# Patient Record
Sex: MALE | Race: WHITE | NOT HISPANIC OR LATINO | Employment: FULL TIME | URBAN - METROPOLITAN AREA
[De-identification: names, ages, dates, MRNs, and addresses within clinical notes are randomized per-mention and may not be internally consistent; named-entity substitution may affect disease eponyms.]

---

## 2017-06-22 ENCOUNTER — ALLSCRIPTS OFFICE VISIT (OUTPATIENT)
Dept: OTHER | Facility: OTHER | Age: 62
End: 2017-06-22

## 2017-09-12 ENCOUNTER — GENERIC CONVERSION - ENCOUNTER (OUTPATIENT)
Dept: OTHER | Facility: OTHER | Age: 62
End: 2017-09-12

## 2018-01-11 NOTE — MISCELLANEOUS
Chief Complaint  Chief Complaint Free Text Note Form: 09/13/2016, Telephone communication call completed, Patient will call back tomorrow to schedule a follow up with Dr Faisal Denson/[place]      History of Present Illness  TCM Communication [facility]: The patient is being contacted for follow-up after hospitalization  Hospital records were not available  He was hospitalized at [facility]  The dates of hospitalization:, date of admission: 09/02/2016, date of discharge: 09/10/2016  Diagnosis: fever, infection, and sepsis  He was discharged to home, home health care  Medications were not reviewed today  He did not schedule a follow up appointment  Follow-up appointments with other specialists: Dr Corinne Robins and Dr Jese Lyman  The patient is currently asymptomatic  Referrals Needed:  none  Counseling was provided to the patient  Topics counseled included activities of daily living and importance of compliance with treatment  Communication performed and completed by JITENDRA Denson/PARIS      Active Problems    1  Achalasia and cardiospasm (530 0) (K22 0)   2  Acid reflux disease (530 81) (K21 9)   3  Allergic rhinitis due to pollen (477 0) (J30 1)   4  Anemia (285 9) (D64 9)   5  Back pain (724 5) (M54 9)   6  Cervical spinal stenosis (723 0) (M48 02)   7  Chronic back pain greater than 3 months duration (724 5,338 29) (M54 9,G89 29)   8  Chronic use of opiate drugs therapeutic purposes (V58 69) (Z79 899)   9  Disc displacement, lumbar (722 10) (M51 26)   10  Displacement of lumbosacral intervertebral disc with myelopathy (722 73) (M51 06)   11  Generalized muscle weakness (728 87) (M62 81)   12  Generalized osteoarthritis of multiple sites (715 09) (M15 9)   13  Hereditary hemochromatosis (275 01) (E83 110)   14  Hypercholesterolemia (272 0) (E78 0)   15  Hypertension (401 9) (I10)   16  Insomnia (780 52) (G47 00)   17  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)   18   Obstructive sleep apnea syndrome (327 23) (G47 33)   19  Osteoarthrosis of hip (715 35) (M16 9)   20  Pain and swelling of lower leg (729 5,729 81) (M79 669,M79 89)    Past Medical History    1  History of Contreras's esophagus (530 85) (K22 70)   2  History of Cervical nerve root injury (953 0) (S14 2XXA)   3  History of Colon cancer screening (V76 51) (Z12 11)   4  History of Myalgia and myositis (729 1)    Surgical History    1  History of Appendectomy   2  History of Esophagogastric Fundoplasty Nissen Fundoplication   3  History of Laminectomy Lumbar   4  History of Small Bowel Resection   5  History of Surgery Vas Deferens Vasectomy   6  History of Total Hip Replacement Right    Family History  Mother    1  Family history of    2  Family history of Diabetes mellitus   3  Family history of depression (V17 0) (Z81 8)   4  Family history of Hypertension   5  Family history of Stroke  Father    6  Family history of Aneurysm   7  Family history of    6  Family history of brain aneurysm (V17 1) (Z82 49)   9  Family history of Stroke  Sibling    10  Family history of Hypertension   11  Family history of Spinal stenosis   12  Family history of Thyroid disorder  Brother    15  Family history of brain aneurysm (V17 1) (Z82 49)   14  Family history of depression (V17 0) (Z81 8)  Maternal Grandmother    15  Family history of Myocardial infarction  Paternal Grandfather    12  Family history of TIA (transient ischemic attack)  Family History    17  Family history of CAD (coronary artery disease)   25  Denied: Family history of Drug use    Social History    · Dental care, regularly   · Drinks coffee   · Exercises moderately 3 or more times a week   · Former smoker (V15 82) (U57 493)   ·    · No alcohol use   · No drug use   · Occupation   · Primary spoken language English   · Vegetarian diet (V49 89) (Z78 9)    Current Meds   1   Benicar HCT 40-25 MG Oral Tablet; TAKE 1 TABLET DAILY  Requested for: 43Frv6216;   Last Rx:97Lxm2751 Ordered 2  Hydrocodone-Acetaminophen  MG Oral Tablet; TAKE 1 TO 2 TABLETS EVERY 4   HOURS AS NEEDED FOR PAIN;   Therapy: 85Wtz7465 to (Evaluate:40Qwk3425); Last Rx:16Wqj9123 Ordered   3  Meloxicam 15 MG Oral Tablet; TAKE 1 TABLET DAILY WITH FOOD  Requested for:   06Erc1412; Last Rx:27Gsd1813 Ordered   4  Multi Vitamin Mens Oral Tablet; TAKE 1 TABLET DAILY; Therapy: (Teressata Modest) to Recorded   5  Omega-3-6-9 Oral Capsule; Take 1 capsule twice daily  Requested for: 16Apr2015; Last   Rx:43Fnl2048 Ordered   6  OxyCONTIN 10 MG Oral Tablet ER 12 Hour Abuse-Deterrent; TAKE 1 TABLET TWICE   DAILY; Therapy: 87Mlg1064 to (Evaluate:52Zku5383); Last Rx:34Eug1439 Ordered   7  OxyCONTIN 20 MG Oral Tablet ER 12 Hour Abuse-Deterrent; TAKE 1 TABLET EVERY 12   HOURS; Therapy: 08Cfs7749 to (Evaluate:61Apb1816); Last Rx:98Ouj6181 Ordered   8  Viagra 50 MG Oral Tablet; Therapy: (Oneta Modest) to Recorded   9  Wellbutrin  MG Oral Tablet Extended Release 24 Hour; TAKE 1 TABLET DAILY; Therapy: (Recorded:73Trk8072) to Recorded   10  Zantac 150 MG Oral Tablet; TAKE 1 TABLET AT BEDTIME; Therapy: (Teressata Modest) to Recorded   11  Zegerid  MG Oral Capsule; TAKE 1 CAPSULE DAILY; Therapy: (Teressata Modest) to Recorded   12  Zegerid  MG Oral Capsule; TAKE 1 CAPSULE DAILY; Therapy: (Recorded:02Xba9681) to Recorded    Allergies    1   No Known Drug Allergies    Signatures   Electronically signed by : Russ Padron MD; Sep 13 2016  9:07PM EST                       (Author)

## 2018-01-11 NOTE — RESULT NOTES
Message   PLEASE CALL   IRON STORES WERE OK   CAN STOP IRON     Verified Results  (1) CBC/PLT/DIFF 28Oct2016 12:00AM [facility]     Test Name Result Flag Reference   WHITE BLOOD CELL COUNT 5 1 Thousand/uL  3 8-10 8   RED BLOOD CELL COUNT 3 90 Million/uL L 4 20-5 80   HEMOGLOBIN 11 6 g/dL L 13 2-17 1   HEMATOCRIT 35 3 % L 38 5-50 0   MCV 90 5 fL  80 0-100 0   MCH 29 8 pg  27 0-33 0   MCHC 32 9 g/dL  32 0-36 0   RDW 14 9 %  11 0-15 0   PLATELET COUNT 664 Thousand/uL  140-400   MPV 8 2 fL  7 5-11 5   ABSOLUTE NEUTROPHILS 3029 cells/uL  5091-6326   ABSOLUTE LYMPHOCYTES 1112 cells/uL  850-3900   ABSOLUTE MONOCYTES 495 cells/uL  200-950   ABSOLUTE EOSINOPHILS 439 cells/uL     ABSOLUTE BASOPHILS 26 cells/uL  0-200   NEUTROPHILS 59 4 %     LYMPHOCYTES 21 8 %     MONOCYTES 9 7 %     EOSINOPHILS 8 6 %     BASOPHILS 0 5 %       (1) COMPREHENSIVE METABOLIC PANEL [id number] 91:18XN [facility]     Test Name Result Flag Reference   GLUCOSE 81 mg/dL  65-99   Fasting reference interval   UREA NITROGEN (BUN) 25 mg/dL  7-25   CREATININE 1 00 mg/dL  0 70-1 25   For patients >52years of age, the reference limit  for Creatinine is approximately 13% higher for people  identified as -American  eGFR NON-AFR   AMERICAN 81 mL/min/1 73m2  > OR = 60   eGFR AFRICAN AMERICAN 94 mL/min/1 73m2  > OR = 60   BUN/CREATININE RATIO   3-98   NOT APPLICABLE (calc)   SODIUM 139 mmol/L  135-146   POTASSIUM 4 6 mmol/L  3 5-5 3   CHLORIDE 103 mmol/L     CARBON DIOXIDE 26 mmol/L  20-31   CALCIUM 9 2 mg/dL  8 6-10 3   PROTEIN, TOTAL 6 3 g/dL  6 1-8 1   ALBUMIN 4 1 g/dL  3 6-5 1   GLOBULIN 2 2 g/dL (calc)  1 9-3 7   ALBUMIN/GLOBULIN RATIO 1 9 (calc)  1 0-2 5   BILIRUBIN, TOTAL 0 3 mg/dL  0 2-1 2   ALKALINE PHOSPHATASE 68 U/L     AST 28 U/L  10-35   ALT 25 U/L  9-46     (1) IRON 28Oct2016 12:00AM [facility]     Test Name Result Flag Reference   IRON, TOTAL 82 mcg/dL

## 2018-01-12 VITALS
RESPIRATION RATE: 18 BRPM | SYSTOLIC BLOOD PRESSURE: 120 MMHG | HEIGHT: 67 IN | DIASTOLIC BLOOD PRESSURE: 80 MMHG | WEIGHT: 15 LBS | BODY MASS INDEX: 2.35 KG/M2 | HEART RATE: 77 BPM | TEMPERATURE: 98.5 F

## 2018-01-18 NOTE — RESULT NOTES
Message   PLEASE CALL   REVIEWED BW   CHOL   GOOD NUMBER   LIVER FUNCTION, KIDNEY FUNCTION, THYROID CHECK   IRON STORES LOW   CONSIDER TAKING IRON SUPPLEMENT TWICE A WEEK   THANKS     Verified Results  (1) COMPREHENSIVE METABOLIC PANEL [id number] 47:41KP Lizabeth Mink     Test Name Result Flag Reference   GLUCOSE 81 mg/dL  65-99   Fasting reference interval   UREA NITROGEN (BUN) 25 mg/dL  7-25   CREATININE 1 31 mg/dL H 0 70-1 25   For patients >52years of age, the reference limit  for Creatinine is approximately 13% higher for people  identified as -American  eGFR NON-AFR  AMERICAN 58 mL/min/1 73m2 L > OR = 60   eGFR AFRICAN AMERICAN 68 mL/min/1 73m2  > OR = 60   BUN/CREATININE RATIO 19 (calc)  6-22   SODIUM 136 mmol/L  135-146   POTASSIUM 4 1 mmol/L  3 5-5 3   CHLORIDE 99 mmol/L     CARBON DIOXIDE 26 mmol/L  19-30   CALCIUM 9 6 mg/dL  8 6-10 3   PROTEIN, TOTAL 6 9 g/dL  6 1-8 1   ALBUMIN 4 6 g/dL  3 6-5 1   GLOBULIN 2 3 g/dL (calc)  1 9-3 7   ALBUMIN/GLOBULIN RATIO 2 0 (calc)  1 0-2 5   BILIRUBIN, TOTAL 0 7 mg/dL  0 2-1 2   ALKALINE PHOSPHATASE 61 U/L     AST 22 U/L  10-35   ALT 20 U/L  9-46     (1) IRON [id number] 12:00AM Lizabeth Mink     Test Name Result Flag Reference   IRON, TOTAL 81 mcg/dL       (1) LIPID PANEL, FASTING [id number] 12:00AM Lizabeth Mink     Test Name Result Flag Reference   CHOLESTEROL, TOTAL 165 mg/dL  125-200   HDL CHOLESTEROL 68 mg/dL  > OR = 40   TRIGLICERIDES 54 mg/dL  <970   LDL-CHOLESTEROL 86 mg/dL (calc)  <130   Desirable range <100 mg/dL for patients with CHD or  diabetes and <70 mg/dL for diabetic patients with  known heart disease  CHOL/HDLC RATIO 2 4 (calc)  < OR = 5 0   NON HDL CHOLESTEROL 97 mg/dL (calc)     Target for non-HDL cholesterol is 30 mg/dL higher than   LDL cholesterol target       (1) PSA (SCREEN) (Dx V76 44 Screen for Prostate Cancer) [id number] 12:00AM Lizabeth Mink     Test Name Result Flag Reference   PSA, TOTAL 2 1 ng/mL  < OR = 4 0   This test was performed using the Siemens  chemiluminescent method  Values obtained from  different assay methods cannot be used  interchangeably  PSA levels, regardless of  value, should not be interpreted as absolute  evidence of the presence or absence of disease  (Q) SED RATE BY MODIFIED WESTERGREN [id number] 12:00AM Loraine Saint     Test Name Result Flag Reference   SED RATE BY MODIFIED$FARHANAREN 9 mm/h  < OR = 20     (Q) CBC (H/H, RBC, INDICES, WBC, PLT) [id number] 12:00AM Loraine Saint     Test Name Result Flag Reference   WHITE BLOOD CELL COUNT 4 7 Thousand/uL  3 8-10 8   RED BLOOD CELL COUNT 3 81 Million/uL L 4 20-5 80   HEMOGLOBIN 11 0 g/dL L 13 2-17 1   HEMATOCRIT 34 0 % L 38 5-50 0   MCV 89 2 fL  80 0-100 0   MCH 28 9 pg  27 0-33 0   MCHC 32 4 g/dL  32 0-36 0   RDW 15 3 % H 11 0-15 0   PLATELET COUNT 658 Thousand/uL  140-400     (Q) TSH, 3RD GENERATION [id number] 12:00AM Loraine Saint     Test Name Result Flag Reference   TSH 0 78 mIU/L  0 40-4 50     *(Q) VITAMIN D, 25-HYDROXY, LC/MS/MS [id number] 12:00AM Loraine Saint     Test Name Result Flag Reference   VITAMIN D, 25-OH, TOTAL 39 ng/mL     Vitamin D Status         25-OH Vitamin D:     Deficiency:                    <20 ng/mL  Insufficiency:             20 - 29 ng/mL  Optimal:                 > or = 30 ng/mL     For 25-OH Vitamin D testing on patients on   D2-supplementation and patients for whom quantitation   of D2 and D3 fractions is required, the QuestAssureD(TM)  25-OH VIT D, (D2,D3), LC/MS/MS is recommended: order   code 10509 (patients >2yrs)  For more information on this test, go to:  http://education  SirenServ/faq/KED395  (This link is being provided for   informational/educational purposes only )     (1) OP ALIZA FERRITIN [id number] 12:00AM Loraine Saint     Test Name Result Flag Reference   FERRITIN 15 ng/mL L

## 2018-01-29 DIAGNOSIS — G89.4 CHRONIC PAIN DISORDER: Primary | ICD-10-CM

## 2018-01-29 RX ORDER — OXYCODONE HCL 10 MG/1
10 TABLET, FILM COATED, EXTENDED RELEASE ORAL EVERY 12 HOURS SCHEDULED
Qty: 60 TABLET | Refills: 0 | Status: SHIPPED | OUTPATIENT
Start: 2018-01-29 | End: 2018-01-30

## 2018-01-30 ENCOUNTER — TELEPHONE (OUTPATIENT)
Dept: FAMILY MEDICINE CLINIC | Facility: CLINIC | Age: 63
End: 2018-01-30

## 2018-01-30 DIAGNOSIS — G89.4 CHRONIC PAIN SYNDROME: Primary | ICD-10-CM

## 2018-01-30 RX ORDER — MELOXICAM 15 MG/1
1 TABLET ORAL DAILY
COMMUNITY
End: 2018-02-07 | Stop reason: SDUPTHER

## 2018-01-30 RX ORDER — OXYCODONE HCL 20 MG/1
1 TABLET, FILM COATED, EXTENDED RELEASE ORAL EVERY 12 HOURS
COMMUNITY
Start: 2014-12-22 | End: 2018-01-30 | Stop reason: SDUPTHER

## 2018-01-30 RX ORDER — BUPROPION HYDROCHLORIDE 300 MG/1
1 TABLET ORAL DAILY
COMMUNITY
End: 2019-04-18 | Stop reason: ALTCHOICE

## 2018-01-30 RX ORDER — OMEPRAZOLE AND SODIUM BICARBONATE 40; 1100 MG/1; MG/1
1 CAPSULE ORAL
COMMUNITY
End: 2019-04-12 | Stop reason: ALTCHOICE

## 2018-01-30 RX ORDER — OXYCODONE HCL 20 MG/1
20 TABLET, FILM COATED, EXTENDED RELEASE ORAL EVERY 12 HOURS
Qty: 60 TABLET | Refills: 0 | Status: SHIPPED | OUTPATIENT
Start: 2018-01-30 | End: 2018-02-26 | Stop reason: SDUPTHER

## 2018-01-30 RX ORDER — RANITIDINE 150 MG/1
1 TABLET ORAL
COMMUNITY
End: 2020-01-24 | Stop reason: HOSPADM

## 2018-01-30 RX ORDER — PNV NO.95/FERROUS FUM/FOLIC AC 28MG-0.8MG
1 TABLET ORAL DAILY
COMMUNITY
End: 2018-04-19 | Stop reason: ALTCHOICE

## 2018-01-30 RX ORDER — SILDENAFIL 50 MG/1
50 TABLET, FILM COATED ORAL AS NEEDED
COMMUNITY
End: 2022-03-31

## 2018-01-30 RX ORDER — HYDROCODONE BITARTRATE AND ACETAMINOPHEN 10; 325 MG/1; MG/1
2 TABLET ORAL EVERY 4 HOURS PRN
COMMUNITY
Start: 2014-12-22 | End: 2018-02-05 | Stop reason: SDUPTHER

## 2018-01-30 RX ORDER — OXYCODONE HCL 10 MG/1
1 TABLET, FILM COATED, EXTENDED RELEASE ORAL 2 TIMES DAILY
COMMUNITY
Start: 2014-12-22 | End: 2018-02-26 | Stop reason: SDUPTHER

## 2018-01-30 RX ORDER — OLMESARTAN MEDOXOMIL AND HYDROCHLOROTHIAZIDE 40/12.5 40; 12.5 MG/1; MG/1
1 TABLET ORAL DAILY
COMMUNITY
Start: 2017-09-03 | End: 2018-02-07 | Stop reason: SDUPTHER

## 2018-01-30 NOTE — TELEPHONE ENCOUNTER
HE SAID HE CALLED YESTERDAY AND ASKED FOR OXYCONTIN 10MG AND OXYCONTIN 20MG  WHEN HE GOT TO THE PHARM, HE RELIZED THAT HE HAD 2 SCRIPTS FOR OXYCONTIN 10MG AND NOT THE 20  CAN YOU WRITE FOR OXYCONTIN 20MG AND HE WILL BRING BACK THE 10MG TOMORROW    THANKS

## 2018-02-05 DIAGNOSIS — G89.29 CHRONIC BILATERAL LOW BACK PAIN WITH BILATERAL SCIATICA: Primary | ICD-10-CM

## 2018-02-05 DIAGNOSIS — M54.42 CHRONIC BILATERAL LOW BACK PAIN WITH BILATERAL SCIATICA: Primary | ICD-10-CM

## 2018-02-05 DIAGNOSIS — M54.41 CHRONIC BILATERAL LOW BACK PAIN WITH BILATERAL SCIATICA: Primary | ICD-10-CM

## 2018-02-05 RX ORDER — HYDROCODONE BITARTRATE AND ACETAMINOPHEN 10; 325 MG/1; MG/1
2 TABLET ORAL EVERY 4 HOURS PRN
Qty: 30 TABLET | Refills: 0 | Status: SHIPPED | OUTPATIENT
Start: 2018-02-05 | End: 2018-02-07 | Stop reason: SDUPTHER

## 2018-02-07 DIAGNOSIS — M54.42 CHRONIC BILATERAL LOW BACK PAIN WITH BILATERAL SCIATICA: ICD-10-CM

## 2018-02-07 DIAGNOSIS — I10 ESSENTIAL HYPERTENSION: Primary | ICD-10-CM

## 2018-02-07 DIAGNOSIS — G89.29 CHRONIC BILATERAL LOW BACK PAIN WITH BILATERAL SCIATICA: ICD-10-CM

## 2018-02-07 DIAGNOSIS — M54.41 CHRONIC BILATERAL LOW BACK PAIN WITH BILATERAL SCIATICA: ICD-10-CM

## 2018-02-08 RX ORDER — HYDROCODONE BITARTRATE AND ACETAMINOPHEN 10; 325 MG/1; MG/1
2 TABLET ORAL EVERY 4 HOURS PRN
Qty: 120 TABLET | Refills: 0 | Status: SHIPPED | OUTPATIENT
Start: 2018-02-08 | End: 2018-02-26 | Stop reason: SDUPTHER

## 2018-02-08 RX ORDER — OLMESARTAN MEDOXOMIL AND HYDROCHLOROTHIAZIDE 40/12.5 40; 12.5 MG/1; MG/1
1 TABLET ORAL DAILY
Qty: 90 TABLET | Refills: 1 | Status: SHIPPED | OUTPATIENT
Start: 2018-02-08 | End: 2018-08-09 | Stop reason: SDUPTHER

## 2018-02-08 RX ORDER — MELOXICAM 15 MG/1
15 TABLET ORAL DAILY
Qty: 90 TABLET | Refills: 0 | Status: SHIPPED | OUTPATIENT
Start: 2018-02-08 | End: 2018-02-15 | Stop reason: SDUPTHER

## 2018-02-15 DIAGNOSIS — M54.42 CHRONIC BILATERAL LOW BACK PAIN WITH BILATERAL SCIATICA: ICD-10-CM

## 2018-02-15 DIAGNOSIS — M54.41 CHRONIC BILATERAL LOW BACK PAIN WITH BILATERAL SCIATICA: ICD-10-CM

## 2018-02-15 DIAGNOSIS — G89.29 CHRONIC BILATERAL LOW BACK PAIN WITH BILATERAL SCIATICA: ICD-10-CM

## 2018-02-15 RX ORDER — MELOXICAM 15 MG/1
15 TABLET ORAL DAILY
Qty: 90 TABLET | Refills: 0 | Status: SHIPPED | OUTPATIENT
Start: 2018-02-15 | End: 2018-05-15 | Stop reason: SDUPTHER

## 2018-02-26 DIAGNOSIS — G89.4 CHRONIC PAIN SYNDROME: ICD-10-CM

## 2018-02-26 DIAGNOSIS — M54.42 CHRONIC BILATERAL LOW BACK PAIN WITH BILATERAL SCIATICA: ICD-10-CM

## 2018-02-26 DIAGNOSIS — G47.9 SLEEP DISTURBANCE: Primary | ICD-10-CM

## 2018-02-26 DIAGNOSIS — G89.29 CHRONIC BILATERAL LOW BACK PAIN WITH BILATERAL SCIATICA: ICD-10-CM

## 2018-02-26 DIAGNOSIS — M54.41 CHRONIC BILATERAL LOW BACK PAIN WITH BILATERAL SCIATICA: ICD-10-CM

## 2018-02-26 RX ORDER — HYDROCODONE BITARTRATE AND ACETAMINOPHEN 10; 325 MG/1; MG/1
1 TABLET ORAL EVERY 4 HOURS PRN
Qty: 120 TABLET | Refills: 0 | Status: SHIPPED | OUTPATIENT
Start: 2018-02-26 | End: 2018-03-27 | Stop reason: SDUPTHER

## 2018-02-26 RX ORDER — OXYCODONE HCL 20 MG/1
20 TABLET, FILM COATED, EXTENDED RELEASE ORAL EVERY 12 HOURS
Qty: 60 TABLET | Refills: 0 | Status: SHIPPED | OUTPATIENT
Start: 2018-02-26 | End: 2018-03-27 | Stop reason: SDUPTHER

## 2018-02-26 RX ORDER — OXYCODONE HCL 10 MG/1
10 TABLET, FILM COATED, EXTENDED RELEASE ORAL 2 TIMES DAILY
Qty: 60 TABLET | Refills: 0 | Status: SHIPPED | OUTPATIENT
Start: 2018-02-26 | End: 2018-03-27 | Stop reason: SDUPTHER

## 2018-03-27 DIAGNOSIS — G89.29 CHRONIC BILATERAL LOW BACK PAIN WITH BILATERAL SCIATICA: ICD-10-CM

## 2018-03-27 DIAGNOSIS — M54.41 CHRONIC BILATERAL LOW BACK PAIN WITH BILATERAL SCIATICA: ICD-10-CM

## 2018-03-27 DIAGNOSIS — G89.4 CHRONIC PAIN SYNDROME: ICD-10-CM

## 2018-03-27 DIAGNOSIS — M54.42 CHRONIC BILATERAL LOW BACK PAIN WITH BILATERAL SCIATICA: ICD-10-CM

## 2018-03-27 RX ORDER — HYDROCODONE BITARTRATE AND ACETAMINOPHEN 10; 325 MG/1; MG/1
1 TABLET ORAL EVERY 4 HOURS PRN
Qty: 120 TABLET | Refills: 0 | Status: SHIPPED | OUTPATIENT
Start: 2018-03-27 | End: 2018-04-26 | Stop reason: SDUPTHER

## 2018-03-27 RX ORDER — OXYCODONE HCL 10 MG/1
10 TABLET, FILM COATED, EXTENDED RELEASE ORAL 2 TIMES DAILY
Qty: 60 TABLET | Refills: 0 | Status: SHIPPED | OUTPATIENT
Start: 2018-03-27 | End: 2018-04-26 | Stop reason: SDUPTHER

## 2018-03-27 RX ORDER — OXYCODONE HCL 20 MG/1
20 TABLET, FILM COATED, EXTENDED RELEASE ORAL EVERY 12 HOURS
Qty: 60 TABLET | Refills: 0 | Status: SHIPPED | OUTPATIENT
Start: 2018-03-27 | End: 2018-04-26 | Stop reason: SDUPTHER

## 2018-04-19 ENCOUNTER — OFFICE VISIT (OUTPATIENT)
Dept: FAMILY MEDICINE CLINIC | Facility: CLINIC | Age: 63
End: 2018-04-19
Payer: COMMERCIAL

## 2018-04-19 VITALS
TEMPERATURE: 98.4 F | OXYGEN SATURATION: 98 % | HEIGHT: 68 IN | RESPIRATION RATE: 16 BRPM | SYSTOLIC BLOOD PRESSURE: 140 MMHG | WEIGHT: 176 LBS | BODY MASS INDEX: 26.67 KG/M2 | DIASTOLIC BLOOD PRESSURE: 88 MMHG | HEART RATE: 68 BPM

## 2018-04-19 DIAGNOSIS — M16.9 OSTEOARTHROSIS OF HIP: ICD-10-CM

## 2018-04-19 DIAGNOSIS — I10 ESSENTIAL HYPERTENSION: ICD-10-CM

## 2018-04-19 DIAGNOSIS — F11.20 OPIOID TYPE DEPENDENCE, CONTINUOUS (HCC): ICD-10-CM

## 2018-04-19 DIAGNOSIS — G89.4 CHRONIC PAIN SYNDROME: ICD-10-CM

## 2018-04-19 DIAGNOSIS — M15.9 GENERALIZED OSTEOARTHRITIS OF MULTIPLE SITES: Primary | ICD-10-CM

## 2018-04-19 PROCEDURE — 99214 OFFICE O/P EST MOD 30 MIN: CPT | Performed by: FAMILY MEDICINE

## 2018-04-19 RX ORDER — RANITIDINE 300 MG/1
TABLET ORAL
COMMUNITY
Start: 2012-08-26 | End: 2018-04-19 | Stop reason: ALTCHOICE

## 2018-04-19 RX ORDER — OMEPRAZOLE AND SODIUM BICARBONATE 40; 1100 MG/1; MG/1
CAPSULE ORAL
COMMUNITY
Start: 2012-06-14 | End: 2018-04-19 | Stop reason: SDUPTHER

## 2018-04-19 NOTE — PATIENT INSTRUCTIONS
CONTINUE CURRENT TREATMENT PLAN  RV 4 M    DISCUSSED THE RISKS AND BENEFITS OF CHRONIC NARCOTIC USE FOR PAIN  REVIEWED PRECAUTIONS WITH USE INCLUDING IMPAIRMENT OF MENTAL STATUS, AND INCREASED REACTION TIME  RECOMMENDED NOT OPERATING MACHINERY WHILE USING THIS CLASS OF MEDICATION  DISCUSSED THE POTENTIATION OF SIDE EFFECTS WITH USE OF OTHER MEDICATIONS AND ALCOHOL  REVIEWED ALTERNATIVE TREATMENTS    PATIENT RELATES UNDERSTANDING OF THESE ISSUES AND WOULD LIKE TO CONTINUE CURRENT TREATMENT    PATIENT IS AWARE THAT MEDICATION USE WILL BE MONITORED AND OFFICE VISITS WILL BE REQUIRED EVERY 3 MONTHS    [place]  AWARE WAS REVIEWED

## 2018-04-20 NOTE — ASSESSMENT & PLAN NOTE
STABLE  DENIES ANY JOINT SWELLING OR REDNESS  JOINT STIFFNESS PRESENT  S/P L HIP REPLACEMENT, R HIP PENDING  PAIN MANAGEMENT ADEQUATE    - CONTINUE CURRENT MANAGEMENT  - MEDICATION AS DIRECTED  - CALL / RETURN IF SYMPTOMS WORSEN

## 2018-04-20 NOTE — PROGRESS NOTES
Assessment/Plan:    Problem List Items Addressed This Visit     Generalized osteoarthritis of multiple sites - Primary     STABLE  DENIES ANY JOINT SWELLING OR REDNESS  JOINT STIFFNESS PRESENT  S/P L HIP REPLACEMENT, R HIP PENDING  PAIN MANAGEMENT ADEQUATE    - CONTINUE CURRENT MANAGEMENT  - MEDICATION AS DIRECTED  - CALL / RETURN IF SYMPTOMS WORSEN           Hypertension     STABLE  DENIES ANY CP, SOB, PALPITATIONS, OR HEADACHE  NOTES NO WATER RETENTION  COMPLIANT WITH MEDICATION  NO CONCERNS    - CONTINUE CURRENT TREATMENT PLAN  - MONITOR DIETARY SODIUM INTAKE  - ENCOURAGE PHYSICAL ACTIVITY  - RV 6 MONTHS         Osteoarthrosis of hip     R HIP REPLACEMENT PENDING         Chronic pain syndrome     PAIN MANAGEMENT ADEQUATE    DISCUSSED THE RISKS AND BENEFITS OF CHRONIC NARCOTIC USE FOR PAIN  REVIEWED PRECAUTIONS WITH USE INCLUDING IMPAIRMENT OF MENTAL STATUS, AND INCREASED REACTION TIME  RECOMMENDED NOT OPERATING MACHINERY WHILE USING THIS CLASS OF MEDICATION  DISCUSSED THE POTENTIATION OF SIDE EFFECTS WITH USE OF OTHER MEDICATIONS AND ALCOHOL  REVIEWED ALTERNATIVE TREATMENTS    PATIENT RELATES UNDERSTANDING OF THESE ISSUES AND WOULD LIKE TO CONTINUE CURRENT TREATMENT    PATIENT IS AWARE THAT MEDICATION USE WILL BE MONITORED AND OFFICE VISITS WILL BE REQUIRED EVERY 3 - [address] [facility]  AWARE WAS REVIEWED         Opioid type dependence, continuous ([facility] [address] )          Patient Instructions   CONTINUE CURRENT TREATMENT PLAN  RV 4 M    DISCUSSED THE RISKS AND BENEFITS OF CHRONIC NARCOTIC USE FOR PAIN  REVIEWED PRECAUTIONS WITH USE INCLUDING IMPAIRMENT OF MENTAL STATUS, AND INCREASED REACTION TIME  RECOMMENDED NOT OPERATING MACHINERY WHILE USING THIS CLASS OF MEDICATION  DISCUSSED THE POTENTIATION OF SIDE EFFECTS WITH USE OF OTHER MEDICATIONS AND ALCOHOL  REVIEWED ALTERNATIVE TREATMENTS    PATIENT RELATES UNDERSTANDING OF THESE ISSUES AND WOULD LIKE TO CONTINUE CURRENT TREATMENT    PATIENT IS AWARE THAT MEDICATION USE WILL BE MONITORED AND OFFICE VISITS WILL BE REQUIRED EVERY 3 MONTHS    [place]  AWARE WAS REVIEWED      Return in about 4 months (around 8/19/2018) for Recheck  Subjective:      Patient ID: Arthur Samuels is a 61 y o  male  Chief Complaint   Patient presents with    medication consult       REVIEWED MEDICAL ISSUES        The following portions of the patient's history were reviewed and updated as appropriate: allergies, current medications, past family history, past medical history, past social history, past surgical history and problem list     Review of Systems   Constitutional: Negative for chills, fatigue and fever  HENT: Negative for congestion, ear discharge, ear pain, mouth sores, postnasal drip, sore throat and trouble swallowing  Eyes: Negative for pain, discharge and visual disturbance  Respiratory: Negative for cough, shortness of breath and wheezing  Cardiovascular: Negative for chest pain, palpitations and leg swelling  Gastrointestinal: Negative for abdominal distention, abdominal pain, blood in stool, diarrhea and nausea  Endocrine: Negative for polydipsia, polyphagia and polyuria  Genitourinary: Negative for dysuria, frequency, hematuria and urgency  Musculoskeletal: Positive for arthralgias, back pain, gait problem, myalgias and neck stiffness  Negative for joint swelling  Skin: Negative for pallor and rash  Neurological: Negative for dizziness, syncope, speech difficulty, weakness, light-headedness, numbness and headaches  Hematological: Negative for adenopathy  Psychiatric/Behavioral: Negative for behavioral problems, confusion and sleep disturbance  The patient is not nervous/anxious            Current Outpatient Prescriptions   Medication Sig Dispense Refill    buPROPion (WELLBUTRIN XL) 300 mg 24 hr tablet Take 1 tablet by mouth daily      HYDROcodone-acetaminophen (NORCO)  mg per tablet Take 1 tablet by mouth every 4 (four) hours as needed (PAIN) Earliest Fill Date: 3/27/18 Max Daily Amount: 6 tablets 120 tablet 0    meloxicam (MOBIC) 15 mg tablet Take 1 tablet (15 mg total) by mouth daily 90 tablet 0    olmesartan-hydrochlorothiazide (BENICAR HCT) 40-12 5 MG per tablet Take 1 tablet by mouth daily 90 tablet 1    omeprazole-sodium bicarbonate (ZEGERID)  MG per capsule Take 1 capsule by mouth      oxyCODONE (OXYCONTIN) 10 mg 12 hr tablet Take 1 tablet (10 mg total) by mouth 2 (two) times a day Earliest Fill Date: 3/27/18 Max Daily Amount: 20 mg 60 tablet 0    oxyCODONE (OXYCONTIN) 20 mg 12 hr tablet Take 1 tablet (20 mg total) by mouth every 12 (twelve) hours Earliest Fill Date: 3/27/18 Max Daily Amount: 40 mg 60 tablet 0    ranitidine (ZANTAC) 150 mg tablet Take 1 tablet by mouth      sildenafil (VIAGRA) 50 MG tablet Take by mouth      Suvorexant (BELSOMRA) 15 MG TABS Take 1 tablet (15 mg total) by mouth daily at bedtime 30 tablet 0     No current facility-administered medications for this visit  Objective:    /88 (BP Location: Right arm, Patient Position: Sitting, Cuff Size: Standard)   Pulse 68   Temp 98 4 °F (36 9 °C) (Temporal)   Resp 16   Ht 5' 7 5" (1 715 m)   Wt 79 8 kg (176 lb)   SpO2 98%   BMI 27 16 kg/m²        Physical Exam   Constitutional: He is oriented to person, place, and time  He appears well-developed and well-nourished  HENT:   Head: Normocephalic and atraumatic  Eyes: Conjunctivae and EOM are normal  Pupils are equal, round, and reactive to light  Right eye exhibits no discharge  Left eye exhibits no discharge  Neck: Neck supple  No JVD present  No thyromegaly present  Cardiovascular: Normal rate, regular rhythm and normal heart sounds  No murmur heard  Pulmonary/Chest: Effort normal and breath sounds normal  He has no wheezes  He has no rales  Abdominal: Soft  Bowel sounds are normal  He exhibits no mass  There is no hepatosplenomegaly  There is no tenderness   There is no rebound, no guarding and no CVA tenderness  Musculoskeletal: Normal range of motion  He exhibits tenderness  He exhibits no edema or deformity  SAMANTHA DJD CHANGES   Lymphadenopathy:     He has no cervical adenopathy  He has no axillary adenopathy  Neurological: He is alert and oriented to person, place, and time  Skin: Skin is warm and dry  No rash noted  No erythema  Psychiatric: He has a normal mood and affect   His behavior is normal  Judgment and thought content normal               Deshawn Koehler MD

## 2018-04-20 NOTE — ASSESSMENT & PLAN NOTE
PAIN MANAGEMENT ADEQUATE    DISCUSSED THE RISKS AND BENEFITS OF CHRONIC NARCOTIC USE FOR PAIN  REVIEWED PRECAUTIONS WITH USE INCLUDING IMPAIRMENT OF MENTAL STATUS, AND INCREASED REACTION TIME  RECOMMENDED NOT OPERATING MACHINERY WHILE USING THIS CLASS OF MEDICATION  DISCUSSED THE POTENTIATION OF SIDE EFFECTS WITH USE OF OTHER MEDICATIONS AND ALCOHOL  REVIEWED ALTERNATIVE TREATMENTS    PATIENT RELATES UNDERSTANDING OF THESE ISSUES AND WOULD LIKE TO CONTINUE CURRENT TREATMENT    PATIENT IS AWARE THAT MEDICATION USE WILL BE MONITORED AND OFFICE VISITS WILL BE REQUIRED EVERY 3 - [address] [facility]  AWARE WAS REVIEWED

## 2018-04-22 DIAGNOSIS — M54.41 CHRONIC BILATERAL LOW BACK PAIN WITH BILATERAL SCIATICA: ICD-10-CM

## 2018-04-22 DIAGNOSIS — G89.4 CHRONIC PAIN SYNDROME: ICD-10-CM

## 2018-04-22 DIAGNOSIS — M54.42 CHRONIC BILATERAL LOW BACK PAIN WITH BILATERAL SCIATICA: ICD-10-CM

## 2018-04-22 DIAGNOSIS — G89.29 CHRONIC BILATERAL LOW BACK PAIN WITH BILATERAL SCIATICA: ICD-10-CM

## 2018-04-22 RX ORDER — HYDROCODONE BITARTRATE AND ACETAMINOPHEN 10; 325 MG/1; MG/1
1 TABLET ORAL EVERY 4 HOURS PRN
Qty: 120 TABLET | Refills: 0 | Status: CANCELLED | OUTPATIENT
Start: 2018-04-22

## 2018-04-22 RX ORDER — OXYCODONE HCL 10 MG/1
10 TABLET, FILM COATED, EXTENDED RELEASE ORAL 2 TIMES DAILY
Qty: 60 TABLET | Refills: 0 | Status: CANCELLED | OUTPATIENT
Start: 2018-04-22

## 2018-04-22 RX ORDER — OXYCODONE HCL 20 MG/1
20 TABLET, FILM COATED, EXTENDED RELEASE ORAL EVERY 12 HOURS
Qty: 60 TABLET | Refills: 0 | Status: CANCELLED | OUTPATIENT
Start: 2018-04-22

## 2018-04-26 DIAGNOSIS — G89.4 CHRONIC PAIN SYNDROME: ICD-10-CM

## 2018-04-26 DIAGNOSIS — M54.41 CHRONIC BILATERAL LOW BACK PAIN WITH BILATERAL SCIATICA: ICD-10-CM

## 2018-04-26 DIAGNOSIS — M54.42 CHRONIC BILATERAL LOW BACK PAIN WITH BILATERAL SCIATICA: ICD-10-CM

## 2018-04-26 DIAGNOSIS — G89.29 CHRONIC BILATERAL LOW BACK PAIN WITH BILATERAL SCIATICA: ICD-10-CM

## 2018-04-26 RX ORDER — HYDROCODONE BITARTRATE AND ACETAMINOPHEN 10; 325 MG/1; MG/1
1 TABLET ORAL EVERY 4 HOURS PRN
Qty: 120 TABLET | Refills: 0 | Status: SHIPPED | OUTPATIENT
Start: 2018-04-26 | End: 2018-05-23 | Stop reason: SDUPTHER

## 2018-04-26 RX ORDER — OXYCODONE HCL 20 MG/1
20 TABLET, FILM COATED, EXTENDED RELEASE ORAL EVERY 12 HOURS
Qty: 60 TABLET | Refills: 0 | Status: SHIPPED | OUTPATIENT
Start: 2018-04-26 | End: 2018-05-23 | Stop reason: SDUPTHER

## 2018-04-26 RX ORDER — OXYCODONE HCL 10 MG/1
10 TABLET, FILM COATED, EXTENDED RELEASE ORAL 2 TIMES DAILY
Qty: 60 TABLET | Refills: 0 | Status: SHIPPED | OUTPATIENT
Start: 2018-04-26 | End: 2018-05-23 | Stop reason: SDUPTHER

## 2018-05-09 DIAGNOSIS — G47.9 SLEEP DISTURBANCE: ICD-10-CM

## 2018-05-15 DIAGNOSIS — G89.29 CHRONIC BILATERAL LOW BACK PAIN WITH BILATERAL SCIATICA: ICD-10-CM

## 2018-05-15 DIAGNOSIS — M54.42 CHRONIC BILATERAL LOW BACK PAIN WITH BILATERAL SCIATICA: ICD-10-CM

## 2018-05-15 DIAGNOSIS — M54.41 CHRONIC BILATERAL LOW BACK PAIN WITH BILATERAL SCIATICA: ICD-10-CM

## 2018-05-15 RX ORDER — MELOXICAM 15 MG/1
15 TABLET ORAL DAILY
Qty: 90 TABLET | Refills: 0 | Status: SHIPPED | OUTPATIENT
Start: 2018-05-15 | End: 2018-09-16 | Stop reason: SDUPTHER

## 2018-05-23 DIAGNOSIS — G89.29 CHRONIC BILATERAL LOW BACK PAIN WITH BILATERAL SCIATICA: ICD-10-CM

## 2018-05-23 DIAGNOSIS — M54.42 CHRONIC BILATERAL LOW BACK PAIN WITH BILATERAL SCIATICA: ICD-10-CM

## 2018-05-23 DIAGNOSIS — M54.41 CHRONIC BILATERAL LOW BACK PAIN WITH BILATERAL SCIATICA: ICD-10-CM

## 2018-05-23 DIAGNOSIS — G89.4 CHRONIC PAIN SYNDROME: ICD-10-CM

## 2018-05-23 RX ORDER — HYDROCODONE BITARTRATE AND ACETAMINOPHEN 10; 325 MG/1; MG/1
1 TABLET ORAL EVERY 4 HOURS PRN
Qty: 120 TABLET | Refills: 0 | Status: SHIPPED | OUTPATIENT
Start: 2018-05-23 | End: 2018-06-22 | Stop reason: SDUPTHER

## 2018-05-23 RX ORDER — OXYCODONE HCL 10 MG/1
10 TABLET, FILM COATED, EXTENDED RELEASE ORAL 2 TIMES DAILY
Qty: 60 TABLET | Refills: 0 | Status: SHIPPED | OUTPATIENT
Start: 2018-05-23 | End: 2018-06-22 | Stop reason: SDUPTHER

## 2018-05-23 RX ORDER — OXYCODONE HCL 20 MG/1
20 TABLET, FILM COATED, EXTENDED RELEASE ORAL EVERY 12 HOURS
Qty: 60 TABLET | Refills: 0 | Status: SHIPPED | OUTPATIENT
Start: 2018-05-23 | End: 2018-06-22 | Stop reason: SDUPTHER

## 2018-06-11 ENCOUNTER — TELEPHONE (OUTPATIENT)
Dept: FAMILY MEDICINE CLINIC | Facility: CLINIC | Age: 63
End: 2018-06-11

## 2018-06-11 NOTE — TELEPHONE ENCOUNTER
He is having surgery June 28 at [facility] R hip revision  She would like you to call her back to go over his medications    Dr Kemi La [phone]

## 2018-06-22 DIAGNOSIS — G89.29 CHRONIC BILATERAL LOW BACK PAIN WITH BILATERAL SCIATICA: ICD-10-CM

## 2018-06-22 DIAGNOSIS — G89.4 CHRONIC PAIN SYNDROME: ICD-10-CM

## 2018-06-22 DIAGNOSIS — G47.9 SLEEP DISTURBANCE: ICD-10-CM

## 2018-06-22 DIAGNOSIS — M54.41 CHRONIC BILATERAL LOW BACK PAIN WITH BILATERAL SCIATICA: ICD-10-CM

## 2018-06-22 DIAGNOSIS — M54.42 CHRONIC BILATERAL LOW BACK PAIN WITH BILATERAL SCIATICA: ICD-10-CM

## 2018-06-24 DIAGNOSIS — G89.29 CHRONIC BILATERAL LOW BACK PAIN WITH BILATERAL SCIATICA: ICD-10-CM

## 2018-06-24 DIAGNOSIS — G89.4 CHRONIC PAIN SYNDROME: ICD-10-CM

## 2018-06-24 DIAGNOSIS — M54.41 CHRONIC BILATERAL LOW BACK PAIN WITH BILATERAL SCIATICA: ICD-10-CM

## 2018-06-24 DIAGNOSIS — M54.42 CHRONIC BILATERAL LOW BACK PAIN WITH BILATERAL SCIATICA: ICD-10-CM

## 2018-06-24 RX ORDER — OXYCODONE HCL 20 MG/1
20 TABLET, FILM COATED, EXTENDED RELEASE ORAL EVERY 12 HOURS
Qty: 60 TABLET | Refills: 0 | Status: SHIPPED | OUTPATIENT
Start: 2018-06-24 | End: 2018-06-25 | Stop reason: SDUPTHER

## 2018-06-24 RX ORDER — OXYCODONE HCL 10 MG/1
10 TABLET, FILM COATED, EXTENDED RELEASE ORAL 2 TIMES DAILY
Qty: 60 TABLET | Refills: 0 | Status: SHIPPED | OUTPATIENT
Start: 2018-06-24 | End: 2018-06-25 | Stop reason: SDUPTHER

## 2018-06-24 RX ORDER — HYDROCODONE BITARTRATE AND ACETAMINOPHEN 10; 325 MG/1; MG/1
1 TABLET ORAL EVERY 4 HOURS PRN
Qty: 120 TABLET | Refills: 0 | Status: SHIPPED | OUTPATIENT
Start: 2018-06-24 | End: 2018-06-25 | Stop reason: SDUPTHER

## 2018-06-25 DIAGNOSIS — M54.41 CHRONIC BILATERAL LOW BACK PAIN WITH BILATERAL SCIATICA: ICD-10-CM

## 2018-06-25 DIAGNOSIS — G89.29 CHRONIC BILATERAL LOW BACK PAIN WITH BILATERAL SCIATICA: ICD-10-CM

## 2018-06-25 DIAGNOSIS — M54.42 CHRONIC BILATERAL LOW BACK PAIN WITH BILATERAL SCIATICA: ICD-10-CM

## 2018-06-25 DIAGNOSIS — G47.9 SLEEP DISTURBANCE: ICD-10-CM

## 2018-06-25 DIAGNOSIS — G89.4 CHRONIC PAIN SYNDROME: ICD-10-CM

## 2018-06-25 RX ORDER — OXYCODONE HCL 10 MG/1
10 TABLET, FILM COATED, EXTENDED RELEASE ORAL 2 TIMES DAILY
Qty: 60 TABLET | Refills: 0 | Status: CANCELLED | OUTPATIENT
Start: 2018-06-25

## 2018-06-25 RX ORDER — OXYCODONE HCL 10 MG/1
TABLET, FILM COATED, EXTENDED RELEASE ORAL
Qty: 60 TABLET | Refills: 0 | OUTPATIENT
Start: 2018-06-25

## 2018-06-25 RX ORDER — HYDROCODONE BITARTRATE AND ACETAMINOPHEN 10; 325 MG/1; MG/1
1 TABLET ORAL EVERY 4 HOURS PRN
Qty: 120 TABLET | Refills: 0 | Status: SHIPPED | OUTPATIENT
Start: 2018-06-25 | End: 2018-06-25 | Stop reason: SDUPTHER

## 2018-06-25 RX ORDER — OXYCODONE HCL 10 MG/1
10 TABLET, FILM COATED, EXTENDED RELEASE ORAL 2 TIMES DAILY
Qty: 60 TABLET | Refills: 0 | Status: SHIPPED | OUTPATIENT
Start: 2018-06-25 | End: 2018-07-22 | Stop reason: SDUPTHER

## 2018-06-25 RX ORDER — HYDROCODONE BITARTRATE AND ACETAMINOPHEN 10; 325 MG/1; MG/1
1 TABLET ORAL EVERY 4 HOURS PRN
Qty: 120 TABLET | Refills: 0 | Status: SHIPPED | OUTPATIENT
Start: 2018-06-25 | End: 2018-07-22 | Stop reason: SDUPTHER

## 2018-06-25 RX ORDER — OXYCODONE HCL 10 MG/1
10 TABLET, FILM COATED, EXTENDED RELEASE ORAL 2 TIMES DAILY
Qty: 60 TABLET | Refills: 0 | OUTPATIENT
Start: 2018-06-25

## 2018-06-25 RX ORDER — OXYCODONE HCL 20 MG/1
20 TABLET, FILM COATED, EXTENDED RELEASE ORAL EVERY 12 HOURS
Qty: 60 TABLET | Refills: 0 | Status: SHIPPED | OUTPATIENT
Start: 2018-06-25 | End: 2018-07-22 | Stop reason: SDUPTHER

## 2018-06-25 RX ORDER — OXYCODONE HCL 20 MG/1
20 TABLET, FILM COATED, EXTENDED RELEASE ORAL EVERY 12 HOURS
Qty: 60 TABLET | Refills: 0 | OUTPATIENT
Start: 2018-06-25

## 2018-06-25 RX ORDER — OXYCODONE HCL 20 MG/1
TABLET, FILM COATED, EXTENDED RELEASE ORAL
Qty: 60 TABLET | Refills: 0 | OUTPATIENT
Start: 2018-06-25

## 2018-06-25 RX ORDER — HYDROCODONE BITARTRATE AND ACETAMINOPHEN 10; 325 MG/1; MG/1
1 TABLET ORAL EVERY 4 HOURS PRN
Qty: 120 TABLET | Refills: 0 | Status: CANCELLED | OUTPATIENT
Start: 2018-06-25

## 2018-06-25 RX ORDER — OXYCODONE HCL 20 MG/1
20 TABLET, FILM COATED, EXTENDED RELEASE ORAL EVERY 12 HOURS
Qty: 60 TABLET | Refills: 0 | Status: CANCELLED | OUTPATIENT
Start: 2018-06-25

## 2018-06-25 NOTE — TELEPHONE ENCOUNTER
From: Dominique Bernal  Sent: 6/22/2018 5:50 AM EDT  Subject: Medication Renewal Request    Dominique Bernal would like a refill of the following medications:     Suvorexant (BELSOMRA) 15 MG TABS Saba Montano MD]     oxyCODONE (OXYCONTIN) 10 mg 12 hr tablet Saba Montano MD]     HYDROcodone-acetaminophen (NORCO)  mg per tablet Saba Montano MD]     oxyCODONE (OXYCONTIN) 20 mg 12 hr tablet Saba Montano MD]    Preferred pharmacy: [facility]

## 2018-07-19 DIAGNOSIS — G89.29 CHRONIC BILATERAL LOW BACK PAIN WITH BILATERAL SCIATICA: ICD-10-CM

## 2018-07-19 DIAGNOSIS — M54.41 CHRONIC BILATERAL LOW BACK PAIN WITH BILATERAL SCIATICA: ICD-10-CM

## 2018-07-19 DIAGNOSIS — G89.4 CHRONIC PAIN SYNDROME: ICD-10-CM

## 2018-07-19 DIAGNOSIS — M54.42 CHRONIC BILATERAL LOW BACK PAIN WITH BILATERAL SCIATICA: ICD-10-CM

## 2018-07-19 NOTE — TELEPHONE ENCOUNTER
From: Leah Patel  Sent: 7/19/2018 8:20 AM EDT  Subject: Medication Renewal Request    Leah Patel would like a refill of the following medications:     oxyCODONE (OXYCONTIN) 20 mg 12 hr tablet Florin Murcia MD]     oxyCODONE (OXYCONTIN) 10 mg 12 hr tablet Florin Murcia MD]     HYDROcodone-acetaminophen (NORCO)  mg per tablet Florin Murcia MD]    Preferred pharmacy: [facility]

## 2018-07-22 DIAGNOSIS — G89.29 CHRONIC BILATERAL LOW BACK PAIN WITH BILATERAL SCIATICA: ICD-10-CM

## 2018-07-22 DIAGNOSIS — M54.41 CHRONIC BILATERAL LOW BACK PAIN WITH BILATERAL SCIATICA: ICD-10-CM

## 2018-07-22 DIAGNOSIS — G89.4 CHRONIC PAIN SYNDROME: ICD-10-CM

## 2018-07-22 DIAGNOSIS — M54.42 CHRONIC BILATERAL LOW BACK PAIN WITH BILATERAL SCIATICA: ICD-10-CM

## 2018-07-22 RX ORDER — OXYCODONE HCL 10 MG/1
10 TABLET, FILM COATED, EXTENDED RELEASE ORAL 2 TIMES DAILY
Qty: 60 TABLET | Refills: 0 | Status: SHIPPED | OUTPATIENT
Start: 2018-07-22 | End: 2018-07-23 | Stop reason: SDUPTHER

## 2018-07-22 RX ORDER — OXYCODONE HCL 20 MG/1
20 TABLET, FILM COATED, EXTENDED RELEASE ORAL EVERY 12 HOURS
Qty: 60 TABLET | Refills: 0 | Status: CANCELLED | OUTPATIENT
Start: 2018-07-22

## 2018-07-22 RX ORDER — HYDROCODONE BITARTRATE AND ACETAMINOPHEN 10; 325 MG/1; MG/1
1 TABLET ORAL EVERY 4 HOURS PRN
Qty: 120 TABLET | Refills: 0 | Status: SHIPPED | OUTPATIENT
Start: 2018-07-22 | End: 2018-07-23 | Stop reason: SDUPTHER

## 2018-07-22 RX ORDER — OXYCODONE HCL 20 MG/1
20 TABLET, FILM COATED, EXTENDED RELEASE ORAL EVERY 12 HOURS
Qty: 60 TABLET | Refills: 0 | Status: SHIPPED | OUTPATIENT
Start: 2018-07-22 | End: 2018-07-23 | Stop reason: SDUPTHER

## 2018-07-22 RX ORDER — HYDROCODONE BITARTRATE AND ACETAMINOPHEN 10; 325 MG/1; MG/1
1 TABLET ORAL EVERY 4 HOURS PRN
Qty: 120 TABLET | Refills: 0 | Status: CANCELLED | OUTPATIENT
Start: 2018-07-22

## 2018-07-22 RX ORDER — OXYCODONE HCL 10 MG/1
10 TABLET, FILM COATED, EXTENDED RELEASE ORAL 2 TIMES DAILY
Qty: 60 TABLET | Refills: 0 | Status: CANCELLED | OUTPATIENT
Start: 2018-07-22

## 2018-07-23 DIAGNOSIS — G89.29 CHRONIC BILATERAL LOW BACK PAIN WITH BILATERAL SCIATICA: ICD-10-CM

## 2018-07-23 DIAGNOSIS — G89.4 CHRONIC PAIN SYNDROME: ICD-10-CM

## 2018-07-23 DIAGNOSIS — M54.41 CHRONIC BILATERAL LOW BACK PAIN WITH BILATERAL SCIATICA: ICD-10-CM

## 2018-07-23 DIAGNOSIS — M54.42 CHRONIC BILATERAL LOW BACK PAIN WITH BILATERAL SCIATICA: ICD-10-CM

## 2018-07-23 RX ORDER — OXYCODONE HCL 10 MG/1
10 TABLET, FILM COATED, EXTENDED RELEASE ORAL 2 TIMES DAILY
Qty: 60 TABLET | Refills: 0 | Status: SHIPPED | OUTPATIENT
Start: 2018-07-23 | End: 2018-08-20 | Stop reason: SDUPTHER

## 2018-07-23 RX ORDER — HYDROCODONE BITARTRATE AND ACETAMINOPHEN 10; 325 MG/1; MG/1
1 TABLET ORAL EVERY 4 HOURS PRN
Qty: 120 TABLET | Refills: 0 | Status: SHIPPED | OUTPATIENT
Start: 2018-07-23 | End: 2018-08-20 | Stop reason: SDUPTHER

## 2018-07-23 RX ORDER — OXYCODONE HCL 20 MG/1
20 TABLET, FILM COATED, EXTENDED RELEASE ORAL EVERY 12 HOURS
Qty: 60 TABLET | Refills: 0 | Status: SHIPPED | OUTPATIENT
Start: 2018-07-23 | End: 2018-08-20 | Stop reason: SDUPTHER

## 2018-07-27 ENCOUNTER — OFFICE VISIT (OUTPATIENT)
Dept: FAMILY MEDICINE CLINIC | Facility: CLINIC | Age: 63
End: 2018-07-27
Payer: COMMERCIAL

## 2018-07-27 VITALS
TEMPERATURE: 97.7 F | RESPIRATION RATE: 16 BRPM | SYSTOLIC BLOOD PRESSURE: 150 MMHG | OXYGEN SATURATION: 96 % | HEART RATE: 62 BPM | HEIGHT: 68 IN | BODY MASS INDEX: 27.28 KG/M2 | DIASTOLIC BLOOD PRESSURE: 84 MMHG | WEIGHT: 180 LBS

## 2018-07-27 DIAGNOSIS — S81.802A WOUND OF LEFT LOWER EXTREMITY, INITIAL ENCOUNTER: ICD-10-CM

## 2018-07-27 DIAGNOSIS — Z96.641 HISTORY OF RIGHT HIP REPLACEMENT: ICD-10-CM

## 2018-07-27 DIAGNOSIS — L03.116 CELLULITIS OF LEFT LOWER EXTREMITY: Primary | ICD-10-CM

## 2018-07-27 PROCEDURE — 99213 OFFICE O/P EST LOW 20 MIN: CPT | Performed by: FAMILY MEDICINE

## 2018-07-27 RX ORDER — OXYCODONE HYDROCHLORIDE 15 MG/1
TABLET ORAL
COMMUNITY
Start: 2018-06-30 | End: 2018-07-27 | Stop reason: ALTCHOICE

## 2018-07-27 RX ORDER — ASPIRIN 81 MG/1
81 TABLET ORAL 2 TIMES DAILY
COMMUNITY
End: 2018-09-14 | Stop reason: ALTCHOICE

## 2018-07-27 RX ORDER — MELOXICAM 7.5 MG/1
TABLET ORAL
COMMUNITY
Start: 2018-06-30 | End: 2018-09-14 | Stop reason: ALTCHOICE

## 2018-07-27 NOTE — PROGRESS NOTES
Assessment/Plan:  Cellulitis left lower extremity  Excoriation left lower extremity  Recent hip replacement  Recommended the patient go directly to the emergency department for IV antibiotics in order to reduce the risk of an infection of the prosthesis  Elevate the extremity  Call with any questions or concerns  Call immediately if there is a problem getting started on antibiotics  Subjective:     Patient ID: Rosemary Brush is a 61 y o  male  This is a 43-year-old gentleman who has a rope burn from his dog's leash that is now becoming erythematous  Of note the patient had a hip replacement 3 weeks ago  Review of Systems   Constitutional: Negative  Respiratory: Negative  Cardiovascular: Negative  Skin: Rash: Left lower extremity is erythematous  Neurological: Negative  Objective:     Physical Exam   Constitutional: He appears well-developed and well-nourished  HENT:   Head: Normocephalic and atraumatic  Skin:        The markings on the diagram indicate a deep excoriation in the left lower leg and surrounding erythema to 4 inches below the knee

## 2018-08-09 DIAGNOSIS — I10 ESSENTIAL HYPERTENSION: ICD-10-CM

## 2018-08-09 DIAGNOSIS — G47.9 SLEEP DISTURBANCE: ICD-10-CM

## 2018-08-09 RX ORDER — OLMESARTAN MEDOXOMIL AND HYDROCHLOROTHIAZIDE 40/12.5 40; 12.5 MG/1; MG/1
1 TABLET ORAL DAILY
Qty: 90 TABLET | Refills: 0 | Status: SHIPPED | OUTPATIENT
Start: 2018-08-09 | End: 2018-08-20 | Stop reason: SDUPTHER

## 2018-08-09 RX ORDER — OLMESARTAN MEDOXOMIL AND HYDROCHLOROTHIAZIDE 40/12.5 40; 12.5 MG/1; MG/1
1 TABLET ORAL DAILY
Qty: 90 TABLET | Refills: 0 | Status: CANCELLED | OUTPATIENT
Start: 2018-08-09

## 2018-08-10 DIAGNOSIS — K21.9 GASTROESOPHAGEAL REFLUX DISEASE WITHOUT ESOPHAGITIS: Primary | ICD-10-CM

## 2018-08-10 DIAGNOSIS — E83.110 HEREDITARY HEMOCHROMATOSIS (HCC): ICD-10-CM

## 2018-08-10 DIAGNOSIS — M15.9 GENERALIZED OSTEOARTHRITIS OF MULTIPLE SITES: ICD-10-CM

## 2018-08-10 DIAGNOSIS — Z12.5 ENCOUNTER FOR PROSTATE CANCER SCREENING: ICD-10-CM

## 2018-08-10 DIAGNOSIS — D64.9 ANEMIA, UNSPECIFIED TYPE: ICD-10-CM

## 2018-08-10 DIAGNOSIS — K22.0 ACHALASIA OF ESOPHAGUS: ICD-10-CM

## 2018-08-10 DIAGNOSIS — E78.00 HYPERCHOLESTEROLEMIA: ICD-10-CM

## 2018-08-10 DIAGNOSIS — I10 ESSENTIAL HYPERTENSION: ICD-10-CM

## 2018-08-10 RX ORDER — CEPHALEXIN 500 MG/1
CAPSULE ORAL
COMMUNITY
Start: 2018-07-27 | End: 2018-09-14 | Stop reason: ALTCHOICE

## 2018-08-17 LAB
ALBUMIN SERPL-MCNC: 4.3 G/DL (ref 3.6–5.1)
ALBUMIN/GLOB SERPL: 1.7 (CALC) (ref 1–2.5)
ALP SERPL-CCNC: 72 U/L (ref 40–115)
ALT SERPL-CCNC: 14 U/L (ref 9–46)
AST SERPL-CCNC: 21 U/L (ref 10–35)
BASOPHILS # BLD AUTO: 29 CELLS/UL (ref 0–200)
BASOPHILS NFR BLD AUTO: 0.6 %
BILIRUB SERPL-MCNC: 0.8 MG/DL (ref 0.2–1.2)
BUN SERPL-MCNC: 19 MG/DL (ref 7–25)
BUN/CREAT SERPL: NORMAL (CALC) (ref 6–22)
CALCIUM SERPL-MCNC: 9.2 MG/DL (ref 8.6–10.3)
CHLORIDE SERPL-SCNC: 100 MMOL/L (ref 98–110)
CHOLEST SERPL-MCNC: 200 MG/DL
CHOLEST/HDLC SERPL: 2 (CALC)
CO2 SERPL-SCNC: 24 MMOL/L (ref 20–32)
CREAT SERPL-MCNC: 0.92 MG/DL (ref 0.7–1.25)
CRP SERPL-MCNC: 0.7 MG/L
EOSINOPHIL # BLD AUTO: 20 CELLS/UL (ref 15–500)
EOSINOPHIL NFR BLD AUTO: 0.4 %
ERYTHROCYTE [DISTWIDTH] IN BLOOD BY AUTOMATED COUNT: 13.8 % (ref 11–15)
ERYTHROCYTE [SEDIMENTATION RATE] IN BLOOD BY WESTERGREN METHOD: 6 MM/H
FERRITIN SERPL-MCNC: 12 NG/ML (ref 20–380)
GLOBULIN SER CALC-MCNC: 2.5 G/DL (CALC) (ref 1.9–3.7)
GLUCOSE SERPL-MCNC: 67 MG/DL (ref 65–99)
HCT VFR BLD AUTO: 33.9 % (ref 38.5–50)
HDLC SERPL-MCNC: 100 MG/DL
HGB BLD-MCNC: 10.6 G/DL (ref 13.2–17.1)
IRON SERPL-MCNC: 44 MCG/DL (ref 50–180)
LDLC SERPL CALC-MCNC: 87 MG/DL (CALC)
LYMPHOCYTES # BLD AUTO: 549 CELLS/UL (ref 850–3900)
LYMPHOCYTES NFR BLD AUTO: 11.2 %
MCH RBC QN AUTO: 26.8 PG (ref 27–33)
MCHC RBC AUTO-ENTMCNC: 31.3 G/DL (ref 32–36)
MCV RBC AUTO: 85.8 FL (ref 80–100)
MONOCYTES # BLD AUTO: 279 CELLS/UL (ref 200–950)
MONOCYTES NFR BLD AUTO: 5.7 %
NEUTROPHILS # BLD AUTO: 4023 CELLS/UL (ref 1500–7800)
NEUTROPHILS NFR BLD AUTO: 82.1 %
NONHDLC SERPL-MCNC: 100 MG/DL (CALC)
PLATELET # BLD AUTO: 326 THOUSAND/UL (ref 140–400)
PMV BLD REES-ECKER: 9.5 FL (ref 7.5–12.5)
POTASSIUM SERPL-SCNC: 4.5 MMOL/L (ref 3.5–5.3)
PROT SERPL-MCNC: 6.8 G/DL (ref 6.1–8.1)
PSA FREE MFR SERPL: 14 % (CALC)
PSA FREE SERPL-MCNC: 0.8 NG/ML
PSA SERPL-MCNC: 5.7 NG/ML
RBC # BLD AUTO: 3.95 MILLION/UL (ref 4.2–5.8)
SL AMB EGFR AFRICAN AMERICAN: 102 ML/MIN/1.73M2
SL AMB EGFR NON AFRICAN AMERICAN: 88 ML/MIN/1.73M2
SODIUM SERPL-SCNC: 136 MMOL/L (ref 135–146)
TRIGL SERPL-MCNC: 51 MG/DL
TSH SERPL-ACNC: 0.43 MIU/L (ref 0.4–4.5)
URATE SERPL-MCNC: 7.8 MG/DL (ref 4–8)
WBC # BLD AUTO: 4.9 THOUSAND/UL (ref 3.8–10.8)

## 2018-08-20 DIAGNOSIS — I10 ESSENTIAL HYPERTENSION: ICD-10-CM

## 2018-08-20 DIAGNOSIS — G89.29 CHRONIC BILATERAL LOW BACK PAIN WITH BILATERAL SCIATICA: ICD-10-CM

## 2018-08-20 DIAGNOSIS — M54.42 CHRONIC BILATERAL LOW BACK PAIN WITH BILATERAL SCIATICA: ICD-10-CM

## 2018-08-20 DIAGNOSIS — G89.4 CHRONIC PAIN SYNDROME: ICD-10-CM

## 2018-08-20 DIAGNOSIS — G47.9 SLEEP DISTURBANCE: ICD-10-CM

## 2018-08-20 DIAGNOSIS — M54.41 CHRONIC BILATERAL LOW BACK PAIN WITH BILATERAL SCIATICA: ICD-10-CM

## 2018-08-20 RX ORDER — OXYCODONE HCL 20 MG/1
20 TABLET, FILM COATED, EXTENDED RELEASE ORAL EVERY 12 HOURS
Qty: 60 TABLET | Refills: 0 | Status: SHIPPED | OUTPATIENT
Start: 2018-08-20 | End: 2018-09-16 | Stop reason: SDUPTHER

## 2018-08-20 RX ORDER — HYDROCODONE BITARTRATE AND ACETAMINOPHEN 10; 325 MG/1; MG/1
1 TABLET ORAL EVERY 4 HOURS PRN
Qty: 120 TABLET | Refills: 0 | Status: SHIPPED | OUTPATIENT
Start: 2018-08-20 | End: 2018-09-16 | Stop reason: SDUPTHER

## 2018-08-20 RX ORDER — OLMESARTAN MEDOXOMIL AND HYDROCHLOROTHIAZIDE 40/12.5 40; 12.5 MG/1; MG/1
1 TABLET ORAL DAILY
Qty: 90 TABLET | Refills: 0 | Status: SHIPPED | OUTPATIENT
Start: 2018-08-20 | End: 2018-11-09 | Stop reason: SDUPTHER

## 2018-08-20 RX ORDER — OXYCODONE HCL 10 MG/1
10 TABLET, FILM COATED, EXTENDED RELEASE ORAL 2 TIMES DAILY
Qty: 60 TABLET | Refills: 0 | Status: SHIPPED | OUTPATIENT
Start: 2018-08-20 | End: 2018-09-16 | Stop reason: SDUPTHER

## 2018-08-20 NOTE — TELEPHONE ENCOUNTER
The [facility] and the [address] was sent to the [address] [place] [address],[address] on 8/9/18  There is a receipt stating they received it but Hernandez stated to patient that they sent it to [facility] does not have it      Please resend

## 2018-08-21 PROBLEM — Z12.11 COLON CANCER SCREENING: Status: ACTIVE | Noted: 2018-08-21

## 2018-08-21 PROBLEM — R97.20 ELEVATED PSA MEASUREMENT: Status: ACTIVE | Noted: 2018-08-21

## 2018-08-21 PROBLEM — Z00.00 ROUTINE GENERAL MEDICAL EXAMINATION AT A HEALTH CARE FACILITY: Status: ACTIVE | Noted: 2018-08-21

## 2018-08-21 PROBLEM — Z13.29 SCREENING FOR HYPOTHYROIDISM: Status: ACTIVE | Noted: 2018-08-21

## 2018-09-14 ENCOUNTER — OFFICE VISIT (OUTPATIENT)
Dept: FAMILY MEDICINE CLINIC | Facility: CLINIC | Age: 63
End: 2018-09-14
Payer: COMMERCIAL

## 2018-09-14 VITALS
SYSTOLIC BLOOD PRESSURE: 128 MMHG | RESPIRATION RATE: 16 BRPM | OXYGEN SATURATION: 98 % | HEIGHT: 68 IN | DIASTOLIC BLOOD PRESSURE: 80 MMHG | BODY MASS INDEX: 26.07 KG/M2 | HEART RATE: 72 BPM | WEIGHT: 172 LBS | TEMPERATURE: 98.3 F

## 2018-09-14 DIAGNOSIS — F11.20 OPIOID TYPE DEPENDENCE, CONTINUOUS (HCC): ICD-10-CM

## 2018-09-14 DIAGNOSIS — D64.9 ANEMIA, UNSPECIFIED TYPE: ICD-10-CM

## 2018-09-14 DIAGNOSIS — Z00.00 ROUTINE GENERAL MEDICAL EXAMINATION AT A HEALTH CARE FACILITY: Primary | ICD-10-CM

## 2018-09-14 DIAGNOSIS — E78.00 HYPERCHOLESTEROLEMIA: ICD-10-CM

## 2018-09-14 DIAGNOSIS — K22.0 ACHALASIA OF ESOPHAGUS: ICD-10-CM

## 2018-09-14 DIAGNOSIS — G47.33 OBSTRUCTIVE SLEEP APNEA SYNDROME: ICD-10-CM

## 2018-09-14 DIAGNOSIS — Z12.5 ENCOUNTER FOR PROSTATE CANCER SCREENING: ICD-10-CM

## 2018-09-14 DIAGNOSIS — G89.4 CHRONIC PAIN SYNDROME: ICD-10-CM

## 2018-09-14 DIAGNOSIS — K21.9 GASTROESOPHAGEAL REFLUX DISEASE WITHOUT ESOPHAGITIS: ICD-10-CM

## 2018-09-14 DIAGNOSIS — M15.9 GENERALIZED OSTEOARTHRITIS OF MULTIPLE SITES: ICD-10-CM

## 2018-09-14 DIAGNOSIS — Z13.29 SCREENING FOR HYPOTHYROIDISM: ICD-10-CM

## 2018-09-14 DIAGNOSIS — Z23 NEED FOR INFLUENZA VACCINATION: ICD-10-CM

## 2018-09-14 DIAGNOSIS — E83.110 HEREDITARY HEMOCHROMATOSIS (HCC): ICD-10-CM

## 2018-09-14 DIAGNOSIS — Z12.11 COLON CANCER SCREENING: ICD-10-CM

## 2018-09-14 DIAGNOSIS — I10 ESSENTIAL HYPERTENSION: ICD-10-CM

## 2018-09-14 PROCEDURE — 93000 ELECTROCARDIOGRAM COMPLETE: CPT | Performed by: FAMILY MEDICINE

## 2018-09-14 PROCEDURE — 90471 IMMUNIZATION ADMIN: CPT

## 2018-09-14 PROCEDURE — 90682 RIV4 VACC RECOMBINANT DNA IM: CPT

## 2018-09-14 PROCEDURE — 99396 PREV VISIT EST AGE 40-64: CPT | Performed by: FAMILY MEDICINE

## 2018-09-16 DIAGNOSIS — M54.41 CHRONIC BILATERAL LOW BACK PAIN WITH BILATERAL SCIATICA: ICD-10-CM

## 2018-09-16 DIAGNOSIS — G89.29 CHRONIC BILATERAL LOW BACK PAIN WITH BILATERAL SCIATICA: ICD-10-CM

## 2018-09-16 DIAGNOSIS — M54.42 CHRONIC BILATERAL LOW BACK PAIN WITH BILATERAL SCIATICA: ICD-10-CM

## 2018-09-16 DIAGNOSIS — G89.4 CHRONIC PAIN SYNDROME: ICD-10-CM

## 2018-09-16 LAB — ECG INTERP DURING EX: NORMAL MS
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
## 2018-09-18 DIAGNOSIS — G47.9 SLEEP DISTURBANCE: ICD-10-CM

## 2018-09-18 RX ORDER — OXYCODONE HCL 20 MG/1
20 TABLET, FILM COATED, EXTENDED RELEASE ORAL EVERY 12 HOURS
Qty: 60 TABLET | Refills: 0 | Status: SHIPPED | OUTPATIENT
Start: 2018-09-18 | End: 2018-10-11 | Stop reason: SDUPTHER

## 2018-09-18 RX ORDER — HYDROCODONE BITARTRATE AND ACETAMINOPHEN 10; 325 MG/1; MG/1
1 TABLET ORAL EVERY 4 HOURS PRN
Qty: 120 TABLET | Refills: 0 | Status: SHIPPED | OUTPATIENT
Start: 2018-09-18 | End: 2018-10-11 | Stop reason: SDUPTHER

## 2018-09-18 RX ORDER — OXYCODONE HCL 10 MG/1
10 TABLET, FILM COATED, EXTENDED RELEASE ORAL 2 TIMES DAILY
Qty: 60 TABLET | Refills: 0 | Status: SHIPPED | OUTPATIENT
Start: 2018-09-18 | End: 2018-10-11 | Stop reason: SDUPTHER

## 2018-09-18 RX ORDER — MELOXICAM 15 MG/1
15 TABLET ORAL DAILY
Qty: 90 TABLET | Refills: 0 | Status: SHIPPED | OUTPATIENT
Start: 2018-09-18 | End: 2019-05-02 | Stop reason: SDUPTHER

## 2018-09-18 NOTE — TELEPHONE ENCOUNTER
From: Carol Vazquez  Sent: 9/16/2018 10:12 AM EDT  Subject: Medication Renewal Request    Carol Vazqeuz would like a refill of the following medications:     meloxicam (MOBIC) 15 mg tablet Jordan Capone MD]     oxyCODONE (OXYCONTIN) 10 mg 12 hr tablet Jordan Capone MD]     oxyCODONE (OXYCONTIN) 20 mg 12 hr tablet Jordan Capone MD]     HYDROcodone-acetaminophen (NORCO)  mg per tablet Jordan Capone MD]    Preferred pharmacy: [facility]

## 2018-10-11 DIAGNOSIS — M54.41 CHRONIC BILATERAL LOW BACK PAIN WITH BILATERAL SCIATICA: ICD-10-CM

## 2018-10-11 DIAGNOSIS — G89.29 CHRONIC BILATERAL LOW BACK PAIN WITH BILATERAL SCIATICA: ICD-10-CM

## 2018-10-11 DIAGNOSIS — G89.4 CHRONIC PAIN SYNDROME: ICD-10-CM

## 2018-10-11 DIAGNOSIS — M54.42 CHRONIC BILATERAL LOW BACK PAIN WITH BILATERAL SCIATICA: ICD-10-CM

## 2018-10-11 RX ORDER — OXYCODONE HCL 20 MG/1
20 TABLET, FILM COATED, EXTENDED RELEASE ORAL EVERY 12 HOURS
Qty: 60 TABLET | Refills: 0 | Status: SHIPPED | OUTPATIENT
Start: 2018-10-11 | End: 2018-11-09 | Stop reason: SDUPTHER

## 2018-10-11 RX ORDER — HYDROCODONE BITARTRATE AND ACETAMINOPHEN 10; 325 MG/1; MG/1
1 TABLET ORAL EVERY 4 HOURS PRN
Qty: 120 TABLET | Refills: 0 | Status: SHIPPED | OUTPATIENT
Start: 2018-10-11 | End: 2018-11-09 | Stop reason: SDUPTHER

## 2018-10-11 RX ORDER — OXYCODONE HCL 10 MG/1
10 TABLET, FILM COATED, EXTENDED RELEASE ORAL 2 TIMES DAILY
Qty: 60 TABLET | Refills: 0 | Status: SHIPPED | OUTPATIENT
Start: 2018-10-11 | End: 2018-11-09 | Stop reason: SDUPTHER

## 2018-10-11 NOTE — TELEPHONE ENCOUNTER
From: Matheus Espinosa  Sent: 10/11/2018 6:44 AM EDT  Subject: Medication Renewal Request    Matheus Espinosa would like a refill of the following medications:     oxyCODONE (OXYCONTIN) 10 mg 12 hr tablet Suri Lees MD]     oxyCODONE (OXYCONTIN) 20 mg 12 hr tablet Suri Lees MD]     HYDROcodone-acetaminophen (NORCO)  mg per tablet Suri Lees MD]    Preferred pharmacy: [facility]

## 2018-10-29 DIAGNOSIS — D50.9 IRON DEFICIENCY ANEMIA, UNSPECIFIED IRON DEFICIENCY ANEMIA TYPE: Primary | ICD-10-CM

## 2018-11-09 DIAGNOSIS — M54.41 CHRONIC BILATERAL LOW BACK PAIN WITH BILATERAL SCIATICA: ICD-10-CM

## 2018-11-09 DIAGNOSIS — G89.4 CHRONIC PAIN SYNDROME: ICD-10-CM

## 2018-11-09 DIAGNOSIS — I10 ESSENTIAL HYPERTENSION: ICD-10-CM

## 2018-11-09 DIAGNOSIS — M54.42 CHRONIC BILATERAL LOW BACK PAIN WITH BILATERAL SCIATICA: ICD-10-CM

## 2018-11-09 DIAGNOSIS — G89.29 CHRONIC BILATERAL LOW BACK PAIN WITH BILATERAL SCIATICA: ICD-10-CM

## 2018-11-11 RX ORDER — OXYCODONE HCL 20 MG/1
20 TABLET, FILM COATED, EXTENDED RELEASE ORAL EVERY 12 HOURS
Qty: 60 TABLET | Refills: 0 | Status: SHIPPED | OUTPATIENT
Start: 2018-11-11 | End: 2018-12-10 | Stop reason: SDUPTHER

## 2018-11-11 RX ORDER — HYDROCODONE BITARTRATE AND ACETAMINOPHEN 10; 325 MG/1; MG/1
1 TABLET ORAL EVERY 4 HOURS PRN
Qty: 120 TABLET | Refills: 0 | Status: SHIPPED | OUTPATIENT
Start: 2018-11-11 | End: 2018-12-10 | Stop reason: SDUPTHER

## 2018-11-11 RX ORDER — OLMESARTAN MEDOXOMIL AND HYDROCHLOROTHIAZIDE 40/12.5 40; 12.5 MG/1; MG/1
1 TABLET ORAL DAILY
Qty: 90 TABLET | Refills: 0 | Status: SHIPPED | OUTPATIENT
Start: 2018-11-11 | End: 2019-02-06 | Stop reason: SDUPTHER

## 2018-11-11 RX ORDER — OXYCODONE HCL 10 MG/1
10 TABLET, FILM COATED, EXTENDED RELEASE ORAL 2 TIMES DAILY
Qty: 60 TABLET | Refills: 0 | Status: SHIPPED | OUTPATIENT
Start: 2018-11-11 | End: 2018-12-10 | Stop reason: SDUPTHER

## 2018-11-11 NOTE — TELEPHONE ENCOUNTER
From: Walter Hall  Sent: 11/9/2018 3:03 PM EST  Subject: Medication Renewal Request    Walter Hall would like a refill of the following medications:     olmesartan-hydrochlorothiazide (BENICAR HCT) 40-12 5 MG per tablet Lucio Rangel MD]     oxyCODONE (OXYCONTIN) 10 mg 12 hr tablet Lucio Rangel MD]     oxyCODONE (OXYCONTIN) 20 mg 12 hr tablet Lucio Rangel MD]     HYDROcodone-acetaminophen (NORCO)  mg per tablet Lucio Rangel MD]    Preferred pharmacy: [facility]

## 2018-11-24 LAB
BASOPHILS # BLD AUTO: 31 CELLS/UL (ref 0–200)
BASOPHILS NFR BLD AUTO: 0.8 %
EOSINOPHIL # BLD AUTO: 160 CELLS/UL (ref 15–500)
EOSINOPHIL NFR BLD AUTO: 4.1 %
ERYTHROCYTE [DISTWIDTH] IN BLOOD BY AUTOMATED COUNT: 16.3 % (ref 11–15)
HCT VFR BLD AUTO: 33.6 % (ref 38.5–50)
HGB BLD-MCNC: 11.4 G/DL (ref 13.2–17.1)
IRON SERPL-MCNC: 44 MCG/DL (ref 50–180)
LYMPHOCYTES # BLD AUTO: 776 CELLS/UL (ref 850–3900)
LYMPHOCYTES NFR BLD AUTO: 19.9 %
MCH RBC QN AUTO: 29.7 PG (ref 27–33)
MCHC RBC AUTO-ENTMCNC: 33.9 G/DL (ref 32–36)
MCV RBC AUTO: 87.5 FL (ref 80–100)
MONOCYTES # BLD AUTO: 484 CELLS/UL (ref 200–950)
MONOCYTES NFR BLD AUTO: 12.4 %
NEUTROPHILS # BLD AUTO: 2449 CELLS/UL (ref 1500–7800)
NEUTROPHILS NFR BLD AUTO: 62.8 %
PLATELET # BLD AUTO: 256 THOUSAND/UL (ref 140–400)
PMV BLD REES-ECKER: 10 FL (ref 7.5–12.5)
RBC # BLD AUTO: 3.84 MILLION/UL (ref 4.2–5.8)
WBC # BLD AUTO: 3.9 THOUSAND/UL (ref 3.8–10.8)

## 2018-12-10 ENCOUNTER — TELEPHONE (OUTPATIENT)
Dept: FAMILY MEDICINE CLINIC | Facility: CLINIC | Age: 63
End: 2018-12-10

## 2018-12-10 DIAGNOSIS — G47.9 SLEEP DISTURBANCE: ICD-10-CM

## 2018-12-10 DIAGNOSIS — M54.42 CHRONIC BILATERAL LOW BACK PAIN WITH BILATERAL SCIATICA: ICD-10-CM

## 2018-12-10 DIAGNOSIS — G89.4 CHRONIC PAIN SYNDROME: ICD-10-CM

## 2018-12-10 DIAGNOSIS — G89.29 CHRONIC BILATERAL LOW BACK PAIN WITH BILATERAL SCIATICA: ICD-10-CM

## 2018-12-10 DIAGNOSIS — M54.41 CHRONIC BILATERAL LOW BACK PAIN WITH BILATERAL SCIATICA: ICD-10-CM

## 2018-12-10 RX ORDER — HYDROCODONE BITARTRATE AND ACETAMINOPHEN 10; 325 MG/1; MG/1
1 TABLET ORAL EVERY 4 HOURS PRN
Qty: 120 TABLET | Refills: 0 | Status: SHIPPED | OUTPATIENT
Start: 2018-12-10 | End: 2019-01-07 | Stop reason: SDUPTHER

## 2018-12-10 RX ORDER — OXYCODONE HCL 20 MG/1
20 TABLET, FILM COATED, EXTENDED RELEASE ORAL EVERY 12 HOURS
Qty: 60 TABLET | Refills: 0 | Status: SHIPPED | OUTPATIENT
Start: 2018-12-10 | End: 2019-01-07 | Stop reason: SDUPTHER

## 2018-12-10 RX ORDER — OXYCODONE HCL 10 MG/1
10 TABLET, FILM COATED, EXTENDED RELEASE ORAL 2 TIMES DAILY
Qty: 60 TABLET | Refills: 0 | Status: SHIPPED | OUTPATIENT
Start: 2018-12-10 | End: 2019-01-07 | Stop reason: SDUPTHER

## 2018-12-10 NOTE — TELEPHONE ENCOUNTER
Az scheduled Kira Becker to come in on Tuesday for a B12 shot  Please place the order    Per Roberto Feng Monocytes levels are low, is there a test to be done for mono  Wants to rule mono out      Wife does not want us to contact Kira Becker regarding her asking about the Mono Test

## 2018-12-13 ENCOUNTER — CLINICAL SUPPORT (OUTPATIENT)
Dept: FAMILY MEDICINE CLINIC | Facility: CLINIC | Age: 63
End: 2018-12-13
Payer: COMMERCIAL

## 2018-12-13 DIAGNOSIS — D50.8 OTHER IRON DEFICIENCY ANEMIA: ICD-10-CM

## 2018-12-13 DIAGNOSIS — E53.9 VITAMIN B DEFICIENCY: Primary | ICD-10-CM

## 2018-12-13 PROCEDURE — 96372 THER/PROPH/DIAG INJ SC/IM: CPT

## 2018-12-13 RX ORDER — CYANOCOBALAMIN 1000 UG/ML
1000 INJECTION INTRAMUSCULAR; SUBCUTANEOUS ONCE
Status: COMPLETED | OUTPATIENT
Start: 2018-12-13 | End: 2018-12-13

## 2018-12-13 RX ADMIN — CYANOCOBALAMIN 1000 MCG: 1000 INJECTION INTRAMUSCULAR; SUBCUTANEOUS at 16:15

## 2018-12-13 NOTE — TELEPHONE ENCOUNTER
Esequiel Morfin called back about Clarisse Roberts stating he is coming in today at 4pm for a b12 shot  She asked if the nurse could draw a mono test on him  I advised her that Clarisse Roberts has not been in to see a provider since sept and if he is not feeling well, then he needs to have a doctor visit  I informed her Dr Bree Pérez or Hayes Casey could see him for an appt, but Esequiel Morfin refused stating she wanted it done by the nurse today without seeing a provider  I spoke to Hayes Casey and explained the situation and she agreed  I told Esequiel Morfin that we would discuss this with Clarisse Roberts when he comes in for his b12 shot and discuss appt options

## 2019-01-07 DIAGNOSIS — G89.29 CHRONIC BILATERAL LOW BACK PAIN WITH BILATERAL SCIATICA: ICD-10-CM

## 2019-01-07 DIAGNOSIS — M54.42 CHRONIC BILATERAL LOW BACK PAIN WITH BILATERAL SCIATICA: ICD-10-CM

## 2019-01-07 DIAGNOSIS — M54.41 CHRONIC BILATERAL LOW BACK PAIN WITH BILATERAL SCIATICA: ICD-10-CM

## 2019-01-07 DIAGNOSIS — G89.4 CHRONIC PAIN SYNDROME: ICD-10-CM

## 2019-01-08 DIAGNOSIS — G47.9 SLEEP DISTURBANCE: ICD-10-CM

## 2019-01-08 RX ORDER — HYDROCODONE BITARTRATE AND ACETAMINOPHEN 10; 325 MG/1; MG/1
1 TABLET ORAL EVERY 4 HOURS PRN
Qty: 120 TABLET | Refills: 0 | Status: SHIPPED | OUTPATIENT
Start: 2019-01-08 | End: 2019-02-06 | Stop reason: SDUPTHER

## 2019-01-08 RX ORDER — OXYCODONE HCL 20 MG/1
20 TABLET, FILM COATED, EXTENDED RELEASE ORAL EVERY 12 HOURS
Qty: 60 TABLET | Refills: 0 | Status: SHIPPED | OUTPATIENT
Start: 2019-01-08 | End: 2019-02-06 | Stop reason: SDUPTHER

## 2019-01-08 RX ORDER — OXYCODONE HCL 10 MG/1
10 TABLET, FILM COATED, EXTENDED RELEASE ORAL 2 TIMES DAILY
Qty: 60 TABLET | Refills: 0 | Status: SHIPPED | OUTPATIENT
Start: 2019-01-08 | End: 2019-02-06 | Stop reason: SDUPTHER

## 2019-01-08 NOTE — TELEPHONE ENCOUNTER
From: Arvilla Councilman  Sent: 1/7/2019 7:48 PM EST  Subject: Medication Renewal Request    Arvilla Councilman would like a refill of the following medications:     oxyCODONE (OXYCONTIN) 10 mg 12 hr tablet Charu Healy MD]     oxyCODONE (OXYCONTIN) 20 mg 12 hr tablet Charu Healy MD]     HYDROcodone-acetaminophen (NORCO)  mg per tablet Charu Healy MD]    Preferred pharmacy: [facility]

## 2019-01-21 DIAGNOSIS — D50.8 OTHER IRON DEFICIENCY ANEMIA: Primary | ICD-10-CM

## 2019-02-06 DIAGNOSIS — G89.4 CHRONIC PAIN SYNDROME: ICD-10-CM

## 2019-02-06 DIAGNOSIS — M54.42 CHRONIC BILATERAL LOW BACK PAIN WITH BILATERAL SCIATICA: ICD-10-CM

## 2019-02-06 DIAGNOSIS — I10 ESSENTIAL HYPERTENSION: ICD-10-CM

## 2019-02-06 DIAGNOSIS — M54.41 CHRONIC BILATERAL LOW BACK PAIN WITH BILATERAL SCIATICA: ICD-10-CM

## 2019-02-06 DIAGNOSIS — G89.29 CHRONIC BILATERAL LOW BACK PAIN WITH BILATERAL SCIATICA: ICD-10-CM

## 2019-02-06 RX ORDER — OLMESARTAN MEDOXOMIL AND HYDROCHLOROTHIAZIDE 40/12.5 40; 12.5 MG/1; MG/1
1 TABLET ORAL DAILY
Qty: 90 TABLET | Refills: 0 | Status: SHIPPED | OUTPATIENT
Start: 2019-02-06 | End: 2019-05-23 | Stop reason: SDUPTHER

## 2019-02-06 RX ORDER — OXYCODONE HCL 20 MG/1
20 TABLET, FILM COATED, EXTENDED RELEASE ORAL EVERY 12 HOURS
Qty: 60 TABLET | Refills: 0 | Status: SHIPPED | OUTPATIENT
Start: 2019-02-06 | End: 2019-03-07 | Stop reason: SDUPTHER

## 2019-02-06 RX ORDER — HYDROCODONE BITARTRATE AND ACETAMINOPHEN 10; 325 MG/1; MG/1
1 TABLET ORAL EVERY 4 HOURS PRN
Qty: 120 TABLET | Refills: 0 | Status: SHIPPED | OUTPATIENT
Start: 2019-02-06 | End: 2019-03-07 | Stop reason: SDUPTHER

## 2019-02-06 RX ORDER — OXYCODONE HCL 10 MG/1
10 TABLET, FILM COATED, EXTENDED RELEASE ORAL 2 TIMES DAILY
Qty: 60 TABLET | Refills: 0 | Status: SHIPPED | OUTPATIENT
Start: 2019-02-06 | End: 2019-03-07 | Stop reason: SDUPTHER

## 2019-02-06 NOTE — TELEPHONE ENCOUNTER
From: Magy Mendez  Sent: 2/6/2019 8:43 AM EST  Subject: Medication Renewal Request    Magy Mendez would like a refill of the following medications:     olmesartan-hydrochlorothiazide (BENICAR HCT) 40-12 5 MG per tablet Daniel Pisano MD]     oxyCODONE (OXYCONTIN) 10 mg 12 hr tablet Daniel Pisano MD]     oxyCODONE (OXYCONTIN) 20 mg 12 hr tablet Daniel Pisano MD]     HYDROcodone-acetaminophen (NORCO)  mg per tablet Daniel Pisano MD]    Preferred pharmacy: [facility]

## 2019-02-18 LAB
BASOPHILS # BLD AUTO: 21 CELLS/UL (ref 0–200)
BASOPHILS NFR BLD AUTO: 0.5 %
EOSINOPHIL # BLD AUTO: 230 CELLS/UL (ref 15–500)
EOSINOPHIL NFR BLD AUTO: 5.6 %
ERYTHROCYTE [DISTWIDTH] IN BLOOD BY AUTOMATED COUNT: 13.3 % (ref 11–15)
HCT VFR BLD AUTO: 35.2 % (ref 38.5–50)
HGB BLD-MCNC: 11.8 G/DL (ref 13.2–17.1)
IRON SERPL-MCNC: 54 MCG/DL (ref 50–180)
LYMPHOCYTES # BLD AUTO: 828 CELLS/UL (ref 850–3900)
LYMPHOCYTES NFR BLD AUTO: 20.2 %
MCH RBC QN AUTO: 31.3 PG (ref 27–33)
MCHC RBC AUTO-ENTMCNC: 33.5 G/DL (ref 32–36)
MCV RBC AUTO: 93.4 FL (ref 80–100)
MONOCYTES # BLD AUTO: 500 CELLS/UL (ref 200–950)
MONOCYTES NFR BLD AUTO: 12.2 %
NEUTROPHILS # BLD AUTO: 2522 CELLS/UL (ref 1500–7800)
NEUTROPHILS NFR BLD AUTO: 61.5 %
PLATELET # BLD AUTO: 261 THOUSAND/UL (ref 140–400)
PMV BLD REES-ECKER: 10.1 FL (ref 7.5–12.5)
RBC # BLD AUTO: 3.77 MILLION/UL (ref 4.2–5.8)
VIT B12 SERPL-MCNC: 1251 PG/ML (ref 200–1100)
WBC # BLD AUTO: 4.1 THOUSAND/UL (ref 3.8–10.8)

## 2019-03-07 DIAGNOSIS — M54.42 CHRONIC BILATERAL LOW BACK PAIN WITH BILATERAL SCIATICA: ICD-10-CM

## 2019-03-07 DIAGNOSIS — M54.41 CHRONIC BILATERAL LOW BACK PAIN WITH BILATERAL SCIATICA: ICD-10-CM

## 2019-03-07 DIAGNOSIS — G89.29 CHRONIC BILATERAL LOW BACK PAIN WITH BILATERAL SCIATICA: ICD-10-CM

## 2019-03-07 DIAGNOSIS — G89.4 CHRONIC PAIN SYNDROME: ICD-10-CM

## 2019-03-07 RX ORDER — OXYCODONE HCL 10 MG/1
10 TABLET, FILM COATED, EXTENDED RELEASE ORAL 2 TIMES DAILY
Qty: 60 TABLET | Refills: 0 | Status: SHIPPED | OUTPATIENT
Start: 2019-03-07 | End: 2019-04-08 | Stop reason: SDUPTHER

## 2019-03-07 RX ORDER — OXYCODONE HCL 20 MG/1
20 TABLET, FILM COATED, EXTENDED RELEASE ORAL EVERY 12 HOURS
Qty: 60 TABLET | Refills: 0 | Status: SHIPPED | OUTPATIENT
Start: 2019-03-07 | End: 2019-04-08 | Stop reason: SDUPTHER

## 2019-03-07 RX ORDER — HYDROCODONE BITARTRATE AND ACETAMINOPHEN 10; 325 MG/1; MG/1
1 TABLET ORAL EVERY 4 HOURS PRN
Qty: 120 TABLET | Refills: 0 | Status: SHIPPED | OUTPATIENT
Start: 2019-03-07 | End: 2019-04-08 | Stop reason: SDUPTHER

## 2019-04-05 ENCOUNTER — APPOINTMENT (EMERGENCY)
Dept: RADIOLOGY | Facility: HOSPITAL | Age: 64
End: 2019-04-05
Payer: COMMERCIAL

## 2019-04-05 ENCOUNTER — HOSPITAL ENCOUNTER (EMERGENCY)
Facility: HOSPITAL | Age: 64
Discharge: HOME | End: 2019-04-05
Attending: EMERGENCY MEDICINE
Payer: COMMERCIAL

## 2019-04-05 VITALS
DIASTOLIC BLOOD PRESSURE: 61 MMHG | SYSTOLIC BLOOD PRESSURE: 108 MMHG | RESPIRATION RATE: 18 BRPM | OXYGEN SATURATION: 98 % | WEIGHT: 170 LBS | TEMPERATURE: 98.2 F | BODY MASS INDEX: 25.18 KG/M2 | HEART RATE: 79 BPM | HEIGHT: 69 IN

## 2019-04-05 DIAGNOSIS — S62.609A FINGER FRACTURE, LEFT: ICD-10-CM

## 2019-04-05 DIAGNOSIS — S62.609A FINGER FRACTURE: ICD-10-CM
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
## 2019-04-05 DIAGNOSIS — T07.XXXA MULTIPLE LACERATIONS: ICD-10-CM

## 2019-04-05 LAB
ALBUMIN SERPL BCP-MCNC: 3.8 G/DL (ref 3.5–5)
ALP SERPL-CCNC: 68 U/L (ref 46–116)
ALT SERPL W P-5'-P-CCNC: 25 U/L (ref 12–78)
ANION GAP SERPL CALCULATED.3IONS-SCNC: 8 MMOL/L (ref 4–13)
AST SERPL W P-5'-P-CCNC: 27 U/L (ref 5–45)
BASOPHILS # BLD AUTO: 0.01 THOUSANDS/ΜL (ref 0–0.1)
BASOPHILS NFR BLD AUTO: 0 % (ref 0–1)
BILIRUB SERPL-MCNC: 0.5 MG/DL (ref 0.2–1)
BUN SERPL-MCNC: 37 MG/DL (ref 5–25)
CALCIUM SERPL-MCNC: 8.8 MG/DL (ref 8.3–10.1)
CHLORIDE SERPL-SCNC: 100 MMOL/L (ref 100–108)
CO2 SERPL-SCNC: 27 MMOL/L (ref 21–32)
CREAT SERPL-MCNC: 1.18 MG/DL (ref 0.6–1.3)
EOSINOPHIL # BLD AUTO: 0.01 THOUSAND/ΜL (ref 0–0.61)
EOSINOPHIL NFR BLD AUTO: 0 % (ref 0–6)
ERYTHROCYTE [DISTWIDTH] IN BLOOD BY AUTOMATED COUNT: 12.7 % (ref 11.6–15.1)
GFR SERPL CREATININE-BSD FRML MDRD: 65 ML/MIN/1.73SQ M
GLUCOSE SERPL-MCNC: 111 MG/DL (ref 65–140)
HCT VFR BLD AUTO: 35.4 % (ref 36.5–49.3)
HGB BLD-MCNC: 11.7 G/DL (ref 12–17)
IMM GRANULOCYTES # BLD AUTO: 0.03 THOUSAND/UL (ref 0–0.2)
IMM GRANULOCYTES NFR BLD AUTO: 1 % (ref 0–2)
LYMPHOCYTES # BLD AUTO: 0.56 THOUSANDS/ΜL (ref 0.6–4.47)
LYMPHOCYTES NFR BLD AUTO: 10 % (ref 14–44)
MCH RBC QN AUTO: 30.8 PG (ref 26.8–34.3)
MCHC RBC AUTO-ENTMCNC: 33.1 G/DL (ref 31.4–37.4)
MCV RBC AUTO: 93 FL (ref 82–98)
MONOCYTES # BLD AUTO: 0.52 THOUSAND/ΜL (ref 0.17–1.22)
MONOCYTES NFR BLD AUTO: 9 % (ref 4–12)
NEUTROPHILS # BLD AUTO: 4.38 THOUSANDS/ΜL (ref 1.85–7.62)
NEUTS SEG NFR BLD AUTO: 80 % (ref 43–75)
NRBC BLD AUTO-RTO: 0 /100 WBCS
PLATELET # BLD AUTO: 233 THOUSANDS/UL (ref 149–390)
PMV BLD AUTO: 9.5 FL (ref 8.9–12.7)
POTASSIUM SERPL-SCNC: 4.3 MMOL/L (ref 3.5–5.3)
PROT SERPL-MCNC: 6.9 G/DL (ref 6.4–8.2)
RBC # BLD AUTO: 3.8 MILLION/UL (ref 3.88–5.62)
SODIUM SERPL-SCNC: 135 MMOL/L (ref 136–145)
WBC # BLD AUTO: 5.51 THOUSAND/UL (ref 4.31–10.16)

## 2019-04-05 PROCEDURE — 96375 TX/PRO/DX INJ NEW DRUG ADDON: CPT

## 2019-04-05 PROCEDURE — 73130 X-RAY EXAM OF HAND: CPT

## 2019-04-05 PROCEDURE — 85025 COMPLETE CBC W/AUTO DIFF WBC: CPT | Performed by: EMERGENCY MEDICINE

## 2019-04-05 PROCEDURE — 96365 THER/PROPH/DIAG IV INF INIT: CPT

## 2019-04-05 PROCEDURE — 36415 COLL VENOUS BLD VENIPUNCTURE: CPT | Performed by: EMERGENCY MEDICINE

## 2019-04-05 PROCEDURE — 70450 CT HEAD/BRAIN W/O DYE: CPT

## 2019-04-05 PROCEDURE — 72125 CT NECK SPINE W/O DYE: CPT

## 2019-04-05 PROCEDURE — 99285 EMERGENCY DEPT VISIT HI MDM: CPT

## 2019-04-05 PROCEDURE — 80053 COMPREHEN METABOLIC PANEL: CPT | Performed by: EMERGENCY MEDICINE

## 2019-04-05 RX ORDER — LIDOCAINE HYDROCHLORIDE 10 MG/ML
10 INJECTION, SOLUTION EPIDURAL; INFILTRATION; INTRACAUDAL; PERINEURAL ONCE
Status: COMPLETED | OUTPATIENT
Start: 2019-04-05 | End: 2019-04-05

## 2019-04-05 RX ORDER — CEFAZOLIN SODIUM 2 G/50ML
2000 SOLUTION INTRAVENOUS ONCE
Status: COMPLETED | OUTPATIENT
Start: 2019-04-05 | End: 2019-04-05

## 2019-04-05 RX ORDER — HYDROMORPHONE HCL/PF 1 MG/ML
0.5 SYRINGE (ML) INJECTION ONCE
Status: COMPLETED | OUTPATIENT
Start: 2019-04-05 | End: 2019-04-05

## 2019-04-05 RX ORDER — CEFADROXIL 500 MG/1
500 CAPSULE ORAL EVERY 12 HOURS SCHEDULED
Qty: 10 CAPSULE | Refills: 0 | Status: SHIPPED | OUTPATIENT
Start: 2019-04-05 | End: 2019-04-10

## 2019-04-05 RX ORDER — CEFADROXIL 500 MG/1
500 CAPSULE ORAL EVERY 12 HOURS SCHEDULED
Qty: 10 CAPSULE | Refills: 0 | Status: SHIPPED | OUTPATIENT
Start: 2019-04-05 | End: 2019-04-05 | Stop reason: SDUPTHER

## 2019-04-05 RX ORDER — BUPIVACAINE HYDROCHLORIDE 5 MG/ML
10 INJECTION, SOLUTION EPIDURAL; INTRACAUDAL ONCE
Status: COMPLETED | OUTPATIENT
Start: 2019-04-05 | End: 2019-04-05

## 2019-04-05 RX ADMIN — BUPIVACAINE HYDROCHLORIDE 10 ML: 5 INJECTION, SOLUTION EPIDURAL; INTRACAUDAL; PERINEURAL at 11:01

## 2019-04-05 RX ADMIN — CEFAZOLIN SODIUM 2000 MG: 2 SOLUTION INTRAVENOUS at 10:22

## 2019-04-05 RX ADMIN — LIDOCAINE HYDROCHLORIDE 10 ML: 10 INJECTION, SOLUTION EPIDURAL; INFILTRATION; INTRACAUDAL; PERINEURAL at 11:49

## 2019-04-05 RX ADMIN — LIDOCAINE HYDROCHLORIDE 10 ML: 10 INJECTION, SOLUTION EPIDURAL; INFILTRATION; INTRACAUDAL; PERINEURAL at 11:01

## 2019-04-05 RX ADMIN — HYDROMORPHONE HYDROCHLORIDE 0.5 MG: 1 INJECTION, SOLUTION INTRAMUSCULAR; INTRAVENOUS; SUBCUTANEOUS at 10:21

## 2019-04-05 RX ADMIN — BUPIVACAINE HYDROCHLORIDE 10 ML: 5 INJECTION, SOLUTION EPIDURAL; INTRACAUDAL; PERINEURAL at 11:49

## 2019-04-08 ENCOUNTER — TELEPHONE (OUTPATIENT)
Dept: FAMILY MEDICINE CLINIC | Facility: CLINIC | Age: 64
End: 2019-04-08

## 2019-04-08 DIAGNOSIS — M54.41 CHRONIC BILATERAL LOW BACK PAIN WITH BILATERAL SCIATICA: ICD-10-CM

## 2019-04-08 DIAGNOSIS — M54.42 CHRONIC BILATERAL LOW BACK PAIN WITH BILATERAL SCIATICA: ICD-10-CM

## 2019-04-08 DIAGNOSIS — G89.29 CHRONIC BILATERAL LOW BACK PAIN WITH BILATERAL SCIATICA: ICD-10-CM

## 2019-04-08 DIAGNOSIS — G89.4 CHRONIC PAIN SYNDROME: ICD-10-CM

## 2019-04-08 RX ORDER — HYDROCODONE BITARTRATE AND ACETAMINOPHEN 10; 325 MG/1; MG/1
1 TABLET ORAL EVERY 4 HOURS PRN
Qty: 120 TABLET | Refills: 0 | Status: SHIPPED | OUTPATIENT
Start: 2019-04-08 | End: 2019-04-09 | Stop reason: SDUPTHER

## 2019-04-08 RX ORDER — OXYCODONE HCL 20 MG/1
20 TABLET, FILM COATED, EXTENDED RELEASE ORAL EVERY 12 HOURS
Qty: 60 TABLET | Refills: 0 | Status: SHIPPED | OUTPATIENT
Start: 2019-04-08 | End: 2019-04-09 | Stop reason: SDUPTHER

## 2019-04-08 RX ORDER — OXYCODONE HCL 10 MG/1
10 TABLET, FILM COATED, EXTENDED RELEASE ORAL 2 TIMES DAILY
Qty: 60 TABLET | Refills: 0 | Status: SHIPPED | OUTPATIENT
Start: 2019-04-08 | End: 2019-04-09 | Stop reason: SDUPTHER

## 2019-04-09 DIAGNOSIS — G89.4 CHRONIC PAIN SYNDROME: ICD-10-CM

## 2019-04-09 DIAGNOSIS — G89.29 CHRONIC BILATERAL LOW BACK PAIN WITH BILATERAL SCIATICA: ICD-10-CM

## 2019-04-09 DIAGNOSIS — M54.42 CHRONIC BILATERAL LOW BACK PAIN WITH BILATERAL SCIATICA: ICD-10-CM

## 2019-04-09 DIAGNOSIS — M54.41 CHRONIC BILATERAL LOW BACK PAIN WITH BILATERAL SCIATICA: ICD-10-CM

## 2019-04-09 RX ORDER — OXYCODONE HCL 10 MG/1
10 TABLET, FILM COATED, EXTENDED RELEASE ORAL 2 TIMES DAILY
Qty: 60 TABLET | Refills: 0 | Status: SHIPPED | OUTPATIENT
Start: 2019-04-09 | End: 2019-05-08 | Stop reason: SDUPTHER

## 2019-04-09 RX ORDER — OXYCODONE HCL 20 MG/1
20 TABLET, FILM COATED, EXTENDED RELEASE ORAL EVERY 12 HOURS
Qty: 60 TABLET | Refills: 0 | Status: SHIPPED | OUTPATIENT
Start: 2019-04-09 | End: 2019-05-08 | Stop reason: SDUPTHER

## 2019-04-09 RX ORDER — HYDROCODONE BITARTRATE AND ACETAMINOPHEN 10; 325 MG/1; MG/1
1 TABLET ORAL EVERY 4 HOURS PRN
Qty: 120 TABLET | Refills: 0 | Status: SHIPPED | OUTPATIENT
Start: 2019-04-09 | End: 2019-05-08 | Stop reason: SDUPTHER

## 2019-04-12 ENCOUNTER — OFFICE VISIT (OUTPATIENT)
Dept: FAMILY MEDICINE CLINIC | Facility: CLINIC | Age: 64
End: 2019-04-12
Payer: COMMERCIAL

## 2019-04-12 VITALS
DIASTOLIC BLOOD PRESSURE: 80 MMHG | RESPIRATION RATE: 14 BRPM | OXYGEN SATURATION: 98 % | SYSTOLIC BLOOD PRESSURE: 116 MMHG | WEIGHT: 172.4 LBS | HEIGHT: 68 IN | BODY MASS INDEX: 26.13 KG/M2 | TEMPERATURE: 98.1 F | HEART RATE: 65 BPM

## 2019-04-12 DIAGNOSIS — M48.02 CERVICAL SPINAL STENOSIS: ICD-10-CM

## 2019-04-12 DIAGNOSIS — G89.4 CHRONIC PAIN SYNDROME: ICD-10-CM

## 2019-04-12 DIAGNOSIS — M54.9 CHRONIC BACK PAIN GREATER THAN 3 MONTHS DURATION: ICD-10-CM

## 2019-04-12 DIAGNOSIS — G89.29 CHRONIC BACK PAIN GREATER THAN 3 MONTHS DURATION: ICD-10-CM

## 2019-04-12 DIAGNOSIS — R55 NEAR SYNCOPE: Primary | ICD-10-CM

## 2019-04-12 PROCEDURE — 99213 OFFICE O/P EST LOW 20 MIN: CPT | Performed by: FAMILY MEDICINE

## 2019-04-12 RX ORDER — CEPHALEXIN 500 MG/1
500 CAPSULE ORAL
COMMUNITY
Start: 2019-04-10 | End: 2019-05-01

## 2019-04-12 RX ORDER — OMEPRAZOLE 40 MG/1
CAPSULE, DELAYED RELEASE ORAL
COMMUNITY
Start: 2019-01-25 | End: 2020-01-14 | Stop reason: ALTCHOICE

## 2019-04-12 RX ORDER — RANITIDINE 300 MG/1
TABLET ORAL
COMMUNITY
Start: 2012-08-26 | End: 2019-04-12 | Stop reason: ALTCHOICE

## 2019-04-14 PROBLEM — Z78.9 VEGAN DIET: Status: ACTIVE | Noted: 2019-04-14

## 2019-04-14 PROBLEM — Z13.29 SCREENING FOR HYPOTHYROIDISM: Status: RESOLVED | Noted: 2018-08-21 | Resolved: 2019-04-14

## 2019-04-14 PROBLEM — Z12.11 COLON CANCER SCREENING: Status: RESOLVED | Noted: 2018-08-21 | Resolved: 2019-04-14

## 2019-04-14 PROBLEM — Z12.5 ENCOUNTER FOR PROSTATE CANCER SCREENING: Status: RESOLVED | Noted: 2018-08-10 | Resolved: 2019-04-14

## 2019-04-14 PROBLEM — G89.29 CHRONIC PAIN: Status: ACTIVE | Noted: 2017-12-15

## 2019-04-14 PROBLEM — F32.A DEPRESSION: Status: ACTIVE | Noted: 2019-04-14

## 2019-04-14 PROBLEM — R13.10 DIFFICULTY SWALLOWING: Status: ACTIVE | Noted: 2019-04-14

## 2019-04-14 PROBLEM — Z00.00 ROUTINE GENERAL MEDICAL EXAMINATION AT A HEALTH CARE FACILITY: Status: RESOLVED | Noted: 2018-08-21 | Resolved: 2019-04-14

## 2019-04-14 PROBLEM — M21.70 LOWER LIMB LENGTH DIFFERENCE: Status: ACTIVE | Noted: 2017-12-15

## 2019-04-15 ENCOUNTER — OFFICE VISIT (OUTPATIENT)
Dept: OBGYN CLINIC | Facility: CLINIC | Age: 64
End: 2019-04-15
Payer: COMMERCIAL

## 2019-04-15 ENCOUNTER — TELEPHONE (OUTPATIENT)
Dept: OBGYN CLINIC | Facility: CLINIC | Age: 64
End: 2019-04-15

## 2019-04-15 VITALS
DIASTOLIC BLOOD PRESSURE: 67 MMHG | HEART RATE: 55 BPM | BODY MASS INDEX: 30.63 KG/M2 | HEIGHT: 64 IN | WEIGHT: 179.4 LBS | SYSTOLIC BLOOD PRESSURE: 113 MMHG

## 2019-04-15 DIAGNOSIS — S62.609S: ICD-10-CM

## 2019-04-15 DIAGNOSIS — L02.519 CELLULITIS AND ABSCESS OF HAND: Primary | ICD-10-CM

## 2019-04-15 DIAGNOSIS — L03.119 CELLULITIS AND ABSCESS OF HAND: Primary | ICD-10-CM

## 2019-04-15 PROCEDURE — 99204 OFFICE O/P NEW MOD 45 MIN: CPT | Performed by: ORTHOPAEDIC SURGERY

## 2019-04-15 RX ORDER — DOXYCYCLINE 100 MG/1
100 CAPSULE ORAL 2 TIMES DAILY
Qty: 20 CAPSULE | Refills: 0 | Status: SHIPPED | OUTPATIENT
Start: 2019-04-15 | End: 2019-04-25

## 2019-04-17 ENCOUNTER — EVALUATION (OUTPATIENT)
Dept: OCCUPATIONAL THERAPY | Facility: CLINIC | Age: 64
End: 2019-04-17
Payer: COMMERCIAL

## 2019-04-17 DIAGNOSIS — S62.613D CLOSED DISPLACED FRACTURE OF PROXIMAL PHALANX OF LEFT MIDDLE FINGER WITH ROUTINE HEALING, SUBSEQUENT ENCOUNTER: ICD-10-CM

## 2019-04-17 DIAGNOSIS — S62.614D DISPLACED FRACTURE OF PROXIMAL PHALANX OF RIGHT RING FINGER, SUBSEQUENT ENCOUNTER FOR FRACTURE WITH ROUTINE HEALING: ICD-10-CM

## 2019-04-17 DIAGNOSIS — L02.519 CELLULITIS AND ABSCESS OF HAND: Primary | ICD-10-CM

## 2019-04-17 DIAGNOSIS — L03.119 CELLULITIS AND ABSCESS OF HAND: Primary | ICD-10-CM

## 2019-04-17 PROCEDURE — G8984 CARRY CURRENT STATUS: HCPCS | Performed by: OCCUPATIONAL THERAPIST

## 2019-04-17 PROCEDURE — G8985 CARRY GOAL STATUS: HCPCS | Performed by: OCCUPATIONAL THERAPIST

## 2019-04-17 PROCEDURE — G8986 CARRY D/C STATUS: HCPCS | Performed by: OCCUPATIONAL THERAPIST

## 2019-04-17 PROCEDURE — L3808 WHFO, RIGID W/O JOINTS: HCPCS | Performed by: OCCUPATIONAL THERAPIST

## 2019-04-17 PROCEDURE — 97165 OT EVAL LOW COMPLEX 30 MIN: CPT | Performed by: OCCUPATIONAL THERAPIST

## 2019-04-18 ENCOUNTER — TELEPHONE (OUTPATIENT)
Dept: FAMILY MEDICINE CLINIC | Facility: CLINIC | Age: 64
End: 2019-04-18

## 2019-04-18 ENCOUNTER — OFFICE VISIT (OUTPATIENT)
Dept: FAMILY MEDICINE CLINIC | Facility: CLINIC | Age: 64
End: 2019-04-18
Payer: COMMERCIAL

## 2019-04-18 VITALS
DIASTOLIC BLOOD PRESSURE: 70 MMHG | TEMPERATURE: 97.1 F | SYSTOLIC BLOOD PRESSURE: 120 MMHG | HEART RATE: 54 BPM | OXYGEN SATURATION: 97 % | BODY MASS INDEX: 26.22 KG/M2 | WEIGHT: 173 LBS | HEIGHT: 68 IN | RESPIRATION RATE: 20 BRPM

## 2019-04-18 DIAGNOSIS — G89.4 CHRONIC PAIN DISORDER: ICD-10-CM

## 2019-04-18 DIAGNOSIS — F33.41 RECURRENT MAJOR DEPRESSIVE DISORDER, IN PARTIAL REMISSION (HCC): ICD-10-CM

## 2019-04-18 DIAGNOSIS — R55 NEAR SYNCOPE: Primary | ICD-10-CM

## 2019-04-18 LAB — ECG INTERP DURING EX: NORMAL MS

## 2019-04-18 PROCEDURE — 99213 OFFICE O/P EST LOW 20 MIN: CPT | Performed by: FAMILY MEDICINE

## 2019-04-18 PROCEDURE — 93000 ELECTROCARDIOGRAM COMPLETE: CPT | Performed by: FAMILY MEDICINE

## 2019-04-18 RX ORDER — BUPROPION HYDROCHLORIDE 150 MG/1
150 TABLET ORAL DAILY
Qty: 30 TABLET | Refills: 1 | Status: SHIPPED | OUTPATIENT
Start: 2019-04-18 | End: 2019-05-08 | Stop reason: SDUPTHER

## 2019-04-22 ENCOUNTER — APPOINTMENT (OUTPATIENT)
Dept: OCCUPATIONAL THERAPY | Facility: CLINIC | Age: 64
End: 2019-04-22
Payer: COMMERCIAL

## 2019-04-22 ENCOUNTER — OFFICE VISIT (OUTPATIENT)
Dept: OBGYN CLINIC | Facility: CLINIC | Age: 64
End: 2019-04-22
Payer: COMMERCIAL

## 2019-04-22 VITALS
WEIGHT: 175 LBS | SYSTOLIC BLOOD PRESSURE: 116 MMHG | HEIGHT: 69 IN | DIASTOLIC BLOOD PRESSURE: 84 MMHG | HEART RATE: 51 BPM | BODY MASS INDEX: 25.92 KG/M2

## 2019-04-22 DIAGNOSIS — S62.609S: ICD-10-CM

## 2019-04-22 DIAGNOSIS — L02.519 CELLULITIS AND ABSCESS OF HAND: Primary | ICD-10-CM

## 2019-04-22 DIAGNOSIS — L03.119 CELLULITIS AND ABSCESS OF HAND: Primary | ICD-10-CM

## 2019-04-22 PROCEDURE — 99213 OFFICE O/P EST LOW 20 MIN: CPT | Performed by: ORTHOPAEDIC SURGERY

## 2019-04-24 ENCOUNTER — APPOINTMENT (OUTPATIENT)
Dept: OCCUPATIONAL THERAPY | Facility: CLINIC | Age: 64
End: 2019-04-24
Payer: COMMERCIAL

## 2019-04-25 ENCOUNTER — TRANSITIONAL CARE MANAGEMENT (OUTPATIENT)
Dept: FAMILY MEDICINE CLINIC | Facility: CLINIC | Age: 64
End: 2019-04-25

## 2019-04-26 ENCOUNTER — APPOINTMENT (OUTPATIENT)
Dept: OCCUPATIONAL THERAPY | Facility: CLINIC | Age: 64
End: 2019-04-26
Payer: COMMERCIAL

## 2019-04-26 ENCOUNTER — TELEPHONE (OUTPATIENT)
Dept: OBGYN CLINIC | Facility: HOSPITAL | Age: 64
End: 2019-04-26

## 2019-04-30 ENCOUNTER — APPOINTMENT (OUTPATIENT)
Dept: OCCUPATIONAL THERAPY | Facility: CLINIC | Age: 64
End: 2019-04-30
Payer: COMMERCIAL

## 2019-05-02 DIAGNOSIS — M54.41 CHRONIC BILATERAL LOW BACK PAIN WITH BILATERAL SCIATICA: ICD-10-CM

## 2019-05-02 DIAGNOSIS — M54.42 CHRONIC BILATERAL LOW BACK PAIN WITH BILATERAL SCIATICA: ICD-10-CM

## 2019-05-02 DIAGNOSIS — G89.29 CHRONIC BILATERAL LOW BACK PAIN WITH BILATERAL SCIATICA: ICD-10-CM

## 2019-05-02 RX ORDER — MELOXICAM 15 MG/1
15 TABLET ORAL DAILY
Qty: 90 TABLET | Refills: 0 | Status: SHIPPED | OUTPATIENT
Start: 2019-05-02 | End: 2019-08-27 | Stop reason: SDUPTHER

## 2019-05-08 DIAGNOSIS — G89.29 CHRONIC BILATERAL LOW BACK PAIN WITH BILATERAL SCIATICA: ICD-10-CM

## 2019-05-08 DIAGNOSIS — M54.42 CHRONIC BILATERAL LOW BACK PAIN WITH BILATERAL SCIATICA: ICD-10-CM

## 2019-05-08 DIAGNOSIS — G89.4 CHRONIC PAIN SYNDROME: ICD-10-CM

## 2019-05-08 DIAGNOSIS — M54.41 CHRONIC BILATERAL LOW BACK PAIN WITH BILATERAL SCIATICA: ICD-10-CM

## 2019-05-08 DIAGNOSIS — F33.41 RECURRENT MAJOR DEPRESSIVE DISORDER, IN PARTIAL REMISSION (HCC): ICD-10-CM

## 2019-05-08 RX ORDER — OXYCODONE HCL 10 MG/1
10 TABLET, FILM COATED, EXTENDED RELEASE ORAL 2 TIMES DAILY
Qty: 60 TABLET | Refills: 0 | Status: SHIPPED | OUTPATIENT
Start: 2019-05-08 | End: 2019-06-06 | Stop reason: SDUPTHER

## 2019-05-08 RX ORDER — OXYCODONE HCL 20 MG/1
20 TABLET, FILM COATED, EXTENDED RELEASE ORAL EVERY 12 HOURS
Qty: 60 TABLET | Refills: 0 | Status: SHIPPED | OUTPATIENT
Start: 2019-05-08 | End: 2019-06-06 | Stop reason: SDUPTHER

## 2019-05-08 RX ORDER — HYDROCODONE BITARTRATE AND ACETAMINOPHEN 10; 325 MG/1; MG/1
1 TABLET ORAL EVERY 4 HOURS PRN
Qty: 120 TABLET | Refills: 0 | Status: SHIPPED | OUTPATIENT
Start: 2019-05-08 | End: 2019-06-06 | Stop reason: SDUPTHER

## 2019-05-08 RX ORDER — BUPROPION HYDROCHLORIDE 300 MG/1
300 TABLET ORAL DAILY
Qty: 30 TABLET | Refills: 1 | Status: SHIPPED | OUTPATIENT
Start: 2019-05-08 | End: 2019-06-06 | Stop reason: SDUPTHER

## 2019-05-23 DIAGNOSIS — I10 ESSENTIAL HYPERTENSION: ICD-10-CM

## 2019-05-23 RX ORDER — OLMESARTAN MEDOXOMIL AND HYDROCHLOROTHIAZIDE 40/12.5 40; 12.5 MG/1; MG/1
1 TABLET ORAL DAILY
Qty: 90 TABLET | Refills: 3 | Status: SHIPPED | OUTPATIENT
Start: 2019-05-23 | End: 2019-05-28 | Stop reason: SDUPTHER

## 2019-05-28 DIAGNOSIS — I10 ESSENTIAL HYPERTENSION: ICD-10-CM

## 2019-05-29 RX ORDER — OLMESARTAN MEDOXOMIL AND HYDROCHLOROTHIAZIDE 40/12.5 40; 12.5 MG/1; MG/1
1 TABLET ORAL DAILY
Qty: 90 TABLET | Refills: 0 | Status: SHIPPED | OUTPATIENT
Start: 2019-05-29 | End: 2019-10-30 | Stop reason: SDUPTHER

## 2019-06-06 DIAGNOSIS — G89.4 CHRONIC PAIN SYNDROME: ICD-10-CM

## 2019-06-06 DIAGNOSIS — M54.42 CHRONIC BILATERAL LOW BACK PAIN WITH BILATERAL SCIATICA: ICD-10-CM

## 2019-06-06 DIAGNOSIS — F33.41 RECURRENT MAJOR DEPRESSIVE DISORDER, IN PARTIAL REMISSION (HCC): ICD-10-CM

## 2019-06-06 DIAGNOSIS — G89.29 CHRONIC BILATERAL LOW BACK PAIN WITH BILATERAL SCIATICA: ICD-10-CM

## 2019-06-06 DIAGNOSIS — M54.41 CHRONIC BILATERAL LOW BACK PAIN WITH BILATERAL SCIATICA: ICD-10-CM

## 2019-06-07 RX ORDER — HYDROCODONE BITARTRATE AND ACETAMINOPHEN 10; 325 MG/1; MG/1
1 TABLET ORAL EVERY 4 HOURS PRN
Qty: 120 TABLET | Refills: 0 | Status: SHIPPED | OUTPATIENT
Start: 2019-06-07 | End: 2019-07-08 | Stop reason: SDUPTHER

## 2019-06-07 RX ORDER — OXYCODONE HCL 10 MG/1
10 TABLET, FILM COATED, EXTENDED RELEASE ORAL 2 TIMES DAILY
Qty: 60 TABLET | Refills: 0 | Status: SHIPPED | OUTPATIENT
Start: 2019-06-07 | End: 2019-07-08 | Stop reason: SDUPTHER

## 2019-06-07 RX ORDER — OXYCODONE HCL 20 MG/1
20 TABLET, FILM COATED, EXTENDED RELEASE ORAL EVERY 12 HOURS
Qty: 60 TABLET | Refills: 0 | Status: SHIPPED | OUTPATIENT
Start: 2019-06-07 | End: 2019-07-08 | Stop reason: SDUPTHER

## 2019-06-07 RX ORDER — BUPROPION HYDROCHLORIDE 300 MG/1
300 TABLET ORAL DAILY
Qty: 30 TABLET | Refills: 0 | Status: SHIPPED | OUTPATIENT
Start: 2019-06-07 | End: 2019-07-08 | Stop reason: SDUPTHER

## 2019-07-08 DIAGNOSIS — G89.29 CHRONIC BILATERAL LOW BACK PAIN WITH BILATERAL SCIATICA: ICD-10-CM

## 2019-07-08 DIAGNOSIS — G89.4 CHRONIC PAIN SYNDROME: ICD-10-CM

## 2019-07-08 DIAGNOSIS — M54.41 CHRONIC BILATERAL LOW BACK PAIN WITH BILATERAL SCIATICA: ICD-10-CM

## 2019-07-08 DIAGNOSIS — F33.41 RECURRENT MAJOR DEPRESSIVE DISORDER, IN PARTIAL REMISSION (HCC): ICD-10-CM

## 2019-07-08 DIAGNOSIS — M54.42 CHRONIC BILATERAL LOW BACK PAIN WITH BILATERAL SCIATICA: ICD-10-CM

## 2019-07-08 RX ORDER — BUPROPION HYDROCHLORIDE 300 MG/1
300 TABLET ORAL DAILY
Qty: 30 TABLET | Refills: 0 | Status: SHIPPED | OUTPATIENT
Start: 2019-07-08 | End: 2019-09-04 | Stop reason: SDUPTHER

## 2019-07-08 RX ORDER — OXYCODONE HCL 10 MG/1
10 TABLET, FILM COATED, EXTENDED RELEASE ORAL 2 TIMES DAILY
Qty: 60 TABLET | Refills: 0 | Status: SHIPPED | OUTPATIENT
Start: 2019-07-08 | End: 2019-08-04 | Stop reason: SDUPTHER

## 2019-07-08 RX ORDER — OXYCODONE HCL 20 MG/1
20 TABLET, FILM COATED, EXTENDED RELEASE ORAL EVERY 12 HOURS
Qty: 60 TABLET | Refills: 0 | Status: SHIPPED | OUTPATIENT
Start: 2019-07-08 | End: 2019-08-04 | Stop reason: SDUPTHER

## 2019-07-08 RX ORDER — HYDROCODONE BITARTRATE AND ACETAMINOPHEN 10; 325 MG/1; MG/1
1 TABLET ORAL EVERY 4 HOURS PRN
Qty: 120 TABLET | Refills: 0 | Status: SHIPPED | OUTPATIENT
Start: 2019-07-08 | End: 2019-08-04 | Stop reason: SDUPTHER

## 2019-08-04 DIAGNOSIS — M54.42 CHRONIC BILATERAL LOW BACK PAIN WITH BILATERAL SCIATICA: ICD-10-CM

## 2019-08-04 DIAGNOSIS — G89.29 CHRONIC BILATERAL LOW BACK PAIN WITH BILATERAL SCIATICA: ICD-10-CM

## 2019-08-04 DIAGNOSIS — G89.4 CHRONIC PAIN SYNDROME: ICD-10-CM

## 2019-08-04 DIAGNOSIS — M54.41 CHRONIC BILATERAL LOW BACK PAIN WITH BILATERAL SCIATICA: ICD-10-CM

## 2019-08-07 RX ORDER — OXYCODONE HCL 20 MG/1
20 TABLET, FILM COATED, EXTENDED RELEASE ORAL EVERY 12 HOURS
Qty: 60 TABLET | Refills: 0 | Status: SHIPPED | OUTPATIENT
Start: 2019-08-07 | End: 2019-09-03 | Stop reason: SDUPTHER

## 2019-08-07 RX ORDER — OXYCODONE HCL 10 MG/1
10 TABLET, FILM COATED, EXTENDED RELEASE ORAL 2 TIMES DAILY
Qty: 60 TABLET | Refills: 0 | Status: SHIPPED | OUTPATIENT
Start: 2019-08-07 | End: 2019-09-03 | Stop reason: SDUPTHER

## 2019-08-07 RX ORDER — HYDROCODONE BITARTRATE AND ACETAMINOPHEN 10; 325 MG/1; MG/1
1 TABLET ORAL EVERY 4 HOURS PRN
Qty: 120 TABLET | Refills: 0 | Status: SHIPPED | OUTPATIENT
Start: 2019-08-07 | End: 2019-09-03 | Stop reason: SDUPTHER

## 2019-08-27 DIAGNOSIS — M54.42 CHRONIC BILATERAL LOW BACK PAIN WITH BILATERAL SCIATICA: ICD-10-CM

## 2019-08-27 DIAGNOSIS — M54.41 CHRONIC BILATERAL LOW BACK PAIN WITH BILATERAL SCIATICA: ICD-10-CM

## 2019-08-27 DIAGNOSIS — G89.29 CHRONIC BILATERAL LOW BACK PAIN WITH BILATERAL SCIATICA: ICD-10-CM

## 2019-08-27 RX ORDER — MELOXICAM 15 MG/1
15 TABLET ORAL DAILY
Qty: 90 TABLET | Refills: 0 | Status: SHIPPED | OUTPATIENT
Start: 2019-08-27 | End: 2019-10-30 | Stop reason: SDUPTHER

## 2019-09-03 DIAGNOSIS — G89.29 CHRONIC BILATERAL LOW BACK PAIN WITH BILATERAL SCIATICA: ICD-10-CM

## 2019-09-03 DIAGNOSIS — M54.41 CHRONIC BILATERAL LOW BACK PAIN WITH BILATERAL SCIATICA: ICD-10-CM

## 2019-09-03 DIAGNOSIS — M54.42 CHRONIC BILATERAL LOW BACK PAIN WITH BILATERAL SCIATICA: ICD-10-CM

## 2019-09-03 DIAGNOSIS — G89.4 CHRONIC PAIN SYNDROME: ICD-10-CM

## 2019-09-03 RX ORDER — OXYCODONE HCL 10 MG/1
10 TABLET, FILM COATED, EXTENDED RELEASE ORAL 2 TIMES DAILY
Qty: 60 TABLET | Refills: 0 | Status: SHIPPED | OUTPATIENT
Start: 2019-09-03 | End: 2019-10-02 | Stop reason: SDUPTHER

## 2019-09-03 RX ORDER — OXYCODONE HCL 20 MG/1
20 TABLET, FILM COATED, EXTENDED RELEASE ORAL EVERY 12 HOURS
Qty: 60 TABLET | Refills: 0 | Status: SHIPPED | OUTPATIENT
Start: 2019-09-03 | End: 2019-10-02 | Stop reason: SDUPTHER

## 2019-09-03 RX ORDER — HYDROCODONE BITARTRATE AND ACETAMINOPHEN 10; 325 MG/1; MG/1
1 TABLET ORAL EVERY 4 HOURS PRN
Qty: 120 TABLET | Refills: 0 | Status: SHIPPED | OUTPATIENT
Start: 2019-09-03 | End: 2019-10-02 | Stop reason: SDUPTHER

## 2019-09-04 DIAGNOSIS — F33.41 RECURRENT MAJOR DEPRESSIVE DISORDER, IN PARTIAL REMISSION (HCC): ICD-10-CM

## 2019-09-04 RX ORDER — BUPROPION HYDROCHLORIDE 300 MG/1
300 TABLET ORAL DAILY
Qty: 30 TABLET | Refills: 0 | Status: SHIPPED | OUTPATIENT
Start: 2019-09-04 | End: 2019-10-02 | Stop reason: SDUPTHER

## 2019-10-02 DIAGNOSIS — G89.4 CHRONIC PAIN SYNDROME: ICD-10-CM

## 2019-10-02 DIAGNOSIS — F33.41 RECURRENT MAJOR DEPRESSIVE DISORDER, IN PARTIAL REMISSION (HCC): ICD-10-CM

## 2019-10-02 DIAGNOSIS — M54.41 CHRONIC BILATERAL LOW BACK PAIN WITH BILATERAL SCIATICA: ICD-10-CM

## 2019-10-02 DIAGNOSIS — G89.29 CHRONIC BILATERAL LOW BACK PAIN WITH BILATERAL SCIATICA: ICD-10-CM

## 2019-10-02 DIAGNOSIS — M54.42 CHRONIC BILATERAL LOW BACK PAIN WITH BILATERAL SCIATICA: ICD-10-CM

## 2019-10-02 RX ORDER — OXYCODONE HCL 20 MG/1
20 TABLET, FILM COATED, EXTENDED RELEASE ORAL EVERY 12 HOURS
Qty: 60 TABLET | Refills: 0 | Status: SHIPPED | OUTPATIENT
Start: 2019-10-02 | End: 2019-10-30 | Stop reason: SDUPTHER

## 2019-10-02 RX ORDER — BUPROPION HYDROCHLORIDE 300 MG/1
300 TABLET ORAL DAILY
Qty: 30 TABLET | Refills: 0 | Status: SHIPPED | OUTPATIENT
Start: 2019-10-02 | End: 2019-11-08 | Stop reason: SDUPTHER

## 2019-10-02 RX ORDER — HYDROCODONE BITARTRATE AND ACETAMINOPHEN 10; 325 MG/1; MG/1
1 TABLET ORAL EVERY 4 HOURS PRN
Qty: 120 TABLET | Refills: 0 | Status: SHIPPED | OUTPATIENT
Start: 2019-10-02 | End: 2019-10-30 | Stop reason: SDUPTHER

## 2019-10-02 RX ORDER — OXYCODONE HCL 10 MG/1
10 TABLET, FILM COATED, EXTENDED RELEASE ORAL 2 TIMES DAILY
Qty: 60 TABLET | Refills: 0 | Status: SHIPPED | OUTPATIENT
Start: 2019-10-02 | End: 2019-10-30 | Stop reason: SDUPTHER

## 2019-10-30 DIAGNOSIS — G89.4 CHRONIC PAIN SYNDROME: ICD-10-CM

## 2019-10-30 DIAGNOSIS — M54.42 CHRONIC BILATERAL LOW BACK PAIN WITH BILATERAL SCIATICA: ICD-10-CM

## 2019-10-30 DIAGNOSIS — M54.41 CHRONIC BILATERAL LOW BACK PAIN WITH BILATERAL SCIATICA: ICD-10-CM

## 2019-10-30 DIAGNOSIS — I10 ESSENTIAL HYPERTENSION: ICD-10-CM

## 2019-10-30 DIAGNOSIS — G89.29 CHRONIC BILATERAL LOW BACK PAIN WITH BILATERAL SCIATICA: ICD-10-CM

## 2019-10-30 RX ORDER — OLMESARTAN MEDOXOMIL AND HYDROCHLOROTHIAZIDE 40/12.5 40; 12.5 MG/1; MG/1
1 TABLET ORAL DAILY
Qty: 90 TABLET | Refills: 0 | Status: SHIPPED | OUTPATIENT
Start: 2019-10-30 | End: 2020-02-05 | Stop reason: SDUPTHER

## 2019-10-30 RX ORDER — MELOXICAM 15 MG/1
15 TABLET ORAL DAILY
Qty: 90 TABLET | Refills: 0 | Status: SHIPPED | OUTPATIENT
Start: 2019-10-30 | End: 2019-11-25 | Stop reason: SDUPTHER

## 2019-10-30 RX ORDER — OXYCODONE HCL 10 MG/1
10 TABLET, FILM COATED, EXTENDED RELEASE ORAL 2 TIMES DAILY
Qty: 60 TABLET | Refills: 0 | Status: SHIPPED | OUTPATIENT
Start: 2019-10-30 | End: 2019-11-25 | Stop reason: SDUPTHER

## 2019-10-30 RX ORDER — OXYCODONE HCL 20 MG/1
20 TABLET, FILM COATED, EXTENDED RELEASE ORAL EVERY 12 HOURS
Qty: 60 TABLET | Refills: 0 | Status: SHIPPED | OUTPATIENT
Start: 2019-10-30 | End: 2019-11-25 | Stop reason: SDUPTHER

## 2019-10-30 RX ORDER — HYDROCODONE BITARTRATE AND ACETAMINOPHEN 10; 325 MG/1; MG/1
1 TABLET ORAL EVERY 4 HOURS PRN
Qty: 120 TABLET | Refills: 0 | Status: SHIPPED | OUTPATIENT
Start: 2019-10-30 | End: 2019-11-25 | Stop reason: SDUPTHER

## 2019-11-08 DIAGNOSIS — G47.9 SLEEP DISTURBANCE: ICD-10-CM

## 2019-11-08 DIAGNOSIS — F33.41 RECURRENT MAJOR DEPRESSIVE DISORDER, IN PARTIAL REMISSION (HCC): ICD-10-CM

## 2019-11-11 RX ORDER — BUPROPION HYDROCHLORIDE 300 MG/1
300 TABLET ORAL DAILY
Qty: 30 TABLET | Refills: 0 | Status: SHIPPED | OUTPATIENT
Start: 2019-11-11 | End: 2019-12-17 | Stop reason: SDUPTHER

## 2019-11-25 DIAGNOSIS — M54.42 CHRONIC BILATERAL LOW BACK PAIN WITH BILATERAL SCIATICA: ICD-10-CM

## 2019-11-25 DIAGNOSIS — G89.4 CHRONIC PAIN SYNDROME: ICD-10-CM

## 2019-11-25 DIAGNOSIS — M54.41 CHRONIC BILATERAL LOW BACK PAIN WITH BILATERAL SCIATICA: ICD-10-CM

## 2019-11-25 DIAGNOSIS — G89.29 CHRONIC BILATERAL LOW BACK PAIN WITH BILATERAL SCIATICA: ICD-10-CM

## 2019-11-25 RX ORDER — MELOXICAM 15 MG/1
15 TABLET ORAL DAILY
Qty: 90 TABLET | Refills: 0 | Status: SHIPPED | OUTPATIENT
Start: 2019-11-25 | End: 2020-01-06 | Stop reason: SDUPTHER

## 2019-11-25 RX ORDER — OXYCODONE HCL 20 MG/1
20 TABLET, FILM COATED, EXTENDED RELEASE ORAL EVERY 12 HOURS
Qty: 60 TABLET | Refills: 0 | Status: SHIPPED | OUTPATIENT
Start: 2019-11-25 | End: 2019-12-30 | Stop reason: SDUPTHER

## 2019-11-25 RX ORDER — HYDROCODONE BITARTRATE AND ACETAMINOPHEN 10; 325 MG/1; MG/1
1 TABLET ORAL EVERY 4 HOURS PRN
Qty: 120 TABLET | Refills: 0 | Status: SHIPPED | OUTPATIENT
Start: 2019-11-25 | End: 2019-12-30 | Stop reason: SDUPTHER

## 2019-11-25 RX ORDER — OXYCODONE HCL 10 MG/1
10 TABLET, FILM COATED, EXTENDED RELEASE ORAL 2 TIMES DAILY
Qty: 60 TABLET | Refills: 0 | Status: SHIPPED | OUTPATIENT
Start: 2019-11-25 | End: 2019-12-30 | Stop reason: SDUPTHER

## 2019-12-17 DIAGNOSIS — F33.41 RECURRENT MAJOR DEPRESSIVE DISORDER, IN PARTIAL REMISSION (HCC): ICD-10-CM

## 2019-12-17 RX ORDER — BUPROPION HYDROCHLORIDE 300 MG/1
300 TABLET ORAL DAILY
Qty: 30 TABLET | Refills: 0 | Status: SHIPPED | OUTPATIENT
Start: 2019-12-17 | End: 2020-01-16 | Stop reason: SDUPTHER

## 2019-12-26 DIAGNOSIS — M54.41 CHRONIC BILATERAL LOW BACK PAIN WITH BILATERAL SCIATICA: ICD-10-CM

## 2019-12-26 DIAGNOSIS — M54.42 CHRONIC BILATERAL LOW BACK PAIN WITH BILATERAL SCIATICA: ICD-10-CM

## 2019-12-26 DIAGNOSIS — G89.4 CHRONIC PAIN SYNDROME: ICD-10-CM

## 2019-12-26 DIAGNOSIS — G89.29 CHRONIC BILATERAL LOW BACK PAIN WITH BILATERAL SCIATICA: ICD-10-CM

## 2019-12-26 DIAGNOSIS — G47.9 SLEEP DISTURBANCE: ICD-10-CM

## 2019-12-30 RX ORDER — OXYCODONE HCL 20 MG/1
20 TABLET, FILM COATED, EXTENDED RELEASE ORAL EVERY 12 HOURS
Qty: 6 TABLET | Refills: 0 | Status: SHIPPED | OUTPATIENT
Start: 2019-12-30 | End: 2020-01-03 | Stop reason: SDUPTHER

## 2019-12-30 RX ORDER — OXYCODONE HCL 10 MG/1
10 TABLET, FILM COATED, EXTENDED RELEASE ORAL 2 TIMES DAILY
Qty: 6 TABLET | Refills: 0 | Status: SHIPPED | OUTPATIENT
Start: 2019-12-30 | End: 2020-01-03 | Stop reason: SDUPTHER

## 2019-12-30 RX ORDER — HYDROCODONE BITARTRATE AND ACETAMINOPHEN 10; 325 MG/1; MG/1
1 TABLET ORAL EVERY 4 HOURS PRN
Qty: 6 TABLET | Refills: 0 | Status: CANCELLED | OUTPATIENT
Start: 2019-12-30 | End: 2020-01-02

## 2019-12-30 RX ORDER — OXYCODONE HCL 20 MG/1
20 TABLET, FILM COATED, EXTENDED RELEASE ORAL EVERY 12 HOURS
Qty: 6 TABLET | Refills: 0 | Status: CANCELLED | OUTPATIENT
Start: 2019-12-30

## 2019-12-30 RX ORDER — HYDROCODONE BITARTRATE AND ACETAMINOPHEN 10; 325 MG/1; MG/1
1 TABLET ORAL EVERY 4 HOURS PRN
Qty: 6 TABLET | Refills: 0 | Status: SHIPPED | OUTPATIENT
Start: 2019-12-30 | End: 2020-01-03 | Stop reason: SDUPTHER

## 2019-12-30 RX ORDER — OXYCODONE HCL 10 MG/1
10 TABLET, FILM COATED, EXTENDED RELEASE ORAL 2 TIMES DAILY
Qty: 6 TABLET | Refills: 0 | Status: CANCELLED | OUTPATIENT
Start: 2019-12-30

## 2019-12-30 NOTE — TELEPHONE ENCOUNTER
Explained to Patient's wife that you do not fill controlled substance for Dr Gracie Jones patients  She wanted me to ask you to fill just 6 pills of the Oxycontin and [place] until Dr Cristopher Flores comes back or sees his message  States that he cannot work unless he has this medication

## 2019-12-30 NOTE — TELEPHONE ENCOUNTER
I will do a one time only fill of these medications for 3 days only  He will have to call office on Thursday for additional tablets  Called pt and left message that his rx was called in  I will not do any additional refills  [place] reviewed  Chart reviewed       Malu Gregg

## 2020-01-03 DIAGNOSIS — M54.42 CHRONIC BILATERAL LOW BACK PAIN WITH BILATERAL SCIATICA: ICD-10-CM

## 2020-01-03 DIAGNOSIS — G89.29 CHRONIC BILATERAL LOW BACK PAIN WITH BILATERAL SCIATICA: ICD-10-CM

## 2020-01-03 DIAGNOSIS — G89.4 CHRONIC PAIN SYNDROME: ICD-10-CM

## 2020-01-03 DIAGNOSIS — M54.41 CHRONIC BILATERAL LOW BACK PAIN WITH BILATERAL SCIATICA: ICD-10-CM

## 2020-01-03 RX ORDER — OXYCODONE HCL 20 MG/1
20 TABLET, FILM COATED, EXTENDED RELEASE ORAL EVERY 12 HOURS
Qty: 60 TABLET | Refills: 0 | Status: SHIPPED | OUTPATIENT
Start: 2020-01-03 | End: 2020-01-09 | Stop reason: SDUPTHER

## 2020-01-03 RX ORDER — OXYCODONE HCL 10 MG/1
10 TABLET, FILM COATED, EXTENDED RELEASE ORAL 2 TIMES DAILY
Qty: 60 TABLET | Refills: 0 | Status: SHIPPED | OUTPATIENT
Start: 2020-01-03 | End: 2020-01-09 | Stop reason: SDUPTHER

## 2020-01-03 RX ORDER — HYDROCODONE BITARTRATE AND ACETAMINOPHEN 10; 325 MG/1; MG/1
1 TABLET ORAL EVERY 4 HOURS PRN
Qty: 180 TABLET | Refills: 0 | Status: SHIPPED | OUTPATIENT
Start: 2020-01-03 | End: 2020-01-07 | Stop reason: SDUPTHER

## 2020-01-06 DIAGNOSIS — G89.29 CHRONIC BILATERAL LOW BACK PAIN WITH BILATERAL SCIATICA: ICD-10-CM

## 2020-01-06 DIAGNOSIS — M54.42 CHRONIC BILATERAL LOW BACK PAIN WITH BILATERAL SCIATICA: ICD-10-CM

## 2020-01-06 DIAGNOSIS — M54.41 CHRONIC BILATERAL LOW BACK PAIN WITH BILATERAL SCIATICA: ICD-10-CM

## 2020-01-06 RX ORDER — MELOXICAM 15 MG/1
15 TABLET ORAL DAILY
Qty: 90 TABLET | Refills: 0 | Status: SHIPPED | OUTPATIENT
Start: 2020-01-06 | End: 2020-06-04 | Stop reason: SDUPTHER

## 2020-01-07 DIAGNOSIS — G89.29 CHRONIC BILATERAL LOW BACK PAIN WITH BILATERAL SCIATICA: ICD-10-CM

## 2020-01-07 DIAGNOSIS — M54.42 CHRONIC BILATERAL LOW BACK PAIN WITH BILATERAL SCIATICA: ICD-10-CM

## 2020-01-07 DIAGNOSIS — M54.41 CHRONIC BILATERAL LOW BACK PAIN WITH BILATERAL SCIATICA: ICD-10-CM

## 2020-01-07 RX ORDER — HYDROCODONE BITARTRATE AND ACETAMINOPHEN 10; 325 MG/1; MG/1
1 TABLET ORAL EVERY 4 HOURS PRN
Qty: 180 TABLET | Refills: 0 | Status: SHIPPED | OUTPATIENT
Start: 2020-01-07 | End: 2020-02-05 | Stop reason: SDUPTHER

## 2020-01-07 NOTE — TELEPHONE ENCOUNTER
Had to do a PA for HYDROcodone-acetaminophen (NORCO)  mg per tablet  It was approved  I called [facility] to let them know of the approval   They cannot fill until 1/9 due to having 7 pills filled on 1/4  They said you can send it in and they will wait to fill it

## 2020-01-09 DIAGNOSIS — G89.29 CHRONIC BILATERAL LOW BACK PAIN WITH BILATERAL SCIATICA: ICD-10-CM

## 2020-01-09 DIAGNOSIS — G89.4 CHRONIC PAIN SYNDROME: ICD-10-CM

## 2020-01-09 DIAGNOSIS — M54.42 CHRONIC BILATERAL LOW BACK PAIN WITH BILATERAL SCIATICA: ICD-10-CM

## 2020-01-09 DIAGNOSIS — M54.41 CHRONIC BILATERAL LOW BACK PAIN WITH BILATERAL SCIATICA: ICD-10-CM

## 2020-01-09 RX ORDER — OXYCODONE HCL 10 MG/1
10 TABLET, FILM COATED, EXTENDED RELEASE ORAL 2 TIMES DAILY
Qty: 60 TABLET | Refills: 0 | Status: SHIPPED | OUTPATIENT
Start: 2020-01-09 | End: 2020-02-05 | Stop reason: SDUPTHER

## 2020-01-09 RX ORDER — OXYCODONE HCL 20 MG/1
20 TABLET, FILM COATED, EXTENDED RELEASE ORAL EVERY 12 HOURS
Qty: 60 TABLET | Refills: 0 | Status: SHIPPED | OUTPATIENT
Start: 2020-01-09 | End: 2020-02-05 | Stop reason: SDUPTHER

## 2020-01-14 ENCOUNTER — OFFICE VISIT (OUTPATIENT)
Dept: FAMILY MEDICINE CLINIC | Facility: CLINIC | Age: 65
End: 2020-01-14
Payer: MEDICARE

## 2020-01-14 VITALS
OXYGEN SATURATION: 97 % | BODY MASS INDEX: 26.22 KG/M2 | HEART RATE: 79 BPM | DIASTOLIC BLOOD PRESSURE: 80 MMHG | RESPIRATION RATE: 18 BRPM | SYSTOLIC BLOOD PRESSURE: 120 MMHG | HEIGHT: 68 IN | TEMPERATURE: 98.2 F | WEIGHT: 173 LBS

## 2020-01-14 DIAGNOSIS — G89.4 CHRONIC PAIN DISORDER: ICD-10-CM

## 2020-01-14 DIAGNOSIS — M51.06 INTERVERTEBRAL DISC DISORDER OF LUMBAR REGION WITH MYELOPATHY: ICD-10-CM

## 2020-01-14 DIAGNOSIS — M15.9 GENERALIZED OSTEOARTHRITIS OF MULTIPLE SITES: Primary | ICD-10-CM

## 2020-01-14 DIAGNOSIS — F11.20 OPIOID TYPE DEPENDENCE, CONTINUOUS (HCC): ICD-10-CM

## 2020-01-14 PROBLEM — S62.644A CLOSED NONDISPLACED FRACTURE OF PROXIMAL PHALANX OF RIGHT RING FINGER: Status: ACTIVE | Noted: 2019-04-22

## 2020-01-14 PROBLEM — S62.613A CLOSED DISPLACED FRACTURE OF PROXIMAL PHALANX OF LEFT MIDDLE FINGER: Status: ACTIVE | Noted: 2019-04-22

## 2020-01-14 PROCEDURE — 99213 OFFICE O/P EST LOW 20 MIN: CPT | Performed by: FAMILY MEDICINE

## 2020-01-14 NOTE — PATIENT INSTRUCTIONS
CONTINUE CURRENT TREATMENT PLAN    MEDICATIONS PRECAUTIONS WERE DISCUSSED    PAIN CONTRACT WAS SIGNED    RV FOR [facility] IN NEAR FUTURE 18

## 2020-01-14 NOTE — PROGRESS NOTES
BMI Counseling: Body mass index is 26 3 kg/m²  The BMI is above normal  Nutrition recommendations include encouraging healthy choices of fruits and vegetables and moderation in carbohydrate intake  Exercise recommendations include exercising 3-5 times per week  No pharmacotherapy was ordered  Assessment/Plan:    No problem-specific Assessment & Plan notes found for this encounter  Diagnoses and all orders for this visit:    Generalized osteoarthritis of multiple sites    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    Chronic pain disorder    Opioid type dependence, continuous ([address] )          Patient Instructions   CONTINUE CURRENT TREATMENT PLAN    MEDICATIONS PRECAUTIONS WERE DISCUSSED    PAIN CONTRACT WAS SIGNED    RV FOR AWV IN NEAR FUTURE      Return for Next scheduled follow up  Subjective:      Patient ID: Gianna Colin is a 59 y o  male      Chief Complaint   Patient presents with    Medication Management       DISCUSSION    PATIENT RETURNS FOR MEDICATION REVIEW  FEELS OK  FEELS PAIN MANAGEMENT WAS ADEQUATE    REVIEWED MEDICATION PRECAUTIONS  PAIN CONTRACT DUE      The following portions of the patient's history were reviewed and updated as appropriate: allergies, current medications, past family history, past medical history, past social history, past surgical history and problem list     Review of Systems      Current Outpatient Medications   Medication Sig Dispense Refill    buPROPion (WELLBUTRIN XL) 300 mg 24 hr tablet Take 1 tablet (300 mg total) by mouth daily 30 tablet 0    HYDROcodone-acetaminophen (NORCO)  mg per tablet Take 1 tablet by mouth every 4 (four) hours as needed (PAIN)Max Daily Amount: 6 tablets 180 tablet 0    meloxicam (MOBIC) 15 mg tablet Take 1 tablet (15 mg total) by mouth daily 90 tablet 0    olmesartan-hydrochlorothiazide (BENICAR HCT) 40-12 5 MG per tablet Take 1 tablet by mouth daily 90 tablet 0    oxyCODONE (OXYCONTIN) 10 mg 12 hr tablet Take 1 tablet (10 mg total) by mouth 2 (two) times a dayMax Daily Amount: 20 mg 60 tablet 0    oxyCODONE (OXYCONTIN) 20 mg 12 hr tablet Take 1 tablet (20 mg total) by mouth every 12 (twelve) hoursMax Daily Amount: 40 mg 60 tablet 0    ranitidine (ZANTAC) 150 mg tablet Take 1 tablet by mouth      sildenafil (VIAGRA) 50 MG tablet Take by mouth      Suvorexant (BELSOMRA) 15 MG TABS Take 1 tablet (15 mg total) by mouth daily at bedtime (Patient not taking: Reported on 1/14/2020) 30 tablet 0     No current facility-administered medications for this visit          Objective:    /80   Pulse 79   Temp 98 2 °F (36 8 °C) (Temporal)   Resp 18   Ht 5' 8" (1 727 m)   Wt 78 5 kg (173 lb)   SpO2 97%   BMI 26 30 kg/m²        Physical Exam      NO PE WAS PERFORMED    LENGTH OF VISIT 20 MIN  LENGTH OF  20 MIN       Art Pino MD

## 2020-01-16 DIAGNOSIS — F33.41 RECURRENT MAJOR DEPRESSIVE DISORDER, IN PARTIAL REMISSION (HCC): ICD-10-CM

## 2020-01-16 RX ORDER — BUPROPION HYDROCHLORIDE 300 MG/1
300 TABLET ORAL DAILY
Qty: 30 TABLET | Refills: 0 | Status: SHIPPED | OUTPATIENT
Start: 2020-01-16 | End: 2020-02-05 | Stop reason: SDUPTHER

## 2020-01-21 ENCOUNTER — APPOINTMENT (EMERGENCY)
Dept: RADIOLOGY | Facility: HOSPITAL | Age: 65
DRG: 871 | End: 2020-01-21
Payer: MEDICARE

## 2020-01-21 ENCOUNTER — HOSPITAL ENCOUNTER (INPATIENT)
Facility: HOSPITAL | Age: 65
LOS: 3 days | Discharge: HOME | DRG: 871 | End: 2020-01-24
Attending: EMERGENCY MEDICINE | Admitting: INTERNAL MEDICINE
Payer: MEDICARE

## 2020-01-21 DIAGNOSIS — J69.0: ICD-10-CM

## 2020-01-21 DIAGNOSIS — J18.9 PNEUMONIA: Primary | ICD-10-CM

## 2020-01-21 DIAGNOSIS — K22.0 ACHALASIA: ICD-10-CM

## 2020-01-21 DIAGNOSIS — A41.9 SEPSIS (HCC): ICD-10-CM

## 2020-01-21 LAB
ALBUMIN SERPL BCP-MCNC: 3.6 G/DL (ref 3.5–5)
ALP SERPL-CCNC: 62 U/L (ref 46–116)
ALT SERPL W P-5'-P-CCNC: 23 U/L (ref 12–78)
ANION GAP SERPL CALCULATED.3IONS-SCNC: 8 MMOL/L (ref 4–13)
APTT PPP: 23 SECONDS (ref 25–32)
AST SERPL W P-5'-P-CCNC: 21 U/L (ref 5–45)
ATRIAL RATE: 96 BPM
BASOPHILS # BLD AUTO: 0.03 THOUSANDS/ΜL (ref 0–0.1)
BASOPHILS NFR BLD AUTO: 0 % (ref 0–1)
BILIRUB SERPL-MCNC: 0.7 MG/DL (ref 0.2–1)
BILIRUB UR QL STRIP: NEGATIVE
BUN SERPL-MCNC: 20 MG/DL (ref 5–25)
CALCIUM SERPL-MCNC: 8.2 MG/DL (ref 8.3–10.1)
CHLORIDE SERPL-SCNC: 103 MMOL/L (ref 100–108)
CLARITY UR: CLEAR
CO2 SERPL-SCNC: 26 MMOL/L (ref 21–32)
COLOR UR: YELLOW
CREAT SERPL-MCNC: 1.14 MG/DL (ref 0.6–1.3)
EOSINOPHIL # BLD AUTO: 0.08 THOUSAND/ΜL (ref 0–0.61)
EOSINOPHIL NFR BLD AUTO: 1 % (ref 0–6)
ERYTHROCYTE [DISTWIDTH] IN BLOOD BY AUTOMATED COUNT: 13.4 % (ref 11.6–15.1)
FLUAV RNA NPH QL NAA+PROBE: NORMAL
FLUBV RNA NPH QL NAA+PROBE: NORMAL
GFR SERPL CREATININE-BSD FRML MDRD: 68 ML/MIN/1.73SQ M
GLUCOSE SERPL-MCNC: 87 MG/DL (ref 65–140)
GLUCOSE UR STRIP-MCNC: NEGATIVE MG/DL
HCT VFR BLD AUTO: 35.5 % (ref 36.5–49.3)
HGB BLD-MCNC: 11.8 G/DL (ref 12–17)
HGB UR QL STRIP.AUTO: NEGATIVE
IMM GRANULOCYTES # BLD AUTO: 0.03 THOUSAND/UL (ref 0–0.2)
IMM GRANULOCYTES NFR BLD AUTO: 0 % (ref 0–2)
INR PPP: 0.95 (ref 0.91–1.09)
KETONES UR STRIP-MCNC: NEGATIVE MG/DL
L PNEUMO1 AG UR QL IA.RAPID: NEGATIVE
LACTATE SERPL-SCNC: 1.2 MMOL/L (ref 0.5–2)
LEUKOCYTE ESTERASE UR QL STRIP: NEGATIVE
LYMPHOCYTES # BLD AUTO: 0.32 THOUSANDS/ΜL (ref 0.6–4.47)
LYMPHOCYTES NFR BLD AUTO: 4 % (ref 14–44)
MCH RBC QN AUTO: 30 PG (ref 26.8–34.3)
MCHC RBC AUTO-ENTMCNC: 33.2 G/DL (ref 31.4–37.4)
MCV RBC AUTO: 90 FL (ref 82–98)
MONOCYTES # BLD AUTO: 0.28 THOUSAND/ΜL (ref 0.17–1.22)
MONOCYTES NFR BLD AUTO: 4 % (ref 4–12)
NEUTROPHILS # BLD AUTO: 6.65 THOUSANDS/ΜL (ref 1.85–7.62)
NEUTS SEG NFR BLD AUTO: 91 % (ref 43–75)
NITRITE UR QL STRIP: NEGATIVE
NRBC BLD AUTO-RTO: 0 /100 WBCS
P AXIS: 54 DEGREES
PH UR STRIP.AUTO: 8 [PH]
PLATELET # BLD AUTO: 239 THOUSANDS/UL (ref 149–390)
PMV BLD AUTO: 9.3 FL (ref 8.9–12.7)
POTASSIUM SERPL-SCNC: 4 MMOL/L (ref 3.5–5.3)
PR INTERVAL: 160 MS
PROCALCITONIN SERPL-MCNC: 0.98 NG/ML
PROT SERPL-MCNC: 6.2 G/DL (ref 6.4–8.2)
PROT UR STRIP-MCNC: NEGATIVE MG/DL
PROTHROMBIN TIME: 10.2 SECONDS (ref 9.8–12)
QRS AXIS: 8 DEGREES
QRSD INTERVAL: 84 MS
QT INTERVAL: 366 MS
QTC INTERVAL: 462 MS
RBC # BLD AUTO: 3.93 MILLION/UL (ref 3.88–5.62)
RSV RNA NPH QL NAA+PROBE: NORMAL
S PNEUM AG UR QL: NEGATIVE
SODIUM SERPL-SCNC: 137 MMOL/L (ref 136–145)
SP GR UR STRIP.AUTO: 1.01 (ref 1–1.03)
T WAVE AXIS: 43 DEGREES
UROBILINOGEN UR QL STRIP.AUTO: 0.2 E.U./DL
VENTRICULAR RATE: 96 BPM
WBC # BLD AUTO: 7.39 THOUSAND/UL (ref 4.31–10.16)

## 2020-01-21 PROCEDURE — 81003 URINALYSIS AUTO W/O SCOPE: CPT | Performed by: EMERGENCY MEDICINE

## 2020-01-21 PROCEDURE — 87040 BLOOD CULTURE FOR BACTERIA: CPT | Performed by: EMERGENCY MEDICINE

## 2020-01-21 PROCEDURE — 85730 THROMBOPLASTIN TIME PARTIAL: CPT | Performed by: EMERGENCY MEDICINE

## 2020-01-21 PROCEDURE — 99285 EMERGENCY DEPT VISIT HI MDM: CPT

## 2020-01-21 PROCEDURE — 94664 DEMO&/EVAL PT USE INHALER: CPT

## 2020-01-21 PROCEDURE — 93005 ELECTROCARDIOGRAM TRACING: CPT

## 2020-01-21 PROCEDURE — 71045 X-RAY EXAM CHEST 1 VIEW: CPT

## 2020-01-21 PROCEDURE — 85610 PROTHROMBIN TIME: CPT | Performed by: EMERGENCY MEDICINE

## 2020-01-21 PROCEDURE — 1123F ACP DISCUSS/DSCN MKR DOCD: CPT | Performed by: INTERNAL MEDICINE

## 2020-01-21 PROCEDURE — 87205 SMEAR GRAM STAIN: CPT | Performed by: INTERNAL MEDICINE

## 2020-01-21 PROCEDURE — 96361 HYDRATE IV INFUSION ADD-ON: CPT

## 2020-01-21 PROCEDURE — 99223 1ST HOSP IP/OBS HIGH 75: CPT | Performed by: INTERNAL MEDICINE

## 2020-01-21 PROCEDURE — 80053 COMPREHEN METABOLIC PANEL: CPT | Performed by: EMERGENCY MEDICINE

## 2020-01-21 PROCEDURE — 36415 COLL VENOUS BLD VENIPUNCTURE: CPT | Performed by: EMERGENCY MEDICINE

## 2020-01-21 PROCEDURE — 93010 ELECTROCARDIOGRAM REPORT: CPT | Performed by: INTERNAL MEDICINE

## 2020-01-21 PROCEDURE — 84145 PROCALCITONIN (PCT): CPT | Performed by: EMERGENCY MEDICINE

## 2020-01-21 PROCEDURE — 87070 CULTURE OTHR SPECIMN AEROBIC: CPT | Performed by: INTERNAL MEDICINE

## 2020-01-21 PROCEDURE — 83605 ASSAY OF LACTIC ACID: CPT | Performed by: EMERGENCY MEDICINE

## 2020-01-21 PROCEDURE — 94760 N-INVAS EAR/PLS OXIMETRY 1: CPT

## 2020-01-21 PROCEDURE — 87631 RESP VIRUS 3-5 TARGETS: CPT | Performed by: EMERGENCY MEDICINE

## 2020-01-21 PROCEDURE — 99285 EMERGENCY DEPT VISIT HI MDM: CPT | Performed by: EMERGENCY MEDICINE

## 2020-01-21 PROCEDURE — 87449 NOS EACH ORGANISM AG IA: CPT | Performed by: INTERNAL MEDICINE

## 2020-01-21 PROCEDURE — 96365 THER/PROPH/DIAG IV INF INIT: CPT

## 2020-01-21 PROCEDURE — 85025 COMPLETE CBC W/AUTO DIFF WBC: CPT | Performed by: EMERGENCY MEDICINE

## 2020-01-21 RX ORDER — ACETAMINOPHEN 325 MG/1
650 TABLET ORAL EVERY 6 HOURS PRN
Status: DISCONTINUED | OUTPATIENT
Start: 2020-01-21 | End: 2020-01-24 | Stop reason: HOSPADM

## 2020-01-21 RX ORDER — ONDANSETRON 2 MG/ML
4 INJECTION INTRAMUSCULAR; INTRAVENOUS EVERY 6 HOURS PRN
Status: DISCONTINUED | OUTPATIENT
Start: 2020-01-21 | End: 2020-01-24 | Stop reason: HOSPADM

## 2020-01-21 RX ORDER — FAMOTIDINE 20 MG/1
20 TABLET, FILM COATED ORAL DAILY
Status: DISCONTINUED | OUTPATIENT
Start: 2020-01-21 | End: 2020-01-21

## 2020-01-21 RX ORDER — OXYCODONE HCL 10 MG/1
10 TABLET, FILM COATED, EXTENDED RELEASE ORAL EVERY 12 HOURS SCHEDULED
Status: DISCONTINUED | OUTPATIENT
Start: 2020-01-21 | End: 2020-01-24 | Stop reason: HOSPADM

## 2020-01-21 RX ORDER — AZITHROMYCIN 250 MG/1
500 TABLET, FILM COATED ORAL EVERY 24 HOURS
Status: DISCONTINUED | OUTPATIENT
Start: 2020-01-21 | End: 2020-01-22

## 2020-01-21 RX ORDER — HYDROCODONE BITARTRATE AND ACETAMINOPHEN 5; 325 MG/1; MG/1
1 TABLET ORAL EVERY 6 HOURS PRN
Status: DISCONTINUED | OUTPATIENT
Start: 2020-01-21 | End: 2020-01-24 | Stop reason: HOSPADM

## 2020-01-21 RX ORDER — OXYCODONE HCL 10 MG/1
10 TABLET, FILM COATED, EXTENDED RELEASE ORAL 2 TIMES DAILY
Status: DISCONTINUED | OUTPATIENT
Start: 2020-01-21 | End: 2020-01-21

## 2020-01-21 RX ORDER — HYDROCHLOROTHIAZIDE 12.5 MG/1
12.5 TABLET ORAL DAILY
Status: DISCONTINUED | OUTPATIENT
Start: 2020-01-21 | End: 2020-01-24 | Stop reason: HOSPADM

## 2020-01-21 RX ORDER — GUAIFENESIN/DEXTROMETHORPHAN 100-10MG/5
10 SYRUP ORAL EVERY 4 HOURS PRN
Status: DISCONTINUED | OUTPATIENT
Start: 2020-01-21 | End: 2020-01-24 | Stop reason: HOSPADM

## 2020-01-21 RX ORDER — LEVOFLOXACIN 5 MG/ML
750 INJECTION, SOLUTION INTRAVENOUS ONCE
Status: COMPLETED | OUTPATIENT
Start: 2020-01-21 | End: 2020-01-21

## 2020-01-21 RX ORDER — MELOXICAM 7.5 MG/1
15 TABLET ORAL
Status: DISCONTINUED | OUTPATIENT
Start: 2020-01-21 | End: 2020-01-24 | Stop reason: HOSPADM

## 2020-01-21 RX ORDER — LOSARTAN POTASSIUM 50 MG/1
100 TABLET ORAL DAILY
Status: DISCONTINUED | OUTPATIENT
Start: 2020-01-21 | End: 2020-01-24 | Stop reason: HOSPADM

## 2020-01-21 RX ORDER — PANTOPRAZOLE SODIUM 40 MG/1
40 TABLET, DELAYED RELEASE ORAL
Status: DISCONTINUED | OUTPATIENT
Start: 2020-01-22 | End: 2020-01-21

## 2020-01-21 RX ORDER — OXYCODONE HCL 20 MG/1
20 TABLET, FILM COATED, EXTENDED RELEASE ORAL EVERY 12 HOURS
Status: DISCONTINUED | OUTPATIENT
Start: 2020-01-21 | End: 2020-01-21

## 2020-01-21 RX ORDER — AZITHROMYCIN 250 MG/1
500 TABLET, FILM COATED ORAL EVERY 24 HOURS
Status: DISCONTINUED | OUTPATIENT
Start: 2020-01-22 | End: 2020-01-21

## 2020-01-21 RX ORDER — CEFTRIAXONE 1 G/50ML
1000 INJECTION, SOLUTION INTRAVENOUS EVERY 24 HOURS
Status: DISCONTINUED | OUTPATIENT
Start: 2020-01-22 | End: 2020-01-21

## 2020-01-21 RX ORDER — BUPROPION HYDROCHLORIDE 150 MG/1
300 TABLET ORAL DAILY
Status: DISCONTINUED | OUTPATIENT
Start: 2020-01-21 | End: 2020-01-24 | Stop reason: HOSPADM

## 2020-01-21 RX ORDER — IBUPROFEN 400 MG/1
400 TABLET ORAL ONCE
Status: COMPLETED | OUTPATIENT
Start: 2020-01-21 | End: 2020-01-21

## 2020-01-21 RX ORDER — PANTOPRAZOLE SODIUM 40 MG/1
40 TABLET, DELAYED RELEASE ORAL
Status: DISCONTINUED | OUTPATIENT
Start: 2020-01-21 | End: 2020-01-24 | Stop reason: HOSPADM

## 2020-01-21 RX ORDER — OXYCODONE HCL 20 MG/1
20 TABLET, FILM COATED, EXTENDED RELEASE ORAL EVERY 12 HOURS SCHEDULED
Status: DISCONTINUED | OUTPATIENT
Start: 2020-01-21 | End: 2020-01-24 | Stop reason: HOSPADM

## 2020-01-21 RX ADMIN — OXYCODONE HYDROCHLORIDE 10 MG: 20 TABLET, FILM COATED, EXTENDED RELEASE ORAL at 13:12

## 2020-01-21 RX ADMIN — CEFEPIME HYDROCHLORIDE 2000 MG: 2 INJECTION, POWDER, FOR SOLUTION INTRAVENOUS at 14:48

## 2020-01-21 RX ADMIN — METRONIDAZOLE 500 MG: 500 INJECTION, SOLUTION INTRAVENOUS at 13:12

## 2020-01-21 RX ADMIN — OXYCODONE HYDROCHLORIDE 10 MG: 20 TABLET, FILM COATED, EXTENDED RELEASE ORAL at 21:30

## 2020-01-21 RX ADMIN — IBUPROFEN 400 MG: 400 TABLET ORAL at 07:20

## 2020-01-21 RX ADMIN — OXYCODONE HYDROCHLORIDE 20 MG: 20 TABLET, FILM COATED, EXTENDED RELEASE ORAL at 21:30

## 2020-01-21 RX ADMIN — CEFEPIME HYDROCHLORIDE 2000 MG: 2 INJECTION, POWDER, FOR SOLUTION INTRAVENOUS at 23:52

## 2020-01-21 RX ADMIN — AZITHROMYCIN 500 MG: 250 TABLET, FILM COATED ORAL at 13:09

## 2020-01-21 RX ADMIN — HYDROCODONE BITARTRATE AND ACETAMINOPHEN 1 TABLET: 5; 325 TABLET ORAL at 20:49

## 2020-01-21 RX ADMIN — LEVOFLOXACIN 750 MG: 5 INJECTION, SOLUTION INTRAVENOUS at 08:53

## 2020-01-21 RX ADMIN — OXYCODONE HYDROCHLORIDE 20 MG: 20 TABLET, FILM COATED, EXTENDED RELEASE ORAL at 13:09

## 2020-01-21 RX ADMIN — METRONIDAZOLE 500 MG: 500 INJECTION, SOLUTION INTRAVENOUS at 20:37

## 2020-01-21 RX ADMIN — SODIUM CHLORIDE 1000 ML: 0.9 INJECTION, SOLUTION INTRAVENOUS at 07:21

## 2020-01-21 RX ADMIN — ACETAMINOPHEN 650 MG: 325 TABLET, FILM COATED ORAL at 11:44

## 2020-01-21 RX ADMIN — ENOXAPARIN SODIUM 40 MG: 40 INJECTION SUBCUTANEOUS at 13:07

## 2020-01-21 NOTE — RESPIRATORY THERAPY NOTE
RT Protocol Note  Shaunna Fishman 59 y o  male MRN: [id number]  Unit/Bed#: [address] [place] [address] Encounter: [id number]    Assessment    Principal Problem:    Sepsis ([address] )  Active Problems:    Achalasia    GERD (gastroesophageal reflux disease)    Cervical spinal stenosis    Hypercholesterolemia    Hypertension    Depression    Aspiration pneumonia of right lung (HCC)      Home Pulmonary Medications:  No meds or oxygen or resp  Diseases     Home Devices/Therapy: Other (Comment)(none per patient)    Past Medical History:   Diagnosis Date    Contreras's esophagus     Depression     Hypertension     Psychiatric disorder     Spinal arthritis      Social History     Socioeconomic History    Marital status: /Civil Union     Spouse name: None    Number of children: None    Years of education: None    Highest education level: None   Occupational History    Occupation:    Social Needs    Financial resource strain: None    Food insecurity:     Worry: None     Inability: None    Transportation needs:     Medical: None     Non-medical: None   Tobacco Use    Smoking status: Former Smoker     Years:      Last attempt to quit:      Years since quittin 0    Smokeless tobacco: Never Used   Substance and Sexual Activity    Alcohol use: Never     Frequency: Never     Comment: rarely    Drug use: Yes     Types: Marijuana     Comment: occassionaly    Sexual activity: None   Lifestyle    Physical activity:     Days per week: None     Minutes per session: None    Stress: None   Relationships    Social connections:     Talks on phone: None     Gets together: None     Attends [employer] service: None     Active member of club or organization: None     Attends meetings of clubs or organizations: None     Relationship status: None    Intimate partner violence:     Fear of current or ex partner: None     Emotionally abused: None     Physically abused: None     Forced sexual activity: None   Other Topics Concern    None   Social History Narrative    Dental care, regularly    Drinks coffee:  2-3 cups per day    Exercises moderately 3 or more times a week    Vegetarian diet       Subjective    Subjective Data: pt appears to be comfortably breathing without distress    Objective    Physical Exam:   Assessment Type: Assess only  General Appearance: Alert, Awake  Respiratory Pattern: Normal, Symmetrical, Spontaneous  Chest Assessment: Chest expansion symmetrical  Bilateral Breath Sounds: Diminished  R Breath Sounds: Diminished  L Breath Sounds: Diminished  Location Specific: No  Cough: Non-productive, Moist, Strong  O2 Device: ra    Vitals:  Blood pressure 102/60, pulse 78, temperature 98 8 °F (37 1 °C), resp  rate 16, height 5' 8" (1 727 m), weight 80 6 kg (177 lb 11 1 oz), SpO2 97 %  Imaging and other studies: I have personally reviewed pertinent reports        O2 Device: ra     Plan    Respiratory Plan: Discontinue Protocol  Airway Clearance Plan: Incentive Spirometer     Resp Comments: pt states quit smoking only smoked 16 years  1 ppd no pulmonary history of pneumonia or disease

## 2020-01-21 NOTE — ASSESSMENT & PLAN NOTE
With chronic pain  Patient is on OxyContin 30 milligram p o  B i d  And oxycodone for breakthrough pain which will be continued

## 2020-01-21 NOTE — PLAN OF CARE
Problem: PAIN - ADULT  Goal: Verbalizes/displays adequate comfort level or baseline comfort level  Description  Interventions:  - Encourage patient to monitor pain and request assistance  - Assess pain using appropriate pain scale  - Administer analgesics based on type and severity of pain and evaluate response  - Implement non-pharmacological measures as appropriate and evaluate response  - Consider cultural and social influences on pain and pain management  - Notify physician/advanced practitioner if interventions unsuccessful or patient reports new pain  Outcome: Progressing     Problem: SAFETY ADULT  Goal: Patient will remain free of falls  Description  INTERVENTIONS:  - Assess patient frequently for physical needs  -  Identify cognitive and physical deficits and behaviors that affect risk of falls    -  [place] fall precautions as indicated by assessment   - Educate patient/family on patient safety including physical limitations  - Instruct patient to call for assistance with activity based on assessment  - Modify environment to reduce risk of injury     Outcome: Progressing     Problem: DISCHARGE PLANNING  Goal: Discharge to home or other facility with appropriate resources  Description  INTERVENTIONS:  - Identify barriers to discharge w/patient and caregiver  - Arrange for needed discharge resources and transportation as appropriate  - Identify discharge learning needs (meds, wound care, etc )  - Refer to Case Management Department for coordinating discharge planning if the patient needs post-hospital services based on physician/advanced practitioner order or complex needs related to functional status, cognitive ability, or social support system   Outcome: Progressing     Problem: Knowledge Deficit  Goal: Patient/family/caregiver demonstrates understanding of disease process, treatment plan, medications, and discharge instructions  Description  Complete learning assessment and assess knowledge base   Interventions:  - Provide teaching at level of understanding  - Provide teaching via preferred learning methods  Outcome: Progressing

## 2020-01-21 NOTE — ASSESSMENT & PLAN NOTE
As evidenced by fever, tachycardia and source being multifocal pneumonia in the right  Likely secondary to aspiration pneumonia  Patient was given Levaquin in the ED  Will broaden the spectrum of antibiotics to cefepime 2 gram IV q 12 hours along with Flagyl 500 milligram IV q 8 hours and azithromycin 500 milligram IV daily  Check sputum cultures  Check urine for Legionella and pneumococcal antigens  Patient's flu swab and RSV were negative in the ED  Patient's lactic acid level is normal  Procalcitonin level is mildly elevated at 0 98  Repeat procalcitonin level in a m
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
## 2020-01-21 NOTE — ASSESSMENT & PLAN NOTE
Continue losartan and hydrochlorothiazide  Patient is on Benicar at home  Monitor blood pressure closely

## 2020-01-21 NOTE — ASSESSMENT & PLAN NOTE
Wife reported multiple episodes of vomiting with some blood streaking this morning  Patient is on Zantac at home which will be changed to Protonix 40 milligram b i d
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
## 2020-01-21 NOTE — ASSESSMENT & PLAN NOTE
Patient present with sepsis likely secondary to aspiration pneumonia  Patient had multiple episodes of vomiting 3 days ago following which patient developed chills with cough  Patient will be covered with cefepime, Flagyl and azithromycin  See problem 1

## 2020-01-22 LAB
ANION GAP SERPL CALCULATED.3IONS-SCNC: 8 MMOL/L (ref 4–13)
BASOPHILS # BLD AUTO: 0.02 THOUSANDS/ΜL (ref 0–0.1)
BASOPHILS NFR BLD AUTO: 0 % (ref 0–1)
BUN SERPL-MCNC: 18 MG/DL (ref 5–25)
CALCIUM SERPL-MCNC: 7.9 MG/DL (ref 8.3–10.1)
CHLORIDE SERPL-SCNC: 102 MMOL/L (ref 100–108)
CO2 SERPL-SCNC: 26 MMOL/L (ref 21–32)
CREAT SERPL-MCNC: 1.08 MG/DL (ref 0.6–1.3)
EOSINOPHIL # BLD AUTO: 0.1 THOUSAND/ΜL (ref 0–0.61)
EOSINOPHIL NFR BLD AUTO: 1 % (ref 0–6)
ERYTHROCYTE [DISTWIDTH] IN BLOOD BY AUTOMATED COUNT: 13.7 % (ref 11.6–15.1)
GFR SERPL CREATININE-BSD FRML MDRD: 72 ML/MIN/1.73SQ M
GLUCOSE SERPL-MCNC: 104 MG/DL (ref 65–140)
HCT VFR BLD AUTO: 29.8 % (ref 36.5–49.3)
HGB BLD-MCNC: 9.8 G/DL (ref 12–17)
IMM GRANULOCYTES # BLD AUTO: 0.07 THOUSAND/UL (ref 0–0.2)
IMM GRANULOCYTES NFR BLD AUTO: 1 % (ref 0–2)
LYMPHOCYTES # BLD AUTO: 0.76 THOUSANDS/ΜL (ref 0.6–4.47)
LYMPHOCYTES NFR BLD AUTO: 6 % (ref 14–44)
MCH RBC QN AUTO: 30 PG (ref 26.8–34.3)
MCHC RBC AUTO-ENTMCNC: 32.9 G/DL (ref 31.4–37.4)
MCV RBC AUTO: 91 FL (ref 82–98)
MONOCYTES # BLD AUTO: 0.54 THOUSAND/ΜL (ref 0.17–1.22)
MONOCYTES NFR BLD AUTO: 4 % (ref 4–12)
NEUTROPHILS # BLD AUTO: 11.43 THOUSANDS/ΜL (ref 1.85–7.62)
NEUTS SEG NFR BLD AUTO: 88 % (ref 43–75)
NRBC BLD AUTO-RTO: 0 /100 WBCS
PLATELET # BLD AUTO: 213 THOUSANDS/UL (ref 149–390)
PMV BLD AUTO: 9.5 FL (ref 8.9–12.7)
POTASSIUM SERPL-SCNC: 3.9 MMOL/L (ref 3.5–5.3)
PROCALCITONIN SERPL-MCNC: 14.97 NG/ML
RBC # BLD AUTO: 3.27 MILLION/UL (ref 3.88–5.62)
SODIUM SERPL-SCNC: 136 MMOL/L (ref 136–145)
WBC # BLD AUTO: 12.92 THOUSAND/UL (ref 4.31–10.16)

## 2020-01-22 PROCEDURE — 99232 SBSQ HOSP IP/OBS MODERATE 35: CPT | Performed by: INTERNAL MEDICINE

## 2020-01-22 PROCEDURE — 80048 BASIC METABOLIC PNL TOTAL CA: CPT | Performed by: INTERNAL MEDICINE

## 2020-01-22 PROCEDURE — 84145 PROCALCITONIN (PCT): CPT | Performed by: INTERNAL MEDICINE

## 2020-01-22 PROCEDURE — 85025 COMPLETE CBC W/AUTO DIFF WBC: CPT | Performed by: INTERNAL MEDICINE

## 2020-01-22 RX ORDER — SACCHAROMYCES BOULARDII 250 MG
250 CAPSULE ORAL 2 TIMES DAILY
Status: DISCONTINUED | OUTPATIENT
Start: 2020-01-22 | End: 2020-01-24 | Stop reason: HOSPADM

## 2020-01-22 RX ORDER — CEFTRIAXONE 1 G/50ML
1000 INJECTION, SOLUTION INTRAVENOUS EVERY 24 HOURS
Status: DISCONTINUED | OUTPATIENT
Start: 2020-01-22 | End: 2020-01-24 | Stop reason: HOSPADM

## 2020-01-22 RX ADMIN — OXYCODONE HYDROCHLORIDE 10 MG: 20 TABLET, FILM COATED, EXTENDED RELEASE ORAL at 08:44

## 2020-01-22 RX ADMIN — HYDROCODONE BITARTRATE AND ACETAMINOPHEN 1 TABLET: 5; 325 TABLET ORAL at 19:28

## 2020-01-22 RX ADMIN — ENOXAPARIN SODIUM 40 MG: 40 INJECTION SUBCUTANEOUS at 08:44

## 2020-01-22 RX ADMIN — PANTOPRAZOLE SODIUM 40 MG: 40 TABLET, DELAYED RELEASE ORAL at 08:18

## 2020-01-22 RX ADMIN — AZITHROMYCIN 500 MG: 250 TABLET, FILM COATED ORAL at 13:15

## 2020-01-22 RX ADMIN — METRONIDAZOLE 500 MG: 500 INJECTION, SOLUTION INTRAVENOUS at 13:41

## 2020-01-22 RX ADMIN — OXYCODONE HYDROCHLORIDE 20 MG: 20 TABLET, FILM COATED, EXTENDED RELEASE ORAL at 08:44

## 2020-01-22 RX ADMIN — Medication 250 MG: at 17:45

## 2020-01-22 RX ADMIN — PANTOPRAZOLE SODIUM 40 MG: 40 TABLET, DELAYED RELEASE ORAL at 16:36

## 2020-01-22 RX ADMIN — METRONIDAZOLE 500 MG: 500 INJECTION, SOLUTION INTRAVENOUS at 04:59

## 2020-01-22 RX ADMIN — BUPROPION 300 MG: 150 TABLET, EXTENDED RELEASE ORAL at 08:43

## 2020-01-22 RX ADMIN — OXYCODONE HYDROCHLORIDE 10 MG: 20 TABLET, FILM COATED, EXTENDED RELEASE ORAL at 21:39

## 2020-01-22 RX ADMIN — OXYCODONE HYDROCHLORIDE 20 MG: 20 TABLET, FILM COATED, EXTENDED RELEASE ORAL at 21:40

## 2020-01-22 RX ADMIN — Medication 250 MG: at 10:16

## 2020-01-22 RX ADMIN — MELOXICAM 15 MG: 7.5 TABLET ORAL at 08:45

## 2020-01-22 RX ADMIN — CEFTRIAXONE 1000 MG: 1 INJECTION, SOLUTION INTRAVENOUS at 21:40

## 2020-01-22 RX ADMIN — HYDROCODONE BITARTRATE AND ACETAMINOPHEN 1 TABLET: 5; 325 TABLET ORAL at 13:14

## 2020-01-22 RX ADMIN — CEFEPIME HYDROCHLORIDE 2000 MG: 2 INJECTION, POWDER, FOR SOLUTION INTRAVENOUS at 13:15

## 2020-01-22 RX ADMIN — METRONIDAZOLE 500 MG: 500 INJECTION, SOLUTION INTRAVENOUS at 20:23

## 2020-01-22 NOTE — ASSESSMENT & PLAN NOTE
Patient present with sepsis likely secondary to aspiration pneumonia  Patient had multiple episodes of vomiting 3 days ago following which patient developed chills with cough  Patient was initially started on cefepime, Flagyl azithromycin and will be deescalated to Rocephin and Flagyl  See problem 1

## 2020-01-22 NOTE — ASSESSMENT & PLAN NOTE
Patient's hemoglobin dropped from 11 8-9 8  Likely secondary dilution from IV fluids  Patient also had some blood-streaked vomiting  Monitor hemoglobin

## 2020-01-22 NOTE — ASSESSMENT & PLAN NOTE
As evidenced by fever, tachycardia and source being multifocal pneumonia in the right  Likely secondary to aspiration pneumonia  Patient was given Levaquin in the ED  Patient's antibiotics were broadened to cefepime 2 gram IV q 12 hours along with Flagyl 500 milligram IV q 8 hours and azithromycin 500 milligram IV daily upon admission  Patient's urine for Legionella pneumococcal antigens were negative  Patient's flu swab and RSV were negative in the ED  Patient's lactic acid level is normal  Procalcitonin level is mildly elevated at 0 98 which increased to 14 97  Will stop azithromycin as Legionella antigen is negative  Deescalate antibiotics to Rocephin 1 gram IV daily and Flagyl 500 milligram IV q 8 hours  Sputum cultures growing gram-positive cocci in pairs  Follow-up chest x-ray in [place]

## 2020-01-22 NOTE — PROGRESS NOTES
Progress Note - Julián Mc 1955, 59 y o  male MRN: [id number]    Unit/Bed#: [address] [place] [address] Encounter: [id number]    Primary Care Provider: Barbra Reyes MD   Date and time admitted to hospital: 1/21/2020  7:03 AM        * Sepsis [facility])  Assessment & Plan  As evidenced by fever, tachycardia and source being multifocal pneumonia in the right  Likely secondary to aspiration pneumonia  Patient was given Levaquin in the ED  Patient's antibiotics were broadened to cefepime 2 gram IV q 12 hours along with Flagyl 500 milligram IV q 8 hours and azithromycin 500 milligram IV daily upon admission  Patient's urine for Legionella pneumococcal antigens were negative  Patient's flu swab and RSV were negative in the ED  Patient's lactic acid level is normal  Procalcitonin level is mildly elevated at 0 98 which increased to 14 97  Will stop azithromycin as Legionella antigen is negative  Deescalate antibiotics to Rocephin 1 gram IV daily and Flagyl 500 milligram IV q 8 hours  Sputum cultures growing gram-positive cocci in pairs  Follow-up chest x-ray in [facility]  Aspiration pneumonia of right lung [facility])  Assessment & Plan  Patient present with sepsis likely secondary to aspiration pneumonia  Patient had multiple episodes of vomiting 3 days ago following which patient developed chills with cough  Patient was initially started on cefepime, Flagyl azithromycin and will be deescalated to Rocephin and Flagyl  See problem 1  Achalasia  Assessment & Plan  Wife reported multiple episodes of vomiting with some blood streaking this morning  Patient is on Zantac at home which will be changed to Protonix 40 milligram b i d  Hypertension  Assessment & Plan  Continue losartan and hydrochlorothiazide  Patient is on Benicar at home  Monitor blood pressure closely    Depression  Assessment & Plan  Continue Wellbutrin    Opioid dependence ([facility] [address] )  Assessment & Plan  Patient currently on OxyContin 30 milligram p o  B i d   And oxycodone p r n  For breakthrough pain    Cervical spinal stenosis  Assessment & Plan  With chronic pain  Patient is on OxyContin 30 milligram p o  B i d  And oxycodone for breakthrough pain which will be continued  Anemia  Assessment & Plan  Patient's hemoglobin dropped from 11 8-9 8  Likely secondary dilution from IV fluids  Patient also had some blood-streaked vomiting  Monitor hemoglobin    GERD (gastroesophageal reflux disease)  Assessment & Plan  Continue PPI          VTE Pharmacologic Prophylaxis:   Pharmacologic: Enoxaparin (Lovenox)  Mechanical VTE Prophylaxis in Place: No    Patient Centered Rounds: I have performed bedside rounds with nursing staff today  Discussions with Specialists or Other Care Team Provider: No  Education and Discussions with Family / Patient:Yes  Time Spent for Care: 45 minutes  More than 50% of total time spent on counseling and coordination of care as described above  Current Length of Stay: 1 day(s)  Current Patient Status: Inpatient     Discharge Plan:  Home    Code Status: Level 1 - Full Code      Subjective:   Patient is still feeling very tired  No further fever  Patient does have occasional cough productive of small amount of phlegm  Patient denies any chest pain, abdominal pain  Patient did feel nauseous but denies any further vomiting      Objective:     Vitals:   Temp (24hrs), Av 7 °F (37 1 °C), Min:97 9 °F (36 6 °C), Max:99 3 °F (37 4 °C)    Temp:  [97 9 °F (36 6 °C)-99 3 °F (37 4 °C)] 98 6 °F (37 °C)  HR:  [70-78] 77  Resp:  [17-20] 20  BP: (100-108)/(60-63) 104/63  SpO2:  [92 %-96 %] 95 %  Body mass index is 27 02 kg/m²  Input and Output Summary (last 24 hours): Intake/Output Summary (Last 24 hours) at 2020 1512  Last data filed at 2020 0529  Gross per 24 hour   Intake 250 ml   Output    Net 250 ml        Physical Exam:     Physical Exam   Constitutional: No distress  HENT:   Head: Normocephalic and atraumatic     Eyes: Pupils are equal, round, and reactive to light  Conjunctivae are normal    Neck: Normal range of motion  Neck supple  Cardiovascular: Normal rate, regular rhythm and normal heart sounds  Pulmonary/Chest: Effort normal  No respiratory distress  He has no wheezes  He has no rhonchi  He has no rales  He exhibits no tenderness  Abdominal: Soft  Bowel sounds are normal  He exhibits no distension  There is no tenderness  There is no rebound and no guarding  Musculoskeletal: He exhibits no edema  Neurological: He is alert  No cranial nerve deficit  Skin: Skin is warm and dry  No rash noted  Additional Data:     Labs:    Results from last 7 days   Lab Units 01/22/20  0529 01/21/20  0721   WBC Thousand/uL 12 92* 7 39   HEMOGLOBIN g/dL 9 8* 11 8*   HEMATOCRIT % 29 8* 35 5*   PLATELETS Thousands/uL 213 239   NEUTROS PCT % 88* 91*     Results from last 7 days   Lab Units 01/22/20  0529 01/21/20  0721   SODIUM mmol/L 136 137   POTASSIUM mmol/L 3 9 4 0   CHLORIDE mmol/L 102 103   CO2 mmol/L 26 26   BUN mg/dL 18 20   CREATININE mg/dL 1 08 1 14   CALCIUM mg/dL 7 9* 8 2*   TOTAL BILIRUBIN mg/dL  --  0 70   ALK PHOS U/L  --  62   ALT U/L  --  23   AST U/L  --  21     Results from last 7 days   Lab Units 01/21/20  0721   INR  0 95         No results found for: HGBA1C      Results from last 7 days   Lab Units 01/22/20  0529 01/21/20  0721   LACTIC ACID mmol/L  --  1 2   PROCALCITONIN ng/ml 14 97* 0 98*       * I Have Reviewed All Lab Data Listed Above  * Additional Pertinent Lab Tests Reviewed: All [facility] Admission Reviewed    Imaging:     XR chest 1 view portable   Final Result by Rosy Miranda MD (01/21 1706)      Right-sided multilobar pneumonia              Workstation performed: [id number]           Imaging Reports Reviewed by myself    Cultures:   Blood Culture:   Lab Results   Component Value Date    BLOODCX No Growth at 24 hrs  01/21/2020    BLOODCX No Growth at 24 hrs  01/21/2020     Urine Culture: No results found for: URINECX  Sputum Culture: No components found for: SPUTUMCX  Wound Culture: No results found for: WOUNDCULT    Last 24 Hours Medication List:     Current Facility-Administered Medications:  acetaminophen 650 mg Oral Q6H PRN José Moore MD    buPROPion 300 mg Oral Daily José Moore MD    cefTRIAXone 1,000 mg Intravenous Q24H Joés Moore MD    dextromethorphan-guaiFENesin 10 mL Oral Q4H PRN José Moore MD    enoxaparin 40 mg Subcutaneous Daily José Moore MD    losartan 100 mg Oral Daily José Moore MD    And        hydrochlorothiazide 12 5 mg Oral Daily José Moore MD    HYDROcodone-acetaminophen 1 tablet Oral Q6H PRN José Moore MD    meloxicam 15 mg Oral Daily With Breakfast José Moore MD    metroNIDAZOLE 500 mg Intravenous Q8H José Moore MD Last Rate: 500 mg (01/22/20 1341)   ondansetron 4 mg Intravenous Q6H PRN José Moore MD    oxyCODONE 20 mg Oral Q12H [facility] José Moore MD    And        oxyCODONE 10 mg Oral Q12H [facility] José Moore MD    pantoprazole 40 mg Oral BID AC José Moore MD    pneumococcal 23-valent polysaccharide vaccine 0 5 mL Subcutaneous Prior to discharge José Moore MD    saccharomyces boulardii 250 mg Oral BID José Moore MD         Today, Patient Was Seen By: José Moore MD    ** Please Note: Dragon 360 Dictation voice to text software may have been used in the creation of this document   **

## 2020-01-23 ENCOUNTER — APPOINTMENT (INPATIENT)
Dept: RADIOLOGY | Facility: HOSPITAL | Age: 65
DRG: 871 | End: 2020-01-23
Payer: MEDICARE

## 2020-01-23 LAB
BACTERIA SPT RESP CULT: ABNORMAL
BACTERIA SPT RESP CULT: ABNORMAL
BASOPHILS # BLD AUTO: 0.02 THOUSANDS/ΜL (ref 0–0.1)
BASOPHILS NFR BLD AUTO: 0 % (ref 0–1)
EOSINOPHIL # BLD AUTO: 0.18 THOUSAND/ΜL (ref 0–0.61)
EOSINOPHIL NFR BLD AUTO: 2 % (ref 0–6)
ERYTHROCYTE [DISTWIDTH] IN BLOOD BY AUTOMATED COUNT: 13.4 % (ref 11.6–15.1)
GRAM STN SPEC: ABNORMAL
HCT VFR BLD AUTO: 29 % (ref 36.5–49.3)
HGB BLD-MCNC: 9.4 G/DL (ref 12–17)
IMM GRANULOCYTES # BLD AUTO: 0.02 THOUSAND/UL (ref 0–0.2)
IMM GRANULOCYTES NFR BLD AUTO: 0 % (ref 0–2)
LYMPHOCYTES # BLD AUTO: 0.86 THOUSANDS/ΜL (ref 0.6–4.47)
LYMPHOCYTES NFR BLD AUTO: 10 % (ref 14–44)
MCH RBC QN AUTO: 29.6 PG (ref 26.8–34.3)
MCHC RBC AUTO-ENTMCNC: 32.4 G/DL (ref 31.4–37.4)
MCV RBC AUTO: 91 FL (ref 82–98)
MONOCYTES # BLD AUTO: 0.57 THOUSAND/ΜL (ref 0.17–1.22)
MONOCYTES NFR BLD AUTO: 7 % (ref 4–12)
NEUTROPHILS # BLD AUTO: 6.63 THOUSANDS/ΜL (ref 1.85–7.62)
NEUTS SEG NFR BLD AUTO: 81 % (ref 43–75)
NRBC BLD AUTO-RTO: 0 /100 WBCS
PLATELET # BLD AUTO: 191 THOUSANDS/UL (ref 149–390)
PMV BLD AUTO: 8.9 FL (ref 8.9–12.7)
PROCALCITONIN SERPL-MCNC: 7.68 NG/ML
RBC # BLD AUTO: 3.18 MILLION/UL (ref 3.88–5.62)
WBC # BLD AUTO: 8.28 THOUSAND/UL (ref 4.31–10.16)

## 2020-01-23 PROCEDURE — 84145 PROCALCITONIN (PCT): CPT | Performed by: INTERNAL MEDICINE

## 2020-01-23 PROCEDURE — 71045 X-RAY EXAM CHEST 1 VIEW: CPT

## 2020-01-23 PROCEDURE — G0009 ADMIN PNEUMOCOCCAL VACCINE: HCPCS | Performed by: INTERNAL MEDICINE

## 2020-01-23 PROCEDURE — 85025 COMPLETE CBC W/AUTO DIFF WBC: CPT | Performed by: INTERNAL MEDICINE

## 2020-01-23 PROCEDURE — 90732 PPSV23 VACC 2 YRS+ SUBQ/IM: CPT | Performed by: INTERNAL MEDICINE

## 2020-01-23 PROCEDURE — 99232 SBSQ HOSP IP/OBS MODERATE 35: CPT | Performed by: INTERNAL MEDICINE

## 2020-01-23 RX ORDER — METRONIDAZOLE 500 MG/1
500 TABLET ORAL EVERY 8 HOURS SCHEDULED
Status: DISCONTINUED | OUTPATIENT
Start: 2020-01-23 | End: 2020-01-24 | Stop reason: HOSPADM

## 2020-01-23 RX ADMIN — Medication 250 MG: at 08:09

## 2020-01-23 RX ADMIN — OXYCODONE HYDROCHLORIDE 20 MG: 20 TABLET, FILM COATED, EXTENDED RELEASE ORAL at 08:08

## 2020-01-23 RX ADMIN — HYDROCHLOROTHIAZIDE 12.5 MG: 12.5 TABLET ORAL at 08:09

## 2020-01-23 RX ADMIN — METRONIDAZOLE 500 MG: 500 INJECTION, SOLUTION INTRAVENOUS at 04:18

## 2020-01-23 RX ADMIN — OXYCODONE HYDROCHLORIDE 10 MG: 20 TABLET, FILM COATED, EXTENDED RELEASE ORAL at 08:09

## 2020-01-23 RX ADMIN — METRONIDAZOLE 500 MG: 500 INJECTION, SOLUTION INTRAVENOUS at 11:24

## 2020-01-23 RX ADMIN — METRONIDAZOLE 500 MG: 500 TABLET ORAL at 20:59

## 2020-01-23 RX ADMIN — PANTOPRAZOLE SODIUM 40 MG: 40 TABLET, DELAYED RELEASE ORAL at 15:55

## 2020-01-23 RX ADMIN — PNEUMOCOCCAL VACCINE POLYVALENT 0.5 ML
25; 25; 25; 25; 25; 25; 25; 25; 25; 25; 25; 25; 25; 25; 25; 25; 25; 25; 25; 25; 25; 25; 25 INJECTION, SOLUTION INTRAMUSCULAR; SUBCUTANEOUS at 07:33

## 2020-01-23 RX ADMIN — HYDROCODONE BITARTRATE AND ACETAMINOPHEN 1 TABLET: 5; 325 TABLET ORAL at 11:29

## 2020-01-23 RX ADMIN — ENOXAPARIN SODIUM 40 MG: 40 INJECTION SUBCUTANEOUS at 08:08

## 2020-01-23 RX ADMIN — PANTOPRAZOLE SODIUM 40 MG: 40 TABLET, DELAYED RELEASE ORAL at 06:07

## 2020-01-23 RX ADMIN — CEFTRIAXONE 1000 MG: 1 INJECTION, SOLUTION INTRAVENOUS at 22:47

## 2020-01-23 RX ADMIN — OXYCODONE HYDROCHLORIDE 10 MG: 20 TABLET, FILM COATED, EXTENDED RELEASE ORAL at 20:53

## 2020-01-23 RX ADMIN — OXYCODONE HYDROCHLORIDE 20 MG: 20 TABLET, FILM COATED, EXTENDED RELEASE ORAL at 20:54

## 2020-01-23 RX ADMIN — MELOXICAM 15 MG: 7.5 TABLET ORAL at 08:10

## 2020-01-23 RX ADMIN — LOSARTAN POTASSIUM 100 MG: 50 TABLET, FILM COATED ORAL at 08:09

## 2020-01-23 RX ADMIN — HYDROCODONE BITARTRATE AND ACETAMINOPHEN 1 TABLET: 5; 325 TABLET ORAL at 17:25

## 2020-01-23 RX ADMIN — Medication 250 MG: at 17:24

## 2020-01-23 RX ADMIN — BUPROPION 300 MG: 150 TABLET, EXTENDED RELEASE ORAL at 08:09

## 2020-01-23 NOTE — SOCIAL WORK
Per chart and nursing rounds, patient does not have any discharge needs anticipated at this time  SW will continue to follow should needs change  Met with pt to discuss enrollment in Medicare Bundle Program   Provided Bundle Program Notification Letter

## 2020-01-23 NOTE — PLAN OF CARE
Problem: PAIN - ADULT  Goal: Verbalizes/displays adequate comfort level or baseline comfort level  Description  Interventions:  - Encourage patient to monitor pain and request assistance  - Assess pain using appropriate pain scale  - Administer analgesics based on type and severity of pain and evaluate response  - Implement non-pharmacological measures as appropriate and evaluate response  - Consider cultural and social influences on pain and pain management  - Notify physician/advanced practitioner if interventions unsuccessful or patient reports new pain  Outcome: Progressing   Monitor for pain/ administer medication as needed,   Problem: SAFETY ADULT  Goal: Patient will remain free of falls  Description  INTERVENTIONS:  - Assess patient frequently for physical needs  -  Identify cognitive and physical deficits and behaviors that affect risk of falls    -  [place] fall precautions as indicated by assessment   - Educate patient/family on patient safety including physical limitations  - Instruct patient to call for assistance with activity based on assessment  - Modify environment to reduce risk of injury     Outcome: Progressing     Problem: DISCHARGE PLANNING  Goal: Discharge to home or other facility with appropriate resources  Description  INTERVENTIONS:  - Identify barriers to discharge w/patient and caregiver  - Arrange for needed discharge resources and transportation as appropriate  - Identify discharge learning needs (meds, wound care, etc )  - Refer to Case Management Department for coordinating discharge planning if the patient needs post-hospital services based on physician/advanced practitioner order or complex needs related to functional status, cognitive ability, or social support system   Outcome: Progressing

## 2020-01-23 NOTE — PROGRESS NOTES
The metronidazole has / have been converted to Oral per [facility] IV-to-PO Auto-Conversion Protocol for Adults as approved by the Pharmacy and Therapeutics Committee  The patient met all eligible criteria:  3 Age = 25years old   2) Received at least one dose of the IV form   3) Receiving at least one other scheduled oral/enteral medication   4) Tolerating an oral/enteral diet   and did not have any exclusions:   1) Critical care patient   2) Active GI bleed (IF assessing H2RAs or PPIs)   3) Continuous tube feeding (IF assessing cipro, doxycycline, levofloxacin, minocycline, rifampin, or voriconazole)   4) Receiving PO vancomycin (IF assessing metronidazole)   5) Persistent nausea and/or vomiting   6) Ileus or gastrointestinal obstruction   7) Enrique/nasogastric tube set for continuous suction   8) Specific order not to automatically convert to PO (in the order's comments or if discussed in the most recent Infectious Disease or primary team's progress notes)

## 2020-01-24 ENCOUNTER — TRANSITIONAL CARE MANAGEMENT (OUTPATIENT)
Dept: FAMILY MEDICINE CLINIC | Facility: CLINIC | Age: 65
End: 2020-01-24

## 2020-01-24 ENCOUNTER — TELEPHONE (OUTPATIENT)
Dept: FAMILY MEDICINE CLINIC | Facility: CLINIC | Age: 65
End: 2020-01-24

## 2020-01-24 VITALS
OXYGEN SATURATION: 96 % | TEMPERATURE: 98.1 F | WEIGHT: 177.69 LBS | SYSTOLIC BLOOD PRESSURE: 124 MMHG | RESPIRATION RATE: 16 BRPM | BODY MASS INDEX: 26.93 KG/M2 | HEART RATE: 64 BPM | HEIGHT: 68 IN | DIASTOLIC BLOOD PRESSURE: 77 MMHG

## 2020-01-24 LAB
ANION GAP SERPL CALCULATED.3IONS-SCNC: 9 MMOL/L (ref 4–13)
BASOPHILS # BLD AUTO: 0.01 THOUSANDS/ΜL (ref 0–0.1)
BASOPHILS NFR BLD AUTO: 0 % (ref 0–1)
BUN SERPL-MCNC: 10 MG/DL (ref 5–25)
CALCIUM SERPL-MCNC: 8.4 MG/DL (ref 8.3–10.1)
CHLORIDE SERPL-SCNC: 102 MMOL/L (ref 100–108)
CO2 SERPL-SCNC: 26 MMOL/L (ref 21–32)
CREAT SERPL-MCNC: 0.96 MG/DL (ref 0.6–1.3)
EOSINOPHIL # BLD AUTO: 0.12 THOUSAND/ΜL (ref 0–0.61)
EOSINOPHIL NFR BLD AUTO: 2 % (ref 0–6)
ERYTHROCYTE [DISTWIDTH] IN BLOOD BY AUTOMATED COUNT: 13.3 % (ref 11.6–15.1)
GFR SERPL CREATININE-BSD FRML MDRD: 83 ML/MIN/1.73SQ M
GLUCOSE SERPL-MCNC: 100 MG/DL (ref 65–140)
HCT VFR BLD AUTO: 31.5 % (ref 36.5–49.3)
HGB BLD-MCNC: 10.5 G/DL (ref 12–17)
IMM GRANULOCYTES # BLD AUTO: 0.03 THOUSAND/UL (ref 0–0.2)
IMM GRANULOCYTES NFR BLD AUTO: 0 % (ref 0–2)
LYMPHOCYTES # BLD AUTO: 0.71 THOUSANDS/ΜL (ref 0.6–4.47)
LYMPHOCYTES NFR BLD AUTO: 10 % (ref 14–44)
MCH RBC QN AUTO: 30.4 PG (ref 26.8–34.3)
MCHC RBC AUTO-ENTMCNC: 33.3 G/DL (ref 31.4–37.4)
MCV RBC AUTO: 91 FL (ref 82–98)
MONOCYTES # BLD AUTO: 0.57 THOUSAND/ΜL (ref 0.17–1.22)
MONOCYTES NFR BLD AUTO: 8 % (ref 4–12)
NEUTROPHILS # BLD AUTO: 5.89 THOUSANDS/ΜL (ref 1.85–7.62)
NEUTS SEG NFR BLD AUTO: 80 % (ref 43–75)
NRBC BLD AUTO-RTO: 0 /100 WBCS
PLATELET # BLD AUTO: 239 THOUSANDS/UL (ref 149–390)
PMV BLD AUTO: 9.5 FL (ref 8.9–12.7)
POTASSIUM SERPL-SCNC: 4 MMOL/L (ref 3.5–5.3)
PROCALCITONIN SERPL-MCNC: 3.74 NG/ML
RBC # BLD AUTO: 3.45 MILLION/UL (ref 3.88–5.62)
SODIUM SERPL-SCNC: 137 MMOL/L (ref 136–145)
WBC # BLD AUTO: 7.33 THOUSAND/UL (ref 4.31–10.16)

## 2020-01-24 PROCEDURE — 84145 PROCALCITONIN (PCT): CPT | Performed by: INTERNAL MEDICINE

## 2020-01-24 PROCEDURE — 80048 BASIC METABOLIC PNL TOTAL CA: CPT | Performed by: INTERNAL MEDICINE

## 2020-01-24 PROCEDURE — 85025 COMPLETE CBC W/AUTO DIFF WBC: CPT | Performed by: INTERNAL MEDICINE

## 2020-01-24 PROCEDURE — 99239 HOSP IP/OBS DSCHRG MGMT >30: CPT | Performed by: INTERNAL MEDICINE

## 2020-01-24 RX ORDER — PANTOPRAZOLE SODIUM 40 MG/1
40 TABLET, DELAYED RELEASE ORAL
Qty: 60 TABLET | Refills: 0 | Status: SHIPPED | OUTPATIENT
Start: 2020-01-24 | End: 2020-09-22 | Stop reason: ALTCHOICE

## 2020-01-24 RX ORDER — GUAIFENESIN/DEXTROMETHORPHAN 100-10MG/5
10 SYRUP ORAL EVERY 4 HOURS PRN
Qty: 118 ML | Refills: 0 | Status: SHIPPED | OUTPATIENT
Start: 2020-01-24 | End: 2020-01-24

## 2020-01-24 RX ORDER — AMOXICILLIN AND CLAVULANATE POTASSIUM 875; 125 MG/1; MG/1
1 TABLET, FILM COATED ORAL 2 TIMES DAILY
Qty: 14 TABLET | Refills: 0 | Status: SHIPPED | OUTPATIENT
Start: 2020-01-24 | End: 2020-01-24 | Stop reason: SDUPTHER

## 2020-01-24 RX ORDER — PANTOPRAZOLE SODIUM 40 MG/1
40 TABLET, DELAYED RELEASE ORAL
Qty: 60 TABLET | Refills: 1 | Status: SHIPPED | OUTPATIENT
Start: 2020-01-24 | End: 2020-01-24

## 2020-01-24 RX ORDER — BUPROPION HYDROCHLORIDE 300 MG/1
300 TABLET ORAL
COMMUNITY
Start: 2020-01-16 | End: 2020-02-03 | Stop reason: SDUPTHER

## 2020-01-24 RX ORDER — SACCHAROMYCES BOULARDII 250 MG
250 CAPSULE ORAL 2 TIMES DAILY
Qty: 14 CAPSULE | Refills: 0 | Status: SHIPPED | OUTPATIENT
Start: 2020-01-24 | End: 2020-01-24 | Stop reason: SDUPTHER

## 2020-01-24 RX ORDER — GUAIFENESIN/DEXTROMETHORPHAN 100-10MG/5
10 SYRUP ORAL EVERY 4 HOURS PRN
Qty: 118 ML | Refills: 0 | Status: SHIPPED | OUTPATIENT
Start: 2020-01-24 | End: 2020-09-22 | Stop reason: ALTCHOICE

## 2020-01-24 RX ORDER — AMOXICILLIN AND CLAVULANATE POTASSIUM 875; 125 MG/1; MG/1
1 TABLET, FILM COATED ORAL 2 TIMES DAILY
Qty: 14 TABLET | Refills: 0 | Status: SHIPPED | OUTPATIENT
Start: 2020-01-24 | End: 2020-01-31

## 2020-01-24 RX ORDER — SACCHAROMYCES BOULARDII 250 MG
250 CAPSULE ORAL 2 TIMES DAILY
Qty: 14 CAPSULE | Refills: 0 | Status: SHIPPED | OUTPATIENT
Start: 2020-01-24 | End: 2020-09-22 | Stop reason: ALTCHOICE

## 2020-01-24 RX ADMIN — BUPROPION 300 MG: 150 TABLET, EXTENDED RELEASE ORAL at 09:51

## 2020-01-24 RX ADMIN — HYDROCHLOROTHIAZIDE 12.5 MG: 12.5 TABLET ORAL at 09:46

## 2020-01-24 RX ADMIN — METRONIDAZOLE 500 MG: 500 TABLET ORAL at 06:27

## 2020-01-24 RX ADMIN — Medication 250 MG: at 09:46

## 2020-01-24 RX ADMIN — OXYCODONE HYDROCHLORIDE 10 MG: 20 TABLET, FILM COATED, EXTENDED RELEASE ORAL at 09:45

## 2020-01-24 RX ADMIN — PANTOPRAZOLE SODIUM 40 MG: 40 TABLET, DELAYED RELEASE ORAL at 06:27

## 2020-01-24 RX ADMIN — MELOXICAM 15 MG: 7.5 TABLET ORAL at 09:47

## 2020-01-24 RX ADMIN — LOSARTAN POTASSIUM 100 MG: 50 TABLET, FILM COATED ORAL at 09:51

## 2020-01-24 RX ADMIN — OXYCODONE HYDROCHLORIDE 20 MG: 20 TABLET, FILM COATED, EXTENDED RELEASE ORAL at 09:47

## 2020-01-24 NOTE — ASSESSMENT & PLAN NOTE
As evidenced by fever, tachycardia and source being multifocal pneumonia in the right  Likely secondary to aspiration pneumonia  Patient was given Levaquin in the ED  Patient's antibiotics were broadened to cefepime 2 gram IV q 12 hours along with Flagyl 500 milligram IV q 8 hours and azithromycin 500 milligram IV daily upon admission  Patient's urine for Legionella pneumococcal antigens were negative  Patient's flu swab and RSV were negative in the ED  Patient's lactic acid level is normal  Procalcitonin level is mildly elevated at 0 98 which increased to 14 97  Procalcitonin continues to improved to 3 74  Azithromycin was stopped as Legionella antigen was negative  Deescalated antibiotics to Rocephin 1 gram IV daily and Flagyl 500 milligram IV q 8 hours  Sputum cultures growing mixed respiratory nick  Repeat chest x-ray showed clearing of the right lung since 01/21 with possible small residual right upper lobe consolidation  Patient received 3 days of antibiotics    Patient will be discharged on Augmentin 1 tablet p o  B i d  for another 1 week to finish a 10 day course

## 2020-01-24 NOTE — ASSESSMENT & PLAN NOTE
Patient present with sepsis likely secondary to aspiration pneumonia  Patient had multiple episodes of vomiting 3 days ago following which patient developed chills with cough  Patient was initially started on cefepime, Flagyl azithromycin and will be deescalated to Rocephin and Flagyl  See problem 1     Repeat chest x-ray showing significant improvement of the right-sided pneumonia  Patient will be discharged on Augmentin

## 2020-01-24 NOTE — ASSESSMENT & PLAN NOTE
As evidenced by fever, tachycardia and source being multifocal pneumonia in the right  Likely secondary to aspiration pneumonia  Patient was given Levaquin in the ED  Patient's antibiotics were broadened to cefepime 2 gram IV q 12 hours along with Flagyl 500 milligram IV q 8 hours and azithromycin 500 milligram IV daily upon admission  Patient's urine for Legionella pneumococcal antigens were negative  Patient's flu swab and RSV were negative in the ED  Patient's lactic acid level is normal  Procalcitonin level is mildly elevated at 0 98 which increased to 14 97    Procalcitonin improved to 7 6  Will stop azithromycin as Legionella antigen is negative  Deescalated antibiotics to Rocephin 1 gram IV daily and Flagyl 500 milligram IV q 8 hours  Sputum cultures growing gram-positive cocci in pairs  Repeat chest x-ray showed clearing of the right lung since 01/21 with possible small residual right upper lobe consolidation

## 2020-01-24 NOTE — TELEPHONE ENCOUNTER
Michel Meraz was admitted to [facility] on 01/21/2020 and is getting discharged this afternoon 01/24/2020  He has [place] appt scheduled 02/03/2020 with Dr Roderick Zhao  Please call Jose Muñiz at [phone] to get info on his hospital stay    Thanks

## 2020-01-24 NOTE — ASSESSMENT & PLAN NOTE
Patient's hemoglobin dropped from 11 8-9 8  Likely secondary dilution from IV fluids  Patient also had some blood-streaked vomiting  Hemoglobin has been stable in 9 range over the past 2 days

## 2020-01-24 NOTE — PROGRESS NOTES
Progress Note - Mursurya Number 1955, 59 y o  male MRN: [id number]    Unit/Bed#: [address] [place] [address] Encounter: [id number]    Primary Care Provider: Bailey Moya MD   Date and time admitted to hospital: 1/21/2020  7:03 AM        * Sepsis [facility])  Assessment & Plan  As evidenced by fever, tachycardia and source being multifocal pneumonia in the right  Likely secondary to aspiration pneumonia  Patient was given Levaquin in the ED  Patient's antibiotics were broadened to cefepime 2 gram IV q 12 hours along with Flagyl 500 milligram IV q 8 hours and azithromycin 500 milligram IV daily upon admission  Patient's urine for Legionella pneumococcal antigens were negative  Patient's flu swab and RSV were negative in the ED  Patient's lactic acid level is normal  Procalcitonin level is mildly elevated at 0 98 which increased to 14 97  Will stop azithromycin as Legionella antigen is negative  Deescalated antibiotics to Rocephin 1 gram IV daily and Flagyl 500 milligram IV q 8 hours  Sputum cultures growing gram-positive cocci in pairs  Repeat chest x-ray showed clearing of the right lung since 01/21 with possible small residual right upper lobe consolidation    Aspiration pneumonia of right lung [facility])  Assessment & Plan  Patient present with sepsis likely secondary to aspiration pneumonia  Patient had multiple episodes of vomiting 3 days ago following which patient developed chills with cough  Patient was initially started on cefepime, Flagyl azithromycin and will be deescalated to Rocephin and Flagyl  See problem 1  Achalasia  Assessment & Plan  Wife reported multiple episodes of vomiting with some blood streaking this morning  Patient is on Zantac at home which will be changed to Protonix 40 milligram b i d      Hypertension  Assessment & Plan  Continue losartan and hydrochlorothiazide  Patient is on Benicar at home  Monitor blood pressure closely    Depression  Assessment & Plan  Continue Wellbutrin    Opioid dependence [facility])  Assessment & Plan  Patient currently on OxyContin 30 milligram p o  B i d  And oxycodone p r n  For breakthrough pain    Cervical spinal stenosis  Assessment & Plan  With chronic pain  Patient is on OxyContin 30 milligram p o  B i d  And oxycodone for breakthrough pain which will be continued  Anemia  Assessment & Plan  Patient's hemoglobin dropped from 11 8-9 8  Likely secondary dilution from IV fluids  Patient also had some blood-streaked vomiting  Hemoglobin has been stable in 9 range over the past 2 days    GERD (gastroesophageal reflux disease)  Assessment & Plan  Continue PPI        VTE Pharmacologic Prophylaxis:   Pharmacologic: Enoxaparin (Lovenox)  Mechanical VTE Prophylaxis in Place: Yes    Patient Centered Rounds: I have performed bedside rounds with nursing staff today  Discussions with Specialists or Other Care Team Provider: Yes  Education and Discussions with Family / Patient:Yes  Time Spent for Care: 45 minutes  More than 50% of total time spent on counseling and coordination of care as described above  Current Length of Stay: 2 day(s)  Current Patient Status: Inpatient     Discharge Plan: Home    Code Status: Level 1 - Full Code      Subjective:   Patient is coughing up mucus with some blood streaking  Patient continues to complain of generalized fatigue and weakness  Patient denies any shortness of breath or further nausea or vomiting      Objective:     Vitals:   Temp (24hrs), Av 9 °F (37 2 °C), Min:98 6 °F (37 °C), Max:99 1 °F (37 3 °C)    Temp:  [98 6 °F (37 °C)-99 1 °F (37 3 °C)] 99 1 °F (37 3 °C)  HR:  [69-74] 74  Resp:  [16-20] 20  BP: (103-123)/(69-82) 103/74  SpO2:  [91 %-98 %] 98 %  Body mass index is 27 02 kg/m²  Input and Output Summary (last 24 hours):   No intake or output data in the 24 hours ending 20     Physical Exam:     Physical Exam   Constitutional: No distress  HENT:   Head: Normocephalic and atraumatic     Eyes: Pupils are equal, round, and reactive to light  Conjunctivae are normal    Neck: Normal range of motion  Neck supple  Cardiovascular: Normal rate, regular rhythm and normal heart sounds  Pulmonary/Chest: Effort normal  No respiratory distress  He has no wheezes  He has no rhonchi  He has no rales  He exhibits no tenderness  Abdominal: Soft  Bowel sounds are normal  He exhibits no distension  There is no tenderness  There is no rebound and no guarding  Musculoskeletal: He exhibits no edema  Neurological: He is alert  No cranial nerve deficit  Skin: Skin is warm and dry  No rash noted  Additional Data:     Labs:    Results from last 7 days   Lab Units 01/23/20  0528 01/22/20  0529 01/21/20  0721   WBC Thousand/uL 8 28 12 92* 7 39   HEMOGLOBIN g/dL 9 4* 9 8* 11 8*   HEMATOCRIT % 29 0* 29 8* 35 5*   PLATELETS Thousands/uL 191 213 239   NEUTROS PCT % 81* 88* 91*     Results from last 7 days   Lab Units 01/22/20  0529 01/21/20  0721   SODIUM mmol/L 136 137   POTASSIUM mmol/L 3 9 4 0   CHLORIDE mmol/L 102 103   CO2 mmol/L 26 26   BUN mg/dL 18 20   CREATININE mg/dL 1 08 1 14   CALCIUM mg/dL 7 9* 8 2*   TOTAL BILIRUBIN mg/dL  --  0 70   ALK PHOS U/L  --  62   ALT U/L  --  23   AST U/L  --  21     Results from last 7 days   Lab Units 01/21/20  0721   INR  0 95         No results found for: HGBA1C      Results from last 7 days   Lab Units 01/23/20  0528 01/22/20  0529 01/21/20  0721   LACTIC ACID mmol/L  --   --  1 2   PROCALCITONIN ng/ml 7 68* 14 97* 0 98*       * I Have Reviewed All Lab Data Listed Above  * Additional Pertinent Lab Tests Reviewed: [facility] Admission Reviewed    Imaging:     XR chest portable   Final Result by Jonatan Mckenna MD (01/23 9923)      Clearing of the right lung since 1/21/2020 with possible small residual right upper lobe consolidation              Workstation performed: [id number]         XR chest 1 view portable   Final Result by Ifeoma Henry MD (01/21 1109)      Right-sided multilobar pneumonia  Workstation performed: [id number]           Imaging Reports Reviewed by myself    Cultures:   Blood Culture:   Lab Results   Component Value Date    BLOODCX No Growth at 48 hrs  01/21/2020    BLOODCX No Growth at 48 hrs  01/21/2020     Urine Culture: No results found for: URINECX  Sputum Culture: No components found for: SPUTUMCX  Wound Culture: No results found for: WOUNDCULT    Last 24 Hours Medication List:     Current Facility-Administered Medications:  acetaminophen 650 mg Oral Q6H PRN Shawna Pisano MD    buPROPion 300 mg Oral Daily Shawna Pisano MD    cefTRIAXone 1,000 mg Intravenous Q24H Shawna Pisano MD Last Rate: 1,000 mg (01/22/20 2140)   dextromethorphan-guaiFENesin 10 mL Oral Q4H PRN Shawna Pisano MD    enoxaparin 40 mg Subcutaneous Daily Shawna Pisano MD    losartan 100 mg Oral Daily Shawna Pisano MD    And        hydrochlorothiazide 12 5 mg Oral Daily Shawna Pisano MD    HYDROcodone-acetaminophen 1 tablet Oral Q6H PRN Shawna Pisano MD    meloxicam 15 mg Oral Daily With Breakfast Shawna Pisano MD    metroNIDAZOLE 500 mg Oral Q8H [facility] Shawna Pisano MD    ondansetron 4 mg Intravenous Q6H PRN Shawna Pisano MD    oxyCODONE 20 mg Oral Q12H [facility] Shawna Pisano MD    And        oxyCODONE 10 mg Oral Q12H [facility] Shawna Pisano MD    pantoprazole 40 mg Oral BID AC Shawna Pisano MD    saccharomyces boulardii 250 mg Oral BID Shawna Pisano MD         Today, Patient Was Seen By: Shawna Pisano MD    ** Please Note: Dragon 360 Dictation voice to text software may have been used in the creation of this document   **

## 2020-01-24 NOTE — ASSESSMENT & PLAN NOTE
Patient's hemoglobin dropped from 11 8-9 8  Likely secondary dilution from IV fluids  Patient also had some blood-streaked vomiting  Hemoglobin has been stable in 9 range   Hemoglobin improved to 10 5

## 2020-01-24 NOTE — ASSESSMENT & PLAN NOTE
Wife reported multiple episodes of vomiting with some blood streaking this morning  Patient is on Zantac at home which will be changed to Protonix 40 milligram b i d    Patient advised to follow up with primary gastroenterologist in [facility]

## 2020-01-24 NOTE — DISCHARGE SUMMARY
Discharge- May Jay 1955, 59 y o  male MRN: [id number]    Unit/Bed#: [address] [place] [address] Encounter: [id number]    Primary Care Provider: Loreta Ye MD   Date and time admitted to hospital: 1/21/2020  7:03 AM        * Sepsis [facility])  Assessment & Plan  As evidenced by fever, tachycardia and source being multifocal pneumonia in the right  Likely secondary to aspiration pneumonia  Patient was given Levaquin in the ED  Patient's antibiotics were broadened to cefepime 2 gram IV q 12 hours along with Flagyl 500 milligram IV q 8 hours and azithromycin 500 milligram IV daily upon admission  Patient's urine for Legionella pneumococcal antigens were negative  Patient's flu swab and RSV were negative in the ED  Patient's lactic acid level is normal  Procalcitonin level is mildly elevated at 0 98 which increased to 14 97  Procalcitonin continues to improved to 3 74  Azithromycin was stopped as Legionella antigen was negative  Deescalated antibiotics to Rocephin 1 gram IV daily and Flagyl 500 milligram IV q 8 hours  Sputum cultures growing mixed respiratory nick  Repeat chest x-ray showed clearing of the right lung since 01/21 with possible small residual right upper lobe consolidation  Patient received 3 days of antibiotics  Patient will be discharged on Augmentin 1 tablet p o  B i d  for another 1 week to finish a 10 day course    Aspiration pneumonia of right lung [facility])  Assessment & Plan  Patient present with sepsis likely secondary to aspiration pneumonia  Patient had multiple episodes of vomiting 3 days ago following which patient developed chills with cough  Patient was initially started on cefepime, Flagyl azithromycin and will be deescalated to Rocephin and Flagyl  See problem 1     Repeat chest x-ray showing significant improvement of the right-sided pneumonia  Patient will be discharged on Augmentin    Achalasia  Assessment & Plan  Wife reported multiple episodes of vomiting with some blood streaking this morning  Patient is on Zantac at home which will be changed to Protonix 40 milligram b i d  Patient advised to follow up with primary gastroenterologist in [facility]    Hypertension  Assessment & Plan  Continue losartan and hydrochlorothiazide  Resume Benicar upon discharge    Depression  Assessment & Plan  Continue Wellbutrin    Opioid dependence ([facility] [address] )  Assessment & Plan  Patient currently on OxyContin 30 milligram p o  B i d  And oxycodone p r n  For breakthrough pain    Cervical spinal stenosis  Assessment & Plan  With chronic pain  Patient is on OxyContin 30 milligram p o  B i d  And oxycodone for breakthrough pain which will be continued  Anemia  Assessment & Plan  Patient's hemoglobin dropped from 11 8-9 8  Likely secondary dilution from IV fluids  Patient also had some blood-streaked vomiting  Hemoglobin has been stable in 9 range   Hemoglobin improved to 10 5      GERD (gastroesophageal reflux disease)  Assessment & Plan  Continue PPI        Discharging Physician / Practitioner: Shawna Pisano MD  PCP: Michael Sawyer MD  Admission Date: 1/21/2020  Discharge Date: 01/24/20    Reason for Admission: Fever - 9 weeks to 74 years and Vomiting (sx started this am)        Resolved Problems  Date Reviewed: 1/24/2020    None          Consultations During Hospital Stay:  None      Significant Findings / Test Results:     ·   Results from last 7 days   Lab Units 01/24/20  0556 01/23/20  0528 01/22/20  0529   WBC Thousand/uL 7 33 8 28 12 92*   HEMOGLOBIN g/dL 10 5* 9 4* 9 8*   PLATELETS Thousands/uL 239 191 213     Results from last 7 days   Lab Units 01/24/20  0556 01/22/20  0529 01/21/20  0721   SODIUM mmol/L 137 136 137   POTASSIUM mmol/L 4 0 3 9 4 0   CHLORIDE mmol/L 102 102 103   CO2 mmol/L 26 26 26   BUN mg/dL 10 18 20   CREATININE mg/dL 0 96 1 08 1 14   CALCIUM mg/dL 8 4 7 9* 8 2*   TOTAL BILIRUBIN mg/dL  --   --  0 70   ALK PHOS U/L  --   --  62   ALT U/L  --   --  23   AST U/L  --   --  21     Results from last 7 days   Lab Units 01/21/20  0721   INR  0 95         No results found for: HGBA1C      Results from last 7 days   Lab Units 01/24/20  0556 01/23/20  0528 01/22/20  0529 01/21/20  0721   LACTIC ACID mmol/L  --   --   --  1 2   PROCALCITONIN ng/ml 3 74* 7 68* 14 97* 0 98*     Blood Culture:   Lab Results   Component Value Date    BLOODCX No Growth at 72 hrs  01/21/2020    BLOODCX No Growth at 72 hrs  01/21/2020     Urine Culture: No results found for: URINECX  Sputum Culture: No components found for: SPUTUMCX  Wound Culture: No results found for: WOUNDCULT     XR chest portable   Final Result by Ilya Pratt MD (01/23 1383)      Clearing of the right lung since 1/21/2020 with possible small residual right upper lobe consolidation  Workstation performed: [id number]         XR chest 1 view portable   Final Result by Dino Nolasco MD (01/21 9990)      Right-sided multilobar pneumonia  Workstation performed: [id number]                 Complications:  None    Reason for Admission:   Chief Complaint   Patient presents with    Fever - 9 weeks to 74 years    Vomiting     sx started this am       Hospital Course:     Per HPI: Josiah Breaux is a 59 y o  male patient with a PMH of hypertension, hyperlipidemia, depression, achalasia, GERD who originally presented to the hospital on 1/21/2020 due to multiple episodes of vomiting followed by general weakness and chills  Patient is admitted hospital for sepsis and patient noted to have multilobar pneumonia on the right  Patient was started on cefepime, Flagyl and azithromycin initially which was de-escalated to Rocephin and Flagyl cover for aspiration pneumonia  Patient's repeat chest x-ray showed significant improvement of pneumonia patient also improved significantly  Patient be discharged home on Augmentin to cover for aspiration pneumonia    Patient advised to follow up with primary gastroenterologist regarding his achalasia as soon as possible  Hospital Course:    Please see above list of diagnoses and related plan for additional information  Condition at Discharge: stable       Discharge Day Visit / Exam:     Subjective:  Patient denies any shortness of breath   Says feeling significantly better  Occasional cough  Vitals: Blood Pressure: 124/77 (01/24/20 0809)  Pulse: 64 (01/24/20 0809)  Temperature: 98 1 °F (36 7 °C) (01/24/20 0809)  Temp Source: Oral (01/23/20 0805)  Respirations: 16 (01/24/20 0809)  Height: 5' 8" (172 7 cm) (01/21/20 1025)  Weight - Scale: 80 6 kg (177 lb 11 1 oz) (01/21/20 1025)  SpO2: 96 % (01/24/20 0809)  Exam:   Physical Exam   Constitutional: No distress  HENT:   Head: Normocephalic and atraumatic  Eyes: Pupils are equal, round, and reactive to light  Conjunctivae are normal    Neck: Normal range of motion  Neck supple  Cardiovascular: Normal rate, regular rhythm and normal heart sounds  Pulmonary/Chest: Effort normal  No respiratory distress  He has no wheezes  He has no rhonchi  He has no rales  He exhibits no tenderness  Abdominal: Soft  Bowel sounds are normal  He exhibits no distension  There is no tenderness  There is no rebound and no guarding  Musculoskeletal: He exhibits no edema  Neurological: He is alert  No cranial nerve deficit  Skin: Skin is warm and dry  No rash noted  Discharge instructions/Information to patient and family:   See after visit summary for information provided to patient and family  Provisions for Follow-Up Care:  See after visit summary for information related to follow-up care and any pertinent home health orders  Disposition:     Home    Planned Readmission: No     Discharge Statement:  I spent 35 minutes discharging the patient  This time was spent on the day of discharge  I had direct contact with the patient on the day of discharge   Greater than 50% of the total time was spent examining patient, answering all patient questions, arranging and discussing plan of care with patient as well as directly providing post-discharge instructions  Additional time then spent on discharge activities  Discharge Medications:  See after visit summary for reconciled discharge medications provided to patient and family        ** Please Note: This note has been constructed using a voice recognition system **

## 2020-01-26 LAB
BACTERIA BLD CULT: NORMAL
BACTERIA BLD CULT: NORMAL

## 2020-01-27 ENCOUNTER — EPISODE CHANGES (OUTPATIENT)
Dept: CASE MANAGEMENT | Facility: HOSPITAL | Age: 65
End: 2020-01-27

## 2020-02-02 PROBLEM — Z76.89 ENCOUNTER FOR SUPPORT AND COORDINATION OF TRANSITION OF CARE: Status: ACTIVE | Noted: 2020-02-02

## 2020-02-02 PROBLEM — R55 NEAR SYNCOPE: Status: RESOLVED | Noted: 2019-04-18 | Resolved: 2020-02-02

## 2020-02-02 NOTE — PATIENT INSTRUCTIONS
CONTINUE CURRENT TREATMENT PLAN    PLENTY OF FLUIDS  REST  MEDICATION AS DIRECTED    RV 2 WEEKS, CALL SOONER PRN

## 2020-02-03 ENCOUNTER — OFFICE VISIT (OUTPATIENT)
Dept: FAMILY MEDICINE CLINIC | Facility: CLINIC | Age: 65
End: 2020-02-03
Payer: MEDICARE

## 2020-02-03 VITALS
HEIGHT: 68 IN | HEART RATE: 97 BPM | DIASTOLIC BLOOD PRESSURE: 90 MMHG | RESPIRATION RATE: 16 BRPM | TEMPERATURE: 97.5 F | OXYGEN SATURATION: 97 % | SYSTOLIC BLOOD PRESSURE: 124 MMHG | WEIGHT: 172.4 LBS | BODY MASS INDEX: 26.13 KG/M2

## 2020-02-03 DIAGNOSIS — G89.29 CHRONIC BACK PAIN GREATER THAN 3 MONTHS DURATION: ICD-10-CM

## 2020-02-03 DIAGNOSIS — M54.9 CHRONIC BACK PAIN GREATER THAN 3 MONTHS DURATION: ICD-10-CM

## 2020-02-03 DIAGNOSIS — F11.20 UNCOMPLICATED OPIOID DEPENDENCE (HCC): ICD-10-CM

## 2020-02-03 DIAGNOSIS — M51.06 INTERVERTEBRAL DISC DISORDER OF LUMBAR REGION WITH MYELOPATHY: ICD-10-CM

## 2020-02-03 DIAGNOSIS — E78.00 HYPERCHOLESTEROLEMIA: ICD-10-CM

## 2020-02-03 DIAGNOSIS — G89.4 CHRONIC PAIN DISORDER: ICD-10-CM

## 2020-02-03 DIAGNOSIS — Z76.89 ENCOUNTER FOR SUPPORT AND COORDINATION OF TRANSITION OF CARE: Primary | ICD-10-CM

## 2020-02-03 DIAGNOSIS — M15.9 GENERALIZED OSTEOARTHRITIS OF MULTIPLE SITES: ICD-10-CM

## 2020-02-03 DIAGNOSIS — J69.0 ASPIRATION PNEUMONIA OF RIGHT LUNG, UNSPECIFIED ASPIRATION PNEUMONIA TYPE, UNSPECIFIED PART OF LUNG (HCC): ICD-10-CM

## 2020-02-03 DIAGNOSIS — I10 ESSENTIAL HYPERTENSION: ICD-10-CM

## 2020-02-03 PROCEDURE — 99495 TRANSJ CARE MGMT MOD F2F 14D: CPT | Performed by: FAMILY MEDICINE

## 2020-02-03 NOTE — PROGRESS NOTES
Assessment/Plan:    No problem-specific Assessment & Plan notes found for this encounter  Diagnoses and all orders for this visit:    Encounter for support and coordination of transition of care    Aspiration pneumonia of right lung, unspecified aspiration pneumonia type, unspecified part of lung ([facility] )    Essential hypertension    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    Generalized osteoarthritis of multiple sites    Chronic back pain greater than 3 months duration    Hypercholesterolemia    Chronic pain disorder    Uncomplicated [facility] dependence ([facility] )          Patient Instructions   CONTINUE CURRENT TREATMENT PLAN    PLENTY OF FLUIDS  REST  MEDICATION AS DIRECTED    RV 2 WEEKS, CALL SOONER PRN          Return in about 2 weeks (around 2/17/2020) for Recheck  Subjective:      Patient ID: Sabrina Gauthier is a 72 y o  male  Chief Complaint   Patient presents with    Transition of Care Management       TRANSITION OF CARE    PATIENT IS S/P [facility] ADMISSION FOR ASPIRATION PNEUMONIA AND SEPSIS    REVIEWED HOSPITAL COURSE  REVIEWED DISCHARGE INSTRUCTIONS AND MEDICATIONS    PATIENT FEELS WELL  MINIMAL COUGH  NO FEVER OR CHILLS  NO NVD  NO RASH      The following portions of the patient's history were reviewed and updated as appropriate: allergies, current medications, past family history, past medical history, past social history, past surgical history and problem list     Review of Systems   Constitutional: Negative for chills, fatigue and fever  HENT: Negative for congestion, ear discharge, ear pain, mouth sores, postnasal drip, sore throat and trouble swallowing  Eyes: Negative for pain, discharge and visual disturbance  Respiratory: Positive for cough  Negative for shortness of breath and wheezing  Cardiovascular: Negative for chest pain, palpitations and leg swelling     Gastrointestinal: Negative for abdominal distention, abdominal pain, blood in stool, diarrhea and nausea  Endocrine: Negative for polydipsia, polyphagia and polyuria  Genitourinary: Negative for dysuria, frequency, hematuria and urgency  Musculoskeletal: Positive for arthralgias, gait problem, neck pain and neck stiffness  Negative for joint swelling  Skin: Negative for pallor and rash  Neurological: Negative for dizziness, syncope, speech difficulty, weakness, light-headedness, numbness and headaches  Hematological: Negative for adenopathy  Psychiatric/Behavioral: Negative for behavioral problems, confusion and sleep disturbance  The patient is not nervous/anxious            Current Outpatient Medications   Medication Sig Dispense Refill    buPROPion (WELLBUTRIN XL) 300 mg 24 hr tablet Take 1 tablet (300 mg total) by mouth daily 30 tablet 0    HYDROcodone-acetaminophen (NORCO)  mg per tablet Take 1 tablet by mouth every 4 (four) hours as needed (PAIN)Max Daily Amount: 6 tablets 180 tablet 0    meloxicam (MOBIC) 15 mg tablet Take 1 tablet (15 mg total) by mouth daily 90 tablet 0    olmesartan-hydrochlorothiazide (BENICAR HCT) 40-12 5 MG per tablet Take 1 tablet by mouth daily 90 tablet 0    oxyCODONE (OXYCONTIN) 10 mg 12 hr tablet Take 1 tablet (10 mg total) by mouth 2 (two) times a dayMax Daily Amount: 20 mg 60 tablet 0    oxyCODONE (OXYCONTIN) 20 mg 12 hr tablet Take 1 tablet (20 mg total) by mouth every 12 (twelve) hoursMax Daily Amount: 40 mg 60 tablet 0    pantoprazole (PROTONIX) 40 mg tablet Take 1 tablet (40 mg total) by mouth 2 (two) times a day before meals 60 tablet 0    saccharomyces boulardii (FLORASTOR) 250 mg capsule Take 1 capsule (250 mg total) by mouth 2 (two) times a day 14 capsule 0    sildenafil (VIAGRA) 50 MG tablet Take by mouth      dextromethorphan-guaiFENesin (ROBITUSSIN DM)  mg/5 mL syrup Take 10 mL by mouth every 4 (four) hours as needed for cough (Patient not taking: Reported on 2/3/2020) 118 mL 0     No current facility-administered medications for this visit  Objective:    /90   Pulse 97   Temp 97 5 °F (36 4 °C) (Temporal)   Resp 16   Ht 5' 7 5" (1 715 m)   Wt 78 2 kg (172 lb 6 4 oz)   SpO2 97%   BMI 26 60 kg/m²        Physical Exam   Constitutional: He is oriented to person, place, and time  He appears well-developed and well-nourished  HENT:   Head: Normocephalic and atraumatic  Eyes: Pupils are equal, round, and reactive to light  Conjunctivae and EOM are normal  Right eye exhibits no discharge  Left eye exhibits no discharge  Neck: Neck supple  No JVD present  No thyromegaly present  Cardiovascular: Normal rate, regular rhythm and normal heart sounds  No murmur heard  Pulmonary/Chest: Effort normal  He has no wheezes  He has no rales  COARSE BS   Abdominal: Soft  Bowel sounds are normal  He exhibits no mass  There is no hepatosplenomegaly  There is no tenderness  There is no rebound, no guarding and no CVA tenderness  Musculoskeletal: He exhibits tenderness  He exhibits no edema or deformity  MODERATE DJD FINDINGS   Lymphadenopathy:     He has no cervical adenopathy  He has no axillary adenopathy  Neurological: He is alert and oriented to person, place, and time  Skin: Skin is warm and dry  No rash noted  No erythema  Psychiatric: He has a normal mood and affect  His behavior is normal  Judgment and thought content normal        TCM Call (since 1/3/2020)     Date and time call was made  1/24/2020  1:35 PM    Hospital care reviewed  Records reviewed    Patient was hospitialized at  [address] [place] [address]    Date of Admission  01/21/20    Date of discharge  01/24/20    Diagnosis  pneumonia right lung, sepsis    Disposition  Home    Were the patients medications reviewed and updated  Yes    Current Symptoms  Fatigue    Fatigue severity  Mild      TCM Call (since 1/3/2020)     Post hospital issues  None    Should patient be enrolled in anticoag monitoring? No    Scheduled for follow up?   Yes    Did you obtain your prescribed medications  Yes    Do you need help managing your prescriptions or medications  No    Is transportation to your appointment needed  No    I have advised the patient to call PCP with any new or worsening symptoms  JITENDRA Duffy    Living Arrangements  Spouse or Significiant other    Support System  Family; Friends; Neighboors; Spouse    The type of support provided  Emotional    Do you have social support  Yes, as much as I need    Are you recieving any outpatient services  No    Are you recieving home care services  No    Are you using any community resources  No    Current waiver services  No    Have you fallen in the last 12 months  No    Interperter language line needed  No    Counseling  Patient    Counseling topics  instructions for management; Activities of daily living    Comments  Patient has appointment with Dr John Turner on 02/03/2020, JITENDRA Sawyer MD

## 2020-02-05 DIAGNOSIS — M54.42 CHRONIC BILATERAL LOW BACK PAIN WITH BILATERAL SCIATICA: ICD-10-CM

## 2020-02-05 DIAGNOSIS — G89.4 CHRONIC PAIN SYNDROME: ICD-10-CM

## 2020-02-05 DIAGNOSIS — G89.29 CHRONIC BILATERAL LOW BACK PAIN WITH BILATERAL SCIATICA: ICD-10-CM

## 2020-02-05 DIAGNOSIS — M54.41 CHRONIC BILATERAL LOW BACK PAIN WITH BILATERAL SCIATICA: ICD-10-CM

## 2020-02-05 DIAGNOSIS — F33.41 RECURRENT MAJOR DEPRESSIVE DISORDER, IN PARTIAL REMISSION (HCC): ICD-10-CM

## 2020-02-05 DIAGNOSIS — I10 ESSENTIAL HYPERTENSION: ICD-10-CM

## 2020-02-08 RX ORDER — OLMESARTAN MEDOXOMIL AND HYDROCHLOROTHIAZIDE 40/12.5 40; 12.5 MG/1; MG/1
1 TABLET ORAL DAILY
Qty: 90 TABLET | Refills: 0 | Status: SHIPPED | OUTPATIENT
Start: 2020-02-08 | End: 2020-06-04 | Stop reason: SDUPTHER

## 2020-02-08 RX ORDER — BUPROPION HYDROCHLORIDE 300 MG/1
300 TABLET ORAL DAILY
Qty: 30 TABLET | Refills: 0 | Status: SHIPPED | OUTPATIENT
Start: 2020-02-08 | End: 2020-03-07 | Stop reason: SDUPTHER

## 2020-02-08 RX ORDER — OXYCODONE HCL 20 MG/1
20 TABLET, FILM COATED, EXTENDED RELEASE ORAL EVERY 12 HOURS
Qty: 60 TABLET | Refills: 0 | Status: SHIPPED | OUTPATIENT
Start: 2020-02-08 | End: 2020-03-07 | Stop reason: SDUPTHER

## 2020-02-08 RX ORDER — OXYCODONE HCL 10 MG/1
10 TABLET, FILM COATED, EXTENDED RELEASE ORAL 2 TIMES DAILY
Qty: 60 TABLET | Refills: 0 | Status: SHIPPED | OUTPATIENT
Start: 2020-02-08 | End: 2020-03-07 | Stop reason: SDUPTHER

## 2020-02-08 RX ORDER — HYDROCODONE BITARTRATE AND ACETAMINOPHEN 10; 325 MG/1; MG/1
1 TABLET ORAL EVERY 4 HOURS PRN
Qty: 180 TABLET | Refills: 0 | Status: SHIPPED | OUTPATIENT
Start: 2020-02-08 | End: 2020-03-07 | Stop reason: SDUPTHER

## 2020-02-10 ENCOUNTER — PATIENT OUTREACH (OUTPATIENT)
Dept: CASE MANAGEMENT | Facility: OTHER | Age: 65
End: 2020-02-10

## 2020-02-20 ENCOUNTER — PATIENT OUTREACH (OUTPATIENT)
Dept: CASE MANAGEMENT | Facility: OTHER | Age: 65
End: 2020-02-20

## 2020-02-20 NOTE — PROGRESS NOTES
Third attempt at outreach  Left a voicemail requesting a call back  Phone number provided  Unable to reach letter sent

## 2020-02-22 ENCOUNTER — TELEPHONE (OUTPATIENT)
Dept: FAMILY MEDICINE CLINIC | Facility: CLINIC | Age: 65
End: 2020-02-22

## 2020-02-22 DIAGNOSIS — R97.20 ELEVATED PSA: Primary | ICD-10-CM

## 2020-02-22 DIAGNOSIS — Z12.5 PROSTATE CANCER SCREENING: ICD-10-CM

## 2020-02-22 NOTE — TELEPHONE ENCOUNTER
NORMA on Sandi's phone that I just received the orders  I will fax them to the [facility] [phone]

## 2020-02-22 NOTE — TELEPHONE ENCOUNTER
Is going to [facility] to have BW for routine PSA levels  Another Dr Ida Griffin gave his a script and he cannot find it  Wants to go to [facility]   Please fax if ordered      Has an appt today at 9:15

## 2020-03-07 DIAGNOSIS — M54.41 CHRONIC BILATERAL LOW BACK PAIN WITH BILATERAL SCIATICA: ICD-10-CM

## 2020-03-07 DIAGNOSIS — F33.41 RECURRENT MAJOR DEPRESSIVE DISORDER, IN PARTIAL REMISSION (HCC): ICD-10-CM

## 2020-03-07 DIAGNOSIS — G89.29 CHRONIC BILATERAL LOW BACK PAIN WITH BILATERAL SCIATICA: ICD-10-CM

## 2020-03-07 DIAGNOSIS — M54.42 CHRONIC BILATERAL LOW BACK PAIN WITH BILATERAL SCIATICA: ICD-10-CM

## 2020-03-07 DIAGNOSIS — G89.4 CHRONIC PAIN SYNDROME: ICD-10-CM

## 2020-03-09 RX ORDER — HYDROCODONE BITARTRATE AND ACETAMINOPHEN 10; 325 MG/1; MG/1
1 TABLET ORAL EVERY 4 HOURS PRN
Qty: 180 TABLET | Refills: 0 | Status: SHIPPED | OUTPATIENT
Start: 2020-03-09 | End: 2020-04-06 | Stop reason: SDUPTHER

## 2020-03-09 RX ORDER — OXYCODONE HCL 10 MG/1
10 TABLET, FILM COATED, EXTENDED RELEASE ORAL 2 TIMES DAILY
Qty: 60 TABLET | Refills: 0 | Status: SHIPPED | OUTPATIENT
Start: 2020-03-09 | End: 2020-04-06 | Stop reason: SDUPTHER

## 2020-03-09 RX ORDER — OXYCODONE HCL 20 MG/1
20 TABLET, FILM COATED, EXTENDED RELEASE ORAL EVERY 12 HOURS
Qty: 60 TABLET | Refills: 0 | Status: SHIPPED | OUTPATIENT
Start: 2020-03-09 | End: 2020-04-06 | Stop reason: SDUPTHER

## 2020-03-09 RX ORDER — BUPROPION HYDROCHLORIDE 300 MG/1
300 TABLET ORAL DAILY
Qty: 30 TABLET | Refills: 0 | Status: SHIPPED | OUTPATIENT
Start: 2020-03-09 | End: 2020-04-06 | Stop reason: SDUPTHER

## 2020-03-30 DIAGNOSIS — G89.29 CHRONIC BILATERAL LOW BACK PAIN WITH BILATERAL SCIATICA: ICD-10-CM

## 2020-03-30 DIAGNOSIS — G89.4 CHRONIC PAIN SYNDROME: ICD-10-CM

## 2020-03-30 DIAGNOSIS — M54.41 CHRONIC BILATERAL LOW BACK PAIN WITH BILATERAL SCIATICA: ICD-10-CM

## 2020-03-30 DIAGNOSIS — M54.42 CHRONIC BILATERAL LOW BACK PAIN WITH BILATERAL SCIATICA: ICD-10-CM

## 2020-03-30 RX ORDER — OXYCODONE HCL 10 MG/1
10 TABLET, FILM COATED, EXTENDED RELEASE ORAL 2 TIMES DAILY
Qty: 60 TABLET | Refills: 0 | OUTPATIENT
Start: 2020-03-30

## 2020-03-30 RX ORDER — HYDROCODONE BITARTRATE AND ACETAMINOPHEN 10; 325 MG/1; MG/1
1 TABLET ORAL EVERY 4 HOURS PRN
Qty: 180 TABLET | Refills: 0 | OUTPATIENT
Start: 2020-03-30

## 2020-03-30 RX ORDER — OXYCODONE HCL 20 MG/1
20 TABLET, FILM COATED, EXTENDED RELEASE ORAL EVERY 12 HOURS
Qty: 60 TABLET | Refills: 0 | OUTPATIENT
Start: 2020-03-30

## 2020-04-06 DIAGNOSIS — M54.41 CHRONIC BILATERAL LOW BACK PAIN WITH BILATERAL SCIATICA: ICD-10-CM

## 2020-04-06 DIAGNOSIS — G89.4 CHRONIC PAIN SYNDROME: ICD-10-CM

## 2020-04-06 DIAGNOSIS — F33.41 RECURRENT MAJOR DEPRESSIVE DISORDER, IN PARTIAL REMISSION (HCC): ICD-10-CM

## 2020-04-06 DIAGNOSIS — M54.42 CHRONIC BILATERAL LOW BACK PAIN WITH BILATERAL SCIATICA: ICD-10-CM

## 2020-04-06 DIAGNOSIS — G89.29 CHRONIC BILATERAL LOW BACK PAIN WITH BILATERAL SCIATICA: ICD-10-CM

## 2020-04-07 RX ORDER — OXYCODONE HCL 10 MG/1
10 TABLET, FILM COATED, EXTENDED RELEASE ORAL 2 TIMES DAILY
Qty: 60 TABLET | Refills: 0 | Status: SHIPPED | OUTPATIENT
Start: 2020-04-07 | End: 2020-05-05 | Stop reason: SDUPTHER

## 2020-04-07 RX ORDER — OXYCODONE HCL 20 MG/1
20 TABLET, FILM COATED, EXTENDED RELEASE ORAL EVERY 12 HOURS
Qty: 60 TABLET | Refills: 0 | Status: SHIPPED | OUTPATIENT
Start: 2020-04-07 | End: 2020-05-05 | Stop reason: SDUPTHER

## 2020-04-07 RX ORDER — HYDROCODONE BITARTRATE AND ACETAMINOPHEN 10; 325 MG/1; MG/1
1 TABLET ORAL EVERY 4 HOURS PRN
Qty: 180 TABLET | Refills: 0 | Status: SHIPPED | OUTPATIENT
Start: 2020-04-07 | End: 2020-05-05 | Stop reason: SDUPTHER

## 2020-04-07 RX ORDER — BUPROPION HYDROCHLORIDE 300 MG/1
300 TABLET ORAL DAILY
Qty: 30 TABLET | Refills: 0 | Status: SHIPPED | OUTPATIENT
Start: 2020-04-07 | End: 2020-05-05 | Stop reason: SDUPTHER

## 2020-04-22 ENCOUNTER — PATIENT OUTREACH (OUTPATIENT)
Dept: CASE MANAGEMENT | Facility: OTHER | Age: 65
End: 2020-04-22

## 2020-05-05 DIAGNOSIS — G89.29 CHRONIC BILATERAL LOW BACK PAIN WITH BILATERAL SCIATICA: ICD-10-CM

## 2020-05-05 DIAGNOSIS — G89.4 CHRONIC PAIN SYNDROME: ICD-10-CM

## 2020-05-05 DIAGNOSIS — M54.41 CHRONIC BILATERAL LOW BACK PAIN WITH BILATERAL SCIATICA: ICD-10-CM

## 2020-05-05 DIAGNOSIS — F33.41 RECURRENT MAJOR DEPRESSIVE DISORDER, IN PARTIAL REMISSION (HCC): ICD-10-CM

## 2020-05-05 DIAGNOSIS — M54.42 CHRONIC BILATERAL LOW BACK PAIN WITH BILATERAL SCIATICA: ICD-10-CM

## 2020-05-06 DIAGNOSIS — G89.4 CHRONIC PAIN SYNDROME: ICD-10-CM

## 2020-05-06 DIAGNOSIS — M54.42 CHRONIC BILATERAL LOW BACK PAIN WITH BILATERAL SCIATICA: ICD-10-CM

## 2020-05-06 DIAGNOSIS — G89.29 CHRONIC BILATERAL LOW BACK PAIN WITH BILATERAL SCIATICA: ICD-10-CM

## 2020-05-06 DIAGNOSIS — F33.41 RECURRENT MAJOR DEPRESSIVE DISORDER, IN PARTIAL REMISSION (HCC): ICD-10-CM

## 2020-05-06 DIAGNOSIS — M54.41 CHRONIC BILATERAL LOW BACK PAIN WITH BILATERAL SCIATICA: ICD-10-CM

## 2020-05-06 RX ORDER — OXYCODONE HCL 20 MG/1
20 TABLET, FILM COATED, EXTENDED RELEASE ORAL EVERY 12 HOURS
Qty: 60 TABLET | Refills: 0 | Status: SHIPPED | OUTPATIENT
Start: 2020-05-06 | End: 2020-06-04 | Stop reason: SDUPTHER

## 2020-05-06 RX ORDER — OXYCODONE HCL 10 MG/1
10 TABLET, FILM COATED, EXTENDED RELEASE ORAL 2 TIMES DAILY
Qty: 60 TABLET | Refills: 0 | Status: SHIPPED | OUTPATIENT
Start: 2020-05-06 | End: 2020-06-04 | Stop reason: SDUPTHER

## 2020-05-06 RX ORDER — BUPROPION HYDROCHLORIDE 300 MG/1
300 TABLET ORAL DAILY
Qty: 30 TABLET | Refills: 0 | Status: SHIPPED | OUTPATIENT
Start: 2020-05-06 | End: 2020-06-03 | Stop reason: SDUPTHER

## 2020-05-06 RX ORDER — HYDROCODONE BITARTRATE AND ACETAMINOPHEN 10; 325 MG/1; MG/1
1 TABLET ORAL EVERY 4 HOURS PRN
Qty: 180 TABLET | Refills: 0 | Status: SHIPPED | OUTPATIENT
Start: 2020-05-06 | End: 2020-06-03 | Stop reason: SDUPTHER

## 2020-05-06 RX ORDER — OXYCODONE HCL 10 MG/1
10 TABLET, FILM COATED, EXTENDED RELEASE ORAL 2 TIMES DAILY
Qty: 60 TABLET | Refills: 0 | Status: SHIPPED | OUTPATIENT
Start: 2020-05-06 | End: 2020-06-03 | Stop reason: SDUPTHER

## 2020-05-06 RX ORDER — BUPROPION HYDROCHLORIDE 300 MG/1
300 TABLET ORAL DAILY
Qty: 30 TABLET | Refills: 0 | Status: SHIPPED | OUTPATIENT
Start: 2020-05-06 | End: 2020-06-04 | Stop reason: SDUPTHER

## 2020-05-06 RX ORDER — OXYCODONE HCL 20 MG/1
20 TABLET, FILM COATED, EXTENDED RELEASE ORAL EVERY 12 HOURS
Qty: 60 TABLET | Refills: 0 | Status: SHIPPED | OUTPATIENT
Start: 2020-05-06 | End: 2020-06-03 | Stop reason: SDUPTHER

## 2020-05-06 RX ORDER — HYDROCODONE BITARTRATE AND ACETAMINOPHEN 10; 325 MG/1; MG/1
1 TABLET ORAL EVERY 4 HOURS PRN
Qty: 180 TABLET | Refills: 0 | Status: SHIPPED | OUTPATIENT
Start: 2020-05-06 | End: 2020-06-04 | Stop reason: SDUPTHER

## 2020-06-03 DIAGNOSIS — M54.42 CHRONIC BILATERAL LOW BACK PAIN WITH BILATERAL SCIATICA: ICD-10-CM

## 2020-06-03 DIAGNOSIS — G89.4 CHRONIC PAIN SYNDROME: ICD-10-CM

## 2020-06-03 DIAGNOSIS — G89.29 CHRONIC BILATERAL LOW BACK PAIN WITH BILATERAL SCIATICA: ICD-10-CM

## 2020-06-03 DIAGNOSIS — I10 ESSENTIAL HYPERTENSION: ICD-10-CM

## 2020-06-03 DIAGNOSIS — M54.41 CHRONIC BILATERAL LOW BACK PAIN WITH BILATERAL SCIATICA: ICD-10-CM

## 2020-06-03 DIAGNOSIS — F33.41 RECURRENT MAJOR DEPRESSIVE DISORDER, IN PARTIAL REMISSION (HCC): ICD-10-CM

## 2020-06-04 DIAGNOSIS — G89.4 CHRONIC PAIN SYNDROME: ICD-10-CM

## 2020-06-04 DIAGNOSIS — M54.41 CHRONIC BILATERAL LOW BACK PAIN WITH BILATERAL SCIATICA: ICD-10-CM

## 2020-06-04 DIAGNOSIS — F33.41 RECURRENT MAJOR DEPRESSIVE DISORDER, IN PARTIAL REMISSION (HCC): ICD-10-CM

## 2020-06-04 DIAGNOSIS — G89.29 CHRONIC BILATERAL LOW BACK PAIN WITH BILATERAL SCIATICA: ICD-10-CM

## 2020-06-04 DIAGNOSIS — I10 ESSENTIAL HYPERTENSION: ICD-10-CM

## 2020-06-04 DIAGNOSIS — M54.42 CHRONIC BILATERAL LOW BACK PAIN WITH BILATERAL SCIATICA: ICD-10-CM

## 2020-06-05 RX ORDER — OMEPRAZOLE 40 MG/1
CAPSULE, DELAYED RELEASE ORAL
COMMUNITY
Start: 2020-05-02 | End: 2022-01-10

## 2020-06-07 RX ORDER — OLMESARTAN MEDOXOMIL AND HYDROCHLOROTHIAZIDE 40/12.5 40; 12.5 MG/1; MG/1
1 TABLET ORAL DAILY
Qty: 90 TABLET | Refills: 0 | Status: SHIPPED | OUTPATIENT
Start: 2020-06-07 | End: 2020-08-27 | Stop reason: SDUPTHER

## 2020-06-07 RX ORDER — OXYCODONE HCL 10 MG/1
10 TABLET, FILM COATED, EXTENDED RELEASE ORAL 2 TIMES DAILY
Qty: 60 TABLET | Refills: 0 | Status: SHIPPED | OUTPATIENT
Start: 2020-06-07 | End: 2020-07-06 | Stop reason: SDUPTHER

## 2020-06-07 RX ORDER — OXYCODONE HCL 10 MG/1
10 TABLET, FILM COATED, EXTENDED RELEASE ORAL 2 TIMES DAILY
Qty: 60 TABLET | Refills: 0 | Status: SHIPPED | OUTPATIENT
Start: 2020-06-07 | End: 2020-08-07 | Stop reason: SDUPTHER

## 2020-06-07 RX ORDER — OLMESARTAN MEDOXOMIL AND HYDROCHLOROTHIAZIDE 40/12.5 40; 12.5 MG/1; MG/1
1 TABLET ORAL DAILY
Qty: 90 TABLET | Refills: 0 | Status: SHIPPED | OUTPATIENT
Start: 2020-06-07 | End: 2020-09-22 | Stop reason: SDUPTHER

## 2020-06-07 RX ORDER — OXYCODONE HCL 20 MG/1
20 TABLET, FILM COATED, EXTENDED RELEASE ORAL EVERY 12 HOURS
Qty: 60 TABLET | Refills: 0 | Status: SHIPPED | OUTPATIENT
Start: 2020-06-07 | End: 2020-08-07 | Stop reason: SDUPTHER

## 2020-06-07 RX ORDER — HYDROCODONE BITARTRATE AND ACETAMINOPHEN 10; 325 MG/1; MG/1
1 TABLET ORAL EVERY 4 HOURS PRN
Qty: 180 TABLET | Refills: 0 | Status: SHIPPED | OUTPATIENT
Start: 2020-06-07 | End: 2020-08-07 | Stop reason: SDUPTHER

## 2020-06-07 RX ORDER — MELOXICAM 15 MG/1
15 TABLET ORAL DAILY
Qty: 90 TABLET | Refills: 0 | Status: SHIPPED | OUTPATIENT
Start: 2020-06-07 | End: 2020-08-03 | Stop reason: SDUPTHER

## 2020-06-07 RX ORDER — BUPROPION HYDROCHLORIDE 300 MG/1
300 TABLET ORAL DAILY
Qty: 30 TABLET | Refills: 0 | Status: SHIPPED | OUTPATIENT
Start: 2020-06-07 | End: 2020-09-22 | Stop reason: SDUPTHER

## 2020-06-07 RX ORDER — MELOXICAM 15 MG/1
15 TABLET ORAL DAILY
Qty: 90 TABLET | Refills: 0 | Status: SHIPPED | OUTPATIENT
Start: 2020-06-07 | End: 2020-09-22 | Stop reason: SDUPTHER

## 2020-06-07 RX ORDER — HYDROCODONE BITARTRATE AND ACETAMINOPHEN 10; 325 MG/1; MG/1
1 TABLET ORAL EVERY 4 HOURS PRN
Qty: 180 TABLET | Refills: 0 | Status: SHIPPED | OUTPATIENT
Start: 2020-06-07 | End: 2020-07-06 | Stop reason: SDUPTHER

## 2020-06-07 RX ORDER — BUPROPION HYDROCHLORIDE 300 MG/1
300 TABLET ORAL DAILY
Qty: 30 TABLET | Refills: 0 | Status: SHIPPED | OUTPATIENT
Start: 2020-06-07 | End: 2020-07-06 | Stop reason: SDUPTHER

## 2020-06-07 RX ORDER — OXYCODONE HCL 20 MG/1
20 TABLET, FILM COATED, EXTENDED RELEASE ORAL EVERY 12 HOURS
Qty: 60 TABLET | Refills: 0 | Status: SHIPPED | OUTPATIENT
Start: 2020-06-07 | End: 2020-07-06 | Stop reason: SDUPTHER

## 2020-06-08 ENCOUNTER — OFFICE VISIT (OUTPATIENT)
Dept: FAMILY MEDICINE CLINIC | Facility: CLINIC | Age: 65
End: 2020-06-08
Payer: MEDICARE

## 2020-06-08 VITALS
DIASTOLIC BLOOD PRESSURE: 80 MMHG | HEART RATE: 92 BPM | WEIGHT: 174 LBS | BODY MASS INDEX: 26.37 KG/M2 | OXYGEN SATURATION: 99 % | SYSTOLIC BLOOD PRESSURE: 120 MMHG | TEMPERATURE: 97.9 F | HEIGHT: 68 IN | RESPIRATION RATE: 20 BRPM

## 2020-06-08 DIAGNOSIS — E04.9 ENLARGED THYROID GLAND: ICD-10-CM

## 2020-06-08 DIAGNOSIS — R00.0 TACHYCARDIA: ICD-10-CM

## 2020-06-08 DIAGNOSIS — E83.110 HEREDITARY HEMOCHROMATOSIS (HCC): ICD-10-CM

## 2020-06-08 DIAGNOSIS — R06.00 DYSPNEA ON EXERTION: Primary | ICD-10-CM

## 2020-06-08 LAB — ECG INTERP DURING EX: NORMAL MS

## 2020-06-08 PROCEDURE — 3074F SYST BP LT 130 MM HG: CPT | Performed by: NURSE PRACTITIONER

## 2020-06-08 PROCEDURE — 93000 ELECTROCARDIOGRAM COMPLETE: CPT | Performed by: NURSE PRACTITIONER

## 2020-06-08 PROCEDURE — 3008F BODY MASS INDEX DOCD: CPT | Performed by: NURSE PRACTITIONER

## 2020-06-08 PROCEDURE — 4040F PNEUMOC VAC/ADMIN/RCVD: CPT | Performed by: NURSE PRACTITIONER

## 2020-06-08 PROCEDURE — 99214 OFFICE O/P EST MOD 30 MIN: CPT | Performed by: NURSE PRACTITIONER

## 2020-06-08 PROCEDURE — 3079F DIAST BP 80-89 MM HG: CPT | Performed by: NURSE PRACTITIONER

## 2020-06-08 PROCEDURE — 1036F TOBACCO NON-USER: CPT | Performed by: NURSE PRACTITIONER

## 2020-06-09 LAB
BASOPHILS # BLD AUTO: 0 X10E3/UL (ref 0–0.2)
BASOPHILS NFR BLD AUTO: 0 %
BUN SERPL-MCNC: 36 MG/DL (ref 8–27)
BUN/CREAT SERPL: 25 (ref 10–24)
CALCIUM SERPL-MCNC: 9.3 MG/DL (ref 8.6–10.2)
CHLORIDE SERPL-SCNC: 97 MMOL/L (ref 96–106)
CK SERPL-CCNC: 215 U/L (ref 41–331)
CO2 SERPL-SCNC: 20 MMOL/L (ref 20–29)
CREAT SERPL-MCNC: 1.43 MG/DL (ref 0.76–1.27)
CRP SERPL-MCNC: <1 MG/L (ref 0–10)
EOSINOPHIL # BLD AUTO: 0.1 X10E3/UL (ref 0–0.4)
EOSINOPHIL NFR BLD AUTO: 1 %
ERYTHROCYTE [DISTWIDTH] IN BLOOD BY AUTOMATED COUNT: 14.3 % (ref 11.6–15.4)
ERYTHROCYTE [SEDIMENTATION RATE] IN BLOOD BY WESTERGREN METHOD: 19 MM/HR (ref 0–30)
FERRITIN SERPL-MCNC: 124 NG/ML (ref 30–400)
GLUCOSE SERPL-MCNC: 109 MG/DL (ref 65–99)
HCT VFR BLD AUTO: 38.9 % (ref 37.5–51)
HGB BLD-MCNC: 12.9 G/DL (ref 13–17.7)
IMM GRANULOCYTES # BLD: 0 X10E3/UL (ref 0–0.1)
IMM GRANULOCYTES NFR BLD: 0 %
IRON SATN MFR SERPL: 84 % (ref 15–55)
IRON SERPL-MCNC: 270 UG/DL (ref 38–169)
LYMPHOCYTES # BLD AUTO: 2 X10E3/UL (ref 0.7–3.1)
LYMPHOCYTES NFR BLD AUTO: 19 %
MCH RBC QN AUTO: 29.1 PG (ref 26.6–33)
MCHC RBC AUTO-ENTMCNC: 33.2 G/DL (ref 31.5–35.7)
MCV RBC AUTO: 88 FL (ref 79–97)
MONOCYTES # BLD AUTO: 0.8 X10E3/UL (ref 0.1–0.9)
MONOCYTES NFR BLD AUTO: 8 %
NEUTROPHILS # BLD AUTO: 7.6 X10E3/UL (ref 1.4–7)
NEUTROPHILS NFR BLD AUTO: 72 %
PLATELET # BLD AUTO: 318 X10E3/UL (ref 150–450)
POTASSIUM SERPL-SCNC: 4 MMOL/L (ref 3.5–5.2)
RBC # BLD AUTO: 4.43 X10E6/UL (ref 4.14–5.8)
SL AMB EGFR AFRICAN AMERICAN: 59 ML/MIN/1.73
SL AMB EGFR NON AFRICAN AMERICAN: 51 ML/MIN/1.73
SODIUM SERPL-SCNC: 133 MMOL/L (ref 134–144)
TIBC SERPL-MCNC: 322 UG/DL (ref 250–450)
TSH SERPL DL<=0.005 MIU/L-ACNC: 0.82 UIU/ML (ref 0.45–4.5)
UIBC SERPL-MCNC: 52 UG/DL (ref 111–343)
WBC # BLD AUTO: 10.5 X10E3/UL (ref 3.4–10.8)

## 2020-06-16 DIAGNOSIS — E04.9 THYROID GOITER: Primary | ICD-10-CM

## 2020-07-06 DIAGNOSIS — M54.41 CHRONIC BILATERAL LOW BACK PAIN WITH BILATERAL SCIATICA: ICD-10-CM

## 2020-07-06 DIAGNOSIS — G89.4 CHRONIC PAIN SYNDROME: ICD-10-CM

## 2020-07-06 DIAGNOSIS — G89.29 CHRONIC BILATERAL LOW BACK PAIN WITH BILATERAL SCIATICA: ICD-10-CM

## 2020-07-06 DIAGNOSIS — F33.41 RECURRENT MAJOR DEPRESSIVE DISORDER, IN PARTIAL REMISSION (HCC): ICD-10-CM

## 2020-07-06 DIAGNOSIS — M54.42 CHRONIC BILATERAL LOW BACK PAIN WITH BILATERAL SCIATICA: ICD-10-CM

## 2020-07-06 RX ORDER — OXYCODONE HCL 20 MG/1
20 TABLET, FILM COATED, EXTENDED RELEASE ORAL EVERY 12 HOURS
Qty: 60 TABLET | Refills: 0 | Status: SHIPPED | OUTPATIENT
Start: 2020-07-06 | End: 2020-08-03 | Stop reason: SDUPTHER

## 2020-07-06 RX ORDER — OXYCODONE HCL 10 MG/1
10 TABLET, FILM COATED, EXTENDED RELEASE ORAL 2 TIMES DAILY
Qty: 60 TABLET | Refills: 0 | Status: SHIPPED | OUTPATIENT
Start: 2020-07-06 | End: 2020-08-03 | Stop reason: SDUPTHER

## 2020-07-06 RX ORDER — HYDROCODONE BITARTRATE AND ACETAMINOPHEN 10; 325 MG/1; MG/1
1 TABLET ORAL EVERY 4 HOURS PRN
Qty: 180 TABLET | Refills: 0 | Status: SHIPPED | OUTPATIENT
Start: 2020-07-06 | End: 2020-08-03 | Stop reason: SDUPTHER

## 2020-07-06 RX ORDER — BUPROPION HYDROCHLORIDE 300 MG/1
300 TABLET ORAL DAILY
Qty: 30 TABLET | Refills: 0 | Status: SHIPPED | OUTPATIENT
Start: 2020-07-06 | End: 2020-08-27 | Stop reason: SDUPTHER

## 2020-07-13 ENCOUNTER — HOSPITAL ENCOUNTER (OUTPATIENT)
Dept: NON INVASIVE DIAGNOSTICS | Facility: HOSPITAL | Age: 65
Discharge: HOME | End: 2020-07-13
Payer: MEDICARE

## 2020-07-13 DIAGNOSIS — R06.00 DYSPNEA ON EXERTION: ICD-10-CM

## 2020-07-13 DIAGNOSIS — E83.110 HEREDITARY HEMOCHROMATOSIS (HCC): ICD-10-CM

## 2020-07-13 DIAGNOSIS — R00.0 TACHYCARDIA: ICD-10-CM

## 2020-07-13 PROCEDURE — 93306 TTE W/DOPPLER COMPLETE: CPT | Performed by: INTERNAL MEDICINE

## 2020-07-13 PROCEDURE — 93018 CV STRESS TEST I&R ONLY: CPT | Performed by: INTERNAL MEDICINE

## 2020-07-13 PROCEDURE — 93016 CV STRESS TEST SUPVJ ONLY: CPT | Performed by: INTERNAL MEDICINE

## 2020-07-13 PROCEDURE — 93017 CV STRESS TEST TRACING ONLY: CPT

## 2020-07-13 PROCEDURE — 93306 TTE W/DOPPLER COMPLETE: CPT

## 2020-07-14 LAB
CHEST PAIN STATEMENT: NORMAL
MAX DIASTOLIC BP: 92 MMHG
MAX HEART RATE: 151 BPM
MAX PREDICTED HEART RATE: 155 BPM
MAX. SYSTOLIC BP: 170 MMHG
PROTOCOL NAME: NORMAL
TARGET HR FORMULA: NORMAL
TEST INDICATION: NORMAL
TIME IN EXERCISE PHASE: NORMAL

## 2020-07-17 ENCOUNTER — TELEPHONE (OUTPATIENT)
Dept: FAMILY MEDICINE CLINIC | Facility: CLINIC | Age: 65
End: 2020-07-17

## 2020-07-20 ENCOUNTER — TELEPHONE (OUTPATIENT)
Dept: FAMILY MEDICINE CLINIC | Facility: CLINIC | Age: 65
End: 2020-07-20

## 2020-07-20 DIAGNOSIS — R53.82 CHRONIC FATIGUE: Primary | ICD-10-CM

## 2020-07-20 NOTE — TELEPHONE ENCOUNTER
----- Message from Petey Singh sent at 7/17/2020  3:00 PM EDT -----  Pt advised,   feels about the same, can't stay awake - thinking he would like to have a sleep study done   Please advise    RT

## 2020-07-31 ENCOUNTER — TELEPHONE (OUTPATIENT)
Dept: FAMILY MEDICINE CLINIC | Facility: CLINIC | Age: 65
End: 2020-07-31

## 2020-07-31 NOTE — TELEPHONE ENCOUNTER
Regarding the discussion about her   Nona Barajas is going to talk to Wayne and Dr Gary Singh about that  Michelle Tom has made comments that Lakhwinder Lund is going to be upset if he knew she talked to his 's

## 2020-07-31 NOTE — TELEPHONE ENCOUNTER
If she is interested in speaking about Boni's issues, we can have an appointment in the office or via virtual to discuss further and formulate a plan to see why is having such fatigue
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
## 2020-07-31 NOTE — TELEPHONE ENCOUNTER
Sent a different message to both you and Mai Mercer in regards to medication and discussion of Boni's symptoms

## 2020-08-03 DIAGNOSIS — M54.41 CHRONIC BILATERAL LOW BACK PAIN WITH BILATERAL SCIATICA: ICD-10-CM

## 2020-08-03 DIAGNOSIS — G89.29 CHRONIC BILATERAL LOW BACK PAIN WITH BILATERAL SCIATICA: ICD-10-CM

## 2020-08-03 DIAGNOSIS — M54.42 CHRONIC BILATERAL LOW BACK PAIN WITH BILATERAL SCIATICA: ICD-10-CM

## 2020-08-03 DIAGNOSIS — G89.4 CHRONIC PAIN SYNDROME: ICD-10-CM

## 2020-08-05 DIAGNOSIS — M54.42 CHRONIC BILATERAL LOW BACK PAIN WITH BILATERAL SCIATICA: Primary | ICD-10-CM

## 2020-08-05 DIAGNOSIS — G89.29 CHRONIC BILATERAL LOW BACK PAIN WITH BILATERAL SCIATICA: Primary | ICD-10-CM

## 2020-08-05 DIAGNOSIS — M54.41 CHRONIC BILATERAL LOW BACK PAIN WITH BILATERAL SCIATICA: Primary | ICD-10-CM

## 2020-08-05 LAB
T4 FREE SERPL-MCNC: 1.6 NG/DL (ref 0.8–1.8)
THYROGLOB AB SERPL-ACNC: <1 IU/ML
THYROPEROXIDASE AB SERPL-ACNC: 2 IU/ML
TSH SERPL-ACNC: 1.25 MIU/L (ref 0.4–4.5)

## 2020-08-05 RX ORDER — HYDROCODONE BITARTRATE AND ACETAMINOPHEN 10; 325 MG/1; MG/1
1 TABLET ORAL EVERY 4 HOURS PRN
Qty: 180 TABLET | Refills: 0 | Status: SHIPPED | OUTPATIENT
Start: 2020-08-05 | End: 2020-09-22 | Stop reason: SDUPTHER

## 2020-08-05 RX ORDER — MELOXICAM 15 MG/1
15 TABLET ORAL DAILY
Qty: 90 TABLET | Refills: 0 | Status: SHIPPED | OUTPATIENT
Start: 2020-08-05 | End: 2020-11-04 | Stop reason: SDUPTHER

## 2020-08-05 RX ORDER — NALOXONE HYDROCHLORIDE 4 MG/.1ML
SPRAY NASAL
Qty: 1 EACH | Refills: 1 | Status: SHIPPED | OUTPATIENT
Start: 2020-08-05

## 2020-08-05 RX ORDER — OXYCODONE HCL 20 MG/1
20 TABLET, FILM COATED, EXTENDED RELEASE ORAL EVERY 12 HOURS
Qty: 60 TABLET | Refills: 0 | Status: SHIPPED | OUTPATIENT
Start: 2020-08-05 | End: 2020-09-09 | Stop reason: SDUPTHER

## 2020-08-05 RX ORDER — OXYCODONE HCL 10 MG/1
10 TABLET, FILM COATED, EXTENDED RELEASE ORAL 2 TIMES DAILY
Qty: 60 TABLET | Refills: 0 | Status: SHIPPED | OUTPATIENT
Start: 2020-08-05 | End: 2020-09-09 | Stop reason: SDUPTHER

## 2020-08-07 DIAGNOSIS — M54.42 CHRONIC BILATERAL LOW BACK PAIN WITH BILATERAL SCIATICA: ICD-10-CM

## 2020-08-07 DIAGNOSIS — G89.29 CHRONIC BILATERAL LOW BACK PAIN WITH BILATERAL SCIATICA: ICD-10-CM

## 2020-08-07 DIAGNOSIS — M54.41 CHRONIC BILATERAL LOW BACK PAIN WITH BILATERAL SCIATICA: ICD-10-CM

## 2020-08-07 DIAGNOSIS — G89.4 CHRONIC PAIN SYNDROME: ICD-10-CM
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
## 2020-08-08 RX ORDER — OXYCODONE HCL 20 MG/1
20 TABLET, FILM COATED, EXTENDED RELEASE ORAL EVERY 12 HOURS
Qty: 60 TABLET | Refills: 0 | Status: SHIPPED | OUTPATIENT
Start: 2020-08-08 | End: 2020-09-08 | Stop reason: SDUPTHER

## 2020-08-08 RX ORDER — OXYCODONE HCL 10 MG/1
10 TABLET, FILM COATED, EXTENDED RELEASE ORAL 2 TIMES DAILY
Qty: 60 TABLET | Refills: 0 | Status: SHIPPED | OUTPATIENT
Start: 2020-08-08 | End: 2020-09-08 | Stop reason: SDUPTHER

## 2020-08-08 RX ORDER — HYDROCODONE BITARTRATE AND ACETAMINOPHEN 10; 325 MG/1; MG/1
1 TABLET ORAL EVERY 4 HOURS PRN
Qty: 180 TABLET | Refills: 0 | Status: SHIPPED | OUTPATIENT
Start: 2020-08-08 | End: 2020-09-08 | Stop reason: SDUPTHER

## 2020-08-27 DIAGNOSIS — F33.41 RECURRENT MAJOR DEPRESSIVE DISORDER, IN PARTIAL REMISSION (HCC): ICD-10-CM

## 2020-08-27 DIAGNOSIS — I10 ESSENTIAL HYPERTENSION: ICD-10-CM

## 2020-08-27 RX ORDER — BUPROPION HYDROCHLORIDE 300 MG/1
300 TABLET ORAL DAILY
Qty: 30 TABLET | Refills: 0 | Status: SHIPPED | OUTPATIENT
Start: 2020-08-27 | End: 2020-09-25 | Stop reason: SDUPTHER

## 2020-08-27 RX ORDER — OLMESARTAN MEDOXOMIL AND HYDROCHLOROTHIAZIDE 40/12.5 40; 12.5 MG/1; MG/1
1 TABLET ORAL DAILY
Qty: 90 TABLET | Refills: 0 | Status: SHIPPED | OUTPATIENT
Start: 2020-08-27 | End: 2020-12-05

## 2020-09-08 ENCOUNTER — TELEPHONE (OUTPATIENT)
Dept: FAMILY MEDICINE CLINIC | Facility: CLINIC | Age: 65
End: 2020-09-08

## 2020-09-08 DIAGNOSIS — M54.41 CHRONIC BILATERAL LOW BACK PAIN WITH BILATERAL SCIATICA: ICD-10-CM

## 2020-09-08 DIAGNOSIS — G89.29 CHRONIC BILATERAL LOW BACK PAIN WITH BILATERAL SCIATICA: ICD-10-CM

## 2020-09-08 DIAGNOSIS — G89.4 CHRONIC PAIN SYNDROME: ICD-10-CM

## 2020-09-08 DIAGNOSIS — M54.42 CHRONIC BILATERAL LOW BACK PAIN WITH BILATERAL SCIATICA: ICD-10-CM

## 2020-09-08 RX ORDER — OXYCODONE HCL 10 MG/1
10 TABLET, FILM COATED, EXTENDED RELEASE ORAL 2 TIMES DAILY
Qty: 60 TABLET | Refills: 0 | Status: SHIPPED | OUTPATIENT
Start: 2020-09-08 | End: 2020-09-22 | Stop reason: SDUPTHER

## 2020-09-08 RX ORDER — OXYCODONE HCL 20 MG/1
20 TABLET, FILM COATED, EXTENDED RELEASE ORAL EVERY 12 HOURS
Qty: 60 TABLET | Refills: 0 | Status: SHIPPED | OUTPATIENT
Start: 2020-09-08 | End: 2020-09-22 | Stop reason: SDUPTHER

## 2020-09-08 RX ORDER — HYDROCODONE BITARTRATE AND ACETAMINOPHEN 10; 325 MG/1; MG/1
1 TABLET ORAL EVERY 4 HOURS PRN
Qty: 180 TABLET | Refills: 0 | Status: SHIPPED | OUTPATIENT
Start: 2020-09-08 | End: 2020-09-09 | Stop reason: SDUPTHER

## 2020-09-08 NOTE — TELEPHONE ENCOUNTER
Pharmacist state that they are out of the medication that was sent HYDROcodone-acetaminophen (NORCO)  mg per tablet, oxyCODONE (OxyCONTIN) 10 & 20  mg 12 hr tablet,    Also stated that this patient does not use their pharmacy        Tried to call Tyree Stephen several times, but his Voice mail is not set up

## 2020-09-09 ENCOUNTER — TELEPHONE (OUTPATIENT)
Dept: FAMILY MEDICINE CLINIC | Facility: CLINIC | Age: 65
End: 2020-09-09

## 2020-09-09 DIAGNOSIS — F33.41 RECURRENT MAJOR DEPRESSIVE DISORDER, IN PARTIAL REMISSION (HCC): ICD-10-CM

## 2020-09-09 DIAGNOSIS — Z12.5 ENCOUNTER FOR PROSTATE CANCER SCREENING: ICD-10-CM

## 2020-09-09 DIAGNOSIS — K21.9 GASTROESOPHAGEAL REFLUX DISEASE WITHOUT ESOPHAGITIS: Primary | ICD-10-CM

## 2020-09-09 DIAGNOSIS — M48.02 CERVICAL SPINAL STENOSIS: ICD-10-CM

## 2020-09-09 DIAGNOSIS — G89.4 CHRONIC PAIN DISORDER: ICD-10-CM

## 2020-09-09 DIAGNOSIS — G89.29 CHRONIC BILATERAL LOW BACK PAIN WITH BILATERAL SCIATICA: ICD-10-CM

## 2020-09-09 DIAGNOSIS — G89.4 CHRONIC PAIN SYNDROME: ICD-10-CM

## 2020-09-09 DIAGNOSIS — M62.81 GENERALIZED MUSCLE WEAKNESS: ICD-10-CM

## 2020-09-09 DIAGNOSIS — D50.8 OTHER IRON DEFICIENCY ANEMIA: ICD-10-CM

## 2020-09-09 DIAGNOSIS — E78.00 HYPERCHOLESTEROLEMIA: ICD-10-CM

## 2020-09-09 DIAGNOSIS — K22.0 ACHALASIA: ICD-10-CM

## 2020-09-09 DIAGNOSIS — F11.20 UNCOMPLICATED OPIOID DEPENDENCE (HCC): ICD-10-CM

## 2020-09-09 DIAGNOSIS — M15.9 GENERALIZED OSTEOARTHRITIS OF MULTIPLE SITES: ICD-10-CM

## 2020-09-09 DIAGNOSIS — G47.33 OBSTRUCTIVE SLEEP APNEA SYNDROME: ICD-10-CM

## 2020-09-09 DIAGNOSIS — M54.42 CHRONIC BILATERAL LOW BACK PAIN WITH BILATERAL SCIATICA: ICD-10-CM

## 2020-09-09 DIAGNOSIS — R97.20 ELEVATED PSA MEASUREMENT: ICD-10-CM

## 2020-09-09 DIAGNOSIS — M54.41 CHRONIC BILATERAL LOW BACK PAIN WITH BILATERAL SCIATICA: ICD-10-CM

## 2020-09-09 DIAGNOSIS — E83.110 HEREDITARY HEMOCHROMATOSIS (HCC): ICD-10-CM

## 2020-09-09 PROBLEM — S62.613A CLOSED DISPLACED FRACTURE OF PROXIMAL PHALANX OF LEFT MIDDLE FINGER: Status: RESOLVED | Noted: 2019-04-22 | Resolved: 2020-09-09

## 2020-09-09 PROBLEM — S62.609S: Status: RESOLVED | Noted: 2019-04-15 | Resolved: 2020-09-09

## 2020-09-09 PROBLEM — A41.9 SEPSIS (HCC): Status: RESOLVED | Noted: 2020-01-21 | Resolved: 2020-09-09

## 2020-09-09 PROBLEM — J69.0 ASPIRATION PNEUMONIA OF RIGHT LUNG (HCC): Status: RESOLVED | Noted: 2020-01-21 | Resolved: 2020-09-09

## 2020-09-09 PROBLEM — S62.644A CLOSED NONDISPLACED FRACTURE OF PROXIMAL PHALANX OF RIGHT RING FINGER: Status: RESOLVED | Noted: 2019-04-22 | Resolved: 2020-09-09

## 2020-09-09 PROBLEM — Z76.89 ENCOUNTER FOR SUPPORT AND COORDINATION OF TRANSITION OF CARE: Status: RESOLVED | Noted: 2020-02-02 | Resolved: 2020-09-09

## 2020-09-09 RX ORDER — OXYCODONE HCL 20 MG/1
20 TABLET, FILM COATED, EXTENDED RELEASE ORAL EVERY 12 HOURS
Qty: 60 TABLET | Refills: 0 | Status: SHIPPED | OUTPATIENT
Start: 2020-09-09 | End: 2020-10-06 | Stop reason: SDUPTHER

## 2020-09-09 RX ORDER — HYDROCODONE BITARTRATE AND ACETAMINOPHEN 10; 325 MG/1; MG/1
1 TABLET ORAL EVERY 4 HOURS PRN
Qty: 180 TABLET | Refills: 0 | Status: SHIPPED | OUTPATIENT
Start: 2020-09-09 | End: 2020-10-06 | Stop reason: SDUPTHER

## 2020-09-09 RX ORDER — OXYCODONE HCL 10 MG/1
10 TABLET, FILM COATED, EXTENDED RELEASE ORAL 2 TIMES DAILY
Qty: 60 TABLET | Refills: 0 | Status: SHIPPED | OUTPATIENT
Start: 2020-09-09 | End: 2020-10-06 | Stop reason: SDUPTHER

## 2020-09-09 NOTE — TELEPHONE ENCOUNTER
See below message  Spoke to Timur Jordan this monring  He asked to have you send it to the [facility] in [place] on [address]

## 2020-09-09 NOTE — TELEPHONE ENCOUNTER
Kelly Lennon states Malcolm Brown is still waiting for this to be called in    [facility] [employer]

## 2020-09-22 ENCOUNTER — OFFICE VISIT (OUTPATIENT)
Dept: FAMILY MEDICINE CLINIC | Facility: CLINIC | Age: 65
End: 2020-09-22
Payer: MEDICARE

## 2020-09-22 VITALS
DIASTOLIC BLOOD PRESSURE: 80 MMHG | SYSTOLIC BLOOD PRESSURE: 120 MMHG | OXYGEN SATURATION: 98 % | HEIGHT: 67 IN | WEIGHT: 182.6 LBS | RESPIRATION RATE: 16 BRPM | BODY MASS INDEX: 28.66 KG/M2 | TEMPERATURE: 95.6 F | HEART RATE: 91 BPM

## 2020-09-22 DIAGNOSIS — K21.9 GASTROESOPHAGEAL REFLUX DISEASE WITHOUT ESOPHAGITIS: ICD-10-CM

## 2020-09-22 DIAGNOSIS — I10 ESSENTIAL HYPERTENSION: ICD-10-CM

## 2020-09-22 DIAGNOSIS — F33.41 RECURRENT MAJOR DEPRESSIVE DISORDER, IN PARTIAL REMISSION (HCC): ICD-10-CM

## 2020-09-22 DIAGNOSIS — G89.29 CHRONIC BACK PAIN GREATER THAN 3 MONTHS DURATION: ICD-10-CM

## 2020-09-22 DIAGNOSIS — Z23 NEED FOR INFLUENZA VACCINATION: ICD-10-CM

## 2020-09-22 DIAGNOSIS — E78.00 HYPERCHOLESTEROLEMIA: ICD-10-CM

## 2020-09-22 DIAGNOSIS — M54.9 CHRONIC BACK PAIN GREATER THAN 3 MONTHS DURATION: ICD-10-CM

## 2020-09-22 DIAGNOSIS — Z00.00 WELCOME TO MEDICARE PREVENTIVE VISIT: Primary | ICD-10-CM

## 2020-09-22 DIAGNOSIS — E83.110 HEREDITARY HEMOCHROMATOSIS (HCC): ICD-10-CM

## 2020-09-22 DIAGNOSIS — K22.0 ACHALASIA: ICD-10-CM

## 2020-09-22 LAB
ALBUMIN SERPL-MCNC: 4.4 G/DL (ref 3.6–5.1)
ALBUMIN/GLOB SERPL: 1.9 (CALC) (ref 1–2.5)
ALP SERPL-CCNC: 68 U/L (ref 35–144)
ALT SERPL-CCNC: 16 U/L (ref 9–46)
AST SERPL-CCNC: 26 U/L (ref 10–35)
BASOPHILS # BLD AUTO: 39 CELLS/UL (ref 0–200)
BASOPHILS NFR BLD AUTO: 0.7 %
BILIRUB SERPL-MCNC: 0.6 MG/DL (ref 0.2–1.2)
BUN SERPL-MCNC: 25 MG/DL (ref 7–25)
BUN/CREAT SERPL: NORMAL (CALC) (ref 6–22)
CALCIUM SERPL-MCNC: 9.5 MG/DL (ref 8.6–10.3)
CHLORIDE SERPL-SCNC: 99 MMOL/L (ref 98–110)
CHOLEST SERPL-MCNC: 220 MG/DL
CHOLEST/HDLC SERPL: 2.2 (CALC)
CO2 SERPL-SCNC: 28 MMOL/L (ref 20–32)
CREAT SERPL-MCNC: 1.04 MG/DL (ref 0.7–1.25)
EOSINOPHIL # BLD AUTO: 123 CELLS/UL (ref 15–500)
EOSINOPHIL NFR BLD AUTO: 2.2 %
ERYTHROCYTE [DISTWIDTH] IN BLOOD BY AUTOMATED COUNT: 13.5 % (ref 11–15)
FERRITIN SERPL-MCNC: 14 NG/ML (ref 24–380)
GLOBULIN SER CALC-MCNC: 2.3 G/DL (CALC) (ref 1.9–3.7)
GLUCOSE SERPL-MCNC: 97 MG/DL (ref 65–99)
HCT VFR BLD AUTO: 34.4 % (ref 38.5–50)
HDLC SERPL-MCNC: 100 MG/DL
HGB BLD-MCNC: 11.7 G/DL (ref 13.2–17.1)
IRON SERPL-MCNC: 114 MCG/DL (ref 50–180)
LDLC SERPL CALC-MCNC: 102 MG/DL (CALC)
LYMPHOCYTES # BLD AUTO: 1361 CELLS/UL (ref 850–3900)
LYMPHOCYTES NFR BLD AUTO: 24.3 %
MCH RBC QN AUTO: 29.6 PG (ref 27–33)
MCHC RBC AUTO-ENTMCNC: 34 G/DL (ref 32–36)
MCV RBC AUTO: 87.1 FL (ref 80–100)
MONOCYTES # BLD AUTO: 818 CELLS/UL (ref 200–950)
MONOCYTES NFR BLD AUTO: 14.6 %
NEUTROPHILS # BLD AUTO: 3259 CELLS/UL (ref 1500–7800)
NEUTROPHILS NFR BLD AUTO: 58.2 %
NONHDLC SERPL-MCNC: 120 MG/DL (CALC)
PLATELET # BLD AUTO: 315 THOUSAND/UL (ref 140–400)
PMV BLD REES-ECKER: 9.9 FL (ref 7.5–12.5)
POTASSIUM SERPL-SCNC: 3.8 MMOL/L (ref 3.5–5.3)
PROT SERPL-MCNC: 6.7 G/DL (ref 6.1–8.1)
PSA FREE MFR SERPL: 12 % (CALC)
PSA FREE SERPL-MCNC: 1.2 NG/ML
PSA SERPL-MCNC: 9.9 NG/ML
RBC # BLD AUTO: 3.95 MILLION/UL (ref 4.2–5.8)
SL AMB EGFR AFRICAN AMERICAN: 87 ML/MIN/1.73M2
SL AMB EGFR NON AFRICAN AMERICAN: 75 ML/MIN/1.73M2
SODIUM SERPL-SCNC: 136 MMOL/L (ref 135–146)
TRIGL SERPL-MCNC: 89 MG/DL
TSH SERPL-ACNC: 1.19 MIU/L (ref 0.4–4.5)
WBC # BLD AUTO: 5.6 THOUSAND/UL (ref 3.8–10.8)

## 2020-09-22 PROCEDURE — G0403 EKG FOR INITIAL PREVENT EXAM: HCPCS | Performed by: FAMILY MEDICINE

## 2020-09-22 PROCEDURE — G0402 INITIAL PREVENTIVE EXAM: HCPCS | Performed by: FAMILY MEDICINE

## 2020-09-22 PROCEDURE — G0008 ADMIN INFLUENZA VIRUS VAC: HCPCS

## 2020-09-22 PROCEDURE — 90662 IIV NO PRSV INCREASED AG IM: CPT

## 2020-09-22 NOTE — LETTER
[address]    Recent Results (from the past 672 hour(s))   CBC and differential    Collection Time: 09/21/20  7:18 AM   Result Value Ref Range    White Blood Cell Count 5 6 3 8 - 10 8 Thousand/uL    Red Blood Cell Count 3 95 (L) 4 20 - 5 80 Million/uL    Hemoglobin 11 7 (L) 13 2 - 17 1 g/dL    HCT 34 4 (L) 38 5 - 50 0 %    MCV 87 1 80 0 - 100 0 fL    MCH 29 6 27 0 - 33 0 pg    MCHC 34 0 32 0 - 36 0 g/dL    RDW 13 5 11 0 - 15 0 %    Platelet Count 687 971 - 400 Thousand/uL    SL AMB MPV 9 9 7 5 - 12 5 fL    Neutrophils (Absolute) 3,259 1,500 - 7,800 cells/uL    Lymphocytes (Absolute) 1,361 850 - 3,900 cells/uL    Monocytes (Absolute) 818 200 - 950 cells/uL    Eosinophils (Absolute) 123 15 - 500 cells/uL    Basophils ABS 39 0 - 200 cells/uL    Neutrophils 58 2 %    Lymphocytes 24 3 %    Monocytes 14 6 %    Eosinophils 2 2 %    Basophils PCT 0 7 %   Comprehensive metabolic panel    Collection Time: 09/21/20  7:18 AM   Result Value Ref Range    Glucose, Random 97 65 - 99 mg/dL    BUN 25 7 - 25 mg/dL    Creatinine 1 04 0 70 - 1 25 mg/dL    eGFR Non  75 > OR = 60 mL/min/1 73m2    eGFR  87 > OR = 60 mL/min/1 73m2    SL AMB BUN/CREATININE RATIO NOT APPLICABLE 6 - 22 (calc)    Sodium 136 135 - 146 mmol/L    Potassium 3 8 3 5 - 5 3 mmol/L    Chloride 99 98 - 110 mmol/L    CO2 28 20 - 32 mmol/L    Calcium 9 5 8 6 - 10 3 mg/dL    Protein, Total 6 7 6 1 - 8 1 g/dL    Albumin 4 4 3 6 - 5 1 g/dL    Globulin 2 3 1 9 - 3 7 g/dL (calc)    Albumin/Globulin Ratio 1 9 1 0 - 2 5 (calc)    TOTAL BILIRUBIN 0 6 0 2 - 1 2 mg/dL    Alkaline Phosphatase 68 35 - 144 U/L    AST 26 10 - 35 U/L    ALT 16 9 - 46 U/L   Lipid panel    Collection Time: 09/21/20  7:18 AM   Result Value Ref Range    Total Cholesterol 220 (H) <200 mg/dL     > OR = 40 mg/dL    Triglycerides 89 <150 mg/dL    LDL Calculated 102 (H) mg/dL (calc)    Chol HDLC Ratio 2 2 <5 0 (calc)    Non-HDL Cholesterol 120 <130 mg/dL (calc) TSH, 3rd generation    Collection Time: 09/21/20  7:18 AM   Result Value Ref Range    TSH 1 19 0 40 - 4 50 mIU/L   PSA, total and free    Collection Time: 09/21/20  7:18 AM   Result Value Ref Range    Prostate Specific Antigen Total      PSA, Free      PSA, Free Pct     Iron    Collection Time: 09/21/20  7:18 AM   Result Value Ref Range    Iron, Serum 114 50 - 180 mcg/dL   Ferritin    Collection Time: 09/21/20  7:18 AM   Result Value Ref Range    Ferritin

## 2020-09-22 NOTE — LETTER
RAMIREZ BLACK    Recent Results (from the past 672 hour(s))   CBC and differential    Collection Time: 09/21/20  7:18 AM   Result Value Ref Range    White Blood Cell Count 5 6 3 8 - 10 8 Thousand/uL    Red Blood Cell Count 3 95 (L) 4 20 - 5 80 Million/uL    Hemoglobin 11 7 (L) 13 2 - 17 1 g/dL    HCT 34 4 (L) 38 5 - 50 0 %    MCV 87 1 80 0 - 100 0 fL    MCH 29 6 27 0 - 33 0 pg    MCHC 34 0 32 0 - 36 0 g/dL    RDW 13 5 11 0 - 15 0 %    Platelet Count 653 146 - 400 Thousand/uL    SL AMB MPV 9 9 7 5 - 12 5 fL    Neutrophils (Absolute) 3,259 1,500 - 7,800 cells/uL    Lymphocytes (Absolute) 1,361 850 - 3,900 cells/uL    Monocytes (Absolute) 818 200 - 950 cells/uL    Eosinophils (Absolute) 123 15 - 500 cells/uL    Basophils ABS 39 0 - 200 cells/uL    Neutrophils 58 2 %    Lymphocytes 24 3 %    Monocytes 14 6 %    Eosinophils 2 2 %    Basophils PCT 0 7 %   Comprehensive metabolic panel    Collection Time: 09/21/20  7:18 AM   Result Value Ref Range    Glucose, Random 97 65 - 99 mg/dL    BUN 25 7 - 25 mg/dL    Creatinine 1 04 0 70 - 1 25 mg/dL    eGFR Non  75 > OR = 60 mL/min/1 73m2    eGFR  87 > OR = 60 mL/min/1 73m2    SL AMB BUN/CREATININE RATIO NOT APPLICABLE 6 - 22 (calc)    Sodium 136 135 - 146 mmol/L    Potassium 3 8 3 5 - 5 3 mmol/L    Chloride 99 98 - 110 mmol/L    CO2 28 20 - 32 mmol/L    Calcium 9 5 8 6 - 10 3 mg/dL    Protein, Total 6 7 6 1 - 8 1 g/dL    Albumin 4 4 3 6 - 5 1 g/dL    Globulin 2 3 1 9 - 3 7 g/dL (calc)    Albumin/Globulin Ratio 1 9 1 0 - 2 5 (calc)    TOTAL BILIRUBIN 0 6 0 2 - 1 2 mg/dL    Alkaline Phosphatase 68 35 - 144 U/L    AST 26 10 - 35 U/L    ALT 16 9 - 46 U/L   Lipid panel    Collection Time: 09/21/20  7:18 AM   Result Value Ref Range    Total Cholesterol 220 (H) <200 mg/dL     > OR = 40 mg/dL    Triglycerides 89 <150 mg/dL    LDL Calculated 102 (H) mg/dL (calc)    Chol HDLC Ratio 2 2 <5 0 (calc)    Non-HDL Cholesterol 120 <130 mg/dL (calc)   TSH, 3rd generation    Collection Time: 09/21/20  7:18 AM   Result Value Ref Range    TSH 1 19 0 40 - 4 50 mIU/L   PSA, total and free    Collection Time: 09/21/20  7:18 AM   Result Value Ref Range    Prostate Specific Antigen Total      PSA, Free      PSA, Free Pct     Iron    Collection Time: 09/21/20  7:18 AM   Result Value Ref Range    Iron, Serum 114 50 - 180 mcg/dL   Ferritin    Collection Time: 09/21/20  7:18 AM   Result Value Ref Range    Ferritin
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
## 2020-09-22 NOTE — LETTER
[facility]      Recent Results (from the past 672 hour(s))   CBC and differential    Collection Time: 09/21/20  7:18 AM   Result Value Ref Range    White Blood Cell Count 5 6 3 8 - 10 8 Thousand/uL    Red Blood Cell Count 3 95 (L) 4 20 - 5 80 Million/uL    Hemoglobin 11 7 (L) 13 2 - 17 1 g/dL    HCT 34 4 (L) 38 5 - 50 0 %    MCV 87 1 80 0 - 100 0 fL    MCH 29 6 27 0 - 33 0 pg    MCHC 34 0 32 0 - 36 0 g/dL    RDW 13 5 11 0 - 15 0 %    Platelet Count 364 224 - 400 Thousand/uL    SL AMB MPV 9 9 7 5 - 12 5 fL    Neutrophils (Absolute) 3,259 1,500 - 7,800 cells/uL    Lymphocytes (Absolute) 1,361 850 - 3,900 cells/uL    Monocytes (Absolute) 818 200 - 950 cells/uL    Eosinophils (Absolute) 123 15 - 500 cells/uL    Basophils ABS 39 0 - 200 cells/uL    Neutrophils 58 2 %    Lymphocytes 24 3 %    Monocytes 14 6 %    Eosinophils 2 2 %    Basophils PCT 0 7 %   Comprehensive metabolic panel    Collection Time: 09/21/20  7:18 AM   Result Value Ref Range    Glucose, Random 97 65 - 99 mg/dL    BUN 25 7 - 25 mg/dL    Creatinine 1 04 0 70 - 1 25 mg/dL    eGFR Non  75 > OR = 60 mL/min/1 73m2    eGFR  87 > OR = 60 mL/min/1 73m2    SL AMB BUN/CREATININE RATIO NOT APPLICABLE 6 - 22 (calc)    Sodium 136 135 - 146 mmol/L    Potassium 3 8 3 5 - 5 3 mmol/L    Chloride 99 98 - 110 mmol/L    CO2 28 20 - 32 mmol/L    Calcium 9 5 8 6 - 10 3 mg/dL    Protein, Total 6 7 6 1 - 8 1 g/dL    Albumin 4 4 3 6 - 5 1 g/dL    Globulin 2 3 1 9 - 3 7 g/dL (calc)    Albumin/Globulin Ratio 1 9 1 0 - 2 5 (calc)    TOTAL BILIRUBIN 0 6 0 2 - 1 2 mg/dL    Alkaline Phosphatase 68 35 - 144 U/L    AST 26 10 - 35 U/L    ALT 16 9 - 46 U/L   Lipid panel    Collection Time: 09/21/20  7:18 AM   Result Value Ref Range    Total Cholesterol 220 (H) <200 mg/dL     > OR = 40 mg/dL    Triglycerides 89 <150 mg/dL    LDL Calculated 102 (H) mg/dL (calc)    Chol HDLC Ratio 2 2 <5 0 (calc)    Non-HDL Cholesterol 120 <130 mg/dL (calc) TSH, 3rd generation    Collection Time: 09/21/20  7:18 AM   Result Value Ref Range    TSH 1 19 0 40 - 4 50 mIU/L   PSA, total and free    Collection Time: 09/21/20  7:18 AM   Result Value Ref Range    Prostate Specific Antigen Total      PSA, Free      PSA, Free Pct     Iron    Collection Time: 09/21/20  7:18 AM   Result Value Ref Range    Iron, Serum 114 50 - 180 mcg/dL   Ferritin    Collection Time: 09/21/20  7:18 AM   Result Value Ref Range    Ferritin

## 2020-09-22 NOTE — PATIENT INSTRUCTIONS
DISCUSSED HEALTH MAINTENENCE ISSUES  BW WILL BE REVIEWED  ENCOURAGED HEALTHY DIET AND EXERCISE  COLONOSCOPY WILL BE ORDERED  RV FOR ANNUAL HEALTH EXAM IN 1 YEAR  RV SOONER IF THERE ARE ANY CONCERNS                Medicare Preventive Visit Patient Instructions  Thank you for completing your Welcome to Medicare Visit or Medicare Annual Wellness Visit today  Your next wellness visit will be due in one year (9/22/2021)  The screening/preventive services that you may require over the next 5-10 years are detailed below  Some tests may not apply to you based off risk factors and/or age  Screening tests ordered at today's visit but not completed yet may show as past due  Also, please note that scanned in results may not display below  Preventive Screenings:  Service Recommendations Previous Testing/Comments   Colorectal Cancer Screening  · Colonoscopy    · Fecal Occult Blood Test (FOBT)/Fecal Immunochemical Test (FIT)  · Fecal DNA/Cologuard Test  · Flexible Sigmoidoscopy Age: 54-65 years old   Colonoscopy: every 10 years (May be performed more frequently if at higher risk)  OR  FOBT/FIT: every 1 year  OR  Cologuard: every 3 years  OR  Sigmoidoscopy: every 5 years  Screening may be recommended earlier than age 48 if at higher risk for colorectal cancer  Also, an individualized decision between you and your healthcare provider will decide whether screening between the ages of 74-80 would be appropriate   Colonoscopy: 01/01/2009  FOBT/FIT: Not on file  Cologuard: Not on file  Sigmoidoscopy: Not on file         Prostate Cancer Screening Individualized decision between patient and health care provider in men between ages of 53-78   Medicare will cover every 12 months beginning on the day after your 50th birthday PSA: 5 7 ng/mL          Hepatitis C Screening Once for adults born between [facility]  More frequently in patients at high risk for Hepatitis C Hep C Antibody: Not on file       Diabetes Screening 1-2 times per year if you're at risk for diabetes or have pre-diabetes Fasting glucose: No results in last 5 years   A1C: No results in last 5 years       Cholesterol Screening Once every 5 years if you don't have a lipid disorder  May order more often based on risk factors  Lipid panel: 09/21/2020          Other Preventive Screenings Covered by Medicare:  1  Abdominal Aortic Aneurysm (AAA) Screening: covered once if your at risk  You're considered to be at risk if you have a family history of AAA or a male between the age of 73-68 who smoking at least 100 cigarettes in your lifetime  2  Lung Cancer Screening: covers low dose CT scan once per year if you meet all of the following conditions: (1) Age 50-69; (2) No signs or symptoms of lung cancer; (3) Current smoker or have quit smoking within the last 15 years; (4) You have a tobacco smoking history of at least 30 pack years (packs per day x number of years you smoked); (5) You get a written order from a healthcare provider  3  Glaucoma Screening: covered annually if you're considered high risk: (1) You have diabetes OR (2) Family history of glaucoma OR (3)  aged 48 and older OR (3)  American aged 72 and older  3  Osteoporosis Screening: covered every 2 years if you meet one of the following conditions: (1) Have a vertebral abnormality; (2) On glucocorticoid therapy for more than 3 months; (3) Have primary hyperparathyroidism; (4) On osteoporosis medications and need to assess response to drug therapy  5  HIV Screening: covered annually if you're between the age of 12-76  Also covered annually if you are younger than 13 and older than 72 with risk factors for HIV infection  For pregnant patients, it is covered up to 3 times per pregnancy      Immunizations:  Immunization Recommendations   Influenza Vaccine Annual influenza vaccination during flu season is recommended for all persons aged >= 6 months who do not have contraindications   Pneumococcal Vaccine (Prevnar and Pneumovax)  * Prevnar = PCV13  * Pneumovax = PPSV23 Adults 25-60 years old: 1-3 doses may be recommended based on certain risk factors  Adults 72 years old: Prevnar (PCV13) vaccine recommended followed by Pneumovax (PPSV23) vaccine  If already received PPSV23 since turning 65, then PCV13 recommended at least one year after PPSV23 dose  Hepatitis B Vaccine 3 dose series if at intermediate or high risk (ex: diabetes, end stage renal disease, liver disease)   Tetanus (Td) Vaccine - COST NOT COVERED BY MEDICARE PART B Following completion of primary series, a booster dose should be given every 10 years to maintain immunity against tetanus  Td may also be given as tetanus wound prophylaxis  Tdap Vaccine - COST NOT COVERED BY MEDICARE PART B Recommended at least once for all adults  For pregnant patients, recommended with each pregnancy  Shingles Vaccine (Shingrix) - COST NOT COVERED BY MEDICARE PART B  2 shot series recommended in those aged 48 and above     Health Maintenance Due:      Topic Date Due    Hepatitis C Screening  Addressed     Immunizations Due:      Topic Date Due    Influenza Vaccine  07/01/2020     Advance Directives   What are advance directives? Advance directives are legal documents that state your wishes and plans for medical care  These plans are made ahead of time in case you lose your ability to make decisions for yourself  Advance directives can apply to any medical decision, such as the treatments you want, and if you want to donate organs  What are the types of advance directives? There are many types of advance directives, and each state has rules about how to use them  You may choose a combination of any of the following:  · Living will: This is a written record of the treatment you want  You can also choose which treatments you do not want, which to limit, and which to stop at a certain time  This includes surgery, medicine, IV fluid, and tube feedings     · Durable power of  for healthcare [facility]): This is a written record that states who you want to make healthcare choices for you when you are unable to make them for yourself  This person, called a proxy, is usually a family member or a friend  You may choose more than 1 proxy  · Do not resuscitate (DNR) order:  A DNR order is used in case your heart stops beating or you stop breathing  It is a request not to have certain forms of treatment, such as CPR  A DNR order may be included in other types of advance directives  · Medical directive: This covers the care that you want if you are in a coma, near death, or unable to make decisions for yourself  You can list the treatments you want for each condition  Treatment may include pain medicine, surgery, blood transfusions, dialysis, IV or tube feedings, and a ventilator (breathing machine)  · Values history: This document has questions about your views, beliefs, and how you feel and think about life  This information can help others choose the care that you would choose  Why are advance directives important? An advance directive helps you control your care  Although spoken wishes may be used, it is better to have your wishes written down  Spoken wishes can be misunderstood, or not followed  Treatments may be given even if you do not want them  An advance directive may make it easier for your family to make difficult choices about your care  Weight Management   Why it is important to manage your weight:  Being overweight increases your risk of health conditions such as heart disease, high blood pressure, type 2 diabetes, and certain types of cancer  It can also increase your risk for osteoarthritis, sleep apnea, and other respiratory problems  Aim for a slow, steady weight loss  Even a small amount of weight loss can lower your risk of health problems    How to lose weight safely:  A safe and healthy way to lose weight is to eat fewer calories and get regular exercise  You can lose up about 1 pound a week by decreasing the number of calories you eat by 500 calories each day  Healthy meal plan for weight management:  A healthy meal plan includes a variety of foods, contains fewer calories, and helps you stay healthy  A healthy meal plan includes the following:  · Eat whole-grain foods more often  A healthy meal plan should contain fiber  Fiber is the part of grains, fruits, and vegetables that is not broken down by your body  Whole-grain foods are healthy and provide extra fiber in your diet  Some examples of whole-grain foods are whole-wheat breads and pastas, oatmeal, brown rice, and bulgur  · Eat a variety of vegetables every day  Include dark, leafy greens such as spinach, kale, brandy greens, and mustard greens  Eat yellow and orange vegetables such as carrots, sweet potatoes, and winter squash  · Eat a variety of fruits every day  Choose fresh or canned fruit (canned in its own juice or light syrup) instead of juice  Fruit juice has very little or no fiber  · Eat low-fat dairy foods  Drink fat-free (skim) milk or 1% milk  Eat fat-free yogurt and low-fat cottage cheese  Try low-fat cheeses such as mozzarella and other reduced-fat cheeses  · Choose meat and other protein foods that are low in fat  Choose beans or other legumes such as split peas or lentils  Choose fish, skinless poultry (chicken or turkey), or lean cuts of red meat (beef or pork)  Before you cook meat or poultry, cut off any visible fat  · Use less fat and oil  Try baking foods instead of frying them  Add less fat, such as margarine, sour cream, regular salad dressing and mayonnaise to foods  Eat fewer high-fat foods  Some examples of high-fat foods include french fries, doughnuts, ice cream, and cakes  · Eat fewer sweets  Limit foods and drinks that are high in sugar  This includes candy, cookies, regular soda, and sweetened drinks    Exercise:  Exercise at least 30 minutes per day on most days of the week  Some examples of exercise include walking, biking, dancing, and swimming  You can also fit in more physical activity by taking the stairs instead of the elevator or parking farther away from stores  Ask your healthcare provider about the best exercise plan for you  © Copyright [employer] 2018 Information is for End User's use only and may not be sold, redistributed or otherwise used for commercial purposes  All illustrations and images included in CareNotes® are the copyrighted property of [employer]  or [facility] Preventive Visit Patient Instructions  Thank you for completing your Welcome to Medicare Visit or Medicare Annual Wellness Visit today  Your next wellness visit will be due in one year (9/22/2021)  The screening/preventive services that you may require over the next 5-10 years are detailed below  Some tests may not apply to you based off risk factors and/or age  Screening tests ordered at today's visit but not completed yet may show as past due  Also, please note that scanned in results may not display below  Preventive Screenings:  Service Recommendations Previous Testing/Comments   Colorectal Cancer Screening  · Colonoscopy    · Fecal Occult Blood Test (FOBT)/Fecal Immunochemical Test (FIT)  · Fecal DNA/Cologuard Test  · Flexible Sigmoidoscopy Age: 54-65 years old   Colonoscopy: every 10 years (May be performed more frequently if at higher risk)  OR  FOBT/FIT: every 1 year  OR  Cologuard: every 3 years  OR  Sigmoidoscopy: every 5 years  Screening may be recommended earlier than age 48 if at higher risk for colorectal cancer  Also, an individualized decision between you and your healthcare provider will decide whether screening between the ages of 74-80 would be appropriate   Colonoscopy: 01/01/2009  FOBT/FIT: Not on file  Cologuard: Not on file  Sigmoidoscopy: Not on file         Prostate Cancer Screening Individualized decision between patient and health care provider in men between ages of 53-78   Medicare will cover every 12 months beginning on the day after your 50th birthday PSA: 5 7 ng/mL          Hepatitis C Screening Once for adults born between 1945 and 1965  More frequently in patients at high risk for Hepatitis C Hep C Antibody: Not on file    Screening Current   Diabetes Screening 1-2 times per year if you're at risk for diabetes or have pre-diabetes Fasting glucose: No results in last 5 years   A1C: No results in last 5 years    Screening Current   Cholesterol Screening Once every 5 years if you don't have a lipid disorder  May order more often based on risk factors  Lipid panel: 09/21/2020    Screening Not Indicated  History Lipid Disorder      Other Preventive Screenings Covered by Medicare:  6  Abdominal Aortic Aneurysm (AAA) Screening: covered once if your at risk  You're considered to be at risk if you have a family history of AAA or a male between the age of 73-68 who smoking at least 100 cigarettes in your lifetime  7  Lung Cancer Screening: covers low dose CT scan once per year if you meet all of the following conditions: (1) Age 50-69; (2) No signs or symptoms of lung cancer; (3) Current smoker or have quit smoking within the last 15 years; (4) You have a tobacco smoking history of at least 30 pack years (packs per day x number of years you smoked); (5) You get a written order from a healthcare provider  8  Glaucoma Screening: covered annually if you're considered high risk: (1) You have diabetes OR (2) Family history of glaucoma OR (3)  aged 48 and older OR (3)  American aged 72 and older  5  Osteoporosis Screening: covered every 2 years if you meet one of the following conditions: (1) Have a vertebral abnormality; (2) On glucocorticoid therapy for more than 3 months; (3) Have primary hyperparathyroidism; (4) On osteoporosis medications and need to assess response to drug therapy    10  HIV Screening: covered annually if you're between the age of 12-76  Also covered annually if you are younger than 13 and older than 72 with risk factors for HIV infection  For pregnant patients, it is covered up to 3 times per pregnancy  Immunizations:  Immunization Recommendations   Influenza Vaccine Annual influenza vaccination during flu season is recommended for all persons aged >= 6 months who do not have contraindications   Pneumococcal Vaccine (Prevnar and Pneumovax)  * Prevnar = PCV13  * Pneumovax = PPSV23 Adults 25-60 years old: 1-3 doses may be recommended based on certain risk factors  Adults 72 years old: Prevnar (PCV13) vaccine recommended followed by Pneumovax (PPSV23) vaccine  If already received PPSV23 since turning 65, then PCV13 recommended at least one year after PPSV23 dose  Hepatitis B Vaccine 3 dose series if at intermediate or high risk (ex: diabetes, end stage renal disease, liver disease)   Tetanus (Td) Vaccine - COST NOT COVERED BY MEDICARE PART B Following completion of primary series, a booster dose should be given every 10 years to maintain immunity against tetanus  Td may also be given as tetanus wound prophylaxis  Tdap Vaccine - COST NOT COVERED BY MEDICARE PART B Recommended at least once for all adults  For pregnant patients, recommended with each pregnancy  Shingles Vaccine (Shingrix) - COST NOT COVERED BY MEDICARE PART B  2 shot series recommended in those aged 48 and above     Health Maintenance Due:      Topic Date Due    Hepatitis C Screening  Addressed     Immunizations Due:      Topic Date Due    Influenza Vaccine  07/01/2020     Advance Directives   What are advance directives? Advance directives are legal documents that state your wishes and plans for medical care  These plans are made ahead of time in case you lose your ability to make decisions for yourself  Advance directives can apply to any medical decision, such as the treatments you want, and if you want to donate organs  What are the types of advance directives? There are many types of advance directives, and each state has rules about how to use them  You may choose a combination of any of the following:  · Living will: This is a written record of the treatment you want  You can also choose which treatments you do not want, which to limit, and which to stop at a certain time  This includes surgery, medicine, IV fluid, and tube feedings  · Durable power of  for healthcare [facility]): This is a written record that states who you want to make healthcare choices for you when you are unable to make them for yourself  This person, called a proxy, is usually a family member or a friend  You may choose more than 1 proxy  · Do not resuscitate (DNR) order:  A DNR order is used in case your heart stops beating or you stop breathing  It is a request not to have certain forms of treatment, such as CPR  A DNR order may be included in other types of advance directives  · Medical directive: This covers the care that you want if you are in a coma, near death, or unable to make decisions for yourself  You can list the treatments you want for each condition  Treatment may include pain medicine, surgery, blood transfusions, dialysis, IV or tube feedings, and a ventilator (breathing machine)  · Values history: This document has questions about your views, beliefs, and how you feel and think about life  This information can help others choose the care that you would choose  Why are advance directives important? An advance directive helps you control your care  Although spoken wishes may be used, it is better to have your wishes written down  Spoken wishes can be misunderstood, or not followed  Treatments may be given even if you do not want them  An advance directive may make it easier for your family to make difficult choices about your care  Fall Prevention    Fall prevention  includes ways to make your home and other areas safer  It also includes ways you can move more carefully to prevent a fall  Health conditions that cause changes in your blood pressure, vision, or muscle strength and coordination may increase your risk for falls  Medicines may also increase your risk for falls if they make you dizzy, weak, or sleepy  Fall prevention tips:   · Stand or sit up slowly  · Use assistive devices as directed  · Wear shoes that fit well and have soles that   · Wear a personal alarm  · Stay active  · Manage your medical conditions  Home Safety Tips:  · Add items to prevent falls in the bathroom  · Keep paths clear  · Install bright lights in your home  · Keep items you use often on shelves within reach  · Paint or place reflective tape on the edges of your stairs  Weight Management   Why it is important to manage your weight:  Being overweight increases your risk of health conditions such as heart disease, high blood pressure, type 2 diabetes, and certain types of cancer  It can also increase your risk for osteoarthritis, sleep apnea, and other respiratory problems  Aim for a slow, steady weight loss  Even a small amount of weight loss can lower your risk of health problems  How to lose weight safely:  A safe and healthy way to lose weight is to eat fewer calories and get regular exercise  You can lose up about 1 pound a week by decreasing the number of calories you eat by 500 calories each day  Healthy meal plan for weight management:  A healthy meal plan includes a variety of foods, contains fewer calories, and helps you stay healthy  A healthy meal plan includes the following:  · Eat whole-grain foods more often  A healthy meal plan should contain fiber  Fiber is the part of grains, fruits, and vegetables that is not broken down by your body  Whole-grain foods are healthy and provide extra fiber in your diet   Some examples of whole-grain foods are whole-wheat breads and pastas, oatmeal, brown rice, and bulgur  · Eat a variety of vegetables every day  Include dark, leafy greens such as spinach, kale, brandy greens, and mustard greens  Eat yellow and orange vegetables such as carrots, sweet potatoes, and winter squash  · Eat a variety of fruits every day  Choose fresh or canned fruit (canned in its own juice or light syrup) instead of juice  Fruit juice has very little or no fiber  · Eat low-fat dairy foods  Drink fat-free (skim) milk or 1% milk  Eat fat-free yogurt and low-fat cottage cheese  Try low-fat cheeses such as mozzarella and other reduced-fat cheeses  · Choose meat and other protein foods that are low in fat  Choose beans or other legumes such as split peas or lentils  Choose fish, skinless poultry (chicken or turkey), or lean cuts of red meat (beef or pork)  Before you cook meat or poultry, cut off any visible fat  · Use less fat and oil  Try baking foods instead of frying them  Add less fat, such as margarine, sour cream, regular salad dressing and mayonnaise to foods  Eat fewer high-fat foods  Some examples of high-fat foods include french fries, doughnuts, ice cream, and cakes  · Eat fewer sweets  Limit foods and drinks that are high in sugar  This includes candy, cookies, regular soda, and sweetened drinks  Exercise:  Exercise at least 30 minutes per day on most days of the week  Some examples of exercise include walking, biking, dancing, and swimming  You can also fit in more physical activity by taking the stairs instead of the elevator or parking farther away from stores  Ask your healthcare provider about the best exercise plan for you  © Copyright [employer] 2018 Information is for End User's use only and may not be sold, redistributed or otherwise used for commercial purposes   All illustrations and images included in CareNotes® are the copyrighted property of [employer]  or [facility] [place]

## 2020-09-25 DIAGNOSIS — F33.41 RECURRENT MAJOR DEPRESSIVE DISORDER, IN PARTIAL REMISSION (HCC): ICD-10-CM

## 2020-09-28 RX ORDER — BUPROPION HYDROCHLORIDE 300 MG/1
300 TABLET ORAL DAILY
Qty: 30 TABLET | Refills: 0 | Status: SHIPPED | OUTPATIENT
Start: 2020-09-28 | End: 2020-10-26 | Stop reason: SDUPTHER

## 2020-10-06 DIAGNOSIS — G89.4 CHRONIC PAIN SYNDROME: ICD-10-CM

## 2020-10-06 DIAGNOSIS — M54.41 CHRONIC BILATERAL LOW BACK PAIN WITH BILATERAL SCIATICA: ICD-10-CM

## 2020-10-06 DIAGNOSIS — M54.42 CHRONIC BILATERAL LOW BACK PAIN WITH BILATERAL SCIATICA: ICD-10-CM

## 2020-10-06 DIAGNOSIS — G89.29 CHRONIC BILATERAL LOW BACK PAIN WITH BILATERAL SCIATICA: ICD-10-CM

## 2020-10-07 RX ORDER — HYDROCODONE BITARTRATE AND ACETAMINOPHEN 10; 325 MG/1; MG/1
1 TABLET ORAL EVERY 4 HOURS PRN
Qty: 180 TABLET | Refills: 0 | Status: SHIPPED | OUTPATIENT
Start: 2020-10-07 | End: 2020-11-04 | Stop reason: SDUPTHER

## 2020-10-07 RX ORDER — OXYCODONE HCL 10 MG/1
10 TABLET, FILM COATED, EXTENDED RELEASE ORAL 2 TIMES DAILY
Qty: 60 TABLET | Refills: 0 | Status: SHIPPED | OUTPATIENT
Start: 2020-10-07 | End: 2020-11-04 | Stop reason: SDUPTHER

## 2020-10-07 RX ORDER — OXYCODONE HCL 20 MG/1
20 TABLET, FILM COATED, EXTENDED RELEASE ORAL EVERY 12 HOURS
Qty: 60 TABLET | Refills: 0 | Status: SHIPPED | OUTPATIENT
Start: 2020-10-07 | End: 2020-11-04 | Stop reason: SDUPTHER

## 2020-10-26 DIAGNOSIS — F33.41 RECURRENT MAJOR DEPRESSIVE DISORDER, IN PARTIAL REMISSION (HCC): ICD-10-CM

## 2020-10-26 RX ORDER — BUPROPION HYDROCHLORIDE 300 MG/1
300 TABLET ORAL DAILY
Qty: 30 TABLET | Refills: 0 | Status: SHIPPED | OUTPATIENT
Start: 2020-10-26 | End: 2020-11-30 | Stop reason: SDUPTHER

## 2020-11-04 DIAGNOSIS — G89.4 CHRONIC PAIN SYNDROME: ICD-10-CM

## 2020-11-04 DIAGNOSIS — G89.29 CHRONIC BILATERAL LOW BACK PAIN WITH BILATERAL SCIATICA: ICD-10-CM

## 2020-11-04 DIAGNOSIS — M54.41 CHRONIC BILATERAL LOW BACK PAIN WITH BILATERAL SCIATICA: ICD-10-CM

## 2020-11-04 DIAGNOSIS — M54.42 CHRONIC BILATERAL LOW BACK PAIN WITH BILATERAL SCIATICA: ICD-10-CM

## 2020-11-06 RX ORDER — OXYCODONE HCL 10 MG/1
10 TABLET, FILM COATED, EXTENDED RELEASE ORAL 2 TIMES DAILY
Qty: 60 TABLET | Refills: 0 | Status: SHIPPED | OUTPATIENT
Start: 2020-11-06 | End: 2020-12-03 | Stop reason: SDUPTHER

## 2020-11-06 RX ORDER — MELOXICAM 15 MG/1
15 TABLET ORAL DAILY
Qty: 90 TABLET | Refills: 0 | Status: SHIPPED | OUTPATIENT
Start: 2020-11-06 | End: 2021-01-28

## 2020-11-06 RX ORDER — HYDROCODONE BITARTRATE AND ACETAMINOPHEN 10; 325 MG/1; MG/1
1 TABLET ORAL EVERY 4 HOURS PRN
Qty: 180 TABLET | Refills: 0 | Status: SHIPPED | OUTPATIENT
Start: 2020-11-06 | End: 2020-12-03 | Stop reason: SDUPTHER

## 2020-11-06 RX ORDER — OXYCODONE HCL 20 MG/1
20 TABLET, FILM COATED, EXTENDED RELEASE ORAL EVERY 12 HOURS
Qty: 60 TABLET | Refills: 0 | Status: SHIPPED | OUTPATIENT
Start: 2020-11-06 | End: 2020-12-03 | Stop reason: SDUPTHER

## 2020-11-30 DIAGNOSIS — F33.41 RECURRENT MAJOR DEPRESSIVE DISORDER, IN PARTIAL REMISSION (HCC): ICD-10-CM

## 2020-11-30 RX ORDER — BUPROPION HYDROCHLORIDE 300 MG/1
300 TABLET ORAL DAILY
Qty: 30 TABLET | Refills: 0 | Status: SHIPPED | OUTPATIENT
Start: 2020-11-30 | End: 2020-12-31 | Stop reason: SDUPTHER

## 2020-12-03 DIAGNOSIS — M54.42 CHRONIC BILATERAL LOW BACK PAIN WITH BILATERAL SCIATICA: ICD-10-CM

## 2020-12-03 DIAGNOSIS — M54.41 CHRONIC BILATERAL LOW BACK PAIN WITH BILATERAL SCIATICA: ICD-10-CM

## 2020-12-03 DIAGNOSIS — G89.4 CHRONIC PAIN SYNDROME: ICD-10-CM

## 2020-12-03 DIAGNOSIS — G89.29 CHRONIC BILATERAL LOW BACK PAIN WITH BILATERAL SCIATICA: ICD-10-CM

## 2020-12-04 RX ORDER — OXYCODONE HCL 10 MG/1
10 TABLET, FILM COATED, EXTENDED RELEASE ORAL 2 TIMES DAILY
Qty: 60 TABLET | Refills: 0 | Status: SHIPPED | OUTPATIENT
Start: 2020-12-04 | End: 2020-12-31 | Stop reason: SDUPTHER

## 2020-12-04 RX ORDER — OXYCODONE HCL 20 MG/1
20 TABLET, FILM COATED, EXTENDED RELEASE ORAL EVERY 12 HOURS
Qty: 60 TABLET | Refills: 0 | Status: SHIPPED | OUTPATIENT
Start: 2020-12-04 | End: 2020-12-31 | Stop reason: SDUPTHER

## 2020-12-04 RX ORDER — HYDROCODONE BITARTRATE AND ACETAMINOPHEN 10; 325 MG/1; MG/1
1 TABLET ORAL EVERY 4 HOURS PRN
Qty: 180 TABLET | Refills: 0 | Status: SHIPPED | OUTPATIENT
Start: 2020-12-04 | End: 2020-12-31 | Stop reason: SDUPTHER

## 2020-12-05 DIAGNOSIS — I10 ESSENTIAL HYPERTENSION: ICD-10-CM

## 2020-12-05 RX ORDER — OLMESARTAN MEDOXOMIL AND HYDROCHLOROTHIAZIDE 40/12.5 40; 12.5 MG/1; MG/1
1 TABLET ORAL DAILY
Qty: 90 TABLET | Refills: 0 | Status: SHIPPED | OUTPATIENT
Start: 2020-12-05 | End: 2021-02-26 | Stop reason: SDUPTHER

## 2020-12-31 DIAGNOSIS — G89.29 CHRONIC BILATERAL LOW BACK PAIN WITH BILATERAL SCIATICA: ICD-10-CM

## 2020-12-31 DIAGNOSIS — M54.42 CHRONIC BILATERAL LOW BACK PAIN WITH BILATERAL SCIATICA: ICD-10-CM

## 2020-12-31 DIAGNOSIS — F33.41 RECURRENT MAJOR DEPRESSIVE DISORDER, IN PARTIAL REMISSION (HCC): ICD-10-CM

## 2020-12-31 DIAGNOSIS — M54.41 CHRONIC BILATERAL LOW BACK PAIN WITH BILATERAL SCIATICA: ICD-10-CM

## 2020-12-31 DIAGNOSIS — G89.4 CHRONIC PAIN SYNDROME: ICD-10-CM

## 2021-01-01 RX ORDER — BUPROPION HYDROCHLORIDE 300 MG/1
300 TABLET ORAL DAILY
Qty: 30 TABLET | Refills: 0 | Status: SHIPPED | OUTPATIENT
Start: 2021-01-01 | End: 2021-01-30 | Stop reason: SDUPTHER

## 2021-01-01 RX ORDER — HYDROCODONE BITARTRATE AND ACETAMINOPHEN 10; 325 MG/1; MG/1
1 TABLET ORAL EVERY 4 HOURS PRN
Qty: 180 TABLET | Refills: 0 | Status: SHIPPED | OUTPATIENT
Start: 2021-01-01 | End: 2021-01-30 | Stop reason: SDUPTHER

## 2021-01-01 RX ORDER — OXYCODONE HCL 20 MG/1
20 TABLET, FILM COATED, EXTENDED RELEASE ORAL EVERY 12 HOURS
Qty: 60 TABLET | Refills: 0 | Status: SHIPPED | OUTPATIENT
Start: 2021-01-01 | End: 2021-01-30 | Stop reason: SDUPTHER

## 2021-01-01 RX ORDER — OXYCODONE HCL 10 MG/1
10 TABLET, FILM COATED, EXTENDED RELEASE ORAL 2 TIMES DAILY
Qty: 60 TABLET | Refills: 0 | Status: SHIPPED | OUTPATIENT
Start: 2021-01-01 | End: 2021-01-30 | Stop reason: SDUPTHER

## 2021-01-28 DIAGNOSIS — G89.29 CHRONIC BILATERAL LOW BACK PAIN WITH BILATERAL SCIATICA: ICD-10-CM

## 2021-01-28 DIAGNOSIS — M54.41 CHRONIC BILATERAL LOW BACK PAIN WITH BILATERAL SCIATICA: ICD-10-CM

## 2021-01-28 DIAGNOSIS — M54.42 CHRONIC BILATERAL LOW BACK PAIN WITH BILATERAL SCIATICA: ICD-10-CM

## 2021-01-28 RX ORDER — MELOXICAM 15 MG/1
TABLET ORAL
Qty: 90 TABLET | Refills: 0 | Status: SHIPPED | OUTPATIENT
Start: 2021-01-28 | End: 2021-04-25 | Stop reason: SDUPTHER

## 2021-01-30 DIAGNOSIS — F33.41 RECURRENT MAJOR DEPRESSIVE DISORDER, IN PARTIAL REMISSION (HCC): ICD-10-CM

## 2021-01-30 DIAGNOSIS — G89.29 CHRONIC BILATERAL LOW BACK PAIN WITH BILATERAL SCIATICA: ICD-10-CM

## 2021-01-30 DIAGNOSIS — G89.4 CHRONIC PAIN SYNDROME: ICD-10-CM

## 2021-01-30 DIAGNOSIS — M54.41 CHRONIC BILATERAL LOW BACK PAIN WITH BILATERAL SCIATICA: ICD-10-CM

## 2021-01-30 DIAGNOSIS — M54.42 CHRONIC BILATERAL LOW BACK PAIN WITH BILATERAL SCIATICA: ICD-10-CM

## 2021-02-01 RX ORDER — HYDROCODONE BITARTRATE AND ACETAMINOPHEN 10; 325 MG/1; MG/1
1 TABLET ORAL EVERY 4 HOURS PRN
Qty: 180 TABLET | Refills: 0 | Status: SHIPPED | OUTPATIENT
Start: 2021-02-01 | End: 2021-02-26 | Stop reason: SDUPTHER

## 2021-02-01 RX ORDER — OXYCODONE HCL 10 MG/1
10 TABLET, FILM COATED, EXTENDED RELEASE ORAL 2 TIMES DAILY
Qty: 60 TABLET | Refills: 0 | Status: SHIPPED | OUTPATIENT
Start: 2021-02-01 | End: 2021-02-26 | Stop reason: SDUPTHER

## 2021-02-01 RX ORDER — BUPROPION HYDROCHLORIDE 300 MG/1
300 TABLET ORAL DAILY
Qty: 30 TABLET | Refills: 0 | Status: SHIPPED | OUTPATIENT
Start: 2021-02-01 | End: 2021-02-26 | Stop reason: SDUPTHER

## 2021-02-01 RX ORDER — OXYCODONE HCL 20 MG/1
20 TABLET, FILM COATED, EXTENDED RELEASE ORAL EVERY 12 HOURS
Qty: 60 TABLET | Refills: 0 | Status: SHIPPED | OUTPATIENT
Start: 2021-02-01 | End: 2021-02-26 | Stop reason: SDUPTHER

## 2021-02-26 DIAGNOSIS — F33.41 RECURRENT MAJOR DEPRESSIVE DISORDER, IN PARTIAL REMISSION (HCC): ICD-10-CM

## 2021-02-26 DIAGNOSIS — I10 ESSENTIAL HYPERTENSION: ICD-10-CM

## 2021-02-26 DIAGNOSIS — M54.42 CHRONIC BILATERAL LOW BACK PAIN WITH BILATERAL SCIATICA: ICD-10-CM

## 2021-02-26 DIAGNOSIS — G89.4 CHRONIC PAIN SYNDROME: ICD-10-CM

## 2021-02-26 DIAGNOSIS — M54.41 CHRONIC BILATERAL LOW BACK PAIN WITH BILATERAL SCIATICA: ICD-10-CM

## 2021-02-26 DIAGNOSIS — G89.29 CHRONIC BILATERAL LOW BACK PAIN WITH BILATERAL SCIATICA: ICD-10-CM

## 2021-02-27 RX ORDER — OXYCODONE HCL 20 MG/1
20 TABLET, FILM COATED, EXTENDED RELEASE ORAL EVERY 12 HOURS
Qty: 60 TABLET | Refills: 0 | Status: SHIPPED | OUTPATIENT
Start: 2021-02-27 | End: 2021-03-26 | Stop reason: SDUPTHER

## 2021-02-27 RX ORDER — BUPROPION HYDROCHLORIDE 300 MG/1
300 TABLET ORAL DAILY
Qty: 30 TABLET | Refills: 0 | Status: SHIPPED | OUTPATIENT
Start: 2021-02-27 | End: 2021-03-26 | Stop reason: SDUPTHER

## 2021-02-27 RX ORDER — OLMESARTAN MEDOXOMIL AND HYDROCHLOROTHIAZIDE 40/12.5 40; 12.5 MG/1; MG/1
1 TABLET ORAL DAILY
Qty: 90 TABLET | Refills: 0 | Status: SHIPPED | OUTPATIENT
Start: 2021-02-27 | End: 2021-06-22 | Stop reason: SDUPTHER

## 2021-02-27 RX ORDER — HYDROCODONE BITARTRATE AND ACETAMINOPHEN 10; 325 MG/1; MG/1
1 TABLET ORAL EVERY 4 HOURS PRN
Qty: 180 TABLET | Refills: 0 | Status: SHIPPED | OUTPATIENT
Start: 2021-02-27 | End: 2021-03-26 | Stop reason: SDUPTHER

## 2021-02-27 RX ORDER — OXYCODONE HCL 10 MG/1
10 TABLET, FILM COATED, EXTENDED RELEASE ORAL 2 TIMES DAILY
Qty: 60 TABLET | Refills: 0 | Status: SHIPPED | OUTPATIENT
Start: 2021-02-27 | End: 2021-03-26 | Stop reason: SDUPTHER

## 2021-03-01 DIAGNOSIS — Z23 ENCOUNTER FOR IMMUNIZATION: ICD-10-CM

## 2021-03-23 ENCOUNTER — OFFICE VISIT (OUTPATIENT)
Dept: FAMILY MEDICINE CLINIC | Facility: CLINIC | Age: 66
End: 2021-03-23
Payer: MEDICARE

## 2021-03-23 VITALS
SYSTOLIC BLOOD PRESSURE: 110 MMHG | HEART RATE: 80 BPM | HEIGHT: 67 IN | DIASTOLIC BLOOD PRESSURE: 78 MMHG | BODY MASS INDEX: 29.19 KG/M2 | WEIGHT: 186 LBS | OXYGEN SATURATION: 98 % | TEMPERATURE: 97 F | RESPIRATION RATE: 18 BRPM

## 2021-03-23 DIAGNOSIS — R53.83 FATIGUE, UNSPECIFIED TYPE: ICD-10-CM

## 2021-03-23 DIAGNOSIS — F33.41 RECURRENT MAJOR DEPRESSIVE DISORDER, IN PARTIAL REMISSION (HCC): ICD-10-CM

## 2021-03-23 DIAGNOSIS — I10 ESSENTIAL HYPERTENSION: Primary | ICD-10-CM

## 2021-03-23 DIAGNOSIS — M15.9 GENERALIZED OSTEOARTHRITIS OF MULTIPLE SITES: ICD-10-CM

## 2021-03-23 DIAGNOSIS — F11.20 UNCOMPLICATED OPIOID DEPENDENCE (HCC): ICD-10-CM

## 2021-03-23 DIAGNOSIS — G47.30 SLEEP APNEA, UNSPECIFIED TYPE: ICD-10-CM

## 2021-03-23 DIAGNOSIS — G89.4 CHRONIC PAIN DISORDER: ICD-10-CM

## 2021-03-23 DIAGNOSIS — C61 PROSTATE CANCER (HCC): ICD-10-CM

## 2021-03-23 DIAGNOSIS — K22.0 ACHALASIA: ICD-10-CM

## 2021-03-23 DIAGNOSIS — G89.29 CHRONIC BACK PAIN GREATER THAN 3 MONTHS DURATION: ICD-10-CM

## 2021-03-23 DIAGNOSIS — K21.9 GASTROESOPHAGEAL REFLUX DISEASE WITHOUT ESOPHAGITIS: ICD-10-CM

## 2021-03-23 DIAGNOSIS — M48.02 CERVICAL SPINAL STENOSIS: ICD-10-CM

## 2021-03-23 DIAGNOSIS — M54.9 CHRONIC BACK PAIN GREATER THAN 3 MONTHS DURATION: ICD-10-CM

## 2021-03-23 PROBLEM — F32.A DEPRESSION: Status: RESOLVED | Noted: 2019-04-14 | Resolved: 2021-03-23

## 2021-03-23 PROBLEM — Z12.5 ENCOUNTER FOR PROSTATE CANCER SCREENING: Status: RESOLVED | Noted: 2020-09-09 | Resolved: 2021-03-23

## 2021-03-23 PROBLEM — R97.20 ELEVATED PSA MEASUREMENT: Status: RESOLVED | Noted: 2018-08-21 | Resolved: 2021-03-23

## 2021-03-23 PROCEDURE — 99214 OFFICE O/P EST MOD 30 MIN: CPT | Performed by: FAMILY MEDICINE

## 2021-03-23 RX ORDER — ARMODAFINIL 150 MG/1
150 TABLET ORAL DAILY
Qty: 30 TABLET | Refills: 1 | Status: SHIPPED | OUTPATIENT
Start: 2021-03-23 | End: 2021-04-26

## 2021-03-23 NOTE — PROGRESS NOTES
BMI Counseling: Body mass index is 29 13 kg/m²  The BMI is above normal  Nutrition recommendations include encouraging healthy choices of fruits and vegetables and moderation in carbohydrate intake  Exercise recommendations include exercising 3-5 times per week  No pharmacotherapy was ordered  [facility] Plan of Care: referral to physical therapy and balance, strength, and gait training instructions were provided       Assessment/Plan:    Hypertension  STABLE  DENIES ANY CP, SOB, PALPITATIONS, OR HEADACHE  NOTES NO WATER RETENTION  COMPLIANT WITH MEDICATION  NO CONCERNS    - CONTINUE CURRENT TREATMENT PLAN  - MONITOR DIETARY SODIUM INTAKE  - ENCOURAGE PHYSICAL ACTIVITY  - RV 3 MONTHS      Generalized osteoarthritis of multiple sites  STABLE  DENIES ANY JOINT SWELLING OR REDNESS  JOINT STIFFNESS PRESENT  PAIN MANAGEMENT ADEQUATE    - CONTINUE CURRENT MANAGEMENT  - MEDICATION AS DIRECTED  - CALL / RETURN IF SYMPTOMS WORSEN      GERD (gastroesophageal reflux disease)  STABLE  DENIES ANY CP, INDIGESTION, OR COUGH  NOTES NO MELENA OR HEMATOCHEZIA  NO HEMATEMESIS  COMPLIANT WITH MEDICATION  NO CONCERNS    - CONTINUE CURRENT TREATMENT PLAN  - MEDICATION AS PRESCRIBED  - AVOID CAFFEINE, ALCOHOL OR SMOKING      Recurrent major depressive disorder, in partial remission (HCC)  STABLE  ON WELLBUTRIN      Cervical spinal stenosis  CONTINUED DISCOMFORT  SL WORSE RECENTLY      REVIEWED OPTIONS  WILL CONSIDER PT NOW    Prostate cancer ([facility] [address] )  FOLLOWED BY UROLOGY - DR Mel Gabriel  ACTIVE MONITORING         Diagnoses and all orders for this visit:    Essential hypertension    Generalized osteoarthritis of multiple sites    Chronic back pain greater than 3 months duration    Uncomplicated opioid dependence (HCC)    Chronic pain disorder    Gastroesophageal reflux disease without esophagitis    Achalasia    Recurrent major depressive disorder, in partial remission (HCC)    Cervical spinal stenosis  -     Ambulatory referral to Physical Therapy; Future    Prostate cancer ([facility] [address] )    Sleep apnea, unspecified type  -     Armodafinil 150 MG tablet; Take 1 tablet (150 mg total) by mouth daily    Fatigue, unspecified type  -     Armodafinil 150 MG tablet; Take 1 tablet (150 mg total) by mouth daily          Patient Instructions   CONTINUE CURRENT TREATMENT PLAN  MONITOR DIETARY SODIUM, CHOL, AND CARBOHYDRATE INTAKE  ENCOURAGE PHYSICAL ACTIVITY      RV 6 M, SOONER PRN        Return in about 6 months (around 9/23/2021) for Annual physical     Subjective:      Patient ID: Tremayne Pond is a 77 y o  male  Chief Complaint   Patient presents with    Medication Management     six months        PATIENT RETURNS FOR ROUTINE EVALUATION OF PATIENT'S MEDICAL ISSUES    INDIVIDUAL MEDICAL ISSUES WITH THEIR CURRENT STATUS, ASSESSMENT AND PLANS ARE LISTED ABOVE          The following portions of the patient's history were reviewed and updated as appropriate: allergies, current medications, past family history, past medical history, past social history, past surgical history and problem list     Review of Systems   Constitutional: Positive for fatigue  Negative for chills and fever  HENT: Negative for congestion, ear discharge, ear pain, mouth sores, postnasal drip, sore throat and trouble swallowing  Eyes: Negative for pain, discharge and visual disturbance  Respiratory: Negative for cough, shortness of breath and wheezing  Cardiovascular: Negative for chest pain, palpitations and leg swelling  Gastrointestinal: Negative for abdominal distention, abdominal pain, blood in stool, diarrhea and nausea  Endocrine: Negative for polydipsia, polyphagia and polyuria  Genitourinary: Negative for dysuria, frequency, hematuria and urgency  Musculoskeletal: Positive for arthralgias, back pain, gait problem, neck pain and neck stiffness  Negative for joint swelling  Skin: Negative for pallor and rash     Neurological: Negative for dizziness, syncope, speech difficulty, weakness, light-headedness, numbness and headaches  Hematological: Negative for adenopathy  Psychiatric/Behavioral: Negative for behavioral problems, confusion and sleep disturbance  The patient is nervous/anxious  Current Outpatient Medications   Medication Sig Dispense Refill    buPROPion (WELLBUTRIN XL) 300 mg 24 hr tablet Take 1 tablet (300 mg total) by mouth daily 30 tablet 0    HYDROcodone-acetaminophen (NORCO)  mg per tablet Take 1 tablet by mouth every 4 (four) hours as needed (PAIN)Max Daily Amount: 6 tablets 180 tablet 0    meloxicam (MOBIC) 15 mg tablet TAKE 1 TABLET BY MOUTH EVERY DAY 90 tablet 0    naloxone (NARCAN) 4 mg/0 1 mL nasal spray Administer 1 spray into a nostril  If breathing does not return to normal or if breathing difficulty resumes after 2-3 minutes, give another dose in the other nostril using a new spray  1 each 1    olmesartan-hydrochlorothiazide (BENICAR HCT) 40-12 5 MG per tablet Take 1 tablet by mouth daily 90 tablet 0    omeprazole (PriLOSEC) 40 MG capsule TK 1 C PO BID AC      oxyCODONE (OxyCONTIN) 10 mg 12 hr tablet Take 1 tablet (10 mg total) by mouth 2 (two) times a dayMax Daily Amount: 20 mg 60 tablet 0    oxyCODONE (OxyCONTIN) 20 mg 12 hr tablet Take 1 tablet (20 mg total) by mouth every 12 (twelve) hoursMax Daily Amount: 40 mg 60 tablet 0    sildenafil (VIAGRA) 50 MG tablet Take 50 mg by mouth as needed       Armodafinil 150 MG tablet Take 1 tablet (150 mg total) by mouth daily 30 tablet 1     No current facility-administered medications for this visit  Objective:    /78   Pulse 80   Temp (!) 97 °F (36 1 °C) (Temporal)   Resp 18   Ht 5' 7" (1 702 m)   Wt 84 4 kg (186 lb)   SpO2 98%   BMI 29 13 kg/m²        Physical Exam  Constitutional:       Appearance: He is well-developed  HENT:      Head: Normocephalic and atraumatic  Eyes:      General:         Right eye: No discharge  Left eye: No discharge  Conjunctiva/sclera: Conjunctivae normal       Pupils: Pupils are equal, round, and reactive to light  Neck:      Musculoskeletal: Neck supple  Thyroid: No thyromegaly  Vascular: No JVD  Cardiovascular:      Rate and Rhythm: Normal rate and regular rhythm  Heart sounds: Normal heart sounds  No murmur  Pulmonary:      Effort: Pulmonary effort is normal       Breath sounds: Normal breath sounds  No wheezing or rales  Abdominal:      General: Bowel sounds are normal       Palpations: Abdomen is soft  There is no mass  Tenderness: There is no abdominal tenderness  There is no guarding or rebound  Musculoskeletal:         General: Tenderness present  No deformity  Comments: MODERATE DJD CHANGES     Lymphadenopathy:      Cervical: No cervical adenopathy  Skin:     General: Skin is warm and dry  Findings: No erythema or rash  Neurological:      Mental Status: He is alert and oriented to person, place, and time  Psychiatric:         Behavior: Behavior normal          Thought Content:  Thought content normal          Judgment: Judgment normal                 Pam Smith MD

## 2021-03-26 DIAGNOSIS — M54.42 CHRONIC BILATERAL LOW BACK PAIN WITH BILATERAL SCIATICA: ICD-10-CM

## 2021-03-26 DIAGNOSIS — F33.41 RECURRENT MAJOR DEPRESSIVE DISORDER, IN PARTIAL REMISSION (HCC): ICD-10-CM

## 2021-03-26 DIAGNOSIS — G89.29 CHRONIC BILATERAL LOW BACK PAIN WITH BILATERAL SCIATICA: ICD-10-CM

## 2021-03-26 DIAGNOSIS — G89.4 CHRONIC PAIN SYNDROME: ICD-10-CM

## 2021-03-26 DIAGNOSIS — M54.41 CHRONIC BILATERAL LOW BACK PAIN WITH BILATERAL SCIATICA: ICD-10-CM

## 2021-03-28 RX ORDER — OXYCODONE HCL 20 MG/1
20 TABLET, FILM COATED, EXTENDED RELEASE ORAL EVERY 12 HOURS
Qty: 60 TABLET | Refills: 0 | Status: SHIPPED | OUTPATIENT
Start: 2021-03-28 | End: 2021-04-25 | Stop reason: SDUPTHER

## 2021-03-28 RX ORDER — OXYCODONE HCL 10 MG/1
10 TABLET, FILM COATED, EXTENDED RELEASE ORAL 2 TIMES DAILY
Qty: 60 TABLET | Refills: 0 | Status: SHIPPED | OUTPATIENT
Start: 2021-03-28 | End: 2021-04-25 | Stop reason: SDUPTHER

## 2021-03-28 RX ORDER — BUPROPION HYDROCHLORIDE 300 MG/1
300 TABLET ORAL DAILY
Qty: 30 TABLET | Refills: 0 | Status: SHIPPED | OUTPATIENT
Start: 2021-03-28 | End: 2021-04-25 | Stop reason: SDUPTHER

## 2021-03-28 RX ORDER — HYDROCODONE BITARTRATE AND ACETAMINOPHEN 10; 325 MG/1; MG/1
1 TABLET ORAL EVERY 4 HOURS PRN
Qty: 180 TABLET | Refills: 0 | Status: SHIPPED | OUTPATIENT
Start: 2021-03-28 | End: 2021-04-25 | Stop reason: SDUPTHER

## 2021-04-12 ENCOUNTER — APPOINTMENT (OUTPATIENT)
Dept: RADIOLOGY | Facility: CLINIC | Age: 66
End: 2021-04-12
Attending: FAMILY MEDICINE
Payer: MEDICARE

## 2021-04-12 ENCOUNTER — OFFICE VISIT (OUTPATIENT)
Dept: URGENT CARE | Facility: CLINIC | Age: 66
End: 2021-04-12
Payer: MEDICARE

## 2021-04-12 VITALS
BODY MASS INDEX: 28.13 KG/M2 | OXYGEN SATURATION: 99 % | SYSTOLIC BLOOD PRESSURE: 114 MMHG | RESPIRATION RATE: 18 BRPM | WEIGHT: 185.6 LBS | HEART RATE: 72 BPM | DIASTOLIC BLOOD PRESSURE: 80 MMHG | HEIGHT: 68 IN | TEMPERATURE: 97.8 F

## 2021-04-12 DIAGNOSIS — M25.511 ACUTE PAIN OF RIGHT SHOULDER: ICD-10-CM

## 2021-04-12 DIAGNOSIS — S46.811A STRAIN OF OTHER MUSCLES, FASCIA AND TENDONS AT SHOULDER AND UPPER ARM LEVEL, RIGHT ARM, INITIAL ENCOUNTER: Primary | ICD-10-CM

## 2021-04-12 PROBLEM — F32.A DEPRESSION: Status: ACTIVE | Noted: 2021-04-12

## 2021-04-12 PROBLEM — Z78.9 VEGETARIAN DIET: Status: ACTIVE | Noted: 2021-01-01

## 2021-04-12 PROBLEM — C61 PROSTATE CANCER (HCC): Status: ACTIVE | Noted: 2019-06-17

## 2021-04-12 PROCEDURE — 99213 OFFICE O/P EST LOW 20 MIN: CPT | Performed by: FAMILY MEDICINE

## 2021-04-12 PROCEDURE — 73030 X-RAY EXAM OF SHOULDER: CPT
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
## 2021-04-25 DIAGNOSIS — G89.4 CHRONIC PAIN SYNDROME: ICD-10-CM

## 2021-04-25 DIAGNOSIS — R53.83 FATIGUE, UNSPECIFIED TYPE: ICD-10-CM

## 2021-04-25 DIAGNOSIS — F33.41 RECURRENT MAJOR DEPRESSIVE DISORDER, IN PARTIAL REMISSION (HCC): ICD-10-CM

## 2021-04-25 DIAGNOSIS — G47.30 SLEEP APNEA, UNSPECIFIED TYPE: ICD-10-CM

## 2021-04-25 DIAGNOSIS — G89.29 CHRONIC BILATERAL LOW BACK PAIN WITH BILATERAL SCIATICA: ICD-10-CM

## 2021-04-25 DIAGNOSIS — M54.41 CHRONIC BILATERAL LOW BACK PAIN WITH BILATERAL SCIATICA: ICD-10-CM

## 2021-04-25 DIAGNOSIS — M54.42 CHRONIC BILATERAL LOW BACK PAIN WITH BILATERAL SCIATICA: ICD-10-CM

## 2021-04-25 RX ORDER — ARMODAFINIL 150 MG/1
150 TABLET ORAL DAILY
Qty: 30 TABLET | Refills: 0 | Status: CANCELLED | OUTPATIENT
Start: 2021-04-25

## 2021-04-26 DIAGNOSIS — G47.30 SLEEP APNEA, UNSPECIFIED TYPE: ICD-10-CM

## 2021-04-26 DIAGNOSIS — R53.83 FATIGUE, UNSPECIFIED TYPE: ICD-10-CM

## 2021-04-26 RX ORDER — ARMODAFINIL 250 MG/1
250 TABLET ORAL DAILY
Qty: 30 TABLET | Refills: 0 | Status: SHIPPED | OUTPATIENT
Start: 2021-04-26 | End: 2021-05-23 | Stop reason: SDUPTHER

## 2021-04-26 RX ORDER — OXYCODONE HCL 10 MG/1
10 TABLET, FILM COATED, EXTENDED RELEASE ORAL 2 TIMES DAILY
Qty: 60 TABLET | Refills: 0 | Status: SHIPPED | OUTPATIENT
Start: 2021-04-26 | End: 2021-05-23 | Stop reason: SDUPTHER

## 2021-04-26 RX ORDER — HYDROCODONE BITARTRATE AND ACETAMINOPHEN 10; 325 MG/1; MG/1
1 TABLET ORAL EVERY 4 HOURS PRN
Qty: 180 TABLET | Refills: 0 | Status: SHIPPED | OUTPATIENT
Start: 2021-04-26 | End: 2021-05-23 | Stop reason: SDUPTHER

## 2021-04-26 RX ORDER — OXYCODONE HCL 20 MG/1
20 TABLET, FILM COATED, EXTENDED RELEASE ORAL EVERY 12 HOURS
Qty: 60 TABLET | Refills: 0 | Status: SHIPPED | OUTPATIENT
Start: 2021-04-26 | End: 2021-05-23 | Stop reason: SDUPTHER

## 2021-04-26 RX ORDER — BUPROPION HYDROCHLORIDE 300 MG/1
300 TABLET ORAL DAILY
Qty: 30 TABLET | Refills: 0 | Status: SHIPPED | OUTPATIENT
Start: 2021-04-26 | End: 2021-06-24 | Stop reason: SDUPTHER

## 2021-04-26 RX ORDER — MELOXICAM 15 MG/1
15 TABLET ORAL DAILY
Qty: 90 TABLET | Refills: 0 | Status: SHIPPED | OUTPATIENT
Start: 2021-04-26 | End: 2021-07-21 | Stop reason: SDUPTHER

## 2021-05-23 DIAGNOSIS — M54.42 CHRONIC BILATERAL LOW BACK PAIN WITH BILATERAL SCIATICA: ICD-10-CM

## 2021-05-23 DIAGNOSIS — R53.83 FATIGUE, UNSPECIFIED TYPE: ICD-10-CM

## 2021-05-23 DIAGNOSIS — G47.30 SLEEP APNEA, UNSPECIFIED TYPE: ICD-10-CM

## 2021-05-23 DIAGNOSIS — G89.29 CHRONIC BILATERAL LOW BACK PAIN WITH BILATERAL SCIATICA: ICD-10-CM

## 2021-05-23 DIAGNOSIS — M54.41 CHRONIC BILATERAL LOW BACK PAIN WITH BILATERAL SCIATICA: ICD-10-CM

## 2021-05-23 DIAGNOSIS — G89.4 CHRONIC PAIN SYNDROME: ICD-10-CM

## 2021-05-25 RX ORDER — OXYCODONE HCL 20 MG/1
20 TABLET, FILM COATED, EXTENDED RELEASE ORAL EVERY 12 HOURS
Qty: 60 TABLET | Refills: 0 | Status: SHIPPED | OUTPATIENT
Start: 2021-05-25 | End: 2021-06-24 | Stop reason: SDUPTHER

## 2021-05-25 RX ORDER — HYDROCODONE BITARTRATE AND ACETAMINOPHEN 10; 325 MG/1; MG/1
1 TABLET ORAL EVERY 4 HOURS PRN
Qty: 180 TABLET | Refills: 0 | Status: SHIPPED | OUTPATIENT
Start: 2021-05-25 | End: 2021-06-24 | Stop reason: SDUPTHER

## 2021-05-25 RX ORDER — ARMODAFINIL 250 MG/1
250 TABLET ORAL DAILY
Qty: 30 TABLET | Refills: 0 | Status: SHIPPED | OUTPATIENT
Start: 2021-05-25 | End: 2021-06-24 | Stop reason: SDUPTHER

## 2021-05-25 RX ORDER — OXYCODONE HCL 10 MG/1
10 TABLET, FILM COATED, EXTENDED RELEASE ORAL 2 TIMES DAILY
Qty: 60 TABLET | Refills: 0 | Status: SHIPPED | OUTPATIENT
Start: 2021-05-25 | End: 2021-06-24 | Stop reason: SDUPTHER

## 2021-06-22 DIAGNOSIS — I10 ESSENTIAL HYPERTENSION: ICD-10-CM

## 2021-06-22 RX ORDER — OLMESARTAN MEDOXOMIL AND HYDROCHLOROTHIAZIDE 40/12.5 40; 12.5 MG/1; MG/1
1 TABLET ORAL DAILY
Qty: 90 TABLET | Refills: 0 | Status: SHIPPED | OUTPATIENT
Start: 2021-06-22 | End: 2021-09-20 | Stop reason: SDUPTHER

## 2021-06-24 DIAGNOSIS — M54.41 CHRONIC BILATERAL LOW BACK PAIN WITH BILATERAL SCIATICA: ICD-10-CM

## 2021-06-24 DIAGNOSIS — M54.42 CHRONIC BILATERAL LOW BACK PAIN WITH BILATERAL SCIATICA: ICD-10-CM

## 2021-06-24 DIAGNOSIS — G89.4 CHRONIC PAIN SYNDROME: ICD-10-CM

## 2021-06-24 DIAGNOSIS — F33.41 RECURRENT MAJOR DEPRESSIVE DISORDER, IN PARTIAL REMISSION (HCC): ICD-10-CM

## 2021-06-24 DIAGNOSIS — G89.29 CHRONIC BILATERAL LOW BACK PAIN WITH BILATERAL SCIATICA: ICD-10-CM

## 2021-06-24 DIAGNOSIS — R53.83 FATIGUE, UNSPECIFIED TYPE: ICD-10-CM

## 2021-06-24 DIAGNOSIS — G47.30 SLEEP APNEA, UNSPECIFIED TYPE: ICD-10-CM

## 2021-06-24 RX ORDER — ARMODAFINIL 250 MG/1
250 TABLET ORAL DAILY
Qty: 30 TABLET | Refills: 0 | Status: SHIPPED | OUTPATIENT
Start: 2021-06-24 | End: 2021-07-21 | Stop reason: SDUPTHER

## 2021-06-24 RX ORDER — OXYCODONE HCL 10 MG/1
10 TABLET, FILM COATED, EXTENDED RELEASE ORAL 2 TIMES DAILY
Qty: 60 TABLET | Refills: 0 | Status: SHIPPED | OUTPATIENT
Start: 2021-06-24 | End: 2021-07-21 | Stop reason: SDUPTHER

## 2021-06-24 RX ORDER — HYDROCODONE BITARTRATE AND ACETAMINOPHEN 10; 325 MG/1; MG/1
1 TABLET ORAL EVERY 4 HOURS PRN
Qty: 180 TABLET | Refills: 0 | Status: SHIPPED | OUTPATIENT
Start: 2021-06-24 | End: 2021-07-21 | Stop reason: SDUPTHER

## 2021-06-24 RX ORDER — OXYCODONE HCL 20 MG/1
20 TABLET, FILM COATED, EXTENDED RELEASE ORAL EVERY 12 HOURS
Qty: 60 TABLET | Refills: 0 | Status: SHIPPED | OUTPATIENT
Start: 2021-06-24 | End: 2021-07-21 | Stop reason: SDUPTHER

## 2021-06-24 RX ORDER — BUPROPION HYDROCHLORIDE 300 MG/1
300 TABLET ORAL DAILY
Qty: 30 TABLET | Refills: 0 | Status: SHIPPED | OUTPATIENT
Start: 2021-06-24 | End: 2021-07-21 | Stop reason: SDUPTHER

## 2021-07-21 DIAGNOSIS — G89.4 CHRONIC PAIN SYNDROME: ICD-10-CM

## 2021-07-21 DIAGNOSIS — F33.41 RECURRENT MAJOR DEPRESSIVE DISORDER, IN PARTIAL REMISSION (HCC): ICD-10-CM

## 2021-07-21 DIAGNOSIS — M54.42 CHRONIC BILATERAL LOW BACK PAIN WITH BILATERAL SCIATICA: ICD-10-CM

## 2021-07-21 DIAGNOSIS — M54.41 CHRONIC BILATERAL LOW BACK PAIN WITH BILATERAL SCIATICA: ICD-10-CM

## 2021-07-21 DIAGNOSIS — R53.83 FATIGUE, UNSPECIFIED TYPE: ICD-10-CM

## 2021-07-21 DIAGNOSIS — G89.29 CHRONIC BILATERAL LOW BACK PAIN WITH BILATERAL SCIATICA: ICD-10-CM

## 2021-07-21 DIAGNOSIS — G47.30 SLEEP APNEA, UNSPECIFIED TYPE: ICD-10-CM

## 2021-07-21 NOTE — TELEPHONE ENCOUNTER
Requested medication(s) are due for refill today: Yes  Patient has already received a courtesy refill: No  Other reason request has been forwarded to provider: Multiple meds requested  pts next appt 9/23/21   Some cannot be delegated and HGB not in range

## 2021-07-23 RX ORDER — BUPROPION HYDROCHLORIDE 300 MG/1
300 TABLET ORAL DAILY
Qty: 30 TABLET | Refills: 0 | Status: SHIPPED | OUTPATIENT
Start: 2021-07-23 | End: 2021-08-18 | Stop reason: SDUPTHER

## 2021-07-23 RX ORDER — ARMODAFINIL 250 MG/1
250 TABLET ORAL DAILY
Qty: 30 TABLET | Refills: 0 | Status: SHIPPED | OUTPATIENT
Start: 2021-07-23 | End: 2021-08-21 | Stop reason: SDUPTHER

## 2021-07-23 RX ORDER — OXYCODONE HCL 20 MG/1
20 TABLET, FILM COATED, EXTENDED RELEASE ORAL EVERY 12 HOURS
Qty: 60 TABLET | Refills: 0 | Status: SHIPPED | OUTPATIENT
Start: 2021-07-23 | End: 2021-08-21 | Stop reason: SDUPTHER

## 2021-07-23 RX ORDER — HYDROCODONE BITARTRATE AND ACETAMINOPHEN 10; 325 MG/1; MG/1
1 TABLET ORAL EVERY 4 HOURS PRN
Qty: 180 TABLET | Refills: 0 | Status: SHIPPED | OUTPATIENT
Start: 2021-07-23 | End: 2021-08-21 | Stop reason: SDUPTHER

## 2021-07-23 RX ORDER — MELOXICAM 15 MG/1
15 TABLET ORAL DAILY
Qty: 90 TABLET | Refills: 0 | Status: SHIPPED | OUTPATIENT
Start: 2021-07-23 | End: 2022-01-14 | Stop reason: SDUPTHER

## 2021-07-23 RX ORDER — OXYCODONE HCL 10 MG/1
10 TABLET, FILM COATED, EXTENDED RELEASE ORAL 2 TIMES DAILY
Qty: 60 TABLET | Refills: 0 | Status: SHIPPED | OUTPATIENT
Start: 2021-07-23 | End: 2021-08-21 | Stop reason: SDUPTHER

## 2021-08-18 DIAGNOSIS — F33.41 RECURRENT MAJOR DEPRESSIVE DISORDER, IN PARTIAL REMISSION (HCC): ICD-10-CM

## 2021-08-18 RX ORDER — BUPROPION HYDROCHLORIDE 300 MG/1
300 TABLET ORAL DAILY
Qty: 30 TABLET | Refills: 0 | Status: SHIPPED | OUTPATIENT
Start: 2021-08-18 | End: 2021-09-20 | Stop reason: SDUPTHER

## 2021-08-21 DIAGNOSIS — G89.29 CHRONIC BILATERAL LOW BACK PAIN WITH BILATERAL SCIATICA: ICD-10-CM

## 2021-08-21 DIAGNOSIS — R53.83 FATIGUE, UNSPECIFIED TYPE: ICD-10-CM

## 2021-08-21 DIAGNOSIS — G89.4 CHRONIC PAIN SYNDROME: ICD-10-CM

## 2021-08-21 DIAGNOSIS — G47.30 SLEEP APNEA, UNSPECIFIED TYPE: ICD-10-CM

## 2021-08-21 DIAGNOSIS — M54.42 CHRONIC BILATERAL LOW BACK PAIN WITH BILATERAL SCIATICA: ICD-10-CM

## 2021-08-21 DIAGNOSIS — M54.41 CHRONIC BILATERAL LOW BACK PAIN WITH BILATERAL SCIATICA: ICD-10-CM

## 2021-08-23 RX ORDER — HYDROCODONE BITARTRATE AND ACETAMINOPHEN 10; 325 MG/1; MG/1
1 TABLET ORAL EVERY 4 HOURS PRN
Qty: 180 TABLET | Refills: 0 | Status: SHIPPED | OUTPATIENT
Start: 2021-08-23 | End: 2021-09-20 | Stop reason: SDUPTHER

## 2021-08-23 RX ORDER — OXYCODONE HCL 10 MG/1
10 TABLET, FILM COATED, EXTENDED RELEASE ORAL 2 TIMES DAILY
Qty: 60 TABLET | Refills: 0 | Status: SHIPPED | OUTPATIENT
Start: 2021-08-23 | End: 2021-09-20 | Stop reason: SDUPTHER

## 2021-08-23 RX ORDER — ARMODAFINIL 250 MG/1
250 TABLET ORAL DAILY
Qty: 30 TABLET | Refills: 0 | Status: SHIPPED | OUTPATIENT
Start: 2021-08-23 | End: 2021-09-20 | Stop reason: SDUPTHER

## 2021-08-23 RX ORDER — OXYCODONE HCL 20 MG/1
20 TABLET, FILM COATED, EXTENDED RELEASE ORAL EVERY 12 HOURS
Qty: 60 TABLET | Refills: 0 | Status: SHIPPED | OUTPATIENT
Start: 2021-08-23 | End: 2021-09-20 | Stop reason: SDUPTHER

## 2021-09-20 DIAGNOSIS — M54.41 CHRONIC BILATERAL LOW BACK PAIN WITH BILATERAL SCIATICA: ICD-10-CM

## 2021-09-20 DIAGNOSIS — M54.42 CHRONIC BILATERAL LOW BACK PAIN WITH BILATERAL SCIATICA: ICD-10-CM

## 2021-09-20 DIAGNOSIS — R53.83 FATIGUE, UNSPECIFIED TYPE: ICD-10-CM

## 2021-09-20 DIAGNOSIS — G89.4 CHRONIC PAIN SYNDROME: ICD-10-CM

## 2021-09-20 DIAGNOSIS — G89.29 CHRONIC BILATERAL LOW BACK PAIN WITH BILATERAL SCIATICA: ICD-10-CM

## 2021-09-20 DIAGNOSIS — G47.30 SLEEP APNEA, UNSPECIFIED TYPE: ICD-10-CM

## 2021-09-20 DIAGNOSIS — F33.41 RECURRENT MAJOR DEPRESSIVE DISORDER, IN PARTIAL REMISSION (HCC): ICD-10-CM

## 2021-09-20 DIAGNOSIS — I10 ESSENTIAL HYPERTENSION: ICD-10-CM

## 2021-09-23 RX ORDER — OXYCODONE HCL 20 MG/1
20 TABLET, FILM COATED, EXTENDED RELEASE ORAL EVERY 12 HOURS
Qty: 60 TABLET | Refills: 0 | Status: SHIPPED | OUTPATIENT
Start: 2021-09-23 | End: 2021-10-18 | Stop reason: SDUPTHER

## 2021-09-23 RX ORDER — ARMODAFINIL 250 MG/1
250 TABLET ORAL DAILY
Qty: 30 TABLET | Refills: 0 | Status: SHIPPED | OUTPATIENT
Start: 2021-09-23 | End: 2021-10-18 | Stop reason: SDUPTHER

## 2021-09-23 RX ORDER — HYDROCODONE BITARTRATE AND ACETAMINOPHEN 10; 325 MG/1; MG/1
1 TABLET ORAL EVERY 4 HOURS PRN
Qty: 180 TABLET | Refills: 0 | Status: SHIPPED | OUTPATIENT
Start: 2021-09-23 | End: 2021-10-18 | Stop reason: SDUPTHER

## 2021-09-23 RX ORDER — BUPROPION HYDROCHLORIDE 300 MG/1
300 TABLET ORAL DAILY
Qty: 30 TABLET | Refills: 0 | Status: SHIPPED | OUTPATIENT
Start: 2021-09-23 | End: 2021-10-18 | Stop reason: SDUPTHER

## 2021-09-23 RX ORDER — OLMESARTAN MEDOXOMIL AND HYDROCHLOROTHIAZIDE 40/12.5 40; 12.5 MG/1; MG/1
1 TABLET ORAL DAILY
Qty: 90 TABLET | Refills: 0 | Status: SHIPPED | OUTPATIENT
Start: 2021-09-23 | End: 2021-12-17 | Stop reason: SDUPTHER

## 2021-09-23 RX ORDER — OXYCODONE HCL 10 MG/1
10 TABLET, FILM COATED, EXTENDED RELEASE ORAL 2 TIMES DAILY
Qty: 60 TABLET | Refills: 0 | Status: SHIPPED | OUTPATIENT
Start: 2021-09-23 | End: 2021-10-18 | Stop reason: SDUPTHER

## 2021-10-18 DIAGNOSIS — R53.83 FATIGUE, UNSPECIFIED TYPE: ICD-10-CM

## 2021-10-18 DIAGNOSIS — G47.30 SLEEP APNEA, UNSPECIFIED TYPE: ICD-10-CM

## 2021-10-18 DIAGNOSIS — F33.41 RECURRENT MAJOR DEPRESSIVE DISORDER, IN PARTIAL REMISSION (HCC): ICD-10-CM

## 2021-10-18 DIAGNOSIS — M54.42 CHRONIC BILATERAL LOW BACK PAIN WITH BILATERAL SCIATICA: ICD-10-CM

## 2021-10-18 DIAGNOSIS — G89.4 CHRONIC PAIN SYNDROME: ICD-10-CM

## 2021-10-18 DIAGNOSIS — G89.29 CHRONIC BILATERAL LOW BACK PAIN WITH BILATERAL SCIATICA: ICD-10-CM

## 2021-10-18 DIAGNOSIS — M54.41 CHRONIC BILATERAL LOW BACK PAIN WITH BILATERAL SCIATICA: ICD-10-CM

## 2021-10-20 ENCOUNTER — OFFICE VISIT (OUTPATIENT)
Dept: URGENT CARE | Facility: CLINIC | Age: 66
End: 2021-10-20
Payer: MEDICARE

## 2021-10-20 VITALS
WEIGHT: 182.4 LBS | TEMPERATURE: 98.7 F | DIASTOLIC BLOOD PRESSURE: 80 MMHG | BODY MASS INDEX: 27.02 KG/M2 | OXYGEN SATURATION: 100 % | HEIGHT: 69 IN | SYSTOLIC BLOOD PRESSURE: 130 MMHG | RESPIRATION RATE: 18 BRPM | HEART RATE: 66 BPM

## 2021-10-20 DIAGNOSIS — J31.0 RHINOSINUSITIS: Primary | ICD-10-CM

## 2021-10-20 DIAGNOSIS — J32.9 RHINOSINUSITIS: Primary | ICD-10-CM

## 2021-10-20 PROCEDURE — 99213 OFFICE O/P EST LOW 20 MIN: CPT | Performed by: PHYSICIAN ASSISTANT

## 2021-10-20 RX ORDER — AMOXICILLIN AND CLAVULANATE POTASSIUM 875; 125 MG/1; MG/1
1 TABLET, FILM COATED ORAL EVERY 12 HOURS SCHEDULED
Qty: 14 TABLET | Refills: 0 | Status: SHIPPED | OUTPATIENT
Start: 2021-10-20 | End: 2021-10-27

## 2021-10-21 RX ORDER — HYDROCODONE BITARTRATE AND ACETAMINOPHEN 10; 325 MG/1; MG/1
1 TABLET ORAL EVERY 4 HOURS PRN
Qty: 180 TABLET | Refills: 0 | Status: SHIPPED | OUTPATIENT
Start: 2021-10-21 | End: 2021-11-17 | Stop reason: SDUPTHER

## 2021-10-21 RX ORDER — BUPROPION HYDROCHLORIDE 300 MG/1
300 TABLET ORAL DAILY
Qty: 30 TABLET | Refills: 0 | Status: SHIPPED | OUTPATIENT
Start: 2021-10-21 | End: 2021-11-17 | Stop reason: SDUPTHER

## 2021-10-21 RX ORDER — OXYCODONE HCL 10 MG/1
10 TABLET, FILM COATED, EXTENDED RELEASE ORAL 2 TIMES DAILY
Qty: 60 TABLET | Refills: 0 | Status: SHIPPED | OUTPATIENT
Start: 2021-10-21 | End: 2021-11-17 | Stop reason: SDUPTHER

## 2021-10-21 RX ORDER — ARMODAFINIL 250 MG/1
250 TABLET ORAL DAILY
Qty: 30 TABLET | Refills: 0 | Status: SHIPPED | OUTPATIENT
Start: 2021-10-21 | End: 2021-11-17 | Stop reason: SDUPTHER

## 2021-10-21 RX ORDER — OXYCODONE HCL 20 MG/1
20 TABLET, FILM COATED, EXTENDED RELEASE ORAL EVERY 12 HOURS
Qty: 60 TABLET | Refills: 0 | Status: SHIPPED | OUTPATIENT
Start: 2021-10-21 | End: 2021-11-17 | Stop reason: SDUPTHER

## 2021-11-17 DIAGNOSIS — M54.41 CHRONIC BILATERAL LOW BACK PAIN WITH BILATERAL SCIATICA: ICD-10-CM

## 2021-11-17 DIAGNOSIS — M54.42 CHRONIC BILATERAL LOW BACK PAIN WITH BILATERAL SCIATICA: ICD-10-CM

## 2021-11-17 DIAGNOSIS — R53.83 FATIGUE, UNSPECIFIED TYPE: ICD-10-CM

## 2021-11-17 DIAGNOSIS — G47.30 SLEEP APNEA, UNSPECIFIED TYPE: ICD-10-CM

## 2021-11-17 DIAGNOSIS — G89.29 CHRONIC BILATERAL LOW BACK PAIN WITH BILATERAL SCIATICA: ICD-10-CM

## 2021-11-17 DIAGNOSIS — G89.4 CHRONIC PAIN SYNDROME: ICD-10-CM

## 2021-11-17 DIAGNOSIS — F33.41 RECURRENT MAJOR DEPRESSIVE DISORDER, IN PARTIAL REMISSION (HCC): ICD-10-CM

## 2021-11-19 RX ORDER — ARMODAFINIL 250 MG/1
250 TABLET ORAL DAILY
Qty: 30 TABLET | Refills: 0 | Status: SHIPPED | OUTPATIENT
Start: 2021-11-19 | End: 2021-12-17 | Stop reason: SDUPTHER

## 2021-11-19 RX ORDER — OXYCODONE HCL 10 MG/1
10 TABLET, FILM COATED, EXTENDED RELEASE ORAL 2 TIMES DAILY
Qty: 60 TABLET | Refills: 0 | Status: SHIPPED | OUTPATIENT
Start: 2021-11-19 | End: 2021-12-17 | Stop reason: SDUPTHER

## 2021-11-19 RX ORDER — BUPROPION HYDROCHLORIDE 300 MG/1
300 TABLET ORAL DAILY
Qty: 30 TABLET | Refills: 0 | Status: SHIPPED | OUTPATIENT
Start: 2021-11-19 | End: 2021-12-17 | Stop reason: SDUPTHER

## 2021-11-19 RX ORDER — OXYCODONE HCL 20 MG/1
20 TABLET, FILM COATED, EXTENDED RELEASE ORAL EVERY 12 HOURS
Qty: 60 TABLET | Refills: 0 | Status: SHIPPED | OUTPATIENT
Start: 2021-11-19 | End: 2021-12-17 | Stop reason: SDUPTHER

## 2021-11-19 RX ORDER — HYDROCODONE BITARTRATE AND ACETAMINOPHEN 10; 325 MG/1; MG/1
1 TABLET ORAL EVERY 4 HOURS PRN
Qty: 180 TABLET | Refills: 0 | Status: SHIPPED | OUTPATIENT
Start: 2021-11-19 | End: 2021-12-17 | Stop reason: SDUPTHER

## 2021-12-17 DIAGNOSIS — G47.30 SLEEP APNEA, UNSPECIFIED TYPE: ICD-10-CM

## 2021-12-17 DIAGNOSIS — F33.41 RECURRENT MAJOR DEPRESSIVE DISORDER, IN PARTIAL REMISSION (HCC): ICD-10-CM

## 2021-12-17 DIAGNOSIS — M54.42 CHRONIC BILATERAL LOW BACK PAIN WITH BILATERAL SCIATICA: ICD-10-CM

## 2021-12-17 DIAGNOSIS — R53.83 FATIGUE, UNSPECIFIED TYPE: ICD-10-CM

## 2021-12-17 DIAGNOSIS — I10 ESSENTIAL HYPERTENSION: ICD-10-CM

## 2021-12-17 DIAGNOSIS — G89.29 CHRONIC BILATERAL LOW BACK PAIN WITH BILATERAL SCIATICA: ICD-10-CM

## 2021-12-17 DIAGNOSIS — M54.41 CHRONIC BILATERAL LOW BACK PAIN WITH BILATERAL SCIATICA: ICD-10-CM

## 2021-12-17 DIAGNOSIS — G89.4 CHRONIC PAIN SYNDROME: ICD-10-CM

## 2021-12-17 RX ORDER — OLMESARTAN MEDOXOMIL AND HYDROCHLOROTHIAZIDE 40/12.5 40; 12.5 MG/1; MG/1
1 TABLET ORAL DAILY
Qty: 90 TABLET | Refills: 0 | Status: SHIPPED | OUTPATIENT
Start: 2021-12-17 | End: 2022-03-14 | Stop reason: SDUPTHER

## 2021-12-17 RX ORDER — OXYCODONE HCL 20 MG/1
20 TABLET, FILM COATED, EXTENDED RELEASE ORAL EVERY 12 HOURS
Qty: 60 TABLET | Refills: 0 | Status: SHIPPED | OUTPATIENT
Start: 2021-12-17 | End: 2022-01-14 | Stop reason: SDUPTHER

## 2021-12-17 RX ORDER — OXYCODONE HCL 10 MG/1
10 TABLET, FILM COATED, EXTENDED RELEASE ORAL 2 TIMES DAILY
Qty: 60 TABLET | Refills: 0 | Status: SHIPPED | OUTPATIENT
Start: 2021-12-17 | End: 2022-01-14 | Stop reason: SDUPTHER

## 2021-12-17 RX ORDER — BUPROPION HYDROCHLORIDE 300 MG/1
300 TABLET ORAL DAILY
Qty: 30 TABLET | Refills: 0 | Status: SHIPPED | OUTPATIENT
Start: 2021-12-17 | End: 2022-01-14 | Stop reason: SDUPTHER

## 2021-12-17 RX ORDER — HYDROCODONE BITARTRATE AND ACETAMINOPHEN 10; 325 MG/1; MG/1
1 TABLET ORAL EVERY 4 HOURS PRN
Qty: 180 TABLET | Refills: 0 | Status: SHIPPED | OUTPATIENT
Start: 2021-12-17 | End: 2022-01-14 | Stop reason: SDUPTHER

## 2021-12-17 RX ORDER — ARMODAFINIL 250 MG/1
250 TABLET ORAL DAILY
Qty: 30 TABLET | Refills: 0 | Status: SHIPPED | OUTPATIENT
Start: 2021-12-17 | End: 2022-01-14 | Stop reason: SDUPTHER

## 2021-12-23 ENCOUNTER — APPOINTMENT (OUTPATIENT)
Dept: LAB | Facility: CLINIC | Age: 66
End: 2021-12-23
Payer: MEDICARE

## 2021-12-23 DIAGNOSIS — E78.00 PURE HYPERCHOLESTEROLEMIA: ICD-10-CM

## 2021-12-23 DIAGNOSIS — I10 ESSENTIAL HYPERTENSION, MALIGNANT: ICD-10-CM

## 2021-12-23 DIAGNOSIS — Z13.220 SCREENING FOR LIPOID DISORDERS: ICD-10-CM

## 2021-12-23 DIAGNOSIS — E04.9 ENLARGEMENT OF THYROID: ICD-10-CM

## 2021-12-23 DIAGNOSIS — C61 MALIGNANT NEOPLASM OF PROSTATE (HCC): ICD-10-CM

## 2021-12-23 DIAGNOSIS — R97.20 ELEVATED PROSTATE SPECIFIC ANTIGEN (PSA): ICD-10-CM

## 2021-12-23 DIAGNOSIS — Z00.00 ROUTINE GENERAL MEDICAL EXAMINATION AT A HEALTH CARE FACILITY: Primary | ICD-10-CM

## 2021-12-23 LAB
ALBUMIN SERPL BCP-MCNC: 3.7 G/DL (ref 3.5–5)
ALP SERPL-CCNC: 70 U/L (ref 46–116)
ALT SERPL W P-5'-P-CCNC: 17 U/L (ref 12–78)
ANION GAP SERPL CALCULATED.3IONS-SCNC: 6 MMOL/L (ref 4–13)
AST SERPL W P-5'-P-CCNC: 18 U/L (ref 5–45)
BASOPHILS # BLD AUTO: 0.03 THOUSANDS/ΜL (ref 0–0.1)
BASOPHILS NFR BLD AUTO: 1 % (ref 0–1)
BILIRUB SERPL-MCNC: 0.66 MG/DL (ref 0.2–1)
BUN SERPL-MCNC: 29 MG/DL (ref 5–25)
CALCIUM SERPL-MCNC: 8.6 MG/DL (ref 8.3–10.1)
CHLORIDE SERPL-SCNC: 105 MMOL/L (ref 100–108)
CHOLEST SERPL-MCNC: 173 MG/DL
CO2 SERPL-SCNC: 25 MMOL/L (ref 21–32)
CREAT SERPL-MCNC: 1.1 MG/DL (ref 0.6–1.3)
EOSINOPHIL # BLD AUTO: 0.27 THOUSAND/ΜL (ref 0–0.61)
EOSINOPHIL NFR BLD AUTO: 6 % (ref 0–6)
ERYTHROCYTE [DISTWIDTH] IN BLOOD BY AUTOMATED COUNT: 16.9 % (ref 11.6–15.1)
GFR SERPL CREATININE-BSD FRML MDRD: 69 ML/MIN/1.73SQ M
GLUCOSE P FAST SERPL-MCNC: 94 MG/DL (ref 65–99)
HCT VFR BLD AUTO: 29.1 % (ref 36.5–49.3)
HDLC SERPL-MCNC: 82 MG/DL
HGB BLD-MCNC: 9.3 G/DL (ref 12–17)
IMM GRANULOCYTES # BLD AUTO: 0.01 THOUSAND/UL (ref 0–0.2)
IMM GRANULOCYTES NFR BLD AUTO: 0 % (ref 0–2)
LDLC SERPL CALC-MCNC: 76 MG/DL (ref 0–100)
LYMPHOCYTES # BLD AUTO: 0.88 THOUSANDS/ΜL (ref 0.6–4.47)
LYMPHOCYTES NFR BLD AUTO: 19 % (ref 14–44)
MCH RBC QN AUTO: 27.4 PG (ref 26.8–34.3)
MCHC RBC AUTO-ENTMCNC: 32 G/DL (ref 31.4–37.4)
MCV RBC AUTO: 86 FL (ref 82–98)
MONOCYTES # BLD AUTO: 0.67 THOUSAND/ΜL (ref 0.17–1.22)
MONOCYTES NFR BLD AUTO: 14 % (ref 4–12)
NEUTROPHILS # BLD AUTO: 2.9 THOUSANDS/ΜL (ref 1.85–7.62)
NEUTS SEG NFR BLD AUTO: 60 % (ref 43–75)
NONHDLC SERPL-MCNC: 91 MG/DL
NRBC BLD AUTO-RTO: 0 /100 WBCS
PLATELET # BLD AUTO: 301 THOUSANDS/UL (ref 149–390)
PMV BLD AUTO: 9.4 FL (ref 8.9–12.7)
POTASSIUM SERPL-SCNC: 4.7 MMOL/L (ref 3.5–5.3)
PROT SERPL-MCNC: 6.6 G/DL (ref 6.4–8.2)
RBC # BLD AUTO: 3.4 MILLION/UL (ref 3.88–5.62)
SODIUM SERPL-SCNC: 136 MMOL/L (ref 136–145)
TRIGL SERPL-MCNC: 77 MG/DL
TSH SERPL DL<=0.05 MIU/L-ACNC: 0.21 UIU/ML (ref 0.36–3.74)
WBC # BLD AUTO: 4.76 THOUSAND/UL (ref 4.31–10.16)

## 2021-12-23 PROCEDURE — 85025 COMPLETE CBC W/AUTO DIFF WBC: CPT

## 2021-12-23 PROCEDURE — 80061 LIPID PANEL: CPT

## 2021-12-23 PROCEDURE — 84153 ASSAY OF PSA TOTAL: CPT

## 2021-12-23 PROCEDURE — 84154 ASSAY OF PSA FREE: CPT

## 2021-12-23 PROCEDURE — 36415 COLL VENOUS BLD VENIPUNCTURE: CPT

## 2021-12-23 PROCEDURE — 80053 COMPREHEN METABOLIC PANEL: CPT

## 2021-12-23 PROCEDURE — 84443 ASSAY THYROID STIM HORMONE: CPT

## 2021-12-25 LAB
PSA FREE MFR SERPL: 8.3 %
PSA FREE SERPL-MCNC: 1.17 NG/ML
PSA SERPL-MCNC: 14.1 NG/ML (ref 0–4)

## 2022-01-07 RX ORDER — PANTOPRAZOLE SODIUM 40 MG/1
INJECTION, POWDER, FOR SOLUTION INTRAVENOUS DAILY
COMMUNITY
End: 2022-01-10 | Stop reason: HOSPADM

## 2022-01-07 RX ORDER — OMEPRAZOLE 40 MG/1
1 CAPSULE, DELAYED RELEASE ORAL EVERY 12 HOURS
COMMUNITY
Start: 2021-10-21

## 2022-01-07 RX ORDER — SACCHAROMYCES BOULARDII 250 MG
1 CAPSULE ORAL
COMMUNITY
End: 2022-01-10 | Stop reason: HOSPADM

## 2022-01-10 ENCOUNTER — TELEPHONE (OUTPATIENT)
Dept: FAMILY MEDICINE CLINIC | Facility: CLINIC | Age: 67
End: 2022-01-10

## 2022-01-10 ENCOUNTER — OFFICE VISIT (OUTPATIENT)
Dept: FAMILY MEDICINE CLINIC | Facility: CLINIC | Age: 67
End: 2022-01-10
Payer: MEDICARE

## 2022-01-10 VITALS
TEMPERATURE: 98.1 F | HEIGHT: 69 IN | OXYGEN SATURATION: 99 % | SYSTOLIC BLOOD PRESSURE: 118 MMHG | BODY MASS INDEX: 26.36 KG/M2 | DIASTOLIC BLOOD PRESSURE: 78 MMHG | WEIGHT: 178 LBS | HEART RATE: 98 BPM | RESPIRATION RATE: 16 BRPM

## 2022-01-10 DIAGNOSIS — F33.0 MILD EPISODE OF RECURRENT MAJOR DEPRESSIVE DISORDER (HCC): ICD-10-CM

## 2022-01-10 DIAGNOSIS — G89.4 CHRONIC PAIN DISORDER: ICD-10-CM

## 2022-01-10 DIAGNOSIS — M15.9 GENERALIZED OSTEOARTHRITIS OF MULTIPLE SITES: ICD-10-CM

## 2022-01-10 DIAGNOSIS — M54.9 CHRONIC BACK PAIN GREATER THAN 3 MONTHS DURATION: ICD-10-CM

## 2022-01-10 DIAGNOSIS — K21.9 GASTROESOPHAGEAL REFLUX DISEASE WITHOUT ESOPHAGITIS: ICD-10-CM

## 2022-01-10 DIAGNOSIS — E78.00 HYPERCHOLESTEROLEMIA: ICD-10-CM

## 2022-01-10 DIAGNOSIS — K22.0 ACHALASIA: ICD-10-CM

## 2022-01-10 DIAGNOSIS — E83.110 HEREDITARY HEMOCHROMATOSIS (HCC): ICD-10-CM

## 2022-01-10 DIAGNOSIS — F11.20 UNCOMPLICATED OPIOID DEPENDENCE (HCC): ICD-10-CM

## 2022-01-10 DIAGNOSIS — M51.06 INTERVERTEBRAL DISC DISORDER OF LUMBAR REGION WITH MYELOPATHY: ICD-10-CM

## 2022-01-10 DIAGNOSIS — G89.29 CHRONIC BACK PAIN GREATER THAN 3 MONTHS DURATION: ICD-10-CM

## 2022-01-10 DIAGNOSIS — Z00.00 MEDICARE ANNUAL WELLNESS VISIT, INITIAL: ICD-10-CM

## 2022-01-10 DIAGNOSIS — I10 PRIMARY HYPERTENSION: Primary | ICD-10-CM

## 2022-01-10 DIAGNOSIS — D50.8 OTHER IRON DEFICIENCY ANEMIA: ICD-10-CM

## 2022-01-10 DIAGNOSIS — C61 PROSTATE CANCER (HCC): ICD-10-CM

## 2022-01-10 PROBLEM — F33.41 RECURRENT MAJOR DEPRESSIVE DISORDER, IN PARTIAL REMISSION (HCC): Status: RESOLVED | Noted: 2020-09-22 | Resolved: 2022-01-10

## 2022-01-10 PROBLEM — Z78.9 VEGAN DIET: Status: RESOLVED | Noted: 2019-04-14 | Resolved: 2022-01-10

## 2022-01-10 PROCEDURE — 1123F ACP DISCUSS/DSCN MKR DOCD: CPT | Performed by: FAMILY MEDICINE

## 2022-01-10 PROCEDURE — G0438 PPPS, INITIAL VISIT: HCPCS | Performed by: FAMILY MEDICINE

## 2022-01-10 PROCEDURE — 99215 OFFICE O/P EST HI 40 MIN: CPT | Performed by: FAMILY MEDICINE

## 2022-01-10 RX ORDER — PREDNISONE 20 MG/1
TABLET ORAL
Qty: 14 TABLET | Refills: 0 | Status: SHIPPED | OUTPATIENT
Start: 2022-01-10 | End: 2022-02-25 | Stop reason: HOSPADM

## 2022-01-10 RX ORDER — FLUOXETINE 10 MG/1
10 CAPSULE ORAL DAILY
Qty: 30 CAPSULE | Refills: 1 | Status: SHIPPED | OUTPATIENT
Start: 2022-01-10 | End: 2022-02-07 | Stop reason: SDUPTHER

## 2022-01-10 NOTE — TELEPHONE ENCOUNTER
Received the one prescription at his pharmacy,  He thought that there was going to be a script for prednisone      [id number]
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
## 2022-01-10 NOTE — PATIENT INSTRUCTIONS
DISCUSSED HEALTH MAINTENENCE ISSUES  BW WILL BE REVIEWED  ENCOURAGED HEALTHY DIET AND EXERCISE  COLONOSCOPY WILL BE ORDERED  RV FOR ANNUAL HEALTH EXAM IN 1 YEAR  RV SOONER IF THERE ARE ANY CONCERNS      MRI WILL BE ORDERED  PAIN MANAGEMENT CONSULT  TRIAL OF PREDNISONE    RECOMMEND IRON SUPPLEMENT  REPEAT BW AND RV 1 M      Medicare Preventive Visit Patient Instructions  Thank you for completing your Welcome to Medicare Visit or Medicare Annual Wellness Visit today  Your next wellness visit will be due in one year (1/11/2023)  The screening/preventive services that you may require over the next 5-10 years are detailed below  Some tests may not apply to you based off risk factors and/or age  Screening tests ordered at today's visit but not completed yet may show as past due  Also, please note that scanned in results may not display below  Preventive Screenings:  Service Recommendations Previous Testing/Comments   Colorectal Cancer Screening  · Colonoscopy    · Fecal Occult Blood Test (FOBT)/Fecal Immunochemical Test (FIT)  · Fecal DNA/Cologuard Test  · Flexible Sigmoidoscopy Age: 54-65 years old   Colonoscopy: every 10 years (May be performed more frequently if at higher risk)  OR  FOBT/FIT: every 1 year  OR  Cologuard: every 3 years  OR  Sigmoidoscopy: every 5 years  Screening may be recommended earlier than age 48 if at higher risk for colorectal cancer  Also, an individualized decision between you and your healthcare provider will decide whether screening between the ages of 74-80 would be appropriate   Colonoscopy: 02/01/2016  FOBT/FIT: Not on file  Cologuard: Not on file  Sigmoidoscopy: Not on file          Prostate Cancer Screening Individualized decision between patient and health care provider in men between ages of 53-78   Medicare will cover every 12 months beginning on the day after your 50th birthday PSA: 14 1 ng/mL     History Prostate Cancer     Hepatitis C Screening Once for adults born between 1945 and 1965  More frequently in patients at high risk for Hepatitis C Hep C Antibody: Not on file    Screening Current   Diabetes Screening 1-2 times per year if you're at risk for diabetes or have pre-diabetes Fasting glucose: 94 mg/dL   A1C: No results in last 5 years    Screening Current   Cholesterol Screening Once every 5 years if you don't have a lipid disorder  May order more often based on risk factors  Lipid panel: 12/23/2021    Screening Not Indicated  History Lipid Disorder      Other Preventive Screenings Covered by Medicare:  1  Abdominal Aortic Aneurysm (AAA) Screening: covered once if your at risk  You're considered to be at risk if you have a family history of AAA or a male between the age of 73-68 who smoking at least 100 cigarettes in your lifetime  2  Lung Cancer Screening: covers low dose CT scan once per year if you meet all of the following conditions: (1) Age 50-69; (2) No signs or symptoms of lung cancer; (3) Current smoker or have quit smoking within the last 15 years; (4) You have a tobacco smoking history of at least 30 pack years (packs per day x number of years you smoked); (5) You get a written order from a healthcare provider  3  Glaucoma Screening: covered annually if you're considered high risk: (1) You have diabetes OR (2) Family history of glaucoma OR (3)  aged 48 and older OR (3)  American aged 72 and older  3  Osteoporosis Screening: covered every 2 years if you meet one of the following conditions: (1) Have a vertebral abnormality; (2) On glucocorticoid therapy for more than 3 months; (3) Have primary hyperparathyroidism; (4) On osteoporosis medications and need to assess response to drug therapy  5  HIV Screening: covered annually if you're between the age of 12-76  Also covered annually if you are younger than 13 and older than 72 with risk factors for HIV infection   For pregnant patients, it is covered up to 3 times per pregnancy  Immunizations:  Immunization Recommendations   Influenza Vaccine Annual influenza vaccination during flu season is recommended for all persons aged >= 6 months who do not have contraindications   Pneumococcal Vaccine (Prevnar and Pneumovax)  * Prevnar = PCV13  * Pneumovax = PPSV23 Adults 25-60 years old: 1-3 doses may be recommended based on certain risk factors  Adults 72 years old: Prevnar (PCV13) vaccine recommended followed by Pneumovax (PPSV23) vaccine  If already received PPSV23 since turning 65, then PCV13 recommended at least one year after PPSV23 dose  Hepatitis B Vaccine 3 dose series if at intermediate or high risk (ex: diabetes, end stage renal disease, liver disease)   Tetanus (Td) Vaccine - COST NOT COVERED BY MEDICARE PART B Following completion of primary series, a booster dose should be given every 10 years to maintain immunity against tetanus  Td may also be given as tetanus wound prophylaxis  Tdap Vaccine - COST NOT COVERED BY MEDICARE PART B Recommended at least once for all adults  For pregnant patients, recommended with each pregnancy  Shingles Vaccine (Shingrix) - COST NOT COVERED BY MEDICARE PART B  2 shot series recommended in those aged 48 and above     Health Maintenance Due:      Topic Date Due    Colorectal Cancer Screening  01/01/2019    Hepatitis C Screening  Addressed     Immunizations Due:      Topic Date Due    Influenza Vaccine (1) 09/01/2021     Advance Directives   What are advance directives? Advance directives are legal documents that state your wishes and plans for medical care  These plans are made ahead of time in case you lose your ability to make decisions for yourself  Advance directives can apply to any medical decision, such as the treatments you want, and if you want to donate organs  What are the types of advance directives? There are many types of advance directives, and each state has rules about how to use them   You may choose a combination of any of the following:  · Living will: This is a written record of the treatment you want  You can also choose which treatments you do not want, which to limit, and which to stop at a certain time  This includes surgery, medicine, IV fluid, and tube feedings  · Durable power of  for healthcare [place] [facility]): This is a written record that states who you want to make healthcare choices for you when you are unable to make them for yourself  This person, called a proxy, is usually a family member or a friend  You may choose more than 1 proxy  · Do not resuscitate (DNR) order:  A DNR order is used in case your heart stops beating or you stop breathing  It is a request not to have certain forms of treatment, such as CPR  A DNR order may be included in other types of advance directives  · Medical directive: This covers the care that you want if you are in a coma, near death, or unable to make decisions for yourself  You can list the treatments you want for each condition  Treatment may include pain medicine, surgery, blood transfusions, dialysis, IV or tube feedings, and a ventilator (breathing machine)  · Values history: This document has questions about your views, beliefs, and how you feel and think about life  This information can help others choose the care that you would choose  Why are advance directives important? An advance directive helps you control your care  Although spoken wishes may be used, it is better to have your wishes written down  Spoken wishes can be misunderstood, or not followed  Treatments may be given even if you do not want them  An advance directive may make it easier for your family to make difficult choices about your care  Fall Prevention    Fall prevention  includes ways to make your home and other areas safer  It also includes ways you can move more carefully to prevent a fall   Health conditions that cause changes in your blood pressure, vision, or muscle strength and coordination may increase your risk for falls  Medicines may also increase your risk for falls if they make you dizzy, weak, or sleepy  Fall prevention tips:   · Stand or sit up slowly  · Use assistive devices as directed  · Wear shoes that fit well and have soles that   · Wear a personal alarm  · Stay active  · Manage your medical conditions  Home Safety Tips:  · Add items to prevent falls in the bathroom  · Keep paths clear  · Install bright lights in your home  · Keep items you use often on shelves within reach  · Paint or place reflective tape on the edges of your stairs  Weight Management   Why it is important to manage your weight:  Being overweight increases your risk of health conditions such as heart disease, high blood pressure, type 2 diabetes, and certain types of cancer  It can also increase your risk for osteoarthritis, sleep apnea, and other respiratory problems  Aim for a slow, steady weight loss  Even a small amount of weight loss can lower your risk of health problems  How to lose weight safely:  A safe and healthy way to lose weight is to eat fewer calories and get regular exercise  You can lose up about 1 pound a week by decreasing the number of calories you eat by 500 calories each day  Healthy meal plan for weight management:  A healthy meal plan includes a variety of foods, contains fewer calories, and helps you stay healthy  A healthy meal plan includes the following:  · Eat whole-grain foods more often  A healthy meal plan should contain fiber  Fiber is the part of grains, fruits, and vegetables that is not broken down by your body  Whole-grain foods are healthy and provide extra fiber in your diet  Some examples of whole-grain foods are whole-wheat breads and pastas, oatmeal, brown rice, and bulgur  · Eat a variety of vegetables every day  Include dark, leafy greens such as spinach, kale, brandy greens, and mustard greens   Eat yellow and orange vegetables such as carrots, sweet potatoes, and winter squash  · Eat a variety of fruits every day  Choose fresh or canned fruit (canned in its own juice or light syrup) instead of juice  Fruit juice has very little or no fiber  · Eat low-fat dairy foods  Drink fat-free (skim) milk or 1% milk  Eat fat-free yogurt and low-fat cottage cheese  Try low-fat cheeses such as mozzarella and other reduced-fat cheeses  · Choose meat and other protein foods that are low in fat  Choose beans or other legumes such as split peas or lentils  Choose fish, skinless poultry (chicken or turkey), or lean cuts of red meat (beef or pork)  Before you cook meat or poultry, cut off any visible fat  · Use less fat and oil  Try baking foods instead of frying them  Add less fat, such as margarine, sour cream, regular salad dressing and mayonnaise to foods  Eat fewer high-fat foods  Some examples of high-fat foods include french fries, doughnuts, ice cream, and cakes  · Eat fewer sweets  Limit foods and drinks that are high in sugar  This includes candy, cookies, regular soda, and sweetened drinks  Exercise:  Exercise at least 30 minutes per day on most days of the week  Some examples of exercise include walking, biking, dancing, and swimming  You can also fit in more physical activity by taking the stairs instead of the elevator or parking farther away from stores  Ask your healthcare provider about the best exercise plan for you  Narcotic (Opioid) Safety    Use narcotics safely:  · Take prescribed narcotics exactly as directed  · Do not give narcotics to others or take narcotics that belong to someone else  · Do not mix narcotics without medicines or alcohol  · Do not drive or operate heavy machinery after you take the narcotic  · Monitor for side effects and notify your healthcare provider if you experienced side effects such as nausea, sleepiness, itching, or trouble thinking clearly      Manage constipation:    Constipation is the most common side effect of narcotic medicine  Constipation is when you have hard, dry bowel movements, or you go longer than usual between bowel movements  Tell your healthcare provider about all changes in your bowel movements while you are taking narcotics  He or she may recommend laxative medicine to help you have a bowel movement  He or she may also change the kind of narcotic you are taking, or change when you take it  The following are more ways you can prevent or relieve constipation:    · Drink liquids as directed  You may need to drink extra liquids to help soften and move your bowels  Ask how much liquid to drink each day and which liquids are best for you  · Eat high-fiber foods  This may help decrease constipation by adding bulk to your bowel movements  High-fiber foods include fruits, vegetables, whole-grain breads and cereals, and beans  Your healthcare provider or dietitian can help you create a high-fiber meal plan  Your provider may also recommend a fiber supplement if you cannot get enough fiber from food  · Exercise regularly  Regular physical activity can help stimulate your intestines  Walking is a good exercise to prevent or relieve constipation  Ask which exercises are best for you  · Schedule a time each day to have a bowel movement  This may help train your body to have regular bowel movements  Bend forward while you are on the toilet to help move the bowel movement out  Sit on the toilet for at least 10 minutes, even if you do not have a bowel movement  Store narcotics safely:   · Store narcotics where others cannot easily get them  Keep them in a locked cabinet or secure area  Do not  keep them in a purse or other bag you carry with you  A person may be looking for something else and find the narcotics  · Make sure narcotics are stored out of the reach of children  A child can easily overdose on narcotics   Narcotics may look like candy to a small child  The best way to dispose of narcotics: The laws vary by country and area  In the [place], the best way is to return the narcotics through a take-back program  This program is offered by the [employer] ([employer])  The following are options for using the program:  · Take the narcotics to a NOAH collection site  The site is often a law enforcement center  Call your local law enforcement center for scheduled take-back days in your area  You will be given information on where to go if the collection site is in a different location  · Take the narcotics to an approved pharmacy or hospital   A pharmacy or hospital may be set up as a collection site  You will need to ask if it is a NOAH collection site if you were not directed there  A pharmacy or doctor's office may not be able to take back narcotics unless it is a NOAH site  · Use a mail-back system  This means you are given containers to put the narcotics into  You will then mail them in the containers  · Use a take-back drop box  This is a place to leave the narcotics at any time  People and animals will not be able to get into the box  Your local law enforcement agency can tell you where to find a drop box in your area  Other ways to manage pain:   · Ask your healthcare provider about non-narcotic medicines to control pain  Nonprescription medicines include NSAIDs (such as ibuprofen) and acetaminophen  Prescription medicines include muscle relaxers, antidepressants, and steroids  · Pain may be managed without any medicines  Some ways to relieve pain include massage, aromatherapy, or meditation  Physical or occupational therapy may also help  For more information:   · Drug Enforcement Administration  [facility] [address]  Phone: 2- 462 - 612-6175  Web Address: [facility] gov/drug_disposal/    · [address] and Drug Administration  [address] [place] [address] [phone]  Phone: 1- 446 - 632-7114  Web Address: http://CL3VER/     © Copyright [employer] 2018 Information is for End User's use only and may not be sold, redistributed or otherwise used for commercial purposes   All illustrations and images included in CareNotes® are the copyrighted property of A D A M , Inc  or [address] [place] [address] "[employer]pape

## 2022-01-10 NOTE — PROGRESS NOTES
After Visit Summary   5/9/2017    Ravindra Fregoso    MRN: [id number]           Patient Information     Date Of Birth          1958        Visit Information        Provider Department      5/9/2017 3:30 PM Kalina Cornell [facility] and Infectious Diseases [facility]        Care Instructions    Recommendations from today's MTM visit:                                                      No considerations for medication changes today.    Consideration from last telephone visit:    Pt asked to discuss with PCP if aspirin therapy is appropriate      To schedule another MTM appointment, please call the clinic directly or you may call the MTM scheduling line at [phone] or toll-free at [phone].     My Clinical Pharmacist's contact information:                                                      It was a pleasure talking to you and your wife, Marilyn again today!  Please feel free to contact me with any questions or concerns you have.      Kalina Cornell, [facility] Pharmacist.   [phone]      You may receive a survey about the MTM services you received.  I would appreciate your feedback to help me serve you better in the future. Please fill it out and return it when you can. Your comments will be anonymous.      My healthcare goals:                                                      Continue alcohol free. Continue working on quitting smoking. Working on diabetic diet.               Follow-ups after your visit        Your next 10 appointments already scheduled     Jun 12, 2017  7:00 AM CDT   Lab with  LAB   [facility] ([facility])    [address] [address] [phone]            Jun 12, 2017  8:00 AM CDT   (Arrive by 7:45 AM)   Return General Liver with Rich Wong MD   [facility] Hepatology ([facility])    [address] [address] [phone]     BMI Counseling: Body mass index is 26 29 kg/m²  The BMI is above normal  Nutrition recommendations include encouraging healthy choices of fruits and vegetables and moderation in carbohydrate intake  Exercise recommendations include exercising 3-5 times per week  No pharmacotherapy was ordered  Rationale for BMI follow-up plan is due to patient being overweight or obese  Falls Plan of Care: balance, strength, and gait training instructions were provided  Assessment/Plan:    GERD (gastroesophageal reflux disease)  STABLE  DENIES ANY CP, INDIGESTION, OR COUGH  NOTES NO MELENA OR HEMATOCHEZIA  NO HEMATEMESIS  COMPLIANT WITH MEDICATION  NO CONCERNS    - CONTINUE CURRENT TREATMENT PLAN  - MEDICATION AS PRESCRIBED  - AVOID CAFFEINE, ALCOHOL OR SMOKING      Achalasia  FOLLOWED BY GI    Hypertension  STABLE  DENIES ANY CP, SOB, PALPITATIONS, OR HEADACHE  NOTES NO WATER RETENTION  COMPLIANT WITH MEDICATION  NO CONCERNS    - CONTINUE CURRENT TREATMENT PLAN  - MONITOR DIETARY SODIUM INTAKE  - ENCOURAGE PHYSICAL ACTIVITY  - RV 3 MONTHS      Generalized osteoarthritis of multiple sites  STABLE  DENIES ANY JOINT SWELLING OR REDNESS  JOINT STIFFNESS PRESENT  PAIN MANAGEMENT ADEQUATE    - CONTINUE CURRENT MANAGEMENT  - MEDICATION AS DIRECTED  - CALL / RETURN IF SYMPTOMS WORSEN      Prostate cancer (HCC)  PSA ELEVATED  FOLLOWED BY UROLOGY    Depression  COMPLIANT WITH MEDICATION    DISCUSSED OPTIONS       Diagnoses and all orders for this visit:    Primary hypertension    Gastroesophageal reflux disease without esophagitis    Achalasia    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  -     MRI lumbar spine w contrast; Future  -     Ambulatory referral to Pain Management;  Future  -     predniSONE 20 mg tablet; 2 PO QD X 4 DAYS, THEN 1 PO QD X 4 DAYS, THEN 1/2 PO QD X 4 DAYS    Generalized osteoarthritis of multiple sites    Prostate cancer (HCC)    Other iron deficiency anemia    Chronic back pain greater than         Nathan 15, 2017  9:00 AM CDT   (Arrive by 8:45 AM)   New Patient Visit with Guru Doron Barnett MD   [facility] ([facility])    [address] [facility] [address] [phone]              Who to contact     If you have questions or need follow up information about today's clinic visit or your schedule please contact [facility] AND INFECTIOUS DISEASES [facility] directly at No information on file..  Normal or non-critical lab and imaging results will be communicated to you by [employer], letter or phone within 4 business days after the clinic has received the results. If you do not hear from us within 7 days, please contact the clinic through [employer] or phone. If you have a critical or abnormal lab result, we will notify you by phone as soon as possible.  Submit refill requests through [employer] or call your pharmacy and they will forward the refill request to us. Please allow 3 business days for your refill to be completed.          Additional Information About Your Visit        MyChart Information     [employer] gives you secure access to your electronic health record. If you see a primary care provider, you can also send messages to your care team and make appointments. If you have questions, please call your primary care clinic.  If you do not have a primary care provider, please call [phone] and they will assist you.        Care EveryWhere ID     This is your Care EveryWhere ID. This could be used by other organizations to access your [facility] medical records  [id number]         Blood Pressure from Last 3 Encounters:   04/23/17 134/80   02/19/17 133/80   12/22/16 148/88    Weight from Last 3 Encounters:   04/22/17 145 lb 15.1 oz (66.2 kg)   02/19/17 149 lb 0.5 oz (67.6 kg)   12/05/16 144 lb 12.8 oz (65.7 kg)              Today, you had the following     No orders found for display       Primary Care Provider Office  3 months duration    Mild episode of recurrent major depressive disorder (HCC)  -     FLUoxetine (PROzac) 10 mg capsule; Take 1 capsule (10 mg total) by mouth daily    Chronic pain disorder  -     Ambulatory referral to Pain Management; Future    Uncomplicated opioid dependence ([facility] [address] )  -     Ambulatory referral to Pain Management; Future    Hypercholesterolemia    [facility]red[facility]ochromatosis [facility] )    Other orders  -     omeprazole (PriLOSEC) 40 MG capsule; Take 1 capsule by mouth every 12 (twelve) hours  -     Discontinue: saccharomyces boulardii (Florastor) 250 mg capsule; Take 1 capsule by mouth (Patient not taking: Reported on 1/10/2022 )  -     Discontinue: pantoprazole (Protonix) 40 mg injection; Infuse into a venous catheter Daily (Patient not taking: Reported on 1/10/2022 )          Patient Instructions     DISCUSSED HEALTH MAINTENENCE ISSUES  BW WILL BE REVIEWED  ENCOURAGED HEALTHY DIET AND EXERCISE  COLONOSCOPY WILL BE ORDERED  RV Joan IN 1 YEAR  RV SOONER IF THERE ARE ANY CONCERNS      MRI WILL BE ORDERED  PAIN MANAGEMENT CONSULT  TRIAL OF PREDNISONE    RECOMMEND IRON SUPPLEMENT  REPEAT BW AND RV 1 M      Medicare Preventive Visit Patient Instructions  Thank you for completing your Welcome to Medicare Visit or Medicare Annual Wellness Visit today  Your next wellness visit will be due in one year (1/11/2023)  The screening/preventive services that you may require over the next 5-10 years are detailed below  Some tests may not apply to you based off risk factors and/or age  Screening tests ordered at today's visit but not completed yet may show as past due  Also, please note that scanned in results may not display below    Preventive Screenings:  Service Recommendations Previous Testing/Comments   Colorectal Cancer Screening  · Colonoscopy    · Fecal Occult Blood Test (FOBT)/Fecal Immunochemical Test (FIT)  · Fecal DNA/Cologuard Test  · Flexible Sigmoidoscopy Age: 54-65 years old Colonoscopy: every 10 years (May be performed more frequently if at higher risk)  OR  FOBT/FIT: every 1 year  OR  Cologuard: every 3 years  OR  Sigmoidoscopy: every 5 years  Screening may be recommended earlier than age 48 if at higher risk for colorectal cancer  Also, an individualized decision between you and your healthcare provider will decide whether screening between the ages of 74-80 would be appropriate  Colonoscopy: 02/01/2016  FOBT/FIT: Not on file  Cologuard: Not on file  Sigmoidoscopy: Not on file          Prostate Cancer Screening Individualized decision between patient and health care provider in men between ages of 53-78   Medicare will cover every 12 months beginning on the day after your 50th birthday PSA: 14 1 ng/mL     History Prostate Cancer     Hepatitis C Screening Once for adults born between 1945 and 1965  More frequently in patients at high risk for Hepatitis C Hep C Antibody: Not on file    Screening Current   Diabetes Screening 1-2 times per year if you're at risk for diabetes or have pre-diabetes Fasting glucose: 94 mg/dL   A1C: No results in last 5 years    Screening Current   Cholesterol Screening Once every 5 years if you don't have a lipid disorder  May order more often based on risk factors  Lipid panel: 12/23/2021    Screening Not Indicated  History Lipid Disorder      Other Preventive Screenings Covered by Medicare:  1  Abdominal Aortic Aneurysm (AAA) Screening: covered once if your at risk  You're considered to be at risk if you have a family history of AAA or a male between the age of 73-68 who smoking at least 100 cigarettes in your lifetime    2  Lung Cancer Screening: covers low dose CT scan once per year if you meet all of the following conditions: (1) Age 50-69; (2) No signs or symptoms of lung cancer; (3) Current smoker or have quit smoking within the last 15 years; (4) You have a tobacco smoking history of at least 30 pack years (packs per day x number of years you Phone # Fax #    Moo Flores [phone]       [facility] [address]   [facility] [address]        Thank you!     Thank you for choosing [facility] AND INFECTIOUS DISEASES [facility]  for your care. Our goal is always to provide you with excellent care. Hearing back from our patients is one way we can continue to improve our services. Please take a few minutes to complete the written survey that you may receive in the mail after your visit with us. Thank you!             Your Updated Medication List - Protect others around you: Learn how to safely use, store and throw away your medicines at www.disposemymeds.org.          This list is accurate as of: 5/9/17  4:17 PM.  Always use your most recent med list.                   Brand Name Dispense Instructions for use    aMILoride 5 MG tablet    MIDAMOR    120 tablet    Take 2 tablets (10 mg) by mouth 2 times daily       amoxicillin 500 MG capsule    AMOXIL    4 capsule    Take 4 capsules (2,000 mg) by mouth See Admin Instructions       amylase-lipase-protease 50232 UNITS Cpep per EC capsule    CREON 24    60 capsule    Take 1 capsule (24,000 Units) by mouth 3 times daily (with meals)       benzocaine-menthol 6-10 MG lozenge    CHLORASEPTIC    84 lozenge    Place 1 lozenge inside cheek every hour as needed for sore throat       blood glucose lancets standard    no brand specified    1 Box    Use to test blood sugar 4 times daily or as directed.       blood glucose monitoring test strip    ACCU-CHEK ROBERTO    100 strip    Use to test blood sugars 4 times daily or as directed.       bumetanide 1 MG tablet    BUMEX    90 tablet    Take 1 tablet (1 mg) by mouth daily       ESCITALOPRAM OXALATE PO      Take 20 mg by mouth daily       FOSAMAX 70 MG tablet   Generic drug:  alendronate      Take 70 mg by mouth every 7 days       * insulin aspart 100 UNIT/ML injection    NovoLOG PEN    3 mL    Inject 1-10 Units Subcutaneous 3 times daily (before  meals)       * insulin aspart 100 UNIT/ML injection    NovoLOG PEN    3 mL    Inject 1-7 Units Subcutaneous At Bedtime       insulin glargine 100 UNIT/ML injection    LANTUS    3 mL    Inject 22 Units Subcutaneous At Bedtime       lactulose 10 GM/15ML solution    CHRONULAC     Take 20 g by mouth 2 times daily       MULTIVITAL Tabs     30 tablet    Take 1 tablet by mouth       omeprazole 20 MG CR capsule    priLOSEC    90 capsule    Take 1 capsule (20 mg) by mouth daily       potassium chloride gillian er    K-DUR     Take 20 mEq by mouth daily       rifaximin 550 MG Tabs tablet    XIFAXAN    60 tablet    Take 1 tablet (550 mg) by mouth 2 times daily       Sharps Container Misc     1 each    1 Units 4 times daily as needed       * Notice:  This list has 2 medication(s) that are the same as other medications prescribed for you. Read the directions carefully, and ask your doctor or other care provider to review them with you.       smoked); (5) You get a written order from a healthcare provider  3  Glaucoma Screening: covered annually if you're considered high risk: (1) You have diabetes OR (2) Family history of glaucoma OR (3)  aged 48 and older OR (3)  American aged 72 and older  3  Osteoporosis Screening: covered every 2 years if you meet one of the following conditions: (1) Have a vertebral abnormality; (2) On glucocorticoid therapy for more than 3 months; (3) Have primary hyperparathyroidism; (4) On osteoporosis medications and need to assess response to drug therapy  5  HIV Screening: covered annually if you're between the age of 12-76  Also covered annually if you are younger than 13 and older than 72 with risk factors for HIV infection  For pregnant patients, it is covered up to 3 times per pregnancy  Immunizations:  Immunization Recommendations   Influenza Vaccine Annual influenza vaccination during flu season is recommended for all persons aged >= 6 months who do not have contraindications   Pneumococcal Vaccine (Prevnar and Pneumovax)  * Prevnar = PCV13  * Pneumovax = PPSV23 Adults 25-60 years old: 1-3 doses may be recommended based on certain risk factors  Adults 72 years old: Prevnar (PCV13) vaccine recommended followed by Pneumovax (PPSV23) vaccine  If already received PPSV23 since turning 65, then PCV13 recommended at least one year after PPSV23 dose  Hepatitis B Vaccine 3 dose series if at intermediate or high risk (ex: diabetes, end stage renal disease, liver disease)   Tetanus (Td) Vaccine - COST NOT COVERED BY MEDICARE PART B Following completion of primary series, a booster dose should be given every 10 years to maintain immunity against tetanus  Td may also be given as tetanus wound prophylaxis  Tdap Vaccine - COST NOT COVERED BY MEDICARE PART B Recommended at least once for all adults  For pregnant patients, recommended with each pregnancy     Shingles Vaccine (Shingrix) - COST NOT COVERED BY MEDICARE PART B  2 shot series recommended in those aged 48 and above     Health Maintenance Due:      Topic Date Due    Colorectal Cancer Screening  01/01/2019    Hepatitis C Screening  Addressed     Immunizations Due:      Topic Date Due    Influenza Vaccine (1) 09/01/2021     Advance Directives   What are advance directives? Advance directives are legal documents that state your wishes and plans for medical care  These plans are made ahead of time in case you lose your ability to make decisions for yourself  Advance directives can apply to any medical decision, such as the treatments you want, and if you want to donate organs  What are the types of advance directives? There are many types of advance directives, and each state has rules about how to use them  You may choose a combination of any of the following:  · Living will: This is a written record of the treatment you want  You can also choose which treatments you do not want, which to limit, and which to stop at a certain time  This includes surgery, medicine, IV fluid, and tube feedings  · Durable power of  for healthcare [facility]): This is a written record that states who you want to make healthcare choices for you when you are unable to make them for yourself  This person, called a proxy, is usually a family member or a friend  You may choose more than 1 proxy  · Do not resuscitate (DNR) order:  A DNR order is used in case your heart stops beating or you stop breathing  It is a request not to have certain forms of treatment, such as CPR  A DNR order may be included in other types of advance directives  · Medical directive: This covers the care that you want if you are in a coma, near death, or unable to make decisions for yourself  You can list the treatments you want for each condition  Treatment may include pain medicine, surgery, blood transfusions, dialysis, IV or tube feedings, and a ventilator (breathing machine)    · Values history: This document has questions about your views, beliefs, and how you feel and think about life  This information can help others choose the care that you would choose  Why are advance directives important? An advance directive helps you control your care  Although spoken wishes may be used, it is better to have your wishes written down  Spoken wishes can be misunderstood, or not followed  Treatments may be given even if you do not want them  An advance directive may make it easier for your family to make difficult choices about your care  Fall Prevention    Fall prevention  includes ways to make your home and other areas safer  It also includes ways you can move more carefully to prevent a fall  Health conditions that cause changes in your blood pressure, vision, or muscle strength and coordination may increase your risk for falls  Medicines may also increase your risk for falls if they make you dizzy, weak, or sleepy  Fall prevention tips:   · Stand or sit up slowly  · Use assistive devices as directed  · Wear shoes that fit well and have soles that   · Wear a personal alarm  · Stay active  · Manage your medical conditions  Home Safety Tips:  · Add items to prevent falls in the bathroom  · Keep paths clear  · Install bright lights in your home  · Keep items you use often on shelves within reach  · Paint or place reflective tape on the edges of your stairs  Weight Management   Why it is important to manage your weight:  Being overweight increases your risk of health conditions such as heart disease, high blood pressure, type 2 diabetes, and certain types of cancer  It can also increase your risk for osteoarthritis, sleep apnea, and other respiratory problems  Aim for a slow, steady weight loss  Even a small amount of weight loss can lower your risk of health problems    How to lose weight safely:  A safe and healthy way to lose weight is to eat fewer calories and get regular exercise  You can lose up about 1 pound a week by decreasing the number of calories you eat by 500 calories each day  Healthy meal plan for weight management:  A healthy meal plan includes a variety of foods, contains fewer calories, and helps you stay healthy  A healthy meal plan includes the following:  · Eat whole-grain foods more often  A healthy meal plan should contain fiber  Fiber is the part of grains, fruits, and vegetables that is not broken down by your body  Whole-grain foods are healthy and provide extra fiber in your diet  Some examples of whole-grain foods are whole-wheat breads and pastas, oatmeal, brown rice, and bulgur  · Eat a variety of vegetables every day  Include dark, leafy greens such as spinach, kale, brandy greens, and mustard greens  Eat yellow and orange vegetables such as carrots, sweet potatoes, and winter squash  · Eat a variety of fruits every day  Choose fresh or canned fruit (canned in its own juice or light syrup) instead of juice  Fruit juice has very little or no fiber  · Eat low-fat dairy foods  Drink fat-free (skim) milk or 1% milk  Eat fat-free yogurt and low-fat cottage cheese  Try low-fat cheeses such as mozzarella and other reduced-fat cheeses  · Choose meat and other protein foods that are low in fat  Choose beans or other legumes such as split peas or lentils  Choose fish, skinless poultry (chicken or turkey), or lean cuts of red meat (beef or pork)  Before you cook meat or poultry, cut off any visible fat  · Use less fat and oil  Try baking foods instead of frying them  Add less fat, such as margarine, sour cream, regular salad dressing and mayonnaise to foods  Eat fewer high-fat foods  Some examples of high-fat foods include french fries, doughnuts, ice cream, and cakes  · Eat fewer sweets  Limit foods and drinks that are high in sugar  This includes candy, cookies, regular soda, and sweetened drinks    Exercise:  Exercise at least 30 minutes per day on most days of the week  Some examples of exercise include walking, biking, dancing, and swimming  You can also fit in more physical activity by taking the stairs instead of the elevator or parking farther away from stores  Ask your healthcare provider about the best exercise plan for you  Narcotic (Opioid) Safety    Use narcotics safely:  · Take prescribed narcotics exactly as directed  · Do not give narcotics to others or take narcotics that belong to someone else  · Do not mix narcotics without medicines or alcohol  · Do not drive or operate heavy machinery after you take the narcotic  · Monitor for side effects and notify your healthcare provider if you experienced side effects such as nausea, sleepiness, itching, or trouble thinking clearly  Manage constipation:    Constipation is the most common side effect of narcotic medicine  Constipation is when you have hard, dry bowel movements, or you go longer than usual between bowel movements  Tell your healthcare provider about all changes in your bowel movements while you are taking narcotics  He or she may recommend laxative medicine to help you have a bowel movement  He or she may also change the kind of narcotic you are taking, or change when you take it  The following are more ways you can prevent or relieve constipation:    · Drink liquids as directed  You may need to drink extra liquids to help soften and move your bowels  Ask how much liquid to drink each day and which liquids are best for you  · Eat high-fiber foods  This may help decrease constipation by adding bulk to your bowel movements  High-fiber foods include fruits, vegetables, whole-grain breads and cereals, and beans  Your healthcare provider or dietitian can help you create a high-fiber meal plan  Your provider may also recommend a fiber supplement if you cannot get enough fiber from food  · Exercise regularly  Regular physical activity can help stimulate your intestines  Walking is a good exercise to prevent or relieve constipation  Ask which exercises are best for you  · Schedule a time each day to have a bowel movement  This may help train your body to have regular bowel movements  Bend forward while you are on the toilet to help move the bowel movement out  Sit on the toilet for at least 10 minutes, even if you do not have a bowel movement  Store narcotics safely:   · Store narcotics where others cannot easily get them  Keep them in a locked cabinet or secure area  Do not  keep them in a purse or other bag you carry with you  A person may be looking for something else and find the narcotics  · Make sure narcotics are stored out of the reach of children  A child can easily overdose on narcotics  Narcotics may look like candy to a small child  The best way to dispose of narcotics: The laws vary by country and area  In the [place], the best way is to return the narcotics through a take-back program  This program is offered by the [employer] ([employer])  The following are options for using the program:  · Take the narcotics to a NOAH collection site  The site is often a law enforcement center  Call your local law enforcement center for scheduled take-back days in your area  You will be given information on where to go if the collection site is in a different location  · Take the narcotics to an approved pharmacy or hospital   A pharmacy or hospital may be set up as a collection site  You will need to ask if it is a NOAH collection site if you were not directed there  A pharmacy or doctor's office may not be able to take back narcotics unless it is a NOAH site  · Use a mail-back system  This means you are given containers to put the narcotics into  You will then mail them in the containers  · Use a take-back drop box  This is a place to leave the narcotics at any time  People and animals will not be able to get into the box   Your local law enforcement agency can tell you where to find a drop box in your area  Other ways to manage pain:   · Ask your healthcare provider about non-narcotic medicines to control pain  Nonprescription medicines include NSAIDs (such as ibuprofen) and acetaminophen  Prescription medicines include muscle relaxers, antidepressants, and steroids  · Pain may be managed without any medicines  Some ways to relieve pain include massage, aromatherapy, or meditation  Physical or occupational therapy may also help  For more information:   · Drug Enforcement Administration  [address]  Phone: 5- [phone] - [phone]  Web Address: [facility]/drug_disposal/    · [address]  and Drug Administration  [place] , [address]  Phone: 1- 902 - [phone]  Web Address: http://[employer]/     © Copyright [employer] 2018 Information is for End User's use only and may not be sold, redistributed or otherwise used for commercial purposes  All illustrations and images included in CareNotes® are the copyrighted property of A D A M , Inc  or [employer]       No follow-ups on file  Subjective:      Patient ID: Arabella Chen is a 77 y o  male  Chief Complaint   Patient presents with   [facility] [employer] Wellness Visit       PATIENT RETURNS FOR ANNUAL WELLNESS EXAM AND ANNUAL EVALUATION OF PATIENT'S MEDICAL ISSUES    INDIVIDUAL MEDICAL ISSUES WITH THEIR CURRENT STATUS, ASSESSMENT AND PLANS ARE LISTED ABOVE          The following portions of the patient's history were reviewed and updated as appropriate: allergies, current medications, past family history, past medical history, past social history, past surgical history and problem list     Review of Systems   Constitutional: Positive for fatigue  Negative for chills and fever  HENT: Negative for congestion, ear discharge, ear pain, mouth sores, postnasal drip, sore throat and trouble swallowing      Eyes: Negative for pain, discharge and visual disturbance  Respiratory: Negative for cough, shortness of breath and wheezing  Cardiovascular: Negative for chest pain, palpitations and leg swelling  Gastrointestinal: Negative for abdominal distention, abdominal pain, blood in stool, diarrhea and nausea  Endocrine: Negative for polydipsia, polyphagia and polyuria  Genitourinary: Negative for dysuria, frequency, hematuria and urgency  Musculoskeletal: Positive for arthralgias, back pain, gait problem, neck pain and neck stiffness  Negative for joint swelling  Skin: Negative for pallor and rash  Neurological: Negative for dizziness, syncope, speech difficulty, weakness, light-headedness, numbness and headaches  Hematological: Negative for adenopathy  Psychiatric/Behavioral: Positive for dysphoric mood  Negative for behavioral problems, confusion and sleep disturbance  The patient is nervous/anxious  Current Outpatient Medications   Medication Sig Dispense Refill    Armodafinil 250 MG tablet Take 1 tablet (250 mg total) by mouth daily 30 tablet 0    buPROPion (WELLBUTRIN XL) 300 mg 24 hr tablet Take 1 tablet (300 mg total) by mouth daily 30 tablet 0    HYDROcodone-acetaminophen (NORCO)  mg per tablet Take 1 tablet by mouth every 4 (four) hours as needed (PAIN) Max Daily Amount: 6 tablets 180 tablet 0    meloxicam (MOBIC) 15 mg tablet Take 1 tablet (15 mg total) by mouth daily 90 tablet 0    naloxone (NARCAN) 4 mg/0 1 mL nasal spray Administer 1 spray into a nostril  If breathing does not return to normal or if breathing difficulty resumes after 2-3 minutes, give another dose in the other nostril using a new spray   1 each 1    olmesartan-hydrochlorothiazide (BENICAR HCT) 40-12 5 MG per tablet Take 1 tablet by mouth daily 90 tablet 0    omeprazole (PriLOSEC) 40 MG capsule Take 1 capsule by mouth every 12 (twelve) hours      oxyCODONE (OxyCONTIN) 10 mg 12 hr tablet Take 1 tablet (10 mg total) by mouth 2 (two) times a day Max Daily Amount: 20 mg 60 tablet 0    oxyCODONE (OxyCONTIN) 20 mg 12 hr tablet Take 1 tablet (20 mg total) by mouth every 12 (twelve) hours Max Daily Amount: 40 mg 60 tablet 0    sildenafil (VIAGRA) 50 MG tablet Take 50 mg by mouth as needed       FLUoxetine (PROzac) 10 mg capsule Take 1 capsule (10 mg total) by mouth daily 30 capsule 1    predniSONE 20 mg tablet 2 PO QD X 4 DAYS, THEN 1 PO QD X 4 DAYS, THEN 1/2 PO QD X 4 DAYS 14 tablet 0     No current facility-administered medications for this visit  Objective:    /78   Pulse 98   Temp 98 1 °F (36 7 °C) (Temporal)   Resp 16   Ht 5' 9" (1 753 m)   Wt 80 7 kg (178 lb)   SpO2 99%   BMI 26 29 kg/m²        Physical Exam  Constitutional:       Appearance: He is well-developed  Comments: PALE   HENT:      Head: Normocephalic and atraumatic  Right Ear: External ear normal       Left Ear: External ear normal       Nose: Nose normal    Eyes:      General:         Right eye: No discharge  Left eye: No discharge  Conjunctiva/sclera: Conjunctivae normal       Pupils: Pupils are equal, round, and reactive to light  Neck:      Thyroid: No thyromegaly  Vascular: No JVD  Cardiovascular:      Rate and Rhythm: Normal rate and regular rhythm  Heart sounds: Normal heart sounds  No murmur heard  Pulmonary:      Effort: Pulmonary effort is normal       Breath sounds: Normal breath sounds  No wheezing or rales  Abdominal:      General: Bowel sounds are normal       Palpations: Abdomen is soft  There is no mass  Tenderness: There is no abdominal tenderness  There is no guarding or rebound  Genitourinary:     Penis: Normal        Prostate: Normal       Rectum: Normal  Guaiac result negative  Musculoskeletal:         General: Tenderness present  No deformity  Normal range of motion  Cervical back: Neck supple        Comments: MODERATE DJD CHANGES   Lymphadenopathy: Cervical: No cervical adenopathy  Skin:     General: Skin is warm and dry  Findings: No erythema or rash  Neurological:      Mental Status: He is alert and oriented to person, place, and time  Cranial Nerves: No cranial nerve deficit  Motor: No abnormal muscle tone  Coordination: Coordination normal       Deep Tendon Reflexes: Reflexes are normal and symmetric  Psychiatric:         Behavior: Behavior normal          Thought Content: Thought content normal          Judgment: Judgment normal                 Isabela Pederson MD    Answers for HPI/ROS submitted by the patient on 1/10/2022  How would you rate your overall health?: fair  Compared to last year, how is your physical health?: slightly worse  In general, how satisfied are you with your life?: dissatisfied  Compared to last year, how is your eyesight?: same  Compared to last year, how is your hearing?: same  Compared to last year, how is your emotional/mental health?: same  How often is anger a problem for you?: sometimes  How often do you feel unusually tired/fatigued?: often  In the past 7 days, how much pain have you experienced?: a lot  If you answered "some" or "a lot", please rate the severity of your pain on a scale of 1 to 10 (1 being the least severe pain and 10 being the most intense pain)  : 7/10  In the past 6 months, have you lost or gained 10 pounds without trying?: No  One or more falls in the last year: Yes  Do you have trouble with the stairs inside or outside your home?: Yes  Does your home have working smoke alarms?: Yes  Does your home have a carbon monoxide monitor?: Yes  Which safety hazards (if any) have you experienced in your home? Please select all that apply : none  How would you describe your current diet?  Please select all that apply : Regular  In addition to prescription medications, are you taking any over-the-counter supplements?: Yes  If yes, what supplements are you taking?: Multi vitamin  Can you manage your medications?: Yes  Are you currently taking any opioid medications?: Yes  Can you walk and transfer into and out of your bed and chair?: Yes  Can you dress and groom yourself?: Yes  Can you bathe or shower yourself?: Yes  Can you feed yourself?: Yes  Can you do your laundry/ housekeeping?: Yes  Can you manage your money, pay your bills, and track your expenses?: Yes  Can you make your own meals?: Yes  Can you do your own shopping?: Yes  Within the last 12 months, have you had any hospitalizations or Emergency Department visits?: No  Do you have a living will?: Yes  Do you have a Durable POA (Power of ) for healthcare decisions?: Yes  Do you have an Advanced Directive for end of life decisions?: No  How often have you used an illegal drug (including marijuana) or a prescription medication for non-medical reasons in the past year?: less than monthly  Have you used drugs other than those required for medical reasons?: Yes  Do you abuse more than one drug at a time?: No  Are you always able to stop using drugs when you want to?: Yes  Have you had "blackouts" or "flashbacks" as a result of drug use?: No  Do you ever feel bad or guilty about your drug use?: No  Does your spouse (or parents) ever complain about your involvement with drugs?: No  Have you neglected your family because of your use of drugs?: No  Have you engaged in illegal activities in order to obtain drugs?: No  Have you ever experienced withdrawal symptoms (felt sick) when you stopped taking drugs?: No  Have you had medical problems as a result of your drug use (e g , memory loss, hepatitis, convulsions, bleeding, etc )?: No  What is the typical number of drinks you consume in a day?: 0  What is the typical number of drinks you consume in a week?: 2  How often did you have a drink containing alcohol in the past year?: 2 to 4 times a month  How many drinks did you have on a typical day  when you were drinking in the past year?: 1 to 2  How often did you have 6 or more drinks on one occasion in the past year?: never

## 2022-01-14 DIAGNOSIS — M54.42 CHRONIC BILATERAL LOW BACK PAIN WITH BILATERAL SCIATICA: ICD-10-CM

## 2022-01-14 DIAGNOSIS — M54.41 CHRONIC BILATERAL LOW BACK PAIN WITH BILATERAL SCIATICA: ICD-10-CM

## 2022-01-14 DIAGNOSIS — G89.4 CHRONIC PAIN SYNDROME: ICD-10-CM

## 2022-01-14 DIAGNOSIS — R53.83 FATIGUE, UNSPECIFIED TYPE: ICD-10-CM

## 2022-01-14 DIAGNOSIS — F33.41 RECURRENT MAJOR DEPRESSIVE DISORDER, IN PARTIAL REMISSION (HCC): ICD-10-CM

## 2022-01-14 DIAGNOSIS — G89.29 CHRONIC BILATERAL LOW BACK PAIN WITH BILATERAL SCIATICA: ICD-10-CM

## 2022-01-14 DIAGNOSIS — G47.30 SLEEP APNEA, UNSPECIFIED TYPE: ICD-10-CM

## 2022-01-15 RX ORDER — ARMODAFINIL 250 MG/1
250 TABLET ORAL DAILY
Qty: 30 TABLET | Refills: 0 | Status: SHIPPED | OUTPATIENT
Start: 2022-01-15 | End: 2022-02-14 | Stop reason: SDUPTHER

## 2022-01-15 RX ORDER — BUPROPION HYDROCHLORIDE 300 MG/1
300 TABLET ORAL DAILY
Qty: 30 TABLET | Refills: 0 | Status: SHIPPED | OUTPATIENT
Start: 2022-01-15 | End: 2022-02-14 | Stop reason: SDUPTHER

## 2022-01-15 RX ORDER — OXYCODONE HCL 10 MG/1
10 TABLET, FILM COATED, EXTENDED RELEASE ORAL 2 TIMES DAILY
Qty: 60 TABLET | Refills: 0 | Status: SHIPPED | OUTPATIENT
Start: 2022-01-15 | End: 2022-02-14 | Stop reason: SDUPTHER

## 2022-01-15 RX ORDER — HYDROCODONE BITARTRATE AND ACETAMINOPHEN 10; 325 MG/1; MG/1
1 TABLET ORAL EVERY 4 HOURS PRN
Qty: 180 TABLET | Refills: 0 | Status: SHIPPED | OUTPATIENT
Start: 2022-01-15 | End: 2022-02-14 | Stop reason: SDUPTHER

## 2022-01-15 RX ORDER — MELOXICAM 15 MG/1
15 TABLET ORAL DAILY
Qty: 90 TABLET | Refills: 0 | Status: SHIPPED | OUTPATIENT
Start: 2022-01-15 | End: 2022-04-09 | Stop reason: SDUPTHER

## 2022-01-15 RX ORDER — OXYCODONE HCL 20 MG/1
20 TABLET, FILM COATED, EXTENDED RELEASE ORAL EVERY 12 HOURS
Qty: 60 TABLET | Refills: 0 | Status: SHIPPED | OUTPATIENT
Start: 2022-01-15 | End: 2022-02-14 | Stop reason: SDUPTHER

## 2022-01-24 PROCEDURE — 93000 ELECTROCARDIOGRAM COMPLETE: CPT | Performed by: FAMILY MEDICINE

## 2022-01-27 ENCOUNTER — SOCIAL WORK (OUTPATIENT)
Dept: BEHAVIORAL/MENTAL HEALTH CLINIC | Facility: CLINIC | Age: 67
End: 2022-01-27
Payer: MEDICARE

## 2022-01-27 DIAGNOSIS — F43.21 ADJUSTMENT DISORDER WITH DEPRESSED MOOD: Primary | ICD-10-CM

## 2022-01-27 PROCEDURE — 90791 PSYCH DIAGNOSTIC EVALUATION: CPT | Performed by: SOCIAL WORKER
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
## 2022-02-03 NOTE — PATIENT INSTRUCTIONS
Please continue to schedule a follow-up session within one week  In case of a psychiatric emergency please contact any of the following:  [facility] [phone] or [phone]     National Suicide Prevention Lifeline : 2-988.705.8603

## 2022-02-07 DIAGNOSIS — F33.0 MILD EPISODE OF RECURRENT MAJOR DEPRESSIVE DISORDER (HCC): ICD-10-CM

## 2022-02-07 RX ORDER — FLUOXETINE 10 MG/1
10 CAPSULE ORAL DAILY
Qty: 30 CAPSULE | Refills: 0 | Status: SHIPPED | OUTPATIENT
Start: 2022-02-07 | End: 2022-03-30 | Stop reason: SDUPTHER

## 2022-02-14 DIAGNOSIS — G89.29 CHRONIC BILATERAL LOW BACK PAIN WITH BILATERAL SCIATICA: ICD-10-CM

## 2022-02-14 DIAGNOSIS — G47.30 SLEEP APNEA, UNSPECIFIED TYPE: ICD-10-CM

## 2022-02-14 DIAGNOSIS — R53.83 FATIGUE, UNSPECIFIED TYPE: ICD-10-CM

## 2022-02-14 DIAGNOSIS — F33.41 RECURRENT MAJOR DEPRESSIVE DISORDER, IN PARTIAL REMISSION (HCC): ICD-10-CM

## 2022-02-14 DIAGNOSIS — G89.4 CHRONIC PAIN SYNDROME: ICD-10-CM

## 2022-02-14 DIAGNOSIS — M54.42 CHRONIC BILATERAL LOW BACK PAIN WITH BILATERAL SCIATICA: ICD-10-CM

## 2022-02-14 DIAGNOSIS — M54.41 CHRONIC BILATERAL LOW BACK PAIN WITH BILATERAL SCIATICA: ICD-10-CM

## 2022-02-15 RX ORDER — BUPROPION HYDROCHLORIDE 300 MG/1
300 TABLET ORAL DAILY
Qty: 30 TABLET | Refills: 0 | Status: SHIPPED | OUTPATIENT
Start: 2022-02-15 | End: 2022-03-14 | Stop reason: SDUPTHER

## 2022-02-15 RX ORDER — HYDROCODONE BITARTRATE AND ACETAMINOPHEN 10; 325 MG/1; MG/1
1 TABLET ORAL EVERY 4 HOURS PRN
Qty: 180 TABLET | Refills: 0 | Status: SHIPPED | OUTPATIENT
Start: 2022-02-15 | End: 2022-03-14 | Stop reason: SDUPTHER

## 2022-02-15 RX ORDER — OXYCODONE HCL 10 MG/1
10 TABLET, FILM COATED, EXTENDED RELEASE ORAL 2 TIMES DAILY
Qty: 60 TABLET | Refills: 0 | Status: SHIPPED | OUTPATIENT
Start: 2022-02-15 | End: 2022-03-14 | Stop reason: SDUPTHER

## 2022-02-15 RX ORDER — OXYCODONE HCL 20 MG/1
20 TABLET, FILM COATED, EXTENDED RELEASE ORAL EVERY 12 HOURS
Qty: 60 TABLET | Refills: 0 | Status: SHIPPED | OUTPATIENT
Start: 2022-02-15 | End: 2022-03-14 | Stop reason: SDUPTHER

## 2022-02-15 RX ORDER — ARMODAFINIL 250 MG/1
250 TABLET ORAL DAILY
Qty: 30 TABLET | Refills: 0 | Status: SHIPPED | OUTPATIENT
Start: 2022-02-15 | End: 2022-03-14 | Stop reason: SDUPTHER

## 2022-02-21 ENCOUNTER — CONSULT (OUTPATIENT)
Dept: PAIN MEDICINE | Facility: CLINIC | Age: 67
End: 2022-02-21
Payer: MEDICARE

## 2022-02-21 VITALS
SYSTOLIC BLOOD PRESSURE: 128 MMHG | HEIGHT: 69 IN | HEART RATE: 70 BPM | BODY MASS INDEX: 25.77 KG/M2 | WEIGHT: 174 LBS | DIASTOLIC BLOOD PRESSURE: 90 MMHG

## 2022-02-21 DIAGNOSIS — M54.42 CHRONIC BILATERAL LOW BACK PAIN WITH BILATERAL SCIATICA: ICD-10-CM

## 2022-02-21 DIAGNOSIS — M54.12 CERVICAL RADICULOPATHY: ICD-10-CM

## 2022-02-21 DIAGNOSIS — M96.1 LUMBAR POST-LAMINECTOMY SYNDROME: ICD-10-CM

## 2022-02-21 DIAGNOSIS — M54.2 NECK PAIN: ICD-10-CM

## 2022-02-21 DIAGNOSIS — M54.41 CHRONIC BILATERAL LOW BACK PAIN WITH BILATERAL SCIATICA: ICD-10-CM

## 2022-02-21 DIAGNOSIS — G89.29 CHRONIC BILATERAL LOW BACK PAIN WITH BILATERAL SCIATICA: ICD-10-CM

## 2022-02-21 DIAGNOSIS — G89.4 CHRONIC PAIN DISORDER: Primary | ICD-10-CM

## 2022-02-21 PROCEDURE — 99204 OFFICE O/P NEW MOD 45 MIN: CPT | Performed by: ANESTHESIOLOGY
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
## 2022-02-21 NOTE — PATIENT INSTRUCTIONS
Magnetic Resonance Imaging   WHAT YOU NEED TO KNOW:   What do I need to know about magnetic resonance imaging (MRI)? An MRI is a test that uses magnetic fields and radio waves to take pictures inside your body  An MRI is used to see blood vessels, tissue, muscles, and bones  It can also show organs, such as your heart, lungs, or liver  An MRI can help your healthcare provider diagnose or treat a medical condition  It does not use radiation  How do I prepare for an MRI? · Your healthcare provider will tell you which medicines to take or not take on the day of your MRI  He or she may give you medicine to help you feel calm and relaxed during the MRI  · Tell your provider if you know or think you are pregnant  · Tell your provider if you have any metal in your body, such as a pacemaker, implant, or aneurism clip  Tell him or her if you have a tattoo or wear a medicine patch  · Remove any metal items, such as hair clips, jewelry, glasses, hearing aids, or dentures before you enter the MRI room  What will happen during an MRI? Your healthcare provider will ask you to lie on a table  Contrast liquid may be used to help a body part show up more clearly  The table will be moved into an open space in the middle of the machine  You will need to lie still during the MRI  It is normal to hear knocking, thumping, or clicking noises from the machine  What are the risks of an MRI? An MRI may cause a metal object in your body to move out of place and cause serious injury, or stop working properly  The contrast liquid may cause nausea, a headache, lightheadedness, or pain at the injection site  You could have an allergic reaction to the contrast liquid  CARE AGREEMENT:   You have the right to help plan your care  Learn about your health condition and how it may be treated  Discuss treatment options with your healthcare providers to decide what care you want to receive   You always have the right to refuse treatment  The above information is an  only  It is not intended as medical advice for individual conditions or treatments  Talk to your doctor, nurse or pharmacist before following any medical regimen to see if it is safe and effective for you  © Copyright [employer] 2021 Information is for End User's use only and may not be sold, redistributed or otherwise used for commercial purposes   All illustrations and images included in CareNotes® are the copyrighted property of A D A M , Inc  or [address] [place] [address]

## 2022-02-24 ENCOUNTER — RA CDI HCC (OUTPATIENT)
Dept: OTHER | Facility: HOSPITAL | Age: 67
End: 2022-02-24

## 2022-02-24 ENCOUNTER — SOCIAL WORK (OUTPATIENT)
Dept: BEHAVIORAL/MENTAL HEALTH CLINIC | Facility: CLINIC | Age: 67
End: 2022-02-24
Payer: MEDICARE

## 2022-02-24 DIAGNOSIS — F43.21 ADJUSTMENT DISORDER WITH DEPRESSED MOOD: Primary | ICD-10-CM

## 2022-02-24 PROCEDURE — 90834 PSYTX W PT 45 MINUTES: CPT | Performed by: SOCIAL WORKER

## 2022-02-24 NOTE — PROGRESS NOTES
[facility]  coding opportunities       Chart reviewed, no opportunity found: CHART REVIEWED, NO OPPORTUNITY FOUND                        Patients insurance company: [employer]

## 2022-03-02 ENCOUNTER — TELEPHONE (OUTPATIENT)
Dept: FAMILY MEDICINE CLINIC | Facility: CLINIC | Age: 67
End: 2022-03-02

## 2022-03-02 NOTE — TELEPHONE ENCOUNTER
Harry called from Tony stating Nikki Paz went to [facility] today for an appt at 4pm for a MRI lumbar spine you ordered 1/10/22  Harry states when Az scheduled the appt she did not have the order so she scheduled Nikki Paz for MRI lumbar spine without contrast   You ordered with contrast   Tony states its their policy to do with and without  Please change order to MRI lumbar spine with and without  Also since Nikki Paz waited so long to go for the appt his CMP bw from Dec is no longer valid for the appt  Please place new bw order for CMP  Call Nikki Paz when bw order and new MRI order is ready is ready    Also fax new MRI order to Tony at fax# [phone]  Thanks

## 2022-03-03 ENCOUNTER — SOCIAL WORK (OUTPATIENT)
Dept: BEHAVIORAL/MENTAL HEALTH CLINIC | Facility: CLINIC | Age: 67
End: 2022-03-03
Payer: MEDICARE

## 2022-03-03 DIAGNOSIS — F43.21 ADJUSTMENT DISORDER WITH DEPRESSED MOOD: Primary | ICD-10-CM

## 2022-03-03 PROCEDURE — 90834 PSYTX W PT 45 MINUTES: CPT | Performed by: SOCIAL WORKER

## 2022-03-03 NOTE — TELEPHONE ENCOUNTER
Also he has an appt scheduled for Monday to go over the MRI and the anemia  Since he did not get the MRI yet, do you still want to see him on Monday? He did have the cervical MRI done through Dr Nae Caruso (ARCHANA Ramsey)  Results should be in his chart

## 2022-03-04 ENCOUNTER — TELEPHONE (OUTPATIENT)
Dept: BEHAVIORAL/MENTAL HEALTH CLINIC | Facility: CLINIC | Age: 67
End: 2022-03-04

## 2022-03-04 NOTE — TELEPHONE ENCOUNTER
Left VM informing him that [facility] can see him for a psych   Eval     Will refer him [facility] for psych eval

## 2022-03-08 NOTE — PATIENT INSTRUCTIONS
Please continue to schedule a follow-up session within one week  In case of a psychiatric emergency please contact any of the following:  [facility] [phone] or [phone]     National Suicide Prevention Lifeline : 8-384.735.4501
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
## 2022-03-10 ENCOUNTER — SOCIAL WORK (OUTPATIENT)
Dept: BEHAVIORAL/MENTAL HEALTH CLINIC | Facility: CLINIC | Age: 67
End: 2022-03-10
Payer: MEDICARE

## 2022-03-10 DIAGNOSIS — F43.21 ADJUSTMENT DISORDER WITH DEPRESSED MOOD: Primary | ICD-10-CM

## 2022-03-10 PROCEDURE — 90834 PSYTX W PT 45 MINUTES: CPT | Performed by: SOCIAL WORKER

## 2022-03-14 DIAGNOSIS — G89.4 CHRONIC PAIN SYNDROME: ICD-10-CM

## 2022-03-14 DIAGNOSIS — I10 ESSENTIAL HYPERTENSION: ICD-10-CM

## 2022-03-14 DIAGNOSIS — G47.30 SLEEP APNEA, UNSPECIFIED TYPE: ICD-10-CM

## 2022-03-14 DIAGNOSIS — G89.29 CHRONIC BILATERAL LOW BACK PAIN WITH BILATERAL SCIATICA: ICD-10-CM

## 2022-03-14 DIAGNOSIS — M54.41 CHRONIC BILATERAL LOW BACK PAIN WITH BILATERAL SCIATICA: ICD-10-CM

## 2022-03-14 DIAGNOSIS — M54.42 CHRONIC BILATERAL LOW BACK PAIN WITH BILATERAL SCIATICA: ICD-10-CM

## 2022-03-14 DIAGNOSIS — R53.83 FATIGUE, UNSPECIFIED TYPE: ICD-10-CM

## 2022-03-14 DIAGNOSIS — F33.41 RECURRENT MAJOR DEPRESSIVE DISORDER, IN PARTIAL REMISSION (HCC): ICD-10-CM

## 2022-03-14 RX ORDER — BUPROPION HYDROCHLORIDE 300 MG/1
300 TABLET ORAL DAILY
Qty: 30 TABLET | Refills: 0 | Status: SHIPPED | OUTPATIENT
Start: 2022-03-14 | End: 2022-04-09 | Stop reason: SDUPTHER

## 2022-03-14 RX ORDER — OLMESARTAN MEDOXOMIL AND HYDROCHLOROTHIAZIDE 40/12.5 40; 12.5 MG/1; MG/1
1 TABLET ORAL DAILY
Qty: 90 TABLET | Refills: 0 | Status: SHIPPED | OUTPATIENT
Start: 2022-03-14 | End: 2022-05-06 | Stop reason: SDUPTHER

## 2022-03-14 RX ORDER — OXYCODONE HCL 20 MG/1
20 TABLET, FILM COATED, EXTENDED RELEASE ORAL EVERY 12 HOURS
Qty: 60 TABLET | Refills: 0 | Status: SHIPPED | OUTPATIENT
Start: 2022-03-14 | End: 2022-04-09 | Stop reason: SDUPTHER

## 2022-03-14 RX ORDER — OXYCODONE HCL 10 MG/1
10 TABLET, FILM COATED, EXTENDED RELEASE ORAL 2 TIMES DAILY
Qty: 60 TABLET | Refills: 0 | Status: SHIPPED | OUTPATIENT
Start: 2022-03-14 | End: 2022-04-09 | Stop reason: SDUPTHER

## 2022-03-14 RX ORDER — ARMODAFINIL 250 MG/1
250 TABLET ORAL DAILY
Qty: 30 TABLET | Refills: 0 | Status: SHIPPED | OUTPATIENT
Start: 2022-03-14 | End: 2022-04-09 | Stop reason: SDUPTHER

## 2022-03-14 RX ORDER — HYDROCODONE BITARTRATE AND ACETAMINOPHEN 10; 325 MG/1; MG/1
1 TABLET ORAL EVERY 4 HOURS PRN
Qty: 180 TABLET | Refills: 0 | Status: SHIPPED | OUTPATIENT
Start: 2022-03-14 | End: 2022-04-09 | Stop reason: SDUPTHER

## 2022-03-14 NOTE — PATIENT INSTRUCTIONS
Please continue to schedule a follow-up session within one week  In case of a psychiatric emergency please contact any of the following:  [facility] [phone] or [phone]     National Suicide Prevention Lifeline : 7-146.488.4165

## 2022-03-15 ENCOUNTER — TELEPHONE (OUTPATIENT)
Dept: RADIOLOGY | Facility: CLINIC | Age: 67
End: 2022-03-15

## 2022-03-15 NOTE — PSYCH
This note was not shared with the patient due to this is a psychotherapy note     Assessment/Plan:      Diagnoses and all orders for this visit:    Adjustment disorder with depressed mood          Subjective:     Patient ID: Harjinder Hernández is a 79 y o  male who presented for his follow-up outpatient counseling appointment  Today claire reports he received prednisone due to his cervical seine pain, arm and back pain  He reports the prednisone helps but he's aware it's not a "forever lasting" relief  Jhonatancristofer Kidd continues to wait for MRI and bloodwork to then meet with the pain management specialist   Nashchaya Marti reports he had to go to the urgent care due to his inability to move  Jhonatancristofer Kidd shared about his medica history with anemia and how it affects his ability to get an MRI at this time  Nashchaya Kidd shared that he's had bout so insomnia in the past where he goes 3-4 days of no sleep but also shared that he's been drinking alcohol daily  Laxmi Cedenoe denies having a substance use disorder and reports drinking 1-2 beers a night and not daily  The undersigned therapist provided Nashchaya Kidd with psychoeducation on sleep hygiene and how alcohol affects our sleep  Nashchaya Kidd also reports he has times where he falls asleep so deeply that nothing or no one is able to wake him up  Nashchaya Kidd had history of a DUI and reports he was outside of a liquor store when this sleeping spell came over him  Jhonatancristofer Kidd was unable to fight the case as his physical ailments prohibited him from walking in a straight line and completing the cam   Nashchaya Kidd continues to feel sad, depressed and struggling to manage his anxiety  HE reports he used to be healthy, alive going to marathons, running and doing mindfulness medication but feels at this time he's unmotivated, depressed  The undersigned therapist assisted Jhonatancristofer Kidd come up with strategies to help him sleep more and organize himself to feel less overwhelmed   Jhonatancristofer Kidd denies suicidal intent, gesture or ideation at this time but admits ideations come fleeting through from time to time  Jaspreet Timmons lives with his wife and his son lives in [place]  Jaspreet Timmons has motivation to stay alive, but is overwhelmed with finances, his marriage and his physical pain  The undersigned therapist provided Jaspreet Timmons with 45 minutes of psychotherapy  Review of Systems   Psychiatric/Behavioral: Positive for decreased concentration, dysphoric mood and sleep disturbance  The patient is nervous/anxious  Objective:     Physical Exam  Psychiatric:         Attention and Perception: Attention and perception normal          Mood and Affect: Affect normal  Mood is anxious  Speech: Speech normal          Behavior: Behavior normal  Behavior is cooperative  Thought Content:  Thought content normal          Cognition and Memory: Cognition and memory normal          Judgment: Judgment normal

## 2022-03-15 NOTE — TELEPHONE ENCOUNTER
----- Message from Alvina Hernández MD sent at 3/15/2022  1:03 PM EDT -----  Regarding: MRI C-spine results  MRI of the cervical spine shows spinal stenosis, spondylolisthesis and a fused vertebra  The fused vertebra is at the C6-C7 level  I think at this point the patient would benefit from a C7-T1 cervical epidural steroid injection since this could be potentially therapeutic  If he is interested, please schedule the injection    Proc:  C7-T1 PUMA  CPT:  22333  Dx:  M48 02, M54 12, M54 2, G89 4

## 2022-03-22 NOTE — PATIENT INSTRUCTIONS
Please continue to schedule a follow-up session within one week  In case of a psychiatric emergency please contact any of the following:  [facility] [phone] or [phone]     National Suicide Prevention Lifeline : 0-954.357.1772
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
## 2022-03-30 DIAGNOSIS — F33.0 MILD EPISODE OF RECURRENT MAJOR DEPRESSIVE DISORDER (HCC): ICD-10-CM

## 2022-03-30 RX ORDER — FLUOXETINE 10 MG/1
10 CAPSULE ORAL DAILY
Qty: 30 CAPSULE | Refills: 0 | Status: SHIPPED | OUTPATIENT
Start: 2022-03-30 | End: 2022-04-01 | Stop reason: SDUPTHER

## 2022-03-31 ENCOUNTER — TELEMEDICINE (OUTPATIENT)
Dept: PSYCHIATRY | Facility: CLINIC | Age: 67
End: 2022-03-31
Payer: MEDICARE

## 2022-03-31 DIAGNOSIS — F33.0 MILD EPISODE OF RECURRENT MAJOR DEPRESSIVE DISORDER (HCC): ICD-10-CM

## 2022-03-31 PROCEDURE — 90792 PSYCH DIAG EVAL W/MED SRVCS: CPT | Performed by: PSYCHIATRY & NEUROLOGY

## 2022-03-31 RX ORDER — TADALAFIL 5 MG/1
5 TABLET ORAL DAILY
COMMUNITY
Start: 2022-03-08 | End: 2023-03-08
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
## 2022-04-01 RX ORDER — FLUOXETINE HYDROCHLORIDE 20 MG/1
20 CAPSULE ORAL DAILY
Qty: 30 CAPSULE | Refills: 1 | Status: SHIPPED | OUTPATIENT
Start: 2022-04-14 | End: 2022-05-31

## 2022-04-01 RX ORDER — FLUOXETINE 10 MG/1
10 CAPSULE ORAL DAILY
Qty: 30 CAPSULE | Refills: 1 | Status: SHIPPED | OUTPATIENT
Start: 2022-04-01 | End: 2022-05-31

## 2022-04-04 NOTE — TELEPHONE ENCOUNTER
Joe Mullins from [facility] called again stating they are still waiting for a new MRI order  Harry called previously stating when Keely Garland scheduled the appt she did not have the order so she scheduled Mayra Seashore for MRI lumbar spine without contrast   You ordered with contrast   Tony states its their policy to do with and without  Please change order to MRI lumbar spine with and without  Mayra Seashore is scheduled for 4/7     Fax to them at [phone]

## 2022-04-09 DIAGNOSIS — M54.42 CHRONIC BILATERAL LOW BACK PAIN WITH BILATERAL SCIATICA: ICD-10-CM

## 2022-04-09 DIAGNOSIS — G89.4 CHRONIC PAIN SYNDROME: ICD-10-CM

## 2022-04-09 DIAGNOSIS — G47.30 SLEEP APNEA, UNSPECIFIED TYPE: ICD-10-CM

## 2022-04-09 DIAGNOSIS — M54.41 CHRONIC BILATERAL LOW BACK PAIN WITH BILATERAL SCIATICA: ICD-10-CM

## 2022-04-09 DIAGNOSIS — F33.41 RECURRENT MAJOR DEPRESSIVE DISORDER, IN PARTIAL REMISSION (HCC): ICD-10-CM

## 2022-04-09 DIAGNOSIS — R53.83 FATIGUE, UNSPECIFIED TYPE: ICD-10-CM

## 2022-04-09 DIAGNOSIS — G89.29 CHRONIC BILATERAL LOW BACK PAIN WITH BILATERAL SCIATICA: ICD-10-CM

## 2022-04-11 ENCOUNTER — TELEPHONE (OUTPATIENT)
Dept: PAIN MEDICINE | Facility: CLINIC | Age: 67
End: 2022-04-11

## 2022-04-12 ENCOUNTER — TELEPHONE (OUTPATIENT)
Dept: PAIN MEDICINE | Facility: CLINIC | Age: 67
End: 2022-04-12

## 2022-04-12 RX ORDER — ARMODAFINIL 250 MG/1
250 TABLET ORAL DAILY
Qty: 30 TABLET | Refills: 0 | Status: SHIPPED | OUTPATIENT
Start: 2022-04-12 | End: 2022-05-12 | Stop reason: SDUPTHER

## 2022-04-12 RX ORDER — HYDROCODONE BITARTRATE AND ACETAMINOPHEN 10; 325 MG/1; MG/1
1 TABLET ORAL EVERY 4 HOURS PRN
Qty: 180 TABLET | Refills: 0 | Status: SHIPPED | OUTPATIENT
Start: 2022-04-12 | End: 2022-05-12 | Stop reason: SDUPTHER

## 2022-04-12 RX ORDER — OXYCODONE HCL 20 MG/1
20 TABLET, FILM COATED, EXTENDED RELEASE ORAL EVERY 12 HOURS
Qty: 60 TABLET | Refills: 0 | Status: SHIPPED | OUTPATIENT
Start: 2022-04-12 | End: 2022-05-12 | Stop reason: SDUPTHER

## 2022-04-12 RX ORDER — MELOXICAM 15 MG/1
15 TABLET ORAL DAILY
Qty: 90 TABLET | Refills: 0 | Status: SHIPPED | OUTPATIENT
Start: 2022-04-12 | End: 2022-07-08 | Stop reason: SDUPTHER

## 2022-04-12 RX ORDER — OXYCODONE HCL 10 MG/1
10 TABLET, FILM COATED, EXTENDED RELEASE ORAL 2 TIMES DAILY
Qty: 60 TABLET | Refills: 0 | Status: SHIPPED | OUTPATIENT
Start: 2022-04-12 | End: 2022-05-12 | Stop reason: SDUPTHER

## 2022-04-12 RX ORDER — BUPROPION HYDROCHLORIDE 300 MG/1
300 TABLET ORAL DAILY
Qty: 30 TABLET | Refills: 0 | Status: SHIPPED | OUTPATIENT
Start: 2022-04-12 | End: 2022-05-12 | Stop reason: SDUPTHER

## 2022-04-12 NOTE — TELEPHONE ENCOUNTER
Left message for the patient to call to schedule his injection    Gave my direct phone number [phone]

## 2022-04-12 NOTE — TELEPHONE ENCOUNTER
S/w the patient and reviewed MRI results with him  He stated he would like to schedule the PUMA and TFESI   He did inquire to see if he could have both injections done at the same time  I did review that it not possible because of the steroid load  He could probably schedule two weeks in between  He denies taking blood thinning medications  Please schedule and he appreciated the call  Thanks!

## 2022-04-12 NOTE — TELEPHONE ENCOUNTER
Just wanted to let you know that he has one pill left and the last time he ran out, he did not have a good experience

## 2022-04-12 NOTE — TELEPHONE ENCOUNTER
----- Message from Lokesh Saldivar MD sent at 4/11/2022  4:31 PM EDT -----  Regarding: FW: Cervical MRI  MRI of the lumbar spine shows an anterior fusion of L4-L5 and L5-S1  There is moderate to severe lumbar dextroscoliosis  There is moderate central and moderate to severe bilateral foraminal stenosis at the L2-L3 level  There is L3-L4 moderate to severe central canal and bilateral severe foraminal stenosis  Based on this, I think he would benefit from bilateral L5 transforaminal epidural steroid injections  Please schedule if he is interested  Proc:  Bilateral L5 transforaminal epidural steroid injectionsDx:  M96 1, M54 5, M54 16, G89  4CPT:  [id number]  ----- Message -----  From: Jana Araujo RN  Sent: 4/11/2022   4:17 PM EDT  To: Lokesh Saldivar MD  Subject: FW: Toni Ortiz,  Have you reviewed the lumbar MRI? Pt has not scheduled an appt as yet  Thank you,  Lucita Frank  ----- Message -----  From: Augustin Bee RN  Sent: 4/11/2022   3:44 PM EDT  To: [facility]  Subject: FW: Cervical MRI                                 [place] patient  ----- Message -----  From: Sabrina Gauthier  Sent: 4/11/2022   3:28 PM EDT  To: [facility]  Subject: Cervical MRI                                     Hi there, Joanna  I had an MRI on my lumbar spine on April 7 and Dr Zak López should have received the report as well  My PCP Dr Almer Meigs indicated that the MRI report was "not happy" and that I should schedule an appointment with Dr Radha Ortiz as soon as possible  I would like to get the C7-T1 cervical epidural injection for my cervical spine but felt it would be better to wait until I had the lumbar spine MRI so that he could get a full picture of my condition before I went any further  I will call and try to set up an appointment  Thanks!    Maxi Jackson

## 2022-04-21 NOTE — TELEPHONE ENCOUNTER
Scheduled patient for TFESI on 5/13/22 and PUMA on 6/9/22  Patient denies RX blood thinners/ NSAIDS  Nothing to eat or drink 1 hour prior to procedure  Needs to arrange transportation  Proper clothing for procedure  No vaccines 2 weeks prior or after procedure  If ill or place on antibiotics, please call to reschedule

## 2022-05-06 DIAGNOSIS — I10 ESSENTIAL HYPERTENSION: ICD-10-CM

## 2022-05-06 RX ORDER — OLMESARTAN MEDOXOMIL AND HYDROCHLOROTHIAZIDE 40/12.5 40; 12.5 MG/1; MG/1
1 TABLET ORAL DAILY
Qty: 60 TABLET | Refills: 0 | Status: SHIPPED | OUTPATIENT
Start: 2022-05-06 | End: 2022-07-08 | Stop reason: SDUPTHER

## 2022-05-12 DIAGNOSIS — M54.41 CHRONIC BILATERAL LOW BACK PAIN WITH BILATERAL SCIATICA: ICD-10-CM

## 2022-05-12 DIAGNOSIS — R53.83 FATIGUE, UNSPECIFIED TYPE: ICD-10-CM

## 2022-05-12 DIAGNOSIS — M54.42 CHRONIC BILATERAL LOW BACK PAIN WITH BILATERAL SCIATICA: ICD-10-CM

## 2022-05-12 DIAGNOSIS — G89.4 CHRONIC PAIN SYNDROME: ICD-10-CM

## 2022-05-12 DIAGNOSIS — G89.29 CHRONIC BILATERAL LOW BACK PAIN WITH BILATERAL SCIATICA: ICD-10-CM

## 2022-05-12 DIAGNOSIS — G47.30 SLEEP APNEA, UNSPECIFIED TYPE: ICD-10-CM

## 2022-05-12 DIAGNOSIS — F33.41 RECURRENT MAJOR DEPRESSIVE DISORDER, IN PARTIAL REMISSION (HCC): ICD-10-CM

## 2022-05-12 RX ORDER — OXYCODONE HCL 20 MG/1
20 TABLET, FILM COATED, EXTENDED RELEASE ORAL EVERY 12 HOURS
Qty: 60 TABLET | Refills: 0 | Status: SHIPPED | OUTPATIENT
Start: 2022-05-12 | End: 2022-06-10 | Stop reason: SDUPTHER

## 2022-05-12 RX ORDER — BUPROPION HYDROCHLORIDE 300 MG/1
300 TABLET ORAL DAILY
Qty: 30 TABLET | Refills: 0 | Status: SHIPPED | OUTPATIENT
Start: 2022-05-12 | End: 2022-05-26

## 2022-05-12 RX ORDER — HYDROCODONE BITARTRATE AND ACETAMINOPHEN 10; 325 MG/1; MG/1
1 TABLET ORAL EVERY 4 HOURS PRN
Qty: 180 TABLET | Refills: 0 | Status: SHIPPED | OUTPATIENT
Start: 2022-05-12 | End: 2022-06-10 | Stop reason: SDUPTHER

## 2022-05-12 RX ORDER — ARMODAFINIL 250 MG/1
250 TABLET ORAL DAILY
Qty: 30 TABLET | Refills: 0 | Status: SHIPPED | OUTPATIENT
Start: 2022-05-12 | End: 2022-06-10 | Stop reason: SDUPTHER

## 2022-05-12 RX ORDER — OXYCODONE HCL 10 MG/1
10 TABLET, FILM COATED, EXTENDED RELEASE ORAL 2 TIMES DAILY
Qty: 60 TABLET | Refills: 0 | Status: SHIPPED | OUTPATIENT
Start: 2022-05-12 | End: 2022-06-10 | Stop reason: SDUPTHER

## 2022-05-13 ENCOUNTER — HOSPITAL ENCOUNTER (OUTPATIENT)
Facility: AMBULARY SURGERY CENTER | Age: 67
Setting detail: OUTPATIENT SURGERY
Discharge: HOME | End: 2022-05-13
Attending: ANESTHESIOLOGY | Admitting: ANESTHESIOLOGY
Payer: MEDICARE

## 2022-05-13 ENCOUNTER — APPOINTMENT (OUTPATIENT)
Dept: RADIOLOGY | Facility: HOSPITAL | Age: 67
End: 2022-05-13
Payer: MEDICARE

## 2022-05-13 VITALS
DIASTOLIC BLOOD PRESSURE: 101 MMHG | HEART RATE: 80 BPM | SYSTOLIC BLOOD PRESSURE: 161 MMHG | OXYGEN SATURATION: 98 % | RESPIRATION RATE: 18 BRPM | TEMPERATURE: 97.4 F

## 2022-05-13 PROCEDURE — 64483 NJX AA&/STRD TFRM EPI L/S 1: CPT | Performed by: ANESTHESIOLOGY

## 2022-05-13 PROCEDURE — A9585 GADOBUTROL INJECTION: HCPCS | Performed by: ANESTHESIOLOGY

## 2022-05-13 RX ORDER — BUPIVACAINE HYDROCHLORIDE 2.5 MG/ML
INJECTION, SOLUTION EPIDURAL; INFILTRATION; INTRACAUDAL AS NEEDED
Status: DISCONTINUED | OUTPATIENT
Start: 2022-05-13 | End: 2022-05-13 | Stop reason: HOSPADM

## 2022-05-13 RX ORDER — METHYLPREDNISOLONE ACETATE 80 MG/ML
INJECTION, SUSPENSION INTRA-ARTICULAR; INTRALESIONAL; INTRAMUSCULAR; SOFT TISSUE AS NEEDED
Status: DISCONTINUED | OUTPATIENT
Start: 2022-05-13 | End: 2022-05-13 | Stop reason: HOSPADM

## 2022-05-13 RX ORDER — LIDOCAINE WITH 8.4% SOD BICARB 0.9%(10ML)
SYRINGE (ML) INJECTION AS NEEDED
Status: DISCONTINUED | OUTPATIENT
Start: 2022-05-13 | End: 2022-05-13 | Stop reason: HOSPADM

## 2022-05-13 NOTE — OP NOTE
ATTENDING PHYSICIAN:  Yaneth Aguilar MD     PROCEDURE:  1  Left L5 transforaminal epidural steroid injection under fluoroscopic guidance  2  Right L5 transforaminal epidural steroid injection under fluoroscopic guidance  PRE-PROCEDURE DIAGNOSIS:  Lumbar post-laminectomy syndrome    POST-PROCEDURE DIAGNOSIS:  Lumbar post-laminectomy syndrome    ANESTHESIA:  Local     ESTIMATED BLOOD LOSS:  Minimal     COMPLICATIONS:  None  LOCATION:  [address]  CONSENT:  Today's procedure, its potential benefits as well as its risks and potential side effects were reviewed  Discussed risks of the procedure including bleeding, infection, nerve irritation or damage, reactions to the medications, weakness, headache, failure of the pain to improve, and potential worsening of the pain were explained to the patient who verbalized understanding and who wished to proceed  Written informed consent was thereby obtained  DESCRIPTION OF THE PROCEDURE:  After written informed consent was obtained, the patient was taken to the fluoroscopy suite and placed in the prone position  Anatomical landmarks were identified by way of fluoroscopy in multiple views  The skin of the lumbar region was prepped using antiseptic and draped in the usual sterile fashion  Strict aseptic technique was utilized  The skin and subcutaneous tissues at the needle entry site were infiltrated with a total of 5 mL of 1% preservative-free lidocaine using a 25-gauge 1-1/2-inch needle  22-gauge needles were then incrementally advanced under fluoroscopic guidance in the oblique view into the neural foramina as mentioned above  Proper placement into each of the neural foramen was confirmed with fluoroscopy in both the lateral and AP views  After negative aspiration for CSF or heme, contrast was injected, which delineated the nerve roots and the epidural space under fluoroscopy in the AP view   There was only a transient pressure paresthesia that resolved immediately upon injection  After negative aspiration, a 2 mL of a 4 mL injectate consisting of 3 mL of preservative-free 0 25% bupivacaine and 1 mL of Depo-Medrol 80 mg/mL was slowly injected into each of the needles as delineated above  The patient tolerated the procedure well and all needles were removed intact  Hemostasis was maintained  There were no apparent paresthesias or complications  The skin was wiped clean and a Band-Aid was placed as appropriate  The patient was monitored for an appropriate period of time and remained hemodynamically stable following the procedure  The patient was ultimately discharged to home with supervision in good condition and instructed to call the office in approximately 7-10 days with an update or sooner as warranted  Discharge instructions were provided  I was present for and participated in all key and critical portions of this procedure      Arsh Benito MD  5/13/2022  12:48 PM

## 2022-05-13 NOTE — DISCHARGE INSTRUCTIONS
Epidural Steroid Injection   WHAT YOU NEED TO KNOW:   An epidural steroid injection (TREVON) is a procedure to inject steroid medicine into the epidural space  The epidural space is between your spinal cord and vertebrae  Steroids reduce inflammation and fluid buildup in your spine that may be causing pain  You may be given pain medicine along with the steroids  ACTIVITY  Do not drive or operate machinery today  No strenuous activity today - bending, lifting, etc   You may resume normal activites starting tomorrow - start slowly and as tolerated  You may shower today, but no tub baths or hot tubs  You may have numbness for several hours from the local anesthetic  Please use caution and common sense, especially with weight-bearing activities  CARE OF THE INJECTION SITE  If you have soreness or pain, apply ice to the area today (20 minutes on/20 minutes off)  Starting tomorrow, you may use warm, moist heat or ice if needed  You may have an increase or change in your discomfort for 36-48 hours after your treatment  Apply ice and continue with any pain medication you have been prescribed  Notify [facility] if you have any of the following: redness, drainage, swelling, headache, stiff neck or fever above 100°F     SPECIAL INSTRUCTIONS  Our office will contact you in approximately 7 days for a progress report  MEDICATIONS  Continue to take all routine medications  Our office may have instructed you to hold some medications  As no general anesthesia was used in today's procedure, you should not experience any side effects related to anesthesia  If you have a problem specifically related to your procedure, please call our office at [phone]  Problems not related to your procedure should be directed to your primary care physician
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
## 2022-05-20 ENCOUNTER — TELEPHONE (OUTPATIENT)
Dept: PAIN MEDICINE | Facility: CLINIC | Age: 67
End: 2022-05-20

## 2022-05-20 NOTE — TELEPHONE ENCOUNTER
Pt reports 20% improvement post inj   Pain level 5/10  Pt aware I will call next week for an update

## 2022-05-26 ENCOUNTER — TELEMEDICINE (OUTPATIENT)
Dept: PSYCHIATRY | Facility: CLINIC | Age: 67
End: 2022-05-26
Payer: MEDICARE

## 2022-05-26 DIAGNOSIS — F41.9 MILD ANXIETY: ICD-10-CM

## 2022-05-26 DIAGNOSIS — F33.41 RECURRENT MAJOR DEPRESSIVE DISORDER, IN PARTIAL REMISSION (HCC): Primary | ICD-10-CM

## 2022-05-26 PROCEDURE — 99213 OFFICE O/P EST LOW 20 MIN: CPT | Performed by: PSYCHIATRY & NEUROLOGY

## 2022-05-26 RX ORDER — BUPROPION HYDROCHLORIDE 450 MG/1
450 TABLET, FILM COATED, EXTENDED RELEASE ORAL EVERY MORNING
Qty: 30 TABLET | Refills: 1 | Status: SHIPPED | OUTPATIENT
Start: 2022-05-26 | End: 2022-07-11 | Stop reason: SDUPTHER

## 2022-05-26 RX ORDER — BUSPIRONE HYDROCHLORIDE 10 MG/1
10 TABLET ORAL 2 TIMES DAILY
Qty: 60 TABLET | Refills: 1 | Status: SHIPPED | OUTPATIENT
Start: 2022-05-26 | End: 2022-07-11 | Stop reason: SDUPTHER
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
## 2022-05-27 ENCOUNTER — TELEPHONE (OUTPATIENT)
Dept: PSYCHIATRY | Facility: CLINIC | Age: 67
End: 2022-05-27

## 2022-05-27 NOTE — TELEPHONE ENCOUNTER
Fax from pharmacy requesting completion of Prior Authorization for Bupropion  MG 24 HR Tablet  Prior Authorization faxed to [facility]  Prior Authorization approval from [employer] effective 1/1/2022 - 5/27/2023  Called pharmacy and notified them of approval   Pharmacist states medication did not go through  Approval letter faxed to him at [phone]  LM on [facility] VM that medication has been approved

## 2022-06-09 ENCOUNTER — TELEPHONE (OUTPATIENT)
Dept: PAIN MEDICINE | Facility: CLINIC | Age: 67
End: 2022-06-09

## 2022-06-09 NOTE — TELEPHONE ENCOUNTER
Received a message from Larry at the [facility] that Shiv Lopes called to cancel his procedure today due to being ill  He will call the office to reschedule when he is feeling better   Procedure has been cancelled

## 2022-06-10 DIAGNOSIS — M54.42 CHRONIC BILATERAL LOW BACK PAIN WITH BILATERAL SCIATICA: ICD-10-CM

## 2022-06-10 DIAGNOSIS — R53.83 FATIGUE, UNSPECIFIED TYPE: ICD-10-CM

## 2022-06-10 DIAGNOSIS — G89.29 CHRONIC BILATERAL LOW BACK PAIN WITH BILATERAL SCIATICA: ICD-10-CM

## 2022-06-10 DIAGNOSIS — G47.30 SLEEP APNEA, UNSPECIFIED TYPE: ICD-10-CM

## 2022-06-10 DIAGNOSIS — M54.41 CHRONIC BILATERAL LOW BACK PAIN WITH BILATERAL SCIATICA: ICD-10-CM

## 2022-06-10 DIAGNOSIS — G89.4 CHRONIC PAIN SYNDROME: ICD-10-CM

## 2022-06-10 RX ORDER — OXYCODONE HCL 20 MG/1
20 TABLET, FILM COATED, EXTENDED RELEASE ORAL EVERY 12 HOURS
Qty: 60 TABLET | Refills: 0 | Status: SHIPPED | OUTPATIENT
Start: 2022-06-10 | End: 2022-07-08 | Stop reason: SDUPTHER

## 2022-06-10 RX ORDER — HYDROCODONE BITARTRATE AND ACETAMINOPHEN 10; 325 MG/1; MG/1
1 TABLET ORAL EVERY 4 HOURS PRN
Qty: 180 TABLET | Refills: 0 | Status: SHIPPED | OUTPATIENT
Start: 2022-06-10 | End: 2022-07-08 | Stop reason: SDUPTHER

## 2022-06-10 RX ORDER — OXYCODONE HCL 10 MG/1
10 TABLET, FILM COATED, EXTENDED RELEASE ORAL 2 TIMES DAILY
Qty: 60 TABLET | Refills: 0 | Status: SHIPPED | OUTPATIENT
Start: 2022-06-10 | End: 2022-07-08 | Stop reason: SDUPTHER

## 2022-06-10 RX ORDER — ARMODAFINIL 250 MG/1
250 TABLET ORAL DAILY
Qty: 30 TABLET | Refills: 0 | Status: SHIPPED | OUTPATIENT
Start: 2022-06-10 | End: 2022-07-08 | Stop reason: SDUPTHER

## 2022-07-05 ENCOUNTER — TELEPHONE (OUTPATIENT)
Dept: PSYCHIATRY | Facility: CLINIC | Age: 67
End: 2022-07-05

## 2022-07-05 NOTE — TELEPHONE ENCOUNTER
Chief complaint:   Chief Complaint   Patient presents with   • Follow-up     medication follow up       Vitals:  Visit Vitals  /62   Pulse 72   Temp 98.8 °F (37.1 °C) (Temporal Artery)   Wt 86.5 kg   SpO2 97%   BMI 31.73 kg/m²       HISTORY OF PRESENT ILLNESS     HPI  Hypertension.  she continues her antihypertensives.  she denies lightheadedness and dizziness.  she is tolerating medicines well.  she feels her blood pressure is well controlled.  Hyperlipidemia.  she continues medications without problem.  she denies generalized muscle aches or pains.  she feels her cholesterol is well controlled.    Other significant problems:  Patient Active Problem List    Diagnosis Date Noted   • Obesity (BMI 30-39.9) 05/07/2012     Priority: Low   • Essential hypertension, benign 03/05/2012     Priority: Low   • Rosacea 04/20/2011     Priority: Low   • Dyslipidemia 04/20/2011     Priority: Low   • Vitamin D deficiency 04/20/2011     Priority: Low       PAST MEDICAL, FAMILY AND SOCIAL HISTORY     Medications:  Current Outpatient Prescriptions   Medication   • lisinopril (PRINIVIL,ZESTRIL) 40 MG tablet   • hydrochlorothiazide (HYDRODIURIL) 25 MG tablet   • atorvastatin (LIPITOR) 20 MG tablet   • Cholecalciferol (VITAMIN D) 2000 UNITS CAPS     No current facility-administered medications for this visit.        Allergies:  ALLERGIES:   Allergen Reactions   • Crestor [Rosuvastatin Calcium]      Joint aches, paresthesias of tongue, edema of hard palate   • Penicillins HIVES       Past Medical  History/Surgeries:  Past Medical History:   Diagnosis Date   • Dyslipidemia 4/20/2011   • Essential hypertension, benign 3/5/2012   • Hyperlipidemia    • Obesity    • Vitamin D deficiency 4/20/2011       Past Surgical History:   Procedure Laterality Date   • Colposcopy,loop electrd cervix excis  ~1990   • Endometrial ablation  1/23/07    Hysteroscopy/D&C   • Full rout obste care,vaginal deliv      x3   • Hysterectomy  8/6/08    vaginal  Patient called to reschedule his 7/7 apt  hysterectomy with transobturator sling, ovaries intact, for bleeding       Family History:  Family History   Problem Relation Age of Onset   • Hypertension Mother    • Musculoskeletal Mother    • Cancer Father         prostate   • Diabetes Father    • Heart Father    • Hypertension Father    • Stroke Father    • Other Brother         well   • Other Brother         well   • Neurological Disorder Maternal Grandmother         alzheimer's   • Cancer Paternal Grandmother         ?cancer   • Cancer Paternal Grandfather         ?cancer   • Heart Maternal Grandfather          of MI   • NEGATIVE FAMILY HX OF Other         no breast, ovarian or colon Ca       Social History:  Social History   Substance Use Topics   • Smoking status: Former Smoker     Packs/day: 0.30     Types: Cigarettes     Quit date: 3/12/2008   • Smokeless tobacco: Never Used   • Alcohol use 1.2 oz/week     2 Standard drinks or equivalent per week      Comment: occasional       REVIEW OF SYSTEMS     Review of Systems   Constitutional: Negative.    Respiratory: Negative.    Cardiovascular: Negative.        PHYSICAL EXAM     Physical Exam   Constitutional: She is oriented to person, place, and time. She appears well-developed and well-nourished. No distress.   Cardiovascular: Normal rate, regular rhythm and normal heart sounds.    No murmur heard.  Pulmonary/Chest: Effort normal and breath sounds normal. She has no wheezes.   Neurological: She is alert and oriented to person, place, and time.   Skin: Skin is warm and dry.   Psychiatric: She has a normal mood and affect.       ASSESSMENT/PLAN     ASSESSMENT: (I10) Essential hypertension, benign  (primary encounter diagnosis)  Comment:   Plan: The current medical regimen is effective;  continue present plan and medications.      (E78.5) Dyslipidemia  Comment:   Plan: The current medical regimen is effective;  continue present plan and medications.      (Z13.6) Screening for cardiovascular  condition  Comment:   Plan: COMPREHENSIVE METABOLIC PANEL, LIPID PANEL WITH        REFLEX, CBC & AUTO DIFFERENTIAL, URINALYSIS &         REFLEX MICRO WITH CULTURE IF INDICATED, THYROID        STIMULATING HORMONE REFLEX            (Z12.31) Screening for breast cancer  Comment:   Plan: MAMMO SCREENING BILATERAL            (Z12.11) Screening for colon cancer  Comment:  Plan: COLOGUARD

## 2022-07-08 DIAGNOSIS — G47.30 SLEEP APNEA, UNSPECIFIED TYPE: ICD-10-CM

## 2022-07-08 DIAGNOSIS — G89.29 CHRONIC BILATERAL LOW BACK PAIN WITH BILATERAL SCIATICA: ICD-10-CM

## 2022-07-08 DIAGNOSIS — M54.41 CHRONIC BILATERAL LOW BACK PAIN WITH BILATERAL SCIATICA: ICD-10-CM

## 2022-07-08 DIAGNOSIS — I10 ESSENTIAL HYPERTENSION: ICD-10-CM

## 2022-07-08 DIAGNOSIS — R53.83 FATIGUE, UNSPECIFIED TYPE: ICD-10-CM

## 2022-07-08 DIAGNOSIS — M54.42 CHRONIC BILATERAL LOW BACK PAIN WITH BILATERAL SCIATICA: ICD-10-CM

## 2022-07-08 DIAGNOSIS — G89.4 CHRONIC PAIN SYNDROME: ICD-10-CM

## 2022-07-08 NOTE — TELEPHONE ENCOUNTER
Requested medication(s) are due for refill today: Yes  Patient has already received a courtesy refill: No  Other reason request has been forwarded to provider:  This refill cannot be delegated
Satisfactory

## 2022-07-10 RX ORDER — HYDROCODONE BITARTRATE AND ACETAMINOPHEN 10; 325 MG/1; MG/1
1 TABLET ORAL EVERY 4 HOURS PRN
Qty: 180 TABLET | Refills: 0 | Status: SHIPPED | OUTPATIENT
Start: 2022-07-10 | End: 2022-08-05 | Stop reason: SDUPTHER

## 2022-07-10 RX ORDER — OXYCODONE HCL 20 MG/1
20 TABLET, FILM COATED, EXTENDED RELEASE ORAL EVERY 12 HOURS
Qty: 60 TABLET | Refills: 0 | Status: SHIPPED | OUTPATIENT
Start: 2022-07-10 | End: 2022-08-05 | Stop reason: SDUPTHER

## 2022-07-10 RX ORDER — MELOXICAM 15 MG/1
15 TABLET ORAL DAILY
Qty: 90 TABLET | Refills: 0 | Status: SHIPPED | OUTPATIENT
Start: 2022-07-10

## 2022-07-10 RX ORDER — OLMESARTAN MEDOXOMIL AND HYDROCHLOROTHIAZIDE 40/12.5 40; 12.5 MG/1; MG/1
1 TABLET ORAL DAILY
Qty: 60 TABLET | Refills: 0 | Status: SHIPPED | OUTPATIENT
Start: 2022-07-10 | End: 2022-09-07 | Stop reason: SDUPTHER

## 2022-07-10 RX ORDER — OXYCODONE HCL 10 MG/1
10 TABLET, FILM COATED, EXTENDED RELEASE ORAL 2 TIMES DAILY
Qty: 60 TABLET | Refills: 0 | Status: SHIPPED | OUTPATIENT
Start: 2022-07-10 | End: 2022-08-05 | Stop reason: SDUPTHER

## 2022-07-10 RX ORDER — ARMODAFINIL 250 MG/1
250 TABLET ORAL DAILY
Qty: 30 TABLET | Refills: 0 | Status: SHIPPED | OUTPATIENT
Start: 2022-07-10 | End: 2022-08-11 | Stop reason: SDUPTHER

## 2022-07-11 ENCOUNTER — TELEMEDICINE (OUTPATIENT)
Dept: PSYCHIATRY | Facility: CLINIC | Age: 67
End: 2022-07-11
Payer: MEDICARE

## 2022-07-11 DIAGNOSIS — F33.41 RECURRENT MAJOR DEPRESSIVE DISORDER, IN PARTIAL REMISSION (HCC): ICD-10-CM

## 2022-07-11 DIAGNOSIS — F41.9 MILD ANXIETY: ICD-10-CM

## 2022-07-11 DIAGNOSIS — F33.0 MILD EPISODE OF RECURRENT MAJOR DEPRESSIVE DISORDER (HCC): Primary | ICD-10-CM

## 2022-07-11 PROCEDURE — 99213 OFFICE O/P EST LOW 20 MIN: CPT | Performed by: PSYCHIATRY & NEUROLOGY

## 2022-07-11 RX ORDER — BUPROPION HYDROCHLORIDE 450 MG/1
450 TABLET, FILM COATED, EXTENDED RELEASE ORAL EVERY MORNING
Qty: 30 TABLET | Refills: 1 | Status: SHIPPED | OUTPATIENT
Start: 2022-07-11 | End: 2022-09-06

## 2022-07-11 RX ORDER — BUSPIRONE HYDROCHLORIDE 10 MG/1
10 TABLET ORAL 2 TIMES DAILY
Qty: 60 TABLET | Refills: 1 | Status: SHIPPED | OUTPATIENT
Start: 2022-07-11 | End: 2022-09-06 | Stop reason: SDUPTHER
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
## 2022-07-15 NOTE — PATIENT INSTRUCTIONS
Anesthesia Evaluation     Patient summary reviewed and Nursing notes reviewed   no history of anesthetic complications:  NPO Solid Status: > 8 hours  NPO Liquid Status: > 8 hours           Airway   Mallampati: II  TM distance: >3 FB  Neck ROM: full  No difficulty expected  Dental - normal exam   (+) edentulous    Pulmonary - normal exam   (+) a smoker Current,   Cardiovascular - normal exam    ECG reviewed    (+) hypertension, valvular problems/murmurs murmur,   (-) dysrhythmias, angina, cardiac stents    ROS comment: EF 70% BY ECHO 7/2022    Interpretation Summary    · Left ventricular ejection fraction appears to be greater than 70%. Left ventricular systolic function is hyperdynamic (EF > 70%).  · Left ventricular wall thickness is consistent with moderate concentric hypertrophy.  · Left ventricular diastolic function is consistent with (grade Ia w/high LAP) impaired relaxation.  · Estimated right ventricular systolic pressure from tricuspid regurgitation is mildly elevated (35-45 mmHg). Calculated right ventricular systolic pressure from tricuspid regurgitation is 37 mmHg.        Neuro/Psych  (+) CVA,    (-) seizures, TIA  GI/Hepatic/Renal/Endo    (+)  GERD,  renal disease CRI,   (-) liver disease, diabetes, no thyroid disorder    ROS Comment: Partial small bowel obstruction    Musculoskeletal (-) negative ROS    Abdominal  - normal exam    Bowel sounds: normal.   Substance History - negative use     OB/GYN negative ob/gyn ROS         Other   autoimmune disease lupus, blood dyscrasia anemia,       Other Comment: LUPUS  ROS/Med Hx Other: HCT 26.3                  Anesthesia Plan    ASA 3     general with block   Rapid sequence  (TAPS  Type and cross( HCT 26))  intravenous induction     Anesthetic plan, risks, benefits, and alternatives have been provided, discussed and informed consent has been obtained with: patient.    Plan discussed with CRNA.        CODE STATUS:        1  Strain of the right shoulder joint   - right shoulder xray report pending   - rest the arm and apply ice to the site  - may use a muscle rub such as [employer] or [employer]    - may try a heating pad   - follow up w/ PCP for re-check in 3-5 days   - if symptoms acutely worsen or any new symptoms present, patient is to be seen in the ER

## 2022-07-19 ENCOUNTER — TELEPHONE (OUTPATIENT)
Dept: PSYCHIATRY | Facility: CLINIC | Age: 67
End: 2022-07-19

## 2022-08-05 ENCOUNTER — OFFICE VISIT (OUTPATIENT)
Dept: FAMILY MEDICINE CLINIC | Facility: CLINIC | Age: 67
End: 2022-08-05
Payer: MEDICARE

## 2022-08-05 VITALS
TEMPERATURE: 96.3 F | HEIGHT: 69 IN | RESPIRATION RATE: 16 BRPM | BODY MASS INDEX: 26.22 KG/M2 | OXYGEN SATURATION: 98 % | WEIGHT: 177 LBS | DIASTOLIC BLOOD PRESSURE: 86 MMHG | SYSTOLIC BLOOD PRESSURE: 132 MMHG | HEART RATE: 67 BPM

## 2022-08-05 DIAGNOSIS — M15.9 GENERALIZED OSTEOARTHRITIS OF MULTIPLE SITES: ICD-10-CM

## 2022-08-05 DIAGNOSIS — K21.9 GASTROESOPHAGEAL REFLUX DISEASE WITHOUT ESOPHAGITIS: ICD-10-CM

## 2022-08-05 DIAGNOSIS — I10 PRIMARY HYPERTENSION: Primary | ICD-10-CM

## 2022-08-05 DIAGNOSIS — G89.4 CHRONIC PAIN SYNDROME: ICD-10-CM

## 2022-08-05 DIAGNOSIS — F11.20 CONTINUOUS OPIOID DEPENDENCE (HCC): ICD-10-CM

## 2022-08-05 DIAGNOSIS — M54.41 CHRONIC BILATERAL LOW BACK PAIN WITH BILATERAL SCIATICA: ICD-10-CM

## 2022-08-05 DIAGNOSIS — M54.42 CHRONIC BILATERAL LOW BACK PAIN WITH BILATERAL SCIATICA: ICD-10-CM

## 2022-08-05 DIAGNOSIS — G89.29 CHRONIC BILATERAL LOW BACK PAIN WITH BILATERAL SCIATICA: ICD-10-CM

## 2022-08-05 DIAGNOSIS — K22.0 ACHALASIA: ICD-10-CM

## 2022-08-05 DIAGNOSIS — C61 PROSTATE CANCER (HCC): ICD-10-CM

## 2022-08-05 PROCEDURE — 99214 OFFICE O/P EST MOD 30 MIN: CPT | Performed by: FAMILY MEDICINE

## 2022-08-08 RX ORDER — OXYCODONE HCL 10 MG/1
10 TABLET, FILM COATED, EXTENDED RELEASE ORAL 2 TIMES DAILY
Qty: 60 TABLET | Refills: 0 | Status: SHIPPED | OUTPATIENT
Start: 2022-08-08 | End: 2022-09-05 | Stop reason: SDUPTHER

## 2022-08-08 RX ORDER — HYDROCODONE BITARTRATE AND ACETAMINOPHEN 10; 325 MG/1; MG/1
1 TABLET ORAL EVERY 4 HOURS PRN
Qty: 180 TABLET | Refills: 0 | Status: SHIPPED | OUTPATIENT
Start: 2022-08-08 | End: 2022-09-05 | Stop reason: SDUPTHER

## 2022-08-08 RX ORDER — OXYCODONE HCL 20 MG/1
20 TABLET, FILM COATED, EXTENDED RELEASE ORAL EVERY 12 HOURS
Qty: 60 TABLET | Refills: 0 | Status: SHIPPED | OUTPATIENT
Start: 2022-08-08 | End: 2022-09-05 | Stop reason: SDUPTHER

## 2022-08-11 DIAGNOSIS — G47.30 SLEEP APNEA, UNSPECIFIED TYPE: ICD-10-CM

## 2022-08-11 DIAGNOSIS — R53.83 FATIGUE, UNSPECIFIED TYPE: ICD-10-CM

## 2022-08-11 RX ORDER — ARMODAFINIL 250 MG/1
250 TABLET ORAL DAILY
Qty: 30 TABLET | Refills: 0 | Status: SHIPPED | OUTPATIENT
Start: 2022-08-11 | End: 2022-09-07 | Stop reason: SDUPTHER

## 2022-08-14 PROBLEM — R13.10 DIFFICULTY SWALLOWING: Status: RESOLVED | Noted: 2019-04-14 | Resolved: 2022-08-14

## 2022-08-14 NOTE — PROGRESS NOTES
Assessment/Plan     Problem List Items Addressed This Visit        Digestive    Achalasia    GERD (gastroesophageal reflux disease)     STABLE  DENIES ANY CP, INDIGESTION, OR COUGH  NOTES NO MELENA OR HEMATOCHEZIA  NO HEMATEMESIS  COMPLIANT WITH MEDICATION  NO CONCERNS    - CONTINUE CURRENT TREATMENT PLAN  - MEDICATION AS PRESCRIBED  - AVOID CAFFEINE, ALCOHOL OR SMOKING              Cardiovascular and Mediastinum    Hypertension - Primary     STABLE  DENIES ANY CP, SOB, PALPITATIONS, OR HEADACHE  NOTES NO WATER RETENTION  COMPLIANT WITH MEDICATION  NO CONCERNS    - CONTINUE CURRENT TREATMENT PLAN  - MONITOR DIETARY SODIUM INTAKE  - ENCOURAGE PHYSICAL ACTIVITY  - RV 3 MONTHS              Musculoskeletal and Integument    Generalized osteoarthritis of multiple sites     STABLE  DENIES ANY JOINT SWELLING OR REDNESS  JOINT STIFFNESS PRESENT  PAIN MANAGEMENT ADEQUATE    - CONTINUE CURRENT MANAGEMENT  - MEDICATION AS DIRECTED  - CALL / RETURN IF SYMPTOMS WORSEN              Genitourinary    [facility]      Other Visit Diagnoses     [facility]uous opioid dependence ([facility] )        Chronic pain syndrome             Treatment Plan: CONTINUE CURRENT TREATMENT PLAN WITH CURRENT MEDICATIONS    REVIEWED MEDICATIONS WITH SIDE EFFECTS AND PRECAUTIONS    Treatment Goals: ADEQUATE PAIN RELIEF WITH PATIENT TO BE ABLE TO PERFORM ACTIVITIES OF DAILY LIVING    Opiate risks  There are risks associated with opioid medications, including dependence, addiction and tolerance  The patient understands and agrees to use these medications only as prescribed  Potential side effects of the medications include, but are not limited to, constipation, drowsiness, addiction, impaired judgment and risk of fatal overdose if not taken as prescribed  The patient was warned against driving while taking sedation medications  Sharing medications is a felony   At this point in time, the patient is showing no signs of addiction, abuse, diversion or suicidal ideation  Pateint is taking concurrent benzodiazepines  Has been counseled on the risks of taking opioids and benzodiazepines including sedation, increased fall risk, dizziness, addictive potential and death  Patient has a high risk condition (age > 72, CAROLINE, renal or hepatic impairment, mental health condition, use of alcohol or other substances)  Has been counseled on the specific risks of taking opioids with these conditions and the increased risks including including sedation, increased fall risk, dizziness, addictive potential and death  Opioid agreement  Pain management agreement was reviewed with patient and signed/updated during visit      PD[place] review  [place] or [place]  reviewed  No red flags were identified; safe to proceed with prescription      Greater than 50% of this time was spent in counseling/coordination of care regarding: risks and benefits of treatment options, instructions for management and impressions  Subjective     Opioid Management:   Type of visit: Follow-up    Interval history: LONG TERM HISTORY OF CHRONIC PAIN SECONDARY TO SEVERE OSTEOARTHRITIS, SPINAL STENOSIS LUMBAR AND CERVICAL DEGENERATIVE CHANGES    Screening Tools/Assessments:    PHQ-2/9:  Last PHQ-2 score: 2 (Last PHQ-2 date: 3/23/2021)  Last PHQ-9 score: 16 (Last PHQ-9 date: 1/10/2022)    Brief Pain Inventory (BPI):  1) Throughout our lives, most of us have had pain from time to time (such as minor headaches, sprains, and toothaches)  Have you had pain other than these everyday kinds of pain today? Yes  2) Where is your pain located? Lower back; shoulder; legs/feet (numbness)  3) Rate your pain at its worst in the last 24 hours: 5  4) Rate your pain at its least in the last 24 hours: 2  5) Rate your average level of pain: 6  6) Rate your pain right now: 6  7) What treatments or medications are you receiving for your pain?  Oxycontin, Norco, Mobic  8) In the past 24 hours, how much relief have pain treatments or medication provided?  80%  9) During the past 24 hours, pain has interfered with your:     A) General activity: 7     B) Mood: 5     C) Walking ability: 8     D) Normal work (work outside the home & housework): 8     E) Relations with other people: 2     F) Sleep: 4     G) Enjoyment of life: 7    Drug Screen:      Drug screen comments: PATIENT UNABLE TO GIVE URINE AT THIS TIME    Opioid agreement:  Active Opioid agreement on file?: Yes    Opioid agreement signed date: 1/14/2020  Opioid agreement expiration date: 1/13/2021    Naloxone:  Currently prescribed Naloxone (Narcan): Yes      PATIENT RETURNS TO REVIEW OPIOID MANAGEMENT AND REVIEWED OF CURRENT MEDICAL ISSUES    PATIENT IS TABLE  PAIN MANAGEMENT BARELY ADEQUATE  RECOMMENDED INCREASED ACTIVITY - OFFERED PHYSICAL THERAPY - WILL CONSIDER    NO CONCERNS AT THIS TIME    Pain Medications             buPROPion (FORFIVO XL) 450 MG 24 hr tablet Take 1 tablet (450 mg total) by mouth every morning    meloxicam (MOBIC) 15 mg tablet Take 1 tablet (15 mg total) by mouth daily         Outpatient Morphine Milligram Equivalents Per Day     8/14/22 and after 150 MME/Day    Order Name Dose Route Frequency Maximum MME/Day     oxyCODONE (OxyCONTIN) 20 mg 12 hr tablet 20 mg Oral Every 12 hours 60 MME/Day     oxyCODONE (OxyCONTIN) 10 mg 12 hr tablet 10 mg Oral 2 times daily 30 MME/Day     HYDROcodone-acetaminophen (NORCO)  mg per tablet 1 tablet Oral Every 4 hours PRN 60 MME/Day    Total Potential Morphine Milligram Equivalents Per Day 150 MME/Day    Calculation Information        oxyCODONE (OxyCONTIN) 20 mg 12 hr tablet    oxyCODONE 20 mg T12a: maximum daily dose of 40 mg * morphine equivalence factor of 1 5 = 60 MME/Day       oxyCODONE (OxyCONTIN) 10 mg 12 hr tablet    oxyCODONE 10 mg T12a: maximum daily dose of 20 mg * morphine equivalence factor of 1 5 = 30 MME/Day       HYDROcodone-acetaminophen (NORCO)  mg per tablet    HYDROcodone-acetaminophen  mg Tabs: maximum daily dose of 60 mg of opioid in combo * morphine equivalence factor of 1 = 60 MME/Day                         PDMP Review       Value Time User    PDMP Reviewed  Yes 8/11/2022  1:27 PM Freddy Vela MD         Review of Systems   Constitutional: Positive for fatigue  Negative for chills and fever  HENT: Negative for congestion, ear discharge, ear pain, mouth sores, postnasal drip, sore throat and trouble swallowing  Eyes: Negative for pain, discharge and visual disturbance  Respiratory: Negative for cough, shortness of breath and wheezing  Cardiovascular: Negative for chest pain, palpitations and leg swelling  Gastrointestinal: Negative for abdominal distention, abdominal pain, blood in stool, diarrhea and nausea  Endocrine: Negative for polydipsia, polyphagia and polyuria  Genitourinary: Negative for dysuria, frequency, hematuria and urgency  Musculoskeletal: Positive for arthralgias, back pain, gait problem, joint swelling, myalgias, neck pain and neck stiffness  Skin: Negative for pallor and rash  Neurological: Negative for dizziness, syncope, speech difficulty, weakness, light-headedness, numbness and headaches  Hematological: Negative for adenopathy  Psychiatric/Behavioral: Positive for dysphoric mood  Negative for behavioral problems, confusion and sleep disturbance  The patient is nervous/anxious  Objective     /86   Pulse 67   Temp (!) 96 3 °F (35 7 °C) (Temporal)   Resp 16   Ht 5' 9" (1 753 m)   Wt 80 3 kg (177 lb)   SpO2 98%   BMI 26 14 kg/m²     Physical Exam  Vitals and nursing note reviewed  Constitutional:       Appearance: He is well-developed  Comments: PALE   HENT:      Head: Normocephalic and atraumatic  Eyes:      Conjunctiva/sclera: Conjunctivae normal    Cardiovascular:      Rate and Rhythm: Normal rate and regular rhythm  Heart sounds: No murmur heard    Pulmonary:      Effort: Pulmonary effort is normal  No respiratory distress  Breath sounds: Normal breath sounds  Abdominal:      Palpations: Abdomen is soft  Tenderness: There is no abdominal tenderness  Musculoskeletal:         General: Tenderness present  Cervical back: Neck supple  Comments: SEVER DJD CHANGES   Skin:     General: Skin is warm and dry  Neurological:      General: No focal deficit present  Mental Status: He is alert           Thalia Foster MD

## 2022-08-14 NOTE — PATIENT INSTRUCTIONS
CONTINUE CURRENT TREATMENT PLAN  REVIEWED MEDICATIONS AND PRECAUTION  DISCUSSED FOLLOW UP AND NEW OPOID POLICY ISSUES    RV 3 M, CALL OR RETURN SOONER PRN   Goals of care:  Maximize your health and quality of life by:   · Increasing your level of function and activity  · Decreasing the negative effects of pain on your life  · Minimizing the risks and side effects of medications and ensuring safe use of opioid medication     Ways for you to help meet your goals:  Maintain a healthy lifestyle  This includes proper nutrition, regular physical activity as able, try for 8 hours of sleep per night, use stress reduction strategies, avoid triggers  Risks and side effects of opioid use:  Prescription opioids carry serious risks of addiction and  overdose, especially with prolonged use  An opioid overdose,  often marked by slowed breathing, can cause sudden death  The  use of prescription opioids can have a number of side effects as  well, even when taken as directed:  · Tolerance--meaning you might need to take more of a medication for the same pain relief  · Physical dependence--meaning you have symptoms of withdrawal when a medication is stopped  · Increased sensitivity to pain  · Constipation  · Nausea, vomiting, dry mouth  · Sleepiness and dizziness   · Confusion  · Depression  · Low levels of testosterone that can result in lower sex drive, energy, and strength  · Itching and sweating    If you are prescribed opioids for pain:  · Never take opioids in greater amounts or more often than prescribed  · Help prevent misuse and abuse  - Never sell or share prescription opioids         - Never use another persons prescription opioids  · Store prescription opioids in a secure place and out of reach of others (this may include visitors, children, friends, and family)    · Safely dispose of unused prescription opioids: Find your community drug take-back program or your pharmacy mail-back program, or flush them down the toilet, following guidance from the Food and Drug Administration (www fda gov/Drugs/ResourcesForYou)  · Visit www cdc gov/drugoverdose to learn about the risks of opioid abuse and overdose  · If you believe you may be struggling with addiction, tell your health care provider and ask for guidance or call [address] at 8-268-278-YDXE

## 2022-09-05 DIAGNOSIS — M54.42 CHRONIC BILATERAL LOW BACK PAIN WITH BILATERAL SCIATICA: ICD-10-CM

## 2022-09-05 DIAGNOSIS — M54.41 CHRONIC BILATERAL LOW BACK PAIN WITH BILATERAL SCIATICA: ICD-10-CM

## 2022-09-05 DIAGNOSIS — G89.29 CHRONIC BILATERAL LOW BACK PAIN WITH BILATERAL SCIATICA: ICD-10-CM

## 2022-09-05 DIAGNOSIS — G89.4 CHRONIC PAIN SYNDROME: ICD-10-CM

## 2022-09-06 ENCOUNTER — TELEMEDICINE (OUTPATIENT)
Dept: PSYCHIATRY | Facility: CLINIC | Age: 67
End: 2022-09-06
Payer: MEDICARE

## 2022-09-06 DIAGNOSIS — F33.0 MILD EPISODE OF RECURRENT MAJOR DEPRESSIVE DISORDER (HCC): Primary | ICD-10-CM

## 2022-09-06 DIAGNOSIS — F41.9 MILD ANXIETY: ICD-10-CM

## 2022-09-06 PROCEDURE — 99213 OFFICE O/P EST LOW 20 MIN: CPT | Performed by: PSYCHIATRY & NEUROLOGY

## 2022-09-06 RX ORDER — MIRTAZAPINE 15 MG/1
15 TABLET, FILM COATED ORAL
Qty: 14 TABLET | Refills: 0 | Status: SHIPPED | OUTPATIENT
Start: 2022-09-06 | End: 2022-10-18

## 2022-09-06 RX ORDER — OXYCODONE HCL 20 MG/1
20 TABLET, FILM COATED, EXTENDED RELEASE ORAL EVERY 12 HOURS
Qty: 60 TABLET | Refills: 0 | Status: SHIPPED | OUTPATIENT
Start: 2022-09-06 | End: 2022-10-07 | Stop reason: SDUPTHER

## 2022-09-06 RX ORDER — HYDROCODONE BITARTRATE AND ACETAMINOPHEN 10; 325 MG/1; MG/1
1 TABLET ORAL EVERY 4 HOURS PRN
Qty: 180 TABLET | Refills: 0 | Status: SHIPPED | OUTPATIENT
Start: 2022-09-06 | End: 2022-10-07 | Stop reason: SDUPTHER

## 2022-09-06 RX ORDER — MIRTAZAPINE 30 MG/1
30 TABLET, FILM COATED ORAL
Qty: 30 TABLET | Refills: 1 | Status: SHIPPED | OUTPATIENT
Start: 2022-09-20 | End: 2022-10-18

## 2022-09-06 RX ORDER — BUSPIRONE HYDROCHLORIDE 10 MG/1
10 TABLET ORAL 2 TIMES DAILY
Qty: 60 TABLET | Refills: 1 | Status: SHIPPED | OUTPATIENT
Start: 2022-09-06 | End: 2022-10-18

## 2022-09-06 RX ORDER — OXYCODONE HCL 10 MG/1
10 TABLET, FILM COATED, EXTENDED RELEASE ORAL 2 TIMES DAILY
Qty: 60 TABLET | Refills: 0 | Status: SHIPPED | OUTPATIENT
Start: 2022-09-06 | End: 2022-10-07 | Stop reason: SDUPTHER
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
## 2022-09-07 DIAGNOSIS — I10 ESSENTIAL HYPERTENSION: ICD-10-CM

## 2022-09-07 DIAGNOSIS — G47.30 SLEEP APNEA, UNSPECIFIED TYPE: ICD-10-CM

## 2022-09-07 DIAGNOSIS — R53.83 FATIGUE, UNSPECIFIED TYPE: ICD-10-CM

## 2022-09-07 RX ORDER — ARMODAFINIL 250 MG/1
250 TABLET ORAL DAILY
Qty: 30 TABLET | Refills: 0 | Status: SHIPPED | OUTPATIENT
Start: 2022-09-07 | End: 2022-10-07 | Stop reason: SDUPTHER

## 2022-09-07 RX ORDER — OLMESARTAN MEDOXOMIL AND HYDROCHLOROTHIAZIDE 40/12.5 40; 12.5 MG/1; MG/1
1 TABLET ORAL DAILY
Qty: 60 TABLET | Refills: 0 | Status: SHIPPED | OUTPATIENT
Start: 2022-09-07

## 2022-10-07 DIAGNOSIS — M54.42 CHRONIC BILATERAL LOW BACK PAIN WITH BILATERAL SCIATICA: ICD-10-CM

## 2022-10-07 DIAGNOSIS — R53.83 FATIGUE, UNSPECIFIED TYPE: ICD-10-CM

## 2022-10-07 DIAGNOSIS — M54.41 CHRONIC BILATERAL LOW BACK PAIN WITH BILATERAL SCIATICA: ICD-10-CM

## 2022-10-07 DIAGNOSIS — G47.30 SLEEP APNEA, UNSPECIFIED TYPE: ICD-10-CM

## 2022-10-07 DIAGNOSIS — G89.29 CHRONIC BILATERAL LOW BACK PAIN WITH BILATERAL SCIATICA: ICD-10-CM

## 2022-10-07 DIAGNOSIS — G89.4 CHRONIC PAIN SYNDROME: ICD-10-CM

## 2022-10-08 RX ORDER — OXYCODONE HCL 10 MG/1
10 TABLET, FILM COATED, EXTENDED RELEASE ORAL 2 TIMES DAILY
Qty: 60 TABLET | Refills: 0 | Status: SHIPPED | OUTPATIENT
Start: 2022-10-08

## 2022-10-08 RX ORDER — ARMODAFINIL 250 MG/1
250 TABLET ORAL DAILY
Qty: 30 TABLET | Refills: 0 | Status: SHIPPED | OUTPATIENT
Start: 2022-10-08

## 2022-10-08 RX ORDER — OXYCODONE HCL 20 MG/1
20 TABLET, FILM COATED, EXTENDED RELEASE ORAL EVERY 12 HOURS
Qty: 60 TABLET | Refills: 0 | Status: SHIPPED | OUTPATIENT
Start: 2022-10-08

## 2022-10-08 RX ORDER — HYDROCODONE BITARTRATE AND ACETAMINOPHEN 10; 325 MG/1; MG/1
1 TABLET ORAL EVERY 4 HOURS PRN
Qty: 180 TABLET | Refills: 0 | Status: SHIPPED | OUTPATIENT
Start: 2022-10-08

## 2022-10-12 PROBLEM — Z00.00 MEDICARE ANNUAL WELLNESS VISIT, INITIAL: Status: RESOLVED | Noted: 2022-01-10 | Resolved: 2022-10-12

## 2022-10-18 ENCOUNTER — TELEMEDICINE (OUTPATIENT)
Dept: PSYCHIATRY | Facility: CLINIC | Age: 67
End: 2022-10-18
Payer: MEDICARE

## 2022-10-18 DIAGNOSIS — F33.0 MILD EPISODE OF RECURRENT MAJOR DEPRESSIVE DISORDER (HCC): Primary | ICD-10-CM

## 2022-10-18 DIAGNOSIS — F41.9 MILD ANXIETY: ICD-10-CM

## 2022-10-18 PROCEDURE — 99213 OFFICE O/P EST LOW 20 MIN: CPT | Performed by: PSYCHIATRY & NEUROLOGY

## 2022-10-18 RX ORDER — BUSPIRONE HYDROCHLORIDE 15 MG/1
15 TABLET ORAL 2 TIMES DAILY
Qty: 60 TABLET | Refills: 2 | Status: SHIPPED | OUTPATIENT
Start: 2022-10-18

## 2022-10-18 RX ORDER — MIRTAZAPINE 30 MG/1
30 TABLET, FILM COATED ORAL
Qty: 90 TABLET | Refills: 0 | Status: SHIPPED | OUTPATIENT
Start: 2022-10-18
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
## 2022-11-04 DIAGNOSIS — G89.29 CHRONIC BILATERAL LOW BACK PAIN WITH BILATERAL SCIATICA: ICD-10-CM

## 2022-11-04 DIAGNOSIS — G89.4 CHRONIC PAIN SYNDROME: ICD-10-CM

## 2022-11-04 DIAGNOSIS — R53.83 FATIGUE, UNSPECIFIED TYPE: ICD-10-CM

## 2022-11-04 DIAGNOSIS — G47.30 SLEEP APNEA, UNSPECIFIED TYPE: ICD-10-CM

## 2022-11-04 DIAGNOSIS — M54.42 CHRONIC BILATERAL LOW BACK PAIN WITH BILATERAL SCIATICA: ICD-10-CM

## 2022-11-04 DIAGNOSIS — M54.41 CHRONIC BILATERAL LOW BACK PAIN WITH BILATERAL SCIATICA: ICD-10-CM

## 2022-11-05 RX ORDER — MELOXICAM 15 MG/1
15 TABLET ORAL DAILY
Qty: 90 TABLET | Refills: 0 | Status: SHIPPED | OUTPATIENT
Start: 2022-11-05

## 2022-11-05 RX ORDER — ARMODAFINIL 250 MG/1
250 TABLET ORAL DAILY
Qty: 30 TABLET | Refills: 0 | Status: SHIPPED | OUTPATIENT
Start: 2022-11-05

## 2022-11-05 RX ORDER — OXYCODONE HCL 20 MG/1
20 TABLET, FILM COATED, EXTENDED RELEASE ORAL EVERY 12 HOURS
Qty: 60 TABLET | Refills: 0 | Status: SHIPPED | OUTPATIENT
Start: 2022-11-05

## 2022-11-05 RX ORDER — OXYCODONE HCL 10 MG/1
10 TABLET, FILM COATED, EXTENDED RELEASE ORAL 2 TIMES DAILY
Qty: 60 TABLET | Refills: 0 | Status: SHIPPED | OUTPATIENT
Start: 2022-11-05

## 2022-11-05 RX ORDER — HYDROCODONE BITARTRATE AND ACETAMINOPHEN 10; 325 MG/1; MG/1
1 TABLET ORAL EVERY 4 HOURS PRN
Qty: 180 TABLET | Refills: 0 | Status: SHIPPED | OUTPATIENT
Start: 2022-11-05

## 2022-11-09 ENCOUNTER — OFFICE VISIT (OUTPATIENT)
Dept: FAMILY MEDICINE CLINIC | Facility: CLINIC | Age: 67
End: 2022-11-09

## 2022-11-09 VITALS
BODY MASS INDEX: 27.99 KG/M2 | HEIGHT: 69 IN | WEIGHT: 189 LBS | TEMPERATURE: 98.2 F | SYSTOLIC BLOOD PRESSURE: 138 MMHG | OXYGEN SATURATION: 98 % | HEART RATE: 100 BPM | RESPIRATION RATE: 12 BRPM | DIASTOLIC BLOOD PRESSURE: 90 MMHG

## 2022-11-09 DIAGNOSIS — M54.9 CHRONIC BACK PAIN GREATER THAN 3 MONTHS DURATION: ICD-10-CM

## 2022-11-09 DIAGNOSIS — M48.02 CERVICAL SPINAL STENOSIS: ICD-10-CM

## 2022-11-09 DIAGNOSIS — G89.4 CHRONIC PAIN DISORDER: ICD-10-CM

## 2022-11-09 DIAGNOSIS — E83.110 HEREDITARY HEMOCHROMATOSIS (HCC): ICD-10-CM

## 2022-11-09 DIAGNOSIS — M15.9 GENERALIZED OSTEOARTHRITIS OF MULTIPLE SITES: ICD-10-CM

## 2022-11-09 DIAGNOSIS — N18.30 STAGE 3 CHRONIC KIDNEY DISEASE, UNSPECIFIED WHETHER STAGE 3A OR 3B CKD (HCC): ICD-10-CM

## 2022-11-09 DIAGNOSIS — E78.00 HYPERCHOLESTEROLEMIA: ICD-10-CM

## 2022-11-09 DIAGNOSIS — I10 PRIMARY HYPERTENSION: ICD-10-CM

## 2022-11-09 DIAGNOSIS — F11.20 UNCOMPLICATED OPIOID DEPENDENCE (HCC): ICD-10-CM

## 2022-11-09 DIAGNOSIS — M96.1 LUMBAR POST-LAMINECTOMY SYNDROME: Primary | ICD-10-CM

## 2022-11-09 DIAGNOSIS — G89.29 CHRONIC BACK PAIN GREATER THAN 3 MONTHS DURATION: ICD-10-CM

## 2022-11-09 RX ORDER — DIPHENOXYLATE HYDROCHLORIDE AND ATROPINE SULFATE 2.5; .025 MG/1; MG/1
1 TABLET ORAL DAILY
Status: ON HOLD | COMMUNITY

## 2022-11-09 RX ORDER — METHYLPREDNISOLONE ACETATE 40 MG/ML
40 INJECTION, SUSPENSION INTRA-ARTICULAR; INTRALESIONAL; INTRAMUSCULAR; SOFT TISSUE ONCE
Status: COMPLETED | OUTPATIENT
Start: 2022-11-09 | End: 2022-11-09

## 2022-11-09 RX ADMIN — METHYLPREDNISOLONE ACETATE 40 MG: 40 INJECTION, SUSPENSION INTRA-ARTICULAR; INTRALESIONAL; INTRAMUSCULAR; SOFT TISSUE at 14:13

## 2022-11-09 NOTE — PROGRESS NOTES
Assessment/Plan     Problem List Items Addressed This Visit        Cardiovascular and Mediastinum    Hypertension       Musculoskeletal and Integument    Generalized osteoarthritis of multiple sites    Relevant Medications    methylPREDNISolone acetate (DEPO-MEDROL) injection 40 mg (Completed)       Genitourinary    Stage 3 chronic kidney disease, unspecified whether stage 3a or 3b CKD (HCC)       Other    Cervical spinal stenosis    Relevant Medications    methylPREDNISolone acetate (DEPO-MEDROL) injection 40 mg (Completed)    Other Relevant Orders    Ambulatory Referral to Physical Therapy    Ambulatory Referral to Neurosurgery    Chronic back pain greater than 3 months duration    Relevant Medications    methylPREDNISolone acetate (DEPO-MEDROL) injection 40 mg (Completed)    Other Relevant Orders    Ambulatory Referral to Physical Therapy    Ambulatory Referral to Neurosurgery    Hereditary hemochromatosis ([facility] [address] )    Hypercholesterolemia    Chronic pain disorder    Relevant Orders    Ambulatory Referral to Physical Therapy    Ambulatory Referral to Neurosurgery    [facility] All Prescribed Meds and Special Instructions    Amphetamines, Methamphetamines    Butalbital    Phenobarbital    Secobarbital    Temazepam    Alprazolam    Clonazepam    Diazepam    Lorazepam    Oxazepam    Gabapentin    Pregabalin    Cocaine    Heroin    Buprenorphine    Levorphanol    Meperidine    Naltrexone    Fentanyl    Methadone    Oxycodone    Oxymorphone    Tapentadol    THC    Tramadol    Codeine, Hydrocodone, Hydropmorphone, Morphine    Bath Salts    Ethyl Glucuronide/Ethyl Sulfate    Kratom    Spice    Methylphenidate    Phentermine    Validity Oxidant    Validity Creatinine    Validity pH    Validity Specific    Opioid dependence (HCC)    Relevant Orders    Ambulatory Referral to Physical Therapy    Ambulatory Referral to Neurosurgery    [facility] All Prescribed Meds and Special Instructions    Amphetamines, Methamphetamines Butalbital    Phenobarbital    Secobarbital    Temazepam    Alprazolam    Clonazepam    Diazepam    Lorazepam    Oxazepam    Gabapentin    Pregabalin    Cocaine    Heroin    Buprenorphine    Levorphanol    Meperidine    Naltrexone    Fentanyl    Methadone    Oxycodone    Oxymorphone    Tapentadol    THC    Tramadol    Codeine, Hydrocodone, Hydropmorphone, Morphine    Bath Salts    Ethyl Glucuronide/Ethyl Sulfate    Kratom    Spice    Methylphenidate    Phentermine    Validity Oxidant    Validity Creatinine    Validity pH    Validity Specific    Lumbar post-laminectomy syndrome - Primary    Relevant Medications    methylPREDNISolone acetate (DEPO-MEDROL) injection 40 mg (Completed)    Other Relevant Orders    Ambulatory Referral to Physical Therapy    Ambulatory Referral to Neurosurgery    [facility] All Prescribed Meds and Special Instructions    Amphetamines, Methamphetamines    Butalbital    Phenobarbital    Secobarbital    Temazepam    Alprazolam    Clonazepam    Diazepam    Lorazepam    Oxazepam    Gabapentin    Pregabalin    Cocaine    Heroin    Buprenorphine    Levorphanol    Meperidine    Naltrexone    Fentanyl    Methadone    Oxycodone    Oxymorphone    Tapentadol    THC    Tramadol    Codeine, Hydrocodone, Hydropmorphone, Morphine    Bath Salts    Ethyl Glucuronide/Ethyl Sulfate    Kratom    Spice    Methylphenidate    Phentermine    Validity Oxidant    Validity Creatinine    Validity pH    Validity Specific         Treatment Plan: PAIN MANAGEMENT  OPIOID MEDICATION    WILL RECOMMEND PT AND NEUROSURGICAL CONSULT    Treatment Goals: IMPROVED DAILY FUNCTION AND MOBILITY    Opiate risks  There are risks associated with opioid medications, including dependence, addiction and tolerance  The patient understands and agrees to use these medications only as prescribed   Potential side effects of the medications include, but are not limited to, constipation, drowsiness, addiction, impaired judgment and risk of fatal overdose if not taken as prescribed  The patient was warned against driving while taking sedation medications  Sharing medications is a felony  At this point in time, the patient is showing no signs of addiction, abuse, diversion or suicidal ideation  Patient is taking concurrent muscle relaxers  Has been counseled on the risks of taking opioids and muscle relaxers including sedation, increased fall risk, dizziness, addictive potential and death  Opioid agreement  Pain management agreement was reviewed with patient and signed/updated during visit      Drug screen  Drug screen collected during today's visit      PD[place] review  [place] or [place]  reviewed  No red flags were identified; safe to proceed with prescription      Greater than 50% of this time was spent in counseling/coordination of care regarding: instructions for management and impressions  Subjective     Opioid Management:   Type of visit: Follow-up    Pain related diagnoses: CERVICAL SPINAL STENOSIS, POST LAMINECTOMY SYNDROME, SEVERE OSTEOARTHRITIS, CHRONIC PAIN MANAGEMENT    Interval history: BARELY ADEQUATE PAIN CONTROL  LITTLE WORSE AS MONTHS GO BY    Aberrant behavior?: No      Adverse effects from medication?: No      Screening Tools/Assessments:    PHQ-2/9:  Last PHQ-2 score: 2 (Last PHQ-2 date: 3/23/2021)  Last PHQ-9 score: 16 (Last PHQ-9 date: 1/10/2022)    Brief Pain Inventory (BPI):  1) Throughout our lives, most of us have had pain from time to time (such as minor headaches, sprains, and toothaches)  Have you had pain other than these everyday kinds of pain today? Yes  2) Where is your pain located? Legs, lower back, neck, arms, hands  3) Rate your pain at its worst in the last 24 hours: 6  4) Rate your pain at its least in the last 24 hours: 3  5) Rate your average level of pain: 4  6) Rate your pain right now: 3  7) What treatments or medications are you receiving for your pain?  Oxycontin, Norco  8) In the past 24 hours, how much relief have pain treatments or medication provided? 70%  9) During the past 24 hours, pain has interfered with your:     A) General activity: 6     B) Mood: 7     C) Walking ability: 8     D) Normal work (work outside the home & housework): 6     E) Relations with other people: 5     F) Sleep: 6     G) Enjoyment of life: 7    COMM:  Current COMM Score: 12 (positive, at risk patient)    Drug Screen:  Date of last drug screen: 11/9/2022    Opioid agreement:  Active Opioid agreement on file?: Yes    Opioid agreement signed date: 1/14/2020  Opioid agreement expiration date: 1/13/2021    Naloxone:  Currently prescribed Naloxone (Narcan): Yes      FOLLOW UP OPIOID MANAGEMENT VISIT    EVALUATION OF MEDICAL ISSUES    Pain Medications             HYDROcodone-acetaminophen (NORCO)  mg per tablet Take 1 tablet by mouth every 4 (four) hours as needed (PAIN) Max Daily Amount: 6 tablets    meloxicam (MOBIC) 15 mg tablet Take 1 tablet (15 mg total) by mouth daily    mirtazapine (REMERON) 30 mg tablet Take 1 tablet (30 mg total) by mouth daily at bedtime    oxyCODONE (OxyCONTIN) 10 mg 12 hr tablet Take 1 tablet (10 mg total) by mouth 2 (two) times a day Max Daily Amount: 20 mg    oxyCODONE (OxyCONTIN) 20 mg 12 hr tablet Take 1 tablet (20 mg total) by mouth every 12 (twelve) hours Max Daily Amount: 40 mg         Outpatient Morphine Milligram Equivalents Per Day     11/9/22 and after 150 MME/Day    Order Name Dose Route Frequency Maximum MME/Day     oxyCODONE (OxyCONTIN) 20 mg 12 hr tablet 20 mg Oral Every 12 hours 60 MME/Day     oxyCODONE (OxyCONTIN) 10 mg 12 hr tablet 10 mg Oral 2 times daily 30 MME/Day     HYDROcodone-acetaminophen (NORCO)  mg per tablet 1 tablet Oral Every 4 hours PRN 60 MME/Day    Total Potential Morphine Milligram Equivalents Per Day 150 MME/Day    Calculation Information        oxyCODONE (OxyCONTIN) 20 mg 12 hr tablet    oxyCODONE 20 mg T12a: maximum daily dose of 40 mg * morphine equivalence factor of 1 5 = 60 MME/Day       oxyCODONE (OxyCONTIN) 10 mg 12 hr tablet    oxyCODONE 10 mg T12a: maximum daily dose of 20 mg * morphine equivalence factor of 1 5 = 30 MME/Day       HYDROcodone-acetaminophen (NORCO)  mg per tablet    HYDROcodone-acetaminophen  mg Tabs: maximum daily dose of 60 mg of opioid in combo * morphine equivalence factor of 1 = 60 MME/Day                         PDMP Review       Value Time User    PDMP Reviewed  Yes 11/5/2022  5:46 PM Collette Martinez MD         Review of Systems   Constitutional: Negative for chills, fatigue and fever  HENT: Negative for congestion, ear discharge, ear pain, mouth sores, postnasal drip, sore throat and trouble swallowing  Eyes: Negative for pain, discharge and visual disturbance  Respiratory: Negative for cough, shortness of breath and wheezing  Cardiovascular: Negative for chest pain, palpitations and leg swelling  Gastrointestinal: Negative for abdominal distention, abdominal pain, blood in stool, diarrhea and nausea  Endocrine: Negative for polydipsia, polyphagia and polyuria  Genitourinary: Negative for dysuria, frequency, hematuria and urgency  Musculoskeletal: Positive for arthralgias, back pain, gait problem, joint swelling, neck pain and neck stiffness  Skin: Negative for pallor and rash  Neurological: Negative for dizziness, syncope, speech difficulty, weakness, light-headedness, numbness and headaches  Hematological: Negative for adenopathy  Psychiatric/Behavioral: Positive for dysphoric mood  Negative for behavioral problems, confusion and sleep disturbance  The patient is nervous/anxious  Objective     /90 (BP Location: Right arm, Patient Position: Sitting, Cuff Size: Large)   Pulse 100   Temp 98 2 °F (36 8 °C) (Temporal)   Resp 12   Ht 5' 9" (1 753 m)   Wt 85 7 kg (189 lb)   SpO2 98%   BMI 27 91 kg/m²     Physical Exam  Constitutional:       General: He is not in acute distress  Appearance: He is well-developed and normal weight  He is not diaphoretic  HENT:      Head: Normocephalic and atraumatic  Right Ear: External ear normal       Left Ear: External ear normal       Nose: Nose normal       Mouth/Throat:      Pharynx: No oropharyngeal exudate  Eyes:      General: No scleral icterus  Right eye: No discharge  Left eye: No discharge  Conjunctiva/sclera: Conjunctivae normal       Pupils: Pupils are equal, round, and reactive to light  Neck:      Thyroid: No thyromegaly  Vascular: No JVD  Trachea: No tracheal deviation  Cardiovascular:      Rate and Rhythm: Normal rate and regular rhythm  Heart sounds: Normal heart sounds  No murmur heard  No friction rub  No gallop  Pulmonary:      Effort: Pulmonary effort is normal  No respiratory distress  Breath sounds: Normal breath sounds  No stridor  No wheezing or rales  Chest:      Chest wall: No tenderness  Abdominal:      General: Bowel sounds are normal  There is no distension  Palpations: Abdomen is soft  There is no mass  Tenderness: There is no abdominal tenderness  There is no guarding or rebound  Hernia: No hernia is present  Genitourinary:     Penis: Normal  No tenderness  Prostate: Normal       Rectum: Normal  Guaiac result negative  Musculoskeletal:         General: Tenderness present  No deformity  Cervical back: Normal range of motion and neck supple  Comments: SEVERE DJD CHANGES   Lymphadenopathy:      Cervical: No cervical adenopathy  Skin:     General: Skin is warm and dry  Coloration: Skin is not pale  Findings: No erythema or rash  Neurological:      General: No focal deficit present  Mental Status: He is alert and oriented to person, place, and time  Cranial Nerves: No cranial nerve deficit  Sensory: No sensory deficit  Motor: No abnormal muscle tone        Coordination: Coordination normal       Deep Tendon Reflexes: Reflexes normal    Psychiatric:         Behavior: Behavior normal          Thought Content:  Thought content normal          Judgment: Judgment normal          Melanie Cosby MD

## 2022-11-09 NOTE — PATIENT INSTRUCTIONS
CONTINUE CURRENT TREATMENT PLAN    REVIEWED MEDICATION WITH RISKS AND BENEFITS    URINE SCREEN OBTAINED TODAY    RV 3 M  Goals of care:  Maximize your health and quality of life by:   · Increasing your level of function and activity  · Decreasing the negative effects of pain on your life  · Minimizing the risks and side effects of medications and ensuring safe use of opioid medication     Ways for you to help meet your goals:  Maintain a healthy lifestyle  This includes proper nutrition, regular physical activity as able, try for 8 hours of sleep per night, use stress reduction strategies, avoid triggers  Risks and side effects of opioid use:  Prescription opioids carry serious risks of addiction and  overdose, especially with prolonged use  An opioid overdose,  often marked by slowed breathing, can cause sudden death  The  use of prescription opioids can have a number of side effects as  well, even when taken as directed:  · Tolerance--meaning you might need to take more of a medication for the same pain relief  · Physical dependence--meaning you have symptoms of withdrawal when a medication is stopped  · Increased sensitivity to pain  · Constipation  · Nausea, vomiting, dry mouth  · Sleepiness and dizziness   · Confusion  · Depression  · Low levels of testosterone that can result in lower sex drive, energy, and strength  · Itching and sweating    If you are prescribed opioids for pain:  · Never take opioids in greater amounts or more often than prescribed  · Help prevent misuse and abuse  - Never sell or share prescription opioids         - Never use another person’s prescription opioids  · ‡Store prescription opioids in a secure place and out of reach of others (this may include visitors, children, friends, and family)    · Safely dispose of unused prescription opioids: Find your community drug take-back program or your pharmacy mail-back program, or flush them down the toilet, following guidance from the Food and Drug Administration (www fda gov/Drugs/ResourcesForYou)  · ‡Visit www cdc gov/drugoverdose to learn about the risks of opioid abuse and overdose  · If you believe you may be struggling with addiction, tell your health care provider and ask for guidance or call [facility]’s National Helpline at 5-115-962-SPXD

## 2022-11-10 ENCOUNTER — TELEPHONE (OUTPATIENT)
Dept: ADMINISTRATIVE | Facility: OTHER | Age: 67
End: 2022-11-10

## 2022-11-10 NOTE — LETTER
Vaccination Request Form: [id number] aka SARS-CoV-2 (Barbra Pillar or Fung Peter or J & J) and Influenza      Date Requested: 22  Patient: Adithya Goff  Patient : 1955   Referring Provider: Thalia Foster MD       The above patient has informed us that they have had their   most recent COVID-19 aka SARS-CoV-2 (Barbra Pillar or Fung Peter or J & J) and Influenza administered at your facility  Please   complete this form and attach all corresponding documentation  Date of Vaccine(s) Given  ______________________________    Lot Number(s) _______________________________________    Manufacture(s) ______________________________________    Dose Amount (s) _____________________________________    Expiration Date(s) ____________________________________    Comments __________________________________________________________  ____________________________________________________________________  ____________________________________________________________________  ____________________________________________________________________    Administering Facility  ________________________________________________    Vaccine Administered By (print name) ___________________________________      Form Completed By (print name) _______________________________________      Signature ___________________________________________________________      These reports are needed for  compliance  Please fax this completed form and a copy of the Vaccine Document(s) to our office located at [place] [address] as soon as possible to [phone] [address]: Phone [phone]    We thank you for your assistance in treating our mutual patient

## 2022-11-10 NOTE — TELEPHONE ENCOUNTER
----- Message from [employer] sent at 11/9/2022  2:01 PM EST -----  Regarding: care gap request - Covid booster and flu shot  11/09/22 2:01 PM    Hello, our patient attached above has had Immunization(s) completed/performed  Please assist in updating the patient chart by making an External outreach to [facility] facility located in [place], [place]  The date of service is 2022      Thank you,  [employer]  [address]

## 2022-11-14 NOTE — TELEPHONE ENCOUNTER
Upon review of the In Basket request and the patient's chart, initial outreach has been made via fax to facility  , please see Contacts section for details       Thank you  Marilynn Garcia

## 2022-11-15 LAB
6MAM UR QL CFM: NEGATIVE NG/ML
7AMINOCLONAZEPAM UR QL CFM: NEGATIVE NG/ML
A-OH ALPRAZ UR QL CFM: NEGATIVE NG/ML
ACCEPTABLE CREAT UR QL: NORMAL MG/DL
ACCEPTIBLE SP GR UR QL: NORMAL
AMPHET UR QL CFM: NEGATIVE NG/ML
BUPRENORPHINE UR QL CFM: NEGATIVE NG/ML
BUTALBITAL UR QL CFM: NEGATIVE NG/ML
BZE UR QL CFM: NEGATIVE NG/ML
CODEINE UR QL CFM: NEGATIVE NG/ML
EDDP UR QL CFM: NEGATIVE NG/ML
ETHYL GLUCURONIDE UR QL CFM: ABNORMAL NG/ML
ETHYL SULFATE UR QL SCN: ABNORMAL NG/ML
EUTYLONE UR QL: NEGATIVE NG/ML
FENTANYL UR QL CFM: NEGATIVE NG/ML
GLIADIN IGG SER IA-ACNC: NEGATIVE NG/ML
HYDROCODONE UR QL CFM: NORMAL NG/ML
HYDROMORPHONE UR QL CFM: NORMAL NG/ML
LORAZEPAM UR QL CFM: NEGATIVE NG/ML
ME-PHENIDATE UR QL CFM: NEGATIVE NG/ML
MEPERIDINE UR QL CFM: NEGATIVE NG/ML
METHADONE UR QL CFM: NEGATIVE NG/ML
METHAMPHET UR QL CFM: NEGATIVE NG/ML
MORPHINE UR QL CFM: NEGATIVE NG/ML
NALTREXONE UR QL CFM: NEGATIVE NG/ML
NITRITE UR QL: NORMAL UG/ML
NORBUPRENORPHINE UR QL CFM: NEGATIVE NG/ML
NORDIAZEPAM UR QL CFM: NEGATIVE NG/ML
NORFENTANYL UR QL CFM: NEGATIVE NG/ML
NORHYDROCODONE UR QL CFM: NORMAL NG/ML
NORMEPERIDINE UR QL CFM: NEGATIVE NG/ML
NOROXYCODONE UR QL CFM: NORMAL NG/ML
OXAZEPAM UR QL CFM: NEGATIVE NG/ML
OXYCODONE UR QL CFM: NORMAL NG/ML
OXYMORPHONE UR QL CFM: NORMAL NG/ML
OXYMORPHONE UR QL CFM: NORMAL NG/ML
PARA-FLUOROFENTANYL QUANTIFICATION: NORMAL NG/ML
PHENOBARB UR QL CFM: NEGATIVE NG/ML
RESULT ALL_PRESCRIBED MEDS AND SPECIAL INSTRUCTIONS: NORMAL
SECOBARBITAL UR QL CFM: NEGATIVE NG/ML
SL AMB 4-ANPP QUANTIFICATION: NORMAL NG/ML
SL AMB 5F-ADB-M7 METABOLITE QUANTIFICATION: NEGATIVE NG/ML
SL AMB 7-OH-MITRAGYNINE (KRATOM ALKALOID) QUANTIFICATION: NEGATIVE NG/ML
SL AMB AB-FUBINACA-M3 METABOLITE QUANTIFICATION: NEGATIVE NG/ML
SL AMB ACETYL FENTANYL QUANTIFICATION: NORMAL NG/ML
SL AMB ACETYL NORFENTANYL QUANTIFICATION: NORMAL NG/ML
SL AMB ACRYL FENTANYL QUANTIFICATION: NORMAL NG/ML
SL AMB CARFENTANIL QUANTIFICATION: NORMAL NG/ML
SL AMB CTHC (MARIJUANA METABOLITE) QUANTIFICATION: ABNORMAL NG/ML
SL AMB DEXTRORPHAN (DEXTROMETHORPHAN METABOLITE) QUANT: NEGATIVE NG/ML
SL AMB GABAPENTIN QUANTIFICATION: NEGATIVE
SL AMB JWH018 METABOLITE QUANTIFICATION: NEGATIVE NG/ML
SL AMB JWH073 METABOLITE QUANTIFICATION: NEGATIVE NG/ML
SL AMB MDMB-FUBINACA-M1 METABOLITE QUANTIFICATION: NEGATIVE NG/ML
SL AMB METHYLONE QUANTIFICATION: NEGATIVE NG/ML
SL AMB N-DESMETHYL-TRAMADOL QUANTIFICATION: NEGATIVE NG/ML
SL AMB PHENTERMINE QUANTIFICATION: NEGATIVE NG/ML
SL AMB PREGABALIN QUANTIFICATION: NEGATIVE
SL AMB RCS4 METABOLITE QUANTIFICATION: NEGATIVE NG/ML
SL AMB RITALINIC ACID QUANTIFICATION: NEGATIVE NG/ML
SMOOTH MUSCLE AB TITR SER IF: NEGATIVE NG/ML
SPECIMEN DRAWN SERPL: NEGATIVE NG/ML
SPECIMEN PH ACCEPTABLE UR: NORMAL
TAPENTADOL UR QL CFM: NEGATIVE NG/ML
TEMAZEPAM UR QL CFM: NEGATIVE NG/ML
TEMAZEPAM UR QL CFM: NEGATIVE NG/ML
TRAMADOL UR QL CFM: NEGATIVE NG/ML
URATE/CREAT 24H UR: NEGATIVE NG/ML

## 2022-11-16 NOTE — TELEPHONE ENCOUNTER
Upon review of the In Basket request we were able to locate, review, and update the patient chart as requested for Immunization(s) Covid and Influenza vaccines  Any additional questions or concerns should be emailed to the Practice Liaisons via the appropriate education email address, please do not reply via In Basket      Thank you  Edouard Morfin

## 2022-11-17 ENCOUNTER — HOSPITAL ENCOUNTER (INPATIENT)
Facility: HOSPITAL | Age: 67
LOS: 4 days | Discharge: HOME | End: 2022-11-22
Attending: GENERAL PRACTICE | Admitting: FAMILY MEDICINE

## 2022-11-17 ENCOUNTER — APPOINTMENT (EMERGENCY)
Dept: RADIOLOGY | Facility: HOSPITAL | Age: 67
End: 2022-11-17

## 2022-11-17 DIAGNOSIS — S73.004A HIP DISLOCATION, RIGHT (HCC): ICD-10-CM

## 2022-11-17 DIAGNOSIS — T14.8XXA DISLOCATION CLOSED: ICD-10-CM

## 2022-11-17 DIAGNOSIS — F32.A DEPRESSION: ICD-10-CM

## 2022-11-17 DIAGNOSIS — S73.004A DISLOCATION OF RIGHT HIP, INITIAL ENCOUNTER (HCC): Primary | ICD-10-CM

## 2022-11-17 PROCEDURE — 0SS9XZZ REPOSITION RIGHT HIP JOINT, EXTERNAL APPROACH: ICD-10-PCS | Performed by: GENERAL PRACTICE

## 2022-11-17 RX ORDER — MORPHINE SULFATE 4 MG/ML
4 INJECTION, SOLUTION INTRAMUSCULAR; INTRAVENOUS ONCE
Status: COMPLETED | OUTPATIENT
Start: 2022-11-17 | End: 2022-11-17

## 2022-11-17 RX ORDER — MORPHINE SULFATE 4 MG/ML
4 INJECTION, SOLUTION INTRAMUSCULAR; INTRAVENOUS ONCE
Status: COMPLETED | OUTPATIENT
Start: 2022-11-17 | End: 2022-11-18

## 2022-11-17 RX ORDER — KETAMINE HCL IN NACL, ISO-OSM 100MG/10ML
50 SYRINGE (ML) INJECTION ONCE
Status: COMPLETED | OUTPATIENT
Start: 2022-11-17 | End: 2022-11-17

## 2022-11-17 RX ORDER — PROPOFOL 10 MG/ML
50 INJECTION, EMULSION INTRAVENOUS ONCE
Status: COMPLETED | OUTPATIENT
Start: 2022-11-17 | End: 2022-11-17

## 2022-11-17 RX ADMIN — PROPOFOL 50 MG: 10 INJECTION, EMULSION INTRAVENOUS at 23:01

## 2022-11-17 RX ADMIN — MORPHINE SULFATE 4 MG: 4 INJECTION INTRAVENOUS at 19:35

## 2022-11-17 RX ADMIN — SODIUM CHLORIDE 1000 ML: 0.9 INJECTION, SOLUTION INTRAVENOUS at 21:06

## 2022-11-17 RX ADMIN — MORPHINE SULFATE 4 MG: 4 INJECTION INTRAVENOUS at 20:39

## 2022-11-17 RX ADMIN — Medication 50 MG: at 23:00

## 2022-11-17 NOTE — LETTER
To: [facility]  From:  [address] [facility] [phone]    AVS and SoC for Ysesica Mendenhall

## 2022-11-18 ENCOUNTER — ANESTHESIA (OUTPATIENT)
Dept: PERIOP | Facility: HOSPITAL | Age: 67
End: 2022-11-18

## 2022-11-18 ENCOUNTER — ANESTHESIA EVENT (OUTPATIENT)
Dept: PERIOP | Facility: HOSPITAL | Age: 67
End: 2022-11-18

## 2022-11-18 ENCOUNTER — APPOINTMENT (OUTPATIENT)
Dept: RADIOLOGY | Facility: HOSPITAL | Age: 67
End: 2022-11-18

## 2022-11-18 PROBLEM — F10.20 ALCOHOL DEPENDENCE (HCC): Status: ACTIVE | Noted: 2022-11-18

## 2022-11-18 PROBLEM — S73.004A HIP DISLOCATION, RIGHT (HCC): Status: ACTIVE | Noted: 2022-11-18

## 2022-11-18 LAB
ALBUMIN SERPL BCP-MCNC: 3.2 G/DL (ref 3.5–5)
ALP SERPL-CCNC: 76 U/L (ref 46–116)
ALT SERPL W P-5'-P-CCNC: 23 U/L (ref 12–78)
ANION GAP SERPL CALCULATED.3IONS-SCNC: 11 MMOL/L (ref 4–13)
BILIRUB SERPL-MCNC: 0.4 MG/DL (ref 0.2–1)
BUN SERPL-MCNC: 17 MG/DL (ref 5–25)
CALCIUM ALBUM COR SERPL-MCNC: 8.8 MG/DL (ref 8.3–10.1)
CALCIUM SERPL-MCNC: 8.2 MG/DL (ref 8.3–10.1)
CHLORIDE SERPL-SCNC: 106 MMOL/L (ref 96–108)
CO2 SERPL-SCNC: 24 MMOL/L (ref 21–32)
CREAT SERPL-MCNC: 1.01 MG/DL (ref 0.6–1.3)
ERYTHROCYTE [DISTWIDTH] IN BLOOD BY AUTOMATED COUNT: 16.9 % (ref 11.6–15.1)
FERRITIN SERPL-MCNC: 16 NG/ML (ref 8–388)
FOLATE SERPL-MCNC: 17.3 NG/ML (ref 3.1–17.5)
GFR SERPL CREATININE-BSD FRML MDRD: 76 ML/MIN/1.73SQ M
GLUCOSE SERPL-MCNC: 88 MG/DL (ref 65–140)
HCT VFR BLD AUTO: 27.8 % (ref 36.5–49.3)
HGB BLD-MCNC: 8.8 G/DL (ref 12–17)
IRON SATN MFR SERPL: 7 % (ref 20–50)
IRON SERPL-MCNC: 23 UG/DL (ref 65–175)
MAGNESIUM SERPL-MCNC: 1.6 MG/DL (ref 1.6–2.6)
MCH RBC QN AUTO: 25 PG (ref 26.8–34.3)
MCHC RBC AUTO-ENTMCNC: 31.7 G/DL (ref 31.4–37.4)
MCV RBC AUTO: 79 FL (ref 82–98)
PLATELET # BLD AUTO: 279 THOUSANDS/UL (ref 149–390)
PMV BLD AUTO: 8.8 FL (ref 8.9–12.7)
POTASSIUM SERPL-SCNC: 4.4 MMOL/L (ref 3.5–5.3)
PROT SERPL-MCNC: 6.1 G/DL (ref 6.4–8.4)
RBC # BLD AUTO: 3.52 MILLION/UL (ref 3.88–5.62)
SARS-COV-2 RNA RESP QL NAA+PROBE: NEGATIVE
SODIUM SERPL-SCNC: 141 MMOL/L (ref 135–147)
T4 FREE SERPL-MCNC: 0.91 NG/DL (ref 0.76–1.46)
TIBC SERPL-MCNC: 310 UG/DL (ref 250–450)
TSH SERPL DL<=0.05 MIU/L-ACNC: 0.33 UIU/ML (ref 0.45–4.5)
VIT B12 SERPL-MCNC: 813 PG/ML (ref 100–900)
WBC # BLD AUTO: 5.94 THOUSAND/UL (ref 4.31–10.16)

## 2022-11-18 PROCEDURE — 0SS9XZZ REPOSITION RIGHT HIP JOINT, EXTERNAL APPROACH: ICD-10-PCS | Performed by: ORTHOPAEDIC SURGERY

## 2022-11-18 RX ORDER — FOLIC ACID 1 MG/1
1 TABLET ORAL DAILY
Status: DISCONTINUED | OUTPATIENT
Start: 2022-11-18 | End: 2022-11-22 | Stop reason: HOSPADM

## 2022-11-18 RX ORDER — OXYCODONE HCL 20 MG/1
20 TABLET, FILM COATED, EXTENDED RELEASE ORAL EVERY 12 HOURS SCHEDULED
Status: DISCONTINUED | OUTPATIENT
Start: 2022-11-18 | End: 2022-11-22 | Stop reason: HOSPADM

## 2022-11-18 RX ORDER — ONDANSETRON 2 MG/ML
4 INJECTION INTRAMUSCULAR; INTRAVENOUS EVERY 6 HOURS PRN
Status: DISCONTINUED | OUTPATIENT
Start: 2022-11-18 | End: 2022-11-22 | Stop reason: HOSPADM

## 2022-11-18 RX ORDER — MELOXICAM 7.5 MG/1
15 TABLET ORAL
Status: DISCONTINUED | OUTPATIENT
Start: 2022-11-18 | End: 2022-11-19

## 2022-11-18 RX ORDER — LIDOCAINE HYDROCHLORIDE 10 MG/ML
INJECTION, SOLUTION EPIDURAL; INFILTRATION; INTRACAUDAL; PERINEURAL AS NEEDED
Status: DISCONTINUED | OUTPATIENT
Start: 2022-11-18 | End: 2022-11-18

## 2022-11-18 RX ORDER — HYDROCHLOROTHIAZIDE 12.5 MG/1
12.5 TABLET ORAL DAILY
Status: DISCONTINUED | OUTPATIENT
Start: 2022-11-18 | End: 2022-11-18

## 2022-11-18 RX ORDER — MIRTAZAPINE 15 MG/1
30 TABLET, FILM COATED ORAL
Status: DISCONTINUED | OUTPATIENT
Start: 2022-11-18 | End: 2022-11-21

## 2022-11-18 RX ORDER — LANOLIN ALCOHOL/MO/W.PET/CERES
100 CREAM (GRAM) TOPICAL DAILY
Status: DISCONTINUED | OUTPATIENT
Start: 2022-11-18 | End: 2022-11-22 | Stop reason: HOSPADM

## 2022-11-18 RX ORDER — PROPOFOL 10 MG/ML
INJECTION, EMULSION INTRAVENOUS AS NEEDED
Status: DISCONTINUED | OUTPATIENT
Start: 2022-11-18 | End: 2022-11-18

## 2022-11-18 RX ORDER — ONDANSETRON 2 MG/ML
4 INJECTION INTRAMUSCULAR; INTRAVENOUS ONCE AS NEEDED
Status: DISCONTINUED | OUTPATIENT
Start: 2022-11-18 | End: 2022-11-18 | Stop reason: HOSPADM

## 2022-11-18 RX ORDER — HEPARIN SODIUM 5000 [USP'U]/ML
5000 INJECTION, SOLUTION INTRAVENOUS; SUBCUTANEOUS ONCE
Status: COMPLETED | OUTPATIENT
Start: 2022-11-18 | End: 2022-11-18

## 2022-11-18 RX ORDER — TADALAFIL 5 MG/1
5 TABLET ORAL DAILY
Status: DISCONTINUED | OUTPATIENT
Start: 2022-11-18 | End: 2022-11-20

## 2022-11-18 RX ORDER — DOCUSATE SODIUM 100 MG/1
100 CAPSULE, LIQUID FILLED ORAL 2 TIMES DAILY
Status: DISCONTINUED | OUTPATIENT
Start: 2022-11-18 | End: 2022-11-22 | Stop reason: HOSPADM

## 2022-11-18 RX ORDER — ENOXAPARIN SODIUM 100 MG/ML
40 INJECTION SUBCUTANEOUS DAILY
Status: DISCONTINUED | OUTPATIENT
Start: 2022-11-18 | End: 2022-11-19

## 2022-11-18 RX ORDER — LOSARTAN POTASSIUM 50 MG/1
100 TABLET ORAL DAILY
Status: DISCONTINUED | OUTPATIENT
Start: 2022-11-18 | End: 2022-11-18

## 2022-11-18 RX ORDER — ROCURONIUM BROMIDE 10 MG/ML
INJECTION, SOLUTION INTRAVENOUS AS NEEDED
Status: DISCONTINUED | OUTPATIENT
Start: 2022-11-18 | End: 2022-11-18

## 2022-11-18 RX ORDER — SODIUM CHLORIDE, SODIUM LACTATE, POTASSIUM CHLORIDE, CALCIUM CHLORIDE 600; 310; 30; 20 MG/100ML; MG/100ML; MG/100ML; MG/100ML
100 INJECTION, SOLUTION INTRAVENOUS CONTINUOUS
Status: DISCONTINUED | OUTPATIENT
Start: 2022-11-18 | End: 2022-11-19

## 2022-11-18 RX ORDER — OXYCODONE HCL 10 MG/1
10 TABLET, FILM COATED, EXTENDED RELEASE ORAL EVERY 12 HOURS SCHEDULED
Status: DISCONTINUED | OUTPATIENT
Start: 2022-11-18 | End: 2022-11-22 | Stop reason: HOSPADM

## 2022-11-18 RX ORDER — SODIUM CHLORIDE, SODIUM LACTATE, POTASSIUM CHLORIDE, CALCIUM CHLORIDE 600; 310; 30; 20 MG/100ML; MG/100ML; MG/100ML; MG/100ML
INJECTION, SOLUTION INTRAVENOUS CONTINUOUS PRN
Status: DISCONTINUED | OUTPATIENT
Start: 2022-11-18 | End: 2022-11-18

## 2022-11-18 RX ORDER — PANTOPRAZOLE SODIUM 40 MG/1
40 TABLET, DELAYED RELEASE ORAL
Status: DISCONTINUED | OUTPATIENT
Start: 2022-11-18 | End: 2022-11-22 | Stop reason: HOSPADM

## 2022-11-18 RX ORDER — HYDROMORPHONE HCL/PF 1 MG/ML
0.5 SYRINGE (ML) INJECTION EVERY 4 HOURS PRN
Status: DISCONTINUED | OUTPATIENT
Start: 2022-11-18 | End: 2022-11-20

## 2022-11-18 RX ORDER — HYDROCODONE BITARTRATE AND ACETAMINOPHEN 5; 325 MG/1; MG/1
2 TABLET ORAL EVERY 6 HOURS PRN
Status: DISCONTINUED | OUTPATIENT
Start: 2022-11-18 | End: 2022-11-20

## 2022-11-18 RX ORDER — MODAFINIL 100 MG/1
200 TABLET ORAL DAILY
Status: DISCONTINUED | OUTPATIENT
Start: 2022-11-18 | End: 2022-11-22 | Stop reason: HOSPADM

## 2022-11-18 RX ORDER — FENTANYL CITRATE/PF 50 MCG/ML
50 SYRINGE (ML) INJECTION
Status: DISCONTINUED | OUTPATIENT
Start: 2022-11-18 | End: 2022-11-18 | Stop reason: HOSPADM

## 2022-11-18 RX ORDER — LOSARTAN POTASSIUM 50 MG/1
100 TABLET ORAL DAILY
Status: DISCONTINUED | OUTPATIENT
Start: 2022-11-18 | End: 2022-11-22 | Stop reason: HOSPADM

## 2022-11-18 RX ORDER — MIDAZOLAM HYDROCHLORIDE 2 MG/2ML
INJECTION, SOLUTION INTRAMUSCULAR; INTRAVENOUS AS NEEDED
Status: DISCONTINUED | OUTPATIENT
Start: 2022-11-18 | End: 2022-11-18

## 2022-11-18 RX ORDER — ONDANSETRON 2 MG/ML
INJECTION INTRAMUSCULAR; INTRAVENOUS AS NEEDED
Status: DISCONTINUED | OUTPATIENT
Start: 2022-11-18 | End: 2022-11-18

## 2022-11-18 RX ORDER — FENTANYL CITRATE 50 UG/ML
INJECTION, SOLUTION INTRAMUSCULAR; INTRAVENOUS AS NEEDED
Status: DISCONTINUED | OUTPATIENT
Start: 2022-11-18 | End: 2022-11-18

## 2022-11-18 RX ADMIN — SODIUM CHLORIDE, POTASSIUM CHLORIDE, SODIUM LACTATE AND CALCIUM CHLORIDE 100 ML/HR: 600; 310; 30; 20 INJECTION, SOLUTION INTRAVENOUS at 11:54

## 2022-11-18 RX ADMIN — SUGAMMADEX 400 MG: 100 INJECTION, SOLUTION INTRAVENOUS at 09:33

## 2022-11-18 RX ADMIN — PANTOPRAZOLE SODIUM 40 MG: 40 TABLET, DELAYED RELEASE ORAL at 17:07

## 2022-11-18 RX ADMIN — HYDROCODONE BITARTRATE AND ACETAMINOPHEN 2 TABLET: 5; 325 TABLET ORAL at 20:33

## 2022-11-18 RX ADMIN — BUSPIRONE HYDROCHLORIDE 15 MG: 10 TABLET ORAL at 17:07

## 2022-11-18 RX ADMIN — MELOXICAM 15 MG: 7.5 TABLET ORAL at 17:10

## 2022-11-18 RX ADMIN — FENTANYL CITRATE 50 MCG: 50 INJECTION, SOLUTION INTRAMUSCULAR; INTRAVENOUS at 09:05

## 2022-11-18 RX ADMIN — THIAMINE HCL TAB 100 MG 100 MG: 100 TAB at 12:15

## 2022-11-18 RX ADMIN — FOLIC ACID 1 MG: 1 TABLET ORAL at 12:15

## 2022-11-18 RX ADMIN — MODAFINIL 200 MG: 100 TABLET ORAL at 12:13

## 2022-11-18 RX ADMIN — OXYCODONE HYDROCHLORIDE 20 MG: 20 TABLET, FILM COATED, EXTENDED RELEASE ORAL at 17:07

## 2022-11-18 RX ADMIN — FENTANYL CITRATE 50 MCG: 50 INJECTION, SOLUTION INTRAMUSCULAR; INTRAVENOUS at 09:10

## 2022-11-18 RX ADMIN — ROCURONIUM BROMIDE 50 MG: 10 INJECTION, SOLUTION INTRAVENOUS at 09:10

## 2022-11-18 RX ADMIN — OXYCODONE HYDROCHLORIDE 10 MG: 10 TABLET, FILM COATED, EXTENDED RELEASE ORAL at 03:15

## 2022-11-18 RX ADMIN — DOCUSATE SODIUM 100 MG: 100 CAPSULE, LIQUID FILLED ORAL at 17:09

## 2022-11-18 RX ADMIN — MORPHINE SULFATE 4 MG: 4 INJECTION INTRAVENOUS at 00:15

## 2022-11-18 RX ADMIN — SODIUM CHLORIDE, SODIUM LACTATE, POTASSIUM CHLORIDE, AND CALCIUM CHLORIDE: .6; .31; .03; .02 INJECTION, SOLUTION INTRAVENOUS at 08:58

## 2022-11-18 RX ADMIN — PROPOFOL 200 MG: 10 INJECTION, EMULSION INTRAVENOUS at 09:10

## 2022-11-18 RX ADMIN — ENOXAPARIN SODIUM 40 MG: 40 INJECTION SUBCUTANEOUS at 17:08

## 2022-11-18 RX ADMIN — HYDROMORPHONE HYDROCHLORIDE 0.5 MG: 1 INJECTION, SOLUTION INTRAMUSCULAR; INTRAVENOUS; SUBCUTANEOUS at 08:22

## 2022-11-18 RX ADMIN — ONDANSETRON 4 MG: 2 INJECTION INTRAMUSCULAR; INTRAVENOUS at 09:19

## 2022-11-18 RX ADMIN — IRON SUCROSE 300 MG: 20 INJECTION, SOLUTION INTRAVENOUS at 19:12

## 2022-11-18 RX ADMIN — PANTOPRAZOLE SODIUM 40 MG: 40 TABLET, DELAYED RELEASE ORAL at 05:25

## 2022-11-18 RX ADMIN — MIRTAZAPINE 30 MG: 15 TABLET, FILM COATED ORAL at 03:14

## 2022-11-18 RX ADMIN — HYDROCODONE BITARTRATE AND ACETAMINOPHEN 2 TABLET: 5; 325 TABLET ORAL at 11:53

## 2022-11-18 RX ADMIN — MIDAZOLAM 1 MG: 1 INJECTION INTRAMUSCULAR; INTRAVENOUS at 08:58

## 2022-11-18 RX ADMIN — DOCUSATE SODIUM 100 MG: 100 CAPSULE, LIQUID FILLED ORAL at 12:11

## 2022-11-18 RX ADMIN — MIDAZOLAM 1 MG: 1 INJECTION INTRAMUSCULAR; INTRAVENOUS at 09:05

## 2022-11-18 RX ADMIN — MIRTAZAPINE 30 MG: 15 TABLET, FILM COATED ORAL at 23:23

## 2022-11-18 RX ADMIN — HYDROCODONE BITARTRATE AND ACETAMINOPHEN 2 TABLET: 5; 325 TABLET ORAL at 05:25

## 2022-11-18 RX ADMIN — LIDOCAINE HYDROCHLORIDE 50 MG: 10 INJECTION, SOLUTION EPIDURAL; INFILTRATION; INTRACAUDAL; PERINEURAL at 09:10

## 2022-11-18 RX ADMIN — HEPARIN SODIUM 5000 UNITS: 5000 INJECTION INTRAVENOUS; SUBCUTANEOUS at 03:15

## 2022-11-18 RX ADMIN — LOSARTAN POTASSIUM 100 MG: 50 TABLET, FILM COATED ORAL at 12:12

## 2022-11-18 RX ADMIN — OXYCODONE HYDROCHLORIDE 20 MG: 20 TABLET, FILM COATED, EXTENDED RELEASE ORAL at 03:15

## 2022-11-18 RX ADMIN — FENTANYL CITRATE 50 MCG: 50 INJECTION INTRAMUSCULAR; INTRAVENOUS at 10:05

## 2022-11-18 RX ADMIN — OXYCODONE HYDROCHLORIDE 10 MG: 10 TABLET, FILM COATED, EXTENDED RELEASE ORAL at 17:08

## 2022-11-18 RX ADMIN — BUSPIRONE HYDROCHLORIDE 15 MG: 10 TABLET ORAL at 12:11
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
## 2022-11-18 NOTE — ED NOTES
Pt wife called nursing station for update on pt  Wife was agitated and upset with staff for not calling her sooner and began yelling at this RN  This RN explained to spouse that we intended to call her with an update after the conscious sedation but she had called before this RN could call her  Pt wife unhappy and asked to speak with pt  Pt given land line and this RN overheard pt wife screaming  Pt expressed to wife that staff was "not lying or deceiving" her and that we were going to call her shortly  Wife hung up on pt then called back and yelled at staff again             Bianca Martínez, [address] [place] [address]  11/18/22 5312

## 2022-11-18 NOTE — ASSESSMENT & PLAN NOTE
Baseline hemoglobin appears to be around 9  · Hemoglobin on admission 8 8  · iron panel reviewed, B12 813, folate 17  · TSH low with normal free T4 - can get repeat lab work after discharge  · IV Venofer  · Monitor CBC    Results from last 7 days   Lab Units 11/18/22  0006   HEMOGLOBIN g/dL 8 8*   HEMATOCRIT % 27 8*   MCV fL 79*

## 2022-11-18 NOTE — ASSESSMENT & PLAN NOTE
Lab Results   Component Value Date    EGFR 76 11/18/2022    EGFR 69 12/23/2021    EGFR 83 01/24/2020    CREATININE 1 01 11/18/2022    CREATININE 1 29 (H) 03/21/2022    CREATININE 1 10 12/23/2021     Baseline creatinine appears to be around 1 0-1 2s    · Creatinine continues to remain at baseline  · Monitor

## 2022-11-18 NOTE — PLAN OF CARE
Problem: Potential for Falls  Goal: Patient will remain free of falls  Description: INTERVENTIONS:  - Educate patient/family on patient safety including physical limitations  - Instruct patient to call for assistance with activity   - Consult OT/PT to assist with strengthening/mobility   - Keep Call bell within reach  - Keep bed low and locked with side rails adjusted as appropriate  - Keep care items and personal belongings within reach  - Initiate and maintain comfort rounds  - Make Fall Risk Sign visible to staff  - Offer Toileting every 2  Hours, in advance of need  - Initiate/Maintain bed alarm  - Obtain necessary fall risk management equipment: bed alarm, yellow socks   - Apply yellow socks and bracelet for high fall risk patients  - Consider moving patient to room near nurses station  Outcome: Progressing     Problem: MOBILITY - ADULT  Goal: Maintain or return to baseline ADL function  Description: INTERVENTIONS:  -  Assess patient's ability to carry out ADLs; assess patient's baseline for ADL function and identify physical deficits which impact ability to perform ADLs (bathing, care of mouth/teeth, toileting, grooming, dressing, etc )  - Assess/evaluate cause of self-care deficits   - Assess range of motion  - Assess patient's mobility; develop plan if impaired  - Assess patient's need for assistive devices and provide as appropriate  - Encourage maximum independence but intervene and supervise when necessary  - Involve family in performance of ADLs  - Assess for home care needs following discharge   - Consider OT consult to assist with ADL evaluation and planning for discharge  - Provide patient education as appropriate  Outcome: Progressing  Goal: Maintains/Returns to pre admission functional level  Description: INTERVENTIONS:  - Perform BMAT or MOVE assessment daily    - Set and communicate daily mobility goal to care team and patient/family/caregiver     - Collaborate with rehabilitation services on mobility goals if consulted  - Perform Range of Motion 3 times a day  - Reposition patient every 2 hours    - Dangle patient 3 times a day  - Stand patient 3 times a day  - Ambulate patient 3 times a day  - Out of bed to chair 3 times a day   - Out of bed for meals 3 times a day  - Out of bed for toileting  - Record patient progress and toleration of activity level   Outcome: Progressing     Problem: PAIN - ADULT  Goal: Verbalizes/displays adequate comfort level or baseline comfort level  Description: Interventions:  - Encourage patient to monitor pain and request assistance  - Assess pain using appropriate pain scale  - Administer analgesics based on type and severity of pain and evaluate response  - Implement non-pharmacological measures as appropriate and evaluate response  - Consider cultural and social influences on pain and pain management  - Notify physician/advanced practitioner if interventions unsuccessful or patient reports new pain  Outcome: Progressing     Problem: INFECTION - ADULT  Goal: Absence or prevention of progression during hospitalization  Description: INTERVENTIONS:  - Assess and monitor for signs and symptoms of infection  - Monitor lab/diagnostic results  - Monitor all insertion sites, i e  indwelling lines, tubes, and drains  - Monitor endotracheal if appropriate and nasal secretions for changes in amount and color  - Vernon appropriate cooling/warming therapies per order  - Administer medications as ordered  - Instruct and encourage patient and family to use good hand hygiene technique  - Identify and instruct in appropriate isolation precautions for identified infection/condition  Outcome: Progressing  Goal: Absence of fever/infection during neutropenic period  Description: INTERVENTIONS:  - Monitor WBC    Outcome: Progressing     Problem: SAFETY ADULT  Goal: Patient will remain free of falls  Description: INTERVENTIONS:  - Educate patient/family on patient safety including physical limitations  - Instruct patient to call for assistance with activity   - Consult OT/PT to assist with strengthening/mobility   - Keep Call bell within reach  - Keep bed low and locked with side rails adjusted as appropriate  - Keep care items and personal belongings within reach  - Initiate and maintain comfort rounds  - Make Fall Risk Sign visible to staff  - Offer Toileting every 2 Hours, in advance of need  - Initiate/Maintain bed alarm  - Obtain necessary fall risk management equipment: bed alarm, yellow socks   - Apply yellow socks and bracelet for high fall risk patients  - Consider moving patient to room near nurses station  Outcome: Progressing  Goal: Maintain or return to baseline ADL function  Description: INTERVENTIONS:  -  Assess patient's ability to carry out ADLs; assess patient's baseline for ADL function and identify physical deficits which impact ability to perform ADLs (bathing, care of mouth/teeth, toileting, grooming, dressing, etc )  - Assess/evaluate cause of self-care deficits   - Assess range of motion  - Assess patient's mobility; develop plan if impaired  - Assess patient's need for assistive devices and provide as appropriate  - Encourage maximum independence but intervene and supervise when necessary  - Involve family in performance of ADLs  - Assess for home care needs following discharge   - Consider OT consult to assist with ADL evaluation and planning for discharge  - Provide patient education as appropriate  Outcome: Progressing  Goal: Maintains/Returns to pre admission functional level  Description: INTERVENTIONS:  - Perform BMAT or MOVE assessment daily    - Set and communicate daily mobility goal to care team and patient/family/caregiver  - Collaborate with rehabilitation services on mobility goals if consulted  - Perform Range of Motion 4 times a day  - Reposition patient every 2 hours    - Dangle patient 3 times a day  - Stand patient 3 times a day  - Ambulate patient 3 times a day  - Out of bed to chair 3 times a day   - Out of bed for meals 3 times a day  - Out of bed for toileting  - Record patient progress and toleration of activity level   Outcome: Progressing     Problem: DISCHARGE PLANNING  Goal: Discharge to home or other facility with appropriate resources  Description: INTERVENTIONS:  - Identify barriers to discharge w/patient and caregiver  - Arrange for needed discharge resources and transportation as appropriate  - Identify discharge learning needs (meds, wound care, etc )  - Arrange for interpretive services to assist at discharge as needed  - Refer to Case Management Department for coordinating discharge planning if the patient needs post-hospital services based on physician/advanced practitioner order or complex needs related to functional status, cognitive ability, or social support system  Outcome: Progressing     Problem: Knowledge Deficit  Goal: Patient/family/caregiver demonstrates understanding of disease process, treatment plan, medications, and discharge instructions  Description: Complete learning assessment and assess knowledge base    Interventions:  - Provide teaching at level of understanding  - Provide teaching via preferred learning methods  Outcome: Progressing     Problem: Prexisting or High Potential for Compromised Skin Integrity  Goal: Skin integrity is maintained or improved  Description: INTERVENTIONS:  - Identify patients at risk for skin breakdown  - Assess and monitor skin integrity  - Assess and monitor nutrition and hydration status  - Monitor labs   - Assess for incontinence   - Turn and reposition patient  - Assist with mobility/ambulation  - Relieve pressure over bony prominences  - Avoid friction and shearing  - Provide appropriate hygiene as needed including keeping skin clean and dry  - Evaluate need for skin moisturizer/barrier cream  - Collaborate with interdisciplinary team   - Patient/family teaching  - Consider wound care consult Outcome: Progressing     Problem: RESPIRATORY - ADULT  Goal: Achieves optimal ventilation and oxygenation  Description: INTERVENTIONS:  - Assess for changes in respiratory status  - Assess for changes in mentation and behavior  - Position to facilitate oxygenation and minimize respiratory effort  - Oxygen administered by appropriate delivery if ordered  - Initiate smoking cessation education as indicated  - Encourage broncho-pulmonary hygiene including cough, deep breathe, Incentive Spirometry  - Assess the need for suctioning and aspirate as needed  - Assess and instruct to report SOB or any respiratory difficulty  - Respiratory Therapy support as indicated  Outcome: Progressing

## 2022-11-18 NOTE — OP NOTE
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, [facility] [T84 020A]    Same    Procedure(s) (LRB):  CLOSED REDUCTION HIP ( attempted) (Right)    Procedure:  Manipulation of R hip requiring general anesthesia (CPT 03547)    Specimen(s):  * No specimens in log *    Estimated Blood Loss:   0 cc    Drains:  * No LDAs found *    Anesthesia Type:   General    Operative Indications:  Dislocation of R hip prosthetic    Operative Findings:  See below    Complications:   None    Procedure and Technique:  The patient is a 71-year-old male who has a complex history of right hip dislocations and revisions and infection who most recently in 2018 underwent a revision surgery to his right hip following placement of dual mobility liner and cup revision  Per the patient, has not dislocated hip in the past 4 years however according to wife he has had multiple dislocations since that time  He had an injury where he fell and presents today with a dislocated right hip prosthesis  The equipment required for open revision of these components were not readily available however the patient was agreeable to undergoing a closed attempted reduction under general anesthesia in the event that we are unable to obtain successful closed reduction and prevent open reduction  Patient understands risks of surgery including persistent dislocation need for revision procedures, impaired function, loss of limb, complications from general anesthesia and agrees to proceed with the case      The patient's operative extremity, laterality, procedure, consent were verified in the preoperative area the patient was taken to the operating room  General anesthesia was provided by the anesthesia team and under fluoroscopic imaging dislocation of the right hip prosthetic was confirmed  Stabilization of the pelvis then took place and reduction maneuvers were attempted  We were able to observe that the femoral stem was stable and was found to be moving as a unit  With flexion and traction as well as extension, we were able to free the stem and head clear of the cup however either dual mobility liner is suspected to be dislocated or soft tissue interposed in the hip joint itself  Given the need for possible revision of components, decision was made to the hip dislocated and return at a later time with hip revision implants present  The wife was informed of this plan  Patient was extubated and awakened and transitioned to the PACU in stable condition  There were no immediate complications  There was no qualified resident available for the procedure  Critical assistance from Eliot Borges PA-C was required for all components of the procedure including but not limited to positioning, stabilization of pelvis during hip reduction maneuvers  The patient will be nonweightbearing on the operative extremity for the time being  We would anticipate a return to the OR in 2 days for revision of components if necessary, at minimal open reduction right hip  The patient may receive Lovenox in the meantime for deep vein thrombosis prophylaxis      Patient Disposition:  PACU         SIGNATURE: Yasmeen Major MD  DATE: November 18, 2022  TIME: 12:30 PM

## 2022-11-18 NOTE — ASSESSMENT & PLAN NOTE
Reports chronic low back pain and neck pain  On OxyContin 30 mg p o  B i d , Norco p r n  At home  Follows pain management as outpatient  History of L4-5 fusion  · Continue OxyContin, Norco p r n  Verified at [facility] website  · IV Dilaudid p r n   Breakthrough

## 2022-11-18 NOTE — RESPIRATORY THERAPY NOTE
RT Protocol Note  Bard Mantle 79 y o  male MRN: [id number]  Unit/Bed#: 2 [place] [address] A Encounter: [id number]    Assessment    Principal Problem:    Hip dislocation, right ([place] [address] )  Active Problems:    Achalasia    Anemia    Chronic back pain greater than 3 months duration    Hypercholesterolemia    Hypertension    Prostate cancer ([place] [address] )    Sleep apnea    Depression    Stage 3 chronic kidney disease, unspecified whether stage 3a or 3b CKD ([place] [address] )    Alcohol dependence ([place] [address] )      Home Pulmonary Medications:  Home Devices/Therapy: BiPAP/CPAP    Past Medical History:   Diagnosis Date   • Anxiety    • Arthritis    • Contreras's esophagus    • Cancer ([place] [address] ) 2018    Stage 1 prostrate cancer; under active surveillance  • Depression    • GERD (gastroesophageal reflux disease)    • Head injury    • Hypertension    • Osteoarthritis    • Psychiatric disorder    • Sleep apnea     CPAP at    • Spinal arthritis      Social History     Socioeconomic History   • Marital status: /Civil Union     Spouse name: None   • Number of children: 1   • Years of education: None   • Highest education level: Master's degree (e g , MA, MS, Benito, MEd, MSW, FERMIN)   Occupational History   • Occupation:    • Occupation: Teacher     Employer: [employer] [address]   Tobacco Use   • Smoking status: Former     Packs/day: 1 00     Years: 16 00     Pack years: 16 00     Types: Cigarettes     Start date: 1969     Quit date:      Years since quittin 9   • Smokeless tobacco: Never   Vaping Use   • Vaping Use: Never used   Substance and Sexual Activity   • Alcohol use:  Yes     Alcohol/week: 2 0 standard drinks     Types: 1 Glasses of wine, 1 Cans of beer per week     Comment: one drink a week   • Drug use: Yes     Frequency: 1 0 times per week     Types: Marijuana     Comment: occassionaly   • Sexual activity: Not Currently     Partners: Female     Birth control/protection: Post-menopausal, Male Sterilization     Comment: Very low / no libido; an issue in my marriage  Other Topics Concern   • None   Social History Narrative    Dental care, regularly    Drinks coffee:  2-3 cups per day    Exercises moderately 3 or more times a week    Vegetarian diet     Social Determinants of Health     Financial Resource Strain: Not on file   Food Insecurity: Not on file   Transportation Needs: Not on file   Physical Activity: Not on file   Stress: Not on file   Social Connections: Not on file   Intimate Partner Violence: Not on file   Housing Stability: Not on file       Subjective         Objective    Physical Exam:   Assessment Type: Assess only  General Appearance: Awake, Alert  Respiratory Pattern: Normal  Chest Assessment: Chest expansion symmetrical  Bilateral Breath Sounds: Unable to assess  Cough: None  O2 Device: CPAP    Vitals:  Blood pressure 130/75, pulse 73, temperature 98 3 °F (36 8 °C), temperature source Oral, resp  rate 17, height 5' 9" (1 753 m), weight 84 8 kg (186 lb 15 2 oz), SpO2 95 %              O2 Device: CPAP     Plan    Respiratory Plan: Vent/NIV/HFNC        Resp Comments: placed pt on CPAP for HS

## 2022-11-18 NOTE — NURSING NOTE
This RN pulled 100 mg of Modafinil previously before patient was in OR  Was supposed to pull 200 mg to administer  Returned one 100 mg pill, since that was what was pulled  Paulette Douglas RN witnessed return  Currently repulled his accurate dose, 200mg  Discrepancy in acudose was corrected by this RN and Paulette Douglas RN  to give as his daily scheduled medicine since patient is back under this RN care

## 2022-11-18 NOTE — ASSESSMENT & PLAN NOTE
Reports drinking 1 drink a week, last drink was night prior to admission  Wife warned ED that patient might withdraw    · Continue CIWA protocol

## 2022-11-18 NOTE — ASSESSMENT & PLAN NOTE
Lab Results   Component Value Date    EGFR 76 11/18/2022    EGFR 69 12/23/2021    EGFR 83 01/24/2020    CREATININE 1 01 11/18/2022    CREATININE 1 29 (H) 03/21/2022    CREATININE 1 10 12/23/2021     Baseline creatinine appears to be around 1 0-1 2s    · Creatinine stable  · Monitor

## 2022-11-18 NOTE — ASSESSMENT & PLAN NOTE
Reports chronic low back pain and neck pain  On OxyContin 30 mg p o  B i d , Norco p r n  At home  Follows pain management as outpatient  History of L4-5 fusion  · Continue OxyContin, Norco p r n  Prior provider Verified at [facility] website  · IV Dilaudid p r n   Breakthrough
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
## 2022-11-18 NOTE — ANESTHESIA POSTPROCEDURE EVALUATION
Post-Op Assessment Note    CV Status:  Stable  Pain Score: 0    Pain management: adequate     Mental Status:  Awake   Hydration Status:  Stable   PONV Controlled:  None   Airway Patency:  Patent       Staff: CRNA         No notable events documented      BP   136/65   Temp      Pulse  78   Resp   15   SpO2   99

## 2022-11-18 NOTE — PROGRESS NOTES
Patel 45  Progress Note Gianni Hines 1955, 79 y o  male MRN: [id number]  Unit/Bed#: OR POOL Encounter: [id number]  Primary Care Provider: Jefry Miramontes MD   Date and time admitted to hospital: 11/17/2022  6:39 PM    * Hip dislocation, right [facility])  Assessment & Plan  Patient presented with "slipped and fell while getting out of bed tonight", sustained right hip pain  History of right hip replacement about 7 years ago, underwent two revisions due to infection and pain respectively  · X-ray showed right hip dislocation, no fracture   · Attempted reduction in ED without success  · Was taken to the OR for reduction today which was not successful  · Pain control  · Nonweightbearing of right lower extremity  · Given heparin subQ x 1 last night for DVT prophylaxis  Started Lovenox today  · Appreciated orthopedic surgery input    Anemia  Assessment & Plan  Baseline hemoglobin appears to be around 9  · Hemoglobin on admission 8 8  · iron panel reviewed, B12 813, folate 17  · TSH low with normal free T4 - can get repeat lab work after discharge  · IV Venofer  · Monitor CBC    Results from last 7 days   Lab Units 11/18/22  0006   HEMOGLOBIN g/dL 8 8*   HEMATOCRIT % 27 8*   MCV fL 79*       Alcohol dependence (HCC)  Assessment & Plan  Reports drinking 1 drink a week, last drink was night prior to admission  Wife warned ED that patient might withdraw  · Continue CIWA protocol    Stage 3 chronic kidney disease, unspecified whether stage 3a or 3b CKD [facility])  Assessment & Plan  Lab Results   Component Value Date    EGFR 76 11/18/2022    EGFR 69 12/23/2021    EGFR 83 01/24/2020    CREATININE 1 01 11/18/2022    CREATININE 1 29 (H) 03/21/2022    CREATININE 1 10 12/23/2021     Baseline creatinine appears to be around 1 0-1 2s    · Creatinine continues to remain at baseline  · Monitor    Depression  Assessment & Plan  Continue Remeron, BuSpar    Sleep apnea  Assessment & Plan  · Substituted Armodafinil with Provigil  · Continue CPAP at HS  Prostate cancer [facility])  Assessment & Plan  Under surveillance    Hypertension  Assessment & Plan  On Benicar-HCT at home  · Substituted with losartan  · BP acceptable    Hypercholesterolemia  Assessment & Plan  Diet controlled    Chronic back pain greater than 3 months duration  Assessment & Plan  Reports chronic low back pain and neck pain  On OxyContin 30 mg p o  B i d , Norco p r n  At home  Follows pain management as outpatient  History of L4-5 fusion  · Continue OxyContin, Norco p r n  Prior provider Verified at [facility] website  · IV Dilaudid p r n  Breakthrough    Achalasia  Assessment & Plan  Continue  b i d  Protonix      VTE Pharmacologic Prophylaxis: VTE Score: 3 Moderate Risk (Score 3-4) - Pharmacological DVT Prophylaxis Ordered: enoxaparin (Lovenox)  Patient Centered Rounds: I performed bedside rounds with nursing staff today  Discussions with Specialists or Other Care Team Provider: yes     Education and Discussions with Family / Patient: Patient declined call to   Time Spent for Care: 30 minutes  More than 50% of total time spent on counseling and coordination of care as described above  Current Length of Stay: 0 day(s)  Current Patient Status: Inpatient   Certification Statement: The patient will continue to require additional inpatient hospital stay due to Persistent right hip dislocation requiring repeat OR intervention  Discharge Plan: Anticipate discharge in >72 hrs to discharge location to be determined pending rehab evaluations      Code Status: Level 3 - DNAR and DNI    Subjective:     Patient reports pain along the right hip    Objective:     Vitals:   Temp (24hrs), Av 1 °F (36 7 °C), Min:97 8 °F (36 6 °C), Max:98 3 °F (36 8 °C)    Temp:  [97 8 °F (36 6 °C)-98 3 °F (36 8 °C)] 97 8 °F (36 6 °C)  HR:  [66-90] 67  Resp:  [11-22] 16  BP: (123-148)/(62-98) 139/90  SpO2:  [95 %-100 %] 97 %  Body mass index is 27 61 kg/m²  Input and Output Summary (last 24 hours): Intake/Output Summary (Last 24 hours) at 11/18/2022 1044  Last data filed at 11/18/2022 1025  Gross per 24 hour   Intake 1300 ml   Output 0 ml   Net 1300 ml       Physical Exam:   Physical Exam  Vitals reviewed  Constitutional:       General: He is not in acute distress  Appearance: He is not ill-appearing  HENT:      Head: Normocephalic and atraumatic  Eyes:      General:         Right eye: No discharge  Left eye: No discharge  Cardiovascular:      Rate and Rhythm: Normal rate and regular rhythm  Pulmonary:      Effort: Pulmonary effort is normal  No respiratory distress  Breath sounds: Normal breath sounds  No wheezing or rales  Abdominal:      General: Bowel sounds are normal  There is no distension  Palpations: Abdomen is soft  Tenderness: There is no abdominal tenderness  Musculoskeletal:      Comments: Tenderness noted along the right hip region   Neurological:      Mental Status: He is alert and oriented to person, place, and time            Additional Data:     Labs:  Results from last 7 days   Lab Units 11/18/22  0006   WBC Thousand/uL 5 94   HEMOGLOBIN g/dL 8 8*   HEMATOCRIT % 27 8*   PLATELETS Thousands/uL 279     Results from last 7 days   Lab Units 11/18/22  0006   SODIUM mmol/L 141   POTASSIUM mmol/L 4 4   CHLORIDE mmol/L 106   CO2 mmol/L 24   BUN mg/dL 17   CREATININE mg/dL 1 01   ANION GAP mmol/L 11   CALCIUM mg/dL 8 2*   ALBUMIN g/dL 3 2*   TOTAL BILIRUBIN mg/dL 0 40   ALK PHOS U/L 76   ALT U/L 23   GLUCOSE RANDOM mg/dL 88                       Lines/Drains:  Invasive Devices     Peripheral Intravenous Line  Duration           Peripheral IV 11/17/22 Distal;Left;Upper;Ventral (anterior) Antecubital <1 day                      Imaging: Reviewed radiology reports from this admission including: xray(s)    Recent Cultures (last 7 days):         Last 24 Hours Medication List:   Current Facility-Administered Medications   Medication Dose Route Frequency Provider Last Rate   • [MAR Hold] busPIRone  15 mg Oral BID ELLIOT Florian     • [facility] Hold] docusate sodium  100 mg Oral BID ELLIOT Florian     • [MAR Hold] enoxaparin  40 mg Subcutaneous Daily ELLIOT Florian     • fentaNYL  50 mcg Intravenous Q10 Min PRN Chauncey Boyd MD     • [facility] Hold] folic acid  1 mg Oral Daily ELLIOT Florian     • [facility] Hold] HYDROcodone-acetaminophen  2 tablet Oral Q6H PRN ELLIOT Florian     • [MAR Hold] HYDROmorphone  0 5 mg Intravenous Q4H PRN ELLIOT Florian     • lactated ringers  100 mL/hr Intravenous Continuous Chauncey Boyd MD     • [facility] Hold] losartan  100 mg Oral Daily ELLIOT Florian     • [facility] Hold] meloxicam  15 mg Oral After Dinner ELLIOT Florian     • [facility] Hold] mirtazapine  30 mg Oral HS ELLIOT Florian     • [MAR Hold] modafinil  200 mg Oral Daily ELLIOT Florian     • [MAR Hold] ondansetron  4 mg Intravenous Q6H PRN ELLIOT Florian     • ondansetron  4 mg Intravenous Once PRN Chauncey Boyd MD     • [facility] Hold] oxyCODONE  10 mg Oral Q12H [address] ELLIOT Florian     • [facility] Hold] oxyCODONE  20 mg Oral Q12H [address] ELLIOT Florian     • [MAR Hold] pantoprazole  40 mg Oral BID AC ELLIOT Florian     • [MAR Hold] tadalafil  5 mg Oral Daily ELLIOT Florian     • [MAR Hold] thiamine  100 mg Oral Daily ELLIOT Florian          Today, Patient Was Seen By: Leana Vicente DO    **Please Note: This note may have been constructed using a voice recognition system  **

## 2022-11-18 NOTE — ASSESSMENT & PLAN NOTE
Baseline hemoglobin appears to be around 9  · Hemoglobin today 8 8  · Check iron panel, B12, folate, TSH, free T4, stool occult blood  · Monitor CBC
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
## 2022-11-18 NOTE — ASSESSMENT & PLAN NOTE
Reports drinking 1 drink a week, last drink was last night  Wife warned ED that patient might withdraw    · Follow CIWA protocol

## 2022-11-18 NOTE — ASSESSMENT & PLAN NOTE
Patient presented with "slipped and fell while getting out of bed tonight", sustained right hip pain  History of right hip replacement about 7 years ago, underwent two revisions due to infection and pain respectively  · X-ray showed right hip dislocation, no fracture   · Attempted reduction in ED without success  · Was taken to the OR for reduction today which was not successful  · Pain control  · Nonweightbearing of right lower extremity  · Given heparin subQ x 1 last night for DVT prophylaxis    Started Lovenox today  · Appreciated orthopedic surgery input

## 2022-11-18 NOTE — ANESTHESIA PREPROCEDURE EVALUATION
Procedure:  CLOSED REDUCTION HIP (Right: Hip)    Relevant Problems   ANESTHESIA (within normal limits)      CARDIO   (+) Hypercholesterolemia   (+) Hypertension      GI/HEPATIC   (+) GERD (gastroesophageal reflux disease)      /RENAL   (+) Prostate cancer (HCC)   (+) Stage 3 chronic kidney disease, unspecified whether stage 3a or 3b CKD (HCC)      HEMATOLOGY   (+) Anemia      MUSCULOSKELETAL   (+) Chronic back pain greater than 3 months duration   (+) Chronic bilateral low back pain with bilateral sciatica   (+) Generalized osteoarthritis of multiple sites   (+) Osteoarthrosis of hip   (+) Strain of other muscles, fascia and tendons at shoulder and upper arm level, right arm, initial encounter      NEURO/PSYCH   (+) Chronic back pain greater than 3 months duration   (+) Chronic bilateral low back pain with bilateral sciatica   (+) Chronic pain disorder   (+) Depression   (+) History of staphylococcal infection   (+) 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      PULMONARY   (+) Obstructive sleep apnea syndrome   (+) Sleep apnea        Physical Exam    Airway    Mallampati score: I  TM Distance: >3 FB  Neck ROM: full     Dental   No notable dental hx     Cardiovascular  Rhythm: regular, Rate: normal,     Pulmonary  Breath sounds clear to auscultation,     Other Findings        Anesthesia Plan  ASA Score- 3 Emergent    Anesthesia Type- general with ASA Monitors  Additional Monitors:   Airway Plan: ETT  Plan Factors-Exercise tolerance (METS): >4 METS  Chart reviewed  EKG reviewed  Existing labs reviewed  Patient summary reviewed  Patient is not a current smoker  Induction- intravenous  Postoperative Plan- Plan for postoperative opioid use  Planned trial extubation    Informed Consent- Anesthetic plan and risks discussed with patient  I personally reviewed this patient with the CRNA  Discussed and agreed on the Anesthesia Plan with the CRNA  Norman Singh

## 2022-11-18 NOTE — ED NOTES
X-Ray at bedside        Rick Cook, [facility]  11/17/22 3310

## 2022-11-18 NOTE — ASSESSMENT & PLAN NOTE
Patient presents with "slipped and fell while getting out of bed tonight", sustained right hip pain  History of right hip replacement about 7 years ago, underwent two revisions due to infection and pain respectively  · X-ray showed right hip dislocation, no fracture per ED provider  · Attempted reduction in ED without success  ED provider discussed with Orthopedic on-call, will take patient to OR for reduction in a m  · Pain control  · Ice  · Bed rest  · Will order heparin subQ x 1 tonight for DVT prophylaxis  Then start Lovenox after procedure tomorrow    · NPO after midnight  · Consult orthopedic surgery

## 2022-11-18 NOTE — ED NOTES
Patient transported to [address] [place] [address] by Pacheco Russo, ED Tech       Rayo Rand, AYAN  11/18/22 3555

## 2022-11-19 ENCOUNTER — ANESTHESIA EVENT (INPATIENT)
Dept: PERIOP | Facility: HOSPITAL | Age: 67
End: 2022-11-19

## 2022-11-19 LAB
ABO GROUP BLD: NORMAL
ABO GROUP BLD: NORMAL
ALBUMIN SERPL BCP-MCNC: 2.9 G/DL (ref 3.5–5)
ALP SERPL-CCNC: 72 U/L (ref 46–116)
ALT SERPL W P-5'-P-CCNC: 19 U/L (ref 12–78)
ANION GAP SERPL CALCULATED.3IONS-SCNC: 7 MMOL/L (ref 4–13)
APTT PPP: 38 SECONDS (ref 23–37)
AST SERPL W P-5'-P-CCNC: 16 U/L (ref 5–45)
BILIRUB SERPL-MCNC: 0.38 MG/DL (ref 0.2–1)
BLD GP AB SCN SERPL QL: NEGATIVE
BUN SERPL-MCNC: 15 MG/DL (ref 5–25)
CALCIUM ALBUM COR SERPL-MCNC: 9 MG/DL (ref 8.3–10.1)
CALCIUM SERPL-MCNC: 8.1 MG/DL (ref 8.3–10.1)
CHLORIDE SERPL-SCNC: 108 MMOL/L (ref 96–108)
CO2 SERPL-SCNC: 26 MMOL/L (ref 21–32)
CREAT SERPL-MCNC: 0.98 MG/DL (ref 0.6–1.3)
ERYTHROCYTE [DISTWIDTH] IN BLOOD BY AUTOMATED COUNT: 16.9 % (ref 11.6–15.1)
GFR SERPL CREATININE-BSD FRML MDRD: 79 ML/MIN/1.73SQ M
GLUCOSE SERPL-MCNC: 107 MG/DL (ref 65–140)
HCT VFR BLD AUTO: 29.6 % (ref 36.5–49.3)
HGB BLD-MCNC: 9 G/DL (ref 12–17)
INR PPP: 1.01 (ref 0.84–1.19)
MAGNESIUM SERPL-MCNC: 1.6 MG/DL (ref 1.6–2.6)
MCH RBC QN AUTO: 24.6 PG (ref 26.8–34.3)
MCHC RBC AUTO-ENTMCNC: 30.4 G/DL (ref 31.4–37.4)
MCV RBC AUTO: 81 FL (ref 82–98)
PLATELET # BLD AUTO: 245 THOUSANDS/UL (ref 149–390)
PMV BLD AUTO: 8.2 FL (ref 8.9–12.7)
POTASSIUM SERPL-SCNC: 4.2 MMOL/L (ref 3.5–5.3)
PROT SERPL-MCNC: 5.8 G/DL (ref 6.4–8.4)
PROTHROMBIN TIME: 13.4 SECONDS (ref 11.6–14.5)
RBC # BLD AUTO: 3.66 MILLION/UL (ref 3.88–5.62)
RH BLD: POSITIVE
RH BLD: POSITIVE
SODIUM SERPL-SCNC: 141 MMOL/L (ref 135–147)
SPECIMEN EXPIRATION DATE: NORMAL
WBC # BLD AUTO: 5.8 THOUSAND/UL (ref 4.31–10.16)

## 2022-11-19 RX ORDER — SODIUM CHLORIDE, SODIUM LACTATE, POTASSIUM CHLORIDE, CALCIUM CHLORIDE 600; 310; 30; 20 MG/100ML; MG/100ML; MG/100ML; MG/100ML
75 INJECTION, SOLUTION INTRAVENOUS CONTINUOUS
Status: DISCONTINUED | OUTPATIENT
Start: 2022-11-20 | End: 2022-11-20

## 2022-11-19 RX ORDER — CEFAZOLIN SODIUM 2 G/50ML
2000 SOLUTION INTRAVENOUS ONCE
Status: COMPLETED | OUTPATIENT
Start: 2022-11-19 | End: 2022-11-19

## 2022-11-19 RX ORDER — ENOXAPARIN SODIUM 100 MG/ML
40 INJECTION SUBCUTANEOUS DAILY
Status: COMPLETED | OUTPATIENT
Start: 2022-11-19 | End: 2022-11-19

## 2022-11-19 RX ADMIN — DOCUSATE SODIUM 100 MG: 100 CAPSULE, LIQUID FILLED ORAL at 18:20

## 2022-11-19 RX ADMIN — HYDROCODONE BITARTRATE AND ACETAMINOPHEN 2 TABLET: 5; 325 TABLET ORAL at 20:20

## 2022-11-19 RX ADMIN — OXYCODONE HYDROCHLORIDE 20 MG: 20 TABLET, FILM COATED, EXTENDED RELEASE ORAL at 18:21

## 2022-11-19 RX ADMIN — OXYCODONE HYDROCHLORIDE 20 MG: 20 TABLET, FILM COATED, EXTENDED RELEASE ORAL at 05:51

## 2022-11-19 RX ADMIN — OXYCODONE HYDROCHLORIDE 10 MG: 10 TABLET, FILM COATED, EXTENDED RELEASE ORAL at 18:21

## 2022-11-19 RX ADMIN — HYDROMORPHONE HYDROCHLORIDE 0.5 MG: 1 INJECTION, SOLUTION INTRAMUSCULAR; INTRAVENOUS; SUBCUTANEOUS at 07:33

## 2022-11-19 RX ADMIN — ENOXAPARIN SODIUM 40 MG: 40 INJECTION SUBCUTANEOUS at 14:00

## 2022-11-19 RX ADMIN — BUSPIRONE HYDROCHLORIDE 15 MG: 10 TABLET ORAL at 18:20

## 2022-11-19 RX ADMIN — HYDROMORPHONE HYDROCHLORIDE 0.5 MG: 1 INJECTION, SOLUTION INTRAMUSCULAR; INTRAVENOUS; SUBCUTANEOUS at 00:13

## 2022-11-19 RX ADMIN — OXYCODONE HYDROCHLORIDE 10 MG: 10 TABLET, FILM COATED, EXTENDED RELEASE ORAL at 05:50

## 2022-11-19 RX ADMIN — HYDROMORPHONE HYDROCHLORIDE 0.5 MG: 1 INJECTION, SOLUTION INTRAMUSCULAR; INTRAVENOUS; SUBCUTANEOUS at 23:08

## 2022-11-19 RX ADMIN — MIRTAZAPINE 30 MG: 15 TABLET, FILM COATED ORAL at 23:03

## 2022-11-19 RX ADMIN — MODAFINIL 200 MG: 100 TABLET ORAL at 08:53

## 2022-11-19 RX ADMIN — PANTOPRAZOLE SODIUM 40 MG: 40 TABLET, DELAYED RELEASE ORAL at 15:49

## 2022-11-19 RX ADMIN — DOCUSATE SODIUM 100 MG: 100 CAPSULE, LIQUID FILLED ORAL at 08:54

## 2022-11-19 RX ADMIN — THIAMINE HCL TAB 100 MG 100 MG: 100 TAB at 08:54

## 2022-11-19 RX ADMIN — BUSPIRONE HYDROCHLORIDE 15 MG: 10 TABLET ORAL at 08:54

## 2022-11-19 RX ADMIN — FOLIC ACID 1 MG: 1 TABLET ORAL at 08:54

## 2022-11-19 RX ADMIN — PANTOPRAZOLE SODIUM 40 MG: 40 TABLET, DELAYED RELEASE ORAL at 05:50

## 2022-11-19 RX ADMIN — CEFAZOLIN SODIUM 2000 MG: 2 SOLUTION INTRAVENOUS at 23:25

## 2022-11-19 RX ADMIN — SODIUM CHLORIDE, POTASSIUM CHLORIDE, SODIUM LACTATE AND CALCIUM CHLORIDE 100 ML/HR: 600; 310; 30; 20 INJECTION, SOLUTION INTRAVENOUS at 10:28

## 2022-11-19 RX ADMIN — LOSARTAN POTASSIUM 100 MG: 50 TABLET, FILM COATED ORAL at 08:54

## 2022-11-19 RX ADMIN — HYDROMORPHONE HYDROCHLORIDE 0.5 MG: 1 INJECTION, SOLUTION INTRAMUSCULAR; INTRAVENOUS; SUBCUTANEOUS at 14:00

## 2022-11-19 RX ADMIN — SODIUM CHLORIDE 200 MG: 900 INJECTION, SOLUTION INTRAVENOUS at 10:24

## 2022-11-19 RX ADMIN — HYDROCODONE BITARTRATE AND ACETAMINOPHEN 2 TABLET: 5; 325 TABLET ORAL at 10:32

## 2022-11-19 NOTE — ASSESSMENT & PLAN NOTE
Baseline hemoglobin appears to be around 9  · Hemoglobin on admission 8 8  · iron panel reviewed, B12 813, folate 17  · TSH low with normal free T4 - can get repeat lab work after discharge  · IV Venofer for 3 doses  · Lab holiday tomorrow        Results from last 7 days   Lab Units 11/19/22  0600 11/18/22  0006   HEMOGLOBIN g/dL 9 0* 8 8*   HEMATOCRIT % 29 6* 27 8*   MCV fL 81* 79*

## 2022-11-19 NOTE — ASSESSMENT & PLAN NOTE
Reports drinking 1 drink a week, last drink was night prior to admission  Wife warned ED that patient might withdraw    · Continue CIWA protocol  · No signs of withdrawal

## 2022-11-19 NOTE — ASSESSMENT & PLAN NOTE
Reports chronic low back pain and neck pain  On OxyContin 30 mg p o  B i d , Norco p r n  At home  Follows pain management as outpatient  History of L4-5 fusion  · Continue OxyContin, Norco p r n  Prior provider Verified at [facility] website  · IV Dilaudid p r n   Breakthrough

## 2022-11-19 NOTE — PLAN OF CARE
Problem: Potential for Falls  Goal: Patient will remain free of falls  Description: INTERVENTIONS:  - Educate patient/family on patient safety including physical limitations  - Instruct patient to call for assistance with activity   - Consult OT/PT to assist with strengthening/mobility   - Keep Call bell within reach  - Keep bed low and locked with side rails adjusted as appropriate  - Keep care items and personal belongings within reach  - Initiate and maintain comfort rounds  - Make Fall Risk Sign visible to staff  - Offer Toileting every 2  Hours, in advance of need  - Initiate/Maintain bed/chair alarm  - Obtain necessary fall risk management equipment: fall risk sign  - Apply yellow socks and bracelet for high fall risk patients  - Consider moving patient to room near nurses station  Outcome: Progressing     Problem: MOBILITY - ADULT  Goal: Maintain or return to baseline ADL function  Description: INTERVENTIONS:  -  Assess patient's ability to carry out ADLs; assess patient's baseline for ADL function and identify physical deficits which impact ability to perform ADLs (bathing, care of mouth/teeth, toileting, grooming, dressing, etc )  - Assess/evaluate cause of self-care deficits   - Assess range of motion  - Assess patient's mobility; develop plan if impaired  - Assess patient's need for assistive devices and provide as appropriate  - Encourage maximum independence but intervene and supervise when necessary  - Involve family in performance of ADLs  - Assess for home care needs following discharge   - Consider OT consult to assist with ADL evaluation and planning for discharge  - Provide patient education as appropriate  Outcome: Progressing     Problem: PAIN - ADULT  Goal: Verbalizes/displays adequate comfort level or baseline comfort level  Description: Interventions:  - Encourage patient to monitor pain and request assistance  - Assess pain using appropriate pain scale  - Administer analgesics based on type and severity of pain and evaluate response  - Implement non-pharmacological measures as appropriate and evaluate response  - Consider cultural and social influences on pain and pain management  - Notify physician/advanced practitioner if interventions unsuccessful or patient reports new pain  Outcome: Progressing

## 2022-11-19 NOTE — PROGRESS NOTES
[address] 45  Progress Note Laithtory Jose E 1955, 79 y o  male MRN: [id number]  Unit/Bed#: [address]  Encounter: [id number]  Primary Care Provider: Ila Grimm MD   Date and time admitted to hospital: 11/17/2022  6:39 PM    * Hip dislocation, right [facility])  Assessment & Plan  Patient presented with "slipped and fell while getting out of bed tonight", sustained right hip pain  History of right hip replacement about 7 years ago, underwent two revisions due to infection and pain respectively  · X-ray showed right hip dislocation, no fracture   · Attempted reduction in ED without success  · Was taken to the OR for reduction 11/18 which was not successful  · Pain control  · Nonweightbearing of right lower extremity  · Continue Lovenox  · Appreciated orthopedic surgery input    Anemia  Assessment & Plan  Baseline hemoglobin appears to be around 9  · Hemoglobin on admission 8 8  · iron panel reviewed, B12 813, folate 17  · TSH low with normal free T4 - can get repeat lab work after discharge  · IV Venofer for 3 doses  · Lab holiday tomorrow  Results from last 7 days   Lab Units 11/19/22  0600 11/18/22  0006   HEMOGLOBIN g/dL 9 0* 8 8*   HEMATOCRIT % 29 6* 27 8*   MCV fL 81* 79*       Alcohol dependence (HCC)  Assessment & Plan  Reports drinking 1 drink a week, last drink was night prior to admission  Wife warned ED that patient might withdraw  · Continue CIWA protocol  · No signs of withdrawal    Stage 3 chronic kidney disease, unspecified whether stage 3a or 3b CKD [facility])  Assessment & Plan  Lab Results   Component Value Date    EGFR 79 11/19/2022    EGFR 76 11/18/2022    EGFR 69 12/23/2021    CREATININE 0 98 11/19/2022    CREATININE 1 01 11/18/2022    CREATININE 1 29 (H) 03/21/2022     Baseline creatinine appears to be around 1 0-1 2s    · Creatinine continues to remain at baseline  · Monitor    Depression  Assessment & Plan  Continue BuSrupinder Remeron    Sleep apnea  Assessment & Plan  · Substituted Armodafinil with Provigil  · Continue CPAP at HS  Prostate cancer [facility])  Assessment & Plan  Under surveillance  Hypertension  Assessment & Plan  On Benicar-HCT at home  · Substituted with losartan  · BPs acceptable    Hypercholesterolemia  Assessment & Plan  Diet controlled  Chronic back pain greater than 3 months duration  Assessment & Plan  Reports chronic low back pain and neck pain  On OxyContin 30 mg p o  B i d , Norco p r n  At home  Follows pain management as outpatient  History of L4-5 fusion  · Continue OxyContin, Norco p r n  Prior provider Verified at [facility] website  · IV Dilaudid p r n  Breakthrough    Achalasia  Assessment & Plan  Continue Protonix b i d     VTE Pharmacologic Prophylaxis: VTE Score: 3 Moderate Risk (Score 3-4) - Pharmacological DVT Prophylaxis Ordered: enoxaparin (Lovenox)  Patient Centered Rounds: I performed bedside rounds with nursing staff today  Discussions with Specialists or Other Care Team Provider: yes - ortho    Education and Discussions with Family / Patient: Updated  (wife) via phone  Time Spent for Care: 35 min  More than 50% of total time spent on counseling and coordination of care as described above  Current Length of Stay: 1 day(s)  Current Patient Status: Inpatient   Certification Statement: The patient will continue to require additional inpatient hospital stay due to Right hip dislocation  Discharge Plan: Anticipate discharge in 48-72 hrs to discharge location to be determined pending rehab evaluations      Code Status: Level 3 - DNAR and DNI    Subjective:     Reports right hip pain and reports stiffness from not being able to move around    Objective:     Vitals:   Temp (24hrs), Av 1 °F (36 7 °C), Min:97 4 °F (36 3 °C), Max:99 2 °F (37 3 °C)    Temp:  [97 4 °F (36 3 °C)-99 2 °F (37 3 °C)] 98 1 °F (36 7 °C)  HR:  [62-79] 74  Resp:  [16-18] 18  BP: (107-148)/(62-96) 138/93  SpO2:  [92 %-100 %] 92 %  Body mass index is 27 61 kg/m²  Input and Output Summary (last 24 hours): Intake/Output Summary (Last 24 hours) at 11/19/2022 0924  Last data filed at 11/19/2022 0245  Gross per 24 hour   Intake 300 ml   Output 950 ml   Net -650 ml       Physical Exam:   Physical Exam  Vitals reviewed  Constitutional:       General: He is not in acute distress  Appearance: He is not ill-appearing  HENT:      Head: Normocephalic and atraumatic  Eyes:      General:         Right eye: No discharge  Left eye: No discharge  Cardiovascular:      Rate and Rhythm: Normal rate and regular rhythm  Pulmonary:      Effort: Pulmonary effort is normal  No respiratory distress  Breath sounds: Normal breath sounds  No wheezing or rales  Abdominal:      General: Bowel sounds are normal  There is no distension  Palpations: Abdomen is soft  Tenderness: There is no abdominal tenderness  Musculoskeletal:      Comments: Right hip tenderness  No tremors noted   Neurological:      Mental Status: He is alert and oriented to person, place, and time           Additional Data:     Labs:  Results from last 7 days   Lab Units 11/19/22  0600   WBC Thousand/uL 5 80   HEMOGLOBIN g/dL 9 0*   HEMATOCRIT % 29 6*   PLATELETS Thousands/uL 245     Results from last 7 days   Lab Units 11/19/22  0600   SODIUM mmol/L 141   POTASSIUM mmol/L 4 2   CHLORIDE mmol/L 108   CO2 mmol/L 26   BUN mg/dL 15   CREATININE mg/dL 0 98   ANION GAP mmol/L 7   CALCIUM mg/dL 8 1*   ALBUMIN g/dL 2 9*   TOTAL BILIRUBIN mg/dL 0 38   ALK PHOS U/L 72   ALT U/L 19   AST U/L 16   GLUCOSE RANDOM mg/dL 107                       Lines/Drains:  Invasive Devices     Peripheral Intravenous Line  Duration           Peripheral IV 11/18/22 Right;Upper;Ventral (anterior) Arm 1 day                Imaging: Reviewed radiology reports from this admission including: xray(s) and procedure reports    Recent Cultures (last 7 days):         Last 24 Hours Medication List:   Current Facility-Administered Medications   Medication Dose Route Frequency Provider Last Rate   • busPIRone  15 mg Oral BID Brina Dockery PA-C     • docusate sodium  100 mg Oral BID Brina Dockery PA-C     • enoxaparin  40 mg Subcutaneous Daily Brina Dockery PA-C     • folic acid  1 mg Oral Daily Brina Dockery PA-C     • HYDROcodone-acetaminophen  2 tablet Oral Q6H PRN Brina Dockery PA-C     • HYDROmorphone  0 5 mg Intravenous Q4H PRN Brina Dockery PA-C     • iron sucrose  200 mg Intravenous Daily Geraldine Mitchell DO     • lactated ringers  100 mL/hr Intravenous Continuous Brina Dockery PA-C 100 mL/hr (11/18/22 1154)   • losartan  100 mg Oral Daily Brina Dockery PA-C     • meloxicam  15 mg Oral After Marilynn Kaba PA-C     • mirtazapine  30 mg Oral HS Brina Dockery PA-C     • modafinil  200 mg Oral Daily Devyn Kaba PA-C     • ondansetron  4 mg Intravenous Q6H PRN Brina Dockery PA-C     • oxyCODONE  10 mg Oral Q12H [address] Brina Dockery PA-C     • oxyCODONE  20 mg Oral Q12H [address] Devyn Kaba PA-C     • pantoprazole  40 mg Oral BID AC Devyn Kaba PA-C     • tadalafil  5 mg Oral Daily Devyn Kaba PA-C     • thiamine  100 mg Oral Daily Brina Dockery PA-C          Today, Patient Was Seen By: Vincent Torres DO    **Please Note: This note may have been constructed using a voice recognition system  **

## 2022-11-19 NOTE — ASSESSMENT & PLAN NOTE
Lab Results   Component Value Date    EGFR 79 11/19/2022    EGFR 76 11/18/2022    EGFR 69 12/23/2021    CREATININE 0 98 11/19/2022    CREATININE 1 01 11/18/2022    CREATININE 1 29 (H) 03/21/2022     Baseline creatinine appears to be around 1 0-1 2s    · Creatinine continues to remain at baseline  · Monitor

## 2022-11-19 NOTE — ASSESSMENT & PLAN NOTE
Patient presented with "slipped and fell while getting out of bed tonight", sustained right hip pain  History of right hip replacement about 7 years ago, underwent two revisions due to infection and pain respectively  · X-ray showed right hip dislocation, no fracture   · Attempted reduction in ED without success      · Was taken to the OR for reduction 11/18 which was not successful  · Pain control  · Nonweightbearing of right lower extremity  · Continue Lovenox  · Appreciated orthopedic surgery input

## 2022-11-19 NOTE — PROGRESS NOTES
Progress Note - Orthopedics   Smooth Number 79 y o  male MRN: [id number]  Unit/Bed#: [address] [address] Encounter: [id number]    Assessment:  1) Dislocated right total hip prosthesis; failed closed reduction attempt in OR yesterday    Plan:  - plan for open reduction verses open revision right total hip arthroplasty tomorrow morning at 8:00 a m  With Dr Mackey Carry  - nonweightbearing right lower extremity, strict bedrest  - antibiotics on call to OR  - NPO after midnight tonight  - Type and screen, coags today  - may give Lovenox today, hold chemical anticoagulation after midnight  -analgesia p r n   - hold PT/OT  - orthopedics will continue to follow    Weight bearing: NWB RLE    VTE Pharmacologic Prophylaxis: Enoxaparin (Lovenox) today, hold after midnight  VTE Mechanical Prophylaxis: sequential compression device    Subjective:  Patient seen and examined this afternoon  No overnight events  Pain controlled in the right hip  He has been nonweightbearing and on bedrest   We did have a long discussion today about planning for surgery tomorrow  At his request, I also called his wife, but was unable to reach her so I left a detailed message  He has baseline paresthesias previous spinal injuries, but no new paresthesias of the right lower extremity  Vitals: Blood pressure 138/93, pulse 74, temperature 98 1 °F (36 7 °C), resp  rate 18, height 5' 9" (1 753 m), weight 84 8 kg (186 lb 15 2 oz), SpO2 92 %  ,Body mass index is 27 61 kg/m²        Intake/Output Summary (Last 24 hours) at 11/19/2022 1107  Last data filed at 11/19/2022 0245  Gross per 24 hour   Intake --   Output 950 ml   Net -950 ml       Invasive Devices     Peripheral Intravenous Line  Duration           Peripheral IV 11/18/22 Right;Upper;Ventral (anterior) Arm 1 day                Physical Exam: General: A&Ox3, NAD, resting comfortably in bed  Ortho Exam: RLE:  Right lower extremity shorter than the left, range of motion strength deferred, baseline sensation unchanged in bilateral lower extremities, thigh calf soft non tender, no abrasion or ecchymosis around right hip, palpable pedal pulse, SCDs in place    Lab, Imaging and other studies:   I have personally reviewed pertinent lab results    CBC:   Lab Results   Component Value Date    WBC 5 80 11/19/2022    HGB 9 0 (L) 11/19/2022    HCT 29 6 (L) 11/19/2022    MCV 81 (L) 11/19/2022     11/19/2022    MCH 24 6 (L) 11/19/2022    MCHC 30 4 (L) 11/19/2022    RDW 16 9 (H) 11/19/2022    MPV 8 2 (L) 11/19/2022     CMP:   Lab Results   Component Value Date    SODIUM 141 11/19/2022     11/19/2022    CO2 26 11/19/2022    BUN 15 11/19/2022    CREATININE 0 98 11/19/2022    CALCIUM 8 1 (L) 11/19/2022    AST 16 11/19/2022    ALT 19 11/19/2022    ALKPHOS 72 11/19/2022    EGFR 79 11/19/2022     PT/INR: No results found for: PT, INR    Enio Jackson PA-C

## 2022-11-20 ENCOUNTER — APPOINTMENT (INPATIENT)
Dept: RADIOLOGY | Facility: HOSPITAL | Age: 67
End: 2022-11-20

## 2022-11-20 ENCOUNTER — ANESTHESIA (INPATIENT)
Dept: PERIOP | Facility: HOSPITAL | Age: 67
End: 2022-11-20

## 2022-11-20 LAB
ANION GAP SERPL CALCULATED.3IONS-SCNC: 10 MMOL/L (ref 4–13)
BASOPHILS # BLD AUTO: 0.03 THOUSANDS/ÂΜL (ref 0–0.1)
BASOPHILS NFR BLD AUTO: 1 % (ref 0–1)
BUN SERPL-MCNC: 11 MG/DL (ref 5–25)
CALCIUM SERPL-MCNC: 8.5 MG/DL (ref 8.3–10.1)
CHLORIDE SERPL-SCNC: 106 MMOL/L (ref 96–108)
CO2 SERPL-SCNC: 23 MMOL/L (ref 21–32)
CREAT SERPL-MCNC: 0.91 MG/DL (ref 0.6–1.3)
EOSINOPHIL # BLD AUTO: 0.36 THOUSAND/ÂΜL (ref 0–0.61)
EOSINOPHIL NFR BLD AUTO: 6 % (ref 0–6)
ERYTHROCYTE [DISTWIDTH] IN BLOOD BY AUTOMATED COUNT: 16.9 % (ref 11.6–15.1)
GFR SERPL CREATININE-BSD FRML MDRD: 86 ML/MIN/1.73SQ M
GLUCOSE SERPL-MCNC: 108 MG/DL (ref 65–140)
HCT VFR BLD AUTO: 28.8 % (ref 36.5–49.3)
HEMOCCULT STL QL: NEGATIVE
HEMOCCULT STL QL: NORMAL
HEMOCCULT STL QL: NORMAL
HGB BLD-MCNC: 8.9 G/DL (ref 12–17)
IMM GRANULOCYTES # BLD AUTO: 0.02 THOUSAND/UL (ref 0–0.2)
IMM GRANULOCYTES NFR BLD AUTO: 0 % (ref 0–2)
LYMPHOCYTES # BLD AUTO: 0.78 THOUSANDS/ÂΜL (ref 0.6–4.47)
LYMPHOCYTES NFR BLD AUTO: 13 % (ref 14–44)
MCH RBC QN AUTO: 25.2 PG (ref 26.8–34.3)
MCHC RBC AUTO-ENTMCNC: 30.9 G/DL (ref 31.4–37.4)
MCV RBC AUTO: 82 FL (ref 82–98)
MONOCYTES # BLD AUTO: 0.67 THOUSAND/ÂΜL (ref 0.17–1.22)
MONOCYTES NFR BLD AUTO: 11 % (ref 4–12)
NEUTROPHILS # BLD AUTO: 4.26 THOUSANDS/ÂΜL (ref 1.85–7.62)
NEUTS SEG NFR BLD AUTO: 69 % (ref 43–75)
NRBC BLD AUTO-RTO: 0 /100 WBCS
PLATELET # BLD AUTO: 286 THOUSANDS/UL (ref 149–390)
PMV BLD AUTO: 9.1 FL (ref 8.9–12.7)
POTASSIUM SERPL-SCNC: 4.1 MMOL/L (ref 3.5–5.3)
RBC # BLD AUTO: 3.53 MILLION/UL (ref 3.88–5.62)
SODIUM SERPL-SCNC: 139 MMOL/L (ref 135–147)
WBC # BLD AUTO: 6.12 THOUSAND/UL (ref 4.31–10.16)

## 2022-11-20 PROCEDURE — 0SR903Z REPLACEMENT OF RIGHT HIP JOINT WITH CERAMIC SYNTHETIC SUBSTITUTE, OPEN APPROACH: ICD-10-PCS | Performed by: ORTHOPAEDIC SURGERY

## 2022-11-20 PROCEDURE — 0SP90JZ REMOVAL OF SYNTHETIC SUBSTITUTE FROM RIGHT HIP JOINT, OPEN APPROACH: ICD-10-PCS | Performed by: ORTHOPAEDIC SURGERY

## 2022-11-20 DEVICE — BIOLOX DELTA TS CERAMIC FEMORAL HEAD 12/14 TAPER REVISION DIAMETER 28MM +5
Type: IMPLANTABLE DEVICE | Status: FUNCTIONAL
Brand: BIOLOX DELTA

## 2022-11-20 DEVICE — IMPLANTABLE DEVICE: Type: IMPLANTABLE DEVICE | Status: FUNCTIONAL

## 2022-11-20 RX ORDER — MAGNESIUM HYDROXIDE 1200 MG/15ML
LIQUID ORAL AS NEEDED
Status: DISCONTINUED | OUTPATIENT
Start: 2022-11-20 | End: 2022-11-20 | Stop reason: HOSPADM

## 2022-11-20 RX ORDER — MIDAZOLAM HYDROCHLORIDE 2 MG/2ML
INJECTION, SOLUTION INTRAMUSCULAR; INTRAVENOUS AS NEEDED
Status: DISCONTINUED | OUTPATIENT
Start: 2022-11-20 | End: 2022-11-20

## 2022-11-20 RX ORDER — HYDROMORPHONE HCL/PF 1 MG/ML
SYRINGE (ML) INJECTION AS NEEDED
Status: DISCONTINUED | OUTPATIENT
Start: 2022-11-20 | End: 2022-11-20

## 2022-11-20 RX ORDER — SODIUM CHLORIDE 9 MG/ML
INJECTION, SOLUTION INTRAVENOUS CONTINUOUS PRN
Status: DISCONTINUED | OUTPATIENT
Start: 2022-11-20 | End: 2022-11-20

## 2022-11-20 RX ORDER — OXYCODONE HYDROCHLORIDE 5 MG/1
5 TABLET ORAL EVERY 4 HOURS PRN
Status: DISCONTINUED | OUTPATIENT
Start: 2022-11-20 | End: 2022-11-22 | Stop reason: HOSPADM

## 2022-11-20 RX ORDER — ONDANSETRON 2 MG/ML
4 INJECTION INTRAMUSCULAR; INTRAVENOUS EVERY 6 HOURS PRN
Status: DISCONTINUED | OUTPATIENT
Start: 2022-11-20 | End: 2022-11-20 | Stop reason: SDUPTHER

## 2022-11-20 RX ORDER — LIDOCAINE HYDROCHLORIDE 10 MG/ML
INJECTION, SOLUTION EPIDURAL; INFILTRATION; INTRACAUDAL; PERINEURAL AS NEEDED
Status: DISCONTINUED | OUTPATIENT
Start: 2022-11-20 | End: 2022-11-20

## 2022-11-20 RX ORDER — HYDROMORPHONE HCL/PF 1 MG/ML
0.4 SYRINGE (ML) INJECTION
Status: DISCONTINUED | OUTPATIENT
Start: 2022-11-20 | End: 2022-11-20 | Stop reason: HOSPADM

## 2022-11-20 RX ORDER — ONDANSETRON 2 MG/ML
4 INJECTION INTRAMUSCULAR; INTRAVENOUS ONCE AS NEEDED
Status: DISCONTINUED | OUTPATIENT
Start: 2022-11-20 | End: 2022-11-20 | Stop reason: HOSPADM

## 2022-11-20 RX ORDER — LABETALOL HYDROCHLORIDE 5 MG/ML
10 INJECTION, SOLUTION INTRAVENOUS
Status: DISCONTINUED | OUTPATIENT
Start: 2022-11-20 | End: 2022-11-20 | Stop reason: HOSPADM

## 2022-11-20 RX ORDER — ENOXAPARIN SODIUM 100 MG/ML
40 INJECTION SUBCUTANEOUS DAILY
Status: DISCONTINUED | OUTPATIENT
Start: 2022-11-21 | End: 2022-11-22 | Stop reason: HOSPADM

## 2022-11-20 RX ORDER — IBUPROFEN 600 MG/1
600 TABLET ORAL ONCE
Status: COMPLETED | OUTPATIENT
Start: 2022-11-20 | End: 2022-11-20

## 2022-11-20 RX ORDER — CEFAZOLIN SODIUM 2 G/50ML
2000 SOLUTION INTRAVENOUS EVERY 8 HOURS
Status: COMPLETED | OUTPATIENT
Start: 2022-11-20 | End: 2022-11-21

## 2022-11-20 RX ORDER — FENTANYL CITRATE 50 UG/ML
INJECTION, SOLUTION INTRAMUSCULAR; INTRAVENOUS AS NEEDED
Status: DISCONTINUED | OUTPATIENT
Start: 2022-11-20 | End: 2022-11-20

## 2022-11-20 RX ORDER — ROCURONIUM BROMIDE 10 MG/ML
INJECTION, SOLUTION INTRAVENOUS AS NEEDED
Status: DISCONTINUED | OUTPATIENT
Start: 2022-11-20 | End: 2022-11-20

## 2022-11-20 RX ORDER — OXYCODONE HYDROCHLORIDE 10 MG/1
10 TABLET ORAL EVERY 4 HOURS PRN
Status: DISCONTINUED | OUTPATIENT
Start: 2022-11-20 | End: 2022-11-22 | Stop reason: HOSPADM

## 2022-11-20 RX ORDER — ONDANSETRON 2 MG/ML
INJECTION INTRAMUSCULAR; INTRAVENOUS AS NEEDED
Status: DISCONTINUED | OUTPATIENT
Start: 2022-11-20 | End: 2022-11-20

## 2022-11-20 RX ORDER — SODIUM CHLORIDE, SODIUM LACTATE, POTASSIUM CHLORIDE, CALCIUM CHLORIDE 600; 310; 30; 20 MG/100ML; MG/100ML; MG/100ML; MG/100ML
INJECTION, SOLUTION INTRAVENOUS CONTINUOUS PRN
Status: DISCONTINUED | OUTPATIENT
Start: 2022-11-20 | End: 2022-11-20

## 2022-11-20 RX ORDER — ACETAMINOPHEN 325 MG/1
975 TABLET ORAL EVERY 8 HOURS
Status: DISCONTINUED | OUTPATIENT
Start: 2022-11-20 | End: 2022-11-22 | Stop reason: HOSPADM

## 2022-11-20 RX ORDER — HYDROMORPHONE HCL/PF 1 MG/ML
0.5 SYRINGE (ML) INJECTION EVERY 2 HOUR PRN
Status: DISPENSED | OUTPATIENT
Start: 2022-11-20 | End: 2022-11-22

## 2022-11-20 RX ORDER — PROPOFOL 10 MG/ML
INJECTION, EMULSION INTRAVENOUS AS NEEDED
Status: DISCONTINUED | OUTPATIENT
Start: 2022-11-20 | End: 2022-11-20

## 2022-11-20 RX ORDER — TRANEXAMIC ACID 100 MG/ML
INJECTION, SOLUTION INTRAVENOUS AS NEEDED
Status: DISCONTINUED | OUTPATIENT
Start: 2022-11-20 | End: 2022-11-20

## 2022-11-20 RX ORDER — DEXAMETHASONE SODIUM PHOSPHATE 4 MG/ML
INJECTION, SOLUTION INTRA-ARTICULAR; INTRALESIONAL; INTRAMUSCULAR; INTRAVENOUS; SOFT TISSUE AS NEEDED
Status: DISCONTINUED | OUTPATIENT
Start: 2022-11-20 | End: 2022-11-20

## 2022-11-20 RX ORDER — CEFAZOLIN SODIUM 2 G/50ML
SOLUTION INTRAVENOUS AS NEEDED
Status: DISCONTINUED | OUTPATIENT
Start: 2022-11-20 | End: 2022-11-20

## 2022-11-20 RX ADMIN — FENTANYL CITRATE 50 MCG: 50 INJECTION, SOLUTION INTRAMUSCULAR; INTRAVENOUS at 11:08

## 2022-11-20 RX ADMIN — FOLIC ACID 1 MG: 1 TABLET ORAL at 14:09

## 2022-11-20 RX ADMIN — ROCURONIUM BROMIDE 10 MG: 10 INJECTION, SOLUTION INTRAVENOUS at 09:14

## 2022-11-20 RX ADMIN — HYDROMORPHONE HYDROCHLORIDE 0.4 MG: 1 INJECTION, SOLUTION INTRAMUSCULAR; INTRAVENOUS; SUBCUTANEOUS at 11:31

## 2022-11-20 RX ADMIN — ACETAMINOPHEN 975 MG: 325 TABLET, FILM COATED ORAL at 21:15

## 2022-11-20 RX ADMIN — OXYCODONE HYDROCHLORIDE 10 MG: 10 TABLET ORAL at 13:19

## 2022-11-20 RX ADMIN — PANTOPRAZOLE SODIUM 40 MG: 40 TABLET, DELAYED RELEASE ORAL at 17:23

## 2022-11-20 RX ADMIN — FENTANYL CITRATE 25 MCG: 50 INJECTION, SOLUTION INTRAMUSCULAR; INTRAVENOUS at 10:06

## 2022-11-20 RX ADMIN — PROPOFOL 200 MG: 10 INJECTION, EMULSION INTRAVENOUS at 08:13

## 2022-11-20 RX ADMIN — CEFAZOLIN SODIUM 2000 MG: 2 SOLUTION INTRAVENOUS at 08:11

## 2022-11-20 RX ADMIN — HYDROMORPHONE HYDROCHLORIDE 0.4 MG: 1 INJECTION, SOLUTION INTRAMUSCULAR; INTRAVENOUS; SUBCUTANEOUS at 11:46

## 2022-11-20 RX ADMIN — SODIUM CHLORIDE: 9 INJECTION, SOLUTION INTRAVENOUS at 08:18

## 2022-11-20 RX ADMIN — FENTANYL CITRATE 75 MCG: 50 INJECTION, SOLUTION INTRAMUSCULAR; INTRAVENOUS at 08:13

## 2022-11-20 RX ADMIN — THIAMINE HCL TAB 100 MG 100 MG: 100 TAB at 14:09

## 2022-11-20 RX ADMIN — BUSPIRONE HYDROCHLORIDE 15 MG: 10 TABLET ORAL at 14:08

## 2022-11-20 RX ADMIN — ONDANSETRON 4 MG: 2 INJECTION INTRAMUSCULAR; INTRAVENOUS at 08:30

## 2022-11-20 RX ADMIN — FENTANYL CITRATE 25 MCG: 50 INJECTION, SOLUTION INTRAMUSCULAR; INTRAVENOUS at 09:05

## 2022-11-20 RX ADMIN — FENTANYL CITRATE 25 MCG: 50 INJECTION, SOLUTION INTRAMUSCULAR; INTRAVENOUS at 09:08

## 2022-11-20 RX ADMIN — CEFAZOLIN SODIUM 2000 MG: 2 SOLUTION INTRAVENOUS at 17:22

## 2022-11-20 RX ADMIN — ROCURONIUM BROMIDE 10 MG: 10 INJECTION, SOLUTION INTRAVENOUS at 10:29

## 2022-11-20 RX ADMIN — FENTANYL CITRATE 25 MCG: 50 INJECTION, SOLUTION INTRAMUSCULAR; INTRAVENOUS at 10:58

## 2022-11-20 RX ADMIN — FENTANYL CITRATE 25 MCG: 50 INJECTION, SOLUTION INTRAMUSCULAR; INTRAVENOUS at 08:03

## 2022-11-20 RX ADMIN — ROCURONIUM BROMIDE 10 MG: 10 INJECTION, SOLUTION INTRAVENOUS at 09:31

## 2022-11-20 RX ADMIN — SUGAMMADEX 300 MG: 100 INJECTION, SOLUTION INTRAVENOUS at 10:58

## 2022-11-20 RX ADMIN — LIDOCAINE HYDROCHLORIDE 50 MG: 10 INJECTION, SOLUTION EPIDURAL; INFILTRATION; INTRACAUDAL; PERINEURAL at 08:13

## 2022-11-20 RX ADMIN — SODIUM CHLORIDE, SODIUM LACTATE, POTASSIUM CHLORIDE, AND CALCIUM CHLORIDE: .6; .31; .03; .02 INJECTION, SOLUTION INTRAVENOUS at 11:18

## 2022-11-20 RX ADMIN — FENTANYL CITRATE 25 MCG: 50 INJECTION, SOLUTION INTRAMUSCULAR; INTRAVENOUS at 10:52

## 2022-11-20 RX ADMIN — HYDROMORPHONE HYDROCHLORIDE 1 MG: 1 INJECTION, SOLUTION INTRAMUSCULAR; INTRAVENOUS; SUBCUTANEOUS at 11:15

## 2022-11-20 RX ADMIN — LABETALOL HYDROCHLORIDE 10 MG: 5 INJECTION, SOLUTION INTRAVENOUS at 11:54

## 2022-11-20 RX ADMIN — IBUPROFEN 600 MG: 600 TABLET, FILM COATED ORAL at 23:40

## 2022-11-20 RX ADMIN — HYDROMORPHONE HYDROCHLORIDE 0.5 MG: 1 INJECTION, SOLUTION INTRAMUSCULAR; INTRAVENOUS; SUBCUTANEOUS at 21:15

## 2022-11-20 RX ADMIN — OXYCODONE HYDROCHLORIDE 10 MG: 10 TABLET ORAL at 17:26

## 2022-11-20 RX ADMIN — DEXAMETHASONE SODIUM PHOSPHATE 4 MG: 4 INJECTION, SOLUTION INTRA-ARTICULAR; INTRALESIONAL; INTRAMUSCULAR; INTRAVENOUS; SOFT TISSUE at 08:30

## 2022-11-20 RX ADMIN — MIDAZOLAM 2 MG: 1 INJECTION INTRAMUSCULAR; INTRAVENOUS at 08:03

## 2022-11-20 RX ADMIN — BUSPIRONE HYDROCHLORIDE 15 MG: 10 TABLET ORAL at 21:16

## 2022-11-20 RX ADMIN — OXYCODONE HYDROCHLORIDE 10 MG: 10 TABLET, FILM COATED, EXTENDED RELEASE ORAL at 18:45

## 2022-11-20 RX ADMIN — SODIUM CHLORIDE, SODIUM LACTATE, POTASSIUM CHLORIDE, AND CALCIUM CHLORIDE 75 ML/HR: .6; .31; .03; .02 INJECTION, SOLUTION INTRAVENOUS at 00:27

## 2022-11-20 RX ADMIN — MODAFINIL 200 MG: 100 TABLET ORAL at 14:08

## 2022-11-20 RX ADMIN — ROCURONIUM BROMIDE 50 MG: 10 INJECTION, SOLUTION INTRAVENOUS at 08:14

## 2022-11-20 RX ADMIN — OXYCODONE HYDROCHLORIDE 10 MG: 10 TABLET ORAL at 23:41

## 2022-11-20 RX ADMIN — ROCURONIUM BROMIDE 20 MG: 10 INJECTION, SOLUTION INTRAVENOUS at 08:45

## 2022-11-20 RX ADMIN — ROCURONIUM BROMIDE 10 MG: 10 INJECTION, SOLUTION INTRAVENOUS at 09:58

## 2022-11-20 RX ADMIN — OXYCODONE HYDROCHLORIDE 20 MG: 20 TABLET, FILM COATED, EXTENDED RELEASE ORAL at 18:45

## 2022-11-20 RX ADMIN — LOSARTAN POTASSIUM 100 MG: 50 TABLET, FILM COATED ORAL at 14:09

## 2022-11-20 RX ADMIN — SODIUM CHLORIDE: 0.9 INJECTION, SOLUTION INTRAVENOUS at 08:03

## 2022-11-20 RX ADMIN — ACETAMINOPHEN 975 MG: 325 TABLET, FILM COATED ORAL at 13:19

## 2022-11-20 RX ADMIN — MIRTAZAPINE 30 MG: 15 TABLET, FILM COATED ORAL at 21:15

## 2022-11-20 RX ADMIN — TRANEXAMIC ACID 1 G: 1 INJECTION, SOLUTION INTRAVENOUS at 08:39

## 2022-11-20 RX ADMIN — HYDROMORPHONE HYDROCHLORIDE 1 MG: 1 INJECTION, SOLUTION INTRAMUSCULAR; INTRAVENOUS; SUBCUTANEOUS at 08:42

## 2022-11-20 RX ADMIN — FENTANYL CITRATE 25 MCG: 50 INJECTION, SOLUTION INTRAMUSCULAR; INTRAVENOUS at 10:47

## 2022-11-20 RX ADMIN — CEFAZOLIN SODIUM 2000 MG: 2 SOLUTION INTRAVENOUS at 23:41

## 2022-11-20 NOTE — OP NOTE
OPERATIVE REPORT  PATIENT NAME: Malu Frost    :  1955  MRN: [id number]  Pt Location: [facility] ROOM 03    SURGERY DATE: 2022    Surgeon(s) and Role:     * Yasmeen Major MD - Primary     * Jose Chan PA-C - Assisting    Preop Diagnosis:  Right prosthetic hip dislocation  Dissociation R hip dual mobility head/liner    Post-Op Diagnosis Codes:     * [facility] right [facility] [[id number]]     Same    Procedure(s) (LRB):  ARTHROPLASTY RIGHT HIP TOTAL OPEN REDUCTION, REVISION OF ONE COMPONENT (Right)    Procedures:  1  Removal of deep implant R hip (femoral head, poly liner) - CPT 43045  2  Open treatment of R hip dislocation requiring anesthesia, without internal fixation - CPT 46346  3  Revision of R hip acetabular component only - CPT 93626    Specimen(s):  * No specimens in log *    Estimated Blood Loss:   250 mL    Drains:  * No LDAs found *    Anesthesia Type:   General    Operative Indications:  Dislocated R hip prosthetic, dissociation of head/liner in ambulatory patient    Operative Findings:  See below    Complications:   None    Implants: [place] ADM x3 28mm/52 mm poly; Depuy revision  28 mm + 5 head    Procedure and Technique:  The patient is a 30-year-old male with a complicated history of multiple right hip revision surgeries, infection, multiple dislocations  He sustained a traumatic injury to the right hip involving a fall which resulted in dislocation of the right hip prosthetic  Prior closed reduction of hip prosthesis this was unable to be obtained, which was suspected to be a possibility secondary to the dual mobility liner in place  The return to the OR today represents a planned return to the OR for open definitive management of hip dislocation    Advised the patient and wife of the continuing risks of surgery for this patient which include but are not limited to leg-length discrepancy, instability, repeat dislocation, need for additional procedures  Of note, the patient was noted to have a significantly lengthened extremity prior to the surgery  This was evidenced by the fact that with his hip in a dislocated position, the extremity was still noted to be longer than the contralateral extremity  This was presumably needed for hip stability at some point in the past   As such, we will likely restore the patient to his baseline of leg length discrepancy for the sake of stability  The patient and wife understand the risks of the surgery and agreed to proceed with procedure  The patient's operative site, laterality, procedure, consent were verified in the preoperative area the patient was transitioned to the operating room  General anesthesia was provided by the anesthesia team and the patient was turned to the left lateral decubitus position  All bony prominences were padded  The operative extremity was prepped and draped in the usual sterile fashion  After time-out for safety, old hip incision was utilized and incised and extended proximally and distally  Dissection was taken down through the layers of scar tissue as well as fascia which was incised  Scar tissue was released and capsular incision was made  We were able to identify the dislocated hip prosthetic at this time  It was noted that the ceramic head had dissociated from the polyethylene liner  The polyethylene liner was intact in the acetabular cup  There was noted to be significant metallosis of all tissues as well as significant femoral head wear/tarnishing as well as pre-existing wear on the polyethylene liner  Utilizing a Olivarez elevator and mallet, the femoral head was removed from the trunnion and the femoral stem was noted to be stable  The polyethylene liner was removed from the acetabular cup  The locking mechanism of the dual mobility metal insert was noted to be intact and there were noted to be no surface defects on the metal liner    The wound was then copiously irrigated with sterile saline  Significant capsular release was required to free up tissue to be able to relocate hip  The new femoral ceramic head and dual mobility poly liner was then assembled on the back table in standard fashion with compression and satisfactory engaging of the locking mechanism  The new femoral head/poly complex was then impacted onto a clean trunnion and impacted with mallet blows  The hip was reduced into the acetabulum and noted to have satisfactory stability  Leg length was long as expected  The wound was then irrigated with Irrisept solution and sterile saline  Repair of the deep tissues and remaining rotator tissue took place with #5 ethibond suture, fascial layer repaired with heavy Vicryl suture, subcutaneous layer closure took place with Vicryl suture, skin layer closure took place with staples  Sterile dressing consisted of Mepilex dressing  An abduction pillow was placed  All final counts were correct  I was present for the entire procedure  The patient was extubated and awakened and transitioned to the PACU in stable condition  There were no immediate complications  There was no qualified resident available for the procedure  Critical assistance from Delisa Avila PA-C  Was required for all components of the procedure including but not limited to positioning, soft tissue retraction, dislocation/relocation of dislocated components, soft tissue closure   This was particularly important secondary to the revision nature of the surgery  The patient may be weight bearing as tolerated on right lower extremity and exercise posterior hip precautions  Abduction pillow should be maintained in bed and when supine at all times otherwise  He will require dvt prophylaxis for 4 wks  We will consider staple removal in 2 weeks      Patient Disposition:  PACU         SIGNATURE: Lam Dorado MD  DATE: November 20, 2022  TIME: 11:22 AM

## 2022-11-20 NOTE — ANESTHESIA POSTPROCEDURE EVALUATION
Post-Op Assessment Note    CV Status:  Stable  Pain Score: 5    Pain management: inadequate     Mental Status:  Alert and awake   Hydration Status:  Euvolemic   PONV Controlled:  Controlled   Airway Patency:  Patent      Post Op Vitals Reviewed: Yes      Staff: Anesthesiologist, CRNA         No notable events documented      BP (!) 174/96 (11/20/22 1111)    Temp 99 2 °F (37 3 °C) (11/20/22 1111)    Pulse 84 (11/20/22 1111)   Resp 16 (11/20/22 1111)    SpO2 96 % (11/20/22 1111)

## 2022-11-20 NOTE — ANESTHESIA PREPROCEDURE EVALUATION
Procedure:  ARTHROPLASTY RIGHT HIP TOTAL OPEN REDUCTION, POSSIBLE REVISION (Right: Hip)    Relevant Problems   CARDIO   (+) Hypercholesterolemia   (+) Hypertension      GI/HEPATIC   (+) GERD (gastroesophageal reflux disease)      /RENAL   (+) Prostate cancer (HCC)   (+) Stage 3 chronic kidney disease, unspecified whether stage 3a or 3b CKD (HCC)      HEMATOLOGY   (+) Anemia      MUSCULOSKELETAL   (+) Chronic back pain greater than 3 months duration      NEURO/PSYCH   (+) Chronic back pain greater than 3 months duration   (+) Depression      PULMONARY   (+) Obstructive sleep apnea syndrome      Musculoskeletal and Integument   (+) Hip dislocation, right (HCC)      Other   (+) Alcohol dependence (HCC)   Complains of awareness during MAC cases    Confirmed NPO appropriate    [place]ack 2 A with Mac 3 on 11/18/22    Physical Exam    Airway    Mallampati score: II  TM Distance: >3 FB  Neck ROM: full     Dental   No notable dental hx     Cardiovascular      Pulmonary      Other Findings        7/13/20 TTE:  LEFT VENTRICLE:  Systolic function was normal by visual assessment  Ejection fraction was estimated in the range of 55 % to 60 %  There were no regional wall motion abnormalities  Wall thickness was at the upper limits of normal   Doppler parameters were consistent with abnormal left ventricular relaxation (grade 1 diastolic dysfunction)      MITRAL VALVE:  There was mild regurgitation      AORTIC VALVE:  There was no evidence for stenosis  There was no regurgitation      TRICUSPID VALVE:  There was mild regurgitation  Pulmonary artery systolic pressure was within the normal range  7/13/20 Exercise Stress:  IMPRESSIONS: Normal study after maximal exercise without reproduction of symptoms  Anesthesia Plan  ASA Score- 3     Anesthesia Type- general with ASA Monitors  Additional Monitors:   Airway Plan: ETT  Plan Factors-Exercise tolerance (METS): >4 METS  Exercise comment: Able to climb flight of stairs without cardiopulmonary limitation when hip is not dislocated  Chart reviewed  EKG reviewed  Existing labs reviewed  Patient summary reviewed  Patient is not a current smoker  Patient did not smoke on day of surgery  Induction- intravenous  Postoperative Plan- Plan for postoperative opioid use  Planned trial extubation    Informed Consent- Anesthetic plan and risks discussed with patient

## 2022-11-20 NOTE — ASSESSMENT & PLAN NOTE
Baseline hemoglobin appears to be around 9  · Hemoglobin on admission 8 8  · iron panel reviewed, B12 813, folate 17  · TSH low with normal free T4 - can get repeat lab work after discharge  · IV Venofer for 3 doses  · Hemoglobin stable    Results from last 7 days   Lab Units 11/20/22  0437 11/19/22  0600 11/18/22  0006   HEMOGLOBIN g/dL 8 9* 9 0* 8 8*   HEMATOCRIT % 28 8* 29 6* 27 8*   MCV fL 82 81* 79*

## 2022-11-20 NOTE — ASSESSMENT & PLAN NOTE
Reports chronic low back pain and neck pain  On OxyContin 30 mg p o  B i d , Norco p r n  At home  Follows pain management as outpatient  History of L4-5 fusion  · Continue OxyContin, Norco p r n  Prior provider Verified at [facility] website  · IV Dilaudid p r n   Breakthrough

## 2022-11-20 NOTE — ASSESSMENT & PLAN NOTE
Lab Results   Component Value Date    EGFR 86 11/20/2022    EGFR 79 11/19/2022    EGFR 76 11/18/2022    CREATININE 0 91 11/20/2022    CREATININE 0 98 11/19/2022    CREATININE 1 01 11/18/2022     Baseline creatinine appears to be around 1 0-1 2s  · Creatinine continues to remain at baseline  · Discontinue IV fluids    Monitor

## 2022-11-20 NOTE — PROGRESS NOTES
[address] 45  Progress Note Maylin Berger 1955, 79 y o  male MRN: [id number]  Unit/Bed#: [address]  Encounter: [id number]  Primary Care Provider: Dominick Kehr, MD   Date and time admitted to hospital: 11/17/2022  6:39 PM    * Hip dislocation, right [facility])  Assessment & Plan  Patient presented with "slipped and fell while getting out of bed tonight", sustained right hip pain  History of right hip replacement about 7 years ago, underwent two revisions due to infection and pain respectively  · X-ray showed right hip dislocation, no fracture   · Attempted reduction in ED without success  · Was taken to the OR for reduction 11/18 which was not successful  · Patient underwent removal of right femoral head implant, open treatment of right hip dislocation without internal fixation and revision of right hip acetabular component today  · Pain control  · Initially Nonweightbearing but per Ortho can weightbear as tolerated of right lower extremity  · Posterior hip precautions  Abduction pillow in bed and when supine  · Continue Lovenox, DVT prophylaxis for 4 weeks  · Staple removal in 2 weeks  · Appreciated orthopedic surgery input    Anemia  Assessment & Plan  Baseline hemoglobin appears to be around 9  · Hemoglobin on admission 8 8  · iron panel reviewed, B12 813, folate 17  · TSH low with normal free T4 - can get repeat lab work after discharge  · IV Venofer for 3 doses  · Hemoglobin stable    Results from last 7 days   Lab Units 11/20/22  0437 11/19/22  0600 11/18/22  0006   HEMOGLOBIN g/dL 8 9* 9 0* 8 8*   HEMATOCRIT % 28 8* 29 6* 27 8*   MCV fL 82 81* 79*       Alcohol dependence (HCC)  Assessment & Plan  Reports drinking 1 drink a week, last drink was night prior to admission  Wife warned ED that patient might withdraw    · Continue CIWA protocol  · No signs of withdrawal    Stage 3 chronic kidney disease, unspecified whether stage 3a or 3b CKD ([facility] [address] )  Assessment & Plan  Lab Results   Component Value Date    EGFR 86 11/20/2022    EGFR 79 11/19/2022    EGFR 76 11/18/2022    CREATININE 0 91 11/20/2022    CREATININE 0 98 11/19/2022    CREATININE 1 01 11/18/2022     Baseline creatinine appears to be around 1 0-1 2s  · Creatinine continues to remain at baseline  · Discontinue IV fluids  Monitor    Depression  Assessment & Plan  Continue Remeron, BuSpar  Psychiatry consult  Sleep apnea  Assessment & Plan  · Substituted Armodafinil with Provigil  · Continue CPAP at   Prostate cancer [facility])  Assessment & Plan  Under surveillance    Hypertension  Assessment & Plan  On Benicar-HCT at home  · Substituted with losartan  · BPs acceptable    Hypercholesterolemia  Assessment & Plan  Diet controlled    Chronic back pain greater than 3 months duration  Assessment & Plan  Reports chronic low back pain and neck pain  On OxyContin 30 mg p o  B i d , Norco p r n  At home  Follows pain management as outpatient  History of L4-5 fusion  · Continue OxyContin, Norco p r n  Prior provider Verified at [facility] website  · IV Dilaudid p r n  Breakthrough    Achalasia  Assessment & Plan  Continue Protonix b i d       VTE Pharmacologic Prophylaxis: VTE Score: 3 Moderate Risk (Score 3-4) - Pharmacological DVT Prophylaxis Ordered: enoxaparin (Lovenox)  Patient Centered Rounds: I performed bedside rounds with nursing staff today  Discussions with Specialists or Other Care Team Provider: yes     Education and Discussions with Family / Patient: Updated  (wife) via phone  Time Spent for Care: 30 minutes  More than 50% of total time spent on counseling and coordination of care as described above      Current Length of Stay: 2 day(s)  Current Patient Status: Inpatient   Certification Statement: The patient will continue to require additional inpatient hospital stay due to Right hip dislocation status post OR intervention requiring monitoring overnight  Discharge Plan: Anticipate discharge in 48-72 hrs to discharge location to be determined pending rehab evaluations  Code Status: Level 3 - DNAR and DNI    Subjective:     Patient reports right hip pain which is bearable due to anesthesia  Objective:     Vitals:   Temp (24hrs), Av 3 °F (36 8 °C), Min:98 °F (36 7 °C), Max:99 2 °F (37 3 °C)    Temp:  [98 °F (36 7 °C)-99 2 °F (37 3 °C)] 98 °F (36 7 °C)  HR:  [64-84] 79  Resp:  [16-19] 19  BP: (105-177)/() 121/67  SpO2:  [94 %-97 %] 96 %  Body mass index is 27 61 kg/m²  Input and Output Summary (last 24 hours): Intake/Output Summary (Last 24 hours) at 2022 1833  Last data filed at 2022 1205  Gross per 24 hour   Intake 1650 ml   Output 1075 ml   Net 575 ml       Physical Exam:   Physical Exam  Vitals reviewed  Constitutional:       General: He is not in acute distress  Appearance: He is not ill-appearing  HENT:      Head: Normocephalic and atraumatic  Eyes:      General:         Right eye: No discharge  Left eye: No discharge  Extraocular Movements: Extraocular movements intact  Cardiovascular:      Rate and Rhythm: Normal rate and regular rhythm  Pulmonary:      Effort: Pulmonary effort is normal  No respiratory distress  Breath sounds: Normal breath sounds  No wheezing or rales  Abdominal:      General: Bowel sounds are normal  There is no distension  Palpations: Abdomen is soft  Tenderness: There is no abdominal tenderness  Musculoskeletal:      Comments: Right thigh region with dressing in place posteriorly   Neurological:      Mental Status: He is alert and oriented to person, place, and time           Additional Data:     Labs:  Results from last 7 days   Lab Units 22  0437   WBC Thousand/uL 6 12   HEMOGLOBIN g/dL 8 9*   HEMATOCRIT % 28 8*   PLATELETS Thousands/uL 286   NEUTROS PCT % 69   LYMPHS PCT % 13*   MONOS PCT % 11   EOS PCT % 6     Results from last 7 days   Lab Units 22  0437 22  0600   SODIUM mmol/L 139 141   POTASSIUM mmol/L 4 1 4 2   CHLORIDE mmol/L 106 108   CO2 mmol/L 23 26   BUN mg/dL 11 15   CREATININE mg/dL 0 91 0 98   ANION GAP mmol/L 10 7   CALCIUM mg/dL 8 5 8 1*   ALBUMIN g/dL  --  2 9*   TOTAL BILIRUBIN mg/dL  --  0 38   ALK PHOS U/L  --  72   ALT U/L  --  19   AST U/L  --  16   GLUCOSE RANDOM mg/dL 108 107     Results from last 7 days   Lab Units 11/19/22  1431   INR  1 01                   Lines/Drains:  Invasive Devices     Peripheral Intravenous Line  Duration           Peripheral IV 11/18/22 Right;Upper;Ventral (anterior) Arm 2 days    Peripheral IV 11/20/22 Left Forearm <1 day                      Imaging: Reviewed radiology reports from this admission including: xray(s)    Recent Cultures (last 7 days):         Last 24 Hours Medication List:   Current Facility-Administered Medications   Medication Dose Route Frequency Provider Last Rate   • acetaminophen  975 mg Oral Q8H Maryfrances Folds, PA-C     • busPIRone  15 mg Oral BID Maryfrances Folds, PA-C     • cefazolin  2,000 mg Intravenous Q8H Maryfrances Folds, PA-C 2,000 mg (11/20/22 1722)   • docusate sodium  100 mg Oral BID Maryfrances Folds, PA-C     • [START ON 11/21/2022] enoxaparin  40 mg Subcutaneous Daily Maryfrances Folds, PA-C     • folic acid  1 mg Oral Daily Maryfrances Folds, PA-C     • HYDROmorphone  0 5 mg Intravenous Q2H PRN Maryfrances Folds, PA-C     • lactated ringers  1,000 mL Intravenous Once PRN Maryfrances Folds, PA-C      And   • lactated ringers  1,000 mL Intravenous Once PRN Maryfrances Folds, PA-C     • lactated ringers  75 mL/hr Intravenous Continuous Maryfrances Folds, PA-C 75 mL/hr (11/20/22 0027)   • losartan  100 mg Oral Daily Maryfrances Folds, PA-C     • mirtazapine  30 mg Oral HS Maryfrances Folds, PA-C     • modafinil  200 mg Oral Daily Maryfrances Folds, PA-C     • ondansetron  4 mg Intravenous Q6H PRN Maryfrances Folds, PA-C     • oxyCODONE  10 mg Oral Q12H [address] Ted Shaw PA-C     • oxyCODONE  20 mg Oral Q12H [address] Ayleen Sutton William Mojica PA-C     • oxyCODONE  10 mg Oral Q4H PRN Mercdees Golden PA-C     • oxyCODONE  5 mg Oral Q4H PRN Mercedes Golden PA-C     • pantoprazole  40 mg Oral BID AC Mercedes Golden PA-C     • sodium chloride  1,000 mL Intravenous Once PRN Mercedes Golden PA-C      And   • sodium chloride  1,000 mL Intravenous Once PRN Mercedes Golden PA-C     • thiamine  100 mg Oral Daily Mercedes Golden PA-C          Today, Patient Was Seen By: Leana Vicente DO    **Please Note: This note may have been constructed using a voice recognition system  **

## 2022-11-20 NOTE — ASSESSMENT & PLAN NOTE
Patient presented with "slipped and fell while getting out of bed tonight", sustained right hip pain  History of right hip replacement about 7 years ago, underwent two revisions due to infection and pain respectively  · X-ray showed right hip dislocation, no fracture   · Attempted reduction in ED without success  · Was taken to the OR for reduction 11/18 which was not successful  · Patient underwent removal of right femoral head implant, open treatment of right hip dislocation without internal fixation and revision of right hip acetabular component today  · Pain control  · Initially Nonweightbearing but per Ortho can weightbear as tolerated of right lower extremity  · Posterior hip precautions    Abduction pillow in bed and when supine  · Continue Lovenox, DVT prophylaxis for 4 weeks  · Staple removal in 2 weeks  · Appreciated orthopedic surgery input

## 2022-11-20 NOTE — PLAN OF CARE
Problem: Potential for Falls  Goal: Patient will remain free of falls  Description: INTERVENTIONS:  - Educate patient/family on patient safety including physical limitations  - Instruct patient to call for assistance with activity   - Consult OT/PT to assist with strengthening/mobility   - Keep Call bell within reach  - Keep bed low and locked with side rails adjusted as appropriate  - Keep care items and personal belongings within reach  - Initiate and maintain comfort rounds  - Make Fall Risk Sign visible to staff  - Offer Toileting every 2 Hours, in advance of need  - Initiate/Maintain bed alarm  - Obtain necessary fall risk management equipment:   - Apply yellow socks and bracelet for high fall risk patients  - Consider moving patient to room near nurses station  Outcome: Progressing     Problem: PAIN - ADULT  Goal: Verbalizes/displays adequate comfort level or baseline comfort level  Description: Interventions:  - Encourage patient to monitor pain and request assistance  - Assess pain using appropriate pain scale  - Administer analgesics based on type and severity of pain and evaluate response  - Implement non-pharmacological measures as appropriate and evaluate response  - Consider cultural and social influences on pain and pain management  - Notify physician/advanced practitioner if interventions unsuccessful or patient reports new pain  Outcome: Progressing

## 2022-11-21 ENCOUNTER — APPOINTMENT (INPATIENT)
Dept: RADIOLOGY | Facility: HOSPITAL | Age: 67
End: 2022-11-21

## 2022-11-21 PROBLEM — R50.82 FEVER POSTOP: Status: ACTIVE | Noted: 2022-11-21

## 2022-11-21 LAB
ANION GAP SERPL CALCULATED.3IONS-SCNC: 8 MMOL/L (ref 4–13)
BILIRUB UR QL STRIP: NEGATIVE
BUN SERPL-MCNC: 10 MG/DL (ref 5–25)
CALCIUM SERPL-MCNC: 8.2 MG/DL (ref 8.3–10.1)
CHLORIDE SERPL-SCNC: 109 MMOL/L (ref 96–108)
CLARITY UR: CLEAR
CO2 SERPL-SCNC: 26 MMOL/L (ref 21–32)
COLOR UR: NORMAL
CREAT SERPL-MCNC: 0.88 MG/DL (ref 0.6–1.3)
ERYTHROCYTE [DISTWIDTH] IN BLOOD BY AUTOMATED COUNT: 17.1 % (ref 11.6–15.1)
GFR SERPL CREATININE-BSD FRML MDRD: 88 ML/MIN/1.73SQ M
GLUCOSE SERPL-MCNC: 111 MG/DL (ref 65–140)
GLUCOSE UR STRIP-MCNC: NEGATIVE MG/DL
HCT VFR BLD AUTO: 28.6 % (ref 36.5–49.3)
HGB BLD-MCNC: 8.9 G/DL (ref 12–17)
HGB UR QL STRIP.AUTO: NEGATIVE
KETONES UR STRIP-MCNC: NEGATIVE MG/DL
LEUKOCYTE ESTERASE UR QL STRIP: NEGATIVE
MCH RBC QN AUTO: 25.6 PG (ref 26.8–34.3)
MCHC RBC AUTO-ENTMCNC: 31.1 G/DL (ref 31.4–37.4)
MCV RBC AUTO: 82 FL (ref 82–98)
NITRITE UR QL STRIP: NEGATIVE
PH UR STRIP.AUTO: 6 [PH]
PLATELET # BLD AUTO: 272 THOUSANDS/UL (ref 149–390)
PMV BLD AUTO: 9.4 FL (ref 8.9–12.7)
POTASSIUM SERPL-SCNC: 3.9 MMOL/L (ref 3.5–5.3)
PROT UR STRIP-MCNC: NEGATIVE MG/DL
RBC # BLD AUTO: 3.48 MILLION/UL (ref 3.88–5.62)
SODIUM SERPL-SCNC: 143 MMOL/L (ref 135–147)
SP GR UR STRIP.AUTO: 1.02 (ref 1–1.03)
UROBILINOGEN UR QL STRIP.AUTO: 0.2 E.U./DL
WBC # BLD AUTO: 8.88 THOUSAND/UL (ref 4.31–10.16)

## 2022-11-21 RX ORDER — DULOXETIN HYDROCHLORIDE 30 MG/1
30 CAPSULE, DELAYED RELEASE ORAL DAILY
Status: DISCONTINUED | OUTPATIENT
Start: 2022-11-22 | End: 2022-11-22 | Stop reason: HOSPADM

## 2022-11-21 RX ORDER — TRAZODONE HYDROCHLORIDE 50 MG/1
50 TABLET ORAL
Status: DISCONTINUED | OUTPATIENT
Start: 2022-11-21 | End: 2022-11-22 | Stop reason: HOSPADM

## 2022-11-21 RX ORDER — HYDROCHLOROTHIAZIDE 12.5 MG/1
12.5 TABLET ORAL DAILY
Status: DISCONTINUED | OUTPATIENT
Start: 2022-11-21 | End: 2022-11-22 | Stop reason: HOSPADM

## 2022-11-21 RX ORDER — ENOXAPARIN SODIUM 100 MG/ML
40 INJECTION SUBCUTANEOUS DAILY
Qty: 11.6 ML | Refills: 0 | Status: SHIPPED | OUTPATIENT
Start: 2022-11-21 | End: 2022-12-20

## 2022-11-21 RX ADMIN — BUSPIRONE HYDROCHLORIDE 15 MG: 10 TABLET ORAL at 08:12

## 2022-11-21 RX ADMIN — HYDROMORPHONE HYDROCHLORIDE 0.5 MG: 1 INJECTION, SOLUTION INTRAMUSCULAR; INTRAVENOUS; SUBCUTANEOUS at 11:40

## 2022-11-21 RX ADMIN — PANTOPRAZOLE SODIUM 40 MG: 40 TABLET, DELAYED RELEASE ORAL at 06:06

## 2022-11-21 RX ADMIN — OXYCODONE HYDROCHLORIDE 10 MG: 10 TABLET ORAL at 22:25

## 2022-11-21 RX ADMIN — ENOXAPARIN SODIUM 40 MG: 40 INJECTION SUBCUTANEOUS at 08:12

## 2022-11-21 RX ADMIN — OXYCODONE HYDROCHLORIDE 10 MG: 10 TABLET, FILM COATED, EXTENDED RELEASE ORAL at 06:06

## 2022-11-21 RX ADMIN — OXYCODONE HYDROCHLORIDE 10 MG: 10 TABLET ORAL at 15:34

## 2022-11-21 RX ADMIN — OXYCODONE HYDROCHLORIDE 10 MG: 10 TABLET ORAL at 09:23

## 2022-11-21 RX ADMIN — FOLIC ACID 1 MG: 1 TABLET ORAL at 08:12

## 2022-11-21 RX ADMIN — OXYCODONE HYDROCHLORIDE 10 MG: 10 TABLET, FILM COATED, EXTENDED RELEASE ORAL at 17:52

## 2022-11-21 RX ADMIN — BUSPIRONE HYDROCHLORIDE 15 MG: 10 TABLET ORAL at 17:52

## 2022-11-21 RX ADMIN — PANTOPRAZOLE SODIUM 40 MG: 40 TABLET, DELAYED RELEASE ORAL at 15:30

## 2022-11-21 RX ADMIN — THIAMINE HCL TAB 100 MG 100 MG: 100 TAB at 08:12

## 2022-11-21 RX ADMIN — MODAFINIL 200 MG: 100 TABLET ORAL at 08:13

## 2022-11-21 RX ADMIN — ACETAMINOPHEN 975 MG: 325 TABLET, FILM COATED ORAL at 06:06

## 2022-11-21 RX ADMIN — LOSARTAN POTASSIUM 100 MG: 50 TABLET, FILM COATED ORAL at 08:13

## 2022-11-21 RX ADMIN — OXYCODONE HYDROCHLORIDE 20 MG: 20 TABLET, FILM COATED, EXTENDED RELEASE ORAL at 17:52

## 2022-11-21 RX ADMIN — ACETAMINOPHEN 975 MG: 325 TABLET, FILM COATED ORAL at 22:11

## 2022-11-21 RX ADMIN — HYDROCHLOROTHIAZIDE 12.5 MG: 12.5 TABLET ORAL at 17:52

## 2022-11-21 RX ADMIN — OXYCODONE HYDROCHLORIDE 20 MG: 20 TABLET, FILM COATED, EXTENDED RELEASE ORAL at 06:06

## 2022-11-21 RX ADMIN — ACETAMINOPHEN 975 MG: 325 TABLET, FILM COATED ORAL at 15:30

## 2022-11-21 NOTE — PHYSICAL THERAPY NOTE
PT TREATMENT     11/21/22 1270   Note Type   Note Type BID visit/treatment   Pain Assessment   Pain Assessment Tool 0-10   Pain Score No Pain   Pain Location/Orientation   (R hip)   Restrictions/Precautions   Weight Bearing Precautions Per Order Yes   RLE Weight Bearing Per Order WBAT   Braces or Orthoses   (hip abduction pillow)   Other Precautions Pain; Fall Risk, posterior hip precuations   General   Chart Reviewed Yes   Cognition   Overall Cognitive Status WFL   Subjective   Subjective "It feels good to be oob"   Transfers   Sit to Stand 5  Supervision   Stand to Sit 5  Supervision   Stand pivot 5  Supervision   Additional items   (with walker)   Ambulation/Elevation   Gait pattern   (decreased heel strike)   Gait Assistance 5  Supervision   Assistive Device Rolling walker   Distance  Stairs 2x75 feet  Pt climbed the 4 practice steps 6 times for a total of 24 steps  Pt is able to do so with a cane or crutch and rail with supervision assist   Pt requires mod assist to climb the steps without a rail with 2 crutches or 2 canes  Balance   Static Sitting Good   Static Standing Fair   Activity Tolerance   Activity Tolerance Patient tolerated treatment well   Exercises   Neuro re-ed x 10 each ankle pumps, quad set, LAQ R LE   Assessment   Prognosis Good   Problem List Decreased strength; Impaired balance;Decreased mobility;Pain;Orthopedic restrictions   Assessment Pt demonstrates improving ability to transfer and ambulate POD 1 s/p R posterior THR revision  Pt is also able to climb the steps with supervsion with a rail and a cane  Pt however requires at least mod assist to climb steps without a rail which pt has to do to enter his home  This PT initially thought that pt would be able to go home today however pt may need one more day of PT to master stair climbing without rails  The patient's AM-PAC Basic Mobility Inpatient Short Form Raw Score is 17   A Raw score of greater than 16 suggests the patient may benefit from discharge to home  Plan   Treatment/Interventions Functional transfer training;LE strengthening/ROM; Elevations; Therapeutic exercise;Gait training;Bed mobility; Equipment eval/education;Patient/family training   Recommendation   PT Discharge Recommendation Home with home health rehabilitation   Equipment Recommended   (pt has a cane and a walker)   AM-PAC Basic Mobility Inpatient   Turning in Bed Without Bedrails 2   Lying on Back to Sitting on Edge of Flat Bed 2   Moving Bed to Chair 3   Standing Up From Chair 3   Walk in Room 3   Climb 3-5 Stairs 2   Basic Mobility Inpatient Raw Score 15   Basic Mobility Standardized Score 36 97   Highest Level Of Mobility   -HLM Goal 4: Move to chair/commode   -HLM Achieved 7: Walk 25 feet or more   End of Consult   Patient Position at End of Consult All needs within reach; Bedside chair   Licensure   [place] License Number  [place]  [id number]

## 2022-11-21 NOTE — ASSESSMENT & PLAN NOTE
Lab Results   Component Value Date    EGFR 88 11/21/2022    EGFR 86 11/20/2022    EGFR 79 11/19/2022    CREATININE 0 88 11/21/2022    CREATININE 0 91 11/20/2022    CREATININE 0 98 11/19/2022     Baseline creatinine appears to be around 1 0-1 2s  · Creatinine continues to remain at baseline  · Now off IV fluids    Monitor

## 2022-11-21 NOTE — ASSESSMENT & PLAN NOTE
Reports chronic low back pain and neck pain  On OxyContin 30 mg p o  B i d , Norco p r n  At home  Follows pain management as outpatient  History of L4-5 fusion  · Continue OxyContin, Norco p r n  Prior provider Verified at [facility] website  · IV Dilaudid p r n   Breakthrough

## 2022-11-21 NOTE — PLAN OF CARE
Problem: MOBILITY - ADULT  Goal: Maintain or return to baseline ADL function  Description: INTERVENTIONS:  -  Assess patient's ability to carry out ADLs; assess patient's baseline for ADL function and identify physical deficits which impact ability to perform ADLs (bathing, care of mouth/teeth, toileting, grooming, dressing, etc )  - Assess/evaluate cause of self-care deficits   - Assess range of motion  - Assess patient's mobility; develop plan if impaired  - Assess patient's need for assistive devices and provide as appropriate  - Encourage maximum independence but intervene and supervise when necessary  - Involve family in performance of ADLs  - Assess for home care needs following discharge   - Consider OT consult to assist with ADL evaluation and planning for discharge  - Provide patient education as appropriate  Outcome: Progressing     Problem: PAIN - ADULT  Goal: Verbalizes/displays adequate comfort level or baseline comfort level  Description: Interventions:  - Encourage patient to monitor pain and request assistance  - Assess pain using appropriate pain scale  - Administer analgesics based on type and severity of pain and evaluate response  - Implement non-pharmacological measures as appropriate and evaluate response  - Consider cultural and social influences on pain and pain management  - Notify physician/advanced practitioner if interventions unsuccessful or patient reports new pain  Outcome: Progressing     Problem: SAFETY ADULT  Goal: Patient will remain free of falls  Description: INTERVENTIONS:  - Educate patient/family on patient safety including physical limitations  - Instruct patient to call for assistance with activity   - Consult OT/PT to assist with strengthening/mobility   - Keep Call bell within reach  - Keep bed low and locked with side rails adjusted as appropriate  - Keep care items and personal belongings within reach  - Initiate and maintain comfort rounds  - Make Fall Risk Sign visible to staff  - Offer Toileting every 2 Hours, in advance of need  - Initiate/Maintain bed/chair alarm  - Obtain necessary fall risk management equipment: fall risk sign  - Apply yellow socks and bracelet for high fall risk patients  - Consider moving patient to room near nurses station  Outcome: Progressing

## 2022-11-21 NOTE — ASSESSMENT & PLAN NOTE
Baseline hemoglobin appears to be around 9  · Hemoglobin on admission 8 8  · iron panel reviewed, B12 813, folate 17  · TSH low with normal free T4 - can get repeat lab work after discharge  · Received IV Venofer for 3 doses  · Hemoglobin stable    Results from last 7 days   Lab Units 11/21/22  0502 11/20/22  0437 11/19/22  0600 11/18/22  0006   HEMOGLOBIN g/dL 8 9* 8 9* 9 0* 8 8*   HEMATOCRIT % 28 6* 28 8* 29 6* 27 8*   MCV fL 82 82 81* 79*

## 2022-11-21 NOTE — PROGRESS NOTES
Patel 45  Progress Note Ashli Daily 1955, 79 y o  male MRN: [id number]  Unit/Bed#: [address]  Encounter: [id number]  Primary Care Provider: Kong Renee MD   Date and time admitted to hospital: 11/17/2022  6:39 PM    * Hip dislocation, right [facility])  Assessment & Plan  Patient presented with "slipped and fell while getting out of bed tonight", sustained right hip pain  History of right hip replacement about 7 years ago, underwent two revisions due to infection and pain respectively  · X-ray showed right hip dislocation, no fracture   · Attempted reduction in ED without success  · Was taken to the OR for reduction 11/18 which was not successful  · Patient underwent removal of right femoral head implant, open treatment of right hip dislocation without internal fixation and revision of right hip acetabular component on 11/20  · Pain control  · Initially Nonweightbearing but per Ortho can weightbear as tolerated of right lower extremity  · Posterior hip precautions  Abduction pillow in bed and when supine  · Continue Lovenox, DVT prophylaxis for 4 weeks  Lovenox Rx sent to pharmacy  · Staple removal in 2 weeks  · Appreciate orthopedic surgery input    Fever postop  Assessment & Plan  Temp of 101 2° overnight which has since resolved  No leukocytosis on lab work  Chest x-ray negative for pneumonia    UA pending    Anemia  Assessment & Plan  Baseline hemoglobin appears to be around 9  · Hemoglobin on admission 8 8  · iron panel reviewed, B12 813, folate 17  · TSH low with normal free T4 - can get repeat lab work after discharge  · Received IV Venofer for 3 doses  · Hemoglobin stable    Results from last 7 days   Lab Units 11/21/22  0502 11/20/22  0437 11/19/22  0600 11/18/22  0006   HEMOGLOBIN g/dL 8 9* 8 9* 9 0* 8 8*   HEMATOCRIT % 28 6* 28 8* 29 6* 27 8*   MCV fL 82 82 81* 79*       Alcohol dependence (HCC)  Assessment & Plan  Reports drinking 1 drink a week, last drink was night prior to admission  Wife warned ED that patient might withdraw  · Continue CIWA protocol  · No signs of withdrawal    Stage 3 chronic kidney disease, unspecified whether stage 3a or 3b CKD [facility])  Assessment & Plan  Lab Results   Component Value Date    EGFR 88 11/21/2022    EGFR 86 11/20/2022    EGFR 79 11/19/2022    CREATININE 0 88 11/21/2022    CREATININE 0 91 11/20/2022    CREATININE 0 98 11/19/2022     Baseline creatinine appears to be around 1 0-1 2s  · Creatinine continues to remain at baseline  · Now off IV fluids  Monitor    Depression  Assessment & Plan  Patient states he was recently started on Remeron with no improvement in symptoms however did lead to weight gain  Psychiatry consulted and appreciate recommendation  · Continue BuSpar  Discontinue Remeron  · Per Psychiatry start Cymbalta 30 mg daily which will be increased after 1 week to 60 mg daily  · Trazodone  mg q h s  P r n  Insomnia  · Follow-up with primary psychiatrist after discharge    Sleep apnea  Assessment & Plan  · Substituted Armodafinil with Provigil  · Continue CPAP at HS      Prostate cancer [facility])  Assessment & Plan  Under surveillance  Hypertension  Assessment & Plan  On Benicar-HCT at home  · Substituted with losartan  Restart hydrochlorothiazide component as blood pressures are running somewhat high    Hypercholesterolemia  Assessment & Plan  Diet control    Chronic back pain greater than 3 months duration  Assessment & Plan  Reports chronic low back pain and neck pain  On OxyContin 30 mg p o  B i d , Norco p r n  At home  Follows pain management as outpatient  History of L4-5 fusion  · Continue OxyContin, Norco p r n  Prior provider Verified at [facility] website  · IV Dilaudid p r n  Breakthrough    Achalasia  Assessment & Plan  Continue Protonix b i d         VTE Pharmacologic Prophylaxis: VTE Score: 3 Moderate Risk (Score 3-4) - Pharmacological DVT Prophylaxis Ordered: enoxaparin (Lovenox)      Patient Centered Rounds: I performed bedside rounds with nursing staff today  Discussions with Specialists or Other Care Team Provider: yes - ortho, psych    Education and Discussions with Family / Patient: Updated  (wife) via phone  Time Spent for Care: 40 min  More than 50% of total time spent on counseling and coordination of care as described above  Current Length of Stay: 3 day(s)  Current Patient Status: Inpatient   Certification Statement: The patient will continue to require additional inpatient hospital stay due to Status post right hip dislocation, postoperative fever  Discharge Plan: Anticipate discharge tomorrow to home with home services  Code Status: Level 3 - DNAR and DNI    Subjective:     Patient states pain is tolerable and does improve with ambulation    Objective:     Vitals:   Temp (24hrs), Av 3 °F (37 4 °C), Min:98 1 °F (36 7 °C), Max:101 2 °F (38 4 °C)    Temp:  [98 1 °F (36 7 °C)-101 2 °F (38 4 °C)] 98 8 °F (37 1 °C)  HR:  [65-90] 90  Resp:  [18-19] 19  BP: (130-165)/(71-97) 153/96  SpO2:  [92 %-99 %] 98 %  Body mass index is 27 11 kg/m²  Input and Output Summary (last 24 hours): Intake/Output Summary (Last 24 hours) at 2022 1746  Last data filed at 2022 1209  Gross per 24 hour   Intake 780 ml   Output 1400 ml   Net -620 ml       Physical Exam:   Physical Exam  Vitals reviewed  Constitutional:       General: He is not in acute distress  Appearance: He is not ill-appearing  HENT:      Head: Normocephalic and atraumatic  Eyes:      General:         Right eye: No discharge  Left eye: No discharge  Cardiovascular:      Rate and Rhythm: Normal rate and regular rhythm  Pulmonary:      Effort: Pulmonary effort is normal  No respiratory distress  Breath sounds: Normal breath sounds  No wheezing or rales  Abdominal:      General: Bowel sounds are normal  There is no distension  Palpations: Abdomen is soft  Tenderness:  There is no abdominal tenderness  Musculoskeletal:      Comments: Right hip region dressing noted with some bloody strike through   Neurological:      Mental Status: He is alert and oriented to person, place, and time  Additional Data:     Labs:  Results from last 7 days   Lab Units 11/21/22  0502 11/20/22  0437   WBC Thousand/uL 8 88 6 12   HEMOGLOBIN g/dL 8 9* 8 9*   HEMATOCRIT % 28 6* 28 8*   PLATELETS Thousands/uL 272 286   NEUTROS PCT %  --  69   LYMPHS PCT %  --  13*   MONOS PCT %  --  11   EOS PCT %  --  6     Results from last 7 days   Lab Units 11/21/22  0502 11/20/22  0437 11/19/22  0600   SODIUM mmol/L 143   < > 141   POTASSIUM mmol/L 3 9   < > 4 2   CHLORIDE mmol/L 109*   < > 108   CO2 mmol/L 26   < > 26   BUN mg/dL 10   < > 15   CREATININE mg/dL 0 88   < > 0 98   ANION GAP mmol/L 8   < > 7   CALCIUM mg/dL 8 2*   < > 8 1*   ALBUMIN g/dL  --   --  2 9*   TOTAL BILIRUBIN mg/dL  --   --  0 38   ALK PHOS U/L  --   --  72   ALT U/L  --   --  19   AST U/L  --   --  16   GLUCOSE RANDOM mg/dL 111   < > 107    < > = values in this interval not displayed       Results from last 7 days   Lab Units 11/19/22  1431   INR  1 01                   Lines/Drains:  Invasive Devices     Peripheral Intravenous Line  Duration           Peripheral IV 11/18/22 Right;Upper;Ventral (anterior) Arm 3 days    Peripheral IV 11/20/22 Left Forearm 1 day                      Imaging: Reviewed radiology reports from this admission including: chest xray    Recent Cultures (last 7 days):         Last 24 Hours Medication List:   Current Facility-Administered Medications   Medication Dose Route Frequency Provider Last Rate   • acetaminophen  975 mg Oral Q8H Honey Subramanian PA-C     • busPIRone  15 mg Oral BID Honey Subramanian PA-C     • docusate sodium  100 mg Oral BID Honey Subramanian PA-C     • [START ON 11/22/2022] DULoxetine  30 mg Oral Daily Geraldine Mitchell DO     • enoxaparin  40 mg Subcutaneous Daily Honey Subramanian PA-C     • folic acid  1 mg Oral Daily Harpreet Zhang PA-C     • hydrochlorothiazide  12 5 mg Oral Daily Geraldine Mitchell DO     • HYDROmorphone  0 5 mg Intravenous Q2H PRN Harpreet Zhang PA-C     • losartan  100 mg Oral Daily Harpreet Zhang PA-C     • modafinil  200 mg Oral Daily Harpreet Zhang PA-C     • ondansetron  4 mg Intravenous Q6H PRN Harpreet Zhang PA-C     • oxyCODONE  10 mg Oral Q12H [address] Harpreet Zhang PA-C     • oxyCODONE  20 mg Oral Q12H [address] Amparo Ventura     • oxyCODONE  10 mg Oral Q4H PRN Harpreet Zhang PA-C     • oxyCODONE  5 mg Oral Q4H PRN Harpreet Zhang PA-C     • pantoprazole  40 mg Oral BID AC Harpreet Zhang PA-C     • thiamine  100 mg Oral Daily Harpreet Zhang PA-C     • traZODone  50 mg Oral HS PRN Geraldine Mitchell DO          Today, Patient Was Seen By: Matty Casey DO    **Please Note: This note may have been constructed using a voice recognition system  **

## 2022-11-21 NOTE — PHYSICAL THERAPY NOTE
PHYSICAL THERAPY EVALUATION/TREATMENT     11/21/22 1001   Note Type   Note type Evaluation   Pain Assessment   Pain Assessment Tool 0-10   Pain Score 5   Pain Location/Orientation Orientation: Right;Location: Hip   Restrictions/Precautions   Weight Bearing Precautions Per Order Yes   RLE Weight Bearing Per Order WBAT   Braces or Orthoses   (abduction pillow)   Other Precautions   (posterior hip precautions)   Home Living   Type of [address] [place] [address] Two level;1/2 bath on main level;Bed/bath upstairs  (5 (no rails) + 5 (rails) GEORGETTE)   Home Equipment Walker;Cane;Quad cane  (rollator)   Prior Function   Level of [place] Independent with ADLs; Independent with functional mobility   Lives With Spouse   Vocational Retired   [employer]   Additional Pertinent History Pt has a history of multiple R hip dislocations s/p NORMAN  Pt fell OOB with hip dislocation and is now 1 day s/p surgical repair  Family/Caregiver Present No   Cognition   Overall Cognitive Status WFL   Following Commands Follows all commands and directions without difficulty   Subjective   Subjective "It feels good to be OOB"   RLE Assessment   RLE Assessment   (ROM WFL within precautions  MMT hip 2-/5, knee 3+/5, ankle 4-/5  Pt reports chronic numbness distal to knee)   LLE Assessment   LLE Assessment WFL  (pt reports numbness distal to knee)   Bed Mobility   Supine to Sit 3  Moderate assistance   Additional items LE management; Increased time required;Verbal cues   Transfers   Sit to Stand 4  Minimal assistance   Stand to Sit 4  Minimal assistance   Stand pivot 4  Minimal assistance   Additional items   (with walker)   Ambulation/Elevation   Gait pattern Antalgic; Excessively slow; Inconsistent naida   Gait Assistance 4  Minimal assist   Assistive Device Rolling walker   Distance 30 feet   Balance   Static Sitting Good   Static Standing Fair   Activity Tolerance   Activity Tolerance Patient tolerated treatment well   Assessment   Prognosis Good   Problem List Decreased strength; Impaired balance;Decreased mobility;Pain;Orthopedic restrictions   Assessment Patient is an 79y o  year old male seen for Physical Therapy evaluation  Patient admitted with Hip dislocation, right ([address] )  Comorbidities affecting patient's physical performance include: B NORMAN, depression, ETOH dependence, chornic back pain  Personal factors affecting patient at time of initial evaluation include: lives in 2 story house, stairs to enter home, inability to perform dynamic tasks in community and depression  Prior to admission, patient was independent with functional mobility with cane and independent with ADLS  Please find objective findings from Physical Therapy assessment regarding body systems outlined above with impairments and limitations including weakness, impaired balance, gait deviations, pain, decreased activity tolerance, decreased functional mobility tolerance, fall risk and orthopedic restrictions  The Barthel Index was used as a functional outcome tool presenting with a score of Barthel Index Score: 65 today indicating moderate limitations of functional mobility and ADLS  Patient's clinical presentation is currently unstable/unpredictable as seen in patient's presentation of changing level of pain, increased fall risk, new onset of impairment of functional mobility and new onset of weakness  Pt would benefit from continued Physical Therapy treatment to address deficits as defined above and maximize level of functional mobility  As demonstrated by objective findings, the assigned level of complexity for this evaluation is high  The patient's AM-PAC Basic Mobility Inpatient Short Form Raw Score is 17  A Raw score of greater than 16 suggests the patient may benefit from discharge to home     Goals   Patient Goals "walk without pain" "go back to work"   [facility] Expiration Date 11/28/22   Short Term Goal #1 independent bed mobility, independent transfers, independent ambulation with a walker 100 feet indoor level surfaces with a non antalgic gait, supervision up and down 10 steps   LTG Expiration Date 12/05/22   Long Term Goal #1 improve R LE strength by at least 1 MMT grade, independent up and down 5 steps with or without a rail with a cane or crutches   Plan   Treatment/Interventions Functional transfer training;LE strengthening/ROM; Elevations; Therapeutic exercise;Gait training;Bed mobility; Equipment eval/education;Patient/family training;Spoke to nursing   PT Frequency Twice a day   Recommendation   PT Discharge Recommendation Home with outpatient rehabilitation   Equipment Recommended   (pt has a cane and a walker)   AM-PAC Basic Mobility Inpatient   Turning in Bed Without Bedrails 3   Lying on Back to Sitting on Edge of Flat Bed 2   Moving Bed to Chair 3   Standing Up From Chair 3   Walk in Room 3   Climb 3-5 Stairs 3   Basic Mobility Inpatient Raw Score 17   Basic Mobility Standardized Score 39 67   Highest Level Of Mobility   -HL Goal 5: Stand one or more mins   -HLM Achieved 7: Walk 25 feet or more   Barthel Index   Feeding 10   Bathing 0   Grooming Score 0   Dressing Score 5   Bladder Score 10   Bowels Score 10   Toilet Use Score 5   Transfers (Bed/Chair) Score 10   Mobility (Level Surface) Score 10   Stairs Score 5   Barthel Index Score 65   Additional Treatment Session   Start Time 0930   End Time 1000   Treatment Assessment S:  "It feels good to get OOB"  O: x 10 each R LE  ankle pumps, quad set, AAROM heel slides, AAROM hip abd/add, LAQ  Gait training with a rolling walker 2x 60 feet with a rolling walker  Gait training up and down 8 steps with min assist and verbal cues  (4 steps with 2 rails, 4 steps with a rail and a cane)  Reviewed NORMAN precautions and use of abduction pillow  A:  Pt demonstrates a generally steady gait POD 1 s/p R NORMAN revision for dislocation  P:  Continue PT BID  Review HEP, stair climbing as pt has stairs without rails     End of Consult   Patient Position at End of Consult Bedside chair; All needs within reach   [facility] License Number  Bunny Shin   [id number]

## 2022-11-21 NOTE — ASSESSMENT & PLAN NOTE
Patient states he was recently started on Remeron with no improvement in symptoms however did lead to weight gain  Psychiatry consulted and appreciate recommendation  · Continue BuSpar  Discontinue Remeron  · Per Psychiatry start Cymbalta 30 mg daily which will be increased after 1 week to 60 mg daily  · Trazodone  mg q h s  P r n   Insomnia  · Follow-up with primary psychiatrist after discharge

## 2022-11-21 NOTE — CASE MANAGEMENT
Case Management Assessment & Discharge Planning Note    Patient name Yuliana Mojica  Location [address] 201/2 850 [facility] MRN [id number]  : 1955 Date 2022       Current Admission Date: 2022  Current Admission Diagnosis:Hip dislocation, right [facility])   Patient Active Problem List    Diagnosis Date Noted   • Hip dislocation, right ([facility] ) 2022   • Alcohol dependence ([facility] ) 2022   • Stage 3 chronic kidney disease, unspecified whether stage 3a or 3b CKD ([facility] ) 2022   • Chronic bilateral low back pain with bilateral sciatica 2022   • Lumbar post-laminectomy syndrome 2022   • Depression 2021   • Strain of other muscles, fascia and tendons at shoulder and upper arm level, right arm, initial encounter 2021   • Sleep apnea 2021   • Vegetarian diet 2021   • Welcome to Medicare preventive visit 2020   • Prostate cancer ([facility] ) 2019   • Chronic pain disorder 2018   • Opioid dependence ([facility] ) 2018   • Lower limb length difference 12/15/2017   • Osteoarthrosis of hip 02/10/2016   • Obstructive sleep apnea syndrome 2016   • History of staphylococcal infection 2016   • Chronic back pain greater than 3 months duration 2015   • Hypercholesterolemia 04/10/2015   • Achalasia 2014   • GERD (gastroesophageal reflux disease) 2014   • Allergic rhinitis due to pollen 2014   • Anemia 2014   • Cervical spinal stenosis 2014   • Disc displacement, lumbar 2014   • Generalized osteoarthritis of multiple sites 2014   • Hereditary hemochromatosis ([facility] ) 2014   • Hypertension 2014   • Neoplasm of uncertain behavior of lung 2014   • 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 2014      LOS (days): 3  Geometric Mean LOS (GMLOS) (days): 2 30  Days to GMLOS:-0 7     OBJECTIVE:    Risk of Unplanned Readmission Score: 14 51     Current admission status: Inpatient    Preferred Pharmacy:   [place], [place] - [address]  [address] [address]  Phone: [phone] Fax: [phone]    [address] [place] [address] - [address] [place] [address]  Phone: [phone] Fax: [address] [facility], [place] [address] [address] [facility] [address] [facility] [address]  Phone: [phone] Fax: [phone]    [id number]  [address] [facility] [address]  Phone: [phone] Fax: [phone]    Primary Care Provider: Kylie Borrego MD    Primary Insurance: MEDICARE  Secondary Insurance: [facility]:  [address] - Spouse   Primary Phone: [phone] (Home)  Mobile Phone: [phone]               Advance Directives  Does patient have a [address]?: Yes  Does patient have Advance Directives?: Yes  Advance Directives: Power of  for health care, Living will  Primary Contact: An Hubbard    Readmission Root Cause  30 Day Readmission: No    Patient Information  Admitted from[de-identified] Home  Mental Status: Alert  During Assessment patient was accompanied by: Not accompanied during assessment  Assessment information provided by[de-identified] Patient  Primary Caregiver: Self  Support Systems: Spouse/significant other  [place] of Residence: [address] [place] [address] do you live in?: [address] [facility] entry access options   Select all that apply : Stairs  Number of steps to enter home : 9 (4 down (no railing), 5 up (railing))  Do the steps have railings?: No  Type of Current Residence: 2 story home  Upon entering residence, is there a bedroom on the main floor (no further steps)?: No  A bedroom is located on the following floor levels of residence (select all that apply):: 2nd Floor  Upon entering residence, is there a bathroom on the main floor (no further steps)?: Yes  Number of steps to 2nd floor from main floor: One Flight  In the last 12 months, was there a time when you were not able to pay the mortgage or rent on time?: No  In the last 12 months, how many places have you lived?: 1  In the last 12 months, was there a time when you did not have a steady place to sleep or slept in a shelter (including now)?: No  Homeless/housing insecurity resource given?: N/A  Living Arrangements: Lives w/ Spouse/significant other    Activities of Daily Living Prior to Admission  Functional Status: Independent  Completes ADLs independently?: Yes  Ambulates independently?: Yes  Does patient use assisted devices?: Yes  Assisted Devices (DME) used: CPAP  DME Company Name (respiratory supplies): [employer]  Does patient currently own DME?: Yes  What DME does the patient currently own?: CPAP  Does patient have a history of Outpatient Therapy (PT/OT)?: Yes  Does the patient have a history of Short-Term Rehab?: No  Does patient have a history of HHC?: No  Does patient currently have [facility]?: No    Patient Information Continued  Income Source: Pension/penitentiary  Does patient have prescription coverage?: Yes ([facility])  Within the past 12 months, you worried that your food would run out before you got the money to buy more : Never true  Within the past 12 months, the food you bought just didn't last and you didn't have money to get more : Never true  Food insecurity resource given?: N/A  Does patient receive dialysis treatments?: No  Does patient have a history of substance abuse?: Yes  Historical substance use preference: Alcohol/ETOH (per chart)  Is patient currently in treatment for substance abuse?: No  Patient declined treatment information  (per pt he does not have substance abuse issue, shared that he is on pain management program)  Does patient have a history of Mental Health Diagnosis?: Yes  Is patient receiving treatment for mental health?: Yes (Following with psychiatrist outpatient)    Means of Transportation  Means of Transport to Appts[de-identified] Drives Self (wife can also drive)  In the past 12 months, has lack of transportation kept you from medical appointments or from getting medications?: No  In the past 12 months, has lack of transportation kept you from meetings, work, or from getting things needed for daily living?: No  Was application for public transport provided?: N/A      DISCHARGE DETAILS:    Discharge planning discussed with[de-identified] Patient and wife, Maryjane Gupta of Choice: Yes  Comments - Freedom of Choice: SW met with pt and spoke with wife over phone to assess needs and discuss plans  Initial recommendation by PT was home with outpatient therapy  However based on pt's afternoon therapy session on steps and the situation with steps with no railing at home recommendation may be changed to home with home care  Pt has been to outpatient therapy in the past and is open to resuming  SW offered to make home care referrals as well to determine agency availability in the event home care is recommended  Pt requested home care referrals be made in case they are needed  SW spoke with pt's wife about plans  Wife discussed stair issue and said there is a way around back without stairs however that is over uneven grass  Pt and wife both requested rollator be ordered for pt because it has been recommended in past for pt's spine issues  Pt chose to have DME ordered from [address]  SW offered ongoing support and assistance with planning  Will follow    CM contacted family/caregiver?: Yes  Were Treatment Team discharge recommendations reviewed with patient/caregiver?: Yes  Did patient/caregiver verbalize understanding of patient care needs?: Yes  Were patient/caregiver advised of the risks associated with not following Treatment Team discharge recommendations?: Yes    Contacts  Patient Contacts: Zachary Adams  Relationship to Patient[de-identified] Family (wife)  Contact Method: Phone  Phone Number: [phone]  Reason/Outcome: Discharge [address]         Is the patient interested in Salvador Dalton at discharge?: Yes  [address] requested[de-identified] Nursing, Physical Therapy, Occupational [address] Name[de-identified] Other  [address] Provider[de-identified] PCP  Home Health Services Needed[de-identified] Evaluate Functional Status and Safety, Gait/ADL Training, Strengthening/Theraputic Exercises to Improve Function  Homebound Criteria Met[de-identified] Requires the Assistance of Another Person for Safe Ambulation or to Leave the Home, Uses an Assist Device (i e  cane, walker, etc)  Supporting Clincal Findings[de-identified] Limited Endurance, Fatigues Easliy in [employer]    DME Referral Provided  Referral made for DME?: Yes  DME referral completed for the following items[de-identified] [facility]  DME Supplier Name[de-identified] [employer]    Other Referral/Resources/Interventions Provided:  Interventions: HHC, DME, Prescription Price Check  Referral Comments: [place] home care referrals made to agencies in [facility] ordered from [address] [facility]  Dr Tere Lloyd requesting price check for Lovenox  Treatment Team Recommendation: Home with [address] [employer] (Home with outpatient therapy)  Discharge Destination Plan[de-identified] Home with [address] [employer] (Home with outpatient therapy)  Transport at Discharge : Family    IMM Given (Date):: 11/21/22  IMM Given to[de-identified] Patient (IMM reviewed with pt  Pt verbalized understanding and agrees with discharge determination  Pt signed IMM and copy given    Copy also placed in scan bin for chart )

## 2022-11-21 NOTE — PLAN OF CARE
Problem: MOBILITY - ADULT  Goal: Maintain or return to baseline ADL function  Description: INTERVENTIONS:  -  Assess patient's ability to carry out ADLs; assess patient's baseline for ADL function and identify physical deficits which impact ability to perform ADLs (bathing, care of mouth/teeth, toileting, grooming, dressing, etc )  - Assess/evaluate cause of self-care deficits   - Assess range of motion  - Assess patient's mobility; develop plan if impaired  - Assess patient's need for assistive devices and provide as appropriate  - Encourage maximum independence but intervene and supervise when necessary  - Involve family in performance of ADLs  - Assess for home care needs following discharge   - Consider OT consult to assist with ADL evaluation and planning for discharge  - Provide patient education as appropriate  11/21/2022 0811 by Dayana Baig, RN  Outcome: Progressing       Problem: PAIN - ADULT  Goal: Verbalizes/displays adequate comfort level or baseline comfort level  Description: Interventions:  - Encourage patient to monitor pain and request assistance  - Assess pain using appropriate pain scale  - Administer analgesics based on type and severity of pain and evaluate response  - Implement non-pharmacological measures as appropriate and evaluate response  - Consider cultural and social influences on pain and pain management  - Notify physician/advanced practitioner if interventions unsuccessful or patient reports new pain  11/21/2022 0811 by Dayana Baig, RN  Outcome: Progressing       Problem: Prexisting or High Potential for Compromised Skin Integrity  Goal: Skin integrity is maintained or improved  Description: INTERVENTIONS:  - Identify patients at risk for skin breakdown  - Assess and monitor skin integrity  - Assess and monitor nutrition and hydration status  - Monitor labs   - Assess for incontinence   - Turn and reposition patient  - Assist with mobility/ambulation  - Relieve pressure over bony prominences  - Avoid friction and shearing  - Provide appropriate hygiene as needed including keeping skin clean and dry  - Evaluate need for skin moisturizer/barrier cream  - Collaborate with interdisciplinary team   - Patient/family teaching  - Consider wound care consult   11/21/2022 0811 by Denys Alanis RN  Outcome: Progressing

## 2022-11-21 NOTE — PROGRESS NOTES
Progress Note - Orthopedics   Maira Doctor 79 y o  male MRN: [id number]  Unit/Bed#: 2 [place] [address] Encounter: [id number]    Assessment:  1) POD#1 s/p open treatment of right hip dislocation with revision of 1 component    Plan:  Ancef 2g IV x 2 for 24 hours postop - completed  DVT prophylaxis: Lovenox 40mg subQ daily/SCD's/Ambulation  PT/OT- WBAT, posterior hip precautions  Analgesia PRN  Follow up AM labs - stable, no hematoma, continue to monitor  Post op medical management per primary medical team  Dressing- monitor for drainage, Mepilex dressing may remain in place 7 days  Discharge planning - patient progressed, but needs cotinued work with PT  Plan for discharge home tomorrow if cleared by PT and SLIM  Follow up as an outpatient with Dr Pete Graves in 2 weeks  Weight bearing: as tolerated     VTE Pharmacologic Prophylaxis: Enoxaparin (Lovenox)  VTE Mechanical Prophylaxis: sequential compression device    Subjective: Patient seen and examined this afternoon  Low fever overnight, now resolved  Pain controlled  Patient able to successfully work with PT/OT, but did struggle with stairs  Denies CP or new parasthesias other than is baseline    Vitals: Blood pressure 157/95, pulse 82, temperature 98 8 °F (37 1 °C), resp  rate 19, height 5' 9" (1 753 m), weight 83 3 kg (183 lb 9 6 oz), SpO2 97 %  ,Body mass index is 27 11 kg/m²        Intake/Output Summary (Last 24 hours) at 11/21/2022 1552  Last data filed at 11/21/2022 1209  Gross per 24 hour   Intake 780 ml   Output 1400 ml   Net -620 ml       Invasive Devices     Peripheral Intravenous Line  Duration           Peripheral IV 11/18/22 Right;Upper;Ventral (anterior) Arm 3 days    Peripheral IV 11/20/22 Left Forearm 1 day                Physical Exam: General: NAD, A&Ox3, resting in chair  Ortho Exam: RLE: posterolateral Mepilex dressing c/d/i, Abd pillow in place, thigh and calf soft and nontender, palpable pulse, + DF/PF/EHL, SILT at baseline, SCDs in place    Lab, Imaging and other studies:   Postoperative x-rays in the PACU demonstrate a reduced and well-aligned right total hip prosthesis  I have personally reviewed pertinent lab results    CBC:   Lab Results   Component Value Date    WBC 8 88 11/21/2022    HGB 8 9 (L) 11/21/2022    HCT 28 6 (L) 11/21/2022    MCV 82 11/21/2022     11/21/2022    MCH 25 6 (L) 11/21/2022    MCHC 31 1 (L) 11/21/2022    RDW 17 1 (H) 11/21/2022    MPV 9 4 11/21/2022     CMP:   Lab Results   Component Value Date    SODIUM 143 11/21/2022     (H) 11/21/2022    CO2 26 11/21/2022    BUN 10 11/21/2022    CREATININE 0 88 11/21/2022    CALCIUM 8 2 (L) 11/21/2022    EGFR 88 11/21/2022     Hilton Paul PA-C

## 2022-11-21 NOTE — OCCUPATIONAL THERAPY NOTE
Occupational Therapy Evaluation/Treatment       11/21/22 1240   Note Type   Note type Evaluation   Pain Assessment   Pain Assessment Tool 0-10   Pain Score 6   Pain Location/Orientation Orientation: Right;Location: Hip   Restrictions/Precautions   RLE Weight Bearing Per Order WBAT   Braces or Orthoses   (abduction pillow)   Other Precautions Fall Risk;Pain  (posterior hip precautions)   Home Living   Type of [address] Two level;1/2 bath on main level  ([address])   Bathroom Shower/Tub Tub/shower unit   Bathroom Toilet Standard   Bathroom Equipment Commode   Home Equipment Walker;Cane;Quad cane  (rollator)   Prior Function   Level of [place] Independent with ADLs; Independent with functional mobility   Lives With Spouse   Receives Help From Family   Comments Patient is POD#1 s/p R NORMAN revision  ADL   Eating Assistance 7  Independent   Grooming Assistance 7  Independent   UB Bathing Assistance 7  Independent   LB Bathing Assistance 4  Minimal Assistance   UB Dressing Assistance 7  Independent   LB Dressing Assistance 4  Minimal Assistance   Toileting Assistance  4  Minimal Assistance   Transfers   Sit to Stand 4  Minimal assistance   Stand to Sit 4  Minimal assistance   Functional Mobility   Functional Mobility 4  Minimal assistance   Additional Comments functional distance in room with RW   Balance   Static Sitting Good   Dynamic Sitting Good   Static Standing Fair   Dynamic Standing Fair   Activity Tolerance   Activity Tolerance Patient tolerated treatment well;Patient limited by pain   Nurse Made Aware Goals established to promote Patient Goals: to go home:  Grooming: supervision standing at sink; Bathing: supervision; Lower Body Dressing: supervision; Toileting: supervision; Patient will increase standing tolerance to 5 minutes during ADL task to decrease assistance level and decrease fall risk; Patient will increase bed mobility to supervision in preparation for ADLS and transfers;  Patient will increase functional mobility to and from bathroom with rolling walker with supervision to increase performance with ADLS and to use a toilet; Patient will improve functional activity tolerance to 10 minutes of sustained functional tasks to increase participation in basic self-care and decrease assistance level;  Patient will be able to to verbalize understanding and perform energy conservation/proper body mechanics/posterior hip precuations during ADLS and functional mobility at least 75% of the time with minimal cueing to decrease signs of fatigue and increase stamina to return to prior level of function;  Patient will increase dynamic standing balance to fair+ to improve postural stability and decrease fall risk during standing ADLS and transfers  RUE Assessment   RUE Assessment WFL   LUE Assessment   LUE Assessment WFL   Cognition   Overall Cognitive Status WFL   Arousal/Participation Cooperative   Attention Within functional limits   Orientation Level Oriented X4   Following Commands Follows all commands and directions without difficulty   Assessment   Limitation Decreased ADL status; Decreased UE strength;Decreased Safe judgement during ADL;Decreased endurance;Decreased high-level ADLs; Decreased self-care trans  (decreased balance and mobility)   Prognosis Good   Assessment Patient evaluated by Occupational Therapy  Patient admitted with Hip dislocation, right ([facility] [address] )  The patients occupational profile, medical and therapy history includes a extensive additional review of physical, cognitive, or psychosocial history related to current functional performance  Comorbidities affecting functional mobility and ADLS include: arthritis, cancer, depression and hypertension  Prior to admission, patient was independent with functional mobility without assistive device, independent with ADLS and independent with IADLS    The evaluation identifies the following performance deficits: weakness, impaired balance, decreased endurance, increased fall risk, new onset of impairment of functional mobility, decreased ADLS, decreased IADLS, pain, decreased activity tolerance, decreased safety awareness, impaired judgement and decreased strength, that result in activity limitations and/or participation restrictions  This evaluation requires clinical decision making of high complexity, because the patient presents with comorbidites that affect occupational performance and required significant modification of tasks or assistance with consideration of multiple treatment options  The Barthel Index was used as a functional outcome tool presenting with a score of Barthel Index Score: 70, indicating marked limitations of functional mobility and ADLS  The patient's raw score on the -PAC Daily Activity inpatient short form is 21, standardized score is 44 27, greater than 39 4  Patients at this level are likely to benefit from DC to home  Please refer to the recommendation of the Occupational Therapist for safe DC planning  Patient will benefit from skilled Occupational Therapy services to address above deficits and facilitate a safe return to prior level of function  Goals   Patient Goals to go home   STG Time Frame   (1-7 days)   Short Term Goal  Goals established to promote Patient Goals: to go home:  Grooming: supervision standing at sink; Bathing: supervision; Lower Body Dressing: supervision; Toileting: supervision; Patient will increase standing tolerance to 5 minutes during ADL task to decrease assistance level and decrease fall risk; Patient will increase bed mobility to supervision in preparation for ADLS and transfers;  Patient will increase functional mobility to and from bathroom with rolling walker with supervision to increase performance with ADLS and to use a toilet; Patient will improve functional activity tolerance to 10 minutes of sustained functional tasks to increase participation in basic self-care and decrease assistance level;  Patient will be able to to verbalize understanding and perform energy conservation/proper body mechanics/posterior hip precuations during ADLS and functional mobility at least 75% of the time with minimal cueing to decrease signs of fatigue and increase stamina to return to prior level of function;  Patient will increase dynamic standing balance to fair+ to improve postural stability and decrease fall risk during standing ADLS and transfers  LTG Time Frame   (8-14 days)   Long Term Goal Grooming: independent standing at sink; Bathing: independent; Lower Body Dressing: independent; Toileting: independent; Patient will increase standing tolerance to 10 minutes during ADL task to decrease assistance level and decrease fall risk; Patient will increase bed mobility to independent in preparation for ADLS and transfers; Patient will increase functional mobility to and from bathroom with rolling walker with independent to increase performance with ADLS and to use a toilet; Patient will improve functional activity tolerance to 20 minutes of sustained functional tasks to increase participation in basic self-care and decrease assistance level;  Patient will be able to to verbalize understanding and perform energy conservation/proper body mechanics/posterior hip precuations during ADLS and functional mobility at least 90% of the time to decrease signs of fatigue and increase stamina to return to prior level of function;  Patient will increase dynamic standing balance to good to improve postural stability and decrease fall risk during standing ADLS and transfers  Pt will score >/= 24/24 on AM-PAC Daily Activity Inpatient scale to promote safe independence with ADLs and functional mobility; Pt will score >/= 100/100 on Barthel Index in order to decrease caregiver assistance needed and increase ability to perform ADLs and functional mobility     Plan   Treatment Interventions ADL retraining;Functional transfer training;UE strengthening/ROM; Endurance training;Patient/family training;Equipment evaluation/education; Activityengagement; Compensatory technique education   Goal Expiration Date 12/05/22   OT Frequency   (5x/week)   Recommendation   OT Discharge Recommendation Home with outpatient rehabilitation   AM-PAC Daily Activity Inpatient   Lower Body Dressing 3   Bathing 3   Toileting 3   Upper Body Dressing 4   Grooming 4   Eating 4   Daily Activity Raw Score 21   Daily Activity Standardized Score (Calc for Raw Score >=11) 44 27   AM-PAC Applied Cognition Inpatient   Following a Speech/Presentation 4   Understanding Ordinary Conversation 4   Taking Medications 4   Remembering Where Things Are Placed or Put Away 4   Remembering List of 4-5 Errands 4   Taking Care of Complicated Tasks 4   Applied Cognition Raw Score 24   Applied Cognition Standardized Score 62 21   Barthel Index   Feeding 10   Bathing 0   Grooming Score 5   Dressing Score 5   Bladder Score 10   Bowels Score 10   Toilet Use Score 5   Transfers (Bed/Chair) Score 10   Mobility (Level Surface) Score 10   Stairs Score 5   Barthel Index Score 70   Additional Treatment Session   Start Time 1230   End Time 1240   Treatment Assessment S: 6/10 R hip pain  O: Patient independently recalled all hip precuations  Patient instructed on car transfers and verbalized understanding  Patient completed commode transfer with supervision  Patient has a commode for home use and a reacher, long handled shoe horn and maybe a sock aid  Functional mobility supervision with RW back to bed  Patient completed donning pants with min assist with use of reacher  Patient doffed/donned R sock with supervision with use of reacher and sock aid  Patient instructed on how to purchase equipment including elastic shoe laces and verbalized understanding  A: Patient reports wife will assist with ADLS and IADLS at home    Patient demonstrated good understanding of equipment and able to maintain hip preacautions    P: outpatient PT   Licensure   [place] License Number  Lona Yousif Erick 87 OTR/L [id number]

## 2022-11-21 NOTE — ASSESSMENT & PLAN NOTE
Temp of 101 2° overnight which has since resolved  No leukocytosis on lab work  Chest x-ray negative for pneumonia    UA pending

## 2022-11-21 NOTE — CASE MANAGEMENT
Case Management Discharge Planning Note    Patient name Kareem Hidden  Location [address] 201/[address] [facility] MRN [id number]  : 1955 Date 2022       Current Admission Date: 2022  Current Admission Diagnosis:Hip dislocation, right [facility])   Patient Active Problem List    Diagnosis Date Noted   • Hip dislocation, right ([facility] [address] ) 2022   • Alcohol dependence ([facility] ) 2022   • Stage 3 chronic kidney disease, unspecified whether stage 3a or 3b CKD ([facility] ) 2022   • Chronic bilateral low back pain with bilateral sciatica 2022   • Lumbar post-laminectomy syndrome 2022   • Depression 2021   • Strain of other muscles, fascia and tendons at shoulder and upper arm level, right arm, initial encounter 2021   • Sleep apnea 2021   • Vegetarian diet 2021   • Welcome to Medicare preventive visit 2020   • Prostate cancer ([facility] ) 2019   • Chronic pain disorder 2018   • Opioid dependence ([facility] ) 2018   • Lower limb length difference 12/15/2017   • Osteoarthrosis of hip 02/10/2016   • Obstructive sleep apnea syndrome 2016   • History of staphylococcal infection 2016   • Chronic back pain greater than 3 months duration 2015   • Hypercholesterolemia 04/10/2015   • Achalasia 2014   • GERD (gastroesophageal reflux disease) 2014   • Allergic rhinitis due to pollen 2014   • Anemia 2014   • Cervical spinal stenosis 2014   • Disc displacement, lumbar 2014   • Generalized osteoarthritis of multiple sites 2014   • Hereditary hemochromatosis ([facility] [address] ) 2014   • Hypertension 2014   • Neoplasm of uncertain behavior of lung 2014   • 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 2014      LOS (days): 3  Geometric Mean LOS (GMLOS) (days): 2 30  Days to GMLOS:-0 8     OBJECTIVE:  Risk of Unplanned Readmission Score: 14 09     Current admission status: Inpatient   Preferred Pharmacy:   [place], [place] - [address]  [address]  [address] [place] [address]  Phone: [phone] Fax: [phone]    [address] [place] [address] [address] [place], [place] - [address] [place] [address] 53 [facility] [address]  Phone: [phone] Fax: [address], [address] [facility] [address] [facility]  [address] [facility] [address]  Phone: [phone] Fax: [phone]    [id number]  [address] [address]  Phone: [phone] Fax: [phone]    Primary Care Provider: Kathy Zaragoza MD    Primary Insurance: MEDICARE  Secondary Insurance: AARP    DISCHARGE DETAILS:    Discharge planning discussed with[de-identified] Patient and wife, Gissel Soledad of Choice: Yes  Comments - Freedom of Choice: SW following to assist with DCP  PT is now recommending home care/therapy for pt  Pt aware of recommendation and open to home care services  JASMIN provided pt with list of accepting agencies from [address] [place] [address]  After reviewing list pt requested services through [facility] [address]  Order for rollator also approved  JASMIN provided pt with phone number for [facility] to schedule delivery  Pt said he would call [facility]  JASMIN reviewed all above with pt's wife over phone  Confirmed plan for pt to discharge tomorrow around noon  Discussed timeframe due to wife's preference not to drive in the dark  Wife stated that she will be able to get pt whenever he calls her tomorrow  Pt aware of wife's request for call  JASMIN also discussed copay amount for pt's Lovenox, $12 96/30 day supply, with pt  Pt said the cost would be affordable    CM contacted family/caregiver?: Yes  Were Treatment Team discharge recommendations reviewed with patient/caregiver?: Yes  Did patient/caregiver verbalize understanding of patient care needs?: Yes  Were patient/caregiver advised of the risks associated with not following Treatment Team discharge recommendations?: Yes    Contacts  Patient Contacts: Emery Valdez  Relationship to Patient[de-identified] Family  Contact Method: Phone  Phone Number: [phone]  Reason/Outcome: Discharge [address] Name[de-identified] [facility] [address]    Other Referral/Resources/Interventions Provided:  Interventions: HHC, DME, Prescription Price Check  Referral Comments: [facility] in [address] [place] [address] for home care services  Rollator approved by [employer] and pt will be calling to schedule delivery  Per pharmacist at [facility] pt's co-pay amount for Lovenox will be $12 96/30 day supply      Treatment Team Recommendation: Home with [address]  Discharge Destination Plan[de-identified] Home with [place] at Discharge : Family

## 2022-11-21 NOTE — ASSESSMENT & PLAN NOTE
On Benicar-HCT at home  · Substituted with losartan    Restart hydrochlorothiazide component as blood pressures are running somewhat high

## 2022-11-21 NOTE — PLAN OF CARE
Problem: PHYSICAL THERAPY ADULT  Goal: Performs mobility at highest level of function for planned discharge setting  See evaluation for individualized goals  Description: Treatment/Interventions: Functional transfer training, LE strengthening/ROM, Elevations, Therapeutic exercise, Gait training, Bed mobility, Equipment eval/education, Patient/family training, Spoke to nursing  Equipment Recommended:  (pt has a cane and a walker)       See flowsheet documentation for full assessment, interventions and recommendations  Note: Prognosis: Good  Problem List: Decreased strength, Impaired balance, Decreased mobility, Pain, Orthopedic restrictions  Assessment: Patient is an 79y o  year old male seen for Physical Therapy evaluation  Patient admitted with Hip dislocation, right ([facility] [address] )  Comorbidities affecting patient's physical performance include: B NORMAN, depression, ETOH dependence, chornic back pain  Personal factors affecting patient at time of initial evaluation include: lives in 2 story house, stairs to enter home, inability to perform dynamic tasks in community and depression  Prior to admission, patient was independent with functional mobility with cane and independent with ADLS  Please find objective findings from Physical Therapy assessment regarding body systems outlined above with impairments and limitations including weakness, impaired balance, gait deviations, pain, decreased activity tolerance, decreased functional mobility tolerance, fall risk and orthopedic restrictions  The Barthel Index was used as a functional outcome tool presenting with a score of Barthel Index Score: 65 today indicating moderate limitations of functional mobility and ADLS  Patient's clinical presentation is currently unstable/unpredictable as seen in patient's presentation of changing level of pain, increased fall risk, new onset of impairment of functional mobility and new onset of weakness   Pt would benefit from continued Physical Therapy treatment to address deficits as defined above and maximize level of functional mobility  As demonstrated by objective findings, the assigned level of complexity for this evaluation is high  The patient's AM-PAC Basic Mobility Inpatient Short Form Raw Score is 17  A Raw score of greater than 16 suggests the patient may benefit from discharge to home  PT Discharge Recommendation: Home with outpatient rehabilitation    See flowsheet documentation for full assessment
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
## 2022-11-21 NOTE — ASSESSMENT & PLAN NOTE
Patient presented with "slipped and fell while getting out of bed tonight", sustained right hip pain  History of right hip replacement about 7 years ago, underwent two revisions due to infection and pain respectively  · X-ray showed right hip dislocation, no fracture   · Attempted reduction in ED without success  · Was taken to the OR for reduction 11/18 which was not successful  · Patient underwent removal of right femoral head implant, open treatment of right hip dislocation without internal fixation and revision of right hip acetabular component on 11/20  · Pain control  · Initially Nonweightbearing but per Ortho can weightbear as tolerated of right lower extremity  · Posterior hip precautions  Abduction pillow in bed and when supine  · Continue Lovenox, DVT prophylaxis for 4 weeks    Lovenox Rx sent to pharmacy  · Staple removal in 2 weeks  · Appreciate orthopedic surgery input

## 2022-11-21 NOTE — PROGRESS NOTES
-- Patient:  -- MRN: [id number]  -- Aidin Request ID: [id number]  -- Level of care reserved: [address] [place] [address]  -- Partner Reserved: Visiting Nurse [address] [employer] [address] [phone]  -- Clinical needs requested:  -- Geography searched: [phone]  -- Start of Service:  -- Request sent: 1:51pm EST on 11/21/2022 by Flavia Heart  -- Partner reserved: 4:12pm EST on 11/21/2022 by Flavia Heart  -- Choice list shared: 3:38pm EST on 11/21/2022 by Flavia Heart

## 2022-11-22 VITALS
DIASTOLIC BLOOD PRESSURE: 110 MMHG | WEIGHT: 183.42 LBS | BODY MASS INDEX: 27.17 KG/M2 | HEIGHT: 69 IN | TEMPERATURE: 98.9 F | OXYGEN SATURATION: 97 % | HEART RATE: 93 BPM | SYSTOLIC BLOOD PRESSURE: 168 MMHG | RESPIRATION RATE: 18 BRPM

## 2022-11-22 LAB
ANION GAP SERPL CALCULATED.3IONS-SCNC: 10 MMOL/L (ref 4–13)
BUN SERPL-MCNC: 12 MG/DL (ref 5–25)
CALCIUM SERPL-MCNC: 8.2 MG/DL (ref 8.3–10.1)
CHLORIDE SERPL-SCNC: 104 MMOL/L (ref 96–108)
CO2 SERPL-SCNC: 25 MMOL/L (ref 21–32)
CREAT SERPL-MCNC: 0.8 MG/DL (ref 0.6–1.3)
ERYTHROCYTE [DISTWIDTH] IN BLOOD BY AUTOMATED COUNT: 17.3 % (ref 11.6–15.1)
GFR SERPL CREATININE-BSD FRML MDRD: 92 ML/MIN/1.73SQ M
GLUCOSE SERPL-MCNC: 99 MG/DL (ref 65–140)
HCT VFR BLD AUTO: 27.5 % (ref 36.5–49.3)
HGB BLD-MCNC: 8.5 G/DL (ref 12–17)
MCH RBC QN AUTO: 25.4 PG (ref 26.8–34.3)
MCHC RBC AUTO-ENTMCNC: 30.9 G/DL (ref 31.4–37.4)
MCV RBC AUTO: 82 FL (ref 82–98)
PLATELET # BLD AUTO: 250 THOUSANDS/UL (ref 149–390)
PMV BLD AUTO: 9 FL (ref 8.9–12.7)
POTASSIUM SERPL-SCNC: 3.3 MMOL/L (ref 3.5–5.3)
RBC # BLD AUTO: 3.35 MILLION/UL (ref 3.88–5.62)
SODIUM SERPL-SCNC: 139 MMOL/L (ref 135–147)
WBC # BLD AUTO: 9.04 THOUSAND/UL (ref 4.31–10.16)

## 2022-11-22 RX ORDER — POTASSIUM CHLORIDE 20 MEQ/1
40 TABLET, EXTENDED RELEASE ORAL ONCE
Status: COMPLETED | OUTPATIENT
Start: 2022-11-22 | End: 2022-11-22

## 2022-11-22 RX ORDER — DOCUSATE SODIUM 100 MG/1
100 CAPSULE, LIQUID FILLED ORAL 2 TIMES DAILY
Qty: 60 CAPSULE | Refills: 0 | Status: SHIPPED | OUTPATIENT
Start: 2022-11-22 | End: 2022-11-29 | Stop reason: HOSPADM

## 2022-11-22 RX ORDER — ACETAMINOPHEN 325 MG/1
975 TABLET ORAL EVERY 8 HOURS
Qty: 45 TABLET | Refills: 0 | Status: SHIPPED | OUTPATIENT
Start: 2022-11-22 | End: 2022-11-27

## 2022-11-22 RX ORDER — DULOXETIN HYDROCHLORIDE 30 MG/1
30 CAPSULE, DELAYED RELEASE ORAL DAILY
Qty: 30 CAPSULE | Refills: 0 | Status: SHIPPED | OUTPATIENT
Start: 2022-11-23

## 2022-11-22 RX ORDER — DULOXETIN HYDROCHLORIDE 30 MG/1
30 CAPSULE, DELAYED RELEASE ORAL DAILY
Qty: 30 CAPSULE | Refills: 0 | Status: SHIPPED | OUTPATIENT
Start: 2022-11-23 | End: 2022-11-22 | Stop reason: SDUPTHER

## 2022-11-22 RX ADMIN — OXYCODONE HYDROCHLORIDE 20 MG: 20 TABLET, FILM COATED, EXTENDED RELEASE ORAL at 05:44

## 2022-11-22 RX ADMIN — ENOXAPARIN SODIUM 40 MG: 40 INJECTION SUBCUTANEOUS at 08:59

## 2022-11-22 RX ADMIN — OXYCODONE HYDROCHLORIDE 10 MG: 10 TABLET, FILM COATED, EXTENDED RELEASE ORAL at 05:44

## 2022-11-22 RX ADMIN — LOSARTAN POTASSIUM 100 MG: 50 TABLET, FILM COATED ORAL at 08:59

## 2022-11-22 RX ADMIN — ACETAMINOPHEN 975 MG: 325 TABLET, FILM COATED ORAL at 12:01

## 2022-11-22 RX ADMIN — ACETAMINOPHEN 975 MG: 325 TABLET, FILM COATED ORAL at 05:44

## 2022-11-22 RX ADMIN — BUSPIRONE HYDROCHLORIDE 15 MG: 10 TABLET ORAL at 08:59

## 2022-11-22 RX ADMIN — PANTOPRAZOLE SODIUM 40 MG: 40 TABLET, DELAYED RELEASE ORAL at 05:44

## 2022-11-22 RX ADMIN — HYDROCHLOROTHIAZIDE 12.5 MG: 12.5 TABLET ORAL at 08:59

## 2022-11-22 RX ADMIN — THIAMINE HCL TAB 100 MG 100 MG: 100 TAB at 08:59

## 2022-11-22 RX ADMIN — OXYCODONE HYDROCHLORIDE 10 MG: 10 TABLET ORAL at 08:46

## 2022-11-22 RX ADMIN — MODAFINIL 200 MG: 100 TABLET ORAL at 08:59

## 2022-11-22 RX ADMIN — DULOXETINE HYDROCHLORIDE 30 MG: 30 CAPSULE, DELAYED RELEASE ORAL at 08:59

## 2022-11-22 RX ADMIN — FOLIC ACID 1 MG: 1 TABLET ORAL at 09:00

## 2022-11-22 RX ADMIN — DOCUSATE SODIUM 100 MG: 100 CAPSULE, LIQUID FILLED ORAL at 08:59

## 2022-11-22 RX ADMIN — POTASSIUM CHLORIDE 40 MEQ: 1500 TABLET, EXTENDED RELEASE ORAL at 08:59

## 2022-11-22 NOTE — ASSESSMENT & PLAN NOTE
Patient presented with "slipped and fell while getting out of bed tonight", sustained right hip pain  History of right hip replacement about 7 years ago, underwent two revisions due to infection and pain respectively  · X-ray showed right hip dislocation, no fracture   · Attempted reduction in ED without success  · Was taken to the OR for reduction 11/18 which was not successful  · Patient underwent removal of right femoral head implant, open treatment of right hip dislocation without internal fixation and revision of right hip acetabular component on 11/20  · Continue pain control  · Patient was initially nonweightbearing but currently orthopedics recommending weight-bearing as tolerated  · Posterior hip precautions  Abduction pillow in bed and when supine  · Continue due to prophylaxis with Lovenox for 4 weeks  · Staple removal in 2 weeks  · Appreciate orthopedic surgery input    Outpatient follow-up with orthopedics

## 2022-11-22 NOTE — ASSESSMENT & PLAN NOTE
Lab Results   Component Value Date    EGFR 92 11/22/2022    EGFR 88 11/21/2022    EGFR 86 11/20/2022    CREATININE 0 80 11/22/2022    CREATININE 0 88 11/21/2022    CREATININE 0 91 11/20/2022     Baseline creatinine appears to be around 1 0-1 2s    · Creatinine continues to remain at baseline  · Patient was initially on IV fluids which were later discontinued

## 2022-11-22 NOTE — DISCHARGE SUMMARY
Patel 45  Discharge- Slime Hugo 1955, 79 y o  male MRN: [id number]  Unit/Bed#: [address]  Encounter: [id number]  Primary Care Provider: Sedrick Swenson MD   Date and time admitted to hospital: 11/17/2022  6:39 PM    * Hip dislocation, right [facility])  Assessment & Plan  Patient presented with "slipped and fell while getting out of bed tonight", sustained right hip pain  History of right hip replacement about 7 years ago, underwent two revisions due to infection and pain respectively  · X-ray showed right hip dislocation, no fracture   · Attempted reduction in ED without success  · Was taken to the OR for reduction 11/18 which was not successful  · Patient underwent removal of right femoral head implant, open treatment of right hip dislocation without internal fixation and revision of right hip acetabular component on 11/20  · Continue pain control  · Patient was initially nonweightbearing but currently orthopedics recommending weight-bearing as tolerated  · Posterior hip precautions  Abduction pillow in bed and when supine  · Continue due to prophylaxis with Lovenox for 4 weeks  · Staple removal in 2 weeks  · Appreciate orthopedic surgery input  Outpatient follow-up with orthopedics    Chronic back pain greater than 3 months duration  Assessment & Plan  Reports chronic low back pain and neck pain  On OxyContin 30 mg p o  B i d , Norco p r n  At home  Follows pain management as outpatient  History of L4-5 fusion  · Continue OxyContin, Norco p r n  Prior provider Verified at [facility] website  · Patient was on IV Dilaudid for breakthrough pain    Achalasia  Assessment & Plan  Continue Protonix b i d  Hypertension  Assessment & Plan  On Benicar-HCT at home  · Continue Benicar/hydrochlorothiazide    Fever postop  Assessment & Plan  Temp of 101 2° overnight of 11/20/22  No leukocytosis on lab work  Chest x-ray negative for pneumonia    UA was unremarkable    Alcohol dependence ([facility] [address] )  Assessment & Plan  Reports drinking 1 drink a week, last drink was night prior to admission  Wife warned ED that patient might withdraw  · No evidence of withdrawal  · Patient was on CIWA protocol    Stage 3 chronic kidney disease, unspecified whether stage 3a or 3b CKD [facility])  Assessment & Plan  Lab Results   Component Value Date    EGFR 92 11/22/2022    EGFR 88 11/21/2022    EGFR 86 11/20/2022    CREATININE 0 80 11/22/2022    CREATININE 0 88 11/21/2022    CREATININE 0 91 11/20/2022     Baseline creatinine appears to be around 1 0-1 2s  · Creatinine continues to remain at baseline  · Patient was initially on IV fluids which were later discontinued    Depression  Assessment & Plan  Patient states he was recently started on Remeron with no improvement in symptoms however did lead to weight gain  Psychiatry consulted and appreciate recommendation  · Continue BuSpar  Discontinue Remeron  · Per Psychiatry start Cymbalta 30 mg daily which will be increased after 1 week to 60 mg daily  · Trazodone  mg q h s  P r n  Insomnia was ordered during hospitalization  · Follow-up with primary psychiatrist after discharge    Sleep apnea  Assessment & Plan  · Substituted Armodafinil with Provigil  · Continue CPAP at HS      Prostate cancer [facility])  Assessment & Plan  Under surveillance      Hypercholesterolemia  Assessment & Plan  Diet control    Anemia  Assessment & Plan  Baseline hemoglobin appears to be around 9  · Hemoglobin on admission 8 8  · iron panel reviewed, B12 813, folate 17  · TSH low with normal free T4 - can get repeat lab work after discharge  · Received IV Venofer for 3 doses  · Hemoglobin stable    Results from last 7 days   Lab Units 11/22/22  0452 11/21/22  0502 11/20/22  0437 11/19/22  0600 11/18/22  0006   HEMOGLOBIN g/dL 8 5* 8 9* 8 9* 9 0* 8 8*   HEMATOCRIT % 27 5* 28 6* 28 8* 29 6* 27 8*   MCV fL 82 82 82 81* 79*       Medical Problems     Resolved Problems  Date Reviewed: 11/22/2022 None       Discharging Physician / Practitioner: Aleida Monson MD  PCP: Raghavendra Cabral MD  Admission Date:   Admission Orders (From admission, onward)     Ordered        11/18/22 1348  Inpatient Admission  Once            11/17/22 2337  Place in Observation  Once                      Discharge Date: 11/22/22    Consultations During Hospital Stay:  · Status post arthroplasty of the right hip with total open reduction and revision of 1 component-acetabular component       Outpatient Tests Requested:  · Follow-up with orthopedics    Complications:  None    Reason for Admission:  Status post fall    Hospital Course:   Smooth [place] is a 79 y o  male patient with past medical history of right hip replacement with revision x2, chronic low back pain with L4-L5 fusion, achalasia, depression, hypertension, hyperlipidemia, prostate cancer sleep apnea who originally presented to the hospital on 11/17/2022 due to fall while getting out of the bed  Patient complained of significant right hip pain and x-ray showed right hip dislocation  Failed reduction in the ED  Patient was taken to the OR for reduction which again failed  Later patient underwent arthroplasty of the right hip with total open reduction and revision of acetabular component  Postoperatively patient developed fever and septic workup was performed which was all negative  Patient remained afebrile and stable  Patient to be discharged on DVT prophylaxis with Lovenox with outpatient follow-up with orthopedics  Patient was doing well with physical therapy  Please see above list of diagnoses and related plan for additional information  Condition at Discharge: stable    Discharge Day Visit / Exam:   Subjective:  Patient is feeling well  Patient does complain of chronic back pain with some right hip pain    Vitals: Blood Pressure: (!) 168/110 (11/22/22 0846)  Pulse: 93 (11/22/22 0846)  Temperature: 98 9 °F (37 2 °C) (11/22/22 0300)  Temp Source: Oral (11/22/22 0300)  Respirations: 18 (11/21/22 2322)  Height: 5' 9" (175 3 cm) (11/18/22 0152)  Weight - Scale: 83 2 kg (183 lb 6 8 oz) (11/22/22 0642)  SpO2: 97 % (11/22/22 0846)  Exam:   Physical Exam  Constitutional:       Appearance: Normal appearance  HENT:      Head: Normocephalic and atraumatic  Eyes:      Extraocular Movements: Extraocular movements intact  Pupils: Pupils are equal, round, and reactive to light  Cardiovascular:      Rate and Rhythm: Normal rate and regular rhythm  Heart sounds: No murmur heard  No gallop  Pulmonary:      Effort: Pulmonary effort is normal       Breath sounds: Normal breath sounds  Abdominal:      General: Bowel sounds are normal       Palpations: Abdomen is soft  Tenderness: There is no abdominal tenderness  Musculoskeletal:         General: No swelling or deformity  Normal range of motion  Cervical back: Normal range of motion and neck supple  Comments: Right hip dressing will some dried bloody drainage   Skin:     General: Skin is warm and dry  Neurological:      General: No focal deficit present  Mental Status: He is alert  Discharge instructions/Information to patient and family:   See after visit summary for information provided to patient and family  Provisions for Follow-Up Care:  See after visit summary for information related to follow-up care and any pertinent home health orders  Disposition:   Home with VNA Services (Reminder: Complete face to face encounter)    Planned Readmission: No     Discharge Statement:  I spent 35 minutes discharging the patient  This time was spent on the day of discharge  I had direct contact with the patient on the day of discharge  Greater than 50% of the total time was spent examining patient, answering all patient questions, arranging and discussing plan of care with patient as well as directly providing post-discharge instructions    Additional time then spent on discharge activities  Discharge Medications:  See after visit summary for reconciled discharge medications provided to patient and/or family        **Please Note: This note may have been constructed using a voice recognition system**

## 2022-11-22 NOTE — ASSESSMENT & PLAN NOTE
Patient states he was recently started on Remeron with no improvement in symptoms however did lead to weight gain  Psychiatry consulted and appreciate recommendation  · Continue BuSpar  Discontinue Remeron  · Per Psychiatry start Cymbalta 30 mg daily which will be increased after 1 week to 60 mg daily  · Trazodone  mg q h s  P r n   Insomnia was ordered during hospitalization  · Follow-up with primary psychiatrist after discharge

## 2022-11-22 NOTE — PHYSICAL THERAPY NOTE
PT TREATMENT     11/22/22 0850   Note Type   Note Type BID visit/treatment   Pain Assessment   Pain Assessment Tool 0-10   Pain Score 5  (pt had pain meds prior to PT)   Pain Location/Orientation   (R hip)   Restrictions/Precautions   RLE Weight Bearing Per Order WBAT   Braces or Orthoses   (abduction pillow)   Other Precautions Fall Risk;Pain  (posterior hip precautions)   General   Chart Reviewed Yes   Cognition   Overall Cognitive Status WFL   Subjective   Subjective "It feels good to get up and walk"   Bed Mobility   Supine to Sit 4  Minimal assistance   Additional items LE management   Transfers   Sit to Stand 5  Supervision   Stand to Sit 5  Supervision   Stand pivot 5  Supervision   Additional items   (with walker)   Additional Comments Pt stood at the toilet to urinate with supervision   Ambulation/Elevation   Gait Assistance 6  Modified independent/supervision   Assistive Device Rolling walker   Distance 200 feet, 100 feet   Stair Management Assistance   (supervision with 2 rails and with 1 rail and a cane;  min assist with 2 canes  Pt issued a cane for home use on steps with his own cane)   Balance   Static Sitting Good   Static Standing Good   Activity Tolerance   Activity Tolerance Patient tolerated treatment well   Exercises   Therapeutic exercises R LE x 10 each ankle pumps, quad set, LAQ, standing hip flexion and standing hip abduction  Ice to hip after PT   Assessment   Prognosis Good   Problem List Decreased strength; Impaired balance;Decreased mobility;Pain;Orthopedic restrictions   Assessment Pt demonstrates improving quality of gait, improving balance, and improved ability to navigate the steps including steps without rails POD 2 s/p R posterior NORMAN revision for dislocation  Pt is ready to go home from the PT point of view to have home PT at home and progress to OP PT  The patient's AM-PAC Basic Mobility Inpatient Short Form Raw Score is 21   A Raw score of greater than 16 suggests the patient may benefit from discharge to home  Plan   Treatment/Interventions Functional transfer training;LE strengthening/ROM; Therapeutic exercise;Gait training;Bed mobility; Patient/family training;Equipment eval/education;Spoke to nursing;Elevations   Progress Progressing toward goals   PT Frequency Twice a day   Recommendation   PT Discharge Recommendation Home with home health rehabilitation   Equipment Recommended   (pt has a walker, pt issued a cane to use with his own cane on steps without rails)   AM-PAC Basic Mobility Inpatient   Turning in Bed Without Bedrails 3   Lying on Back to Sitting on Edge of Flat Bed 3   Moving Bed to Chair 4   Standing Up From Chair 4   Walk in Room 4   Climb 3-5 Stairs 3   Basic Mobility Inpatient Raw Score 21   Basic Mobility Standardized Score 45 55   Highest Level Of Mobility   JH-HLM Goal 6: Walk 10 steps or more   JH-HLM Achieved 8: Walk 250 feet ot more   End of Consult   Patient Position at End of Consult All needs within reach; Bedside chair   Licensure   [address] [facility] License Number  Quiroz MICHELLE [id number]

## 2022-11-22 NOTE — ASSESSMENT & PLAN NOTE
Baseline hemoglobin appears to be around 9  · Hemoglobin on admission 8 8  · iron panel reviewed, B12 813, folate 17  · TSH low with normal free T4 - can get repeat lab work after discharge  · Received IV Venofer for 3 doses  · Hemoglobin stable    Results from last 7 days   Lab Units 11/22/22  0452 11/21/22  0502 11/20/22  0437 11/19/22  0600 11/18/22  0006   HEMOGLOBIN g/dL 8 5* 8 9* 8 9* 9 0* 8 8*   HEMATOCRIT % 27 5* 28 6* 28 8* 29 6* 27 8*   MCV fL 82 82 82 81* 79*

## 2022-11-22 NOTE — PLAN OF CARE
Problem: Potential for Falls  Goal: Patient will remain free of falls  Description: INTERVENTIONS:  - Educate patient/family on patient safety including physical limitations  - Instruct patient to call for assistance with activity   - Consult OT/PT to assist with strengthening/mobility   - Keep Call bell within reach  - Keep bed low and locked with side rails adjusted as appropriate  - Keep care items and personal belongings within reach  - Initiate and maintain comfort rounds  - Make Fall Risk Sign visible to staff  - Offer Toileting every 2 Hours, in advance of need  - Initiate/Maintain bed alarm  - Obtain necessary fall risk management equipment: call bell   - Apply yellow socks and bracelet for high fall risk patients  - Consider moving patient to room near nurses station  Outcome: Progressing     Problem: MOBILITY - ADULT  Goal: Maintain or return to baseline ADL function  Description: INTERVENTIONS:  -  Assess patient's ability to carry out ADLs; assess patient's baseline for ADL function and identify physical deficits which impact ability to perform ADLs (bathing, care of mouth/teeth, toileting, grooming, dressing, etc )  - Assess/evaluate cause of self-care deficits   - Assess range of motion  - Assess patient's mobility; develop plan if impaired  - Assess patient's need for assistive devices and provide as appropriate  - Encourage maximum independence but intervene and supervise when necessary  - Involve family in performance of ADLs  - Assess for home care needs following discharge   - Consider OT consult to assist with ADL evaluation and planning for discharge  - Provide patient education as appropriate  Outcome: Progressing  Goal: Maintains/Returns to pre admission functional level  Description: INTERVENTIONS:  - Perform BMAT or MOVE assessment daily    - Set and communicate daily mobility goal to care team and patient/family/caregiver     - Collaborate with rehabilitation services on mobility goals if consulted  - Perform Range of Motion 2 times a day  - Reposition patient every 2 hours    - Dangle patient 2 times a day  - Stand patient 2 times a day  - Ambulate patient 2 times a day  - Out of bed to chair 2 times a day   - Out of bed for meals 2 times a day  - Out of bed for toileting  - Record patient progress and toleration of activity level   Outcome: Progressing     Problem: PAIN - ADULT  Goal: Verbalizes/displays adequate comfort level or baseline comfort level  Description: Interventions:  - Encourage patient to monitor pain and request assistance  - Assess pain using appropriate pain scale  - Administer analgesics based on type and severity of pain and evaluate response  - Implement non-pharmacological measures as appropriate and evaluate response  - Consider cultural and social influences on pain and pain management  - Notify physician/advanced practitioner if interventions unsuccessful or patient reports new pain  Outcome: Progressing     Problem: INFECTION - ADULT  Goal: Absence or prevention of progression during hospitalization  Description: INTERVENTIONS:  - Assess and monitor for signs and symptoms of infection  - Monitor lab/diagnostic results  - Monitor all insertion sites, i e  indwelling lines, tubes, and drains  - Monitor endotracheal if appropriate and nasal secretions for changes in amount and color  - [place] appropriate cooling/warming therapies per order  - Administer medications as ordered  - Instruct and encourage patient and family to use good hand hygiene technique  - Identify and instruct in appropriate isolation precautions for identified infection/condition  Outcome: Progressing  Goal: Absence of fever/infection during neutropenic period  Description: INTERVENTIONS:  - Monitor WBC    Outcome: Progressing     Problem: SAFETY ADULT  Goal: Patient will remain free of falls  Description: INTERVENTIONS:  - Educate patient/family on patient safety including physical limitations  - Instruct patient to call for assistance with activity   - Consult OT/PT to assist with strengthening/mobility   - Keep Call bell within reach  - Keep bed low and locked with side rails adjusted as appropriate  - Keep care items and personal belongings within reach  - Initiate and maintain comfort rounds  - Make Fall Risk Sign visible to staff  - Offer Toileting every 2 Hours, in advance of need  - Initiate/Maintain bed alarm  - Obtain necessary fall risk management equipment: call bell   - Apply yellow socks and bracelet for high fall risk patients  - Consider moving patient to room near nurses station  Outcome: Progressing  Goal: Maintain or return to baseline ADL function  Description: INTERVENTIONS:  -  Assess patient's ability to carry out ADLs; assess patient's baseline for ADL function and identify physical deficits which impact ability to perform ADLs (bathing, care of mouth/teeth, toileting, grooming, dressing, etc )  - Assess/evaluate cause of self-care deficits   - Assess range of motion  - Assess patient's mobility; develop plan if impaired  - Assess patient's need for assistive devices and provide as appropriate  - Encourage maximum independence but intervene and supervise when necessary  - Involve family in performance of ADLs  - Assess for home care needs following discharge   - Consider OT consult to assist with ADL evaluation and planning for discharge  - Provide patient education as appropriate  Outcome: Progressing  Goal: Maintains/Returns to pre admission functional level  Description: INTERVENTIONS:  - Perform BMAT or MOVE assessment daily    - Set and communicate daily mobility goal to care team and patient/family/caregiver  - Collaborate with rehabilitation services on mobility goals if consulted  - Perform Range of Motion 2 times a day  - Reposition patient every 2 hours    - Dangle patient 2 times a day  - Stand patient 2 times a day  - Ambulate patient 2 times a day  - Out of bed to chair 2 times a day   - Out of bed for meals 2 times a day  - Out of bed for toileting  - Record patient progress and toleration of activity level   Outcome: Progressing     Problem: DISCHARGE PLANNING  Goal: Discharge to home or other facility with appropriate resources  Description: INTERVENTIONS:  - Identify barriers to discharge w/patient and caregiver  - Arrange for needed discharge resources and transportation as appropriate  - Identify discharge learning needs (meds, wound care, etc )  - Arrange for interpretive services to assist at discharge as needed  - Refer to Case Management Department for coordinating discharge planning if the patient needs post-hospital services based on physician/advanced practitioner order or complex needs related to functional status, cognitive ability, or social support system  Outcome: Progressing     Problem: Knowledge Deficit  Goal: Patient/family/caregiver demonstrates understanding of disease process, treatment plan, medications, and discharge instructions  Description: Complete learning assessment and assess knowledge base    Interventions:  - Provide teaching at level of understanding  - Provide teaching via preferred learning methods  Outcome: Progressing     Problem: Prexisting or High Potential for Compromised Skin Integrity  Goal: Skin integrity is maintained or improved  Description: INTERVENTIONS:  - Identify patients at risk for skin breakdown  - Assess and monitor skin integrity  - Assess and monitor nutrition and hydration status  - Monitor labs   - Assess for incontinence   - Turn and reposition patient  - Assist with mobility/ambulation  - Relieve pressure over bony prominences  - Avoid friction and shearing  - Provide appropriate hygiene as needed including keeping skin clean and dry  - Evaluate need for skin moisturizer/barrier cream  - Collaborate with interdisciplinary team   - Patient/family teaching  - Consider wound care consult   Outcome: Progressing Problem: RESPIRATORY - ADULT  Goal: Achieves optimal ventilation and oxygenation  Description: INTERVENTIONS:  - Assess for changes in respiratory status  - Assess for changes in mentation and behavior  - Position to facilitate oxygenation and minimize respiratory effort  - Oxygen administered by appropriate delivery if ordered  - Initiate smoking cessation education as indicated  - Encourage broncho-pulmonary hygiene including cough, deep breathe, Incentive Spirometry  - Assess the need for suctioning and aspirate as needed  - Assess and instruct to report SOB or any respiratory difficulty  - Respiratory Therapy support as indicated  Outcome: Progressing

## 2022-11-22 NOTE — RESPIRATORY THERAPY NOTE
Patient had CPAP for approximately 30 minutes and removed it  Said he no longer wanted to wear it for the night

## 2022-11-22 NOTE — ASSESSMENT & PLAN NOTE
Reports drinking 1 drink a week, last drink was night prior to admission  Wife warned ED that patient might withdraw    · No evidence of withdrawal  · Patient was on CIWA protocol

## 2022-11-22 NOTE — CASE MANAGEMENT
Case Management Discharge Planning Note    Patient name Kareem Hidden  Location 18 [facility] [address] 12 MRN [id number]  : 1955 Date 2022       Current Admission Date: 2022  Current Admission Diagnosis:Hip dislocation, right [facility])   Patient Active Problem List    Diagnosis Date Noted   • Fever postop 2022   • Hip dislocation, right ([facility] ) 2022   • Alcohol dependence ([facility] ) 2022   • Stage 3 chronic kidney disease, unspecified whether stage 3a or 3b CKD ([facility] ) 2022   • Chronic bilateral low back pain with bilateral sciatica 2022   • Lumbar post-laminectomy syndrome 2022   • Depression 2021   • Strain of other muscles, fascia and tendons at shoulder and upper arm level, right arm, initial encounter 2021   • Sleep apnea 2021   • Vegetarian diet 2021   • Welcome to Medicare preventive visit 2020   • Prostate cancer ([facility] ) 2019   • Chronic pain disorder 2018   • Opioid dependence ([facility] ) 2018   • Lower limb length difference 12/15/2017   • Osteoarthrosis of hip 02/10/2016   • Obstructive sleep apnea syndrome 2016   • History of staphylococcal infection 2016   • Chronic back pain greater than 3 months duration 2015   • Hypercholesterolemia 04/10/2015   • Achalasia 2014   • GERD (gastroesophageal reflux disease) 2014   • Allergic rhinitis due to pollen 2014   • Anemia 2014   • Cervical spinal stenosis 2014   • Disc displacement, lumbar 2014   • Generalized osteoarthritis of multiple sites 2014   • Hereditary hemochromatosis ([facility] ) 2014   • Hypertension 2014   • Neoplasm of uncertain behavior of lung 2014   • 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 2014      LOS (days): 4  Geometric Mean LOS (GMLOS) (days): 2 30  Days to GMLOS:-1 7     OBJECTIVE:  Risk of Unplanned Readmission Score: 15 05     Current admission status: Inpatient   Preferred Pharmacy:   [place], [place] - [address]  [address] [facility] [address]  Phone: [phone] Fax: [phone]    [address] [place] [address] [address] [place], [place] - [address] [facility] 53 [facility] [address]  Phone: [phone] Fax: [address], [facility] 97 [address] [facility]  [address] [facility] [address] [facility] [address]  Phone: [phone] Fax: [phone]    [id number] [place]  [place]  [address] [address]  Phone: [phone] Fax: [phone]    Primary Care Provider: Buddy Antonio MD    Primary Insurance: MEDICARE  Secondary Insurance: AARP    DISCHARGE DETAILS:    Discharge planning discussed with[de-identified] Patient  Freedom of Choice: Yes  Comments - Freedom of Choice: Discharge planned today  SW met with pt to confirm plans  Per pt his wife will be arriving to transport him home very soon  Pt's plan remains to return home with [facility] [address] [facility] services  Requested [address]         Is the patient interested in [address] at discharge?: Yes    Other Referral/Resources/Interventions Provided:  Interventions: [address]  Referral Comments: SW notified [facility] [address] of discharge  AVS and Summary of Care faxed to agency      Treatment Team Recommendation: Home with [address]  Discharge Destination Plan[de-identified] Home with [place] at Discharge : Family

## 2022-11-22 NOTE — ASSESSMENT & PLAN NOTE
Temp of 101 2° overnight of 11/20/22  No leukocytosis on lab work  Chest x-ray negative for pneumonia    UA was unremarkable

## 2022-11-23 ENCOUNTER — TRANSITIONAL CARE MANAGEMENT (OUTPATIENT)
Dept: FAMILY MEDICINE CLINIC | Facility: CLINIC | Age: 67
End: 2022-11-23

## 2022-11-25 LAB
DME PARACHUTE DELIVERY DATE EXPECTED: NORMAL
DME PARACHUTE DELIVERY DATE REQUESTED: NORMAL
DME PARACHUTE ITEM DESCRIPTION: NORMAL
DME PARACHUTE ITEM DESCRIPTION: NORMAL
DME PARACHUTE ORDER STATUS: NORMAL
DME PARACHUTE SUPPLIER NAME: NORMAL
DME PARACHUTE SUPPLIER PHONE: NORMAL

## 2022-11-27 DIAGNOSIS — S73.004A DISLOCATION OF RIGHT HIP, INITIAL ENCOUNTER (HCC): Primary | ICD-10-CM

## 2022-11-28 LAB
DME PARACHUTE DELIVERY DATE ACTUAL: NORMAL
DME PARACHUTE DELIVERY DATE EXPECTED: NORMAL
DME PARACHUTE DELIVERY DATE REQUESTED: NORMAL
DME PARACHUTE ITEM DESCRIPTION: NORMAL
DME PARACHUTE ITEM DESCRIPTION: NORMAL
DME PARACHUTE ORDER STATUS: NORMAL
DME PARACHUTE SUPPLIER NAME: NORMAL
DME PARACHUTE SUPPLIER PHONE: NORMAL

## 2022-11-30 ENCOUNTER — TELEPHONE (OUTPATIENT)
Dept: FAMILY MEDICINE CLINIC | Facility: CLINIC | Age: 67
End: 2022-11-30

## 2022-11-30 ENCOUNTER — OFFICE VISIT (OUTPATIENT)
Dept: FAMILY MEDICINE CLINIC | Facility: CLINIC | Age: 67
End: 2022-11-30

## 2022-11-30 VITALS — WEIGHT: 183 LBS | HEIGHT: 69 IN | BODY MASS INDEX: 27.11 KG/M2

## 2022-11-30 DIAGNOSIS — E78.00 HYPERCHOLESTEROLEMIA: ICD-10-CM

## 2022-11-30 DIAGNOSIS — I10 PRIMARY HYPERTENSION: ICD-10-CM

## 2022-11-30 DIAGNOSIS — Z76.89 ENCOUNTER FOR SUPPORT AND COORDINATION OF TRANSITION OF CARE: ICD-10-CM

## 2022-11-30 DIAGNOSIS — N18.30 STAGE 3 CHRONIC KIDNEY DISEASE, UNSPECIFIED WHETHER STAGE 3A OR 3B CKD (HCC): ICD-10-CM

## 2022-11-30 DIAGNOSIS — M15.9 GENERALIZED OSTEOARTHRITIS OF MULTIPLE SITES: ICD-10-CM

## 2022-11-30 DIAGNOSIS — G89.4 CHRONIC PAIN DISORDER: ICD-10-CM

## 2022-11-30 DIAGNOSIS — G89.29 CHRONIC BILATERAL LOW BACK PAIN WITH BILATERAL SCIATICA: ICD-10-CM

## 2022-11-30 DIAGNOSIS — S73.004S DISLOCATION OF RIGHT HIP, SEQUELA: ICD-10-CM

## 2022-11-30 DIAGNOSIS — K21.9 GASTROESOPHAGEAL REFLUX DISEASE WITHOUT ESOPHAGITIS: ICD-10-CM

## 2022-11-30 DIAGNOSIS — M54.42 CHRONIC BILATERAL LOW BACK PAIN WITH BILATERAL SCIATICA: ICD-10-CM

## 2022-11-30 DIAGNOSIS — D64.9 ANEMIA, UNSPECIFIED TYPE: Primary | ICD-10-CM

## 2022-11-30 DIAGNOSIS — M54.41 CHRONIC BILATERAL LOW BACK PAIN WITH BILATERAL SCIATICA: ICD-10-CM

## 2022-11-30 DIAGNOSIS — E83.110 HEREDITARY HEMOCHROMATOSIS (HCC): ICD-10-CM

## 2022-11-30 DIAGNOSIS — G47.33 OBSTRUCTIVE SLEEP APNEA SYNDROME: ICD-10-CM

## 2022-11-30 DIAGNOSIS — F11.20 UNCOMPLICATED OPIOID DEPENDENCE (HCC): ICD-10-CM

## 2022-11-30 DIAGNOSIS — K22.0 ACHALASIA: ICD-10-CM

## 2022-11-30 NOTE — TELEPHONE ENCOUNTER
Anjelica Guzmán the Physical Therapist with the [facility] went to the house  Anjelica Guzmán states he is walking around great  strength is good  He did put in for OT eval to help him get in and out of bath tub  Otherwise Anjelica Guzmán states Casie Jalloh does not need PT  No further action at this time

## 2022-11-30 NOTE — PROGRESS NOTES
Virtual TCM Visit:    Verification of patient location:    Patient is located in the following state in which I hold an active license [place]        Assessment/Plan:        Problem List Items Addressed This Visit        Digestive    Achalasia    GERD (gastroesophageal reflux disease)       Respiratory    Obstructive sleep apnea syndrome       Cardiovascular and Mediastinum    Hypertension       Nervous and Auditory    Chronic bilateral low back pain with bilateral sciatica       Musculoskeletal and Integument    Generalized osteoarthritis of multiple sites    Hip dislocation, right (HCC)       Genitourinary    Stage 3 chronic kidney disease, unspecified whether stage 3a or 3b CKD ([facility] )       Other    Anemia - Primary    Relevant Orders    Ambulatory Referral to Hematology / Oncology    Hereditary hemochromatosis ([facility] )    Hypercholesterolemia    Chronic pain disorder    Opioid dependence ([facility] )   Other Visit Diagnoses     Encounter for support and coordination of transition of care                 Reason for visit is [facility]    Encounter provider Collette Martinez MD       Provider located at [address] [place] [address]  [id number]      Recent Visits  No visits were found meeting these conditions  Showing recent visits within past 7 days and meeting all other requirements  Today's Visits  Date Type Provider Dept   11/30/22 Telephone [address] [place] [address] Physicians   11/30/22 Office Visit Collette Martinez MD [facility] Physicians   Showing today's visits and meeting all other requirements  Future Appointments  No visits were found meeting these conditions  Showing future appointments within next 150 days and meeting all other requirements       After connecting through Social[employer], the patient was identified by name and date of birth   Gianna Colin was informed that this is a telemedicine visit and that the visit is being conducted through the [employer] platform  He agrees to proceed     My office door was closed  No one else was in the room  He acknowledged consent and understanding of privacy and security of the video platform  The patient has agreed to participate and understands they can discontinue the visit at any time  Patient is aware this is a billable service  Subjective:     Patient ID: Bard Dior is a 79 y o  male  PATIENT IS S/P [facility] ADMISSION FOR DISLOCATION OF R HIP / S/P REPAIR    DATE OF DISCHARGE  11/22/2022    REVIEWED HOSPITAL COURSE, DISCHARGE INSTRUCTIONS AND MEDICATIONS    PATIENT FEELS BETTER  REVIEWED ABNORMAL LABS AND DISCUSSED THERAPEUTIC PLAN AT LENGTH    PATIENT WILL RETURN FOR IN OFFICE EVAL Monday OR Tuesday OF NEXT WEEK      Review of Systems   Constitutional: Negative for chills, fatigue and fever  HENT: Negative for congestion, ear discharge, ear pain, mouth sores, postnasal drip, sore throat and trouble swallowing  Eyes: Negative for pain, discharge and visual disturbance  Respiratory: Negative for cough, shortness of breath and wheezing  Cardiovascular: Negative for chest pain, palpitations and leg swelling  Gastrointestinal: Negative for abdominal distention, abdominal pain, blood in stool, diarrhea and nausea  Endocrine: Negative for polydipsia, polyphagia and polyuria  Genitourinary: Negative for dysuria, frequency, hematuria and urgency  Musculoskeletal: Negative for arthralgias, gait problem and joint swelling  Skin: Negative for pallor and rash  Neurological: Negative for dizziness, syncope, speech difficulty, weakness, light-headedness, numbness and headaches  Hematological: Negative for adenopathy  Psychiatric/Behavioral: Negative for behavioral problems, confusion and sleep disturbance  The patient is not nervous/anxious            Objective:    Vitals:    11/30/22 1407   Weight: 83 kg (183 lb)   Height: 5' 9" (1 753 m) Physical Exam      PATIENT VISUALLY APPEARS  IN NO OBVIOUS DISTRESS      Transitional Care Management Review:  Loraine Kuhn is a 79 y o  male here for TCM follow up  During the TCM phone call patient stated:    TCM Call     Date and time call was made  11/23/2022 10:45 AM    Hospital care reviewed  Records reviewed    Patient was hospitialized at  [address] [place] [address]    Date of Admission  11/17/22    Date of discharge  11/22/22    Diagnosis  Hip dislocation, right    Disposition  Home    Were the patients medications reviewed and updated  Yes    Current Symptoms  --  Pain    Fatigue severity  Mild      TCM Call     Post hospital issues  Reduced activity    Should patient be enrolled in anticoag monitoring? No    Scheduled for follow up? Yes    Not clinically warranted  Patient is following up with his surgeon in [place] on 05/01/2019 Dr Marina Marin MD    Patients specialists  Other (comment)    Other specialists names  surgeon    Did you obtain your prescribed medications  Yes    Do you need help managing your prescriptions or medications  No    Is transportation to your appointment needed  No    I have advised the patient to call PCP with any new or worsening symptoms  [place] Arrangements  Family members    Support System  Family    The type of support provided  Emotional; Physical    Do you have social support  Yes, as much as I need    Are you recieving any outpatient services  Yes    What type of services  PT    Are you recieving home care services  Yes    Are you using any community resources  No    Current waiver services  No    Have you fallen in the last 12 months  Yes    How many times  12    Interperter language line needed  No    Counseling  Patient    Counseling topics  instructions for management; Activities of daily living    Comments  Patient has appointment with Dr Cristopher Flores on 02/03/2020, JITENDRA Denson/[place]          I spent 25 minutes with the patient during this visit      Natalie Morejon Ree Adan MD      VIRTUAL VISIT DISCLAIMER    Tremayne Santosaine verbally agrees to participate in [facility]  Pt is aware that [facility] could be limited without vital signs or the ability to perform a full hands-on physical Lynne Montez understands he or the provider may request at any time to terminate the video visit and request the patient to seek care or treatment in person

## 2022-11-30 NOTE — PATIENT INSTRUCTIONS
CONTINUE CURRENT TREATMENT PLAN  CONTINUE DISCHARGE MEDICATIONS    CONSULT DR Messina [facility] - CONSIDER IRON INFUSIONS    PATIENT WILL START ORAL IRON  WILL CALL NUMBER FOR THERAPIST REGARDING DEPRESSION AND ALCOHOL USE    RV Monday OR Tuesday FOR FOLLOW UP    IF FEEL WORSE - GO TO ER

## 2022-12-01 ENCOUNTER — APPOINTMENT (OUTPATIENT)
Dept: RADIOLOGY | Facility: CLINIC | Age: 67
End: 2022-12-01

## 2022-12-01 ENCOUNTER — OFFICE VISIT (OUTPATIENT)
Dept: OBGYN CLINIC | Facility: CLINIC | Age: 67
End: 2022-12-01

## 2022-12-01 ENCOUNTER — TELEPHONE (OUTPATIENT)
Dept: PSYCHIATRY | Facility: CLINIC | Age: 67
End: 2022-12-01

## 2022-12-01 VITALS
HEIGHT: 69 IN | HEART RATE: 69 BPM | WEIGHT: 183 LBS | DIASTOLIC BLOOD PRESSURE: 93 MMHG | SYSTOLIC BLOOD PRESSURE: 164 MMHG | BODY MASS INDEX: 27.11 KG/M2 | TEMPERATURE: 98.6 F

## 2022-12-01 DIAGNOSIS — S73.004S DISLOCATION OF RIGHT HIP, SEQUELA: ICD-10-CM

## 2022-12-01 DIAGNOSIS — M21.70 LEG LENGTH DISCREPANCY: Primary | ICD-10-CM

## 2022-12-01 DIAGNOSIS — S73.004A DISLOCATION OF RIGHT HIP, INITIAL ENCOUNTER (HCC): ICD-10-CM

## 2022-12-01 NOTE — TELEPHONE ENCOUNTER
Pt called to schedule a appt with a counselor I asked when pt was available, He said he just had a hip replacement and it wouldn't be for a couple of weeks  I asked him to give me a call when his doctor gives him a go

## 2022-12-01 NOTE — PROGRESS NOTES
45Orthopaedics Office Visit - Post-op Patient Visit    ASSESSMENT/PLAN:    Assessment:   11 days s/p open reduction, revision right hip arthroplasty  - replacement of dual mobility head/liner after liner dissociation  DOS 11/20/22  Doing well overall, well healed incision site   Leg length discrepancy  R longer than left - pre-existing from prior to this injury      Plan:   - Weight bearing as tolerated right lower extremity   - Continue posterior hip precautions at this time   - Continue lovenox as directed (28 days post operatively )   - Staples removed in the office  Patient tolerated well    - Okay To begin showering  Allow water to flow over the incision sites  Do not vigorously scrubbed or soak wounds until otherwise stated   - Over the counter analgesics as needed / directed   - Ice / heat as directed   - Please contact orthopedics office prior to dental appointment for antibiotic prescription   - Shoe lift prescription provided for patient   - Cleared for spine surgery at this time  - Follow up 4 weeks with repeat XR       To Do Next Visit:  XR right hip     _____________________________________________________  CHIEF COMPLAINT:  Chief Complaint   Patient presents with   • Right Hip - Post-op         SUBJECTIVE:  Tremayne Pond is a 79 y o  male who presents 11 days s/p open reduction, revision right hip arthroplasty    DOS 11/20/22  Patient states that his hip is doing well overall  Patient states he has minimal pain in the hip currently  Patient states he has been full weight-bearing with use of a walker  Patient states he has been compliant with anticoagulation use  Patient states he has been compliant with posterior hip precautions  Patient denies any shortness of breath chest tightness chest pain  Patient does admit to experiencing exhaustion  Patient offers no other complaints at this time      SOCIAL HISTORY:  Social History     Tobacco Use   • Smoking status: Former     Packs/day: 1 00 Years: 16 00     Pack years: 16 00     Types: Cigarettes     Start date: 1969     Quit date:      Years since quittin 9   • Smokeless tobacco: Never   Vaping Use   • Vaping Use: Never used   Substance Use Topics   • Alcohol use: Yes     Alcohol/week: 2 0 standard drinks     Types: 1 Glasses of wine, 1 Cans of beer per week     Comment: one drink a week   • Drug use: Yes     Frequency: 1 0 times per week     Types: Marijuana     Comment: occassionaly       MEDICATIONS:    Current Outpatient Medications:   •  Armodafinil 250 MG tablet, Take 1 tablet (250 mg total) by mouth daily, Disp: 30 tablet, Rfl: 0  •  busPIRone (BUSPAR) 15 mg tablet, Take 1 tablet (15 mg total) by mouth 2 (two) times a day, Disp: 60 tablet, Rfl: 2  •  DULoxetine (CYMBALTA) 30 mg delayed release capsule, Take 1 capsule (30 mg total) by mouth daily Do not start before 2022 , Disp: 30 capsule, Rfl: 0  •  enoxaparin (LOVENOX) 40 mg/0 4 mL, Inject 0 4 mL (40 mg total) under the skin in the morning for 29 days, Disp: 11 6 mL, Rfl: 0  •  HYDROcodone-acetaminophen (NORCO)  mg per tablet, Take 1 tablet by mouth every 4 (four) hours as needed (PAIN) Max Daily Amount: 6 tablets, Disp: 180 tablet, Rfl: 0  •  meloxicam (MOBIC) 15 mg tablet, Take 1 tablet (15 mg total) by mouth daily, Disp: 90 tablet, Rfl: 0  •  multivitamin (THERAGRAN) TABS, Take 1 tablet by mouth daily, Disp: , Rfl:   •  naloxone (NARCAN) 4 mg/0 1 mL nasal spray, Administer 1 spray into a nostril   If breathing does not return to normal or if breathing difficulty resumes after 2-3 minutes, give another dose in the other nostril using a new spray , Disp: 1 each, Rfl: 1  •  olmesartan-hydrochlorothiazide (BENICAR HCT) 40-12 5 MG per tablet, Take 1 tablet by mouth daily, Disp: 60 tablet, Rfl: 0  •  omeprazole (PriLOSEC) 40 MG capsule, Take 1 capsule by mouth every 12 (twelve) hours, Disp: , Rfl:   •  oxyCODONE (OxyCONTIN) 10 mg 12 hr tablet, Take 1 tablet (10 mg total) by mouth 2 (two) times a day Max Daily Amount: 20 mg, Disp: 60 tablet, Rfl: 0  •  oxyCODONE (OxyCONTIN) 20 mg 12 hr tablet, Take 1 tablet (20 mg total) by mouth every 12 (twelve) hours Max Daily Amount: 40 mg, Disp: 60 tablet, Rfl: 0  •  tadalafil (CIALIS) 5 MG tablet, Take 5 mg by mouth daily, Disp: , Rfl:     REVIEW OF SYSTEMS:  MSK: right hip pain  Neuro: WNL   Pertinent items are otherwise noted in HPI  A comprehensive review of systems was otherwise negative     _____________________________________________________  PHYSICAL EXAMINATION:  Vital signs: /93   Pulse 69   Temp 98 6 °F (37 °C)   Ht 5' 9" (1 753 m)   Wt 83 kg (183 lb)   BMI 27 02 kg/m²   General: No acute distress, awake and alert  Psychiatric: Mood and affect appear appropriate  HEENT: Trachea Midline, No torticollis, no apparent facial trauma  Cardiovascular: No audible murmurs; Extremities appear perfused  Pulmonary: No audible wheezing or stridor  Skin: No open lesions; see further details (if any) below      MUSCULOSKELETAL EXAMINATION:  Right hip examination:  - Patient sitting comfortably in the office in no acute distress   - healing incision site present with staples intact  No surrounding erythema or ecchymosis present  - mild tenderness palpation noted over incision site  No other bony or soft tissue tenderness to palpation noted at this time  - NV intact    _____________________________________________________  STUDIES REVIEWED:  I personally reviewed the images and interpretation is as follows:  Right hip XR 3 views:  Status post total hip arthroplasty with no fractures or dislocations present      PROCEDURES PERFORMED:  Suture removal    Date/Time: 12/1/2022 10:44 AM  Performed by: Price Mcdonald PA-C  Authorized by: Celso Ovalles MD   Universal Protocol:  Consent: Verbal consent obtained    Risks and benefits: risks, benefits and alternatives were discussed  Consent given by: patient  Patient understanding: patient states understanding of the procedure being performed  Site marked: the operative site was marked  Patient identity confirmed: verbally with patient        Patient location:  Bedside  Location:     Laterality:  Right    Location:  Lower extremity    Lower extremity location:  Hip    Hip location:  R hip  Procedure details:     Nail bed suture material: staple remover     Wound appearance:  No sign(s) of infection, good wound healing and clean  Post-procedure details:     Post-removal:  Steri-Strips applied    Patient tolerance of procedure: Tolerated well, no immediate complications            Moustapha Bobby PA-C - assisting  Farrah Ogden MD          Portions of the record may have been created with voice recognition software  Occasional wrong word or "sound a like" substitutions may have occurred due to the inherent limitations of voice recognition software  Read the chart carefully and recognize, using context, where substitutions have occurred

## 2022-12-01 NOTE — PATIENT INSTRUCTIONS
- Weight bearing as tolerated right lower extremity   - Continue posterior hip precautions at this time   - Continue lovenox as directed (28 days post operatively )   - Staples removed in the office  Patient tolerated well    - Okay To begin showering  Allow water to flow over the incision sites    Do not vigorously scrubbed or soak wounds until otherwise stated   - Over the counter analgesics as needed / directed   - Ice / heat as directed   - Please contact orthopedics office prior to dental appointment for antibiotic prescription   - Shoe lift prescription provided for patient   - Follow up 4 weeks with repeat XR

## 2022-12-05 ENCOUNTER — OFFICE VISIT (OUTPATIENT)
Dept: FAMILY MEDICINE CLINIC | Facility: CLINIC | Age: 67
End: 2022-12-05

## 2022-12-05 ENCOUNTER — TELEPHONE (OUTPATIENT)
Dept: OBGYN CLINIC | Facility: HOSPITAL | Age: 67
End: 2022-12-05

## 2022-12-05 VITALS
OXYGEN SATURATION: 99 % | DIASTOLIC BLOOD PRESSURE: 88 MMHG | HEART RATE: 84 BPM | SYSTOLIC BLOOD PRESSURE: 142 MMHG | TEMPERATURE: 99.6 F | WEIGHT: 175 LBS | RESPIRATION RATE: 12 BRPM | BODY MASS INDEX: 25.92 KG/M2 | HEIGHT: 69 IN

## 2022-12-05 DIAGNOSIS — M15.9 GENERALIZED OSTEOARTHRITIS OF MULTIPLE SITES: ICD-10-CM

## 2022-12-05 DIAGNOSIS — D50.8 OTHER IRON DEFICIENCY ANEMIA: Primary | ICD-10-CM

## 2022-12-05 DIAGNOSIS — I10 PRIMARY HYPERTENSION: ICD-10-CM

## 2022-12-05 DIAGNOSIS — S73.004S DISLOCATION OF RIGHT HIP, SEQUELA: ICD-10-CM

## 2022-12-05 NOTE — PROGRESS NOTES
Name: Ángel Langley      : 1955      MRN: [id number]  Encounter Provider: Anuel Ocasio MD  Encounter Date: 2022   Encounter department: [facility]     1  Other iron deficiency anemia  -     CBC and differential  -     Comprehensive metabolic panel  -     Iron  -     Ferritin    2  Primary hypertension  -     Comprehensive metabolic panel    3  Dislocation of right hip, sequela    4  Generalized osteoarthritis of multiple sites         Subjective      FOLLOW UP    FEELING BETTER  SAW ORTHO LAST WEEK  PROGRESSING WELL    NO ACTIVE BLEEDING  HAS NOT BEEN CONTACTED BY CENTRAL SCHEDULING YET REGARDING HEMATOLOGY CONSULT       DENIES ANY ALCOHOL INTAKE    REVIEWED PLAN  BW WILL BE OBTAINED TODAY    FURTHER PLANS PENDING EVAL    Review of Systems   Constitutional: Positive for fatigue  Negative for chills and fever  HENT: Negative for congestion, ear discharge, ear pain, mouth sores, postnasal drip, sore throat and trouble swallowing  Eyes: Negative for pain, discharge and visual disturbance  Respiratory: Negative for cough, shortness of breath and wheezing  Cardiovascular: Negative for chest pain, palpitations and leg swelling  Gastrointestinal: Positive for nausea  Negative for abdominal distention, abdominal pain, blood in stool, diarrhea and vomiting  Endocrine: Negative for polydipsia, polyphagia and polyuria  Genitourinary: Negative for dysuria, frequency, hematuria and urgency  Musculoskeletal: Positive for arthralgias, back pain, gait problem, neck pain and neck stiffness  Negative for joint swelling  Skin: Negative for pallor and rash  Neurological: Negative for dizziness, syncope, speech difficulty, weakness, light-headedness, numbness and headaches  Hematological: Negative for adenopathy  Psychiatric/Behavioral: Negative for behavioral problems, confusion and sleep disturbance  The patient is not nervous/anxious  Current Outpatient Medications on File Prior to Visit   Medication Sig   • Armodafinil 250 MG tablet Take 1 tablet (250 mg total) by mouth daily   • busPIRone (BUSPAR) 15 mg tablet Take 1 tablet (15 mg total) by mouth 2 (two) times a day   • DULoxetine (CYMBALTA) 30 mg delayed release capsule Take 1 capsule (30 mg total) by mouth daily Do not start before November 23, 2022  • enoxaparin (LOVENOX) 40 mg/0 4 mL Inject 0 4 mL (40 mg total) under the skin in the morning for 29 days   • HYDROcodone-acetaminophen (NORCO)  mg per tablet Take 1 tablet by mouth every 4 (four) hours as needed (PAIN) Max Daily Amount: 6 tablets   • meloxicam (MOBIC) 15 mg tablet Take 1 tablet (15 mg total) by mouth daily   • multivitamin (THERAGRAN) TABS Take 1 tablet by mouth daily   • naloxone (NARCAN) 4 mg/0 1 mL nasal spray Administer 1 spray into a nostril  If breathing does not return to normal or if breathing difficulty resumes after 2-3 minutes, give another dose in the other nostril using a new spray  • olmesartan-hydrochlorothiazide (BENICAR HCT) 40-12 5 MG per tablet Take 1 tablet by mouth daily   • omeprazole (PriLOSEC) 40 MG capsule Take 1 capsule by mouth every 12 (twelve) hours   • oxyCODONE (OxyCONTIN) 10 mg 12 hr tablet Take 1 tablet (10 mg total) by mouth 2 (two) times a day Max Daily Amount: 20 mg   • oxyCODONE (OxyCONTIN) 20 mg 12 hr tablet Take 1 tablet (20 mg total) by mouth every 12 (twelve) hours Max Daily Amount: 40 mg   • tadalafil (CIALIS) 5 MG tablet Take 5 mg by mouth daily       Objective     /88 (BP Location: Right arm, Patient Position: Sitting, Cuff Size: Large)   Pulse 84   Temp 99 6 °F (37 6 °C) (Temporal)   Resp 12   Ht 5' 9" (1 753 m)   Wt 79 4 kg (175 lb)   SpO2 99%   BMI 25 84 kg/m²     Physical Exam  Constitutional:       Appearance: He is well-developed and well-nourished  HENT:      Head: Normocephalic and atraumatic  Eyes:      General:         Right eye: No discharge  Left eye: No discharge  Extraocular Movements: EOM normal       Conjunctiva/sclera: Conjunctivae normal       Pupils: Pupils are equal, round, and reactive to light  Neck:      Thyroid: No thyromegaly  Vascular: No JVD  Cardiovascular:      Rate and Rhythm: Normal rate and regular rhythm  Heart sounds: Normal heart sounds  No murmur heard  Pulmonary:      Effort: Pulmonary effort is normal       Breath sounds: Normal breath sounds  No wheezing or rales  Abdominal:      General: Bowel sounds are normal       Palpations: Abdomen is soft  There is no hepatosplenomegaly or mass  Tenderness: There is no abdominal tenderness  There is no CVA tenderness, guarding or rebound  Musculoskeletal:         General: Tenderness present  No deformity or edema  Cervical back: Neck supple  Comments: SEVERE DJD CHANGES     Lymphadenopathy:      Cervical: No cervical adenopathy  Upper Body:   No axillary adenopathy present  Skin:     General: Skin is warm and dry  Findings: No erythema or rash  Neurological:      General: No focal deficit present  Mental Status: He is alert and oriented to person, place, and time  Psychiatric:         Mood and Affect: Mood and affect and mood normal          Behavior: Behavior normal          Thought Content:  Thought content normal          Judgment: Judgment normal        Keely Delarosa MD

## 2022-12-05 NOTE — PATIENT INSTRUCTIONS
Hydration  Continue current medications  Bw today    Further plans pending results    Will contact hematology with bw results for possible iron infusion    RV 2 WEEKS OTHERWISE

## 2022-12-05 NOTE — TELEPHONE ENCOUNTER
Caller: patient    Doctor: Shahid Kramer    Reason for call: pt is calling requesting that we mail the script for durable equipment(lt shoe lift) to the home address on file    Call back#: [phone]

## 2022-12-06 ENCOUNTER — TELEPHONE (OUTPATIENT)
Dept: HEMATOLOGY ONCOLOGY | Facility: CLINIC | Age: 67
End: 2022-12-06

## 2022-12-06 LAB
ALBUMIN SERPL-MCNC: 4.4 G/DL (ref 3.6–5.1)
ALBUMIN/GLOB SERPL: 1.8 (CALC) (ref 1–2.5)
ALP SERPL-CCNC: 86 U/L (ref 35–144)
ALT SERPL-CCNC: 13 U/L (ref 9–46)
AST SERPL-CCNC: 16 U/L (ref 10–35)
BASOPHILS # BLD AUTO: 20 CELLS/UL (ref 0–200)
BASOPHILS NFR BLD AUTO: 0.3 %
BILIRUB SERPL-MCNC: 0.4 MG/DL (ref 0.2–1.2)
BUN SERPL-MCNC: 21 MG/DL (ref 7–25)
BUN/CREAT SERPL: ABNORMAL (CALC) (ref 6–22)
CALCIUM SERPL-MCNC: 9.3 MG/DL (ref 8.6–10.3)
CHLORIDE SERPL-SCNC: 99 MMOL/L (ref 98–110)
CO2 SERPL-SCNC: 23 MMOL/L (ref 20–32)
CREAT SERPL-MCNC: 0.98 MG/DL (ref 0.7–1.35)
EOSINOPHIL # BLD AUTO: 211 CELLS/UL (ref 15–500)
EOSINOPHIL NFR BLD AUTO: 3.1 %
ERYTHROCYTE [DISTWIDTH] IN BLOOD BY AUTOMATED COUNT: 16.5 % (ref 11–15)
FERRITIN SERPL-MCNC: 124 NG/ML (ref 24–380)
GFR/BSA.PRED SERPLBLD CYS-BASED-ARV: 85 ML/MIN/1.73M2
GLOBULIN SER CALC-MCNC: 2.5 G/DL (CALC) (ref 1.9–3.7)
GLUCOSE SERPL-MCNC: 84 MG/DL (ref 65–99)
HCT VFR BLD AUTO: 33.1 % (ref 38.5–50)
HGB BLD-MCNC: 10.6 G/DL (ref 13.2–17.1)
IRON SERPL-MCNC: 125 MCG/DL (ref 50–180)
LYMPHOCYTES # BLD AUTO: 979 CELLS/UL (ref 850–3900)
LYMPHOCYTES NFR BLD AUTO: 14.4 %
MCH RBC QN AUTO: 26.8 PG (ref 27–33)
MCHC RBC AUTO-ENTMCNC: 32 G/DL (ref 32–36)
MCV RBC AUTO: 83.8 FL (ref 80–100)
MONOCYTES # BLD AUTO: 612 CELLS/UL (ref 200–950)
MONOCYTES NFR BLD AUTO: 9 %
NEUTROPHILS # BLD AUTO: 4978 CELLS/UL (ref 1500–7800)
NEUTROPHILS NFR BLD AUTO: 73.2 %
PLATELET # BLD AUTO: 515 THOUSAND/UL (ref 140–400)
PMV BLD REES-ECKER: 9.3 FL (ref 7.5–12.5)
POTASSIUM SERPL-SCNC: 4.9 MMOL/L (ref 3.5–5.3)
PROT SERPL-MCNC: 6.9 G/DL (ref 6.1–8.1)
RBC # BLD AUTO: 3.95 MILLION/UL (ref 4.2–5.8)
SODIUM SERPL-SCNC: 131 MMOL/L (ref 135–146)
WBC # BLD AUTO: 6.8 THOUSAND/UL (ref 3.8–10.8)

## 2022-12-06 NOTE — TELEPHONE ENCOUNTER
Made attempt to schedule a new patient appointment with Hematology oncology, Patient decline to schedule a appointment at this time stating that he will be follow up with his PCP

## 2022-12-07 DIAGNOSIS — M54.41 CHRONIC BILATERAL LOW BACK PAIN WITH BILATERAL SCIATICA: ICD-10-CM

## 2022-12-07 DIAGNOSIS — G89.4 CHRONIC PAIN SYNDROME: ICD-10-CM

## 2022-12-07 DIAGNOSIS — G89.29 CHRONIC BILATERAL LOW BACK PAIN WITH BILATERAL SCIATICA: ICD-10-CM

## 2022-12-07 DIAGNOSIS — G47.30 SLEEP APNEA, UNSPECIFIED TYPE: ICD-10-CM

## 2022-12-07 DIAGNOSIS — M54.42 CHRONIC BILATERAL LOW BACK PAIN WITH BILATERAL SCIATICA: ICD-10-CM

## 2022-12-07 DIAGNOSIS — R53.83 FATIGUE, UNSPECIFIED TYPE: ICD-10-CM

## 2022-12-08 ENCOUNTER — TELEPHONE (OUTPATIENT)
Dept: OBGYN CLINIC | Facility: CLINIC | Age: 67
End: 2022-12-08

## 2022-12-08 ENCOUNTER — TELEPHONE (OUTPATIENT)
Dept: FAMILY MEDICINE CLINIC | Facility: CLINIC | Age: 67
End: 2022-12-08

## 2022-12-08 ENCOUNTER — TELEPHONE (OUTPATIENT)
Dept: PSYCHIATRY | Facility: CLINIC | Age: 67
End: 2022-12-08

## 2022-12-08 NOTE — TELEPHONE ENCOUNTER
Patient called and left message on clinical line  Patient expressed you wanted him to call you if he has any falls or dizziness  He stated he fell on Saturday night down the stairs about 3 ft  States he hurt his back but it did not seem to impact his dislocation side  Artemio Delacruz explained he does have bouts of dizziness when he climbs stairs, he said it could be from his Anemia, other than that he is feeling fine  He just wanted to make you aware

## 2022-12-08 NOTE — TELEPHONE ENCOUNTER
Cristy Santos from the A called to update Mayra Gan had a fall on sat 12/03/22  He states he fell on his back and hit his head  He said he is fine and walking around fine  Cristy Santos states vitals are good  Cristy Santos states she is calling his surgeon to inform them next

## 2022-12-09 DIAGNOSIS — E04.9 ENLARGEMENT OF THYROID: ICD-10-CM

## 2022-12-09 DIAGNOSIS — D50.8 OTHER IRON DEFICIENCY ANEMIA: ICD-10-CM

## 2022-12-09 DIAGNOSIS — C61 PROSTATE CANCER (HCC): ICD-10-CM

## 2022-12-09 DIAGNOSIS — Z00.00 MEDICARE ANNUAL WELLNESS VISIT, SUBSEQUENT: Primary | ICD-10-CM

## 2022-12-09 DIAGNOSIS — K21.9 GASTROESOPHAGEAL REFLUX DISEASE WITHOUT ESOPHAGITIS: ICD-10-CM

## 2022-12-09 DIAGNOSIS — I10 PRIMARY HYPERTENSION: ICD-10-CM

## 2022-12-09 DIAGNOSIS — E78.00 HYPERCHOLESTEROLEMIA: ICD-10-CM

## 2022-12-09 RX ORDER — OXYCODONE HCL 10 MG/1
10 TABLET, FILM COATED, EXTENDED RELEASE ORAL 2 TIMES DAILY
Qty: 60 TABLET | Refills: 0 | Status: SHIPPED | OUTPATIENT
Start: 2022-12-09

## 2022-12-09 RX ORDER — ARMODAFINIL 250 MG/1
250 TABLET ORAL DAILY
Qty: 30 TABLET | Refills: 0 | Status: SHIPPED | OUTPATIENT
Start: 2022-12-09

## 2022-12-09 RX ORDER — HYDROCODONE BITARTRATE AND ACETAMINOPHEN 10; 325 MG/1; MG/1
1 TABLET ORAL EVERY 4 HOURS PRN
Qty: 180 TABLET | Refills: 0 | Status: SHIPPED | OUTPATIENT
Start: 2022-12-09

## 2022-12-09 RX ORDER — OXYCODONE HCL 20 MG/1
20 TABLET, FILM COATED, EXTENDED RELEASE ORAL EVERY 12 HOURS
Qty: 60 TABLET | Refills: 0 | Status: SHIPPED | OUTPATIENT
Start: 2022-12-09

## 2022-12-12 ENCOUNTER — TELEPHONE (OUTPATIENT)
Dept: PSYCHIATRY | Facility: CLINIC | Age: 67
End: 2022-12-12

## 2022-12-12 NOTE — TELEPHONE ENCOUNTER
Patient called again stating that Dr Yonas Borges has recommended that he see a counselor for therapy  I informed him that he is placed on the waiting list and someone will be calling him once the schedules open up

## 2022-12-13 ENCOUNTER — EVALUATION (OUTPATIENT)
Dept: PHYSICAL THERAPY | Facility: CLINIC | Age: 67
End: 2022-12-13

## 2022-12-13 DIAGNOSIS — Z96.641 HISTORY OF RIGHT HIP REPLACEMENT: Primary | ICD-10-CM

## 2022-12-13 DIAGNOSIS — G89.4 CHRONIC PAIN DISORDER: ICD-10-CM

## 2022-12-13 NOTE — PROGRESS NOTES
PT Evaluation     Today's date: 2022  Patient name: Tami Mckee  : 1955  MRN: [id number]  Referring provider: Lluvia Lorenz MD  Dx:   Encounter Diagnosis     ICD-10-CM    1  History of right hip replacement  Z96 641       2  Chronic pain disorder  G89 4 Ambulatory Referral to Physical Therapy                     Assessment  Assessment details: Tami Mckee is a 79 y o  male with history of R total hip arthroplasty with two revisions who presents s/p R total hip revision and open reduction, which occurred on 22  Patient also has a frequent history of falls due to neuropathy in both legs  Patient presents with the following impairments: right hip pain, limited right hip AROM, limited right hip strength, poor balance, and ambulatory dysfunction  Due to these impairments, patient has difficulty performing the following: ADLs, bending forward, self-dressing, bathing, prolonged walking, prolonged sitting, sit-to-stand transfers, walking on uneven terrain, getting in/out of the car, getting in/out of the bath, shopping, driving, lifting, carrying, and sleeping  Patient has been educated in post-op contraindications / precautions, weight bearing status, home exercise program and plan of care  Patient would benefit from skilled physical therapy services to address the above functional limitations and progress towards prior level of function and independence with home exercise program   Impairments: abnormal gait, abnormal muscle firing, abnormal or restricted ROM, activity intolerance, impaired physical strength, lacks appropriate home exercise program, pain with function and poor body mechanics  Understanding of Dx/Px/POC: good   Prognosis: good    Goals  Short Term Goals (3 weeks; target date: 23)  1  Patient will be independent with initial HEP  2  Patient will demonstrate an increase in right hip    3  Patient will demonstrate an increase in right hip strength of 1/2 grade on MMT     Long Term Goals (6 weeks; target date: 3/13/23)  1  Patient will demonstrate an increase in right hip AROM to WNL in order to promote self-care activities pain-free  2  Patient will demonstrate an increase in right hip strength to 4/5 on MMT  3  Patient will be able to ascend/descend 15 stairs reciprocally without pain  4  Patient will be able to ambulate 300 feet on varied terrain without AD and without gait deviation  5  Patient will be able to perform all basic ADLs without modification  6  Patient will be able to perform Timed Up and Go in 12 5 seconds or less in order to demonstrate decreased risk of falls       Plan  Patient would benefit from: skilled physical therapy  Planned modality interventions: cryotherapy, electrical stimulation/Russian stimulation, TENS, ultrasound, thermotherapy: hydrocollator packs, unattended electrical stimulation, high voltage pulsed current: pain management and high voltage pulsed current: spasm management  Planned therapy interventions: flexibility, functional ROM exercises, graded exercise, home exercise program, joint mobilization, manual therapy, neuromuscular re-education, patient education, strengthening, stretching, therapeutic exercise, therapeutic activities, Vides taping, balance/weight bearing training, gait training, abdominal trunk stabilization, activity modification, balance, body mechanics training, graded activity, self care, postural training, IADL retraining, ADL training, breathing training, behavior modification, muscle pump exercises, therapeutic training and transfer training  Frequency: 2x week  Duration in weeks: 8  Plan of Care beginning date: 12/13/2022  Plan of Care expiration date: 3/13/2023  Treatment plan discussed with: patient        Subjective Evaluation    History of Present Illness  Date of surgery: 11/20/2022  Mechanism of injury: Pt reports that on 11/17/22, he attempted to get out of bed while wearing socks and states that he slipped and fell "flat onto his backside " Pt states that he went to the ED and was found to have dislocation of his R hip  Pt states that on 22, his surgeon attempted to reduce the dislocation, however was not successful  Pt states that he underwent R total hip revision and reduction of the hip on 22  Pt states that he was discharged and was using a walker until about one week ago  Pt is able to recall his posterior hip precautions when asked and states he has been following them  Pt also reports adherence to his HEP prescribed in the hospital  Pt reports that he has neuropathy in both of his legs from the knees down to both feet, which makes him susceptible to falls  Pt admits to falling about 1x/week  Pain  Current pain ratin  At best pain rating: 3  At worst pain ratin  Location: Right hip/back  Quality: dull ache  Relieving factors: medications  Aggravating factors: walking, standing, stair climbing and lifting  Progression: improved    Social Support  Steps to enter house: yes  8  Stairs in house: yes   15  Lives with: spouse    Employment status: not working (Retired)  Hand dominance: right  Exercise history: Not much lately due to COVID and neuropathy      Diagnostic Tests  X-ray: normal  Treatments  No previous or current treatments  Patient Goals  Patient goals for therapy: increased motion          Objective     Neurological Testing     Sensation     Hip   Left Hip   Hyposensation: light touch    Right Hip   Hyposensation: light touch    Comments   Left light touch: Reports symmetrical hyposensation below each knee and into both feet; intact above the knee       Active Range of Motion   Left Hip   Flexion: 105 degrees   Abduction: 36 degrees   External rotation (90/90): 34 degrees     Right Hip   Flexion: 88 (Terminated by PT to adhere to precautions) degrees with pain  Abduction: 25 degrees   External rotation (90/90): 22 degrees     Strength/Myotome Testing     Additional Strength Details  MMT not performed due to recent surgery    Ambulation     Comments   Ambulates with wide-based gait with SPC, tibial external rotation R > L; antalgic gait    Timed-Up-and-Go Score: 12 8 seconds with SPC         Insurance:  AMA/CMS Eval/ Re-eval POC expires FOTO Total   Visits  Co-Insurance   CMS - [facility] 12/13/22 3/13/23 48/63  $0                                        Precautions: s/p R total hip arthroplasty revision, open reduction 11/20/22; RLE WBAT; R posterior hip precautions, neuropathy in BLE, FALL RISK, chronic neck and back pain     EVAL   Manuals 12/13/22   STM/MFR    Manual flexibility     Joint mobs         Neuro Re-Ed    Quad sets Instructed   Glute sets  Instructed   SLR w/ quad set  Instructed   Standing hip abd Reviewed   Bridging    Supine clams        Ther Ex    Ankle pumps Instructed   Heel slides Instructed active   HS stretching                        Ther Activity    Pt education POC, HEP   Stairs    Squats    Gait                Modalities              Access Code: [email or web address]  URL: [email or web address]  Date: 12/13/2022  Prepared by: Tammy Ledesma    Exercises  • Supine Ankle Pumps - 1-2 x daily - 7 x weekly - 1 sets - 30 reps  • Supine Quadricep Sets - 1-2 x daily - 7 x weekly - 2 sets - 10 reps - 5 hold  • Supine Gluteal Sets - 1-2 x daily - 7 x weekly - 2 sets - 10 reps - 5 hold  • Supine Heel Slides - 1-2 x daily - 7 x weekly - 2 sets - 10 reps - 5 hold  • Supine Active Straight Leg Raise - 1-2 x daily - 7 x weekly - 2 sets - 10 reps - 3 hold

## 2022-12-14 ENCOUNTER — TELEMEDICINE (OUTPATIENT)
Dept: PSYCHIATRY | Facility: CLINIC | Age: 67
End: 2022-12-14

## 2022-12-14 DIAGNOSIS — F41.9 MILD ANXIETY: ICD-10-CM

## 2022-12-14 DIAGNOSIS — F33.41 RECURRENT MAJOR DEPRESSIVE DISORDER, IN PARTIAL REMISSION (HCC): Primary | ICD-10-CM

## 2022-12-14 DIAGNOSIS — S73.004A DISLOCATION OF RIGHT HIP, INITIAL ENCOUNTER (HCC): ICD-10-CM

## 2022-12-14 RX ORDER — DULOXETIN HYDROCHLORIDE 30 MG/1
30 CAPSULE, DELAYED RELEASE ORAL DAILY
Qty: 30 CAPSULE | Refills: 1 | Status: SHIPPED | OUTPATIENT
Start: 2022-12-14

## 2022-12-14 RX ORDER — BUSPIRONE HYDROCHLORIDE 15 MG/1
15 TABLET ORAL 2 TIMES DAILY
Qty: 60 TABLET | Refills: 2 | Status: SHIPPED | OUTPATIENT
Start: 2022-12-14
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
## 2022-12-15 ENCOUNTER — OFFICE VISIT (OUTPATIENT)
Dept: PHYSICAL THERAPY | Facility: CLINIC | Age: 67
End: 2022-12-15

## 2022-12-15 DIAGNOSIS — G89.4 CHRONIC PAIN DISORDER: ICD-10-CM

## 2022-12-15 DIAGNOSIS — Z96.641 HISTORY OF RIGHT HIP REPLACEMENT: Primary | ICD-10-CM

## 2022-12-15 NOTE — PROGRESS NOTES
Daily Note     Today's date: 12/15/2022  Patient name: Adithya Goff  : 1955  MRN: [id number]  Referring provider: Johanna Nixon MD  Dx:   Encounter Diagnosis     ICD-10-CM    1  History of right hip replacement  Z96 641       2  Chronic pain disorder  G89 4           Start Time: 940  Stop Time: 1062  Total time in clinic (min): 35 minutes    Subjective: Pt reports no pain prior to session      Objective: See treatment diary below      Assessment: Tolerated treatment well  Patient demonstrated fatigue post treatment, exhibited good technique with therapeutic exercises and would benefit from continued PT  Pt did not report any pain during session  Demo good tolerance with exercises  Compliant with HEP      Plan: Continue per plan of care  Insurance:  AMA/CMS Eval/ Re-eval POC expires FOTO Total   Visits  Co-Insurance   [facility] 12/13/22 3/13/23 48/63  $0                                        Precautions: s/p R total hip arthroplasty revision, open reduction 22; RLE WBAT; R posterior hip precautions, neuropathy in BLE, FALL RISK, chronic neck and back pain     12/15 EVAL   Manuals  22   STM/MFR     Manual flexibility      Joint mobs           Neuro Re-Ed     Quad sets 5sx20 Instructed   Glute sets  5sx20 Instructed   SLR w/ quad set  2x10 Instructed   Standing hip abd 2x10 Reviewed   Bridging 5sx10    Supine clams          Ther Ex     Ankle pumps  Instructed   Heel slides AROM 3sx20 Instructed active   HS stretching SOS 5x10s    SAQ 5sx20    Hip Add sq 20x    Clamshells Hook YTB 20x              Ther Activity     Pt education  POC, HEP   Stairs     Squats     Gait exagg gait at bar 3 laps                   Modalities                 Access Code: [email or web address]  URL: [email or web address]  Date: 2022  Prepared by: Romana Mallet    Exercises  • Supine Ankle Pumps - 1-2 x daily - 7 x weekly - 1 sets - 30 reps  • Supine Quadricep Sets - 1-2 x daily - 7 x weekly - 2 sets - 10 reps - 5 hold  • Supine Gluteal Sets - 1-2 x daily - 7 x weekly - 2 sets - 10 reps - 5 hold  • Supine Heel Slides - 1-2 x daily - 7 x weekly - 2 sets - 10 reps - 5 hold  • Supine Active Straight Leg Raise - 1-2 x daily - 7 x weekly - 2 sets - 10 reps - 3 hold

## 2022-12-20 ENCOUNTER — OFFICE VISIT (OUTPATIENT)
Dept: FAMILY MEDICINE CLINIC | Facility: CLINIC | Age: 67
End: 2022-12-20

## 2022-12-20 ENCOUNTER — OFFICE VISIT (OUTPATIENT)
Dept: PHYSICAL THERAPY | Facility: CLINIC | Age: 67
End: 2022-12-20

## 2022-12-20 VITALS
OXYGEN SATURATION: 98 % | TEMPERATURE: 97.7 F | WEIGHT: 180 LBS | HEIGHT: 69 IN | SYSTOLIC BLOOD PRESSURE: 120 MMHG | BODY MASS INDEX: 26.66 KG/M2 | HEART RATE: 60 BPM | RESPIRATION RATE: 12 BRPM | DIASTOLIC BLOOD PRESSURE: 70 MMHG

## 2022-12-20 DIAGNOSIS — E83.110 HEREDITARY HEMOCHROMATOSIS (HCC): ICD-10-CM

## 2022-12-20 DIAGNOSIS — G89.4 CHRONIC PAIN DISORDER: ICD-10-CM

## 2022-12-20 DIAGNOSIS — M15.9 GENERALIZED OSTEOARTHRITIS OF MULTIPLE SITES: ICD-10-CM

## 2022-12-20 DIAGNOSIS — K21.9 GASTROESOPHAGEAL REFLUX DISEASE WITHOUT ESOPHAGITIS: ICD-10-CM

## 2022-12-20 DIAGNOSIS — Z96.641 HISTORY OF RIGHT HIP REPLACEMENT: Primary | ICD-10-CM

## 2022-12-20 DIAGNOSIS — D50.8 OTHER IRON DEFICIENCY ANEMIA: ICD-10-CM

## 2022-12-20 DIAGNOSIS — I10 PRIMARY HYPERTENSION: Primary | ICD-10-CM

## 2022-12-20 NOTE — PROGRESS NOTES
Daily Note     Today's date: 2022  Patient name: Hermelindo Mishra  : 1955  MRN: [id number]  Referring provider: Luis Daniel Rivera MD  Dx:   Encounter Diagnosis     ICD-10-CM    1  History of right hip replacement  Z96 641       2  Chronic pain disorder  G89 4                      Subjective: Hermelindo Mishra denies any falls this week but continues to have numbness in lower limbs constantly  He states symptoms will normally intensify when walking longer periods  He also has been having cramping in hamstring, quads of his legs  Objective: See treatment diary below      Assessment: Tolerated treatment well  Patient with cramping in both hamstrings and quads at onset of session which responded well to stretching  He continues with severe balance deficits from lack of proprioceptive input from LEs  He demonstrates severe lateral hip weakness, tendency to have weight posterior in standing creating increased extension moment on lumbar spine and then increased radicular LE symptoms  Plan: Continue per plan of care        Insurance:  A/CMS Eval/ Re-eval POC expires FOTO Total   Visits  Co-Insurance   CMS - [facility] 12/13/22 3/13/23 48/63  $0                                        Precautions: s/p R total hip arthroplasty revision, open reduction 22; RLE WBAT; R posterior hip precautions, neuropathy in BLE, FALL RISK, chronic neck and back pain     12/20 12/15 EVAL   Manuals 3  22   STM/MFR      Manual flexibility       Joint mobs             Neuro Re-Ed      Quad sets  5sx20 Instructed   Glute sets   5sx20 Instructed   SLR w/ quad set   2x10 Instructed   Standing hip abd  2x10 Reviewed   SL clamshell 2x10 B cues for hip drop     Bridge 2x10 5s 5sx10    ML weight shifts In mirror + cues 2x10 5s - created increased LE numbness/weakness     Hook PPT 15x5s     Ther Ex      Ankle pumps   Instructed   Heel slides  AROM 3sx20 Instructed active   HS stretching Seated 3x30s B SOS 5x10s    SAQ 5sx20    Hip Add sq  20x    Clamshells  Hook YTB 20x    Prone quad str 3x30s B           Ther Activity      Pt education   POC, HEP   Gait  exagg gait at bar 3 laps                                          Access Code: [email or web address]  URL: [email or web address]  Date: 12/13/2022  Prepared by: Claudio Rm    Exercises  • Supine Ankle Pumps - 1-2 x daily - 7 x weekly - 1 sets - 30 reps  • Supine Quadricep Sets - 1-2 x daily - 7 x weekly - 2 sets - 10 reps - 5 hold  • Supine Gluteal Sets - 1-2 x daily - 7 x weekly - 2 sets - 10 reps - 5 hold  • Supine Heel Slides - 1-2 x daily - 7 x weekly - 2 sets - 10 reps - 5 hold  • Supine Active Straight Leg Raise - 1-2 x daily - 7 x weekly - 2 sets - 10 reps - 3 hold

## 2022-12-20 NOTE — PROGRESS NOTES
Name: Serg Joshi      : 1955      MRN: [id number]  Encounter Provider: Emerald Martin MD  Encounter Date: 2022   Encounter department: [address] [facility]     1  Primary hypertension  Assessment & Plan:  STABLE  DENIES ANY CP, SOB, PALPITATIONS, OR HEADACHE  NOTES NO WATER RETENTION  COMPLIANT WITH MEDICATION  NO CONCERNS    - CONTINUE CURRENT TREATMENT PLAN  - MONITOR DIETARY SODIUM INTAKE  - ENCOURAGE PHYSICAL ACTIVITY  - RV 3 MONTHS      Orders:  -     Comprehensive metabolic panel    2  Gastroesophageal reflux disease without esophagitis  Assessment & Plan:  STABLE  DENIES ANY CP, INDIGESTION, OR COUGH  NOTES NO MELENA OR HEMATOCHEZIA  NO HEMATEMESIS  COMPLIANT WITH MEDICATION  NO CONCERNS    - CONTINUE CURRENT TREATMENT PLAN  - MEDICATION AS PRESCRIBED  - AVOID CAFFEINE, ALCOHOL OR SMOKING      Orders:  -     CBC and differential    3  Generalized osteoarthritis of multiple sites  Assessment & Plan:  STABLE  DENIES ANY JOINT SWELLING OR REDNESS  JOINT STIFFNESS PRESENT  PAIN MANAGEMENT ADEQUATE    - CONTINUE CURRENT MANAGEMENT  - MEDICATION AS DIRECTED  - CALL / RETURN IF SYMPTOMS WORSEN        4  Other iron deficiency anemia  Assessment & Plan:  HGB 10 6        Orders:  -     CBC and differential    5  Hereditary hemochromatosis ([facility] [address] )  -     Iron           Subjective      FOLLOW UP    REVIEWED CURRENT ACTIVE MEDICAL ISSUES        Review of Systems   Constitutional: Negative for chills, fatigue and fever  HENT: Negative for congestion, ear discharge, ear pain, mouth sores, postnasal drip, sore throat and trouble swallowing  Eyes: Negative for pain, discharge and visual disturbance  Respiratory: Negative for cough, shortness of breath and wheezing  Cardiovascular: Negative for chest pain, palpitations and leg swelling  Gastrointestinal: Negative for abdominal distention, abdominal pain, blood in stool, diarrhea and nausea     Endocrine: Negative for polydipsia, polyphagia and polyuria  Genitourinary: Negative for dysuria, frequency, hematuria and urgency  Musculoskeletal: Positive for arthralgias, back pain, gait problem, neck pain and neck stiffness  Negative for joint swelling  Skin: Negative for pallor and rash  Neurological: Negative for dizziness, syncope, speech difficulty, weakness, light-headedness, numbness and headaches  Hematological: Negative for adenopathy  Psychiatric/Behavioral: Positive for dysphoric mood  Negative for behavioral problems, confusion and sleep disturbance  The patient is nervous/anxious  Current Outpatient Medications on File Prior to Visit   Medication Sig   • Armodafinil 250 MG tablet Take 1 tablet (250 mg total) by mouth daily   • busPIRone (BUSPAR) 15 mg tablet Take 1 tablet (15 mg total) by mouth 2 (two) times a day   • DULoxetine (CYMBALTA) 30 mg delayed release capsule Take 1 capsule (30 mg total) by mouth daily   • enoxaparin (LOVENOX) 40 mg/0 4 mL Inject 0 4 mL (40 mg total) under the skin in the morning for 29 days   • HYDROcodone-acetaminophen (NORCO)  mg per tablet Take 1 tablet by mouth every 4 (four) hours as needed (PAIN) Max Daily Amount: 6 tablets   • meloxicam (MOBIC) 15 mg tablet Take 1 tablet (15 mg total) by mouth daily   • multivitamin (THERAGRAN) TABS Take 1 tablet by mouth daily   • naloxone (NARCAN) 4 mg/0 1 mL nasal spray Administer 1 spray into a nostril  If breathing does not return to normal or if breathing difficulty resumes after 2-3 minutes, give another dose in the other nostril using a new spray     • olmesartan-hydrochlorothiazide (BENICAR HCT) 40-12 5 MG per tablet Take 1 tablet by mouth daily   • omeprazole (PriLOSEC) 40 MG capsule Take 1 capsule by mouth every 12 (twelve) hours   • oxyCODONE (OxyCONTIN) 10 mg 12 hr tablet Take 1 tablet (10 mg total) by mouth 2 (two) times a day Max Daily Amount: 20 mg   • tadalafil (CIALIS) 5 MG tablet Take 5 mg by mouth daily   • oxyCODONE (OxyCONTIN) 20 mg 12 hr tablet Take 1 tablet (20 mg total) by mouth every 12 (twelve) hours Max Daily Amount: 40 mg       Objective     /70   Pulse 60   Temp 97 7 °F (36 5 °C) (Temporal)   Resp 12   Ht 5' 9" (1 753 m)   Wt 81 6 kg (180 lb)   SpO2 98%   BMI 26 58 kg/m²     Physical Exam  Constitutional:       Appearance: He is well-developed  HENT:      Head: Normocephalic and atraumatic  Eyes:      General:         Right eye: No discharge  Left eye: No discharge  Conjunctiva/sclera: Conjunctivae normal       Pupils: Pupils are equal, round, and reactive to light  Neck:      Thyroid: No thyromegaly  Vascular: No JVD  Cardiovascular:      Rate and Rhythm: Normal rate and regular rhythm  Heart sounds: Normal heart sounds  No murmur heard  Pulmonary:      Effort: Pulmonary effort is normal       Breath sounds: Normal breath sounds  No wheezing or rales  Abdominal:      General: Bowel sounds are normal       Palpations: Abdomen is soft  There is no mass  Tenderness: There is no abdominal tenderness  There is no guarding or rebound  Musculoskeletal:         General: Tenderness and deformity present  Cervical back: Neck supple  Comments: MODERATELY SEVERE DJD CHANGES     Lymphadenopathy:      Cervical: No cervical adenopathy  Skin:     General: Skin is warm and dry  Findings: No erythema or rash  Neurological:      Mental Status: He is alert and oriented to person, place, and time  Psychiatric:         Behavior: Behavior normal          Thought Content:  Thought content normal          Judgment: Judgment normal        Mariya Celestin MD

## 2022-12-21 ENCOUNTER — TELEPHONE (OUTPATIENT)
Dept: OBGYN CLINIC | Facility: HOSPITAL | Age: 67
End: 2022-12-21

## 2022-12-21 DIAGNOSIS — Z96.649 STATUS POST TOTAL REPLACEMENT OF HIP, UNSPECIFIED LATERALITY: Primary | ICD-10-CM

## 2022-12-21 LAB
ALBUMIN SERPL-MCNC: 4 G/DL (ref 3.6–5.1)
ALBUMIN/GLOB SERPL: 1.9 (CALC) (ref 1–2.5)
ALP SERPL-CCNC: 80 U/L (ref 35–144)
ALT SERPL-CCNC: 14 U/L (ref 9–46)
AST SERPL-CCNC: 19 U/L (ref 10–35)
BASOPHILS # BLD AUTO: 22 CELLS/UL (ref 0–200)
BASOPHILS NFR BLD AUTO: 0.4 %
BILIRUB SERPL-MCNC: 0.3 MG/DL (ref 0.2–1.2)
BUN SERPL-MCNC: 35 MG/DL (ref 7–25)
BUN/CREAT SERPL: 29 (CALC) (ref 6–22)
CALCIUM SERPL-MCNC: 8.8 MG/DL (ref 8.6–10.3)
CHLORIDE SERPL-SCNC: 100 MMOL/L (ref 98–110)
CO2 SERPL-SCNC: 22 MMOL/L (ref 20–32)
CREAT SERPL-MCNC: 1.19 MG/DL (ref 0.7–1.35)
EOSINOPHIL # BLD AUTO: 567 CELLS/UL (ref 15–500)
EOSINOPHIL NFR BLD AUTO: 10.5 %
ERYTHROCYTE [DISTWIDTH] IN BLOOD BY AUTOMATED COUNT: 17.4 % (ref 11–15)
GFR/BSA.PRED SERPLBLD CYS-BASED-ARV: 67 ML/MIN/1.73M2
GLOBULIN SER CALC-MCNC: 2.1 G/DL (CALC) (ref 1.9–3.7)
GLUCOSE SERPL-MCNC: 120 MG/DL (ref 65–99)
HCT VFR BLD AUTO: 29.4 % (ref 38.5–50)
HGB BLD-MCNC: 9.8 G/DL (ref 13.2–17.1)
IRON SERPL-MCNC: 75 MCG/DL (ref 50–180)
LYMPHOCYTES # BLD AUTO: 778 CELLS/UL (ref 850–3900)
LYMPHOCYTES NFR BLD AUTO: 14.4 %
MCH RBC QN AUTO: 27.5 PG (ref 27–33)
MCHC RBC AUTO-ENTMCNC: 33.3 G/DL (ref 32–36)
MCV RBC AUTO: 82.4 FL (ref 80–100)
MONOCYTES # BLD AUTO: 400 CELLS/UL (ref 200–950)
MONOCYTES NFR BLD AUTO: 7.4 %
NEUTROPHILS # BLD AUTO: 3634 CELLS/UL (ref 1500–7800)
NEUTROPHILS NFR BLD AUTO: 67.3 %
PLATELET # BLD AUTO: 217 THOUSAND/UL (ref 140–400)
PMV BLD REES-ECKER: 9.8 FL (ref 7.5–12.5)
POTASSIUM SERPL-SCNC: 4.4 MMOL/L (ref 3.5–5.3)
PROT SERPL-MCNC: 6.1 G/DL (ref 6.1–8.1)
RBC # BLD AUTO: 3.57 MILLION/UL (ref 4.2–5.8)
SODIUM SERPL-SCNC: 133 MMOL/L (ref 135–146)
WBC # BLD AUTO: 5.4 THOUSAND/UL (ref 3.8–10.8)

## 2022-12-21 RX ORDER — CEPHALEXIN 500 MG/1
CAPSULE ORAL
Qty: 4 CAPSULE | Refills: 1 | Status: SHIPPED | OUTPATIENT
Start: 2022-12-21 | End: 2022-12-21

## 2022-12-21 NOTE — TELEPHONE ENCOUNTER
LM on  for patient  [facility], form being faxed for medical clearance

## 2022-12-21 NOTE — TELEPHONE ENCOUNTER
Caller: [facility]  dental    Doctor: Thu Arriaga    Reason for call: Dental office would like to know if patient needs to pre medicate prior to dental procedures    Call back#:[phone]

## 2022-12-22 ENCOUNTER — APPOINTMENT (OUTPATIENT)
Dept: PHYSICAL THERAPY | Facility: CLINIC | Age: 67
End: 2022-12-22

## 2022-12-27 ENCOUNTER — OFFICE VISIT (OUTPATIENT)
Dept: PHYSICAL THERAPY | Facility: CLINIC | Age: 67
End: 2022-12-27

## 2022-12-27 DIAGNOSIS — Z96.641 HISTORY OF RIGHT HIP REPLACEMENT: Primary | ICD-10-CM

## 2022-12-27 DIAGNOSIS — G89.4 CHRONIC PAIN DISORDER: ICD-10-CM

## 2022-12-27 NOTE — PROGRESS NOTES
Daily Note     Today's date: 2022  Patient name: Marvin Ramos  : 1955  MRN: [id number]  Referring provider: Lidia Abbott MD  Dx:   Encounter Diagnosis     ICD-10-CM    1  History of right hip replacement  Z96 641       2  Chronic pain disorder  G89 4                      Subjective: Marvin Ramos reports he has had tightness along the front of right hip but not severe  He also continues with his normal balance issues  Objective: See treatment diary below      Assessment: Tolerated treatment well  Patient demonstrated fatigue post treatment and would benefit from continued PT  He demos signs of right foot drop during side stepping requiring increased hip flexion to clear right foot  He had one episode of catching toe requiring need to grab onto railing to prevent fall  Plan: Continue per plan of care        Insurance:  A/CMS Eval/ Re-eval POC expires FOTO Total   Visits  Co-Insurance   CMS - [facility] 12/13/22 3/13/23 48/63  $0                                        Precautions: s/p R total hip arthroplasty revision, open reduction 22; RLE WBAT; R posterior hip precautions, neuropathy in BLE, FALL RISK, chronic neck and back pain      12/15 EVAL   Manuals 4 3  22   STM/MFR       Manual flexibility        Joint mobs               Neuro Re-Ed       Quad sets   5sx20 Instructed   Glute sets    5sx20 Instructed   SLR w/ quad set    2x10 Instructed   Standing hip abd   2x10 Reviewed   SL clamshell  2x10 B cues for hip drop     Standing hip flexion onto 8" step 2x10 B      Bridge 2x10 5s 2x10 5s 5sx10    ML side step at mirror [id number] cues for reducing hip ER      ML weight shifts  In mirror + cues 2x10 5s - created increased LE numbness/weakness     Hook PPT  15x5s     Ther Ex       Ankle pumps    Instructed   Heel slides   AROM 3sx20 Instructed active   HS stretching  Seated 3x30s B SOS 5x10s    SAQ   5sx20    Hip Add sq   20x    Clamshells   Hook YTB 20x Prone quad str  3x30s B     Supine SKTC off edge of table 2x10      Sup hip IR to neutral  2x10 5s      Sup modified karen str 3x30s      Standing toe raises 2x10             Ther Activity       Pt education    POC, HEP   Gait   exagg gait at bar 3 laps                                                Access Code: [email or web address]  URL: [email or web address]  Date: 12/13/2022  Prepared by: Liborio Smith    Exercises  • Supine Ankle Pumps - 1-2 x daily - 7 x weekly - 1 sets - 30 reps  • Supine Quadricep Sets - 1-2 x daily - 7 x weekly - 2 sets - 10 reps - 5 hold  • Supine Gluteal Sets - 1-2 x daily - 7 x weekly - 2 sets - 10 reps - 5 hold  • Supine Heel Slides - 1-2 x daily - 7 x weekly - 2 sets - 10 reps - 5 hold  • Supine Active Straight Leg Raise - 1-2 x daily - 7 x weekly - 2 sets - 10 reps - 3 hold

## 2022-12-29 ENCOUNTER — APPOINTMENT (OUTPATIENT)
Dept: RADIOLOGY | Facility: CLINIC | Age: 67
End: 2022-12-29

## 2022-12-29 ENCOUNTER — OFFICE VISIT (OUTPATIENT)
Dept: OBGYN CLINIC | Facility: CLINIC | Age: 67
End: 2022-12-29

## 2022-12-29 VITALS
DIASTOLIC BLOOD PRESSURE: 84 MMHG | SYSTOLIC BLOOD PRESSURE: 123 MMHG | HEIGHT: 69 IN | BODY MASS INDEX: 26.59 KG/M2 | HEART RATE: 72 BPM | WEIGHT: 179.5 LBS | TEMPERATURE: 98.6 F

## 2022-12-29 DIAGNOSIS — S73.004S DISLOCATION OF RIGHT HIP, SEQUELA: Primary | ICD-10-CM

## 2022-12-29 DIAGNOSIS — S73.004S DISLOCATION OF RIGHT HIP, SEQUELA: ICD-10-CM

## 2022-12-29 NOTE — PATIENT INSTRUCTIONS
- Weight bearing as tolerated right lower extremity   - Recommend shoe lift for leg length discrepancy  - Continue care for lumbar spine    - Over the counter analgesics as needed / directed   - Ice / heat as directed   - Discontinue posterior hip precautions at this time   - Begin voltaren gel as directed   - Follow up 3 months with repeat XR

## 2022-12-29 NOTE — PROGRESS NOTES
Orthopaedics Office Visit - Post-op Patient Visit    ASSESSMENT/PLAN:    Assessment:   5 5 weeks s/p open reduction, revision right hip arthroplasty  - replacement of dual mobility head/liner after liner dissociation  DOS 22  Doing well overall, well healed incision site   Leg length discrepancy  R longer than left - pre-existing from prior to this injury      Plan:   - Weight bearing as tolerated right lower extremity   - Recommend shoe lift for leg length discrepancy  - Continue care for lumbar spine    - Over the counter analgesics as needed / directed   - Ice / heat as directed   - Discontinue posterior hip precautions at this time   - Begin voltaren gel as directed   - Follow up 3 months with repeat XR       To Do Next Visit:  Repeat XR    _____________________________________________________  CHIEF COMPLAINT:  Chief Complaint   Patient presents with   • Right Hip - Post-op         SUBJECTIVE:  Monique Sanders is a 79 y o  male who presents 5 5 weeks s/p open reduction, revision right hip arthroplasty  - replacement of dual mobility head/liner after liner dissociation  DOS 22  Patient states that his hip is doing well overall  Patient states he has minimal pain in the hip currently  Patient states he does have mild pain associated with physical therapy  Patient was previously prescribed a shoe lift at last appointment but states that he was unable to obtain the shoe lift  Patient states that he has been attending physical therapy as directed with good results  Patient denies any new or worsening symptoms in the hip or leg  Patient offers no other complaints at this time        SOCIAL HISTORY:  Social History     Tobacco Use   • Smoking status: Former     Packs/day: 1 00     Years: 16 00     Pack years: 16 00     Types: Cigarettes     Start date: 1969     Quit date:      Years since quittin 0   • Smokeless tobacco: Never   Vaping Use   • Vaping Use: Never used   Substance Use Topics   • Alcohol use: Yes     Alcohol/week: 2 0 standard drinks     Types: 1 Glasses of wine, 1 Cans of beer per week     Comment: one drink a week   • Drug use: Yes     Frequency: 1 0 times per week     Types: Marijuana     Comment: occassionaly       MEDICATIONS:    Current Outpatient Medications:   •  Armodafinil 250 MG tablet, Take 1 tablet (250 mg total) by mouth daily, Disp: 30 tablet, Rfl: 0  •  busPIRone (BUSPAR) 15 mg tablet, Take 1 tablet (15 mg total) by mouth 2 (two) times a day, Disp: 60 tablet, Rfl: 2  •  DULoxetine (CYMBALTA) 30 mg delayed release capsule, Take 1 capsule (30 mg total) by mouth daily, Disp: 30 capsule, Rfl: 1  •  HYDROcodone-acetaminophen (NORCO)  mg per tablet, Take 1 tablet by mouth every 4 (four) hours as needed (PAIN) Max Daily Amount: 6 tablets, Disp: 180 tablet, Rfl: 0  •  meloxicam (MOBIC) 15 mg tablet, Take 1 tablet (15 mg total) by mouth daily, Disp: 90 tablet, Rfl: 0  •  multivitamin (THERAGRAN) TABS, Take 1 tablet by mouth daily, Disp: , Rfl:   •  naloxone (NARCAN) 4 mg/0 1 mL nasal spray, Administer 1 spray into a nostril   If breathing does not return to normal or if breathing difficulty resumes after 2-3 minutes, give another dose in the other nostril using a new spray , Disp: 1 each, Rfl: 1  •  olmesartan-hydrochlorothiazide (BENICAR HCT) 40-12 5 MG per tablet, Take 1 tablet by mouth daily, Disp: 60 tablet, Rfl: 0  •  omeprazole (PriLOSEC) 40 MG capsule, Take 1 capsule by mouth every 12 (twelve) hours, Disp: , Rfl:   •  oxyCODONE (OxyCONTIN) 10 mg 12 hr tablet, Take 1 tablet (10 mg total) by mouth 2 (two) times a day Max Daily Amount: 20 mg, Disp: 60 tablet, Rfl: 0  •  oxyCODONE (OxyCONTIN) 20 mg 12 hr tablet, Take 1 tablet (20 mg total) by mouth every 12 (twelve) hours Max Daily Amount: 40 mg, Disp: 60 tablet, Rfl: 0  •  tadalafil (CIALIS) 5 MG tablet, Take 5 mg by mouth daily, Disp: , Rfl:   •  enoxaparin (LOVENOX) 40 mg/0 4 mL, Inject 0 4 mL (40 mg total) under the skin in the morning for 29 days, Disp: 11 6 mL, Rfl: 0    REVIEW OF SYSTEMS:  MSK: right hip pain   Neuro: WNL   Pertinent items are otherwise noted in HPI  A comprehensive review of systems was otherwise negative     _____________________________________________________  PHYSICAL EXAMINATION:  Vital signs: /84   Pulse 72   Temp 98 6 °F (37 °C)   Ht 5' 9" (1 753 m)   Wt 81 4 kg (179 lb 8 oz)   BMI 26 51 kg/m²   General: No acute distress, awake and alert  Psychiatric: Mood and affect appear appropriate  HEENT: Trachea Midline, No torticollis, no apparent facial trauma  Cardiovascular: No audible murmurs; Extremities appear perfused  Pulmonary: No audible wheezing or stridor  Skin: No open lesions; see further details (if any) below      MUSCULOSKELETAL EXAMINATION:  Right hip examination:  - Patient sitting comfortably in the office in no acute distress   -Healed incision site with Vicryls present proximally and distally  Extremity appears well-perfused overall   -Minimal tenderness palpation noted over incision site  No other bony or soft tissue tenderness to palpation noted at this time   -No pain associated with resisted flexion, abduction, abduction of the hip  - NV intact    _____________________________________________________  STUDIES REVIEWED:  I personally reviewed the images and interpretation is as follows:  N/A       PROCEDURES PERFORMED:  No procedures were performed at this time  Darvin Hernandez PA-C - assisting  Billie Roldan              Portions of the record may have been created with voice recognition software  Occasional wrong word or "sound a like" substitutions may have occurred due to the inherent limitations of voice recognition software  Read the chart carefully and recognize, using context, where substitutions have occurred

## 2023-01-03 ENCOUNTER — APPOINTMENT (OUTPATIENT)
Dept: PHYSICAL THERAPY | Facility: CLINIC | Age: 68
End: 2023-01-03

## 2023-01-04 ENCOUNTER — RA CDI HCC (OUTPATIENT)
Dept: OTHER | Facility: HOSPITAL | Age: 68
End: 2023-01-04

## 2023-01-04 NOTE — PROGRESS NOTES
[facility]  coding opportunities          Chart Reviewed number of suggestions sent to Provider: 1     Patients Insurance     Medicare Insurance: Medicare        F10 20

## 2023-01-05 ENCOUNTER — OFFICE VISIT (OUTPATIENT)
Dept: PHYSICAL THERAPY | Facility: CLINIC | Age: 68
End: 2023-01-05

## 2023-01-05 DIAGNOSIS — G89.4 CHRONIC PAIN DISORDER: ICD-10-CM

## 2023-01-05 DIAGNOSIS — Z96.641 HISTORY OF RIGHT HIP REPLACEMENT: Primary | ICD-10-CM

## 2023-01-05 NOTE — PROGRESS NOTES
Daily Note     Today's date: 2023  Patient name: Adithya Goff  : 1955  MRN: [id number]  Referring provider: Johanna Nixon MD  Dx:   Encounter Diagnosis     ICD-10-CM    1  History of right hip replacement  Z96 641       2  Chronic pain disorder  G89 4           Start Time: 1100          Subjective: Pt reports his sciatica flared up after last session, he stated he would like to "take it easy today"  He reports he saw the surgeon and cleared him of his restrictions  Objective: See treatment diary below      Assessment: Tolerated treatment well  Patient demonstrated fatigue post treatment and would benefit from continued PT  Pt able to tolerate table exercises today well overall, he stated he preferred these exercises today from feeling "worn out" at start of session  Plan: Continue per plan of care        Insurance:  AMA/CMS Eval/ Re-eval POC expires FOTO Total   Visits  Co-Insurance   [facility] 12/13/22 3/13/23 48/63  $0                                        Precautions: s/p R total hip arthroplasty revision, open reduction 22; RLE WBAT; R posterior hip precautions, neuropathy in BLE, FALL RISK, chronic neck and back pain     1/5 12/27 12/20 12/15   Manuals 5 4 3    STM/MFR       Manual flexibility        Joint mobs               Neuro Re-Ed       Quad sets    5sx20   Glute sets  5sx20   5sx20   SLR w/ quad set  2x10   2x10   Standing hip abd SL 2x10   2x10   SL clamshell   2x10 B cues for hip drop    Standing hip flexion onto 8" step 2x10 B 2x10 B     Bridge 2x10 2x10 5s 2x10 5s 5sx10   ML side step at mirror [id number] cues for reducing hip ER [id number] cues for reducing hip ER     ML weight shifts   In mirror + cues 2x10 5s - created increased LE numbness/weakness    Hook PPT   15x5s    Ther Ex       Ankle pumps       Heel slides pball SL 20x  DL 20x   AROM 3sx20   HS stretching SOS 5x10s  Seated 3x30s B SOS 5x10s   SAQ    5sx20   Hip Add sq 20x   20x   Clamshells Hook RTB 20x Hook YTB 20x   Prone quad str   3x30s B    Supine SKTC off edge of table  2x10     Sup hip IR to neutral   2x10 5s     Sup modified karen str 3x30s 3x30s     Standing toe raises 2x10 2x10            Ther Activity       Pt education       Gait    exagg gait at bar 3 laps                                               Access Code: [email or web address]  URL: [email or web address]  Date: 12/13/2022  Prepared by: Onesimo Hassan    Exercises  • Supine Ankle Pumps - 1-2 x daily - 7 x weekly - 1 sets - 30 reps  • Supine Quadricep Sets - 1-2 x daily - 7 x weekly - 2 sets - 10 reps - 5 hold  • Supine Gluteal Sets - 1-2 x daily - 7 x weekly - 2 sets - 10 reps - 5 hold  • Supine Heel Slides - 1-2 x daily - 7 x weekly - 2 sets - 10 reps - 5 hold  • Supine Active Straight Leg Raise - 1-2 x daily - 7 x weekly - 2 sets - 10 reps - 3 hold

## 2023-01-07 DIAGNOSIS — G47.30 SLEEP APNEA, UNSPECIFIED TYPE: ICD-10-CM

## 2023-01-07 DIAGNOSIS — G89.29 CHRONIC BILATERAL LOW BACK PAIN WITH BILATERAL SCIATICA: ICD-10-CM

## 2023-01-07 DIAGNOSIS — M54.42 CHRONIC BILATERAL LOW BACK PAIN WITH BILATERAL SCIATICA: ICD-10-CM

## 2023-01-07 DIAGNOSIS — R53.83 FATIGUE, UNSPECIFIED TYPE: ICD-10-CM

## 2023-01-07 DIAGNOSIS — M54.41 CHRONIC BILATERAL LOW BACK PAIN WITH BILATERAL SCIATICA: ICD-10-CM

## 2023-01-07 DIAGNOSIS — I10 ESSENTIAL HYPERTENSION: ICD-10-CM

## 2023-01-07 DIAGNOSIS — G89.4 CHRONIC PAIN SYNDROME: ICD-10-CM

## 2023-01-09 ENCOUNTER — OFFICE VISIT (OUTPATIENT)
Dept: PHYSICAL THERAPY | Facility: CLINIC | Age: 68
End: 2023-01-09

## 2023-01-09 DIAGNOSIS — Z96.641 HISTORY OF RIGHT HIP REPLACEMENT: Primary | ICD-10-CM

## 2023-01-09 DIAGNOSIS — G89.4 CHRONIC PAIN DISORDER: ICD-10-CM

## 2023-01-09 RX ORDER — OXYCODONE HCL 10 MG/1
10 TABLET, FILM COATED, EXTENDED RELEASE ORAL 2 TIMES DAILY
Qty: 60 TABLET | Refills: 0 | Status: SHIPPED | OUTPATIENT
Start: 2023-01-09

## 2023-01-09 RX ORDER — OXYCODONE HCL 20 MG/1
20 TABLET, FILM COATED, EXTENDED RELEASE ORAL EVERY 12 HOURS
Qty: 60 TABLET | Refills: 0 | Status: SHIPPED | OUTPATIENT
Start: 2023-01-09

## 2023-01-09 RX ORDER — HYDROCODONE BITARTRATE AND ACETAMINOPHEN 10; 325 MG/1; MG/1
1 TABLET ORAL EVERY 4 HOURS PRN
Qty: 180 TABLET | Refills: 0 | Status: SHIPPED | OUTPATIENT
Start: 2023-01-09

## 2023-01-09 RX ORDER — OLMESARTAN MEDOXOMIL AND HYDROCHLOROTHIAZIDE 40/12.5 40; 12.5 MG/1; MG/1
1 TABLET ORAL DAILY
Qty: 60 TABLET | Refills: 0 | Status: SHIPPED | OUTPATIENT
Start: 2023-01-09

## 2023-01-09 RX ORDER — ARMODAFINIL 250 MG/1
250 TABLET ORAL DAILY
Qty: 30 TABLET | Refills: 0 | Status: SHIPPED | OUTPATIENT
Start: 2023-01-09

## 2023-01-09 NOTE — PROGRESS NOTES
Daily Note     Today's date: 2023  Patient name: Kati Escamilla  : 1955  MRN: [id number]  Referring provider: Tyler Canales MD  Dx:   Encounter Diagnosis     ICD-10-CM    1  History of right hip replacement  Z96 641       2  Chronic pain disorder  G89 4                      Subjective: Kati Escamilla reports he has responded well to decreased intensity of exercises with decreased pain levels  He does continue to report continued severe weakness, limited weight bearing tolerance and severe imbalance  Objective: See treatment diary below      Assessment: Tolerated treatment well  Patient demonstrated fatigue post treatment, exhibited good technique with therapeutic exercises and would benefit from continued PT to improve core, hip, LE strength and mobility  Plan: Continue per plan of care        Insurance:  AMA/CMS Eval/ Re-eval POC expires FOTO Total   Visits  Co-Insurance   CMS - [facility] 12/13/22 3/13/23 48/63  $0                                        Precautions: s/p R total hip arthroplasty revision, open reduction 22; RLE WBAT; R posterior hip precautions, neuropathy in BLE, FALL RISK, chronic neck and back pain     1/9 1/5 12/27 12/20 12/15   Manuals 6 5 4 3    STM/MFR        Manual flexibility         Joint mobs                 Neuro Re-Ed        Quad sets     5sx20   Glute sets   5sx20   5sx20   SLR w/ quad set   2x10   2x10   Standing hip abd Standing hip abd 2x10 B SL 2x10   2x10   SL clamshell 2x10 B   2x10 B cues for hip drop    Standing hip flexion onto 8" step SLS hip flexion 2x10 B 2x10 B 2x10 B     Bridge 2x10 5s 2x10 2x10 5s 2x10 5s 5sx10   ML side step at mirror  [id number] cues for reducing hip ER [id number] cues for reducing hip ER     ML weight shifts    In mirror + cues 2x10 5s - created increased LE numbness/weakness    Hook PPT    15x5s    Ther Ex        Ankle pumps        Heel slides  pball SL 20x  DL 20x   AROM 3sx20   HS stretching SOS 3x30s SOS 5x10s  Seated 3x30s B SOS 5x10s   SAQ     5sx20   Hip Add sq  20x   20x   Clamshells  Hook RTB 20x   Hook YTB 20x   Prone quad str    3x30s B    Supine SKTC off edge of table   2x10     Sup hip IR to neutral    2x10 5s     Sup modified karen str 3x30s B 3x30s 3x30s     Standing toe raises  2x10 2x10             Ther Activity        Pt education        Gait     exagg gait at bar 3 laps                                                     Access Code: [email or web address]  URL: [email or web address]  Date: 12/13/2022  Prepared by: Ralf Gary    Exercises  • Supine Ankle Pumps - 1-2 x daily - 7 x weekly - 1 sets - 30 reps  • Supine Quadricep Sets - 1-2 x daily - 7 x weekly - 2 sets - 10 reps - 5 hold  • Supine Gluteal Sets - 1-2 x daily - 7 x weekly - 2 sets - 10 reps - 5 hold  • Supine Heel Slides - 1-2 x daily - 7 x weekly - 2 sets - 10 reps - 5 hold  • Supine Active Straight Leg Raise - 1-2 x daily - 7 x weekly - 2 sets - 10 reps - 3 hold

## 2023-01-10 LAB
ALBUMIN SERPL-MCNC: 4.3 G/DL (ref 3.6–5.1)
ALBUMIN/GLOB SERPL: 1.9 (CALC) (ref 1–2.5)
ALP SERPL-CCNC: 85 U/L (ref 35–144)
ALT SERPL-CCNC: 16 U/L (ref 9–46)
AST SERPL-CCNC: 23 U/L (ref 10–35)
BASOPHILS # BLD AUTO: 29 CELLS/UL (ref 0–200)
BASOPHILS NFR BLD AUTO: 0.5 %
BILIRUB SERPL-MCNC: 0.4 MG/DL (ref 0.2–1.2)
BUN SERPL-MCNC: 37 MG/DL (ref 7–25)
BUN/CREAT SERPL: 21 (CALC) (ref 6–22)
CALCIUM SERPL-MCNC: 9 MG/DL (ref 8.6–10.3)
CHLORIDE SERPL-SCNC: 99 MMOL/L (ref 98–110)
CHOLEST SERPL-MCNC: 236 MG/DL
CHOLEST/HDLC SERPL: 2.2 (CALC)
CO2 SERPL-SCNC: 26 MMOL/L (ref 20–32)
CREAT SERPL-MCNC: 1.74 MG/DL (ref 0.7–1.35)
EOSINOPHIL # BLD AUTO: 180 CELLS/UL (ref 15–500)
EOSINOPHIL NFR BLD AUTO: 3.1 %
ERYTHROCYTE [DISTWIDTH] IN BLOOD BY AUTOMATED COUNT: 18.6 % (ref 11–15)
GFR/BSA.PRED SERPLBLD CYS-BASED-ARV: 42 ML/MIN/1.73M2
GLOBULIN SER CALC-MCNC: 2.3 G/DL (CALC) (ref 1.9–3.7)
GLUCOSE SERPL-MCNC: 101 MG/DL (ref 65–139)
HCT VFR BLD AUTO: 36 % (ref 38.5–50)
HDLC SERPL-MCNC: 105 MG/DL
HGB BLD-MCNC: 12 G/DL (ref 13.2–17.1)
LDLC SERPL CALC-MCNC: 104 MG/DL (CALC)
LYMPHOCYTES # BLD AUTO: 1119 CELLS/UL (ref 850–3900)
LYMPHOCYTES NFR BLD AUTO: 19.3 %
MCH RBC QN AUTO: 28.8 PG (ref 27–33)
MCHC RBC AUTO-ENTMCNC: 33.3 G/DL (ref 32–36)
MCV RBC AUTO: 86.5 FL (ref 80–100)
MONOCYTES # BLD AUTO: 551 CELLS/UL (ref 200–950)
MONOCYTES NFR BLD AUTO: 9.5 %
NEUTROPHILS # BLD AUTO: 3921 CELLS/UL (ref 1500–7800)
NEUTROPHILS NFR BLD AUTO: 67.6 %
NONHDLC SERPL-MCNC: 131 MG/DL (CALC)
PLATELET # BLD AUTO: 320 THOUSAND/UL (ref 140–400)
PMV BLD REES-ECKER: 9.6 FL (ref 7.5–12.5)
POTASSIUM SERPL-SCNC: 5.5 MMOL/L (ref 3.5–5.3)
PROT SERPL-MCNC: 6.6 G/DL (ref 6.1–8.1)
PSA FREE MFR SERPL: ABNORMAL % (CALC)
PSA FREE SERPL-MCNC: 2.9 NG/ML
PSA SERPL-MCNC: 19 NG/ML
RBC # BLD AUTO: 4.16 MILLION/UL (ref 4.2–5.8)
SODIUM SERPL-SCNC: 132 MMOL/L (ref 135–146)
TRIGL SERPL-MCNC: 153 MG/DL
TSH SERPL-ACNC: 0.37 MIU/L (ref 0.4–4.5)
WBC # BLD AUTO: 5.8 THOUSAND/UL (ref 3.8–10.8)

## 2023-01-11 ENCOUNTER — OFFICE VISIT (OUTPATIENT)
Dept: FAMILY MEDICINE CLINIC | Facility: CLINIC | Age: 68
End: 2023-01-11

## 2023-01-11 ENCOUNTER — TELEMEDICINE (OUTPATIENT)
Dept: PSYCHIATRY | Facility: CLINIC | Age: 68
End: 2023-01-11

## 2023-01-11 VITALS
SYSTOLIC BLOOD PRESSURE: 112 MMHG | TEMPERATURE: 97.6 F | OXYGEN SATURATION: 98 % | BODY MASS INDEX: 29.41 KG/M2 | RESPIRATION RATE: 12 BRPM | HEART RATE: 80 BPM | WEIGHT: 183 LBS | HEIGHT: 66 IN | DIASTOLIC BLOOD PRESSURE: 70 MMHG

## 2023-01-11 DIAGNOSIS — F10.20 UNCOMPLICATED ALCOHOL DEPENDENCE (HCC): ICD-10-CM

## 2023-01-11 DIAGNOSIS — M15.9 GENERALIZED OSTEOARTHRITIS OF MULTIPLE SITES: ICD-10-CM

## 2023-01-11 DIAGNOSIS — I10 PRIMARY HYPERTENSION: Primary | ICD-10-CM

## 2023-01-11 DIAGNOSIS — M48.02 CERVICAL SPINAL STENOSIS: ICD-10-CM

## 2023-01-11 DIAGNOSIS — M54.41 CHRONIC BILATERAL LOW BACK PAIN WITH BILATERAL SCIATICA: ICD-10-CM

## 2023-01-11 DIAGNOSIS — M54.42 CHRONIC BILATERAL LOW BACK PAIN WITH BILATERAL SCIATICA: ICD-10-CM

## 2023-01-11 DIAGNOSIS — Z13.29 SCREENING FOR HYPOTHYROIDISM: ICD-10-CM

## 2023-01-11 DIAGNOSIS — Z00.00 MEDICARE ANNUAL WELLNESS VISIT, SUBSEQUENT: ICD-10-CM

## 2023-01-11 DIAGNOSIS — M51.06 INTERVERTEBRAL DISC DISORDER OF LUMBAR REGION WITH MYELOPATHY: ICD-10-CM

## 2023-01-11 DIAGNOSIS — G89.4 CHRONIC PAIN DISORDER: ICD-10-CM

## 2023-01-11 DIAGNOSIS — K22.0 ACHALASIA: ICD-10-CM

## 2023-01-11 DIAGNOSIS — F39 UNSPECIFIED MOOD (AFFECTIVE) DISORDER (HCC): ICD-10-CM

## 2023-01-11 DIAGNOSIS — E83.110 HEREDITARY HEMOCHROMATOSIS (HCC): ICD-10-CM

## 2023-01-11 DIAGNOSIS — S73.004A DISLOCATION OF RIGHT HIP, INITIAL ENCOUNTER (HCC): ICD-10-CM

## 2023-01-11 DIAGNOSIS — N18.30 STAGE 3 CHRONIC KIDNEY DISEASE, UNSPECIFIED WHETHER STAGE 3A OR 3B CKD (HCC): ICD-10-CM

## 2023-01-11 DIAGNOSIS — D50.8 OTHER IRON DEFICIENCY ANEMIA: ICD-10-CM

## 2023-01-11 DIAGNOSIS — Z12.11 COLON CANCER SCREENING: ICD-10-CM

## 2023-01-11 DIAGNOSIS — K21.9 GASTROESOPHAGEAL REFLUX DISEASE WITHOUT ESOPHAGITIS: ICD-10-CM

## 2023-01-11 DIAGNOSIS — E34.9 TESTOSTERONE DEFICIENCY: ICD-10-CM

## 2023-01-11 DIAGNOSIS — F41.9 MILD ANXIETY: ICD-10-CM

## 2023-01-11 DIAGNOSIS — F33.41 RECURRENT MAJOR DEPRESSIVE DISORDER, IN PARTIAL REMISSION (HCC): Primary | ICD-10-CM

## 2023-01-11 DIAGNOSIS — E78.00 HYPERCHOLESTEROLEMIA: ICD-10-CM

## 2023-01-11 DIAGNOSIS — G89.29 CHRONIC BILATERAL LOW BACK PAIN WITH BILATERAL SCIATICA: ICD-10-CM

## 2023-01-11 DIAGNOSIS — F11.20 UNCOMPLICATED OPIOID DEPENDENCE (HCC): ICD-10-CM

## 2023-01-11 DIAGNOSIS — C61 PROSTATE CANCER (HCC): ICD-10-CM

## 2023-01-11 PROBLEM — S46.811A STRAIN OF OTHER MUSCLES, FASCIA AND TENDONS AT SHOULDER AND UPPER ARM LEVEL, RIGHT ARM, INITIAL ENCOUNTER: Status: RESOLVED | Noted: 2021-04-12 | Resolved: 2023-01-11

## 2023-01-11 PROBLEM — R50.82 FEVER POSTOP: Status: RESOLVED | Noted: 2022-11-21 | Resolved: 2023-01-11

## 2023-01-11 RX ORDER — BUPROPION HYDROCHLORIDE 150 MG/1
150 TABLET ORAL DAILY
Qty: 30 TABLET | Refills: 1 | Status: SHIPPED | OUTPATIENT
Start: 2023-01-11 | End: 2023-01-11

## 2023-01-11 RX ORDER — DULOXETIN HYDROCHLORIDE 30 MG/1
30 CAPSULE, DELAYED RELEASE ORAL DAILY
Qty: 30 CAPSULE | Refills: 1 | Status: SHIPPED | OUTPATIENT
Start: 2023-01-11

## 2023-01-11 NOTE — PATIENT INSTRUCTIONS
DISCUSSED HEALTH MAINTENENCE ISSUES  BW WILL REVIEWED  ENCOURAGED HEALTHY DIET AND EXERCISE  RV FOR ANNUAL HEALTH EXAM IN 1 YEAR  RV SOONER IF THERE ARE ANY CONCERNS    RV 6 WKS    FOLLOW UP WITH DR Favian Rodriguez REGARDING ELEVATED PSA  FOLLOW UP WITH DR LOPES FOR ACHALASIA  CONTINUE PSYCH INTERVENTION    DECREASE MICARDIS TO 1/2 PILL DAILY  CHECK TESTOSTERONE LEVEL      Recent Results (from the past 672 hour(s))   CBC and differential    Collection Time: 12/20/22  2:14 PM   Result Value Ref Range    White Blood Cell Count 5 4 3 8 - 10 8 Thousand/uL    Red Blood Cell Count 3 57 (L) 4 20 - 5 80 Million/uL    Hemoglobin 9 8 (L) 13 2 - 17 1 g/dL    HCT 29 4 (L) 38 5 - 50 0 %    MCV 82 4 80 0 - 100 0 fL    MCH 27 5 27 0 - 33 0 pg    MCHC 33 3 32 0 - 36 0 g/dL    RDW 17 4 (H) 11 0 - 15 0 %    Platelet Count 337 170 - 400 Thousand/uL    SL AMB MPV 9 8 7 5 - 12 5 fL    Neutrophils (Absolute) 3,634 1,500 - 7,800 cells/uL    Lymphocytes (Absolute) 778 (L) 850 - 3,900 cells/uL    Monocytes (Absolute) 400 200 - 950 cells/uL    Eosinophils (Absolute) 567 (H) 15 - 500 cells/uL    Basophils ABS 22 0 - 200 cells/uL    Neutrophils 67 3 %    Lymphocytes 14 4 %    Monocytes 7 4 %    Eosinophils 10 5 %    Basophils PCT 0 4 %   Comprehensive metabolic panel    Collection Time: 12/20/22  2:14 PM   Result Value Ref Range    Glucose, Random 120 (H) 65 - 99 mg/dL    BUN 35 (H) 7 - 25 mg/dL    Creatinine 1 19 0 70 - 1 35 mg/dL    eGFR 67 > OR = 60 mL/min/1 73m2    SL AMB BUN/CREATININE RATIO 29 (H) 6 - 22 (calc)    Sodium 133 (L) 135 - 146 mmol/L    Potassium 4 4 3 5 - 5 3 mmol/L    Chloride 100 98 - 110 mmol/L    CO2 22 20 - 32 mmol/L    Calcium 8 8 8 6 - 10 3 mg/dL    Protein, Total 6 1 6 1 - 8 1 g/dL    Albumin 4 0 3 6 - 5 1 g/dL    Globulin 2 1 1 9 - 3 7 g/dL (calc)    Albumin/Globulin Ratio 1 9 1 0 - 2 5 (calc)    TOTAL BILIRUBIN 0 3 0 2 - 1 2 mg/dL    Alkaline Phosphatase 80 35 - 144 U/L    AST 19 10 - 35 U/L    ALT 14 9 - 46 U/L   Iron Collection Time: 12/20/22  2:14 PM   Result Value Ref Range    Iron, Serum 75 50 - 180 mcg/dL   CBC and differential    Collection Time: 01/09/23  1:32 PM   Result Value Ref Range    White Blood Cell Count 5 8 3 8 - 10 8 Thousand/uL    Red Blood Cell Count 4 16 (L) 4 20 - 5 80 Million/uL    Hemoglobin 12 0 (L) 13 2 - 17 1 g/dL    HCT 36 0 (L) 38 5 - 50 0 %    MCV 86 5 80 0 - 100 0 fL    MCH 28 8 27 0 - 33 0 pg    MCHC 33 3 32 0 - 36 0 g/dL    RDW 18 6 (H) 11 0 - 15 0 %    Platelet Count 675 472 - 400 Thousand/uL    SL AMB MPV 9 6 7 5 - 12 5 fL    Neutrophils (Absolute) 3,921 1,500 - 7,800 cells/uL    Lymphocytes (Absolute) 1,119 850 - 3,900 cells/uL    Monocytes (Absolute) 551 200 - 950 cells/uL    Eosinophils (Absolute) 180 15 - 500 cells/uL    Basophils ABS 29 0 - 200 cells/uL    Neutrophils 67 6 %    Lymphocytes 19 3 %    Monocytes 9 5 %    Eosinophils 3 1 %    Basophils PCT 0 5 %   Comprehensive metabolic panel    Collection Time: 01/09/23  1:32 PM   Result Value Ref Range    Glucose, Random 101 65 - 139 mg/dL    BUN 37 (H) 7 - 25 mg/dL    Creatinine 1 74 (H) 0 70 - 1 35 mg/dL    eGFR 42 (L) > OR = 60 mL/min/1 73m2    SL AMB BUN/CREATININE RATIO 21 6 - 22 (calc)    Sodium 132 (L) 135 - 146 mmol/L    Potassium 5 5 (H) 3 5 - 5 3 mmol/L    Chloride 99 98 - 110 mmol/L    CO2 26 20 - 32 mmol/L    Calcium 9 0 8 6 - 10 3 mg/dL    Protein, Total 6 6 6 1 - 8 1 g/dL    Albumin 4 3 3 6 - 5 1 g/dL    Globulin 2 3 1 9 - 3 7 g/dL (calc)    Albumin/Globulin Ratio 1 9 1 0 - 2 5 (calc)    TOTAL BILIRUBIN 0 4 0 2 - 1 2 mg/dL    Alkaline Phosphatase 85 35 - 144 U/L    AST 23 10 - 35 U/L    ALT 16 9 - 46 U/L   Lipid panel    Collection Time: 01/09/23  1:32 PM   Result Value Ref Range    Total Cholesterol 236 (H) <200 mg/dL     > OR = 40 mg/dL    Triglycerides 153 (H) <150 mg/dL    LDL Calculated 104 (H) mg/dL (calc)    Chol HDLC Ratio 2 2 <5 0 (calc)    Non-HDL Cholesterol 131 (H) <130 mg/dL (calc)   TSH, 3rd generation Collection Time: 01/09/23  1:32 PM   Result Value Ref Range    TSH 0 37 (L) 0 40 - 4 50 mIU/L   PSA, total and free    Collection Time: 01/09/23  1:32 PM   Result Value Ref Range    Prostate Specific Antigen Total 19 0 (H) < OR = 4 0 ng/mL    PSA, Free 2 9 ng/mL    PSA, Free Pct NOT CALCULATED >25 % (calc)       Medicare Preventive Visit Patient Instructions  Thank you for completing your Welcome to Medicare Visit or Medicare Annual Wellness Visit today  Your next wellness visit will be due in one year (1/12/2024)  The screening/preventive services that you may require over the next 5-10 years are detailed below  Some tests may not apply to you based off risk factors and/or age  Screening tests ordered at today's visit but not completed yet may show as past due  Also, please note that scanned in results may not display below  Preventive Screenings:  Service Recommendations Previous Testing/Comments   Colorectal Cancer Screening  Colonoscopy    Fecal Occult Blood Test (FOBT)/Fecal Immunochemical Test (FIT)  Fecal DNA/Cologuard Test  Flexible Sigmoidoscopy Age: 39-70 years old   Colonoscopy: every 10 years (May be performed more frequently if at higher risk)  OR  FOBT/FIT: every 1 year  OR  Cologuard: every 3 years  OR  Sigmoidoscopy: every 5 years  Screening may be recommended earlier than age 39 if at higher risk for colorectal cancer  Also, an individualized decision between you and your healthcare provider will decide whether screening between the ages of 74-80 would be appropriate   Colonoscopy: 02/01/2016  FOBT/FIT: 11/20/2022  Cologuard: Not on file  Sigmoidoscopy: Not on file    Screening Current     Prostate Cancer Screening Individualized decision between patient and health care provider in men between ages of 53-78   Medicare will cover every 12 months beginning on the day after your 50th birthday PSA: 19 0 ng/mL     History Prostate Cancer     Hepatitis C Screening Once for adults born between [birth year] and 1965  More frequently in patients at high risk for Hepatitis C Hep C Antibody: Not on file    Screening Current   Diabetes Screening 1-2 times per year if you're at risk for diabetes or have pre-diabetes Fasting glucose: 94 mg/dL (12/23/2021)  A1C: No results in last 5 years (No results in last 5 years)  Screening Current   Cholesterol Screening Once every 5 years if you don't have a lipid disorder  May order more often based on risk factors  Lipid panel: 01/09/2023  Screening Not Indicated  History Lipid Disorder      Other Preventive Screenings Covered by Medicare:  Abdominal Aortic Aneurysm (AAA) Screening: covered once if your at risk  You're considered to be at risk if you have a family history of AAA or a male between the age of 73-68 who smoking at least 100 cigarettes in your lifetime  Lung Cancer Screening: covers low dose CT scan once per year if you meet all of the following conditions: (1) Age 50-69; (2) No signs or symptoms of lung cancer; (3) Current smoker or have quit smoking within the last 15 years; (4) You have a tobacco smoking history of at least 20 pack years (packs per day x number of years you smoked); (5) You get a written order from a healthcare provider  Glaucoma Screening: covered annually if you're considered high risk: (1) You have diabetes OR (2) Family history of glaucoma OR (3)  aged 48 and older OR (3)  American aged 72 and older  Osteoporosis Screening: covered every 2 years if you meet one of the following conditions: (1) Have a vertebral abnormality; (2) On glucocorticoid therapy for more than 3 months; (3) Have primary hyperparathyroidism; (4) On osteoporosis medications and need to assess response to drug therapy  HIV Screening: covered annually if you're between the age of 12-76  Also covered annually if you are younger than 13 and older than 72 with risk factors for HIV infection   For pregnant patients, it is covered up to 3 times per pregnancy  Immunizations:  Immunization Recommendations   Influenza Vaccine Annual influenza vaccination during flu season is recommended for all persons aged >= 6 months who do not have contraindications   Pneumococcal Vaccine   * Pneumococcal conjugate vaccine = PCV13 (Prevnar 13), PCV15 (Vaxneuvance), PCV20 (Prevnar 20)  * Pneumococcal polysaccharide vaccine = PPSV23 (Pneumovax) Adults 25-60 years old: 1-3 doses may be recommended based on certain risk factors  Adults 72 years old: 1-2 doses may be recommended based off what pneumonia vaccine you previously received   Hepatitis B Vaccine 3 dose series if at intermediate or high risk (ex: diabetes, end stage renal disease, liver disease)   Tetanus (Td) Vaccine - COST NOT COVERED BY MEDICARE PART B Following completion of primary series, a booster dose should be given every 10 years to maintain immunity against tetanus  Td may also be given as tetanus wound prophylaxis  Tdap Vaccine - COST NOT COVERED BY MEDICARE PART B Recommended at least once for all adults  For pregnant patients, recommended with each pregnancy  Shingles Vaccine (Shingrix) - COST NOT COVERED BY MEDICARE PART B  2 shot series recommended in those aged 48 and above     Health Maintenance Due:      Topic Date Due    Colorectal Cancer Screening  11/20/2023    Hepatitis C Screening  Addressed     Immunizations Due:      Topic Date Due    Pneumococcal Vaccine: 65+ Years (2 - PCV) 01/23/2021    COVID-19 Vaccine (5 - Booster for Fung Peter series) 12/05/2022     Advance Directives   What are advance directives? Advance directives are legal documents that state your wishes and plans for medical care  These plans are made ahead of time in case you lose your ability to make decisions for yourself  Advance directives can apply to any medical decision, such as the treatments you want, and if you want to donate organs  What are the types of advance directives?   There are many types of advance directives, and each state has rules about how to use them  You may choose a combination of any of the following:  Living will: This is a written record of the treatment you want  You can also choose which treatments you do not want, which to limit, and which to stop at a certain time  This includes surgery, medicine, IV fluid, and tube feedings  Durable power of  for healthcare [facility]): This is a written record that states who you want to make healthcare choices for you when you are unable to make them for yourself  This person, called a proxy, is usually a family member or a friend  You may choose more than 1 proxy  Do not resuscitate (DNR) order:  A DNR order is used in case your heart stops beating or you stop breathing  It is a request not to have certain forms of treatment, such as CPR  A DNR order may be included in other types of advance directives  Medical directive: This covers the care that you want if you are in a coma, near death, or unable to make decisions for yourself  You can list the treatments you want for each condition  Treatment may include pain medicine, surgery, blood transfusions, dialysis, IV or tube feedings, and a ventilator (breathing machine)  Values history: This document has questions about your views, beliefs, and how you feel and think about life  This information can help others choose the care that you would choose  Why are advance directives important? An advance directive helps you control your care  Although spoken wishes may be used, it is better to have your wishes written down  Spoken wishes can be misunderstood, or not followed  Treatments may be given even if you do not want them  An advance directive may make it easier for your family to make difficult choices about your care  Fall Prevention    Fall prevention  includes ways to make your home and other areas safer  It also includes ways you can move more carefully to prevent a fall   Health conditions that cause changes in your blood pressure, vision, or muscle strength and coordination may increase your risk for falls  Medicines may also increase your risk for falls if they make you dizzy, weak, or sleepy  Fall prevention tips:   Stand or sit up slowly  Use assistive devices as directed  Wear shoes that fit well and have soles that   Wear a personal alarm  Stay active  Manage your medical conditions  Home Safety Tips:  Add items to prevent falls in the bathroom  Keep paths clear  Install bright lights in your home  Keep items you use often on shelves within reach  [place] or place reflective tape on the edges of your stairs  Weight Management   Why it is important to manage your weight:  Being overweight increases your risk of health conditions such as heart disease, high blood pressure, type 2 diabetes, and certain types of cancer  It can also increase your risk for osteoarthritis, sleep apnea, and other respiratory problems  Aim for a slow, steady weight loss  Even a small amount of weight loss can lower your risk of health problems  How to lose weight safely:  A safe and healthy way to lose weight is to eat fewer calories and get regular exercise  You can lose up about 1 pound a week by decreasing the number of calories you eat by 500 calories each day  Healthy meal plan for weight management:  A healthy meal plan includes a variety of foods, contains fewer calories, and helps you stay healthy  A healthy meal plan includes the following:  Eat whole-grain foods more often  A healthy meal plan should contain fiber  Fiber is the part of grains, fruits, and vegetables that is not broken down by your body  Whole-grain foods are healthy and provide extra fiber in your diet  Some examples of whole-grain foods are whole-wheat breads and pastas, oatmeal, brown rice, and bulgur  Eat a variety of vegetables every day    Include dark, leafy greens such as spinach, kale, brandy greens, and mustard greens  Eat yellow and orange vegetables such as carrots, sweet potatoes, and winter squash  Eat a variety of fruits every day  Choose fresh or canned fruit (canned in its own juice or light syrup) instead of juice  Fruit juice has very little or no fiber  Eat low-fat dairy foods  Drink fat-free (skim) milk or 1% milk  Eat fat-free yogurt and low-fat cottage cheese  Try low-fat cheeses such as mozzarella and other reduced-fat cheeses  Choose meat and other protein foods that are low in fat  Choose beans or other legumes such as split peas or lentils  Choose fish, skinless poultry (chicken or turkey), or lean cuts of red meat (beef or pork)  Before you cook meat or poultry, cut off any visible fat  Use less fat and oil  Try baking foods instead of frying them  Add less fat, such as margarine, sour cream, regular salad dressing and mayonnaise to foods  Eat fewer high-fat foods  Some examples of high-fat foods include french fries, doughnuts, ice cream, and cakes  Eat fewer sweets  Limit foods and drinks that are high in sugar  This includes candy, cookies, regular soda, and sweetened drinks  Exercise:  Exercise at least 30 minutes per day on most days of the week  Some examples of exercise include walking, biking, dancing, and swimming  You can also fit in more physical activity by taking the stairs instead of the elevator or parking farther away from stores  Ask your healthcare provider about the best exercise plan for you  Narcotic (Opioid) Safety    Use narcotics safely:  Take prescribed narcotics exactly as directed  Do not give narcotics to others or take narcotics that belong to someone else  Do not mix narcotics without medicines or alcohol  Do not drive or operate heavy machinery after you take the narcotic  Monitor for side effects and notify your healthcare provider if you experienced side effects such as nausea, sleepiness, itching, or trouble thinking clearly      Manage constipation:    Constipation is the most common side effect of narcotic medicine  Constipation is when you have hard, dry bowel movements, or you go longer than usual between bowel movements  Tell your healthcare provider about all changes in your bowel movements while you are taking narcotics  He or she may recommend laxative medicine to help you have a bowel movement  He or she may also change the kind of narcotic you are taking, or change when you take it  The following are more ways you can prevent or relieve constipation:    Drink liquids as directed  You may need to drink extra liquids to help soften and move your bowels  Ask how much liquid to drink each day and which liquids are best for you  Eat high-fiber foods  This may help decrease constipation by adding bulk to your bowel movements  High-fiber foods include fruits, vegetables, whole-grain breads and cereals, and beans  Your healthcare provider or dietitian can help you create a high-fiber meal plan  Your provider may also recommend a fiber supplement if you cannot get enough fiber from food  Exercise regularly  Regular physical activity can help stimulate your intestines  Walking is a good exercise to prevent or relieve constipation  Ask which exercises are best for you  Schedule a time each day to have a bowel movement  This may help train your body to have regular bowel movements  Bend forward while you are on the toilet to help move the bowel movement out  Sit on the toilet for at least 10 minutes, even if you do not have a bowel movement  Store narcotics safely:   Store narcotics where others cannot easily get them  Keep them in a locked cabinet or secure area  Do not  keep them in a purse or other bag you carry with you  A person may be looking for something else and find the narcotics  Make sure narcotics are stored out of the reach of children  A child can easily overdose on narcotics   Narcotics may look like candy to a small child     The best way to dispose of narcotics: The laws vary by country and area  In the [place], the best way is to return the narcotics through a take-back program  This program is offered by the [employer])  The following are options for using the program:  Take the narcotics to a NOAH collection site  The site is often a law enforcement center  Call your local law enforcement center for scheduled take-back days in your area  You will be given information on where to go if the collection site is in a different location  Take the narcotics to an approved pharmacy or hospital   A pharmacy or hospital may be set up as a collection site  You will need to ask if it is a NOAH collection site if you were not directed there  A pharmacy or doctor's office may not be able to take back narcotics unless it is a NOAH site  Use a mail-back system  This means you are given containers to put the narcotics into  You will then mail them in the containers  Use a take-back drop box  This is a place to leave the narcotics at any time  People and animals will not be able to get into the box  Your local law enforcement agency can tell you where to find a drop box in your area  Other ways to manage pain:   Ask your healthcare provider about non-narcotic medicines to control pain  Nonprescription medicines include NSAIDs (such as ibuprofen) and acetaminophen  Prescription medicines include muscle relaxers, antidepressants, and steroids  Pain may be managed without any medicines  Some ways to relieve pain include massage, aromatherapy, or meditation  Physical or occupational therapy may also help  For more information:   Drug Enforcement Administration  [address] [place] [address] [address]  Phone: 6- 180 - [phone]  Web Address: [facility]/drug_[email or web address]    [address] and Drug Administration  [place] , [address]  Phone: 0- 718 - [phone]  Web Address: http://[employer]/     © Copyright [employer] 2018 Information is for End User's use only and may not be sold, redistributed or otherwise used for commercial purposes   All illustrations and images included in CareNotes® are the copyrighted property of A D A M , Inc  or Shay Waggoner

## 2023-01-11 NOTE — LETTER
FLETCHER BLACK        Current Outpatient Medications:   •  Armodafinil 250 MG tablet, Take 1 tablet (250 mg total) by mouth daily, Disp: 30 tablet, Rfl: 0  •  HYDROcodone-acetaminophen (NORCO)  mg per tablet, Take 1 tablet by mouth every 4 (four) hours as needed (PAIN) Max Daily Amount: 6 tablets, Disp: 180 tablet, Rfl: 0  •  olmesartan-hydrochlorothiazide (BENICAR HCT) 40-12 5 MG per tablet, Take 1 tablet by mouth daily, Disp: 60 tablet, Rfl: 0  •  oxyCODONE (OxyCONTIN) 10 mg 12 hr tablet, Take 1 tablet (10 mg total) by mouth 2 (two) times a day Max Daily Amount: 20 mg, Disp: 60 tablet, Rfl: 0  •  oxyCODONE (OxyCONTIN) 20 mg 12 hr tablet, Take 1 tablet (20 mg total) by mouth every 12 (twelve) hours Max Daily Amount: 40 mg, Disp: 60 tablet, Rfl: 0  •  busPIRone (BUSPAR) 15 mg tablet, Take 1 tablet (15 mg total) by mouth 2 (two) times a day, Disp: 60 tablet, Rfl: 2  •  Diclofenac Sodium (VOLTAREN) 1 %, Apply 2 g topically 4 (four) times a day, Disp: 150 g, Rfl: 1  •  DULoxetine (CYMBALTA) 30 mg delayed release capsule, Take 1 capsule (30 mg total) by mouth daily, Disp: 30 capsule, Rfl: 1  •  enoxaparin (LOVENOX) 40 mg/0 4 mL, Inject 0 4 mL (40 mg total) under the skin in the morning for 29 days, Disp: 11 6 mL, Rfl: 0  •  meloxicam (MOBIC) 15 mg tablet, Take 1 tablet (15 mg total) by mouth daily, Disp: 90 tablet, Rfl: 0  •  multivitamin (THERAGRAN) TABS, Take 1 tablet by mouth daily, Disp: , Rfl:   •  naloxone (NARCAN) 4 mg/0 1 mL nasal spray, Administer 1 spray into a nostril   If breathing does not return to normal or if breathing difficulty resumes after 2-3 minutes, give another dose in the other nostril using a new spray , Disp: 1 each, Rfl: 1  •  omeprazole (PriLOSEC) 40 MG capsule, Take 1 capsule by mouth every 12 (twelve) hours, Disp: , Rfl:   •  tadalafil (CIALIS) 5 MG tablet, Take 5 mg by mouth daily, Disp: , Rfl:       Recent Results (from the past 672 hour(s))   CBC and differential    Collection Time: 12/20/22  2:14 PM   Result Value Ref Range    White Blood Cell Count 5 4 3 8 - 10 8 Thousand/uL    Red Blood Cell Count 3 57 (L) 4 20 - 5 80 Million/uL    Hemoglobin 9 8 (L) 13 2 - 17 1 g/dL    HCT 29 4 (L) 38 5 - 50 0 %    MCV 82 4 80 0 - 100 0 fL    MCH 27 5 27 0 - 33 0 pg    MCHC 33 3 32 0 - 36 0 g/dL    RDW 17 4 (H) 11 0 - 15 0 %    Platelet Count 280 764 - 400 Thousand/uL    SL AMB MPV 9 8 7 5 - 12 5 fL    Neutrophils (Absolute) 3,634 1,500 - 7,800 cells/uL    Lymphocytes (Absolute) 778 (L) 850 - 3,900 cells/uL    Monocytes (Absolute) 400 200 - 950 cells/uL    Eosinophils (Absolute) 567 (H) 15 - 500 cells/uL    Basophils ABS 22 0 - 200 cells/uL    Neutrophils 67 3 %    Lymphocytes 14 4 %    Monocytes 7 4 %    Eosinophils 10 5 %    Basophils PCT 0 4 %   Comprehensive metabolic panel    Collection Time: 12/20/22  2:14 PM   Result Value Ref Range    Glucose, Random 120 (H) 65 - 99 mg/dL    BUN 35 (H) 7 - 25 mg/dL    Creatinine 1 19 0 70 - 1 35 mg/dL    eGFR 67 > OR = 60 mL/min/1 73m2    SL AMB BUN/CREATININE RATIO 29 (H) 6 - 22 (calc)    Sodium 133 (L) 135 - 146 mmol/L    Potassium 4 4 3 5 - 5 3 mmol/L    Chloride 100 98 - 110 mmol/L    CO2 22 20 - 32 mmol/L    Calcium 8 8 8 6 - 10 3 mg/dL    Protein, Total 6 1 6 1 - 8 1 g/dL    Albumin 4 0 3 6 - 5 1 g/dL    Globulin 2 1 1 9 - 3 7 g/dL (calc)    Albumin/Globulin Ratio 1 9 1 0 - 2 5 (calc)    TOTAL BILIRUBIN 0 3 0 2 - 1 2 mg/dL    Alkaline Phosphatase 80 35 - 144 U/L    AST 19 10 - 35 U/L    ALT 14 9 - 46 U/L   Iron    Collection Time: 12/20/22  2:14 PM   Result Value Ref Range    Iron, Serum 75 50 - 180 mcg/dL   CBC and differential    Collection Time: 01/09/23  1:32 PM   Result Value Ref Range    White Blood Cell Count 5 8 3 8 - 10 8 Thousand/uL    Red Blood Cell Count 4 16 (L) 4 20 - 5 80 Million/uL    Hemoglobin 12 0 (L) 13 2 - 17 1 g/dL    HCT 36 0 (L) 38 5 - 50 0 %    MCV 86 5 80 0 - 100 0 fL    MCH 28 8 27 0 - 33 0 pg    MCHC 33 3 32 0 - 36 0 g/dL RDW 18 6 (H) 11 0 - 15 0 %    Platelet Count 528 914 - 400 Thousand/uL    SL AMB MPV 9 6 7 5 - 12 5 fL    Neutrophils (Absolute) 3,921 1,500 - 7,800 cells/uL    Lymphocytes (Absolute) 1,119 850 - 3,900 cells/uL    Monocytes (Absolute) 551 200 - 950 cells/uL    Eosinophils (Absolute) 180 15 - 500 cells/uL    Basophils ABS 29 0 - 200 cells/uL    Neutrophils 67 6 %    Lymphocytes 19 3 %    Monocytes 9 5 %    Eosinophils 3 1 %    Basophils PCT 0 5 %   Comprehensive metabolic panel    Collection Time: 01/09/23  1:32 PM   Result Value Ref Range    Glucose, Random 101 65 - 139 mg/dL    BUN 37 (H) 7 - 25 mg/dL    Creatinine 1 74 (H) 0 70 - 1 35 mg/dL    eGFR 42 (L) > OR = 60 mL/min/1 73m2    SL AMB BUN/CREATININE RATIO 21 6 - 22 (calc)    Sodium 132 (L) 135 - 146 mmol/L    Potassium 5 5 (H) 3 5 - 5 3 mmol/L    Chloride 99 98 - 110 mmol/L    CO2 26 20 - 32 mmol/L    Calcium 9 0 8 6 - 10 3 mg/dL    Protein, Total 6 6 6 1 - 8 1 g/dL    Albumin 4 3 3 6 - 5 1 g/dL    Globulin 2 3 1 9 - 3 7 g/dL (calc)    Albumin/Globulin Ratio 1 9 1 0 - 2 5 (calc)    TOTAL BILIRUBIN 0 4 0 2 - 1 2 mg/dL    Alkaline Phosphatase 85 35 - 144 U/L    AST 23 10 - 35 U/L    ALT 16 9 - 46 U/L   Lipid panel    Collection Time: 01/09/23  1:32 PM   Result Value Ref Range    Total Cholesterol 236 (H) <200 mg/dL     > OR = 40 mg/dL    Triglycerides 153 (H) <150 mg/dL    LDL Calculated 104 (H) mg/dL (calc)    Chol HDLC Ratio 2 2 <5 0 (calc)    Non-HDL Cholesterol 131 (H) <130 mg/dL (calc)   TSH, 3rd generation    Collection Time: 01/09/23  1:32 PM   Result Value Ref Range    TSH 0 37 (L) 0 40 - 4 50 mIU/L   PSA, total and free    Collection Time: 01/09/23  1:32 PM   Result Value Ref Range    Prostate Specific Antigen Total 19 0 (H) < OR = 4 0 ng/mL    PSA, Free 2 9 ng/mL    PSA, Free Pct NOT CALCULATED >25 % (calc)
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
## 2023-01-12 ENCOUNTER — OFFICE VISIT (OUTPATIENT)
Dept: PHYSICAL THERAPY | Facility: CLINIC | Age: 68
End: 2023-01-12

## 2023-01-12 DIAGNOSIS — Z96.641 HISTORY OF RIGHT HIP REPLACEMENT: ICD-10-CM

## 2023-01-12 DIAGNOSIS — G89.4 CHRONIC PAIN DISORDER: Primary | ICD-10-CM

## 2023-01-12 NOTE — PROGRESS NOTES
Daily Note     Today's date: 2023  Patient name: Harjinder Hernández  : 1955  MRN: [id number]  Referring provider: Farnaz Connor MD  Dx:   Encounter Diagnosis     ICD-10-CM    1  Chronic pain disorder  G89 4       2  History of right hip replacement  Z96 641                      Subjective: Patient reports he felt mild soreness after LV, but not like 2 times ago, he reports balance continues to be his primary issue  Objective: See treatment diary below      Assessment: Tolerated treatment well  Patient continues to demonstrate significant balance deficits, particularly in single limb support  He continues to have difficulty progressing with ambulation due in part to L LE instability  Patient demonstrated fatigue post treatment, exhibited good technique with therapeutic exercises and would benefit from continued PT      Plan: Continue per plan of care  Progress treatment as tolerated  Progress challenge as appropriate with irritability       Insurance:  A/CMS Eval/ Re-eval POC expires FOTO Total   Visits  Co-Insurance   CMS - [facility] 12/13/22 3/13/23 48/63  $0                                        Precautions: s/p R total hip arthroplasty revision, open reduction 22; RLE WBAT; R posterior hip precautions, neuropathy in BLE, FALL RISK, chronic neck and back pain        Manuals 7 6 5 4 3   STM/MFR        Manual flexibility         Joint mobs                 Neuro Re-Ed        [employer] sets    5sx20     SLR w/ quad set    2x10     Standing hip abd Standing 2x10 hip abd B Standing hip abd 2x10 B SL 2x10     SL clamshell 2x10 2x10 B   2x10 B cues for hip drop   Standing hip flexion onto 8" step SLS hip flexion 2x10 B SLS hip flexion 2x10 B 2x10 B 2x10 B    Bridge 2x10 5s 2x10 5s 2x10 2x10 5s 2x10 5s   ML side step at mirror [id number] with redcution of hip ER  [id number] cues for reducing hip ER [id number] cues for reducing hip ER    ML weight shifts     In mirror + cues 2x10 5s - created increased LE numbness/weakness   Hook PPT     15x5s   Ther Ex        Ankle pumps        Heel slides   pball SL 20x  DL 20x     HS stretching SOS 3x30" SOS 3x30s SOS 5x10s  Seated 3x30s B   SAQ        Hip Add sq   20x     Clamshells   Hook RTB 20x     Prone quad str     3x30s B   Supine SKTC off edge of table    2x10    Sup hip IR to neutral     2x10 5s    Sup modified karen str 3x30s B 3x30s B 3x30s 3x30s    Standing toe raises   2x10 2x10            Ther Activity        Pt education        Gait                                                          Access Code: [email or web address]  URL: [email or web address]  Date: 12/13/2022  Prepared by: Abe Moya    Exercises  • Supine Ankle Pumps - 1-2 x daily - 7 x weekly - 1 sets - 30 reps  • Supine Quadricep Sets - 1-2 x daily - 7 x weekly - 2 sets - 10 reps - 5 hold  • Supine Gluteal Sets - 1-2 x daily - 7 x weekly - 2 sets - 10 reps - 5 hold  • Supine Heel Slides - 1-2 x daily - 7 x weekly - 2 sets - 10 reps - 5 hold  • Supine Active Straight Leg Raise - 1-2 x daily - 7 x weekly - 2 sets - 10 reps - 3 hold

## 2023-01-14 RX ORDER — BUPROPION HYDROCHLORIDE 150 MG/1
TABLET ORAL
COMMUNITY
Start: 2023-01-11

## 2023-01-16 ENCOUNTER — CONSULT (OUTPATIENT)
Dept: HEMATOLOGY ONCOLOGY | Facility: MEDICAL CENTER | Age: 68
End: 2023-01-16

## 2023-01-16 VITALS
HEIGHT: 66 IN | SYSTOLIC BLOOD PRESSURE: 130 MMHG | OXYGEN SATURATION: 98 % | TEMPERATURE: 97.9 F | WEIGHT: 178.2 LBS | RESPIRATION RATE: 18 BRPM | DIASTOLIC BLOOD PRESSURE: 74 MMHG | HEART RATE: 82 BPM | BODY MASS INDEX: 28.64 KG/M2

## 2023-01-16 DIAGNOSIS — Z78.9 VEGETARIAN: ICD-10-CM

## 2023-01-16 DIAGNOSIS — D51.8 OTHER VITAMIN B12 DEFICIENCY ANEMIAS: ICD-10-CM

## 2023-01-16 DIAGNOSIS — E83.119 HEMOCHROMATOSIS, UNSPECIFIED HEMOCHROMATOSIS TYPE: ICD-10-CM

## 2023-01-16 DIAGNOSIS — R53.83 FATIGUE, UNSPECIFIED TYPE: ICD-10-CM

## 2023-01-16 DIAGNOSIS — D50.9 MICROCYTIC ANEMIA: Primary | ICD-10-CM
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
## 2023-01-17 ENCOUNTER — APPOINTMENT (OUTPATIENT)
Dept: PHYSICAL THERAPY | Facility: CLINIC | Age: 68
End: 2023-01-17

## 2023-01-19 ENCOUNTER — OFFICE VISIT (OUTPATIENT)
Dept: PHYSICAL THERAPY | Facility: CLINIC | Age: 68
End: 2023-01-19

## 2023-01-19 DIAGNOSIS — G89.4 CHRONIC PAIN DISORDER: Primary | ICD-10-CM

## 2023-01-19 DIAGNOSIS — Z96.641 HISTORY OF RIGHT HIP REPLACEMENT: ICD-10-CM

## 2023-01-19 NOTE — PROGRESS NOTES
Daily Note     Today's date: 2023  Patient name: Valente Manzo  : 1955  MRN: [id number]  Referring provider: Bob Ribera MD  Dx:   Encounter Diagnosis     ICD-10-CM    1  Chronic pain disorder  G89 4       2  History of right hip replacement  Z96 641                      Subjective: Patient reports he is having a bad day, the rain has increased his arthritis symptoms, and today is a low end day  Objective: See treatment diary below    Hip Flexion: 108,  Strength: 4  Hip ER: 36,   Strength: 3+  Hip Abd: 32,   Strength: 3+      Assessment: Tolerated treatment fair  Patient presented with significant increase in tissue irritability, significant neuropathy symptoms and unsteadiness noted with presentation  Discussed emphasis on increasing functional mobility exercises in subsequent visits due to objective measure improvement  Emphasized importance of gradual increase in activity tolerance and balance/stability exercise  Patient demonstrated fatigue post treatment, exhibited good technique with therapeutic exercises and would benefit from continued PT      Plan: Continue per plan of care  Progress treatment as tolerated  Progress challenge as appropriate with irritability       Insurance:  AMA/CMS Eval/ Re-eval POC expires FOTO Total   Visits  Co-Insurance   CMS - [facility] 12/13/22 3/13/23 48/63  $0                                        Precautions: s/p R total hip arthroplasty revision, open reduction 22; RLE WBAT; R posterior hip precautions, neuropathy in BLE, FALL RISK, chronic neck and back pain        Manuals 8 7 6 5   STM/MFR       Manual flexibility        Joint mobs               Neuro Re-Ed       Standing Horse On/Off 2x10 w/ UE support      Glute sets     5sx20   SLR w/ quad set     2x10   Standing hip abd  Standing 2x10 hip abd B Standing hip abd 2x10 B SL 2x10   SL clamshell  2x10 2x10 B    Standing hip flexion onto 8" step  SLS hip flexion 2x10 B SLS hip flexion 2x10 B 2x10 B   Bridge  2x10 5s 2x10 5s 2x10   Romberg EC 3x30" [place]      Nerve Glide Seated sciatic nerve glide 2x10 ea      ML side step at mirror  [id number] with redcution of hip ER  [id number] cues for reducing hip ER   ML weight shifts       Hook PPT       Ther Ex       Ankle pumps       Heel slides    pball SL 20x  DL 20x   HS stretching SOS 3x30" SOS 3x30" SOS 3x30s SOS 5x10s   SAQ       Hip Add sq    20x   Clamshells    Hook RTB 20x   Prone quad str       Supine SKTC off edge of table       Sup hip IR to neutral        Sup modified karen str  3x30s B 3x30s B 3x30s   Standing toe raises    2x10   Assessment & Management POC with emphasis on functional training and emphasis on dynamic stability exercises  Ther Activity       Pt education       Gait                                                   Access Code: [email or web address]  URL: [email or web address]  Date: 12/13/2022  Prepared by: Fabienne Rumple    Exercises  • Supine Ankle Pumps - 1-2 x daily - 7 x weekly - 1 sets - 30 reps  • Supine Quadricep Sets - 1-2 x daily - 7 x weekly - 2 sets - 10 reps - 5 hold  • Supine Gluteal Sets - 1-2 x daily - 7 x weekly - 2 sets - 10 reps - 5 hold  • Supine Heel Slides - 1-2 x daily - 7 x weekly - 2 sets - 10 reps - 5 hold  • Supine Active Straight Leg Raise - 1-2 x daily - 7 x weekly - 2 sets - 10 reps - 3 hold

## 2023-01-24 ENCOUNTER — APPOINTMENT (OUTPATIENT)
Dept: PHYSICAL THERAPY | Facility: CLINIC | Age: 68
End: 2023-01-24

## 2023-01-26 ENCOUNTER — APPOINTMENT (OUTPATIENT)
Dept: PHYSICAL THERAPY | Facility: CLINIC | Age: 68
End: 2023-01-26

## 2023-01-27 ENCOUNTER — OFFICE VISIT (OUTPATIENT)
Dept: PHYSICAL THERAPY | Facility: CLINIC | Age: 68
End: 2023-01-27

## 2023-01-27 DIAGNOSIS — G89.4 CHRONIC PAIN DISORDER: Primary | ICD-10-CM

## 2023-01-27 DIAGNOSIS — Z96.641 HISTORY OF RIGHT HIP REPLACEMENT: ICD-10-CM

## 2023-01-27 NOTE — PROGRESS NOTES
PT Re-Evaluation     Today's date: 2023  Patient name: Ariana Puga  : 1955  MRN: [id number]  Referring provider: Nuria Devlin MD  Dx:   Encounter Diagnosis     ICD-10-CM    1  Chronic pain disorder  G89 4       2  History of right hip replacement  Z96 641           Start Time: 1315  Stop Time: 1400  Total time in clinic (min): 45 minutes       Subjective: Patient reports he is having a bad day, the rain has increased his arthritis symptoms, and today is a low end day  Objective: See treatment diary below    Hip Flexion: 108,  Strength: 4  Hip ER: 36,   Strength: 3+  Hip Abd: 32,   Strength: 3+    Goals  Short Term Goals (3 weeks; target date: 23)  1  Patient will be independent with initial HEP -MET  2  Patient will demonstrate an increase in right hip  -MET  3  Patient will demonstrate an increase in right hip strength of 1/2 grade on MMT  -MET    Long Term Goals (6 weeks; target date: 3/13/23)  1  Patient will demonstrate an increase in right hip AROM to WNL in order to promote self-care activities pain-free -progressing towards  2  Patient will demonstrate an increase in right hip strength to 4/5 on MMT -progressing towards  3  Patient will be able to ascend/descend 15 stairs reciprocally without pain  -progressing towards  4  Patient will be able to ambulate 300 feet on varied terrain without AD and without gait deviation  -progressing towards   5  Patient will be able to perform all basic ADLs without modification  -progressing towards  6  Patient will be able to perform Timed Up and Go in 12 5 seconds or less in order to demonstrate decreased risk of falls  -progressing towards      Assessment: Tolerated treatment fair  Patient presented with significant increase in tissue irritability, significant neuropathy symptoms and unsteadiness noted with presentation  Discussed emphasis on increasing functional mobility exercises in subsequent visits due to objective measure improvement  Emphasized importance of gradual increase in activity tolerance and balance/stability exercise  Patient demonstrated fatigue post treatment, exhibited good technique with therapeutic exercises and would benefit from continued PT      Plan: Continue per plan of care  Progress treatment as tolerated  Progress challenge as appropriate with irritability  Insurance:  AMA/CMS Eval/ Re-eval POC expires FOTO Total   Visits  Co-Insurance   CMS - [facility] 12/13/22 3/13/23 48/63  $0                                        Precautions: s/p R total hip arthroplasty revision, open reduction 11/20/22; RLE WBAT; R posterior hip precautions, neuropathy in BLE, FALL RISK, chronic neck and back pain     1/19 1/12 1/9 1/5   Manuals 8 7 6 5   STM/MFR       Manual flexibility        Joint mobs               Neuro Re-Ed       Standing Horse On/Off 2x10 w/ UE support      Glute sets     5sx20   SLR w/ quad set     2x10   Standing hip abd  Standing 2x10 hip abd B Standing hip abd 2x10 B SL 2x10   SL clamshell  2x10 2x10 B    Standing hip flexion onto 8" step  SLS hip flexion 2x10 B SLS hip flexion 2x10 B 2x10 B   Bridge  2x10 5s 2x10 5s 2x10   Romberg EC 3x30" [place]      Nerve Glide Seated sciatic nerve glide 2x10 ea      ML side step at mirror  [id number] with redcution of hip ER  [id number] cues for reducing hip ER   ML weight shifts       Hook PPT       Ther Ex       Ankle pumps       Heel slides    pball SL 20x  DL 20x   HS stretching SOS 3x30" SOS 3x30" SOS 3x30s SOS 5x10s   SAQ       Hip Add sq    20x   Clamshells    Hook RTB 20x   Prone quad str       Supine SKTC off edge of table       Sup hip IR to neutral        Sup modified karen str  3x30s B 3x30s B 3x30s   Standing toe raises    2x10   Assessment & Management POC with emphasis on functional training and emphasis on dynamic stability exercises        Ther Activity       Pt education       Gait                                                   Access Code: [email or web address]  URL: [email or web address]  Date: 12/13/2022  Prepared by: Na Olvera    Exercises  • Supine Ankle Pumps - 1-2 x daily - 7 x weekly - 1 sets - 30 reps  • Supine Quadricep Sets - 1-2 x daily - 7 x weekly - 2 sets - 10 reps - 5 hold  • Supine Gluteal Sets - 1-2 x daily - 7 x weekly - 2 sets - 10 reps - 5 hold  • Supine Heel Slides - 1-2 x daily - 7 x weekly - 2 sets - 10 reps - 5 hold  • Supine Active Straight Leg Raise - 1-2 x daily - 7 x weekly - 2 sets - 10 reps - 3 hold

## 2023-01-27 NOTE — PROGRESS NOTES
Daily Note     Today's date: 2023  Patient name: Rosario Quick  : 1955  MRN: [id number]  Referring provider: Blake Smart MD  Dx: No diagnosis found  Subjective: ***      Objective: See treatment diary below    MMT IE IE      Left Right Left Right   Hip Flex       Hip Abd       Hip IR       Hip ER                ROM IE IE      Left Right Left Right   Hip Flex       Hip Abd       Hip IR       Hip ER                     Assessment: Tolerated treatment {Tolerated treatment :4043481095}  Patient {assessment:9813431924}    Goals  Short Term Goals (3 weeks; target date: 23)  1  Patient will be independent with initial HEP  2  Patient will demonstrate an increase in right hip  3  Patient will demonstrate an increase in right hip strength of 1/2 grade on MMT  Long Term Goals (6 weeks; target date: 3/13/23)  1  Patient will demonstrate an increase in right hip AROM to WNL in order to promote self-care activities pain-free  2  Patient will demonstrate an increase in right hip strength to 4/5 on MMT  3  Patient will be able to ascend/descend 15 stairs reciprocally without pain  4  Patient will be able to ambulate 300 feet on varied terrain without AD and without gait deviation  5  Patient will be able to perform all basic ADLs without modification  6  Patient will be able to perform Timed Up and Go in 12 5 seconds or less in order to demonstrate decreased risk of falls       Plan: {PLAN:[id number]}     Insurance:  AMA/CMS Eval/ Re-eval POC expires FOTO Total   Visits  Co-Insurance   CMS - [facility] 12/13/22 3/13/23 48/63  $0                                        Precautions: s/p R total hip arthroplasty revision, open reduction 22; RLE WBAT; R posterior hip precautions, neuropathy in BLE, FALL RISK, chronic neck and back pain        Manuals 9 8 7 6 5   STM/MFR        Manual flexibility         Joint mobs                 Neuro Re-Ed        Standing Horse On/Off  2x10 w/ UE support      Glute sets      5sx20   SLR w/ quad set      2x10   Standing hip abd   Standing 2x10 hip abd B Standing hip abd 2x10 B SL 2x10   SL clamshell   2x10 2x10 B    Standing hip flexion onto 8" step   SLS hip flexion 2x10 B SLS hip flexion 2x10 B 2x10 B   Bridge   2x10 5s 2x10 5s 2x10   Romberg EC  3x30" [place]      Nerve Glide  Seated sciatic nerve glide 2x10 ea      ML side step at mirror   [id number] with redcution of hip ER  [id number] cues for reducing hip ER   ML weight shifts        Hook PPT        Ther Ex        Ankle pumps        Heel slides     pball SL 20x  DL 20x   HS stretching  SOS 3x30" SOS 3x30" SOS 3x30s SOS 5x10s   SAQ        Hip Add sq     20x   Clamshells     Hook RTB 20x   Prone quad str        Supine SKTC off edge of table        Sup hip IR to neutral         Sup modified karen str   3x30s B 3x30s B 3x30s   Standing toe raises     2x10   Assessment & Management  POC with emphasis on functional training and emphasis on dynamic stability exercises  Ther Activity        Pt education        Gait                                                          Access Code: [email or web address]  URL: [email or web address]  Date: 12/13/2022  Prepared by: Barbara Aguilar    Exercises  • Supine Ankle Pumps - 1-2 x daily - 7 x weekly - 1 sets - 30 reps  • Supine Quadricep Sets - 1-2 x daily - 7 x weekly - 2 sets - 10 reps - 5 hold  • Supine Gluteal Sets - 1-2 x daily - 7 x weekly - 2 sets - 10 reps - 5 hold  • Supine Heel Slides - 1-2 x daily - 7 x weekly - 2 sets - 10 reps - 5 hold  • Supine Active Straight Leg Raise - 1-2 x daily - 7 x weekly - 2 sets - 10 reps - 3 hold

## 2023-01-27 NOTE — PROGRESS NOTES
Daily Note     Today's date: 2023  Patient name: Saji Trinh  : 1955  MRN: [id number]  Referring provider: Duglas Cherry MD  Dx:   Encounter Diagnosis     ICD-10-CM    1  Chronic pain disorder  G89 4       2  History of right hip replacement  Z96 641                      Subjective: Saji Trinh states he has better/worse days regarding weakness and numbness in his legs  He does feel overall improvement in his hip  Objective: See treatment diary below      Assessment: Tolerated treatment well  Patient continually experienced increased numbness in LEs during weight bearing and would decrease with repeated flexion in sitting  He requires verbal and tactile cues to improve hip hinge during balance activity to reduce extension through lumbar spine and weight through heels  Plan: Continue per plan of care        Insurance:  AMA/CMS Eval/ Re-eval POC expires FOTO Total   Visits  Co-Insurance   CMS - [facility] 12/13/22 3/13/23 48/63  $0                                        Precautions: s/p R total hip arthroplasty revision, open reduction 22; RLE WBAT; R posterior hip precautions, neuropathy in BLE, FALL RISK, chronic neck and back pain      1   Manuals 9 8 7 6 5   STM/MFR        Manual flexibility         Joint mobs                 Neuro Re-Ed        Standing Horse On/Off  2x10 w/ UE support      Glute sets      5sx20   SLR w/ quad set      2x10   Standing hip abd   Standing 2x10 hip abd B Standing hip abd 2x10 B SL 2x10   SL clamshell   2x10 2x10 B    Standing hip flexion onto 8" step   SLS hip flexion 2x10 B SLS hip flexion 2x10 B 2x10 B   Bridge   2x10 5s 2x10 5s 2x10   Romberg EC  3x30" [place]      Nerve Glide  Seated sciatic nerve glide 2x10 ea      ML side step at mirror   [id number] with redcution of hip ER  [id number] cues for reducing hip ER   Biodex ML/AP Wt shifts 3 min ea - static  LOS - static 3x  Squats L11 2x10       Fwd step up  2x10 8" B Ther Ex        Ankle pumps        Heel slides     pball SL 20x  DL 20x   HS stretching  SOS 3x30" SOS 3x30" SOS 3x30s SOS 5x10s   SAQ        Hip Add sq     20x   Clamshells     Hook RTB 20x   Prone quad str        Flexion in sitting 10x (6x)       Sup hip IR to neutral         Sup modified karen str   3x30s B 3x30s B 3x30s   Standing toe raises     2x10   Assessment & Management  POC with emphasis on functional training and emphasis on dynamic stability exercises  Access Code: [email or web address]  URL: [email or web address]  Date: 12/13/2022  Prepared by: Wus Rumple    Exercises  • Supine Ankle Pumps - 1-2 x daily - 7 x weekly - 1 sets - 30 reps  • Supine Quadricep Sets - 1-2 x daily - 7 x weekly - 2 sets - 10 reps - 5 hold  • Supine Gluteal Sets - 1-2 x daily - 7 x weekly - 2 sets - 10 reps - 5 hold  • Supine Heel Slides - 1-2 x daily - 7 x weekly - 2 sets - 10 reps - 5 hold  • Supine Active Straight Leg Raise - 1-2 x daily - 7 x weekly - 2 sets - 10 reps - 3 hold

## 2023-01-31 ENCOUNTER — OFFICE VISIT (OUTPATIENT)
Dept: PHYSICAL THERAPY | Facility: CLINIC | Age: 68
End: 2023-01-31

## 2023-01-31 DIAGNOSIS — Z96.641 HISTORY OF RIGHT HIP REPLACEMENT: ICD-10-CM

## 2023-01-31 DIAGNOSIS — G89.4 CHRONIC PAIN DISORDER: Primary | ICD-10-CM

## 2023-01-31 NOTE — PROGRESS NOTES
Daily Note     Today's date: 2023  Patient name: Janna Crowder  : 1955  MRN: [id number]  Referring provider: Dia Fox MD  Dx:   Encounter Diagnosis     ICD-10-CM    1  Chronic pain disorder  G89 4       2  History of right hip replacement  Z96 641                      Subjective: Patient reports this is an average day for his numbness, no worse or better than usual       Objective: See treatment diary below      Assessment: Tolerated treatment well  Patient challenged by weight shifting on biodex, particularly with L sided target control  Improved performance noted with squatting with weight on unstable surface, improved anticipatory balance control with squatting with weight shift  Patient demonstrated fatigue post treatment, exhibited good technique with therapeutic exercises and would benefit from continued PT      Plan: Continue per plan of care  Progress treatment as tolerated  Progress challenge with stabilization training as appropriate with irritability       Insurance:  A/CMS Eval/ Re-eval POC expires FOTO Total   Visits  Co-Insurance   CMS - [facility] 12/13/22 3/13/23 48/63  $0                                        Precautions: s/p R total hip arthroplasty revision, open reduction 22; RLE WBAT; R posterior hip precautions, neuropathy in BLE, FALL RISK, chronic neck and back pain        Manuals 10 9 8 7 6   STM/MFR        Manual flexibility         Joint mobs                 Neuro Re-Ed        Standing Horse On/Off   2x10 w/ UE support     Squat w/ DB Weight Shift Blue therapads 2x10 5# UE crossbody       Staggered Squat w/ Pallof Press Blue Therapads 2x10 5# pallof       Standing hip abd    Standing 2x10 hip abd B Standing hip abd 2x10 B   SL clamshell    2x10 2x10 B   Standing hip flexion onto 8" step    SLS hip flexion 2x10 B SLS hip flexion 2x10 B   Bridge    2x10 5s 2x10 5s   Romberg EC   3x30" [place]     Nerve Glide   Seated sciatic nerve glide 2x10 ea     ML side step at mirror    [id number] with redcution of hip ER    Biodex ML/AP weight shifts 3 min ea - static LOS 3 times with target -3x10 squats with L10 ML/AP Wt shifts 3 min ea - static  LOS - static 3x  Squats L11 2x10      Fwd step up  Fwd 2x10 8" B w/ contralateral march 2x10 8" B      Ther Ex        Ankle pumps        Heel slides        HS stretching   SOS 3x30" SOS 3x30" SOS 3x30s   SAQ        Hip Add sq        Clamshells        Prone quad str        Flexion in sitting 10x (3x) 10x (6x)      Sup hip IR to neutral         Sup modified karen str    3x30s B 3x30s B   Standing toe raises        Assessment & Management   POC with emphasis on functional training and emphasis on dynamic stability exercises  Access Code: [email or web address]  URL: [email or web address]  Date: 12/13/2022  Prepared by: Bonita Vyas    Exercises  • Supine Ankle Pumps - 1-2 x daily - 7 x weekly - 1 sets - 30 reps  • Supine Quadricep Sets - 1-2 x daily - 7 x weekly - 2 sets - 10 reps - 5 hold  • Supine Gluteal Sets - 1-2 x daily - 7 x weekly - 2 sets - 10 reps - 5 hold  • Supine Heel Slides - 1-2 x daily - 7 x weekly - 2 sets - 10 reps - 5 hold  • Supine Active Straight Leg Raise - 1-2 x daily - 7 x weekly - 2 sets - 10 reps - 3 hold

## 2023-02-02 ENCOUNTER — APPOINTMENT (OUTPATIENT)
Dept: PHYSICAL THERAPY | Facility: CLINIC | Age: 68
End: 2023-02-02

## 2023-02-03 ENCOUNTER — APPOINTMENT (OUTPATIENT)
Dept: PHYSICAL THERAPY | Facility: CLINIC | Age: 68
End: 2023-02-03

## 2023-02-05 DIAGNOSIS — R53.83 FATIGUE, UNSPECIFIED TYPE: ICD-10-CM

## 2023-02-05 DIAGNOSIS — M54.42 CHRONIC BILATERAL LOW BACK PAIN WITH BILATERAL SCIATICA: ICD-10-CM

## 2023-02-05 DIAGNOSIS — G89.29 CHRONIC BILATERAL LOW BACK PAIN WITH BILATERAL SCIATICA: ICD-10-CM

## 2023-02-05 DIAGNOSIS — G47.30 SLEEP APNEA, UNSPECIFIED TYPE: ICD-10-CM

## 2023-02-05 DIAGNOSIS — G89.4 CHRONIC PAIN SYNDROME: ICD-10-CM

## 2023-02-05 DIAGNOSIS — M54.41 CHRONIC BILATERAL LOW BACK PAIN WITH BILATERAL SCIATICA: ICD-10-CM

## 2023-02-06 DIAGNOSIS — F33.0 MILD EPISODE OF RECURRENT MAJOR DEPRESSIVE DISORDER (HCC): Primary | ICD-10-CM

## 2023-02-06 RX ORDER — HYDROCODONE BITARTRATE AND ACETAMINOPHEN 10; 325 MG/1; MG/1
1 TABLET ORAL EVERY 4 HOURS PRN
Qty: 180 TABLET | Refills: 0 | Status: SHIPPED | OUTPATIENT
Start: 2023-02-06

## 2023-02-06 RX ORDER — MELOXICAM 15 MG/1
15 TABLET ORAL DAILY
Qty: 90 TABLET | Refills: 0 | Status: SHIPPED | OUTPATIENT
Start: 2023-02-06

## 2023-02-06 RX ORDER — OXYCODONE HCL 10 MG/1
10 TABLET, FILM COATED, EXTENDED RELEASE ORAL 2 TIMES DAILY
Qty: 60 TABLET | Refills: 0 | Status: SHIPPED | OUTPATIENT
Start: 2023-02-06

## 2023-02-06 RX ORDER — ARMODAFINIL 250 MG/1
250 TABLET ORAL DAILY
Qty: 30 TABLET | Refills: 0 | Status: SHIPPED | OUTPATIENT
Start: 2023-02-06

## 2023-02-06 RX ORDER — OXYCODONE HCL 20 MG/1
20 TABLET, FILM COATED, EXTENDED RELEASE ORAL EVERY 12 HOURS
Qty: 60 TABLET | Refills: 0 | Status: SHIPPED | OUTPATIENT
Start: 2023-02-06

## 2023-02-06 NOTE — TELEPHONE ENCOUNTER
New patient counseling/therapy appointment needed  Patient has updated information in computer  Patient states he has talked to Dr Abe Aquino about this  Patient wants to stay in the [place] office only  He is very flexible with availability  No lymphadedenopathy

## 2023-02-06 NOTE — TELEPHONE ENCOUNTER
Medication Refill Request      Name of Medication Wellbutrin  mg  Dose/Frequency   Quantity   Verified pharmacy   [x]  Verified ordering Provider   [x]  Does patient have enough for the next 3 days? Yes [] No [x]  Does patient have a follow-up appointment scheduled?  Yes [x] No []   If so when is appointment: 2/15/2023

## 2023-02-07 ENCOUNTER — OFFICE VISIT (OUTPATIENT)
Dept: PHYSICAL THERAPY | Facility: CLINIC | Age: 68
End: 2023-02-07

## 2023-02-07 DIAGNOSIS — G89.4 CHRONIC PAIN DISORDER: Primary | ICD-10-CM

## 2023-02-07 DIAGNOSIS — Z96.641 HISTORY OF RIGHT HIP REPLACEMENT: ICD-10-CM

## 2023-02-07 RX ORDER — BUPROPION HYDROCHLORIDE 150 MG/1
150 TABLET ORAL EVERY MORNING
Qty: 30 TABLET | Refills: 1 | Status: SHIPPED | OUTPATIENT
Start: 2023-02-07

## 2023-02-07 NOTE — PROGRESS NOTES
Daily Note     Today's date: 2023  Patient name: Arnie Gilliam  : 1955  MRN: [id number]  Referring provider: Savanna Pulido MD  Dx:   Encounter Diagnosis     ICD-10-CM    1  Chronic pain disorder  G89 4       2  History of right hip replacement  Z96 641                      Subjective: Patient reports he feels decent today, though he continues to have numbness that increases with movement  Objective: See treatment diary below      Assessment: Tolerated treatment well  Patient continues to be challenged by intensity of dynamic stabilization exercises  Numbness builds with increased duration of exercises, though it continues to be relieved with repeated flexion exercises  Patient demonstrated fatigue post treatment, exhibited good technique with therapeutic exercises and would benefit from continued PT      Plan: Continue per plan of care  Progress treatment as tolerated  Progress challenge as appropriate with irritability       Insurance:  A/CMS Eval/ Re-eval POC expires FOTO Total   Visits  Co-Insurance   CMS - [facility] 12/13/22 3/13/23 48/63  $0                                        Precautions: s/p R total hip arthroplasty revision, open reduction 22; RLE WBAT; R posterior hip precautions, neuropathy in BLE, FALL RISK, chronic neck and back pain     2/3 1/31 1/27 1/19 1/12   Manuals 11 10 9 8 7   STM/MFR        Manual flexibility         Joint mobs                 Neuro Re-Ed        Standing Horse On/Off    2x10 w/ UE support    Squat w/ DB Weight Shift Blue therapads 2x10 5# UE crossbody Blue therapads 2x10 5# UE crossbody      Staggered Squat w/ Pallof Press Blue Therapads 2x10 5# Pallof Blue Therapads 2x10 5# pallof      Standing hip abd     Standing 2x10 hip abd B   SL clamshell     2x10   Standing hip flexion onto 8" step     SLS hip flexion 2x10 B   Lunge w/ mild UE assist 2x10 w/ [address]    2x10 5s   Romberg EC    3x30" [place]    Nerve Glide    Seated sciatic nerve glide 2x10 ea    ML side step at mirror     [id number] with redcution of hip ER   Biodex ML/AP weight shifts 3 min ea - static LOS 3 times with target - 3x10 squats with Lvl 9, 1 set Lvl 7 ML/AP weight shifts 3 min ea - static LOS 3 times with target -3x10 squats with L10 ML/AP Wt shifts 3 min ea - static  LOS - static 3x  Squats L11 2x10     Fwd step up   Fwd 2x10 8" B w/ contralateral march 2x10 8" B     Ther Ex        Ankle pumps        Heel slides        HS stretching    SOS 3x30" SOS 3x30"   SAQ        Hip Add sq        Clamshells        Prone quad str        Flexion in sitting 10x (3x) 10x (3x) 10x (6x)     Sup hip IR to neutral         Sup modified karen str     3x30s B   Standing toe raises        Assessment & Management    POC with emphasis on functional training and emphasis on dynamic stability exercises  Access Code: [email or web address]  URL: [email or web address]  Date: 12/13/2022  Prepared by: Morena Burn    Exercises  • Supine Ankle Pumps - 1-2 x daily - 7 x weekly - 1 sets - 30 reps  • Supine Quadricep Sets - 1-2 x daily - 7 x weekly - 2 sets - 10 reps - 5 hold  • Supine Gluteal Sets - 1-2 x daily - 7 x weekly - 2 sets - 10 reps - 5 hold  • Supine Heel Slides - 1-2 x daily - 7 x weekly - 2 sets - 10 reps - 5 hold  • Supine Active Straight Leg Raise - 1-2 x daily - 7 x weekly - 2 sets - 10 reps - 3 hold

## 2023-02-09 ENCOUNTER — OFFICE VISIT (OUTPATIENT)
Dept: PHYSICAL THERAPY | Facility: CLINIC | Age: 68
End: 2023-02-09

## 2023-02-09 DIAGNOSIS — G89.4 CHRONIC PAIN DISORDER: ICD-10-CM

## 2023-02-09 DIAGNOSIS — Z96.641 HISTORY OF RIGHT HIP REPLACEMENT: Primary | ICD-10-CM

## 2023-02-09 NOTE — PROGRESS NOTES
Daily Note     Today's date: 2023  Patient name: Lina Gibson  : 1955  MRN: [id number]  Referring provider: Sherrell Tillman MD  Dx:   Encounter Diagnosis     ICD-10-CM    1  History of right hip replacement  Z96 641       2  Chronic pain disorder  G89 4                      Subjective: Patient reports he feels a little more instability in the legs this morning walking in       Objective: See treatment diary below      Assessment: Tolerated treatment well  Patient continues to be challenged by intensity of dynamic stability exercises, though he had less endurance today than previous session  Patient demonstrated fatigue post treatment, exhibited good technique with therapeutic exercises and would benefit from continued PT      Plan: Continue per plan of care  Progress treatment as tolerated  Progress challenge as appropriate with irritability       Insurance:  AMA/CMS Eval/ Re-eval POC expires FOTO Total   Visits  Co-Insurance   CMS - [facility] 12/13/22 3/13/23 48/63  $0                                        Precautions: s/p R total hip arthroplasty revision, open reduction 22; RLE WBAT; R posterior hip precautions, neuropathy in BLE, FALL RISK, chronic neck and back pain     2/9 2/3 1/31 1/27 1/19   Manuals 12 11 10 9 8   STM/MFR        Manual flexibility         Joint mobs                 Neuro Re-Ed        Standing Horse On/Off     2x10 w/ UE support   Squat w/ DB Weight Shift Blue therapads 2x10 5# crossbody Blue therapads 2x10 5# UE crossbody Blue therapads 2x10 5# UE crossbody     Staggered Squat w/ Pallof Press  Blue Therapads 2x10 5# Pallof Blue Therapads 2x10 5# pallof     Standing hip abd        SL clamshell        Standing hip flexion onto 8" step        Lunge w/ mild UE assist 2x10 w/ [place] 2x10 w/ [address]      Romberg EC     3x30" [place]   Nerve Glide     Seated sciatic nerve glide 2x10 ea   ML side step at [facility] ML/AP weight shifts LOS 3 times with lvl 10, 3x10 squats with Lvl 9 ML/AP weight shifts 3 min ea - static LOS 3 times with target - 3x10 squats with Lvl 9, 1 set Lvl 7 ML/AP weight shifts 3 min ea - static LOS 3 times with target -3x10 squats with L10 ML/AP Wt shifts 3 min ea - static  LOS - static 3x  Squats L11 2x10    Fwd step up  2x10 w/ contralateral march, R held due to instability  Fwd 2x10 8" B w/ contralateral march 2x10 8" B    Ther Ex        Ankle pumps        Heel slides        HS stretching     SOS 3x30"   SAQ        Hip Add sq        Clamshells        Prone quad str        Flexion in sitting 10x (6x) 10x (3x) 10x (3x) 10x (6x)    Sup hip IR to neutral         Sup modified karen str        Standing toe raises        Assessment & Management     POC with emphasis on functional training and emphasis on dynamic stability exercises  Access Code: [email or web address]  URL: [email or web address]  Date: 12/13/2022  Prepared by: Baudilio Glassing    Exercises  • Supine Ankle Pumps - 1-2 x daily - 7 x weekly - 1 sets - 30 reps  • Supine Quadricep Sets - 1-2 x daily - 7 x weekly - 2 sets - 10 reps - 5 hold  • Supine Gluteal Sets - 1-2 x daily - 7 x weekly - 2 sets - 10 reps - 5 hold  • Supine Heel Slides - 1-2 x daily - 7 x weekly - 2 sets - 10 reps - 5 hold  • Supine Active Straight Leg Raise - 1-2 x daily - 7 x weekly - 2 sets - 10 reps - 3 hold

## 2023-02-14 ENCOUNTER — OFFICE VISIT (OUTPATIENT)
Dept: PHYSICAL THERAPY | Facility: CLINIC | Age: 68
End: 2023-02-14

## 2023-02-14 DIAGNOSIS — G89.4 CHRONIC PAIN DISORDER: Primary | ICD-10-CM

## 2023-02-14 DIAGNOSIS — Z96.641 HISTORY OF RIGHT HIP REPLACEMENT: ICD-10-CM

## 2023-02-14 NOTE — PROGRESS NOTES
Daily Note     Today's date: 2023  Patient name: Shahid Gottlieb  : 1955  MRN: [id number]  Referring provider: Brandi Mandel MD  Dx:   Encounter Diagnosis     ICD-10-CM    1  Chronic pain disorder  G89 4       2  History of right hip replacement  Z96 641                      Subjective: Patient reports he feels more "wobbly" today than he does normally  Objective: See treatment diary below      Assessment: Tolerated treatment well  Patient presented with significant dynamic balance deficits though symptoms partially reduced due to seated lumbar flexion movements  He demonstrated improved static balance with weight shifting onto unstable surfaces  Patient demonstrated fatigue post treatment, exhibited good technique with therapeutic exercises and would benefit from continued PT      Plan: Continue per plan of care  Progress treatment as tolerated  Progress challenge as appropriate with irritability       Insurance:  AMA/CMS Eval/ Re-eval POC expires FOTO Total   Visits  Co-Insurance   CMS - [facility] 12/13/22 3/13/23 48/63  $0                                        Precautions: s/p R total hip arthroplasty revision, open reduction 22; RLE WBAT; R posterior hip precautions, neuropathy in BLE, FALL RISK, chronic neck and back pain     2/14 2/9 2/3 1/31 1/27   Manuals 13 12 11 10 9   STM/MFR        Manual flexibility         Joint mobs                 Neuro Re-Ed        Standing Horse On/Off Standing hip abd 10x ea       Squat w/ DB Weight Shift Blue Therapads 2x10 straight squat Blue therapads 2x10 5# crossbody Blue therapads 2x10 5# UE crossbody Blue therapads 2x10 5# UE crossbody    Staggered Squat w/ Pallof Press   Blue Therapads 2x10 5# Pallof Blue Therapads 2x10 5# pallof    Standing hip abd        SL clamshell        Standing hip flexion onto 8" step        Lunge w/ mild UE assist 2x10 w/ [place] 2x10 w/ [place] 2x10 w/ [address]     Romberg EC        Nerve Glide        Side lunge onto  [place] 2x10 ea direction green       Biodex ML/AP weight shifts LOS 3 times with lvl 10, 3x10 squats with lvl 9 ML/AP weight shifts LOS 3 times with lvl 10, 3x10 squats with Lvl 9 ML/AP weight shifts 3 min ea - static LOS 3 times with target - 3x10 squats with Lvl 9, 1 set Lvl 7 ML/AP weight shifts 3 min ea - static LOS 3 times with target -3x10 squats with L10 ML/AP Wt shifts 3 min ea - static  LOS - static 3x  Squats L11 2x10   Fwd step up   2x10 w/ contralateral march, R held due to instability  Fwd 2x10 8" B w/ contralateral march 2x10 8" B   Ther Ex        Ankle pumps        Heel slides        HS stretching        SAQ        Hip Add sq        Clamshells        Prone quad str        Flexion in sitting  10x (6x) 10x (3x) 10x (3x) 10x (6x)   Sup hip IR to neutral         Sup modified karen str        Standing toe raises        Assessment & Management                                  Access Code: [email or web address]  URL: [email or web address]  Date: 12/13/2022  Prepared by: Carolyn Crawley    Exercises  • Supine Ankle Pumps - 1-2 x daily - 7 x weekly - 1 sets - 30 reps  • Supine Quadricep Sets - 1-2 x daily - 7 x weekly - 2 sets - 10 reps - 5 hold  • Supine Gluteal Sets - 1-2 x daily - 7 x weekly - 2 sets - 10 reps - 5 hold  • Supine Heel Slides - 1-2 x daily - 7 x weekly - 2 sets - 10 reps - 5 hold  • Supine Active Straight Leg Raise - 1-2 x daily - 7 x weekly - 2 sets - 10 reps - 3 hold

## 2023-02-15 ENCOUNTER — RA CDI HCC (OUTPATIENT)
Dept: OTHER | Facility: HOSPITAL | Age: 68
End: 2023-02-15

## 2023-02-15 ENCOUNTER — TELEMEDICINE (OUTPATIENT)
Dept: PSYCHIATRY | Facility: CLINIC | Age: 68
End: 2023-02-15

## 2023-02-15 DIAGNOSIS — F33.0 MILD EPISODE OF RECURRENT MAJOR DEPRESSIVE DISORDER (HCC): Primary | ICD-10-CM

## 2023-02-15 DIAGNOSIS — F41.9 MILD ANXIETY: ICD-10-CM

## 2023-02-15 NOTE — PROGRESS NOTES
[facility]  coding opportunities          Chart Reviewed number of suggestions sent to Provider: 1     Patients Insurance     Medicare Insurance: Medicare        F10 20
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
## 2023-02-16 ENCOUNTER — OFFICE VISIT (OUTPATIENT)
Dept: PHYSICAL THERAPY | Facility: CLINIC | Age: 68
End: 2023-02-16

## 2023-02-16 DIAGNOSIS — G89.4 CHRONIC PAIN DISORDER: Primary | ICD-10-CM

## 2023-02-16 DIAGNOSIS — Z96.641 HISTORY OF RIGHT HIP REPLACEMENT: ICD-10-CM

## 2023-02-16 NOTE — PROGRESS NOTES
Daily Note     Today's date: 2023  Patient name: Hermelindo Mishra  : 1955  MRN: [id number]  Referring provider: Luis Daniel Rivera MD  Dx:   Encounter Diagnosis     ICD-10-CM    1  Chronic pain disorder  G89 4       2  History of right hip replacement  Z96 641                      Subjective: Patient reports he feels less unstable today  Objective: See treatment diary below      Assessment: Tolerated treatment well  Patient continues to progress with dynamic balance training, demonstrates improved control with step ups with march  Patient demonstrated fatigue post treatment, exhibited good technique with therapeutic exercises and would benefit from continued PT      Plan: Continue per plan of care  Progress treatment as tolerated  Progress challenge as appropriate with irritability       Insurance:  AMA/CMS Eval/ Re-eval POC expires FOTO Total   Visits  Co-Insurance   CMS - [facility] 12/13/22 3/13/23 48/63  $0                                        Precautions: s/p R total hip arthroplasty revision, open reduction 22; RLE WBAT; R posterior hip precautions, neuropathy in BLE, FALL RISK, chronic neck and back pain     2/16 2/14 2/9 2/3 1/31   Manuals 14 13 12 11 10   STM/MFR        Manual flexibility         Joint mobs                 Neuro Re-Ed        Standing Horse On/Off Standing hip abd 10x ea Standing hip abd 10x ea      Squat w/ DB Weight Shift Blue Therapads 2x10 straight squat 5# Blue Therapads 2x10 straight squat Blue therapads 2x10 5# crossbody Blue therapads 2x10 5# UE crossbody Blue therapads 2x10 5# UE crossbody   Staggered Squat w/ Pallof Press    Blue Therapads 2x10 5# Pallof Blue Therapads 2x10 5# pallof   Standing hip abd        SL clamshell        Standing hip flexion onto 8" step        Lunge w/ mild UE assist 2x10 w/ Green [place] 2x10 w/ [place] 2x10 w/ [place] 2x10 w/ [address]    Romberg EC        Nerve Glide        Side lunge onto  [place] 2x10 ea direction green 2x10 ea direction green      Biodex ML/AP weight shifts LOS 3 times with lvl 10, 3x10 squats with lvl 9 ML/AP weight shifts LOS 3 times with lvl 10, 3x10 squats with lvl 9 ML/AP weight shifts LOS 3 times with lvl 10, 3x10 squats with Lvl 9 ML/AP weight shifts 3 min ea - static LOS 3 times with target - 3x10 squats with Lvl 9, 1 set Lvl 7 ML/AP weight shifts 3 min ea - static LOS 3 times with target -3x10 squats with L10   Fwd step up  2x10 w/ contralateral march, R held due to instability  2x10 lateral step ups   2x10 w/ contralateral march, R held due to instability  Fwd 2x10 8" B w/ contralateral march   Ther Ex        Ankle pumps        Heel slides        HS stretching        SAQ        Hip Add sq        Clamshells        Prone quad str        Flexion in sitting   10x (6x) 10x (3x) 10x (3x)   Sup hip IR to neutral         Sup modified karen str        Standing toe raises        Assessment & Management                                  Access Code: [email or web address]  URL: [email or web address]  Date: 12/13/2022  Prepared by: Calli Manning    Exercises  • Supine Ankle Pumps - 1-2 x daily - 7 x weekly - 1 sets - 30 reps  • Supine Quadricep Sets - 1-2 x daily - 7 x weekly - 2 sets - 10 reps - 5 hold  • Supine Gluteal Sets - 1-2 x daily - 7 x weekly - 2 sets - 10 reps - 5 hold  • Supine Heel Slides - 1-2 x daily - 7 x weekly - 2 sets - 10 reps - 5 hold  • Supine Active Straight Leg Raise - 1-2 x daily - 7 x weekly - 2 sets - 10 reps - 3 hold

## 2023-02-17 ENCOUNTER — TELEPHONE (OUTPATIENT)
Dept: PSYCHIATRY | Facility: CLINIC | Age: 68
End: 2023-02-17

## 2023-02-17 NOTE — TELEPHONE ENCOUNTER
Behavioral Health Outpatient Intake Questions    Referred By   : Self     Please advise interviewee that they need to answer all questions truthfully to allow for best care, and any misrepresentations of information may affect their ability to be seen at this clinic   => Was this discussed? Yes     If Minor Child (under age 25)    Who is/are the legal guardian(s) of the child? Is there a custody agreement? No     • If "YES"- Custody orders must be obtained prior to scheduling the first appointment  • In addition, Consent to Treatment must be signed by all legal guardians prior to scheduling the first appointment    • If "NO"- Consent to Treatment must be signed by all legal guardians prior to scheduling the first appointment    Behavioral Health Outpatient Intake History -     Presenting Problem (in patient's own words): Anxiety, Depression, lack of sleep     Are there any communication barriers for this patient? No                                               If yes, please describe barriers: no  • If there is a unique situation, please refer to [facility] for final determination  Are you taking any psychiatric medications? Yes   •   If "YES" -What are they Wellbutrin   •   If "YES" -Who prescribes? Dr Isidra Denny    Has the Patient previously received outpatient Talk Therapy or Medication Management from [place] [id number]  Yes     •    If "YES"- When, Where and with Whom? Dr Isidra Denny, saw a therapist [facility]      •    If "NO" -Has Patient received these services elsewhere? •   If "YES" -When, Where, and with Whom? Has the Patient abused alcohol or other substances in the last 6 months ? No  No concerns of substance abuse are reported  •  If "YES" -What substance, How much, How often? •  If illegal substance: Refer to [facility] (for [facility]) or [facility]    •  If Alcohol in excess of 10 drinks per week:  Refer to [facility] (for [facility]O) or [facility] History-     Is this treatment court ordered? No   • If "Yes"- refer to [address] [place] [address] for final determination  Has the Patient been convicted of a felony? No  •  If "Yes" -When, What? • Talk Therapy : Send to [address] [place] [address] for final determination   • Med Management: Send to Dr Doe Villegas for final determination     ACCEPTED as a patient Yes  • If "Yes" Appointment Date: 2/28/2023 at 2 with Dia Burton     Referred Elsewhere? No  • If “Yes” - (Where? Ex: [facility], [facility], [address] [place] [address], etc )       Name of Insurance Co: [place]  Insurance ID# [id number]  Insurance Phone # [phone]  If ins is primary or secondary? If patient is a minor, parents information such as Name, D  O B of guarantor

## 2023-02-21 ENCOUNTER — APPOINTMENT (OUTPATIENT)
Dept: PHYSICAL THERAPY | Facility: CLINIC | Age: 68
End: 2023-02-21

## 2023-02-21 NOTE — PROGRESS NOTES
PT Re-Evaluation     Today's date: 2023  Patient name: Isis Kelly  : 1955  MRN: [id number]  Referring provider: Thomas Donaldson MD  Dx: No diagnosis found  Assessment  Assessment details: Isis Kelly is a 79 y o  male with history of R total hip arthroplasty with two revisions who presents s/p R total hip revision and open reduction, which occurred on 22  Patient also has a frequent history of falls due to neuropathy in both legs  Patient presents with the following impairments: right hip pain, limited right hip AROM, limited right hip strength, poor balance, and ambulatory dysfunction  Due to these impairments, patient has difficulty performing the following: ADLs, bending forward, self-dressing, bathing, prolonged walking, prolonged sitting, sit-to-stand transfers, walking on uneven terrain, getting in/out of the car, getting in/out of the bath, shopping, driving, lifting, carrying, and sleeping  Patient has been educated in post-op contraindications / precautions, weight bearing status, home exercise program and plan of care  Patient would benefit from skilled physical therapy services to address the above functional limitations and progress towards prior level of function and independence with home exercise program   Impairments: abnormal gait, abnormal muscle firing, abnormal or restricted ROM, activity intolerance, impaired physical strength, lacks appropriate home exercise program, pain with function and poor body mechanics  Understanding of Dx/Px/POC: good   Prognosis: good   Goals  Short Term Goals (3 weeks; target date: 23)  1  Patient will be independent with initial HEP  2  Patient will demonstrate an increase in right hip  3  Patient will demonstrate an increase in right hip strength of 1/2 grade on MMT  Long Term Goals (6 weeks; target date: 3/13/23)  1   Patient will demonstrate an increase in right hip AROM to WNL in order to promote self-care activities pain-free  2  Patient will demonstrate an increase in right hip strength to 4/5 on MMT  3  Patient will be able to ascend/descend 15 stairs reciprocally without pain  4  Patient will be able to ambulate 300 feet on varied terrain without AD and without gait deviation  5  Patient will be able to perform all basic ADLs without modification  6  Patient will be able to perform Timed Up and Go in 12 5 seconds or less in order to demonstrate decreased risk of falls  Plan  Patient would benefit from: skilled physical therapy  Planned modality interventions: cryotherapy, electrical stimulation/Russian stimulation, TENS, ultrasound, thermotherapy: hydrocollator packs, unattended electrical stimulation, high voltage pulsed current: pain management and high voltage pulsed current: spasm management  Planned therapy interventions: flexibility, functional ROM exercises, graded exercise, home exercise program, joint mobilization, manual therapy, neuromuscular re-education, patient education, strengthening, stretching, therapeutic exercise, therapeutic activities, Vides taping, balance/weight bearing training, gait training, abdominal trunk stabilization, activity modification, balance, body mechanics training, graded activity, self care, postural training, IADL retraining, ADL training, breathing training, behavior modification, muscle pump exercises, therapeutic training and transfer training  Frequency: 2x week  Duration in weeks: 8  Plan of Care beginning date: 12/13/2022  Plan of Care expiration date: 3/13/2023  Treatment plan discussed with: patient         Subjective Evaluation     History of Present Illness  Date of surgery: 11/20/2022  Mechanism of injury: Pt reports that on 11/17/22, he attempted to get out of bed while wearing socks and states that he slipped and fell "flat onto his backside " Pt states that he went to the ED and was found to have dislocation of his R hip   Pt states that on 22, his surgeon attempted to reduce the dislocation, however was not successful  Pt states that he underwent R total hip revision and reduction of the hip on 22  Pt states that he was discharged and was using a walker until about one week ago  Pt is able to recall his posterior hip precautions when asked and states he has been following them  Pt also reports adherence to his HEP prescribed in the hospital  Pt reports that he has neuropathy in both of his legs from the knees down to both feet, which makes him susceptible to falls  Pt admits to falling about 1x/week     Pain  Current pain ratin  At best pain rating: 3  At worst pain ratin  Location: Right hip/back  Quality: dull ache  Relieving factors: medications  Aggravating factors: walking, standing, stair climbing and lifting  Progression: improved     Social Support  Steps to enter house: yes  8  Stairs in house: yes   15  Lives with: spouse     Employment status: not working (Retired)  Hand dominance: right  Exercise history: Not much lately due to COVID and neuropathy       Diagnostic Tests  X-ray: normal  Treatments  No previous or current treatments  Patient Goals  Patient goals for therapy: increased motion              Objective      Neurological Testing      Sensation      Hip   Left Hip   Hyposensation: light touch     Right Hip   Hyposensation: light touch     Comments   Left light touch: Reports symmetrical hyposensation below each knee and into both feet; intact above the knee       Active Range of Motion   Left Hip   Flexion: 105 degrees   Abduction: 36 degrees   External rotation (90/90): 34 degrees      Right Hip   Flexion: 88 (Terminated by PT to adhere to precautions) degrees with pain  Abduction: 25 degrees   External rotation (90/90): 22 degrees      Strength/Myotome Testing     Additional Strength Details  MMT not performed due to recent surgery     Ambulation      Comments   Ambulates with wide-based gait with SPC, tibial external rotation R > L; antalgic gait     Timed-Up-and-Go Score: 12 8 seconds with SPC        Precautions: ***      Manuals                                                                 Neuro Re-Ed                                                                                                        Ther Ex                                                                                                                     Ther Activity                                       Gait Training                                       Modalities

## 2023-02-22 ENCOUNTER — APPOINTMENT (OUTPATIENT)
Dept: LAB | Facility: CLINIC | Age: 68
End: 2023-02-22

## 2023-02-22 DIAGNOSIS — K22.0 ACHALASIA AND CARDIOSPASM: Primary | ICD-10-CM

## 2023-02-22 DIAGNOSIS — K21.9 GASTROESOPHAGEAL REFLUX DISEASE WITHOUT ESOPHAGITIS: ICD-10-CM

## 2023-02-22 DIAGNOSIS — D50.8 IRON DEFICIENCY ANEMIA SECONDARY TO INADEQUATE DIETARY IRON INTAKE: ICD-10-CM

## 2023-02-22 DIAGNOSIS — E34.9 ENDOCRINE DISEASE: ICD-10-CM

## 2023-02-22 LAB
BASOPHILS # BLD AUTO: 0.02 THOUSANDS/ÂΜL (ref 0–0.1)
BASOPHILS NFR BLD AUTO: 0 % (ref 0–1)
EOSINOPHIL # BLD AUTO: 0.04 THOUSAND/ÂΜL (ref 0–0.61)
EOSINOPHIL NFR BLD AUTO: 1 % (ref 0–6)
ERYTHROCYTE [DISTWIDTH] IN BLOOD BY AUTOMATED COUNT: 16.7 % (ref 11.6–15.1)
FERRITIN SERPL-MCNC: 22 NG/ML (ref 8–388)
HCT VFR BLD AUTO: 39.8 % (ref 36.5–49.3)
HGB BLD-MCNC: 13.2 G/DL (ref 12–17)
IMM GRANULOCYTES # BLD AUTO: 0.02 THOUSAND/UL (ref 0–0.2)
IMM GRANULOCYTES NFR BLD AUTO: 0 % (ref 0–2)
IRON SERPL-MCNC: 89 UG/DL (ref 65–175)
LYMPHOCYTES # BLD AUTO: 0.76 THOUSANDS/ÂΜL (ref 0.6–4.47)
LYMPHOCYTES NFR BLD AUTO: 13 % (ref 14–44)
MCH RBC QN AUTO: 30.3 PG (ref 26.8–34.3)
MCHC RBC AUTO-ENTMCNC: 33.2 G/DL (ref 31.4–37.4)
MCV RBC AUTO: 92 FL (ref 82–98)
MONOCYTES # BLD AUTO: 0.43 THOUSAND/ÂΜL (ref 0.17–1.22)
MONOCYTES NFR BLD AUTO: 7 % (ref 4–12)
NEUTROPHILS # BLD AUTO: 4.51 THOUSANDS/ÂΜL (ref 1.85–7.62)
NEUTS SEG NFR BLD AUTO: 79 % (ref 43–75)
NRBC BLD AUTO-RTO: 0 /100 WBCS
PLATELET # BLD AUTO: 394 THOUSANDS/UL (ref 149–390)
PMV BLD AUTO: 8.8 FL (ref 8.9–12.7)
RBC # BLD AUTO: 4.35 MILLION/UL (ref 3.88–5.62)
WBC # BLD AUTO: 5.78 THOUSAND/UL (ref 4.31–10.16)

## 2023-02-22 RX ORDER — BUPROPION HYDROCHLORIDE 300 MG/1
300 TABLET ORAL EVERY MORNING
Qty: 30 TABLET | Refills: 1 | Status: SHIPPED | OUTPATIENT
Start: 2023-02-22

## 2023-02-22 RX ORDER — DULOXETIN HYDROCHLORIDE 20 MG/1
20 CAPSULE, DELAYED RELEASE ORAL DAILY
Qty: 14 CAPSULE | Refills: 0 | Status: SHIPPED | OUTPATIENT
Start: 2023-02-22

## 2023-02-22 RX ORDER — BUSPIRONE HYDROCHLORIDE 15 MG/1
15 TABLET ORAL 2 TIMES DAILY
Qty: 60 TABLET | Refills: 2 | Status: SHIPPED | OUTPATIENT
Start: 2023-02-22

## 2023-02-23 ENCOUNTER — OFFICE VISIT (OUTPATIENT)
Dept: FAMILY MEDICINE CLINIC | Facility: CLINIC | Age: 68
End: 2023-02-23

## 2023-02-23 ENCOUNTER — TELEPHONE (OUTPATIENT)
Dept: FAMILY MEDICINE CLINIC | Facility: CLINIC | Age: 68
End: 2023-02-23

## 2023-02-23 ENCOUNTER — APPOINTMENT (OUTPATIENT)
Dept: LAB | Facility: HOSPITAL | Age: 68
End: 2023-02-23

## 2023-02-23 ENCOUNTER — APPOINTMENT (OUTPATIENT)
Dept: PHYSICAL THERAPY | Facility: CLINIC | Age: 68
End: 2023-02-23

## 2023-02-23 VITALS
HEART RATE: 84 BPM | OXYGEN SATURATION: 97 % | DIASTOLIC BLOOD PRESSURE: 84 MMHG | RESPIRATION RATE: 12 BRPM | WEIGHT: 174 LBS | TEMPERATURE: 97.5 F | HEIGHT: 66 IN | SYSTOLIC BLOOD PRESSURE: 140 MMHG | BODY MASS INDEX: 27.97 KG/M2

## 2023-02-23 DIAGNOSIS — F39 UNSPECIFIED MOOD (AFFECTIVE) DISORDER (HCC): ICD-10-CM

## 2023-02-23 DIAGNOSIS — F10.20 UNCOMPLICATED ALCOHOL DEPENDENCE (HCC): ICD-10-CM

## 2023-02-23 DIAGNOSIS — I10 PRIMARY HYPERTENSION: ICD-10-CM

## 2023-02-23 DIAGNOSIS — G89.29 CHRONIC BILATERAL LOW BACK PAIN WITH BILATERAL SCIATICA: ICD-10-CM

## 2023-02-23 DIAGNOSIS — M54.41 CHRONIC BILATERAL LOW BACK PAIN WITH BILATERAL SCIATICA: ICD-10-CM

## 2023-02-23 DIAGNOSIS — M54.42 CHRONIC BILATERAL LOW BACK PAIN WITH BILATERAL SCIATICA: ICD-10-CM

## 2023-02-23 DIAGNOSIS — G89.4 CHRONIC PAIN DISORDER: ICD-10-CM

## 2023-02-23 DIAGNOSIS — M51.06 INTERVERTEBRAL DISC DISORDER OF LUMBAR REGION WITH MYELOPATHY: ICD-10-CM

## 2023-02-23 DIAGNOSIS — F11.20 UNCOMPLICATED OPIOID DEPENDENCE (HCC): ICD-10-CM

## 2023-02-23 DIAGNOSIS — M15.9 GENERALIZED OSTEOARTHRITIS OF MULTIPLE SITES: Primary | ICD-10-CM

## 2023-02-23 NOTE — TELEPHONE ENCOUNTER
When Bety Learn left he could not remember when Dr schmid wanted to see him next    Dr Elizabeth Sage was in with another patient    HP wants Bety Learn to message him through [employer] in 2 weeks with an update and wants an appt in 4 weeks (opioid appt)

## 2023-02-24 ENCOUNTER — OFFICE VISIT (OUTPATIENT)
Dept: PHYSICAL THERAPY | Facility: CLINIC | Age: 68
End: 2023-02-24

## 2023-02-24 DIAGNOSIS — G89.4 CHRONIC PAIN DISORDER: Primary | ICD-10-CM

## 2023-02-24 DIAGNOSIS — Z96.641 HISTORY OF RIGHT HIP REPLACEMENT: ICD-10-CM

## 2023-02-24 NOTE — PROGRESS NOTES
Daily Note     Today's date: 2023  Patient name: Samantha Velarde  : 1955  MRN: [id number]  Referring provider: Val Mack MD  Dx:   Encounter Diagnosis     ICD-10-CM    1  Chronic pain disorder  G89 4       2  History of right hip replacement  Z96 641                      Subjective: Patient reports "falling" yesterday after losing his balance when bending down to  after his dog  Objective: See treatment diary below      Assessment: Tolerated treatment well  Patient continues to progress with dynamic balance training, demonstrates improved control with step ups with march  Patient continues to have significant fatigue with increased repetitions  Patient demonstrated fatigue post treatment, exhibited good technique with therapeutic exercises and would benefit from continued PT      Plan: Continue per plan of care  Progress treatment as tolerated  Progress challenge as appropriate with irritability       Insurance:  AMA/CMS Eval/ Re-eval POC expires FOTO Total   Visits  Co-Insurance   CMS - [facility] 12/13/22 3/13/23 48/63  $0                                        Precautions: s/p R total hip arthroplasty revision, open reduction 22; RLE WBAT; R posterior hip precautions, neuropathy in BLE, FALL RISK, chronic neck and back pain     2/24 2/16 2/14 2/9 2/3 1/31   Manuals 15 14 13 12 11 10   STM/MFR         Manual flexibility          Joint mobs                   Neuro Re-Ed         Standing Horse On/Off Standing hip abd 2x10 each Standing hip abd 10x ea Standing hip abd 10x ea      Squat w/ DB Weight Shift Blue Therapads 2x10 w/ staggered stance Blue Therapads 2x10 straight squat 5# Blue Therapads 2x10 straight squat Blue therapads 2x10 5# crossbody Blue therapads 2x10 5# UE crossbody Blue therapads 2x10 5# UE crossbody   Staggered Squat w/ Pallof Press     Blue Therapads 2x10 5# Pallof Blue Therapads 2x10 5# pallof   Standing hip abd         SL clamshell         Standing hip flexion onto 8" step         Lunge w/ mild UE assist 2x10 w/ [place] 2x10 w/ [place] 2x10 w/ [place] 2x10 w/ [place] 2x10 w/ [place] [employer] On leg length discrepancy with cues to complete with R selena  Also gave info on shoe lift  Romberg EC         Nerve Glide         Side lunge onto  [place] 2x10 ea direction green 2x10 ea direction green 2x10 ea direction green      Biodex Weight shift: x 4 min AP, x 4 min ML ML/AP weight shifts LOS 3 times with lvl 10, 3x10 squats with lvl 9 ML/AP weight shifts LOS 3 times with lvl 10, 3x10 squats with lvl 9 ML/AP weight shifts LOS 3 times with lvl 10, 3x10 squats with Lvl 9 ML/AP weight shifts 3 min ea - static LOS 3 times with target - 3x10 squats with Lvl 9, 1 set Lvl 7 ML/AP weight shifts 3 min ea - static LOS 3 times with target -3x10 squats with L10   Item retrieval With 1UE support by mirror, x 6 min CG        Fwd step up   2x10 w/ contralateral march, R held due to instability  2x10 lateral step ups   2x10 w/ contralateral march, R held due to instability  Fwd 2x10 8" B w/ contralateral march   Ther Ex         Ankle pumps         Heel slides         HS stretching         SAQ         Hip Add sq         Clamshells         Prone quad str         Flexion in sitting    10x (6x) 10x (3x) 10x (3x)   Step [place] 2x10 [employer]        Sup modified karen str         Standing toe raises         Assessment & Management                                      Access Code: [email or web address]  URL: [email or web address]  Date: 12/13/2022  Prepared by: Brody Espino    Exercises  • Supine Ankle Pumps - 1-2 x daily - 7 x weekly - 1 sets - 30 reps  • Supine Quadricep Sets - 1-2 x daily - 7 x weekly - 2 sets - 10 reps - 5 hold  • Supine Gluteal Sets - 1-2 x daily - 7 x weekly - 2 sets - 10 reps - 5 hold  • Supine Heel Slides - 1-2 x daily - 7 x weekly - 2 sets - 10 reps - 5 hold  • Supine Active Straight Leg Raise - 1-2 x daily - 7 x weekly - 2 sets - 10 reps - 3 hold

## 2023-02-26 NOTE — PATIENT INSTRUCTIONS
CONTINUE CURRENT TREATMENT PLAN  ENCOURAGED NO ALCOHOL  WILL REMAIN OF WELLBUTRIN 300 MG  MAY NEED TO INCREASE     CONTINUE PSYCH CONSULT / THERAPY    CALL IN 2 WEEKS WITH UPDATE

## 2023-02-26 NOTE — PROGRESS NOTES
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    3  Chronic bilateral low back pain with bilateral sciatica    4  Primary hypertension  Assessment & Plan:  STABLE  DENIES ANY CP, SOB, PALPITATIONS, OR HEADACHE  NOTES NO WATER RETENTION  COMPLIANT WITH MEDICATION  NO CONCERNS    - CONTINUE CURRENT TREATMENT PLAN  - MONITOR DIETARY SODIUM INTAKE  - ENCOURAGE PHYSICAL ACTIVITY  - RV 3 MONTHS        5  Chronic pain disorder    6  Uncomplicated opioid dependence ([facility] )    7  Uncomplicated alcohol dependence ([facility] )    8  Unspecified mood (affective) disorder ([facility] )           Subjective      FOLLOW UP    REVIEWED MEDICAL ISSUES  REVIEWED DEPRESSION ISSUES    DISCUSSED THERAPEUTIC PLAN    Review of Systems   Constitutional: Negative for chills, fatigue and fever  HENT: Negative for congestion, ear discharge, ear pain, mouth sores, postnasal drip, sore throat and trouble swallowing  Eyes: Negative for pain, discharge and visual disturbance  Respiratory: Negative for cough, shortness of breath and wheezing  Cardiovascular: Negative for chest pain, palpitations and leg swelling  Gastrointestinal: Negative for abdominal distention, abdominal pain, blood in stool, diarrhea and nausea  Endocrine: Negative for polydipsia, polyphagia and polyuria  Genitourinary: Negative for dysuria, frequency, hematuria and urgency  Musculoskeletal: Negative for arthralgias, gait problem and joint swelling     Skin: Negative for pallor and rash  Neurological: Negative for dizziness, syncope, speech difficulty, weakness, light-headedness, numbness and headaches  Hematological: Negative for adenopathy  Psychiatric/Behavioral: Positive for dysphoric mood  Negative for behavioral problems, confusion and sleep disturbance  The patient is nervous/anxious  Current Outpatient Medications on File Prior to Visit   Medication Sig   • Armodafinil 250 MG tablet Take 1 tablet (250 mg total) by mouth daily   • buPROPion (WELLBUTRIN XL) 300 mg 24 hr tablet Take 1 tablet (300 mg total) by mouth every morning   • busPIRone (BUSPAR) 15 mg tablet Take 1 tablet (15 mg total) by mouth 2 (two) times a day   • Diclofenac Sodium (VOLTAREN) 1 % Apply 2 g topically 4 (four) times a day   • HYDROcodone-acetaminophen (NORCO)  mg per tablet Take 1 tablet by mouth every 4 (four) hours as needed (PAIN) Max Daily Amount: 6 tablets   • meloxicam (MOBIC) 15 mg tablet Take 1 tablet (15 mg total) by mouth daily   • multivitamin (THERAGRAN) TABS Take 1 tablet by mouth daily   • naloxone (NARCAN) 4 mg/0 1 mL nasal spray Administer 1 spray into a nostril  If breathing does not return to normal or if breathing difficulty resumes after 2-3 minutes, give another dose in the other nostril using a new spray     • olmesartan-hydrochlorothiazide (BENICAR HCT) 40-12 5 MG per tablet Take 1 tablet by mouth daily (Patient taking differently: Take 0 5 tablets by mouth daily)   • omeprazole (PriLOSEC) 40 MG capsule Take 1 capsule by mouth every 12 (twelve) hours   • oxyCODONE (OxyCONTIN) 10 mg 12 hr tablet Take 1 tablet (10 mg total) by mouth 2 (two) times a day Max Daily Amount: 20 mg   • oxyCODONE (OxyCONTIN) 20 mg 12 hr tablet Take 1 tablet (20 mg total) by mouth every 12 (twelve) hours Max Daily Amount: 40 mg   • tadalafil (CIALIS) 5 MG tablet Take 5 mg by mouth daily   • DULoxetine (CYMBALTA) 20 mg capsule Take 1 capsule (20 mg total) by mouth daily Then stop (Patient not taking: Reported on 2/23/2023)       Objective     /84 (BP Location: Right arm, Patient Position: Sitting, Cuff Size: Large)   Pulse 84   Temp 97 5 °F (36 4 °C) (Temporal)   Resp 12   Ht 5' 5 5" (1 664 m)   Wt 78 9 kg (174 lb)   SpO2 97%   BMI 28 51 kg/m²     Physical Exam  Constitutional:       Appearance: He is well-developed  HENT:      Head: Normocephalic and atraumatic  Eyes:      General:         Right eye: No discharge  Left eye: No discharge  Conjunctiva/sclera: Conjunctivae normal       Pupils: Pupils are equal, round, and reactive to light  Neck:      Thyroid: No thyromegaly  Vascular: No JVD  Cardiovascular:      Rate and Rhythm: Normal rate and regular rhythm  Heart sounds: Normal heart sounds  No murmur heard  Pulmonary:      Effort: Pulmonary effort is normal       Breath sounds: Normal breath sounds  No wheezing or rales  Abdominal:      General: Bowel sounds are normal       Palpations: Abdomen is soft  There is no mass  Tenderness: There is no abdominal tenderness  There is no guarding or rebound  Musculoskeletal:         General: Tenderness present  No deformity  Cervical back: Neck supple  Comments: SEVERE DJD CHANGES   Lymphadenopathy:      Cervical: No cervical adenopathy  Skin:     General: Skin is warm and dry  Findings: No erythema or rash  Neurological:      Mental Status: He is alert and oriented to person, place, and time  Psychiatric:         Behavior: Behavior normal          Thought Content:  Thought content normal          Judgment: Judgment normal        Sharon Small MD

## 2023-02-28 ENCOUNTER — APPOINTMENT (OUTPATIENT)
Dept: PHYSICAL THERAPY | Facility: CLINIC | Age: 68
End: 2023-02-28

## 2023-03-01 ENCOUNTER — OFFICE VISIT (OUTPATIENT)
Dept: PHYSICAL THERAPY | Facility: CLINIC | Age: 68
End: 2023-03-01

## 2023-03-01 DIAGNOSIS — G89.4 CHRONIC PAIN DISORDER: Primary | ICD-10-CM

## 2023-03-01 DIAGNOSIS — Z96.641 HISTORY OF RIGHT HIP REPLACEMENT: ICD-10-CM

## 2023-03-01 NOTE — PROGRESS NOTES
Daily Note     Today's date: 3/1/2023  Patient name: Saskia Pond  : 1955  MRN: [id number]  Referring provider: Ridge Lara MD  Dx:   Encounter Diagnosis     ICD-10-CM    1  Chronic pain disorder  G89 4       2  History of right hip replacement  Z96 641                      Subjective: Patient reports he fell a few times over the past week, he feels sore, but no change in daily function  Objective: See treatment diary below      Assessment: Tolerated treatment well  Patient demonstrates no significant change to functional status or ROM/strength post falls  He presented with increased tissue irritability  Continues to be challenged by dynamic balance activities  Patient demonstrated fatigue post treatment, exhibited good technique with therapeutic exercises and would benefit from continued PT      Plan: Continue per plan of care  Progress treatment as tolerated  Progress challenge as appropriate with irritability       Insurance:  A/CMS Eval/ Re-eval POC expires FOTO Total   Visits  Co-Insurance   CMS - [facility] 12/13/22 3/13/23 48/63  $0                                        Precautions: s/p R total hip arthroplasty revision, open reduction 22; RLE WBAT; R posterior hip precautions, neuropathy in BLE, FALL RISK, chronic neck and back pain     3/1 2/24 2/16 2/14 2/9 1/31   Manuals 12 15 14 13 12 10   STM/MFR         Manual flexibility          Joint mobs                   Neuro Re-Ed         Standing Horse On/Off Standing abd on [id number] Standing hip abd 2x10 each Standing hip abd 10x ea Standing hip abd 10x ea     Squat w/ DB Weight Shift Blue Therapads 2x10  Blue Therapads 2x10 w/ staggered stance Blue Therapads 2x10 straight squat 5# Blue Therapads 2x10 straight squat Blue therapads 2x10 5# crossbody Blue therapads 2x10 5# UE crossbody   Staggered Squat w/ Pallof Press      Blue Therapads 2x10 5# pallof   Standing hip abd         SL clamshell         Standing hip flexion onto 8" step         Lungyasemin w/ mild UE assist  2x10 w/ [place] 2x10 w/ [place] 2x10 w/ [place] 2x10 w/ [place]    Education  On leg length discrepancy with cues to complete with R selena  Also gave info on shoe lift  Romberg EC         Nerve Glide         Side lunge onto  [address] 2x10 weight shift green 2x10 ea direction green 2x10 ea direction green 2x10 ea direction green     Biodex LOS 2 lvl 10, Weight shift squat 2x10 lvl 10 Weight shift: x 4 min AP, x 4 min ML ML/AP weight shifts LOS 3 times with lvl 10, 3x10 squats with lvl 9 ML/AP weight shifts LOS 3 times with lvl 10, 3x10 squats with lvl 9 ML/AP weight shifts LOS 3 times with lvl 10, 3x10 squats with Lvl 9 ML/AP weight shifts 3 min ea - static LOS 3 times with target -3x10 squats with L10   Item retrieval  With 1UE support by mirror, x 6 min CG       Fwd step up    2x10 w/ contralateral march, R held due to instability  2x10 lateral step ups   2x10 w/ contralateral march, R held due to instability  Fwd 2x10 8" B w/ contralateral march   Ther Ex         Ankle pumps         Heel slides         HS stretching         SAQ         Hip Add sq         Clamshells         Prone quad str         Flexion in sitting     10x (6x) 10x (3x)   Step [place]  2x10 [employer]       Sup modified karen str         Standing toe raises         Assessment & Management                                      Access Code: [email or web address]  URL: [email or web address]  Date: 12/13/2022  Prepared by: Nela Huston    Exercises  • Supine Ankle Pumps - 1-2 x daily - 7 x weekly - 1 sets - 30 reps  • Supine Quadricep Sets - 1-2 x daily - 7 x weekly - 2 sets - 10 reps - 5 hold  • Supine Gluteal Sets - 1-2 x daily - 7 x weekly - 2 sets - 10 reps - 5 hold  • Supine Heel Slides - 1-2 x daily - 7 x weekly - 2 sets - 10 reps - 5 hold  • Supine Active Straight Leg Raise - 1-2 x daily - 7 x weekly - 2 sets - 10 reps - 3 hold

## 2023-03-02 ENCOUNTER — APPOINTMENT (OUTPATIENT)
Dept: PHYSICAL THERAPY | Facility: CLINIC | Age: 68
End: 2023-03-02

## 2023-03-03 ENCOUNTER — OFFICE VISIT (OUTPATIENT)
Dept: PHYSICAL THERAPY | Facility: CLINIC | Age: 68
End: 2023-03-03

## 2023-03-03 DIAGNOSIS — M54.41 CHRONIC BILATERAL LOW BACK PAIN WITH BILATERAL SCIATICA: ICD-10-CM

## 2023-03-03 DIAGNOSIS — R53.83 FATIGUE, UNSPECIFIED TYPE: ICD-10-CM

## 2023-03-03 DIAGNOSIS — G89.4 CHRONIC PAIN DISORDER: Primary | ICD-10-CM

## 2023-03-03 DIAGNOSIS — G47.30 SLEEP APNEA, UNSPECIFIED TYPE: ICD-10-CM

## 2023-03-03 DIAGNOSIS — Z96.641 HISTORY OF RIGHT HIP REPLACEMENT: ICD-10-CM

## 2023-03-03 DIAGNOSIS — M54.42 CHRONIC BILATERAL LOW BACK PAIN WITH BILATERAL SCIATICA: ICD-10-CM

## 2023-03-03 DIAGNOSIS — G89.29 CHRONIC BILATERAL LOW BACK PAIN WITH BILATERAL SCIATICA: ICD-10-CM

## 2023-03-03 DIAGNOSIS — G89.4 CHRONIC PAIN SYNDROME: ICD-10-CM

## 2023-03-03 NOTE — PROGRESS NOTES
Daily Note     Today's date: 3/3/2023  Patient name: Hermelindo Mishra  : 1955  MRN: [id number]  Referring provider: Luis Daniel Rivera MD  Dx:   Encounter Diagnoses   Name Primary? • Chronic pain disorder Yes   • History of right hip replacement                   Subjective: Pt reports he has suffered several falls since last session  One fall down the stairs with increased pain in low back and in legs  Pain currently rated 8/10  Objective: See treatment diary below      Assessment: Pt tolerated treatment fairly with significant complaints of pain in low back increased radicular symptoms  Able to tolerate supine table exercises today with significant rests in between  Recommended pt seek medical consult secondary to increasing symptoms and incidence of falls over the past week  Pt in agreement  Pt denies any red flag symptoms such as bowel and bladder changes  Plan: Continue with plan of care and progress as tolerated            Insurance:  AMA/CMS Eval/ Re-eval POC expires FOTO Total   Visits  Co-Insurance   CMS - [facility] 12/13/22 3/13/23 48/63  $0                                        Precautions: s/p R total hip arthroplasty revision, open reduction 22; RLE WBAT; R posterior hip precautions, neuropathy in BLE, FALL RISK, chronic neck and back pain     3/3 3/1 2/24 2/16 2/14   Manuals 17 16 15 14 13   STM/MFR        Manual flexibility         Joint mobs                 Neuro Re-Ed        Standing Horse On/Off  Standing abd on [id number] Standing hip abd 2x10 each Standing hip abd 10x ea Standing hip abd 10x ea   Squat w/ DB Weight Shift  Blue Therapads 2x10  Blue Therapads 2x10 w/ staggered stance Blue Therapads 2x10 straight squat 5# Blue Therapads 2x10 straight squat   Staggered Squat w/ Pallof Press        Standing hip abd        SL clamshell        Standing hip flexion onto 8" step        Lunge w/ mild UE assist   2x10 w/ [place] 2x10 w/ [place] 2x10 w/ Carmen [place]   Education   On leg length discrepancy with cues to complete with KAT walters  Also gave info on shoe lift  Romberg EC        Nerve Glide        Side lunge onto  [address]  2x10 weight shift green 2x10 ea direction green 2x10 ea direction green 2x10 ea direction green   Biodex LOS x2 lvl 9  LOS 2 lvl 10, Weight shift squat 2x10 lvl 10 Weight shift: x 4 min AP, x 4 min ML ML/AP weight shifts LOS 3 times with lvl 10, 3x10 squats with lvl 9 ML/AP weight shifts LOS 3 times with lvl 10, 3x10 squats with lvl 9   Item retrieval   With 1UE support by mirror, x 6 min CG     Fwd step up     2x10 w/ contralateral march, R held due to instability  2x10 lateral step ups     Ther Ex        LTR Hold 5, 2x10        Heel slides x15 (inc symp)       Bent knee fall out Hold 5, 2x10 ea       SAQ        Hip Add sq Hold 5, x15       Clamshells        Prone quad str        Flexion in sitting Hold 5, 2x10        Step [place]   2x10 [employer]     Sup modified karen str        Standing toe raises        Assessment & Management        Supine marches 2x10       TA with UE scap depression on ball Hold 5, 2x10                  Access Code: [email or web address]  URL: [email or web address]  Date: 12/13/2022  Prepared by: Chris George    Exercises  • Supine Ankle Pumps - 1-2 x daily - 7 x weekly - 1 sets - 30 reps  • Supine Quadricep Sets - 1-2 x daily - 7 x weekly - 2 sets - 10 reps - 5 hold  • Supine Gluteal Sets - 1-2 x daily - 7 x weekly - 2 sets - 10 reps - 5 hold  • Supine Heel Slides - 1-2 x daily - 7 x weekly - 2 sets - 10 reps - 5 hold  • Supine Active Straight Leg Raise - 1-2 x daily - 7 x weekly - 2 sets - 10 reps - 3 hold

## 2023-03-04 RX ORDER — HYDROCODONE BITARTRATE AND ACETAMINOPHEN 10; 325 MG/1; MG/1
1 TABLET ORAL EVERY 4 HOURS PRN
Qty: 180 TABLET | Refills: 0 | Status: SHIPPED | OUTPATIENT
Start: 2023-03-04

## 2023-03-04 RX ORDER — OXYCODONE HCL 10 MG/1
10 TABLET, FILM COATED, EXTENDED RELEASE ORAL 2 TIMES DAILY
Qty: 60 TABLET | Refills: 0 | Status: SHIPPED | OUTPATIENT
Start: 2023-03-04

## 2023-03-04 RX ORDER — OXYCODONE HCL 20 MG/1
20 TABLET, FILM COATED, EXTENDED RELEASE ORAL EVERY 12 HOURS
Qty: 60 TABLET | Refills: 0 | Status: SHIPPED | OUTPATIENT
Start: 2023-03-04

## 2023-03-04 RX ORDER — ARMODAFINIL 250 MG/1
250 TABLET ORAL DAILY
Qty: 30 TABLET | Refills: 0 | Status: SHIPPED | OUTPATIENT
Start: 2023-03-04

## 2023-03-07 ENCOUNTER — EVALUATION (OUTPATIENT)
Dept: PHYSICAL THERAPY | Facility: CLINIC | Age: 68
End: 2023-03-07

## 2023-03-07 DIAGNOSIS — Z96.641 HISTORY OF RIGHT HIP REPLACEMENT: ICD-10-CM

## 2023-03-07 DIAGNOSIS — G89.4 CHRONIC PAIN DISORDER: Primary | ICD-10-CM

## 2023-03-07 NOTE — PROGRESS NOTES
PT Re-Evaluation     Today's date: 3/7/2023  Patient name: Monique Sanders  : 1955  MRN: [id number]  Referring provider: Monie Guzmán MD  Dx:   Encounter Diagnosis     ICD-10-CM    1  Chronic pain disorder  G89 4       2  History of right hip replacement  Z96 641                      Assessment  Assessment details: Monique Sanders is a 79 y o  male with history of R total hip arthroplasty with two revisions who presents s/p R total hip revision and open reduction, which occurred on 22  Patient reports falling multiple times over the last few weeks and reports he is following up with a neurologist  Patient presents with the following impairments: balance deficits with overground walking, limited right hip strength, poor balance with transitions, and ambulatory dysfunction  Due to these impairments, patient has difficulty performing the following: ADLs stair navigation, prolonged walking, sit-to-stand transfers, walking on uneven terrain, getting in/out of the car, getting in/out of the bath, shopping, driving, lifting, carrying, and sleeping  Patient has demonstrated improvements dynamic balance and global LE strength, though he continues to have hip abduction and ER deficits that contribute to difficulty with balance  Patient would benefit from skilled physical therapy services to address the above functional limitations and progress towards prior level of function and independence with home exercise program   Impairments: abnormal gait, abnormal muscle firing, abnormal or restricted ROM, activity intolerance, impaired physical strength, lacks appropriate home exercise program, pain with function and poor body mechanics  Understanding of Dx/Px/POC: good   Prognosis: good   Goals  Short Term Goals (3 weeks; target date: 23)  1  Patient will be independent with initial HEP  -met  2  Patient will demonstrate an increase in right hip -progressing towards  3   Patient will demonstrate an increase in right hip strength of 1/2 grade on MMT -progressing towards    Long Term Goals (6 weeks; target date: 3/13/23)  1  Patient will demonstrate an increase in right hip AROM to WNL in order to promote self-care activities pain-free -met  2  Patient will demonstrate an increase in right hip strength to 4/5 on MMT -met  3  Patient will be able to ascend/descend 15 stairs reciprocally without pain  -progressing towards  4  Patient will be able to ambulate 300 feet on varied terrain without AD and without gait deviation  -progressing towards   5  Patient will be able to perform all basic ADLs without modification  -progressing towards  6  Patient will be able to perform Timed Up and Go in 12 5 seconds or less in order to demonstrate decreased risk of falls  -progressing towards  Plan  Patient would benefit from: skilled physical therapy  Planned modality interventions: cryotherapy, electrical stimulation/Russian stimulation, TENS, ultrasound, thermotherapy: hydrocollator packs, unattended electrical stimulation, high voltage pulsed current: pain management and high voltage pulsed current: spasm management  Planned therapy interventions: flexibility, functional ROM exercises, graded exercise, home exercise program, joint mobilization, manual therapy, neuromuscular re-education, patient education, strengthening, stretching, therapeutic exercise, therapeutic activities, Vides taping, balance/weight bearing training, gait training, abdominal trunk stabilization, activity modification, balance, body mechanics training, graded activity, self care, postural training, IADL retraining, ADL training, breathing training, behavior modification, muscle pump exercises, therapeutic training and transfer training  Frequency: 2x week  Duration in weeks: 8  Treatment plan discussed with: patient         Subjective Evaluation     History of Present Illness  Date of surgery: 11/20/2022  Mechanism of injury: Pt reports that he feels improvement with daily activities, though he continues to have "up and down" days that contribute to his balance dysfunction  He reports no pain with his hip, though he continues to have report difficulty with hip weakness that he feels may contribute to his balance limitations  He reports requiring an SPC to ambulate any prolonged distances, and feels unsteady when walking outside without even surfaces  He reports falling 1x a week on average and he fell multiple times last week    Pain  Current pain rating: 3  At best pain rating: 3  At worst pain ratin  Location: Right hip/back  Quality: dull ache  Relieving factors: medications  Aggravating factors: walking, standing, stair climbing and lifting  Progression: improved     Social Support  Steps to enter house: yes  8  Stairs in house: yes   15  Lives with: spouse     Employment status: not working (Retired)  Hand dominance: right  Exercise history: Not much lately due to COVID and neuropathy       Diagnostic Tests  X-ray: normal  Treatments  No previous or current treatments  Patient Goals  Patient goals for therapy: increased motion              Objective      Neurological Testing      Sensation      Hip   Left Hip   Hyposensation: light touch     Right Hip   Hyposensation: light touch     Comments   Left light touch: Reports symmetrical hyposensation below each knee and into both feet; intact above the knee       Active Range of Motion   Left Hip   Flexion: 105 degrees   Abduction: 36 degrees   External rotation (90/90): 34 degrees      Right Hip   Flexion: 112   Abduction: 38 degrees   External rotation (90/90): 22 degrees      Strength/Myotome Testing     Additional Strength Details  MMT:    Hip Flexion: 4+, 4  Hip ER: 4, 3+  Hip Abd: 4-, 3  Hip Ext: 4, 4-     Ambulation      Comments   Ambulates with wide-based gait with SPC, tibial external rotation R > L; antalgic gait     Timed-Up-and-Go Score: 15 1 no SPC  6 min walk: 925ft w/ SPC  5x STS: 16 1 Precautions:   Past Medical History:   Diagnosis Date   • Anxiety 2010   • Arthritis 1990   • Contreras's esophagus    • Cancer ([facility] [address] ) 2018    Stage 1 prostrate cancer; under active surveillance  • Depression    • GERD (gastroesophageal reflux disease) 2005   • Head injury    • Hypertension    • Osteoarthritis 1990   • Psychiatric disorder    • Sleep apnea     CPAP at HS   • Spinal arthritis        Precautions: s/p R total hip arthroplasty revision, open reduction 11/20/22; RLE WBAT; R posterior hip precautions, neuropathy in BLE, FALL RISK, chronic neck and back pain     3/7 3/3 3/1 2/24 2/16   Manuals 18 17 16 15 14   STM/MFR        Manual flexibility         Joint mobs                 Neuro Re-Ed        Standing Horse On/Off   Standing abd on [id number] Standing hip abd 2x10 each Standing hip abd 10x ea   Squat w/ DB Weight Shift   Blue Therapads 2x10  Blue Therapads 2x10 w/ staggered stance Blue Therapads 2x10 straight squat 5#   Staggered Squat w/ Pallof Press        Standing hip abd        SL clamshell        Standing hip flexion onto 8" step        Lunge w/ mild UE assist [place] 10x ea   2x10 w/ [place] 2x10 w/ [place]   Education    On leg length discrepancy with cues to complete with R lungyasemin  Also gave info on shoe lift  Romberg EC        Nerve Glide        Side lunge onto  [address]   2x10 weight shift green 2x10 ea direction green 2x10 ea direction green   Biodex LOS x2 lvl 10, weight shift squats 2x10 lvl 10 LOS x2 lvl 9  LOS 2 lvl 10, Weight shift squat 2x10 lvl 10 Weight shift: x 4 min AP, x 4 min ML ML/AP weight shifts LOS 3 times with lvl 10, 3x10 squats with lvl 9   Item retrieval    With 1UE support by mirror, x 6 min CG    Fwd step up      2x10 w/ contralateral march, R held due to instability   2x10 lateral step ups    Ther Ex        LTR  Hold 5, 2x10       Heel slides  x15 (inc symp)      Bent knee fall out  Hold 5, 2x10 ea      SAQ        Hip Add sq  Hold 5, x15      Clamshells        Prone quad str        Flexion in sitting  Hold 5, 2x10       Step [place]    2x10 [employer]    Sup modified karen str        Standing toe raises        Assessment & Management Re-assess and goal setting for ADLs       Supine marches  2x10      TA with UE scap depression on ball  Hold 5, 2x10

## 2023-03-09 ENCOUNTER — APPOINTMENT (OUTPATIENT)
Dept: PHYSICAL THERAPY | Facility: CLINIC | Age: 68
End: 2023-03-09

## 2023-03-10 ENCOUNTER — OFFICE VISIT (OUTPATIENT)
Dept: PHYSICAL THERAPY | Facility: CLINIC | Age: 68
End: 2023-03-10

## 2023-03-10 DIAGNOSIS — G89.4 CHRONIC PAIN DISORDER: Primary | ICD-10-CM

## 2023-03-10 DIAGNOSIS — Z96.641 HISTORY OF RIGHT HIP REPLACEMENT: ICD-10-CM

## 2023-03-10 NOTE — PROGRESS NOTES
Daily Note     Today's date: 3/10/2023  Patient name: Valente Manzo  : 1955  MRN: [id number]  Referring provider: Bob Ribera MD  Dx:   Encounter Diagnosis     ICD-10-CM    1  Chronic pain disorder  G89 4       2  History of right hip replacement  Z96 641                      Subjective: Valente Manzo reports he continues to have good and bad days, continued significant pain and imabalance  Overall however he does feel he has made improvements in strength since starting OPPT  Objective: See treatment diary below      Assessment: Tolerated treatment well  Patient demonstrated fatigue post treatment, with consistent cues to improve fwd weight shift and avoid tendency to lock knees and hips into extension  He had one incident of LOB posteriorly but was able to grab onto table, indicating risk of falls and need for close supervision during WB activity  Plan: Continue per plan of care  Precautions:   Past Medical History:   Diagnosis Date   • Anxiety    • Arthritis    • Contreras's esophagus    • Cancer ([facility] [address] )     Stage 1 prostrate cancer; under active surveillance     • Depression    • GERD (gastroesophageal reflux disease)    • Head injury    • Hypertension    • Osteoarthritis    • Psychiatric disorder    • Sleep apnea     CPAP at HS   • Spinal arthritis        Precautions: s/p R total hip arthroplasty revision, open reduction 22; RLE WBAT; R posterior hip precautions, neuropathy in BLE, FALL RISK, chronic neck and back pain     3/10 3/7 3/3 3/1 2/24   Manuals 19 18 17 16 15   STM/MFR        Manual flexibility         Joint mobs                 Neuro Re-Ed        Standing Horse On/Off 15x B   Standing abd on [address] Standing hip abd 2x10 each   Squat w/ DB Weight Shift    Blue Therapads 2x10  Blue Therapads 2x10 w/ staggered stance   Staggered Squat w/ Pallof Press        Side step at table 2 UE support and cues to encourage fwd weight shift, reduce lateral trunk lean  [id number]       SL clamshell        Standing hip flexion onto 8" step        Lunge w/ mild UE assist  [place] 10x ea   2x10 w/ [place]   Education     On leg length discrepancy with cues to complete with KAT walters  Also gave info on shoe lift     Romberg EC        Nerve Glide        Side lunge onto  [address]    2x10 weight shift green 2x10 ea direction green   Biodex  LOS x2 lvl 10, weight shift squats 2x10 lvl 10 LOS x2 lvl 9  LOS 2 lvl 10, Weight shift squat 2x10 lvl 10 Weight shift: x 4 min AP, x 4 min ML   Item retrieval     With 1UE support by mirror, x 6 min CG   Fwd step up         Ther Ex        LTR   Hold 5, 2x10      Heel slides   x15 (inc symp)     Bent knee fall out   Hold 5, 2x10 ea     SAQ        Hip Add sq   Hold 5, x15     B Heel raises Focus on engaging quads/glutes avoiding extension thrust  2x10 B UE support       Prone quad str        Flexion in sitting 15x neutral  Hold 5, 2x10      Step [place]     2x10 [employer]   Sup modified karen str        Standing toe raises        Assessment & Management  Re-assess and goal setting for ADLs      Supine marches   2x10     TA with UE scap depression on ball   Hold 5, 2x10

## 2023-03-14 ENCOUNTER — APPOINTMENT (OUTPATIENT)
Dept: PHYSICAL THERAPY | Facility: CLINIC | Age: 68
End: 2023-03-14

## 2023-03-15 ENCOUNTER — TELEPHONE (OUTPATIENT)
Dept: HEMATOLOGY ONCOLOGY | Facility: CLINIC | Age: 68
End: 2023-03-15

## 2023-03-16 ENCOUNTER — APPOINTMENT (OUTPATIENT)
Dept: LAB | Facility: CLINIC | Age: 68
End: 2023-03-16

## 2023-03-16 ENCOUNTER — OFFICE VISIT (OUTPATIENT)
Dept: PHYSICAL THERAPY | Facility: CLINIC | Age: 68
End: 2023-03-16

## 2023-03-16 DIAGNOSIS — G89.4 CHRONIC PAIN DISORDER: Primary | ICD-10-CM

## 2023-03-16 DIAGNOSIS — Z78.9 NORMAL TISSUE: ICD-10-CM

## 2023-03-16 DIAGNOSIS — D50.9 IRON DEFICIENCY ANEMIA, UNSPECIFIED IRON DEFICIENCY ANEMIA TYPE: Primary | ICD-10-CM

## 2023-03-16 DIAGNOSIS — Z96.641 HISTORY OF RIGHT HIP REPLACEMENT: ICD-10-CM

## 2023-03-16 DIAGNOSIS — D51.8 OTHER VITAMIN B12 DEFICIENCY ANEMIA: ICD-10-CM

## 2023-03-16 DIAGNOSIS — D50.9 MICROCYTIC ANEMIA: ICD-10-CM

## 2023-03-16 LAB
ALBUMIN SERPL BCP-MCNC: 4.1 G/DL (ref 3.5–5)
ALP SERPL-CCNC: 80 U/L (ref 46–116)
ALT SERPL W P-5'-P-CCNC: 22 U/L (ref 12–78)
ANION GAP SERPL CALCULATED.3IONS-SCNC: 6 MMOL/L (ref 4–13)
AST SERPL W P-5'-P-CCNC: 22 U/L (ref 5–45)
BASOPHILS # BLD AUTO: 0.03 THOUSANDS/ÂΜL (ref 0–0.1)
BASOPHILS NFR BLD AUTO: 1 % (ref 0–1)
BILIRUB SERPL-MCNC: 0.47 MG/DL (ref 0.2–1)
BUN SERPL-MCNC: 17 MG/DL (ref 5–25)
CALCIUM SERPL-MCNC: 9.2 MG/DL (ref 8.3–10.1)
CHLORIDE SERPL-SCNC: 105 MMOL/L (ref 96–108)
CO2 SERPL-SCNC: 25 MMOL/L (ref 21–32)
CREAT SERPL-MCNC: 0.92 MG/DL (ref 0.6–1.3)
EOSINOPHIL # BLD AUTO: 0.28 THOUSAND/ÂΜL (ref 0–0.61)
EOSINOPHIL NFR BLD AUTO: 6 % (ref 0–6)
ERYTHROCYTE [DISTWIDTH] IN BLOOD BY AUTOMATED COUNT: 14.5 % (ref 11.6–15.1)
FERRITIN SERPL-MCNC: 17 NG/ML (ref 8–388)
GFR SERPL CREATININE-BSD FRML MDRD: 85 ML/MIN/1.73SQ M
GLUCOSE SERPL-MCNC: 88 MG/DL (ref 65–140)
HCT VFR BLD AUTO: 34.1 % (ref 36.5–49.3)
HGB BLD-MCNC: 11.5 G/DL (ref 12–17)
IMM GRANULOCYTES # BLD AUTO: 0.01 THOUSAND/UL (ref 0–0.2)
IMM GRANULOCYTES NFR BLD AUTO: 0 % (ref 0–2)
IRON SATN MFR SERPL: 17 % (ref 20–50)
IRON SERPL-MCNC: 51 UG/DL (ref 65–175)
LYMPHOCYTES # BLD AUTO: 1.08 THOUSANDS/ÂΜL (ref 0.6–4.47)
LYMPHOCYTES NFR BLD AUTO: 22 % (ref 14–44)
MCH RBC QN AUTO: 31.1 PG (ref 26.8–34.3)
MCHC RBC AUTO-ENTMCNC: 33.7 G/DL (ref 31.4–37.4)
MCV RBC AUTO: 92 FL (ref 82–98)
MONOCYTES # BLD AUTO: 0.53 THOUSAND/ÂΜL (ref 0.17–1.22)
MONOCYTES NFR BLD AUTO: 11 % (ref 4–12)
NEUTROPHILS # BLD AUTO: 3.09 THOUSANDS/ÂΜL (ref 1.85–7.62)
NEUTS SEG NFR BLD AUTO: 60 % (ref 43–75)
NRBC BLD AUTO-RTO: 0 /100 WBCS
PLATELET # BLD AUTO: 320 THOUSANDS/UL (ref 149–390)
PMV BLD AUTO: 9.1 FL (ref 8.9–12.7)
POTASSIUM SERPL-SCNC: 4.5 MMOL/L (ref 3.5–5.3)
PROT SERPL-MCNC: 6.8 G/DL (ref 6.4–8.4)
RBC # BLD AUTO: 3.7 MILLION/UL (ref 3.88–5.62)
SODIUM SERPL-SCNC: 136 MMOL/L (ref 135–147)
TIBC SERPL-MCNC: 303 UG/DL (ref 250–450)
VIT B12 SERPL-MCNC: 859 PG/ML (ref 100–900)
WBC # BLD AUTO: 5.02 THOUSAND/UL (ref 4.31–10.16)

## 2023-03-16 NOTE — PROGRESS NOTES
Daily Note     Today's date: 3/16/2023  Patient name: Janna Crowder  : 1955  MRN: [id number]  Referring provider: Dia Fox MD  Dx:   Encounter Diagnosis     ICD-10-CM    1  Chronic pain disorder  G89 4       2  History of right hip replacement  Z96 641                      Subjective: Patient reports he is having a good day today, less pain than he has had the last few  Objective: See treatment diary below      Assessment: Tolerated treatment well  Patient continues to have significant balance deficits with closed chain motions, continues to have posterior translation of mass with squatting contributing to LOB  Patient demonstrated fatigue post treatment, exhibited good technique with therapeutic exercises and would benefit from continued PT      Plan: Continue per plan of care  Progress treatment as tolerated  Progress challenge as appropriate with irritability  Precautions:   Past Medical History:   Diagnosis Date   • Anxiety    • Arthritis    • Contreras's esophagus    • Cancer ([facility] [address] ) 2018    Stage 1 prostrate cancer; under active surveillance     • Depression    • GERD (gastroesophageal reflux disease)    • Head injury    • Hypertension    • Osteoarthritis    • Psychiatric disorder    • Sleep apnea     CPAP at    • Spinal arthritis        Precautions: s/p R total hip arthroplasty revision, open reduction 22; RLE WBAT; R posterior hip precautions, neuropathy in BLE, FALL RISK, chronic neck and back pain     3/16 3/10 3/7 3/3 3/1   Manuals 20 19 18 17 16   STM/MFR        Manual flexibility         Joint mobs                 Neuro Re-Ed        Standing Horse On/Off 15x B 15x B   Standing abd on [facility] w/ DB Weight Shift     Blue Therapads 2x10    Staggered Squat w/ Pallof Press        Side step at table  2 UE support and cues to encourage fwd weight shift, reduce lateral trunk lean  [id number]      SL clamshell        Standing hip flexion onto 8" step Lunge w/ mild UE assist [place] 10x ea  Listia 10x ea     Education        Romberg EC        Nerve Glide        Side lunge onto  [place]     2x10 weight shift green   Biodex LOS x2 lvl 10, weight shift squats 3x10 lvl 10  LOS x2 lvl 10, weight shift squats 2x10 lvl 10 LOS x2 lvl 9  LOS 2 lvl 10, Weight shift squat 2x10 lvl 10   Item retrieval        Fwd step up         Ther Ex        LTR    Hold 5, 2x10     Heel slides    x15 (inc symp)    Bent knee fall out    Hold 5, 2x10 ea    SAQ        Hip Add sq    Hold 5, x15    B Heel raises 2x10 avoiding extension thrust Focus on engaging quads/glutes avoiding extension thrust  2x10 B UE support      Prone quad str        Flexion in sitting 10x (3x) 15x neutral  Hold 5, 2x10     [facility] [facility]        STS 15x w/ airex, 2x10 w/o airex       Standing toe raises        Assessment & Management   Re-assess and goal setting for ADLs     Supine marches    2x10    TA with UE scap depression on ball    Hold 5, 2x10

## 2023-03-21 ENCOUNTER — APPOINTMENT (OUTPATIENT)
Dept: PHYSICAL THERAPY | Facility: CLINIC | Age: 68
End: 2023-03-21

## 2023-03-21 ENCOUNTER — TELEPHONE (OUTPATIENT)
Dept: HEMATOLOGY ONCOLOGY | Facility: CLINIC | Age: 68
End: 2023-03-21

## 2023-03-21 NOTE — TELEPHONE ENCOUNTER
Gianna Espinoza calling from [facility] to confirm address for Samuel Wilde I gave her the address

## 2023-03-23 ENCOUNTER — OFFICE VISIT (OUTPATIENT)
Dept: PHYSICAL THERAPY | Facility: CLINIC | Age: 68
End: 2023-03-23

## 2023-03-23 ENCOUNTER — OFFICE VISIT (OUTPATIENT)
Dept: FAMILY MEDICINE CLINIC | Facility: CLINIC | Age: 68
End: 2023-03-23

## 2023-03-23 VITALS
HEIGHT: 66 IN | TEMPERATURE: 97.3 F | DIASTOLIC BLOOD PRESSURE: 100 MMHG | SYSTOLIC BLOOD PRESSURE: 160 MMHG | BODY MASS INDEX: 28.45 KG/M2 | WEIGHT: 177 LBS | RESPIRATION RATE: 16 BRPM | OXYGEN SATURATION: 8 % | HEART RATE: 96 BPM

## 2023-03-23 DIAGNOSIS — K21.9 GASTROESOPHAGEAL REFLUX DISEASE WITHOUT ESOPHAGITIS: ICD-10-CM

## 2023-03-23 DIAGNOSIS — G89.4 CHRONIC PAIN DISORDER: ICD-10-CM

## 2023-03-23 DIAGNOSIS — M15.9 GENERALIZED OSTEOARTHRITIS OF MULTIPLE SITES: Primary | ICD-10-CM

## 2023-03-23 DIAGNOSIS — F10.20 UNCOMPLICATED ALCOHOL DEPENDENCE (HCC): ICD-10-CM

## 2023-03-23 DIAGNOSIS — M96.1 LUMBAR POST-LAMINECTOMY SYNDROME: ICD-10-CM

## 2023-03-23 DIAGNOSIS — G89.4 CHRONIC PAIN DISORDER: Primary | ICD-10-CM

## 2023-03-23 DIAGNOSIS — F11.20 UNCOMPLICATED OPIOID DEPENDENCE (HCC): ICD-10-CM

## 2023-03-23 DIAGNOSIS — F33.1 MODERATE EPISODE OF RECURRENT MAJOR DEPRESSIVE DISORDER (HCC): ICD-10-CM

## 2023-03-23 DIAGNOSIS — I10 PRIMARY HYPERTENSION: ICD-10-CM

## 2023-03-23 DIAGNOSIS — Z96.641 HISTORY OF RIGHT HIP REPLACEMENT: ICD-10-CM

## 2023-03-23 DIAGNOSIS — D50.8 OTHER IRON DEFICIENCY ANEMIA: ICD-10-CM

## 2023-03-23 DIAGNOSIS — N18.30 STAGE 3 CHRONIC KIDNEY DISEASE, UNSPECIFIED WHETHER STAGE 3A OR 3B CKD (HCC): ICD-10-CM

## 2023-03-23 DIAGNOSIS — C61 PROSTATE CANCER (HCC): ICD-10-CM

## 2023-03-23 LAB — HFE GENE MUT ANL BLD/T: NORMAL

## 2023-03-23 RX ORDER — VILAZODONE HYDROCHLORIDE 20 MG/1
40 TABLET ORAL
Qty: 30 TABLET | Refills: 3 | Status: SHIPPED | OUTPATIENT
Start: 2023-03-23

## 2023-03-23 NOTE — PATIENT INSTRUCTIONS
CONTINUE CURRENT TREATMENT PLAN  MONITOR SYMPTOMS  CONTINUE PT    DISCUSSED MEDICATION OPTIONS FOR DEPRESSION    RV 3 M  Goals of care:  Maximize your health and quality of life by:   · Increasing your level of function and activity  · Decreasing the negative effects of pain on your life  · Minimizing the risks and side effects of medications and ensuring safe use of opioid medication     Ways for you to help meet your goals:  Maintain a healthy lifestyle  This includes proper nutrition, regular physical activity as able, try for 8 hours of sleep per night, use stress reduction strategies, avoid triggers  Risks and side effects of opioid use:  Prescription opioids carry serious risks of addiction and  overdose, especially with prolonged use  An opioid overdose,  often marked by slowed breathing, can cause sudden death  The  use of prescription opioids can have a number of side effects as  well, even when taken as directed:  · Tolerance--meaning you might need to take more of a medication for the same pain relief  · Physical dependence--meaning you have symptoms of withdrawal when a medication is stopped  · Increased sensitivity to pain  · Constipation  · Nausea, vomiting, dry mouth  · Sleepiness and dizziness   · Confusion  · Depression  · Low levels of testosterone that can result in lower sex drive, energy, and strength  · Itching and sweating    If you are prescribed opioids for pain:  · Never take opioids in greater amounts or more often than prescribed  · Help prevent misuse and abuse  - Never sell or share prescription opioids         - Never use another person’s prescription opioids  · ‡Store prescription opioids in a secure place and out of reach of others (this may include visitors, children, friends, and family)    · Safely dispose of unused prescription opioids: Find your community drug take-back program or your pharmacy mail-back program, or flush them down the toilet, following guidance from the Food and Drug Administration (www fda gov/Drugs/ResourcesForYou)  · ‡Visit www cdc gov/drugoverdose to learn about the risks of opioid abuse and overdose  · If you believe you may be struggling with addiction, tell your health care provider and ask for guidance or call [facility]’s National Helpline at 6-439-121-LLGT

## 2023-03-23 NOTE — PROGRESS NOTES
Daily Note     Today's date: 3/23/2023  Patient name: Claudia Ivey  : 1955  MRN: [id number]  Referring provider: Salina Woodall MD  Dx:   Encounter Diagnosis     ICD-10-CM    1  Chronic pain disorder  G89 4       2  History of right hip replacement  Z96 641           Start Time: 1110  Stop Time: 1145  Total time in clinic (min): 35 minutes    Subjective: Client arrives late due to weather  He states having a fall yesterday where a chair collapsed under him  He denies any injuries      Objective: See treatment diary below      Assessment: Tolerated treatment well  He continues to have significant balance deficits with closed chain motions, continues to have posterior translation of mass with squatting contributing to LOB  Challenged core stabilization and motor control at torso due to periods of excess trunk extension during standing tasks with improved control  He demonstrated fatigue post treatment, exhibited good technique with therapeutic exercises and would benefit from continued PT      Plan: Continue per plan of care  Progress treatment as tolerated  Progress challenge as appropriate with irritability  Precautions:   Past Medical History:   Diagnosis Date   • Anxiety    • Arthritis    • Contreras's esophagus    • Cancer ([facility] [address] ) 2018    Stage 1 prostrate cancer; under active surveillance     • Depression    • GERD (gastroesophageal reflux disease)    • Head injury    • Hypertension    • Osteoarthritis    • Psychiatric disorder    • Sleep apnea     CPAP at    • Spinal arthritis        Precautions: s/p R total hip arthroplasty revision, open reduction 22; RLE WBAT; R posterior hip precautions, neuropathy in BLE, FALL RISK, chronic neck and back pain     3/23 3/16 3/10 3/7 3/3 3/1   Manuals 21 20 19 18 17 16   STM/MFR         Manual flexibility          Joint mobs                   Neuro Re-Ed         Standing Horse On/Off 15x each LE 15x B 15x B   Standing abd on [address] [place] w/ DB Weight Shift      Blue Therapads 2x10             Staggered Squat w/ Pallof Press         Side step at table Light UE firm surface 10 feet x 10    Along airex beam 10 feet x 10 BUE support  2 UE support and cues to encourage fwd weight shift, reduce lateral trunk lean  [id number]      Standing core rotations Holding blue medicine ball 20x each way        SL clamshell         Standing hip flexion onto 8" step Standing hip flexion while holding blue medicine ball 20x (min A)        Lunge w/ mild UE assist  [place] 10x ea  [place] 10x ea     Education         Romberg EC         Nerve Glide Sitting t-spine rotation 30x        Side lunge onto  Bear [place]      2x10 weight shift green   Biodex  LOS x2 lvl 10, weight shift squats 3x10 lvl 10  LOS x2 lvl 10, weight shift squats 2x10 lvl 10 LOS x2 lvl 9  LOS 2 lvl 10, Weight shift squat 2x10 lvl 10   Item retrieval         Fwd step up          Ther Ex         LTR     Hold 5, 2x10     Heel slides     x15 (inc symp)    Bent knee fall out     Hold 5, 2x10 ea    SAQ         Hip Add sq     Hold 5, x15    B Heel raises 2x10 2x10 avoiding extension thrust Focus on engaging quads/glutes avoiding extension thrust  2x10 B UE support      Prone quad str         Flexion in sitting 10x (3x) 10x (3x) 15x neutral  Hold 5, 2x10     Step Ups [address]         STS 2x 10 no airex 15x w/ airex, 2x10 w/o airex       Standing toe raises         Assessment & Management    Re-assess and goal setting for ADLs     Supine marches     2x10    TA with UE scap depression on ball     Hold 5, 2x10

## 2023-03-23 NOTE — PROGRESS NOTES
Assessment/Plan     Problem List Items Addressed This Visit        Digestive    GERD (gastroesophageal reflux disease)     STABLE  DENIES ANY CP, INDIGESTION, OR COUGH  NOTES NO MELENA OR HEMATOCHEZIA  NO HEMATEMESIS  COMPLIANT WITH MEDICATION  NO CONCERNS    - CONTINUE CURRENT TREATMENT PLAN  - MEDICATION AS PRESCRIBED  - AVOID CAFFEINE, ALCOHOL OR SMOKING              Cardiovascular and Mediastinum    Hypertension     STABLE  DENIES ANY CP, SOB, PALPITATIONS, OR HEADACHE  NOTES NO WATER RETENTION  COMPLIANT WITH MEDICATION  NO CONCERNS    - CONTINUE CURRENT TREATMENT PLAN  - MONITOR DIETARY SODIUM INTAKE  - ENCOURAGE PHYSICAL ACTIVITY  - RV 3 MONTHS              Musculoskeletal and Integument    Generalized osteoarthritis of multiple sites - Primary     STABLE  DENIES ANY JOINT SWELLING OR REDNESS  JOINT STIFFNESS PRESENT  PAIN MANAGEMENT ADEQUATE    - CONTINUE CURRENT MANAGEMENT  - MEDICATION AS DIRECTED  - CALL / RETURN IF SYMPTOMS WORSEN              Genitourinary    Prostate cancer ([address] )    Stage 3 chronic kidney disease, unspecified whether stage 3a or 3b CKD (HCC)       Other    Anemia    Chronic pain disorder    Opioid dependence (HCC)    Depression    Relevant Medications    vilazodone (Viibryd) 20 mg tablet    Lumbar post-laminectomy syndrome    Alcohol dependence (HCC)      Treatment Plan: PHYSICAL THERAPY  MEDICATION - NSAID AND OPIOID    Treatment Goals: IMPROVED MOBILITY  PAIN MANAGEMENT  RETURN TO NORMAL ADL      Opiate risks  There are risks associated with opioid medications, including dependence, addiction and tolerance  The patient understands and agrees to use these medications only as prescribed  Potential side effects of the medications include, but are not limited to, constipation, drowsiness, addiction, impaired judgment and risk of fatal overdose if not taken as prescribed  The patient was warned against driving while taking sedation medications  Sharing medications is a felony   At this point in time, the patient is showing no signs of addiction, abuse, diversion or suicidal ideation  Patient has a high risk condition (age > 72, CAROLINE, renal or hepatic impairment, mental health condition, use of alcohol or other substances)  Has been counseled on the specific risks of taking opioids with these conditions and the increased risks including including sedation, increased fall risk, dizziness, addictive potential and death  PDMP review  [place] or [place]  reviewed  No red flags were identified; safe to proceed with prescription      risks and benefits of treatment options and impressions  Subjective     Opioid Management:   Type of visit: Follow-up    Pain related diagnoses: CERVICAL SPINAL STENOSIS, POST LAMINECTOMY SYNDROME, SEVERE OSTEOARTHRITIS, CHRONIC PAIN MANAGEMENT    Interval history: STABLE  PT GOING WELL  BALANCE ISSUES    Aberrant behavior?: No      Adverse effects from medication?: No      Screening Tools/Assessments:    PHQ-2/9:  Last PHQ-2 score: 2 (Last PHQ-2 date: 3/23/2021)  Last PHQ-9 score: 15 (Last PHQ-9 date: 1/11/2023)    Brief Pain Inventory (BPI):  1) Throughout our lives, most of us have had pain from time to time (such as minor headaches, sprains, and toothaches)  Have you had pain other than these everyday kinds of pain today? Yes  2) Where is your pain located? Neck, arms, lumbar spine, neuropathy  3) Rate your pain at its worst in the last 24 hours: 7  4) Rate your pain at its least in the last 24 hours: 3  5) Rate your average level of pain: 5  6) Rate your pain right now: 5  7) What treatments or medications are you receiving for your pain?  Oxytocin norco, physical therapy  8) In the past 24 hours, how much relief have pain treatments or medication provided? 70%  9) During the past 24 hours, pain has interfered with your:     A) General activity: 6     B) Mood: 5     C) Walking ability: 8     D) Normal work (work outside the home & housework): 8     E) Relations with other people: 6     F) Sleep: 6     G) Enjoyment of life: 8    Drug Screen:  Date of last drug screen: 11/15/2022    Opioid agreement:  Active Opioid agreement on file?: Yes    Opioid agreement signed date: 1/14/2020  Opioid agreement expiration date: 1/13/2021    Naloxone:  Currently prescribed Naloxone (Narcan): Yes      PATIENT RETURNS FOR ROUTINE EVALUATION OF PATIENT'S MEDICAL ISSUES    INDIVIDUAL MEDICAL ISSUES WITH THEIR CURRENT STATUS, ASSESSMENT AND PLANS ARE LISTED ABOVE        OPIOID MANAGEMENT    Pain Medications             buPROPion (WELLBUTRIN XL) 300 mg 24 hr tablet Take 1 tablet (300 mg total) by mouth every morning    HYDROcodone-acetaminophen (NORCO)  mg per tablet Take 1 tablet by mouth every 4 (four) hours as needed (PAIN) Max Daily Amount: 6 tablets    meloxicam (MOBIC) 15 mg tablet Take 1 tablet (15 mg total) by mouth daily    oxyCODONE (OxyCONTIN) 10 mg 12 hr tablet Take 1 tablet (10 mg total) by mouth 2 (two) times a day Max Daily Amount: 20 mg    oxyCODONE (OxyCONTIN) 20 mg 12 hr tablet Take 1 tablet (20 mg total) by mouth every 12 (twelve) hours Max Daily Amount: 40 mg    vilazodone (Viibryd) 20 mg tablet Take 2 tablets (40 mg total) by mouth daily with breakfast         Outpatient Morphine Milligram Equivalents Per Day     3/23/23 and after 150 MME/Day    Order Name Dose Route Frequency Maximum MME/Day     oxyCODONE (OxyCONTIN) 20 mg 12 hr tablet 20 mg Oral Every 12 hours 60 MME/Day     oxyCODONE (OxyCONTIN) 10 mg 12 hr tablet 10 mg Oral 2 times daily 30 MME/Day     HYDROcodone-acetaminophen (NORCO)  mg per tablet 1 tablet Oral Every 4 hours PRN 60 MME/Day    Total Potential Morphine Milligram Equivalents Per Day 150 MME/Day    Calculation Information        oxyCODONE (OxyCONTIN) 20 mg 12 hr tablet    oxyCODONE 20 mg T12a: maximum daily dose of 40 mg * morphine equivalence factor of 1 5 = 60 MME/Day       oxyCODONE (OxyCONTIN) 10 mg 12 hr tablet    oxyCODONE 10 mg T12a: maximum daily dose of 20 mg * morphine equivalence factor of 1 5 = 30 MME/Day       HYDROcodone-acetaminophen (NORCO)  mg per tablet    HYDROcodone-acetaminophen  mg Tabs: maximum daily dose of 60 mg of opioid in combo * morphine equivalence factor of 1 = 60 MME/Day                         PDMP Review       Value Time User    PDMP Reviewed  Yes 3/4/2023 10:05 AM Monet Nuñez MD         Review of Systems   Constitutional: Negative for chills, fatigue and fever  HENT: Negative for congestion, ear discharge, ear pain, mouth sores, postnasal drip, sore throat and trouble swallowing  Eyes: Negative for pain, discharge and visual disturbance  Respiratory: Negative for cough, shortness of breath and wheezing  Cardiovascular: Negative for chest pain, palpitations and leg swelling  Gastrointestinal: Negative for abdominal distention, abdominal pain, blood in stool, diarrhea and nausea  Endocrine: Negative for polydipsia, polyphagia and polyuria  Genitourinary: Negative for dysuria, frequency, hematuria and urgency  Musculoskeletal: Positive for arthralgias, back pain, gait problem, neck pain and neck stiffness  Negative for joint swelling  Skin: Negative for pallor and rash  Neurological: Negative for dizziness, syncope, speech difficulty, weakness, light-headedness, numbness and headaches  Hematological: Negative for adenopathy  Psychiatric/Behavioral: Positive for dysphoric mood  Negative for behavioral problems, confusion and sleep disturbance  The patient is nervous/anxious  Objective     /100 (BP Location: Left arm, Patient Position: Sitting, Cuff Size: Large)   Pulse 96   Temp (!) 97 3 °F (36 3 °C) (Temporal)   Resp 16   Ht 5' 5 5" (1 664 m)   Wt 80 3 kg (177 lb)   SpO2 (!) 8%   BMI 29 01 kg/m²     Physical Exam  Constitutional:       General: He is not in acute distress  Appearance: Normal appearance  He is well-developed and normal weight   He is not ill-appearing or diaphoretic  HENT:      Head: Normocephalic and atraumatic  Right Ear: External ear normal       Left Ear: External ear normal       Nose: Nose normal       Mouth/Throat:      Pharynx: No oropharyngeal exudate  Eyes:      General: No scleral icterus  Right eye: No discharge  Left eye: No discharge  Conjunctiva/sclera: Conjunctivae normal       Pupils: Pupils are equal, round, and reactive to light  Neck:      Thyroid: No thyromegaly  Vascular: No JVD  Trachea: No tracheal deviation  Cardiovascular:      Rate and Rhythm: Normal rate and regular rhythm  Heart sounds: Normal heart sounds  No murmur heard  No friction rub  No gallop  Pulmonary:      Effort: Pulmonary effort is normal  No respiratory distress  Breath sounds: Normal breath sounds  No stridor  No wheezing or rales  Chest:      Chest wall: No tenderness  Abdominal:      General: Bowel sounds are normal  There is no distension  Palpations: Abdomen is soft  There is no mass  Tenderness: There is no abdominal tenderness  There is no guarding or rebound  Hernia: No hernia is present  Genitourinary:     Penis: Normal  No tenderness  Prostate: Normal       Rectum: Normal  Guaiac result negative  Musculoskeletal:         General: Tenderness present  No deformity  Cervical back: Normal range of motion and neck supple  Comments: SEVERE DJD CHANGES     Lymphadenopathy:      Cervical: No cervical adenopathy  Skin:     General: Skin is warm and dry  Coloration: Skin is not pale  Findings: No erythema or rash  Neurological:      Mental Status: He is alert and oriented to person, place, and time  Cranial Nerves: No cranial nerve deficit  Sensory: No sensory deficit  Motor: No abnormal muscle tone        Coordination: Coordination normal       Deep Tendon Reflexes: Reflexes normal    Psychiatric:         Mood and Affect: Mood normal          Behavior: Behavior normal          Thought Content:  Thought content normal          Judgment: Judgment normal          Torsten Luis MD

## 2023-03-27 DIAGNOSIS — S73.004S DISLOCATION OF RIGHT HIP, SEQUELA: Primary | ICD-10-CM

## 2023-03-28 ENCOUNTER — OFFICE VISIT (OUTPATIENT)
Dept: PHYSICAL THERAPY | Facility: CLINIC | Age: 68
End: 2023-03-28

## 2023-03-28 DIAGNOSIS — G89.4 CHRONIC PAIN DISORDER: ICD-10-CM

## 2023-03-28 DIAGNOSIS — Z96.641 HISTORY OF RIGHT HIP REPLACEMENT: Primary | ICD-10-CM

## 2023-03-28 NOTE — PROGRESS NOTES
Daily Note     Today's date: 3/28/2023  Patient name: Quinten Rachel  : 1955  MRN: [id number]  Referring provider: Nara Isbell MD  Dx:   Encounter Diagnosis     ICD-10-CM    1  History of right hip replacement  Z96 641       2  Chronic pain disorder  G89 4                      Subjective: Patient reports he feels more instability on his L LE and he did fall once last week, but it was under a chair collapse  Objective: See treatment diary below      Assessment: Tolerated treatment well  Patient continues to be challenged by intensity of stability exercises, requires contact guard with SL dynamic balance activities particularly on the L LE  Continued to emphasize hip extension with eccentric movements to improve stability  Patient demonstrated fatigue post treatment, exhibited good technique with therapeutic exercises and would benefit from continued PT      Plan: Continue per plan of care  Progress treatment as tolerated  Progress challenge as appropriate with irritability  Precautions:   Past Medical History:   Diagnosis Date   • Anxiety    • Arthritis    • Contreras's esophagus    • Cancer ([facility] [address] ) 2018    Stage 1 prostrate cancer; under active surveillance     • Depression    • GERD (gastroesophageal reflux disease)    • Head injury    • Hypertension    • Osteoarthritis    • Psychiatric disorder    • Sleep apnea     CPAP at    • Spinal arthritis        Precautions: s/p R total hip arthroplasty revision, open reduction 22; RLE WBAT; R posterior hip precautions, neuropathy in BLE, FALL RISK, chronic neck and back pain     3/28 3/23 3/16 3/10 3/7 3/3   Manuals 22 21 20 19 18 17   STM/MFR         Manual flexibility          Joint mobs                   Neuro Re-Ed         Standing Horse On/Off 15x ea LE 15x each LE 15x B 15x B     Squat w/ DB Weight Shift 2x10 on BRR        SL Hip Abd On [address] 2x10        Standing [address]  2x10        Side "step at table Along beam airex 10feet x10 BUE Light UE firm surface 10 feet x 10    Along airex beam 10 feet x 10 BUE support  2 UE support and cues to encourage fwd weight shift, reduce lateral trunk lean  [id number]     Standing core rotations  Holding blue medicine ball 20x each way       SL clamshell         Standing hip flexion onto 8\" step  Standing hip flexion while holding blue medicine ball 20x (min A)       Lunge w/ mild UE assist   [place] 10x ea  [place] 10x ea    Education         Romberg EC         Nerve Glide  Sitting t-spine rotation 30x       Side lunge onto  [place]         Biodex   LOS x2 lvl 10, weight shift squats 3x10 lvl 10  LOS x2 lvl 10, weight shift squats 2x10 lvl 10 LOS x2 lvl 9    Item retrieval         Fwd step up          Ther Ex         LTR      Hold 5, 2x10    Heel slides      x15 (inc symp)   Bent knee fall out      Hold 5, 2x10 ea   SAQ         Hip Add sq      Hold 5, x15   B Heel raises  2x10 2x10 avoiding extension thrust Focus on engaging quads/glutes avoiding extension thrust  2x10 B UE support     Prone quad str         Flexion in sitting 10x (3x) 10x (3x) 10x (3x) 15x neutral  Hold 5, 2x10    Step [place]         STS 2x10 10# no airex 2x 10 no airex 15x w/ airex, 2x10 w/o airex      Standing toe raises         Assessment & Management     Re-assess and goal setting for ADLs    Supine marches      2x10   TA with UE scap depression on ball      Hold 5, 2x10                  "

## 2023-03-29 ENCOUNTER — TELEMEDICINE (OUTPATIENT)
Dept: PSYCHIATRY | Facility: CLINIC | Age: 68
End: 2023-03-29

## 2023-03-29 DIAGNOSIS — F33.0 MILD EPISODE OF RECURRENT MAJOR DEPRESSIVE DISORDER (HCC): ICD-10-CM
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
## 2023-03-30 ENCOUNTER — APPOINTMENT (OUTPATIENT)
Dept: RADIOLOGY | Facility: CLINIC | Age: 68
End: 2023-03-30

## 2023-03-30 ENCOUNTER — OFFICE VISIT (OUTPATIENT)
Dept: OBGYN CLINIC | Facility: CLINIC | Age: 68
End: 2023-03-30

## 2023-03-30 VITALS
BODY MASS INDEX: 29.12 KG/M2 | SYSTOLIC BLOOD PRESSURE: 167 MMHG | TEMPERATURE: 97.8 F | WEIGHT: 181.2 LBS | HEIGHT: 66 IN | HEART RATE: 69 BPM | DIASTOLIC BLOOD PRESSURE: 88 MMHG

## 2023-03-30 DIAGNOSIS — M21.70 LEG LENGTH DISCREPANCY: Primary | ICD-10-CM

## 2023-03-30 DIAGNOSIS — S73.004S DISLOCATION OF RIGHT HIP, SEQUELA: ICD-10-CM

## 2023-03-30 NOTE — PATIENT INSTRUCTIONS
- Weight bearing as tolerated right lower extremity   - Shoe lift information provided for patient   - Over the counter analgesics as needed / directed   - Ice / heat as directed   - Follow up PRN       Today, We recommended a brace/prosthesis for you  [facility] certified pedorthotists make orthotics but not braces or prosthesis  Below are the companies in the area that make these, Please call ahead for an appointment and to ensure the company accepts your insurance  Make sure to bring the prescription given to you in the office today to the company for the brace/prosthesis  [facility]  [address] [facility]  [address] Sheryl Chicas 1 Phone: [phone]  [place] Fax: [phone]    [address]  [address]  [place], [address] [place] [address] [address]  (By Appointment Only)  Directions  [address] [facility], [address] [place] [address]   Directions  [place] Phone: [phone]  [place] Fax: 210 [facility] Location  [address]   [address] [facility]  [phone] (fax)    [facility]  [address] [facility], [address] [place] [address],[address]  [address]  [phone] (fax)    [facility]  [address]  [phone] (fax)    [facility] Location  [address] [address] [address] [place] [address]  [phone] (fax)    [place] [address] , [address], [place]  [phone] (fax)

## 2023-03-30 NOTE — PROGRESS NOTES
Orthopaedics Office Visit - Post-op Patient Visit    ASSESSMENT/PLAN:    Assessment:   4 5 months s/p open reduction, revision right hip arthroplasty  - replacement of dual mobility head/liner after liner dissociation  DOS 22  Leg length discrepancy R longer than left - pre-existing from prior to this injury    Feels improved strength on RLE      Plan:   - Weight bearing as tolerated right lower extremity   - Shoe lift information provided for patient - requested additional orthotics companies - provided in AVS today  - Over the counter analgesics as needed / directed   - Ice / heat as directed   - Follow up PRN       To Do Next Visit:  PRN     _____________________________________________________  CHIEF COMPLAINT:  Chief Complaint   Patient presents with   • Right Hip - Follow-up         SUBJECTIVE:  Deidra Farrell is a 76 y o  male who presents 4 5 months s/p open reduction, revision right hip arthroplasty  - replacement of dual mobility head/liner after liner dissociation  DOS 22  Patient states that his hip is doing well overall  Patient states he has no pain in his hip currently  Patient has had no episodes of instability in the hip  Patient states he still ambulates with a quad cane secondary to severe lumbar spine stenosis  Patient has been having numbness and tingling in his lower extremities secondary to lumbar spine  Patient was previously given a prescription for a shoe lift but patient was unable to obtain secondary to the shoe lifts not being available  Patient offers no other complaints at this time  SOCIAL HISTORY:  Social History     Tobacco Use   • Smoking status: Former     Packs/day: 1 00     Years: 16 00     Pack years: 16 00     Types: Cigarettes     Start date: 1969     Quit date:      Years since quittin 2   • Smokeless tobacco: Never   Vaping Use   • Vaping Use: Never used   Substance Use Topics   • Alcohol use:  Yes     Alcohol/week: 2 0 standard drinks Types: 1 Glasses of wine, 1 Cans of beer per week     Comment: one drink a week   • Drug use: Yes     Frequency: 1 0 times per week     Types: Marijuana     Comment: occassionaly       MEDICATIONS:    Current Outpatient Medications:   •  Armodafinil 250 MG tablet, Take 1 tablet (250 mg total) by mouth daily, Disp: 30 tablet, Rfl: 0  •  buPROPion (WELLBUTRIN XL) 300 mg 24 hr tablet, Take 1 tablet (300 mg total) by mouth every morning, Disp: 30 tablet, Rfl: 1  •  busPIRone (BUSPAR) 15 mg tablet, Take 1 tablet (15 mg total) by mouth 2 (two) times a day, Disp: 60 tablet, Rfl: 2  •  Diclofenac Sodium (VOLTAREN) 1 %, Apply 2 g topically 4 (four) times a day, Disp: 150 g, Rfl: 1  •  HYDROcodone-acetaminophen (NORCO)  mg per tablet, Take 1 tablet by mouth every 4 (four) hours as needed (PAIN) Max Daily Amount: 6 tablets, Disp: 180 tablet, Rfl: 0  •  meloxicam (MOBIC) 15 mg tablet, Take 1 tablet (15 mg total) by mouth daily, Disp: 90 tablet, Rfl: 0  •  multivitamin (THERAGRAN) TABS, Take 1 tablet by mouth daily, Disp: , Rfl:   •  olmesartan-hydrochlorothiazide (BENICAR HCT) 40-12 5 MG per tablet, Take 1 tablet by mouth daily (Patient taking differently: Take 0 5 tablets by mouth daily), Disp: 60 tablet, Rfl: 0  •  omeprazole (PriLOSEC) 40 MG capsule, Take 1 capsule by mouth every 12 (twelve) hours, Disp: , Rfl:   •  oxyCODONE (OxyCONTIN) 10 mg 12 hr tablet, Take 1 tablet (10 mg total) by mouth 2 (two) times a day Max Daily Amount: 20 mg, Disp: 60 tablet, Rfl: 0  •  oxyCODONE (OxyCONTIN) 20 mg 12 hr tablet, Take 1 tablet (20 mg total) by mouth every 12 (twelve) hours Max Daily Amount: 40 mg, Disp: 60 tablet, Rfl: 0  •  vilazodone (Viibryd) 20 mg tablet, Take 2 tablets (40 mg total) by mouth daily with breakfast, Disp: 30 tablet, Rfl: 3  •  naloxone (NARCAN) 4 mg/0 1 mL nasal spray, Administer 1 spray into a nostril   If breathing does not return to normal or if breathing difficulty resumes after 2-3 minutes, give "another dose in the other nostril using a new spray  (Patient not taking: Reported on 3/30/2023), Disp: 1 each, Rfl: 1  •  tadalafil (CIALIS) 5 MG tablet, Take 5 mg by mouth daily, Disp: , Rfl:     REVIEW OF SYSTEMS:  MSK: right hip pain   Neuro: WNL   Pertinent items are otherwise noted in HPI  A comprehensive review of systems was otherwise negative     _____________________________________________________  PHYSICAL EXAMINATION:  Vital signs: /88   Pulse 69   Temp 97 8 °F (36 6 °C)   Ht 5' 5 5\" (1 664 m)   Wt 82 2 kg (181 lb 3 2 oz)   BMI 29 69 kg/m²   General: No acute distress, awake and alert  Psychiatric: Mood and affect appear appropriate  HEENT: Trachea Midline, No torticollis, no apparent facial trauma  Cardiovascular: No audible murmurs; Extremities appear perfused  Pulmonary: No audible wheezing or stridor  Skin: No open lesions; see further details (if any) below      MUSCULOSKELETAL EXAMINATION:  Right hip examination:  - Patient sitting comfortably in the office in no acute distress   -Healed incision site over the posterior aspect of the posterior aspect of the hip  Extremity appears well-perfused overall   - Leg length discrepancy R longer than left    -No bony or soft tissue tender palpation noted at this time  - NV intact    _____________________________________________________  STUDIES REVIEWED:  I personally reviewed the images and interpretation is as follows:  Right hip XR 3 views:  Status post right total hip arthroplasty with no acute osseous abnormalities present  PROCEDURES PERFORMED:  No procedures were performed at this time  Rustam Mercer PA-C -  - assisting  Julia Monday Jamar                Portions of the record may have been created with voice recognition software  Occasional wrong word or \"sound a like\" substitutions may have occurred due to the inherent limitations of voice recognition software    Read the chart carefully and recognize, using " context, where substitutions have occurred

## 2023-03-31 ENCOUNTER — APPOINTMENT (OUTPATIENT)
Dept: PHYSICAL THERAPY | Facility: CLINIC | Age: 68
End: 2023-03-31

## 2023-04-01 DIAGNOSIS — G89.29 CHRONIC BILATERAL LOW BACK PAIN WITH BILATERAL SCIATICA: ICD-10-CM

## 2023-04-01 DIAGNOSIS — M54.41 CHRONIC BILATERAL LOW BACK PAIN WITH BILATERAL SCIATICA: ICD-10-CM

## 2023-04-01 DIAGNOSIS — M54.42 CHRONIC BILATERAL LOW BACK PAIN WITH BILATERAL SCIATICA: ICD-10-CM

## 2023-04-01 DIAGNOSIS — G89.4 CHRONIC PAIN SYNDROME: ICD-10-CM

## 2023-04-01 DIAGNOSIS — I10 ESSENTIAL HYPERTENSION: ICD-10-CM

## 2023-04-03 RX ORDER — HYDROCODONE BITARTRATE AND ACETAMINOPHEN 10; 325 MG/1; MG/1
1 TABLET ORAL EVERY 4 HOURS PRN
Qty: 180 TABLET | Refills: 0 | Status: SHIPPED | OUTPATIENT
Start: 2023-04-03

## 2023-04-03 RX ORDER — MELOXICAM 15 MG/1
15 TABLET ORAL DAILY
Qty: 90 TABLET | Refills: 0 | OUTPATIENT
Start: 2023-04-03

## 2023-04-03 RX ORDER — OLMESARTAN MEDOXOMIL AND HYDROCHLOROTHIAZIDE 40/12.5 40; 12.5 MG/1; MG/1
1 TABLET ORAL DAILY
Qty: 60 TABLET | Refills: 0 | OUTPATIENT
Start: 2023-04-03

## 2023-04-03 RX ORDER — OXYCODONE HCL 10 MG/1
10 TABLET, FILM COATED, EXTENDED RELEASE ORAL 2 TIMES DAILY
Qty: 60 TABLET | Refills: 0 | Status: SHIPPED | OUTPATIENT
Start: 2023-04-03

## 2023-04-05 RX ORDER — BUPROPION HYDROCHLORIDE 300 MG/1
300 TABLET ORAL EVERY MORNING
Qty: 30 TABLET | Refills: 1 | Status: SHIPPED | OUTPATIENT
Start: 2023-04-05

## 2023-04-18 ENCOUNTER — APPOINTMENT (OUTPATIENT)
Dept: PHYSICAL THERAPY | Facility: CLINIC | Age: 68
End: 2023-04-18

## 2023-04-20 ENCOUNTER — APPOINTMENT (OUTPATIENT)
Dept: PHYSICAL THERAPY | Facility: CLINIC | Age: 68
End: 2023-04-20

## 2023-04-24 ENCOUNTER — OFFICE VISIT (OUTPATIENT)
Dept: PHYSICAL THERAPY | Facility: CLINIC | Age: 68
End: 2023-04-24

## 2023-04-24 DIAGNOSIS — G89.4 CHRONIC PAIN DISORDER: ICD-10-CM

## 2023-04-24 DIAGNOSIS — Z96.641 HISTORY OF RIGHT HIP REPLACEMENT: Primary | ICD-10-CM

## 2023-04-24 NOTE — PROGRESS NOTES
"Daily Note     Today's date: 2023  Patient name: Zaheer Lee  : 1955  MRN: [id number]  Referring provider: Chloe Isaac MD  Dx:   Encounter Diagnosis     ICD-10-CM    1  History of right hip replacement  Z96 641       2  Chronic pain disorder  G89 4           Start Time: 1115  Stop Time: 1145  Total time in clinic (min): 30 minutes    Patient seen concurrently with another patient nearby  Subjective: Patient arrives to physical therapy with increased complaints of numbness in bilateral feet  He reports about 5 falls over the weekend from various positions such as getting out of bed and overall transitioning movements  He states that \"he felt his ankles were giving out\"  Objective: See treatment diary below      Assessment: Patient tolerated today's treatment well  Dianna Welsh exhibited increased difficulty with SL activities especially with L LE weightbearing and R LE mobility  Throughout the session, he demonstrated ataxia with R LE movement with impaired coordination and motor control  His ataxic ambulatory pattern continued to increase as muscle fatigue increased  He required external support to regain balance following mild LOB during core focused exercises  For safety purposes, Dianna Welsh benefits from [address] for stability throughout sessions  Dianna Welsh continues to benefit from skilled physical therapy in order to increase DL and SL balance strategies and improve bilateral hip musculature strength  Plan: Continue per plan of care  Progress treatment as tolerated              2023   4/14 4/12 3/28 3/23 3/16   Manuals 25 24 23 22 21 20   STM/MFR         Manual flexibility          Joint mobs                   Neuro Re-Ed         Standing Horse On/Off 2x10 ea LE 15x ea LE  15x ea LE 15x each LE 15x B   Squat w/ DB Weight Shift  2x10 on BRR  2x10 on BRR     SL Hip Abd 2x10 on airex 2x10 on airex  On [address] 2x10     Standing [address] 2x10     Side step " "at table    Along beam airex 10feet x10 BUE Light UE firm surface 10 feet x 10    Along airex beam 10 feet x 10 BUE support    Standing core rotations Holding blue medicine ball 20x each way    Holding blue medicine ball 20x each way    SL clamshell         Standing hip flexion onto 8\" step     Standing hip flexion while holding blue medicine ball 20x (min A)    Lunge w/ mild UE assist  BRR 2x10    [place] 10x ea   Education TG        Romberg EC         Nerve Glide     Sitting t-spine rotation 30x    Side lunge onto  [employer]         Biodex LOS x2 lvl 9, weight shift squats 3x10 lvl 9 LOS x2 lvl 9, weight shift squats 3x10 lvl 7 LOS x2 lvl 10, weight shift squats 3x10 lvl 9-10   LOS x2 lvl 10, weight shift squats 3x10 lvl 10   Item retrieval         Fwd step up          Ther Ex         LTR         Heel slides         Bent knee fall out         SAQ         Hip Add sq         B Heel raises     2x10 2x10 avoiding extension thrust   Prone quad str         Flexion in sitting    10x (3x) 10x (3x) 10x (3x)   Step Ups [employer]         STS 3x5, 3x5 10# no airex  3x10 10# no airex 2x10 10# no airex 2x 10 no airex 15x w/ airex, 2x10 w/o airex   Standing toe raises 2x10 airex         Assessment & Management   POC with continued dynamic balance emphasis  Supine marches         TA with UE scap depression on ball                      Patient treated by Eliseo Anton, SPT under my direct supervision           "

## 2023-04-26 ENCOUNTER — OFFICE VISIT (OUTPATIENT)
Dept: PHYSICAL THERAPY | Facility: CLINIC | Age: 68
End: 2023-04-26

## 2023-04-26 DIAGNOSIS — Z96.641 HISTORY OF RIGHT HIP REPLACEMENT: Primary | ICD-10-CM

## 2023-04-26 DIAGNOSIS — G89.4 CHRONIC PAIN DISORDER: ICD-10-CM

## 2023-04-26 NOTE — PROGRESS NOTES
"Daily Note     Today's date: 2023  Patient name: Stephany Herrera  : 1955  MRN: [id number]  Referring provider: Raquel Singh MD  Dx:   Encounter Diagnosis     ICD-10-CM    1  History of right hip replacement  Z96 641       2  Chronic pain disorder  G89 4           Start Time: 1105  Stop Time: 1145  Total time in clinic (min): 40 minutes    Subjective: Pt reports that he sees his neurosurgeon May 1st, will be getting EMG testing on BUE  Pt states feeling a little sore and fatigued after his previous session  Pt continues with BLE numbness and \"aches\" but overall feels good today  Pt denies any falls since his last treatment session  Objective: See treatment diary below      Assessment: Tolerated treatment well  Patient demonstrated fatigue post treatment, exhibited good technique with therapeutic exercises and would benefit from continued PT  Pt continues to exhibit ataxia of BLE, more intense on the LLE, after performing 1-2 sets of any exercise  Pt requires min-mod A and guarding due to LOB  Ataxia reduces after short rest breaks and stretches  Pt performed better than previous session with exercise mechanics and coordination, still significant difficulty with any balance exercises  Plan: Continue per plan of care  Progress treatment as tolerated              4/26 2023   4/14 4/12 3/28 3/23   Manuals 26 25 24 23 22 21   STM/MFR         Manual flexibility          Joint mobs                   Neuro Re-Ed         Standing Horse On/Off  2x10 ea LE 15x ea LE  15x ea LE 15x each LE   Squat w/ DB Weight Shift   2x10 on BRR  2x10 on BRR    SL Hip Abd abd and ext 2x10 ea 2x10 on airex 2x10 on airex  On [address] 2x10    bosu step lunge 5x10s B        Standing Marches Onto black disc 10x B    On [address] 2x10    Side step at table     Along beam airex 10feet x10 BUE Light UE firm surface 10 feet x 10    Along airex beam 10 feet x 10 BUE support   Standing core rotations " "Holding pink medicine ball 20x ea way Holding blue medicine ball 20x each way    Holding blue medicine ball 20x each way   SL clamshell         Standing hip flexion onto 8\" step      Standing hip flexion while holding blue medicine ball 20x (min A)   Lunge w/ mild UE assist   BRR 2x10      Education  TG       Romberg EC         Nerve Glide      Sitting t-spine rotation 30x   Side lunge onto  [facility]         Biodex Squats L9 3x10    LOS L9 2x    RC L12 2 mins LOS x2 lvl 9, weight shift squats 3x10 lvl 9 LOS x2 lvl 9, weight shift squats 3x10 lvl 7 LOS x2 lvl 10, weight shift squats 3x10 lvl 9-10     Item retrieval         Fwd step up          Ther Ex         LTR         Heel slides         Bent knee fall out         SAQ         Hip Add sq         B Heel raises      2x10   Prone quad str         Flexion in sitting     10x (3x) 10x (3x)   Step Ups [address]         STS 3x5 no weight, 3x5 15# no airex 3x5, 3x5 10# no airex  3x10 10# no airex 2x10 10# no airex 2x 10 no airex   Standing toe raises 3x10 airex 2x10 airex        Assessment & Management    POC with continued dynamic balance emphasis  Supine marches         TA with UE scap depression on ball                      Patient treated by Madonna Carter, SPT under my direct supervision             "

## 2023-04-28 DIAGNOSIS — G47.30 SLEEP APNEA, UNSPECIFIED TYPE: ICD-10-CM

## 2023-04-28 DIAGNOSIS — R53.83 FATIGUE, UNSPECIFIED TYPE: ICD-10-CM

## 2023-04-28 NOTE — TELEPHONE ENCOUNTER
Requested medication(s) are due for refill today: unknown  Patient has already received a courtesy refill: No  Other reason request has been forwarded to provider:  This refill cannot be delegated

## 2023-04-29 RX ORDER — ARMODAFINIL 250 MG/1
250 TABLET ORAL DAILY
Qty: 30 TABLET | Refills: 0 | Status: SHIPPED | OUTPATIENT
Start: 2023-04-29

## 2023-05-01 ENCOUNTER — APPOINTMENT (OUTPATIENT)
Dept: PHYSICAL THERAPY | Facility: CLINIC | Age: 68
End: 2023-05-01
Payer: MEDICARE

## 2023-05-01 DIAGNOSIS — M54.41 CHRONIC BILATERAL LOW BACK PAIN WITH BILATERAL SCIATICA: ICD-10-CM

## 2023-05-01 DIAGNOSIS — G89.4 CHRONIC PAIN SYNDROME: ICD-10-CM

## 2023-05-01 DIAGNOSIS — M54.42 CHRONIC BILATERAL LOW BACK PAIN WITH BILATERAL SCIATICA: ICD-10-CM

## 2023-05-01 DIAGNOSIS — G89.29 CHRONIC BILATERAL LOW BACK PAIN WITH BILATERAL SCIATICA: ICD-10-CM

## 2023-05-01 RX ORDER — HYDROCODONE BITARTRATE AND ACETAMINOPHEN 10; 325 MG/1; MG/1
1 TABLET ORAL EVERY 4 HOURS PRN
Qty: 180 TABLET | Refills: 0 | Status: SHIPPED | OUTPATIENT
Start: 2023-05-01

## 2023-05-01 RX ORDER — OXYCODONE HCL 10 MG/1
10 TABLET, FILM COATED, EXTENDED RELEASE ORAL 2 TIMES DAILY
Qty: 60 TABLET | Refills: 0 | Status: SHIPPED | OUTPATIENT
Start: 2023-05-01

## 2023-05-03 ENCOUNTER — OFFICE VISIT (OUTPATIENT)
Dept: PHYSICAL THERAPY | Facility: CLINIC | Age: 68
End: 2023-05-03

## 2023-05-03 DIAGNOSIS — Z96.641 HISTORY OF RIGHT HIP REPLACEMENT: Primary | ICD-10-CM

## 2023-05-03 DIAGNOSIS — G89.4 CHRONIC PAIN DISORDER: ICD-10-CM

## 2023-05-03 NOTE — PROGRESS NOTES
"Daily Note     Today's date: 5/3/2023  Patient name: Patito Hernandez  : 1955  MRN: [id number]  Referring provider: Ryan Alvarez MD  Dx:   Encounter Diagnosis     ICD-10-CM    1  History of right hip replacement  Z96 641       2  Chronic pain disorder  G89 4                      Subjective: Patient reports he is having a rough day today, and feels a little more unsteady than normal       Objective: See treatment diary below      Assessment: Tolerated treatment well  Patient continues to have LOB with increased closed chain activities in neutral position, continued to emphasize functional activity training with dynamic balance exercises  Patient demonstrated fatigue post treatment, exhibited good technique with therapeutic exercises and would benefit from continued PT      Plan: Continue per plan of care  Progress treatment as tolerated  Progress challenge as appropriate with irritability            5/3 4/26 2023   4/14 4/12 3/23   Manuals 27 26 25 24 23 21   STM/MFR         Manual flexibility          Joint mobs                   Neuro Re-Ed         Standing Horse On/Off   2x10 ea LE 15x ea LE  15x each LE   Squat w/ DB Weight Shift    2x10 on BRR     SL Hip Abd abd and ext 2x10 ear airex abd and ext 2x10 ea 2x10 on airex 2x10 on airex     bosu step lunge 2x10 5s 5x10s B       Standing Marches  Onto black disc 10x B       Side step at table      Light UE firm surface 10 feet x 10    Along airex beam 10 feet x 10 BUE support   Standing core rotations Holding pink med ball 20x ea Holding pink medicine ball 20x ea way Holding blue medicine ball 20x each way   Holding blue medicine ball 20x each way   SL clamshell         Standing hip flexion onto 8\" step      Standing hip flexion while holding blue medicine ball 20x (min A)   Lunge w/ mild UE assist    BRR 2x10     Education   TG      Romberg EC         Nerve Glide      Sitting t-spine rotation 30x   Side lunge onto  [employer]         Biodex L10 " 3x10, LOS L10 3x Squats L9 3x10    LOS L9 2x    RC L12 2 mins LOS x2 lvl 9, weight shift squats 3x10 lvl 9 LOS x2 lvl 9, weight shift squats 3x10 lvl 7 LOS x2 lvl 10, weight shift squats 3x10 lvl 9-10    Item retrieval         Fwd step up          Ther Ex         LTR         Heel slides         Bent knee fall out         SAQ         Hip Add sq         B Heel raises      2x10   Prone quad str         Flexion in sitting      10x (3x)   Step Ups [address] [employer]         STS 2x10 3x5 no weight, 3x5 15# no airex 3x5, 3x5 10# no airex  3x10 10# no airex 2x 10 no airex   Standing toe raises  3x10 airex 2x10 airex       Assessment & Management     POC with continued dynamic balance emphasis      Supine marches         TA with UE scap depression on ball

## 2023-05-05 ENCOUNTER — TELEPHONE (OUTPATIENT)
Dept: HEMATOLOGY ONCOLOGY | Facility: CLINIC | Age: 68
End: 2023-05-05

## 2023-05-05 ENCOUNTER — OFFICE VISIT (OUTPATIENT)
Dept: HEMATOLOGY ONCOLOGY | Facility: CLINIC | Age: 68
End: 2023-05-05

## 2023-05-05 ENCOUNTER — APPOINTMENT (OUTPATIENT)
Dept: LAB | Facility: CLINIC | Age: 68
End: 2023-05-05

## 2023-05-05 VITALS
HEART RATE: 95 BPM | DIASTOLIC BLOOD PRESSURE: 90 MMHG | RESPIRATION RATE: 18 BRPM | WEIGHT: 175 LBS | SYSTOLIC BLOOD PRESSURE: 140 MMHG | TEMPERATURE: 98 F | HEIGHT: 65 IN | BODY MASS INDEX: 29.16 KG/M2 | OXYGEN SATURATION: 98 %

## 2023-05-05 DIAGNOSIS — D50.8 IRON DEFICIENCY ANEMIA SECONDARY TO INADEQUATE DIETARY IRON INTAKE: ICD-10-CM

## 2023-05-05 DIAGNOSIS — Z78.9 VEGETARIAN DIET: ICD-10-CM

## 2023-05-05 DIAGNOSIS — D50.8 IRON DEFICIENCY ANEMIA SECONDARY TO INADEQUATE DIETARY IRON INTAKE: Primary | ICD-10-CM

## 2023-05-05 DIAGNOSIS — E83.110 HEREDITARY HEMOCHROMATOSIS (HCC): ICD-10-CM

## 2023-05-05 LAB
BASOPHILS # BLD AUTO: 0.02 THOUSANDS/ÂΜL (ref 0–0.1)
BASOPHILS NFR BLD AUTO: 0 % (ref 0–1)
EOSINOPHIL # BLD AUTO: 0.17 THOUSAND/ÂΜL (ref 0–0.61)
EOSINOPHIL NFR BLD AUTO: 3 % (ref 0–6)
ERYTHROCYTE [DISTWIDTH] IN BLOOD BY AUTOMATED COUNT: 13.3 % (ref 11.6–15.1)
FERRITIN SERPL-MCNC: 15 NG/ML (ref 8–388)
HCT VFR BLD AUTO: 36.3 % (ref 36.5–49.3)
HGB BLD-MCNC: 11.7 G/DL (ref 12–17)
IMM GRANULOCYTES # BLD AUTO: 0.02 THOUSAND/UL (ref 0–0.2)
IMM GRANULOCYTES NFR BLD AUTO: 0 % (ref 0–2)
IRON SATN MFR SERPL: 18 % (ref 20–50)
IRON SERPL-MCNC: 57 UG/DL (ref 65–175)
LYMPHOCYTES # BLD AUTO: 0.99 THOUSANDS/ÂΜL (ref 0.6–4.47)
LYMPHOCYTES NFR BLD AUTO: 16 % (ref 14–44)
MCH RBC QN AUTO: 29.8 PG (ref 26.8–34.3)
MCHC RBC AUTO-ENTMCNC: 32.2 G/DL (ref 31.4–37.4)
MCV RBC AUTO: 92 FL (ref 82–98)
MONOCYTES # BLD AUTO: 0.6 THOUSAND/ÂΜL (ref 0.17–1.22)
MONOCYTES NFR BLD AUTO: 10 % (ref 4–12)
NEUTROPHILS # BLD AUTO: 4.26 THOUSANDS/ÂΜL (ref 1.85–7.62)
NEUTS SEG NFR BLD AUTO: 71 % (ref 43–75)
NRBC BLD AUTO-RTO: 0 /100 WBCS
PLATELET # BLD AUTO: 264 THOUSANDS/UL (ref 149–390)
PMV BLD AUTO: 8 FL (ref 8.9–12.7)
RBC # BLD AUTO: 3.93 MILLION/UL (ref 3.88–5.62)
TIBC SERPL-MCNC: 320 UG/DL (ref 250–450)
WBC # BLD AUTO: 6.06 THOUSAND/UL (ref 4.31–10.16)

## 2023-05-05 RX ORDER — SODIUM CHLORIDE 9 MG/ML
20 INJECTION, SOLUTION INTRAVENOUS ONCE
Status: CANCELLED | OUTPATIENT
Start: 2023-05-12

## 2023-05-05 RX ORDER — TAMSULOSIN HYDROCHLORIDE 0.4 MG/1
0.4 CAPSULE ORAL DAILY
COMMUNITY
Start: 2023-03-28 | End: 2024-03-27

## 2023-05-05 NOTE — TELEPHONE ENCOUNTER
----- Message from Andrea Wiggins PA-C sent at 5/5/2023  2:51 PM EDT -----  Please notify pt labs are consistent with iron def  Will move forward with the plan  Phoned patient to inform of Mercedes Hartman PA-C recommendations  Patient aware and appreciative  Infusion appts scheduled
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
## 2023-05-05 NOTE — TELEPHONE ENCOUNTER
Patient requested that I call back at 1030-11am due to driving back home  I informed patient I will call back at requested times for scheduling preferences

## 2023-05-08 ENCOUNTER — OFFICE VISIT (OUTPATIENT)
Dept: PHYSICAL THERAPY | Facility: CLINIC | Age: 68
End: 2023-05-08

## 2023-05-08 DIAGNOSIS — G47.9 SLEEP DISTURBANCE: Primary | ICD-10-CM

## 2023-05-08 DIAGNOSIS — G89.4 CHRONIC PAIN DISORDER: ICD-10-CM

## 2023-05-08 DIAGNOSIS — Z96.641 HISTORY OF RIGHT HIP REPLACEMENT: Primary | ICD-10-CM

## 2023-05-08 DIAGNOSIS — I10 ESSENTIAL HYPERTENSION: ICD-10-CM

## 2023-05-08 RX ORDER — OLMESARTAN MEDOXOMIL AND HYDROCHLOROTHIAZIDE 40/12.5 40; 12.5 MG/1; MG/1
1 TABLET ORAL DAILY
Qty: 60 TABLET | Refills: 0 | Status: SHIPPED | OUTPATIENT
Start: 2023-05-08 | End: 2023-05-08 | Stop reason: SDUPTHER

## 2023-05-08 RX ORDER — OLMESARTAN MEDOXOMIL AND HYDROCHLOROTHIAZIDE 40/12.5 40; 12.5 MG/1; MG/1
1 TABLET ORAL DAILY
Qty: 60 TABLET | Refills: 0 | Status: SHIPPED | OUTPATIENT
Start: 2023-05-08 | End: 2023-07-24 | Stop reason: SDUPTHER

## 2023-05-08 NOTE — PROGRESS NOTES
"Daily Note     Today's date: 2023  Patient name: Laurent Govea  : 1955  MRN: [id number]  Referring provider: Jennifer Dominguez MD  Dx:   Encounter Diagnosis     ICD-10-CM    1  History of right hip replacement  Z96 641       2  Chronic pain disorder  G89 4                      Subjective: Patient reports he feels his neck is a little more sore today than it has been the last few  Objective: See treatment diary below      Assessment: Tolerated treatment well  Patient continues to have difficulty with prolonged functional activity in an upright posture, increased strengthening in slight flexion to decrease ataxic movements and motor control deficits  Patient demonstrated fatigue post treatment, exhibited good technique with therapeutic exercises and would benefit from continued PT      Plan: Continue per plan of care  Progress treatment as tolerated  Progress challenge as appropriate with irritability            5/5 4/26 2023   4/14 4/12 3/23   Manuals 28 26 25 24 23 21   STM/MFR         Manual flexibility          Joint mobs                   Neuro Re-Ed         Standing Horse On/Off   2x10 ea LE 15x ea LE  15x each LE   Squat w/ DB Weight Shift    2x10 on BRR     SL Hip Abd 2x10 abd and ext ea abd and ext 2x10 ea 2x10 on airex 2x10 on airex     bosu step lunge 2x10s B 5x10s B       Standing Marches  Onto black disc 10x B       Side step at table      Light UE firm surface 10 feet x 10    Along airex beam 10 feet x 10 BUE support   Standing core rotations Holding pink medicine ball 20x ea way Holding pink medicine ball 20x ea way Holding blue medicine ball 20x each way   Holding blue medicine ball 20x each way   SL clamshell         Standing hip flexion onto 8\" step      Standing hip flexion while holding blue medicine ball 20x (min A)   Lunge w/ mild UE assist    BRR 2x10     Education   TG      Romberg EC         Nerve Glide      Sitting t-spine rotation 30x   Side lunge onto  [employer] L10 Squats 3x10,    LOS L10 3x    Maze Control L10 2x Squats L9 3x10    LOS L9 2x    RC L12 2 mins LOS x2 lvl 9, weight shift squats 3x10 lvl 9 LOS x2 lvl 9, weight shift squats 3x10 lvl 7 LOS x2 lvl 10, weight shift squats 3x10 lvl 9-10    Item retrieval         Fwd step up          Ther Ex         LTR         Heel slides         Bent knee fall out         SAQ         Hip Add sq         B Heel raises      2x10   Prone quad str         Flexion in sitting 10x (3x)     10x (3x)   Step Ups 1111 [employer]         STS 3x10 10# no airex 3x5 no weight, 3x5 15# no airex 3x5, 3x5 10# no airex  3x10 10# no airex 2x 10 no airex   Standing toe raises  3x10 airex 2x10 airex       Assessment & Management     POC with continued dynamic balance emphasis      Supine marches Standing Airex 2x10        TA with UE scap depression on ball

## 2023-05-08 NOTE — TELEPHONE ENCOUNTER
----- Message from Sai Felipe sent at 2023  4:49 PM EDT -----  Regarding: Benicar refill; Belsomra  Contact: [phone]  Hi Dr Bentley Alicia  For some reason [facility] will not let me request a refill for Benicar HCT  As you advised Sebastien Johnston been cutting them in half and taking a lower dose but I will run out this week  Can you help me with a refill? Lately Sebastien Johnston been having some difficulty sleeping, and awhile ago you prescribed Belsomra and it helped short term  My prescription  in 2019  Do you think I can get a new prescription? Thanks in advance for your help!     Lidia Ball

## 2023-05-09 ENCOUNTER — TELEMEDICINE (OUTPATIENT)
Dept: PSYCHIATRY | Facility: CLINIC | Age: 68
End: 2023-05-09

## 2023-05-09 DIAGNOSIS — F33.0 MILD EPISODE OF RECURRENT MAJOR DEPRESSIVE DISORDER (HCC): ICD-10-CM

## 2023-05-09 DIAGNOSIS — F33.1 MODERATE EPISODE OF RECURRENT MAJOR DEPRESSIVE DISORDER (HCC): ICD-10-CM

## 2023-05-09 RX ORDER — BUPROPION HYDROCHLORIDE 300 MG/1
300 TABLET ORAL EVERY MORNING
Qty: 90 TABLET | Refills: 0 | Status: SHIPPED | OUTPATIENT
Start: 2023-05-09

## 2023-05-09 RX ORDER — VILAZODONE HYDROCHLORIDE 40 MG/1
40 TABLET ORAL
Qty: 30 TABLET | Refills: 1 | Status: SHIPPED | OUTPATIENT
Start: 2023-05-09
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
## 2023-05-10 ENCOUNTER — OFFICE VISIT (OUTPATIENT)
Dept: PHYSICAL THERAPY | Facility: CLINIC | Age: 68
End: 2023-05-10

## 2023-05-10 DIAGNOSIS — G89.4 CHRONIC PAIN DISORDER: ICD-10-CM

## 2023-05-10 DIAGNOSIS — Z96.641 HISTORY OF RIGHT HIP REPLACEMENT: Primary | ICD-10-CM

## 2023-05-10 NOTE — PROGRESS NOTES
Daily Note     Today's date: 5/10/2023  Patient name: Radha Gottlieb  : 1955  MRN: [id number]  Referring provider: Miguel Min MD  Dx:   Encounter Diagnosis     ICD-10-CM    1  History of right hip replacement  Z96 641       2  Chronic pain disorder  G89 4           Start Time: 1025  Stop Time: 1100  Total time in clinic (min): 35 minutes    Subjective: Pt states that his neck has been bothering him the past few days since his previous session, his neck got irritated from one of the exercises  Pt feels 6/10 pain on the L side of his neck into the L scapula  Objective: See treatment diary below      Assessment: Tolerated treatment well  Patient demonstrated fatigue post treatment, exhibited good technique with therapeutic exercises and would benefit from continued PT  Pt highly restricted with cervical AROM into extension, L lateral flexion and L rotation with pain  No repeated movements of the cervical spine or thoracic spine helped relieve his symptoms  Pt given UT and SCM stretches to help with pain and tightness  Standing and balance exercises significantly increased his neck and scapular pain, regressed to seated exercises to continue working LE's without irritating neck  Plan: Continue per plan of care  Progress treatment as tolerated              5/10 5/5 4/26 2023   4/14 4/12   Manuals 29 28 26 25 24 23   STM/MFR         Manual flexibility          Joint mobs                   Neuro Re-Ed         Standing Horse On/Off    2x10 ea LE 15x ea LE    Squat w/ DB Weight Shift     2x10 on BRR    Cervical retr 10x W        Cervical prot 10x NE        Slouch correct 10x B        SL Hip Abd 2x10 abd & ext ea 2x10 abd and ext ea abd and ext 2x10 ea 2x10 on airex 2x10 on airex    bosu step lunge 2x10 B fwd 2x10s B 5x10s B      Standing Marches   Onto black disc 10x B      Side step at table         Standing core rotations  Holding pink medicine ball 20x ea way Holding pink medicine ball "20x ea way Holding blue medicine ball 20x each way     SL clamshell         Standing hip flexion onto 8\" step         Lunge w/ mild UE assist     BRR 2x10    Education    TG     Romberg EC         Nerve Glide         Side lunge onto  [address]         Biodex  L10 Squats 3x10,    LOS L10 3x    Maze Control L10 2x Squats L9 3x10    LOS L9 2x    RC L12 2 mins LOS x2 lvl 9, weight shift squats 3x10 lvl 9 LOS x2 lvl 9, weight shift squats 3x10 lvl 7 LOS x2 lvl 10, weight shift squats 3x10 lvl 9-10   Item retrieval         Fwd step up          Ther Ex         LTR         Heel slides         Bent knee fall out         SAQ         Hip Add sq         Seated TS ext 10x w/hip hinge NE  10x over foam roller- W        UT str 2x30s L        SCM str 2x30s L        B Heel raises         Prone quad str         Flexion in sitting 10x (3 ways) 10x (3x)       Step Ups 1111 brotips         STS  3x10 10# no airex 3x5 no weight, 3x5 15# no airex 3x5, 3x5 10# no airex  3x10 10# no airex   Standing toe raises   3x10 airex 2x10 airex      Assessment & Management      POC with continued dynamic balance emphasis     Supine marches Standing airex 5x stopped due to neck pain    Seated 2x10 B Standing Airex 2x10       TA with UE scap depression on ball                                       "

## 2023-05-11 ENCOUNTER — APPOINTMENT (OUTPATIENT)
Dept: PHYSICAL THERAPY | Facility: CLINIC | Age: 68
End: 2023-05-11
Payer: MEDICARE

## 2023-05-12 ENCOUNTER — HOSPITAL ENCOUNTER (OUTPATIENT)
Dept: INFUSION CENTER | Facility: HOSPITAL | Age: 68
Discharge: HOME | End: 2023-05-12

## 2023-05-12 VITALS
RESPIRATION RATE: 20 BRPM | TEMPERATURE: 98.5 F | DIASTOLIC BLOOD PRESSURE: 92 MMHG | SYSTOLIC BLOOD PRESSURE: 155 MMHG | HEART RATE: 80 BPM | OXYGEN SATURATION: 97 %

## 2023-05-12 DIAGNOSIS — Z78.9 VEGETARIAN DIET: ICD-10-CM

## 2023-05-12 DIAGNOSIS — D50.8 IRON DEFICIENCY ANEMIA SECONDARY TO INADEQUATE DIETARY IRON INTAKE: Primary | ICD-10-CM

## 2023-05-12 RX ORDER — SODIUM CHLORIDE 9 MG/ML
20 INJECTION, SOLUTION INTRAVENOUS ONCE
Status: COMPLETED | OUTPATIENT
Start: 2023-05-12 | End: 2023-05-12

## 2023-05-12 RX ORDER — SODIUM CHLORIDE 9 MG/ML
20 INJECTION, SOLUTION INTRAVENOUS ONCE
Status: CANCELLED | OUTPATIENT
Start: 2023-05-19

## 2023-05-12 RX ADMIN — IRON SUCROSE 300 MG: 20 INJECTION, SOLUTION INTRAVENOUS at 13:33

## 2023-05-12 RX ADMIN — SODIUM CHLORIDE 20 ML/HR: 0.9 INJECTION, SOLUTION INTRAVENOUS at 13:32

## 2023-05-16 ENCOUNTER — EVALUATION (OUTPATIENT)
Dept: PHYSICAL THERAPY | Facility: CLINIC | Age: 68
End: 2023-05-16

## 2023-05-16 DIAGNOSIS — Z96.641 HISTORY OF RIGHT HIP REPLACEMENT: Primary | ICD-10-CM

## 2023-05-16 DIAGNOSIS — G89.4 CHRONIC PAIN DISORDER: ICD-10-CM

## 2023-05-16 NOTE — PROGRESS NOTES
Re-Eval     Today's date: 2023  Patient name: Vick Sewell  : 1955  MRN: [id number]  Referring provider: Abisai Iniguez MD  Dx:   Encounter Diagnosis     ICD-10-CM    1  History of right hip replacement  Z96 641       2  Chronic pain disorder  G89 4                    Assessment:  Vick Sewell presents today with continued complaints of lumbar spine pain, left LE >R weakness and significant complaints of imbalance  He reported he is to be scheduled for spinal fusion to help address continued LE neurologic symptoms after seeing his neurosurgeon  Today he continues to demonstrate significant bilateral LE weakness, limited bilateral hip ROM, impaired gait which worsens with prolonged walking increasing his right foot slap and leading to evidence of increase imbalance  His functional outcome scores today were similar to last evaluation regarding his 5x STS and TUG but his 6 minute walk test reduced by 150ft since last evaluation  He has self reports of improved overall balance since beginning therapy  He will continue to benefit from skilled therapy to address above strength, ROM deficits, challenge balance in safe environment as he require close supervision to minimal assist to prevents falls  Impairments: abnormal gait, abnormal muscle firing, abnormal or restricted ROM, activity intolerance, impaired physical strength, lacks appropriate home exercise program, pain with function and poor body mechanics  Understanding of Dx/Px/POC: good   Prognosis: good   Goals  Short Term Goals (3 weeks)  1  Patient will be independent with initial HEP  -met  2  Patient will demonstrate an increase in right hip ROM  -progressing towards  3  Patient will demonstrate an increase in right hip strength of 1/2 grade on MMT -progressing towards    Long Term Goals (6 weeks)  1  Patient will demonstrate an increase in right hip AROM to WNL in order to promote self-care activities pain-free -met  2   Patient will demonstrate an increase in right hip strength to 4/5 on MMT -met  3  Patient will be able to ascend/descend 15 stairs reciprocally without pain  -progressing towards  4  Patient will be able to ambulate 300 feet on varied terrain without AD and without gait deviation  -progressing towards   5  Patient will be able to perform all basic ADLs without modification  -progressing towards  6  Patient will be able to perform Timed Up and Go in 12 5 seconds or less in order to demonstrate decreased risk of falls  -progressing towards  Plan  Patient would benefit from: skilled physical therapy  Planned modality interventions: cryotherapy, electrical stimulation/Russian stimulation, TENS, ultrasound, thermotherapy: hydrocollator packs, unattended electrical stimulation, high voltage pulsed current: pain management and high voltage pulsed current: spasm management  Planned therapy interventions: flexibility, functional ROM exercises, graded exercise, home exercise program, joint mobilization, manual therapy, neuromuscular re-education, patient education, strengthening, stretching, therapeutic exercise, therapeutic activities, Vides taping, balance/weight bearing training, gait training, abdominal trunk stabilization, activity modification, balance, body mechanics training, graded activity, self care, postural training, IADL retraining, ADL training, breathing training, behavior modification, muscle pump exercises, therapeutic training and transfer training  Frequency: 2x week  Duration in weeks: 8  Treatment plan discussed with: patient         Subjective Evaluation     History of Present Illness  Date of surgery: 11/20/2022    Isela Serranoe reports he was seen by neurosurgeon who is going to perform a fusion L3-4 and is just waiting for surgery to be scheduled  He was also diagnosed with right hand carpal tunnel syndrome which may need surgery as well    He continues with high levels of back pain that can fluctuate in "instensity day to day and even time of day  He states he continues with feeling of imbalance but does notice small improvements overall  Pain  Current pain rating: 3  At best pain rating: 3  At worst pain ratin  Location: Right hip/back  Quality: dull ache  Relieving factors: medications  Aggravating factors: walking, standing, stair climbing and lifting  Progression: improved     As per  FOTO pt reports: \"Extreme Difficulty or unable to perform\"    - Performing heavy activities around your home   \"Moderate Difficulty\" with:     - With any of your usual work, housework, or school activities    - Lifting an object, like a bag of groceries from the floor    - Performing light activities around your home     Social Support  Steps to enter house: yes  8  Stairs in house: yes   15  Lives with: spouse     Employment status: not working (Retired)  Hand dominance: right  Exercise history: Not much lately due to COVID and neuropathy       Diagnostic Tests  X-ray: normal  Treatments  No previous or current treatments  Patient Goals  Patient goals for therapy: increased motion              Objective      Neurological Testing      Sensation      Hip   Left Hip   Hyposensation: light touch     Right Hip   Hyposensation: light touch     Comments   Left light touch: Reports symmetrical hyposensation below each knee and into both feet; intact above the knee       Lumbar ROM:  Extension: Mod-Major loss  Flexion: WNL    AROM     Left Right Left Right   Hip Flex 105 degrees  112 degrees  WNL WNL   Hip Abd 36 degrees  38 degrees  WNL WNL   Hip IR   12 degrees seated 17 degrees seated   Hip ER 34 degrees PROM supine 90/90 22 degrees PROM supine 90/90 22 degrees seated 5 degrees seated             MMT     Left Right   Hip Flex - SLR 5/5 4+/5   Hip Abd 3/5 3+/5   Hip IR 4-/5* 4+/5*   Hip ER 4-/5* 4-/5*              Stairs Negotiation:  Ascend: Reciprocal with 2 hand rails    Descend: Non-reciprocal with " 2 hand rails      Ambulation      Comments   Ambulates with wide-based gait with SPC, tibial external rotation R > L; antalgic gait     Aldana/56  MDC: 6 pts  Age Norms:  61-76: M - 54   F - 55  70-79: M - 47   F - 53  80-89: M - 48   F - 50 5xSTS: Chance et al 2010  MDC: 2 3 sec  Age Norms:  60-69: 11 1 sec  70-79: 12 6 sec  80-89: 14 8 sec   TUG  MDC: 4 14 sec  Cut off score:  >13 5 sec community dwelling adults  >32 2 frail elderly  <20 I for basic transfers  >30 dependent on transfers 10 Meter Walk Test: Lesly Gonzales and Cristina alfredo   20-29: M - 1 35 m   F - 1 34 m  30-39: M - 1 43 m   F - 1 34 m  40-49: M - 1 43 m   F - 1 39 m  50-59: M - 1 43 m   F - 1 31 m  60-69: M - 1 34 m   F - 1 24 m  70-79: M - 1 26 m   F - 1 13 m  80-89: M - 0 97 m   F - 0 94 m   FGA  MCID: 4 pts  Geriatrics/community < 22/30 fall risk  Geriatrics/community < 20/30 unexplained falls DGI  MDC: vestibular - 4 pts  MDC: geriatric/community - 3 pts  Falls risk <19/24   6 Minute Walk Test  Age Norms  61-76: M - 1876 ft (571 80 m)  F - 1765 ft (537 98 m)  70-79: M - 1729 ft (527 00 m)  F - 1545 ft (470 92 m)  80-89: M - 1368 ft (416 97 m)  F - 1286 ft (391 97 m) mCTSIB  Norm: 20-60 yrs  Eyes open firm: norm sway 0 21-0 48  Eyes closed firm: norm sway 0 48-0 99  Eyes open foam: norm sway 0 38-0 71  Eyes closed foam: norm sway 0 70-2 22       Outcome Measures    5/16         5xSTS 13 1s 13 72s         TUG  TUG Cog  TUG Carry 14 2s No SPC 13 58s No SPC         mCTSIB  - FTEO (firm)  - FTEC (firm)  - FTEO (foam)  - FTEC (foam)           6MWT 1050ft w/SPC 900ft w/SPC -increased imbalance noted >2 minutes         2MWT     315ft w/SPC                       5/16 5/10 5/5 4/26 2023     Manuals 30 29 28 26 25   STM/MFR        Manual flexibility         Joint mobs                 Neuro Re-Ed        Standing Horse On/Off     2x10 ea LE   Squat w/ DB Weight Shift        Cervical retr  10x W      Cervical prot  10x NE      Slouch correct  10x B "  SL Hip Abd  2x10 abd & ext ea 2x10 abd and ext ea abd and ext 2x10 ea 2x10 on airex   bosu step lunge  2x10 B fwd 2x10s B 5x10s B    Standing Marches    Onto black disc 10x B    Side step at table        Standing core rotations   Holding pink medicine ball 20x ea way Holding pink medicine ball 20x ea way Holding blue medicine ball 20x each way   SL clamshell        Standing hip flexion onto 8\" step        Lunge w/ mild UE assist        Education     TG   Romberg EC        Nerve Glide        Side lunge onto  [employer]-it        Biodex   L10 Squats 3x10,    LOS L10 3x    Maze Control L10 2x Squats L9 3x10    LOS L9 2x    RC L12 2 mins LOS x2 lvl 9, weight shift squats 3x10 lvl 9   Item retrieval        Fwd step up         Ther Ex        LTR        Heel slides        Bent knee fall out        SAQ        Hip Add sq        Seated TS ext  10x w/hip hinge NE  10x over foam roller- W      UT str  2x30s L      SCM str  2x30s L      B Heel raises        Prone quad str        Flexion in sitting 10x 3 ways 10x (3 ways) 10x (3x)     Step Ups 1111 [employer]        STS 5xSTS  3x10 10# no airex 3x5 no weight, 3x5 15# no airex 3x5, 3x5 10# no airex   Standing toe raises    3x10 airex 2x10 airex    Assessment & Management        Supine marches  Standing airex 5x stopped due to neck pain    Seated 2x10 B Standing Airex 2x10     TA with UE scap depression on ball        6 MWT performed       TUG performed                              "

## 2023-05-18 ENCOUNTER — OFFICE VISIT (OUTPATIENT)
Dept: PHYSICAL THERAPY | Facility: CLINIC | Age: 68
End: 2023-05-18

## 2023-05-18 DIAGNOSIS — G89.4 CHRONIC PAIN DISORDER: ICD-10-CM

## 2023-05-18 DIAGNOSIS — Z96.641 HISTORY OF RIGHT HIP REPLACEMENT: Primary | ICD-10-CM

## 2023-05-18 NOTE — PROGRESS NOTES
"Daily Note     Today's date: 2023  Patient name: Elizabeth Rasmussen  : 1955  MRN: [id number]  Referring provider: Chrissy Wood MD  Dx:   Encounter Diagnosis     ICD-10-CM    1  History of right hip replacement  Z96 641       2  Chronic pain disorder  G89 4           Start Time: 1105  Stop Time: 1145  Total time in clinic (min): 40 minutes    Subjective: Pt states speaking to his neurosurgeon recently and they plan on doing an L3-4 fusion, still waiting for exact date  Pt is dealing with carpal tunnel syndrome in his hands, cervical pain went away from last week  No new complaints  Objective: See treatment diary below      Assessment: Tolerated treatment well  Patient demonstrated fatigue post treatment, exhibited good technique with therapeutic exercises and would benefit from continued PT  Pt continues to have difficulty with standing balance exercises, requiring BUE support at times as well as CG due to LOB  More difficulty balancing on LLE  Pt had high levels of lumbar and sacral pain throughout session, required frequent rest breaks  Plan: Continue per plan of care  Progress treatment as tolerated              5/18 5/16 5/10 5/5 4/26   Manuals 31 30 29 28 26   STM/MFR        Manual flexibility         Joint mobs                 Neuro Re-Ed        Standing Horse On/Off        Squat w/ DB Weight Shift        Cervical retr   10x W     Cervical prot   10x NE     Slouch correct   10x B     SL Hip Abd On airex 15x B abd & ext  2x10 abd & ext ea 2x10 abd and ext ea abd and ext 2x10 ea   bosu step lunge   2x10 B fwd 2x10s B 5x10s B   Standing Marches On airex 2x10 B    Onto black disc 10x B   Side step at table        Standing core rotations    Holding pink medicine ball 20x ea way Holding pink medicine ball 20x ea way   SL clamshell        Standing hip flexion onto 8\" step        Lunge w/ mild UE assist        Education        Romberg EC        Nerve Glide        Side lunge onto  [employer] wobbleboard taps ML 30x  AP 30x       Biodex L10 squats 3x10    LOS L11 2x   L10 Squats 3x10,    LOS L10 3x    Maze Control L10 2x Squats L9 3x10    LOS L9 2x    RC L12 2 mins   Item retrieval        Fwd step up         Ther Ex        LTR        Heel slides        Bent knee fall out        SAQ        Hip Add sq        Seated TS ext   10x w/hip hinge NE  10x over foam roller- W     UT str   2x30s L     SCM str   2x30s L     B Heel raises        Prone quad str        Flexion in sitting 10x 3 ways    Fwd 10x (4x) 10x 3 ways 10x (3 ways) 10x (3x)    Step Ups 1111 [employer]        STS 10x STS  10x STS w/10lbs 5xSTS  3x10 10# no airex 3x5 no weight, 3x5 15# no airex   Standing toe raises     3x10 airex   Assessment & Management        Supine marches   Standing airex 5x stopped due to neck pain    Seated 2x10 B Standing Airex 2x10    TA with UE scap depression on ball        6 MWT  performed      TUG  performed

## 2023-05-19 ENCOUNTER — HOSPITAL ENCOUNTER (OUTPATIENT)
Dept: INFUSION CENTER | Facility: HOSPITAL | Age: 68
End: 2023-05-19

## 2023-05-19 VITALS
TEMPERATURE: 97.7 F | DIASTOLIC BLOOD PRESSURE: 94 MMHG | RESPIRATION RATE: 18 BRPM | HEART RATE: 81 BPM | OXYGEN SATURATION: 96 % | SYSTOLIC BLOOD PRESSURE: 142 MMHG

## 2023-05-19 DIAGNOSIS — D50.8 IRON DEFICIENCY ANEMIA SECONDARY TO INADEQUATE DIETARY IRON INTAKE: Primary | ICD-10-CM

## 2023-05-19 DIAGNOSIS — Z78.9 VEGETARIAN DIET: ICD-10-CM

## 2023-05-19 RX ORDER — SODIUM CHLORIDE 9 MG/ML
20 INJECTION, SOLUTION INTRAVENOUS ONCE
Status: CANCELLED | OUTPATIENT
Start: 2023-05-19

## 2023-05-19 RX ORDER — SODIUM CHLORIDE 9 MG/ML
20 INJECTION, SOLUTION INTRAVENOUS ONCE
Status: COMPLETED | OUTPATIENT
Start: 2023-05-19 | End: 2023-05-19

## 2023-05-19 RX ADMIN — IRON SUCROSE 300 MG: 20 INJECTION, SOLUTION INTRAVENOUS at 13:30

## 2023-05-19 RX ADMIN — SODIUM CHLORIDE 20 ML/HR: 0.9 INJECTION, SOLUTION INTRAVENOUS at 13:30

## 2023-05-22 DIAGNOSIS — G47.30 SLEEP APNEA, UNSPECIFIED TYPE: ICD-10-CM

## 2023-05-22 DIAGNOSIS — R53.83 FATIGUE, UNSPECIFIED TYPE: ICD-10-CM

## 2023-05-22 RX ORDER — ARMODAFINIL 250 MG/1
250 TABLET ORAL DAILY
Qty: 30 TABLET | Refills: 0 | Status: SHIPPED | OUTPATIENT
Start: 2023-05-22

## 2023-05-23 ENCOUNTER — OFFICE VISIT (OUTPATIENT)
Dept: PHYSICAL THERAPY | Facility: CLINIC | Age: 68
End: 2023-05-23

## 2023-05-23 DIAGNOSIS — G89.4 CHRONIC PAIN DISORDER: ICD-10-CM

## 2023-05-23 DIAGNOSIS — Z96.641 HISTORY OF RIGHT HIP REPLACEMENT: Primary | ICD-10-CM

## 2023-05-23 NOTE — PROGRESS NOTES
"Daily Note     Today's date: 2023  Patient name: Destiny Arita  : 1955  MRN: [id number]  Referring provider: Vivien Huerta MD  Dx:   Encounter Diagnosis     ICD-10-CM    1  History of right hip replacement  Z96 641       2  Chronic pain disorder  G89 4                      Subjective: Patient reports he feels about the same, he is waiting for surgery to be scheduled  Objective: See treatment diary below      Assessment: Tolerated treatment well  Patient continues to have significant balance deficits particularly when performing activities in prolonged extension  Continues to require seated lumbar flexion breaks in order to minimize pain  Patient demonstrated fatigue post treatment, exhibited good technique with therapeutic exercises and would benefit from continued PT      Plan: Continue per plan of care  Progress treatment as tolerated  Progress challenge as appropriate with irritability            5/23 5/18 5/16 5/10 5/5   Manuals 32 31 30 29 28   STM/MFR        Manual flexibility         Joint mobs                 Neuro Re-Ed        Standing Horse On/Off        Squat w/ DB Weight Shift        Cervical retr    10x W    Cervical prot    10x NE    Slouch correct    10x B    SL Hip Abd On airex B abd & ext 2x10 On airex 15x B abd & ext  2x10 abd & ext ea 2x10 abd and ext ea   bosu step lunge airex B 2x10 ea   2x10 B fwd 2x10s B   Standing Marches On airex 2x10 On airex 2x10 B      Side step at table        Standing core rotations     Holding pink medicine ball 20x ea way   SL clamshell        Standing hip flexion onto 8\" step        Lunge w/ mild UE assist        Education        Romberg EC        Nerve Glide        Side lunge onto  Bear [place]        wobbleboard taps ML 40x, AP 40x ML 30x  AP 30x      Biodex L9 squats 3x10, LOS lvl 9 3x, Maze control L10 2x L10 squats 3x10    LOS L11 2x   L10 Squats 3x10,    LOS L10 3x    Maze Control L10 2x   Item retrieval        Fwd step up       " Ther Ex        LTR        Heel slides        Bent knee fall out        SAQ        Hip Add sq        Seated TS ext    10x w/hip hinge NE  10x over foam roller- W    UT str    2x30s L    SCM str    2x30s L    B Heel raises        Prone quad str        Flexion in sitting 10x (4x) 10x 3 ways    Fwd 10x (4x) 10x 3 ways 10x (3 ways) 10x (3x)   Step Ups 1111 [email or web address]        STS 3x10 w/ 10lbs 10x STS  10x STS w/10lbs 5xSTS  3x10 10# no airex   Standing toe raises        Assessment & Management        Supine marches    Standing airex 5x stopped due to neck pain    Seated 2x10 B Standing Airex 2x10   TA with UE scap depression on ball        6 MWT   performed     TUG   performed

## 2023-05-25 ENCOUNTER — APPOINTMENT (OUTPATIENT)
Dept: PHYSICAL THERAPY | Facility: CLINIC | Age: 68
End: 2023-05-25
Payer: MEDICARE

## 2023-05-29 DIAGNOSIS — M54.42 CHRONIC BILATERAL LOW BACK PAIN WITH BILATERAL SCIATICA: ICD-10-CM

## 2023-05-29 DIAGNOSIS — G89.4 CHRONIC PAIN SYNDROME: ICD-10-CM

## 2023-05-29 DIAGNOSIS — M54.41 CHRONIC BILATERAL LOW BACK PAIN WITH BILATERAL SCIATICA: ICD-10-CM

## 2023-05-29 DIAGNOSIS — G89.29 CHRONIC BILATERAL LOW BACK PAIN WITH BILATERAL SCIATICA: ICD-10-CM

## 2023-05-29 DIAGNOSIS — N52.9 ERECTILE DYSFUNCTION, UNSPECIFIED ERECTILE DYSFUNCTION TYPE: ICD-10-CM

## 2023-05-29 DIAGNOSIS — K21.9 GASTROESOPHAGEAL REFLUX DISEASE WITHOUT ESOPHAGITIS: Primary | ICD-10-CM

## 2023-05-30 ENCOUNTER — OFFICE VISIT (OUTPATIENT)
Dept: PHYSICAL THERAPY | Facility: CLINIC | Age: 68
End: 2023-05-30

## 2023-05-30 DIAGNOSIS — M54.42 CHRONIC BILATERAL LOW BACK PAIN WITH BILATERAL SCIATICA: ICD-10-CM

## 2023-05-30 DIAGNOSIS — Z96.641 HISTORY OF RIGHT HIP REPLACEMENT: Primary | ICD-10-CM

## 2023-05-30 DIAGNOSIS — G89.29 CHRONIC BILATERAL LOW BACK PAIN WITH BILATERAL SCIATICA: ICD-10-CM

## 2023-05-30 DIAGNOSIS — M54.41 CHRONIC BILATERAL LOW BACK PAIN WITH BILATERAL SCIATICA: ICD-10-CM

## 2023-05-30 DIAGNOSIS — G89.4 CHRONIC PAIN DISORDER: ICD-10-CM

## 2023-05-30 RX ORDER — HYDROCODONE BITARTRATE AND ACETAMINOPHEN 10; 325 MG/1; MG/1
1 TABLET ORAL EVERY 4 HOURS PRN
Qty: 180 TABLET | Refills: 0 | Status: SHIPPED | OUTPATIENT
Start: 2023-05-30

## 2023-05-30 RX ORDER — OMEPRAZOLE 40 MG/1
40 CAPSULE, DELAYED RELEASE ORAL EVERY 12 HOURS
Qty: 90 CAPSULE | Refills: 0 | Status: SHIPPED | OUTPATIENT
Start: 2023-05-30

## 2023-05-30 RX ORDER — MELOXICAM 15 MG/1
15 TABLET ORAL DAILY
Qty: 90 TABLET | Refills: 0 | Status: SHIPPED | OUTPATIENT
Start: 2023-05-30

## 2023-05-30 RX ORDER — OXYCODONE HCL 10 MG/1
10 TABLET, FILM COATED, EXTENDED RELEASE ORAL 2 TIMES DAILY
Qty: 60 TABLET | Refills: 0 | Status: SHIPPED | OUTPATIENT
Start: 2023-05-30

## 2023-05-30 RX ORDER — TADALAFIL 5 MG/1
5 TABLET ORAL DAILY
Qty: 10 TABLET | Refills: 0 | Status: SHIPPED | OUTPATIENT
Start: 2023-05-30 | End: 2024-07-26

## 2023-05-30 NOTE — PROGRESS NOTES
"Daily Note     Today's date: 2023  Patient name: Shital Bruce  : 1955  MRN: [id number]  Referring provider: Sarah Barajas MD  Dx:   Encounter Diagnosis     ICD-10-CM    1  History of right hip replacement  Z96 641       2  Chronic pain disorder  G89 4                      Subjective: Patient reports he fell over the weekend while raking pinecones on his R hip  He reports feeling a little sore, but no pain or change in activity level  Objective: See treatment diary below      Assessment: Tolerated treatment well  Patient demonstrates no change in status, ROM, or strength post fall over the weekend  He continues to demonstrate increased LOB with deficits becoming more apparent with prolonged time in standing  Able to tolerate sit to stands on airex without significant LOB  Patient demonstrated fatigue post treatment, exhibited good technique with therapeutic exercises and would benefit from continued PT      Plan: Continue per plan of care  Progress treatment as tolerated  Progress challenge as appropriate with irritability         5/10   Manuals 33 32 31 30 29   STM/MFR        Manual flexibility         Joint mobs                 Neuro Re-Ed        Standing Horse On/Off        Squat w/ DB Weight Shift        Cervical retr     10x W   Cervical prot     10x NE   Slouch correct     10x B   SL Hip Abd On airex B abd & ext 2x10 On airex B abd & ext 2x10 On airex 15x B abd & ext  2x10 abd & ext ea   bosu step lunge airex B 2x10 ea airex B 2x10 ea   2x10 B fwd   Standing Marches  On airex 2x10 On airex 2x10 B     Side step at table        Standing core rotations        SL clamshell        Standing hip flexion onto 8\" step        Lunge w/ mild UE assist        Education        Romberg EC        Nerve Glide        Side lunge onto  [employer] [place] Into BOSU 15x ea       wobbleboard taps  ML 40x, AP 40x ML 30x  AP 30x     Biodex Lvl 7 squats 3x10, LOS Lvl 9 3x L9 squats 3x10, LOS " lvl 9 3x, Maze control L10 2x L10 squats 3x10    LOS L11 2x     Item retrieval        Fwd step up         Ther Ex        LTR        Heel slides        Bent knee fall out        SAQ        Hip Add sq        Seated TS ext     10x w/hip hinge NE  10x over foam roller- W   UT str     2x30s L   SCM str     2x30s L   B Heel raises        Prone quad str        Flexion in sitting 10x (5x) 10x (4x) 10x 3 ways    Fwd 10x (4x) 10x 3 ways 10x (3 ways)   Step Ups 1111 [facility]        STS 3x10 w/ 10lbs, 3x10 w/ airex under feet 3x10 w/ 10lbs 10x STS  10x STS w/10lbs 5xSTS    Standing toe raises        Assessment & Management        Supine marches     Standing airex 5x stopped due to neck pain    Seated 2x10 B   TA with UE scap depression on ball        6 MWT    performed    TUG    performed

## 2023-05-30 NOTE — TELEPHONE ENCOUNTER
Requested medication(s) are due for refill today: Yes and unknown as we have never prescribed them  Patient has already received a courtesy refill: No  Other reason request has been forwarded to provider:  This refill cannot be delegated

## 2023-05-30 NOTE — TELEPHONE ENCOUNTER
Requested medication(s) are due for refill today: Yes  Patient has already received a courtesy refill: No  Other reason request has been forwarded to provider: Refill failed protocol - HGB in normal range and within 360 days

## 2023-05-30 NOTE — TELEPHONE ENCOUNTER
----- Message from Aram Marie sent at 5/29/2023 12:47 PM EDT -----  Regarding: Mobic refill  Contact: [phone]  Hi Dr Carrizales Sayer  For some reason [facility] won’t let me request a refill on my Mobic prescription  My last refill was in February with a 90 day supply  Can you prescribe a refill for me?   Thanks in advance for your help!  - Farzaneh Renteria

## 2023-05-31 ENCOUNTER — TELEPHONE (OUTPATIENT)
Dept: FAMILY MEDICINE CLINIC | Facility: CLINIC | Age: 68
End: 2023-05-31

## 2023-06-01 ENCOUNTER — APPOINTMENT (OUTPATIENT)
Dept: PHYSICAL THERAPY | Facility: CLINIC | Age: 68
End: 2023-06-01
Payer: MEDICARE

## 2023-06-06 ENCOUNTER — OFFICE VISIT (OUTPATIENT)
Dept: PHYSICAL THERAPY | Facility: CLINIC | Age: 68
End: 2023-06-06
Payer: MEDICARE

## 2023-06-06 DIAGNOSIS — Z96.641 HISTORY OF RIGHT HIP REPLACEMENT: Primary | ICD-10-CM

## 2023-06-06 DIAGNOSIS — G89.4 CHRONIC PAIN DISORDER: ICD-10-CM

## 2023-06-06 PROCEDURE — 97112 NEUROMUSCULAR REEDUCATION: CPT

## 2023-06-06 PROCEDURE — 97110 THERAPEUTIC EXERCISES: CPT

## 2023-06-06 NOTE — PROGRESS NOTES
"Daily Note     Today's date: 2023  Patient name: Brigido Nascimento  : 1955  MRN: [id number]  Referring provider: Marnie Pinedo MD  Dx:   Encounter Diagnosis     ICD-10-CM    1  History of right hip replacement  Z96 641       2  Chronic pain disorder  G89 4           Start Time: 940  Stop Time: 3087  Total time in clinic (min): 40 minutes    Subjective: Pt states that he got the phone call today that his surgery is scheduled for   Pt reports having at least 1 fall a day at this point  Pt states the normal pain he typically has, fell yesterday  Most of his falls have been in the house, most of his pain is around the sacrum  Objective: See treatment diary below      Assessment: Tolerated treatment well  Patient demonstrated fatigue post treatment, exhibited good technique with therapeutic exercises and would benefit from continued PT  Intensity of exercises reduced today due to his fall yesterday and increased pain today  Plan: Continue per plan of care  Progress treatment as tolerated             Manuals 34 33 32 31 30   STM/MFR        Manual flexibility         Joint mobs                 Neuro Re-Ed        Standing Horse On/Off        Squat w/ DB Weight Shift        Cervical retr        Cervical prot        Slouch correct        SL Hip Abd On airex B abd & ext 2x10 On airex B abd & ext 2x10 On airex B abd & ext 2x10 On airex 15x B abd & ext    bosu step lunge airex B 2x10 ea airex B 2x10 ea airex B 2x10 ea     Standing Marches   On airex 2x10 On airex 2x10 B    Side step at table        Standing core rotations        SL clamshell        Standing hip flexion onto 8\" step        Lunge w/ mild UE assist        Education        Romberg EC        Nerve Glide        Side lunge onto  Bear [place] Into bosu 15x ea Into BOSU 15x ea      wobbleboard taps   ML 40x, AP 40x ML 30x  AP 30x    Biodex L9 squats 2x10 Lvl 7 squats 3x10, LOS Lvl 9 3x L9 squats 3x10, LOS lvl 9 " 3x, Maze control L10 2x L10 squats 3x10    LOS L11 2x    Item retrieval        Fwd step up         Ther Ex        LTR        Heel slides        Bent knee fall out        SAQ        Hip Add sq        Seated TS ext        UT str        SCM str        B Heel raises        Prone quad str        Flexion in sitting 10x (5x) 10x (5x) 10x (4x) 10x 3 ways    Fwd 10x (4x) 10x 3 ways   Step [place]        STS 10x  2x10 w/10lbs 3x10 w/ 10lbs, 3x10 w/ airex under feet 3x10 w/ 10lbs 10x STS  10x STS w/10lbs 5xSTS   Standing toe raises        Assessment & Management        Supine marches        TA with UE scap depression on ball        6 MWT     performed   TUG     performed

## 2023-06-08 ENCOUNTER — OFFICE VISIT (OUTPATIENT)
Dept: PHYSICAL THERAPY | Facility: CLINIC | Age: 68
End: 2023-06-08
Payer: MEDICARE

## 2023-06-08 DIAGNOSIS — Z96.641 HISTORY OF RIGHT HIP REPLACEMENT: Primary | ICD-10-CM

## 2023-06-08 DIAGNOSIS — G89.4 CHRONIC PAIN DISORDER: ICD-10-CM

## 2023-06-08 PROCEDURE — 97112 NEUROMUSCULAR REEDUCATION: CPT | Performed by: PHYSICAL THERAPIST

## 2023-06-08 PROCEDURE — 97110 THERAPEUTIC EXERCISES: CPT | Performed by: PHYSICAL THERAPIST

## 2023-06-08 NOTE — PROGRESS NOTES
"Daily Note     Today's date: 2023  Patient name: Сергей Mendoza  : 1955  MRN: [id number]  Referring provider: Martha Fink MD  Dx:   Encounter Diagnosis     ICD-10-CM    1  History of right hip replacement  Z96 641       2  Chronic pain disorder  G89 4                      Subjective: Patient reports he feels about the same overall, though he has not had any falls since then  Objective: See treatment diary below      Assessment: Tolerated treatment fair  Patient continues to be challenged by intensity of squats and single leg balance movements when on airex  He quickly fatigues and instability increases with increased reps, requires frequent stretch breaks to decrease fatigue  Patient demonstrated fatigue post treatment, exhibited good technique with therapeutic exercises and would benefit from continued PT      Plan: Continue per plan of care  Progress treatment as tolerated  Progress challenge as appropriate with irritability           Manuals 35 34 33 32 31   STM/MFR        Manual flexibility         Joint mobs                 Neuro Re-Ed        Standing Horse On/Off        Squat w/ DB Weight Shift        Cervical retr        Cervical prot        Slouch correct        SL Hip Abd On airex B abd & hip flexoin 2x10 On airex B abd & ext 2x10 On airex B abd & ext 2x10 On airex B abd & ext 2x10 On airex 15x B abd & ext   bosu step lunge  airex B 2x10 ea airex B 2x10 ea airex B 2x10 ea    Standing Marches    On airex 2x10 On airex 2x10 B   Side step at table        Standing core rotations        SL clamshell        Standing hip flexion onto 8\" step        Lunge w/ mild UE assist        Education        Romberg EC        Nerve Glide        Side lunge onto  Bear [place]  Into bosu 15x ea Into BOSU 15x ea     wobbleboard taps    ML 40x, AP 40x ML 30x  AP 30x   Biodex L8 3x10 squats, LOS lvl 9 3x L9 squats 2x10 Lvl 7 squats 3x10, LOS Lvl 9 3x L9 squats 3x10, LOS lvl 9 3x, Maze " control L10 2x L10 squats 3x10    LOS L11 2x   Item retrieval        Fwd step up         Ther Ex        LTR        Heel slides        Bent knee fall out        SAQ        Hip Add sq        Seated TS ext        UT str        SCM str        B Heel raises        Prone quad str        Flexion in sitting 10x (5x) 10x (5x) 10x (5x) 10x (4x) 10x 3 ways    Fwd 10x (4x)   Step [place]        STS 3x10 airex under feet, 3x10 10# 10x  2x10 w/10lbs 3x10 w/ 10lbs, 3x10 w/ airex under feet 3x10 w/ 10lbs 10x STS  10x STS w/10lbs   Standing toe raises        Assessment & Management        Supine marches        TA with UE scap depression on ball        6 MWT        TUG

## 2023-06-12 ENCOUNTER — APPOINTMENT (OUTPATIENT)
Dept: LAB | Facility: HOSPITAL | Age: 68
End: 2023-06-12
Payer: MEDICARE

## 2023-06-12 ENCOUNTER — HOSPITAL ENCOUNTER (OUTPATIENT)
Dept: RADIOLOGY | Facility: HOSPITAL | Age: 68
Discharge: HOME | End: 2023-06-12
Payer: MEDICARE

## 2023-06-12 ENCOUNTER — OFFICE VISIT (OUTPATIENT)
Dept: LAB | Facility: HOSPITAL | Age: 68
End: 2023-06-12
Payer: MEDICARE

## 2023-06-12 DIAGNOSIS — M48.062 SPINAL STENOSIS, LUMBAR REGION WITH NEUROGENIC CLAUDICATION: ICD-10-CM

## 2023-06-12 PROCEDURE — 71046 X-RAY EXAM CHEST 2 VIEWS: CPT

## 2023-06-12 PROCEDURE — 93005 ELECTROCARDIOGRAM TRACING: CPT

## 2023-06-13 ENCOUNTER — APPOINTMENT (OUTPATIENT)
Dept: PHYSICAL THERAPY | Facility: CLINIC | Age: 68
End: 2023-06-13
Payer: MEDICARE

## 2023-06-13 LAB
ATRIAL RATE: 93 BPM
P AXIS: 37 DEGREES
PR INTERVAL: 176 MS
QRS AXIS: 10 DEGREES
QRSD INTERVAL: 82 MS
QT INTERVAL: 358 MS
QTC INTERVAL: 445 MS
T WAVE AXIS: 30 DEGREES
VENTRICULAR RATE: 93 BPM

## 2023-06-13 PROCEDURE — 93010 ELECTROCARDIOGRAM REPORT: CPT | Performed by: INTERNAL MEDICINE

## 2023-06-14 ENCOUNTER — APPOINTMENT (OUTPATIENT)
Dept: PHYSICAL THERAPY | Facility: CLINIC | Age: 68
End: 2023-06-14
Payer: MEDICARE

## 2023-06-15 ENCOUNTER — APPOINTMENT (OUTPATIENT)
Dept: PHYSICAL THERAPY | Facility: CLINIC | Age: 68
End: 2023-06-15
Payer: MEDICARE

## 2023-06-15 ENCOUNTER — RA CDI HCC (OUTPATIENT)
Dept: OTHER | Facility: HOSPITAL | Age: 68
End: 2023-06-15

## 2023-06-15 NOTE — PROGRESS NOTES
[address]  coding opportunities       Chart reviewed, no opportunity found: CHART REVIEWED, NO OPPORTUNITY FOUND        Patients Insurance     Medicare Insurance: Medicare

## 2023-06-16 ENCOUNTER — OFFICE VISIT (OUTPATIENT)
Dept: PHYSICAL THERAPY | Facility: CLINIC | Age: 68
End: 2023-06-16
Payer: MEDICARE

## 2023-06-16 DIAGNOSIS — G47.9 SLEEP DISTURBANCE: ICD-10-CM

## 2023-06-16 DIAGNOSIS — Z96.641 HISTORY OF RIGHT HIP REPLACEMENT: Primary | ICD-10-CM

## 2023-06-16 DIAGNOSIS — G47.30 SLEEP APNEA, UNSPECIFIED TYPE: ICD-10-CM

## 2023-06-16 DIAGNOSIS — R53.83 FATIGUE, UNSPECIFIED TYPE: ICD-10-CM

## 2023-06-16 DIAGNOSIS — G89.4 CHRONIC PAIN DISORDER: ICD-10-CM

## 2023-06-16 PROCEDURE — 97110 THERAPEUTIC EXERCISES: CPT | Performed by: PHYSICAL THERAPIST

## 2023-06-16 PROCEDURE — 97112 NEUROMUSCULAR REEDUCATION: CPT | Performed by: PHYSICAL THERAPIST

## 2023-06-16 NOTE — TELEPHONE ENCOUNTER
Requested medication(s) are due for refill today: Yes  Patient has already received a courtesy refill: No  Other reason request has been forwarded to provider: Medication not assigned to a protocol, review manually

## 2023-06-16 NOTE — PROGRESS NOTES
"Daily Note     Today's date: 2023  Patient name: Leonila Cooper  : 1955  MRN: [id number]  Referring provider: Jennifer Del Rio MD  Dx:   Encounter Diagnosis     ICD-10-CM    1  History of right hip replacement  Z96 641       2  Chronic pain disorder  G89 4                      Subjective: Patient reports he found he has prostate cancer, but it has not metastasized, he is going to his neurologist next week to evaluate  Objective: See treatment diary below      Assessment: Tolerated treatment well  Patient continues to be challenged intensity of exercises, shortened session as patient arrived 15 minutes late  Patient demonstrated fatigue post treatment, exhibited good technique with therapeutic exercises and would benefit from continued PT      Plan: Continue per plan of care  Progress treatment as tolerated  Progress challenge as appropriate with irritability           Manuals 36 35 34 33 32   STM/MFR        Manual flexibility         Joint mobs                 Neuro Re-Ed        Standing Horse On/Off        Squat w/ DB Weight Shift        Cervical retr        Cervical prot        Slouch correct        SL Hip Abd On airex B abd & hip flexion 2x10 On airex B abd & hip flexoin 2x10 On airex B abd & ext 2x10 On airex B abd & ext 2x10 On airex B abd & ext 2x10   bosu step lunge   airex B 2x10 ea airex B 2x10 ea airex B 2x10 ea   Standing Marches     On airex 2x10   Side step at table        Standing core rotations        SL clamshell        Standing hip flexion onto 8\" step        Lunge w/ mild UE assist        Education        Romberg EC        Nerve Glide        Side lunge onto  [employer] [place]   Into bosu 15x ea Into BOSU 15x ea    wobbleboard taps     ML 40x, AP 40x   Biodex L8 3x10 squats, LOS lvl 8 3x L8 3x10 squats, LOS lvl 9 3x L9 squats 2x10 Lvl 7 squats 3x10, LOS Lvl 9 3x L9 squats 3x10, LOS lvl 9 3x, Maze control L10 2x   Item retrieval        Fwd step up         Ther " Ex        LTR        Heel slides        Bent knee fall out        SAQ        Hip Add sq        Seated TS ext        UT str        SCM str        B Heel raises        Prone quad str        Flexion in sitting 10x (5x) 10x (5x) 10x (5x) 10x (5x) 10x (4x)   Step [place]        STS 3x10 10# 3x10 airex under feet, 3x10 10# 10x  2x10 w/10lbs 3x10 w/ 10lbs, 3x10 w/ airex under feet 3x10 w/ 10lbs   Standing toe raises W  Eccentric shift 2x10 ea       Assessment & Management        Supine marches        TA with UE scap depression on ball        6 MWT        TUG

## 2023-06-17 RX ORDER — ARMODAFINIL 250 MG/1
250 TABLET ORAL DAILY
Qty: 30 TABLET | Refills: 0 | Status: SHIPPED | OUTPATIENT
Start: 2023-06-17

## 2023-06-19 NOTE — ASSESSMENT & PLAN NOTE
Reports chronic low back pain and neck pain  On OxyContin 30 mg p o  B i d , Norco p r n  At home  Follows pain management as outpatient  History of L4-5 fusion  · Continue OxyContin, Norco p r n  Prior provider Verified at [facility] website    · Patient was on IV Dilaudid for breakthrough pain Soolantra Pregnancy And Lactation Text: This medication is Pregnancy Category C. This medication is considered safe during breast feeding.

## 2023-06-20 ENCOUNTER — OFFICE VISIT (OUTPATIENT)
Dept: PHYSICAL THERAPY | Facility: CLINIC | Age: 68
End: 2023-06-20
Payer: MEDICARE

## 2023-06-20 DIAGNOSIS — G89.4 CHRONIC PAIN DISORDER: ICD-10-CM

## 2023-06-20 DIAGNOSIS — Z96.641 HISTORY OF RIGHT HIP REPLACEMENT: Primary | ICD-10-CM

## 2023-06-20 PROCEDURE — 97110 THERAPEUTIC EXERCISES: CPT | Performed by: PHYSICAL THERAPIST

## 2023-06-20 PROCEDURE — 97112 NEUROMUSCULAR REEDUCATION: CPT | Performed by: PHYSICAL THERAPIST

## 2023-06-20 NOTE — PROGRESS NOTES
"Daily Note     Today's date: 2023  Patient name: Elizabeth Melgoza  : 1955  MRN: [id number]  Referring provider: Carlos Live MD  Dx:   Encounter Diagnosis     ICD-10-CM    1  History of right hip replacement  Z96 641       2  Chronic pain disorder  G89 4                      Subjective: Patient reports he feels about the same overall, he is having his surgery on 23  Objective: See treatment diary below      Assessment: Tolerated treatment fair  Patient continues to be challenged by intensity of dynamic stabilization exercises, requires intermittent CG to avoid LOB  Patient demonstrated fatigue post treatment, exhibited good technique with therapeutic exercises and would benefit from continued PT      Plan: Continue per plan of care  Progress treatment as tolerated  Progress challenge as appropriate with irritability           Manuals 37 36 35 34 33   STM/MFR        Manual flexibility         Joint mobs                 Neuro Re-Ed        Standing Horse On/Off        Squat w/ DB Weight Shift        Cervical retr        Cervical prot        Slouch correct        SL Hip Abd On airex B abd & hip flexion 2x10 On airex B abd & hip flexion 2x10 On airex B abd & hip flexoin 2x10 On airex B abd & ext 2x10 On airex B abd & ext 2x10   bosu step lunge    airex B 2x10 ea airex B 2x10 ea   Standing Marches        Side step at table        Standing core rotations        SL clamshell        Standing hip flexion onto 8\" step        Lunge w/ mild UE assist        Education        Romberg EC        Nerve Glide        Side lunge onto  Bear [place]    Into bosu 15x ea Into BOSU 15x ea   wobbleboard taps        Biodex L8 3x10 squats, LOS lvl 7 3x L8 3x10 squats, LOS lvl 8 3x L8 3x10 squats, LOS lvl 9 3x L9 squats 2x10 Lvl 7 squats 3x10, LOS Lvl 9 3x   Item retrieval        Fwd step up         Ther Ex        LTR        Heel slides        Bent knee fall out        SAQ        Hip Add sq      " Seated TS ext        UT str        SCM str        B Heel raises        Prone quad str        Flexion in sitting 10x (3x) 10x (5x) 10x (5x) 10x (5x) 10x (5x)   Step [place]        STS 3x10 15# 3x10 10# 3x10 airex under feet, 3x10 10# 10x  2x10 w/10lbs 3x10 w/ 10lbs, 3x10 w/ airex under feet   Standing toe raises W/ ecc 2x10 ea W  Eccentric shift 2x10 ea      Assessment & Management        Supine marches        TA with UE scap depression on ball        6 MWT        TUG

## 2023-06-22 ENCOUNTER — OFFICE VISIT (OUTPATIENT)
Dept: FAMILY MEDICINE CLINIC | Facility: CLINIC | Age: 68
End: 2023-06-22
Payer: MEDICARE

## 2023-06-22 VITALS
WEIGHT: 175 LBS | RESPIRATION RATE: 16 BRPM | DIASTOLIC BLOOD PRESSURE: 84 MMHG | SYSTOLIC BLOOD PRESSURE: 120 MMHG | HEIGHT: 65 IN | BODY MASS INDEX: 29.16 KG/M2 | HEART RATE: 80 BPM | OXYGEN SATURATION: 96 % | TEMPERATURE: 97.3 F

## 2023-06-22 DIAGNOSIS — C61 PROSTATE CANCER (HCC): ICD-10-CM

## 2023-06-22 DIAGNOSIS — E78.00 HYPERCHOLESTEROLEMIA: ICD-10-CM

## 2023-06-22 DIAGNOSIS — K21.9 GASTROESOPHAGEAL REFLUX DISEASE WITHOUT ESOPHAGITIS: ICD-10-CM

## 2023-06-22 DIAGNOSIS — E83.110 HEREDITARY HEMOCHROMATOSIS (HCC): ICD-10-CM

## 2023-06-22 DIAGNOSIS — K22.0 ACHALASIA: ICD-10-CM

## 2023-06-22 DIAGNOSIS — G89.4 CHRONIC PAIN DISORDER: ICD-10-CM

## 2023-06-22 DIAGNOSIS — F11.20 UNCOMPLICATED OPIOID DEPENDENCE (HCC): ICD-10-CM

## 2023-06-22 DIAGNOSIS — Z01.818 PREOPERATIVE CLEARANCE: Primary | ICD-10-CM

## 2023-06-22 DIAGNOSIS — I10 PRIMARY HYPERTENSION: ICD-10-CM

## 2023-06-22 DIAGNOSIS — M15.9 GENERALIZED OSTEOARTHRITIS OF MULTIPLE SITES: ICD-10-CM

## 2023-06-22 DIAGNOSIS — M48.062 SPINAL STENOSIS, LUMBAR REGION WITH NEUROGENIC CLAUDICATION: ICD-10-CM

## 2023-06-22 DIAGNOSIS — N18.30 STAGE 3 CHRONIC KIDNEY DISEASE, UNSPECIFIED WHETHER STAGE 3A OR 3B CKD (HCC): ICD-10-CM

## 2023-06-22 PROCEDURE — 99214 OFFICE O/P EST MOD 30 MIN: CPT | Performed by: FAMILY MEDICINE

## 2023-06-22 RX ORDER — SILDENAFIL 100 MG/1
100 TABLET, FILM COATED ORAL
COMMUNITY
Start: 2023-06-06 | End: 2023-09-04

## 2023-06-22 NOTE — PROGRESS NOTES
Name: Kia Cheng      : 1955      MRN: [id number]  Encounter Provider: Arielle Sesay MD  Encounter Date: 2023   Encounter department: [address] [facility]     1  Preoperative clearance    2  Spinal stenosis, lumbar region with neurogenic claudication    3  Primary hypertension    4  Hypercholesterolemia    5  Gastroesophageal reflux disease without esophagitis    6  Achalasia    7  Generalized osteoarthritis of multiple sites    8  Stage 3 chronic kidney disease, unspecified whether stage 3a or 3b CKD ([place] [address] )    9  Hereditary hemochromatosis ([place] [address] )    10  Chronic pain disorder    11  Uncomplicated opioid dependence ([place] [address] )    12  Prostate cancer ([place] [address] )           Subjective      PREOPERATIVE CLEARANCE    PATIENT IS SCHEDULED FOR ELECTIVE R L-4 LAMINECTOMY    DATE OF PROCEDURE - 23    SURGEON - DR Alex Hendrickson    REVIEWED PAST MEDICAL HISTORY, PAT LABS, CXR AND ECG    PATIENT FEELS WELL  DENIES ANY RECENT ILLNESS, FEVER OR CHILLS  PAIN MANAGEMENT HAS BEEN ADEQUATE AT THIS TIME      THERE IS NO OBVIOUS MEDICAL CONTRAINDICATION FOR SURGERY UNDER GENERAL ANESTHESIA  APPROPRIATE DVT PROPHYLAXIS SHOULD BE PROVIDED      Review of Systems   Constitutional: Negative for chills, fatigue and fever  HENT: Negative for congestion, ear discharge, ear pain, mouth sores, postnasal drip, sore throat and trouble swallowing  Eyes: Negative for pain, discharge and visual disturbance  Respiratory: Negative for cough, shortness of breath and wheezing  Cardiovascular: Negative for chest pain, palpitations and leg swelling  Gastrointestinal: Negative for abdominal distention, abdominal pain, blood in stool, diarrhea and nausea  Endocrine: Negative for polydipsia, polyphagia and polyuria  Genitourinary: Negative for dysuria, frequency, hematuria and urgency  Musculoskeletal: Positive for arthralgias, back pain, gait problem, myalgias, neck pain and neck stiffness  Negative for joint swelling  Skin: Negative for pallor and rash  Neurological: Positive for weakness  Negative for dizziness, syncope, speech difficulty, light-headedness, numbness and headaches  Hematological: Negative for adenopathy  Psychiatric/Behavioral: Positive for dysphoric mood  Negative for behavioral problems, confusion and sleep disturbance  The patient is nervous/anxious  Current Outpatient Medications on File Prior to Visit   Medication Sig   • Armodafinil 250 MG tablet Take 1 tablet (250 mg total) by mouth daily   • buPROPion (WELLBUTRIN XL) 300 mg 24 hr tablet Take 1 tablet (300 mg total) by mouth every morning   • Diclofenac Sodium (VOLTAREN) 1 % Apply 2 g topically 4 (four) times a day   • HYDROcodone-acetaminophen (NORCO)  mg per tablet Take 1 tablet by mouth every 4 (four) hours as needed (PAIN) Max Daily Amount: 6 tablets   • meloxicam (MOBIC) 15 mg tablet Take 1 tablet (15 mg total) by mouth daily   • multivitamin (THERAGRAN) TABS Take 1 tablet by mouth daily   • naloxone (NARCAN) 4 mg/0 1 mL nasal spray Administer 1 spray into a nostril  If breathing does not return to normal or if breathing difficulty resumes after 2-3 minutes, give another dose in the other nostril using a new spray     • olmesartan-hydrochlorothiazide (BENICAR HCT) 40-12 5 MG per tablet Take 1 tablet by mouth daily   • omeprazole (PriLOSEC) 40 MG capsule Take 1 capsule (40 mg total) by mouth every 12 (twelve) hours   • oxyCODONE (OxyCONTIN) 10 mg 12 hr tablet Take 1 tablet (10 mg total) by mouth 2 (two) times a day Max Daily Amount: 20 mg   • sildenafil (VIAGRA) 100 mg tablet Take 100 mg by mouth   • Suvorexant 10 MG TABS Take 1 tablet (10 mg total) by mouth daily at bedtime as needed (insomnia)   • tadalafil (CIALIS) 5 MG tablet Take 1 tablet (5 mg total) by mouth daily   • tamsulosin (FLOMAX) 0 4 mg Take 0 4 mg by mouth daily   • vilazodone (Viibryd) 40 mg tablet Take 1 tablet (40 mg total) by mouth "daily with breakfast   • [DISCONTINUED] oxyCODONE (OxyCONTIN) 20 mg 12 hr tablet Take 1 tablet (20 mg total) by mouth every 12 (twelve) hours Max Daily Amount: 40 mg       Objective     /84 (BP Location: Right arm, Patient Position: Sitting, Cuff Size: Large)   Pulse 80   Temp (!) 97 3 °F (36 3 °C) (Temporal)   Resp 16   Ht 5' 5\" (1 651 m)   Wt 79 4 kg (175 lb)   SpO2 96%   BMI 29 12 kg/m²     Physical Exam  Constitutional:       General: He is not in acute distress  Appearance: Normal appearance  He is well-developed and normal weight  He is not ill-appearing  HENT:      Head: Normocephalic and atraumatic  Nose: Nose normal       Mouth/Throat:      Mouth: Mucous membranes are moist    Eyes:      General:         Right eye: No discharge  Left eye: No discharge  Conjunctiva/sclera: Conjunctivae normal       Pupils: Pupils are equal, round, and reactive to light  Neck:      Thyroid: No thyromegaly  Vascular: No JVD  Cardiovascular:      Rate and Rhythm: Normal rate and regular rhythm  Heart sounds: Normal heart sounds  No murmur heard  Pulmonary:      Effort: Pulmonary effort is normal       Breath sounds: Normal breath sounds  No wheezing or rales  Abdominal:      General: Bowel sounds are normal       Palpations: Abdomen is soft  There is no mass  Tenderness: There is no abdominal tenderness  There is no guarding or rebound  Musculoskeletal:         General: Tenderness present  No deformity  Cervical back: Neck supple  Comments: MODERATELY SEVERE DJD CHANGES   Lymphadenopathy:      Cervical: No cervical adenopathy  Skin:     General: Skin is warm and dry  Findings: No erythema or rash  Neurological:      General: No focal deficit present  Mental Status: He is alert and oriented to person, place, and time  Cranial Nerves: No cranial nerve deficit  Sensory: No sensory deficit  Motor: Weakness present        Coordination: " Coordination normal       Gait: Gait abnormal       Deep Tendon Reflexes: Reflexes normal    Psychiatric:         Mood and Affect: Mood normal          Behavior: Behavior normal          Thought Content:  Thought content normal          Judgment: Judgment normal        Ravinder Calvillo MD

## 2023-06-22 NOTE — PATIENT INSTRUCTIONS
THERE IS NO OBVIOUS MEDICAL CONTRAINDICATION FOR SURGERY UNDER GENERAL ANESTHESIA  APPROPRIATE DVT PROPHYLAXIS SHOULD BE PROVIDED

## 2023-06-23 ENCOUNTER — OFFICE VISIT (OUTPATIENT)
Dept: PHYSICAL THERAPY | Facility: CLINIC | Age: 68
End: 2023-06-23
Payer: MEDICARE

## 2023-06-23 DIAGNOSIS — G89.4 CHRONIC PAIN DISORDER: ICD-10-CM

## 2023-06-23 DIAGNOSIS — Z96.641 HISTORY OF RIGHT HIP REPLACEMENT: Primary | ICD-10-CM

## 2023-06-23 PROCEDURE — 97112 NEUROMUSCULAR REEDUCATION: CPT | Performed by: PHYSICAL THERAPIST

## 2023-06-23 PROCEDURE — 97110 THERAPEUTIC EXERCISES: CPT | Performed by: PHYSICAL THERAPIST

## 2023-06-23 NOTE — PROGRESS NOTES
"Daily Note     Today's date: 2023  Patient name: Josy Lorenzo  : 1955  MRN: [id number]  Referring provider: Joie Hilton MD  Dx:   Encounter Diagnosis     ICD-10-CM    1  History of right hip replacement  Z96 641       2  Chronic pain disorder  G89 4                      Subjective: Patient reports he has had a good week with his balance  Objective: See treatment diary below      Assessment: Tolerated treatment well  Patient continues to be significantly challenged by dynamic stabilization exercises  He continues to require HHA for maintenance of balance with squats  Patient demonstrated fatigue post treatment, exhibited good technique with therapeutic exercises and would benefit from continued PT      Plan: Continue per plan of care  Progress treatment as tolerated  Discharge as patient is undergoing surgery for spinal fusion next week           Manuals 38 37 36 35 34   STM/MFR        Manual flexibility         Joint mobs                 Neuro Re-Ed        Standing Horse On/Off        Squat w/ DB Weight Shift        Cervical retr        Cervical prot        Slouch correct        SL Hip Abd On airex B abd & hip fleion 2x10 On airex B abd & hip flexion 2x10 On airex B abd & hip flexion 2x10 On airex B abd & hip flexoin 2x10 On airex B abd & ext 2x10   bosu step lunge     airex B 2x10 ea   Standing Marches        Side step at table        Standing core rotations        SL clamshell        Standing hip flexion onto 8\" step        Lunge w/ mild UE assist        Education        Romberg EC        Nerve Glide        Side lunge onto  Bear Starke     Into bosu 15x ea   wobbleboard taps        Biodex L8 2x10 squats, LOS lvl 8 3x L8 3x10 squats, LOS lvl 7 3x L8 3x10 squats, LOS lvl 8 3x L8 3x10 squats, LOS lvl 9 3x L9 squats 2x10   Item retrieval        Fwd step up         Ther Ex        LTR        Heel slides        Bent knee fall out        SAQ        Hip Add sq      " Seated TS ext        UT str        SCM str        B Heel raises        Prone quad str        Flexion in sitting 10x (3x) 10x (3x) 10x (5x) 10x (5x) 10x (5x)   Step [place]        STS 3x10 15# 3x10 15# 3x10 10# 3x10 airex under feet, 3x10 10# 10x  2x10 w/10lbs   Standing toe raises W/ ecc 2x10 ea W/ ecc 2x10 ea W  Eccentric shift 2x10 ea     Assessment & Management        Supine marches        TA with UE scap depression on ball        6 MWT        TUG

## 2023-06-27 ENCOUNTER — APPOINTMENT (OUTPATIENT)
Dept: PHYSICAL THERAPY | Facility: CLINIC | Age: 68
End: 2023-06-27
Payer: MEDICARE

## 2023-06-29 ENCOUNTER — APPOINTMENT (OUTPATIENT)
Dept: PHYSICAL THERAPY | Facility: CLINIC | Age: 68
End: 2023-06-29
Payer: MEDICARE

## 2023-06-30 DIAGNOSIS — G89.4 CHRONIC PAIN SYNDROME: ICD-10-CM

## 2023-06-30 DIAGNOSIS — M54.41 CHRONIC BILATERAL LOW BACK PAIN WITH BILATERAL SCIATICA: ICD-10-CM

## 2023-06-30 DIAGNOSIS — M54.42 CHRONIC BILATERAL LOW BACK PAIN WITH BILATERAL SCIATICA: ICD-10-CM

## 2023-06-30 DIAGNOSIS — G89.29 CHRONIC BILATERAL LOW BACK PAIN WITH BILATERAL SCIATICA: ICD-10-CM

## 2023-07-02 RX ORDER — HYDROCODONE BITARTRATE AND ACETAMINOPHEN 10; 325 MG/1; MG/1
1 TABLET ORAL EVERY 4 HOURS PRN
Qty: 180 TABLET | Refills: 0 | Status: SHIPPED | OUTPATIENT
Start: 2023-07-02

## 2023-07-02 RX ORDER — OXYCODONE HCL 10 MG/1
10 TABLET, FILM COATED, EXTENDED RELEASE ORAL 2 TIMES DAILY
Qty: 60 TABLET | Refills: 0 | Status: SHIPPED | OUTPATIENT
Start: 2023-07-02

## 2023-07-13 ENCOUNTER — TELEMEDICINE (OUTPATIENT)
Dept: PSYCHIATRY | Facility: CLINIC | Age: 68
End: 2023-07-13
Payer: MEDICARE

## 2023-07-13 DIAGNOSIS — F33.1 MODERATE EPISODE OF RECURRENT MAJOR DEPRESSIVE DISORDER (HCC): Primary | ICD-10-CM

## 2023-07-13 DIAGNOSIS — F33.0 MILD EPISODE OF RECURRENT MAJOR DEPRESSIVE DISORDER (HCC): ICD-10-CM

## 2023-07-13 PROCEDURE — 99213 OFFICE O/P EST LOW 20 MIN: CPT | Performed by: PSYCHIATRY & NEUROLOGY

## 2023-07-13 RX ORDER — SENNOSIDES A AND B 8.6 MG/1
17.2 TABLET, FILM COATED ORAL DAILY
COMMUNITY
Start: 2023-06-30 | End: 2023-07-30

## 2023-07-13 RX ORDER — BUPROPION HYDROCHLORIDE 300 MG/1
300 TABLET ORAL EVERY MORNING
Qty: 90 TABLET | Refills: 1 | Status: SHIPPED | OUTPATIENT
Start: 2023-07-13

## 2023-07-13 RX ORDER — VILAZODONE HYDROCHLORIDE 40 MG/1
40 TABLET ORAL
Qty: 90 TABLET | Refills: 1 | Status: SHIPPED | OUTPATIENT
Start: 2023-07-13

## 2023-07-13 RX ORDER — TIZANIDINE 4 MG/1
4 TABLET ORAL
COMMUNITY
Start: 2023-06-30
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
## 2023-07-17 ENCOUNTER — TELEPHONE (OUTPATIENT)
Dept: OTHER | Facility: OTHER | Age: 68
End: 2023-07-17

## 2023-07-17 NOTE — TELEPHONE ENCOUNTER
Patient is calling to have a Rx put in to system for PSA in order to receive radiology treatment.  Please f/u

## 2023-07-18 ENCOUNTER — APPOINTMENT (OUTPATIENT)
Dept: LAB | Facility: CLINIC | Age: 68
End: 2023-07-18
Payer: MEDICARE

## 2023-07-18 ENCOUNTER — TELEPHONE (OUTPATIENT)
Dept: FAMILY MEDICINE CLINIC | Facility: CLINIC | Age: 68
End: 2023-07-18

## 2023-07-18 DIAGNOSIS — C61 PROSTATE CANCER (HCC): ICD-10-CM

## 2023-07-18 DIAGNOSIS — D50.8 IRON DEFICIENCY ANEMIA SECONDARY TO INADEQUATE DIETARY IRON INTAKE: ICD-10-CM

## 2023-07-18 DIAGNOSIS — Z78.9 VEGETARIAN DIET: ICD-10-CM

## 2023-07-18 DIAGNOSIS — C61 PROSTATE CANCER (HCC): Primary | ICD-10-CM

## 2023-07-18 LAB
BASOPHILS # BLD AUTO: 0.02 THOUSANDS/ÂΜL (ref 0–0.1)
BASOPHILS NFR BLD AUTO: 0 % (ref 0–1)
EOSINOPHIL # BLD AUTO: 0.29 THOUSAND/ÂΜL (ref 0–0.61)
EOSINOPHIL NFR BLD AUTO: 5 % (ref 0–6)
ERYTHROCYTE [DISTWIDTH] IN BLOOD BY AUTOMATED COUNT: 14.2 % (ref 11.6–15.1)
FERRITIN SERPL-MCNC: 67 NG/ML (ref 24–336)
HCT VFR BLD AUTO: 39.1 % (ref 36.5–49.3)
HGB BLD-MCNC: 13.1 G/DL (ref 12–17)
IMM GRANULOCYTES # BLD AUTO: 0.01 THOUSAND/UL (ref 0–0.2)
IMM GRANULOCYTES NFR BLD AUTO: 0 % (ref 0–2)
IRON SATN MFR SERPL: 38 % (ref 20–50)
IRON SERPL-MCNC: 106 UG/DL (ref 65–175)
LYMPHOCYTES # BLD AUTO: 0.94 THOUSANDS/ÂΜL (ref 0.6–4.47)
LYMPHOCYTES NFR BLD AUTO: 17 % (ref 14–44)
MCH RBC QN AUTO: 32.6 PG (ref 26.8–34.3)
MCHC RBC AUTO-ENTMCNC: 33.5 G/DL (ref 31.4–37.4)
MCV RBC AUTO: 97 FL (ref 82–98)
MONOCYTES # BLD AUTO: 0.58 THOUSAND/ÂΜL (ref 0.17–1.22)
MONOCYTES NFR BLD AUTO: 10 % (ref 4–12)
NEUTROPHILS # BLD AUTO: 3.83 THOUSANDS/ÂΜL (ref 1.85–7.62)
NEUTS SEG NFR BLD AUTO: 68 % (ref 43–75)
NRBC BLD AUTO-RTO: 0 /100 WBCS
PLATELET # BLD AUTO: 314 THOUSANDS/UL (ref 149–390)
PMV BLD AUTO: 9 FL (ref 8.9–12.7)
RBC # BLD AUTO: 4.02 MILLION/UL (ref 3.88–5.62)
TIBC SERPL-MCNC: 278 UG/DL (ref 250–450)
WBC # BLD AUTO: 5.67 THOUSAND/UL (ref 4.31–10.16)

## 2023-07-18 PROCEDURE — 36415 COLL VENOUS BLD VENIPUNCTURE: CPT

## 2023-07-18 PROCEDURE — 85025 COMPLETE CBC W/AUTO DIFF WBC: CPT

## 2023-07-18 PROCEDURE — 83550 IRON BINDING TEST: CPT

## 2023-07-18 PROCEDURE — 84154 ASSAY OF PSA FREE: CPT

## 2023-07-18 PROCEDURE — 82728 ASSAY OF FERRITIN: CPT

## 2023-07-18 PROCEDURE — 83540 ASSAY OF IRON: CPT

## 2023-07-18 PROCEDURE — 84153 ASSAY OF PSA TOTAL: CPT

## 2023-07-18 NOTE — TELEPHONE ENCOUNTER
Saman Ball called and stated that he is going to see a dr at [facility]. Before they will scheduled an appt, he needs a current PSA test.  They told him to contact his pcp    Can you place an order for him.   He will use       Fax to [phone] - attention Pamula Peabody

## 2023-07-18 NOTE — TELEPHONE ENCOUNTER
Saman Ball also returned Joleen's call.   He called and spoke to [facility] and everything is taken care of

## 2023-07-19 LAB
PSA FREE MFR SERPL: 7.8 %
PSA FREE SERPL-MCNC: 3.04 NG/ML
PSA SERPL-MCNC: 38.9 NG/ML (ref 0–4)

## 2023-07-24 DIAGNOSIS — K21.9 GASTROESOPHAGEAL REFLUX DISEASE WITHOUT ESOPHAGITIS: ICD-10-CM

## 2023-07-24 DIAGNOSIS — R53.83 FATIGUE, UNSPECIFIED TYPE: ICD-10-CM

## 2023-07-24 DIAGNOSIS — G47.30 SLEEP APNEA, UNSPECIFIED TYPE: ICD-10-CM

## 2023-07-24 DIAGNOSIS — I10 ESSENTIAL HYPERTENSION: ICD-10-CM

## 2023-07-24 RX ORDER — OMEPRAZOLE 40 MG/1
40 CAPSULE, DELAYED RELEASE ORAL EVERY 12 HOURS
Qty: 180 CAPSULE | Refills: 0 | Status: SHIPPED | OUTPATIENT
Start: 2023-07-24

## 2023-07-24 RX ORDER — OLMESARTAN MEDOXOMIL AND HYDROCHLOROTHIAZIDE 40/12.5 40; 12.5 MG/1; MG/1
1 TABLET ORAL DAILY
Qty: 90 TABLET | Refills: 0 | Status: SHIPPED | OUTPATIENT
Start: 2023-07-24

## 2023-07-24 RX ORDER — ARMODAFINIL 250 MG/1
250 TABLET ORAL DAILY
Qty: 30 TABLET | Refills: 0 | Status: SHIPPED | OUTPATIENT
Start: 2023-07-24

## 2023-07-31 DIAGNOSIS — M54.42 CHRONIC BILATERAL LOW BACK PAIN WITH BILATERAL SCIATICA: ICD-10-CM

## 2023-07-31 DIAGNOSIS — M54.41 CHRONIC BILATERAL LOW BACK PAIN WITH BILATERAL SCIATICA: ICD-10-CM

## 2023-07-31 DIAGNOSIS — G89.29 CHRONIC BILATERAL LOW BACK PAIN WITH BILATERAL SCIATICA: ICD-10-CM

## 2023-07-31 DIAGNOSIS — G89.4 CHRONIC PAIN SYNDROME: ICD-10-CM

## 2023-07-31 RX ORDER — HYDROCODONE BITARTRATE AND ACETAMINOPHEN 10; 325 MG/1; MG/1
1 TABLET ORAL EVERY 4 HOURS PRN
Qty: 180 TABLET | Refills: 0 | Status: SHIPPED | OUTPATIENT
Start: 2023-07-31

## 2023-07-31 RX ORDER — OXYCODONE HCL 10 MG/1
10 TABLET, FILM COATED, EXTENDED RELEASE ORAL 2 TIMES DAILY
Qty: 60 TABLET | Refills: 0 | Status: SHIPPED | OUTPATIENT
Start: 2023-07-31

## 2023-08-04 ENCOUNTER — OFFICE VISIT (OUTPATIENT)
Dept: HEMATOLOGY ONCOLOGY | Facility: CLINIC | Age: 68
End: 2023-08-04
Payer: MEDICARE

## 2023-08-04 VITALS
SYSTOLIC BLOOD PRESSURE: 168 MMHG | BODY MASS INDEX: 29.24 KG/M2 | HEIGHT: 65 IN | TEMPERATURE: 98.2 F | HEART RATE: 73 BPM | OXYGEN SATURATION: 98 % | WEIGHT: 175.5 LBS | RESPIRATION RATE: 17 BRPM | DIASTOLIC BLOOD PRESSURE: 102 MMHG

## 2023-08-04 DIAGNOSIS — E46 PROTEIN-CALORIE MALNUTRITION, UNSPECIFIED SEVERITY (HCC): ICD-10-CM

## 2023-08-04 DIAGNOSIS — E83.110 HEREDITARY HEMOCHROMATOSIS (HCC): ICD-10-CM

## 2023-08-04 DIAGNOSIS — C61 PROSTATE CANCER (HCC): ICD-10-CM

## 2023-08-04 DIAGNOSIS — D50.8 IRON DEFICIENCY ANEMIA SECONDARY TO INADEQUATE DIETARY IRON INTAKE: Primary | ICD-10-CM

## 2023-08-04 PROCEDURE — 99215 OFFICE O/P EST HI 40 MIN: CPT | Performed by: PHYSICIAN ASSISTANT
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
## 2023-08-15 ENCOUNTER — EVALUATION (OUTPATIENT)
Dept: PHYSICAL THERAPY | Facility: CLINIC | Age: 68
End: 2023-08-15
Payer: MEDICARE

## 2023-08-15 DIAGNOSIS — M54.16 LUMBAR RADICULOPATHY: ICD-10-CM

## 2023-08-15 DIAGNOSIS — Z98.890 HISTORY OF LUMBAR LAMINECTOMY: Primary | ICD-10-CM

## 2023-08-15 DIAGNOSIS — M54.50 LUMBAR PAIN: ICD-10-CM

## 2023-08-15 DIAGNOSIS — R26.89 BALANCE PROBLEM: ICD-10-CM

## 2023-08-15 PROCEDURE — 97110 THERAPEUTIC EXERCISES: CPT | Performed by: PHYSICAL THERAPIST

## 2023-08-15 PROCEDURE — 97162 PT EVAL MOD COMPLEX 30 MIN: CPT | Performed by: PHYSICAL THERAPIST

## 2023-08-15 NOTE — LETTER
2023    Clover Boston MD  [address] [address]    Patient: Brad Brown   YOB: 1955   Date of Visit: 8/15/2023     Encounter Diagnosis     ICD-10-CM    1. History of lumbar laminectomy  Z98.890       2. Lumbar radiculopathy  M54.16       3. Lumbar pain  M54.50       4. Balance problem  R26.89           Dear Dr. Rosa Scarce: Thank you for your recent referral of Brad Brown. Please review the attached evaluation summary from Boni's recent visit. Please verify that you agree with the plan of care by signing the attached order. If you have any questions or concerns, please do not hesitate to call. I sincerely appreciate the opportunity to share in the care of one of your patients and hope to have another opportunity to work with you in the near future. Sincerely,    Luz Peter, PT      Referring Provider:      I certify that I have read the below Plan of Care and certify the need for these services furnished under this plan of treatment while under my care. Clover Boston MD  [address] [address]  Via Fax: [phone]          PT Evaluation     Today's date: 8/15/2023  Patient name: Brad Brown  : 1955  MRN: [id number]  Referring provider: No ref. provider found  Dx:   Encounter Diagnosis     ICD-10-CM    1. History of lumbar laminectomy  Z98.890       2. Lumbar radiculopathy  M54.16       3. Lumbar pain  M54.50       4. Balance problem  R26.89                    Assessment details: Brad Brown is a 76 y.o. male with history of lumbar laminectomy on 2023 with the associated impairments including: ROM restriction, balance deficits, strength deficits, and activity limitation. He was seen pre-operatively for balance issues related to lumbar and hip pathology with nerve symptoms. He presents with global tightness of the lumbar spine and bilateral hips.   He demonstrates R hip weakness and core weakness which contribute to functional activity deficits. He ambulates with an SPC with visible foot slap/foot drop of the R LE and leg length discrepancy which contributes to intermittent inversion of the L LE. He demonstrates with significant balance deficits due in part to radicular symptoms and previous lumbar and cervical pathology. Patient presents with the following impairments: balance deficits with overground walking, stiffness with transferring positions, inability to lift and carry objects, and difficulty performing yard work. Due to these impairments, patient has difficulty performing the following: ADLs stair navigation, prolonged walking, sit-to-stand transfers, walking on uneven terrain, getting in/out of the car, getting in/out of the bath, shopping, driving, lifting, carrying, and sleeping. . Patient would benefit from skilled physical therapy services to address the above functional limitations through a targeted program consisting of repeated ROM/flexibility exercises, graded core strengthening and functional activity strengthening, static and dynamic balance training, and graded increase in functional activity training in order to progress towards prior level of function and independence with home exercise program.  Impairments: abnormal gait, abnormal muscle firing, abnormal or restricted ROM, activity intolerance, impaired physical strength, lacks appropriate home exercise program, pain with function and poor body mechanics  Understanding of Dx/Px/POC: good   Prognosis: good   Goals  Short Term Goals (3 weeks)  1. Patient will be independent with initial HEP. 2. Patient will demonstrate ability to put on socks without support. 3. Patient will demonstrate an increase in right hip strength of 1/2 grade on MMT to assist with don/doffing shoes. Long Term Goals (6 weeks)  1. Patient will demonstrate an increase in bilateral hip ROM to WNL to assist with bed mobility.   2. Patient will demonstrate an increase in right hip strength to 4/5 on MMT. 3. Patient will be able to ascend/descend 15 stairs reciprocally with 1 UE assist safely without imbalance. 4. Patient will be able to ambulate 300 feet on varied terrain without AD. 5. Patient will be able to perform all basic ADLs without modification. 6. Patient will be able to walk for 30 minutes without LOB or need for a rest break at sidewalk in neighborhood. Plan  Patient would benefit from: skilled physical therapy  Planned modality interventions: cryotherapy, electrical stimulation/Russian stimulation, TENS, ultrasound, thermotherapy: hydrocollator packs, unattended electrical stimulation, high voltage pulsed current: pain management and high voltage pulsed current: spasm management  Planned therapy interventions: flexibility, functional ROM exercises, graded exercise, home exercise program, joint mobilization, manual therapy, neuromuscular re-education, patient education, strengthening, stretching, therapeutic exercise, therapeutic activities, Vides taping, balance/weight bearing training, gait training, abdominal trunk stabilization, activity modification, balance, body mechanics training, graded activity, self care, postural training, IADL retraining, ADL training, breathing training, behavior modification, muscle pump exercises, therapeutic training and transfer training  Frequency: 2x week  Duration in weeks: 6  Treatment plan discussed with: patient         Subjective Evaluation     History of Present Illness  Date of surgery: 7/13/23  Mechanism of injury: Pt presents today after lumbar laminectomy on 7/13/23. He reports he has been cleared of restrictions after his surgery. He was seen previously in physical therapy for balance deficits and lumbar radiculopathy. He reports decreased numbness after surgery and improved proprioception in his legs, though he continues to feel weakness and fatigue with prolonged activity.   He continues to report balance problems including falling while performing his walking program after surgery. He reports no new trauma and no significant change in symptoms other than gradual improvement since his surgery. Pain  Current pain ratin  At best pain ratin  At worst pain ratin  Location: Right hip/back  Quality: dull ache  Relieving factors: medications  Aggravating factors: walking, standing, stair climbing and lifting  Progression: improved     Social Support  Steps to enter house: yes  8  Stairs in house: yes   15  Lives with: spouse     Employment status: not working (Retired)  Hand dominance: right  Exercise history: Walking and yard work       Diagnostic Tests  X-ray: normal  Treatments  No previous or current treatments  Patient Goals  Patient goals for therapy: increased motion              Objective      Neurological Testing      Sensation      Hip   Left Hip   Hyposensation: light touch     Right Hip   Hyposensation: light touch     Comments   Left light touch: Reports symmetrical hyposensation below each knee and into both feet; intact above the knee.      Active Range of Motion   Left Hip   Flexion: 92 degrees   Abduction: 25 degrees   External rotation (90/90): 26 degrees      Right Hip   Flexion: 94   Abduction: 25 degrees   External rotation (90/90): 28 degrees      Strength/Myotome Testing     Additional Strength Details  MMT:    Hip Flexion: 4, 3+  Hip ER: 4, 2+  Hip Abd: 4, 2+  Hip Ext: 3+, 3+     Ambulation      Comments   Ambulates with wide-based gait with SPC, tibial external rotation R > L; antalgic gait.  Foot drop of the R foot, and inversion of the L foot intermittently with leg length discrepancy.     Timed-Up-and-Go Score: 17.5 no SPC  6 min walk: 600 ft w/ SPC  5x STS: 12.9      Precautions:   Past Medical History:   Diagnosis Date   • Anxiety    • Arthritis    • Contreras's esophagus    • Cancer ([address]) 2018    Stage 1 prostrate cancer; under active surveillance.    • Depression    • GERD (gastroesophageal reflux disease) 2005   • Head injury    • Hypertension    • Osteoarthritis 1990   • Psychiatric disorder    • Sleep apnea     CPAP at HS   • Spinal arthritis        FALL'S RISK CG WITH ALL EXERCISE, Prostate C(x) w/ Radiation Treatment    Manuals    8/15                               Neuro Re-Ed       Hip Flexion w/ ER       TA SLR       TA Bridge       TA Clams                            Ther Ex       L/S Gentle Rotation       Seated Hamstring Str       Assessment & Management     POC w/ emphasis on dynamic stability and core stabilization                                      Ther Activity                     Gait Training                     Modalities

## 2023-08-15 NOTE — PROGRESS NOTES
PT Evaluation     Today's date: 8/15/2023  Patient name: Becky Dennis  : 1955  MRN: [id number]  Referring provider: No ref. provider found  Dx:   Encounter Diagnosis     ICD-10-CM    1. History of lumbar laminectomy  Z98.890       2. Lumbar radiculopathy  M54.16       3. Lumbar pain  M54.50       4. Balance problem  R26.89                    Assessment details: Becky Dennis is a 76 y.o. male with history of lumbar laminectomy on 2023 with the associated impairments including: ROM restriction, balance deficits, strength deficits, and activity limitation. He was seen pre-operatively for balance issues related to lumbar and hip pathology with nerve symptoms. He presents with global tightness of the lumbar spine and bilateral hips. He demonstrates R hip weakness and core weakness which contribute to functional activity deficits. He ambulates with an SPC with visible foot slap/foot drop of the R LE and leg length discrepancy which contributes to intermittent inversion of the L LE. He demonstrates with significant balance deficits due in part to radicular symptoms and previous lumbar and cervical pathology. Patient presents with the following impairments: balance deficits with overground walking, stiffness with transferring positions, inability to lift and carry objects, and difficulty performing yard work. Due to these impairments, patient has difficulty performing the following: ADLs stair navigation, prolonged walking, sit-to-stand transfers, walking on uneven terrain, getting in/out of the car, getting in/out of the bath, shopping, driving, lifting, carrying, and sleeping.  . Patient would benefit from skilled physical therapy services to address the above functional limitations through a targeted program consisting of repeated ROM/flexibility exercises, graded core strengthening and functional activity strengthening, static and dynamic balance training, and graded increase in functional activity training in order to progress towards prior level of function and independence with home exercise program.  Impairments: abnormal gait, abnormal muscle firing, abnormal or restricted ROM, activity intolerance, impaired physical strength, lacks appropriate home exercise program, pain with function and poor body mechanics  Understanding of Dx/Px/POC: good   Prognosis: good   Goals  Short Term Goals (3 weeks)  1. Patient will be independent with initial HEP. 2. Patient will demonstrate ability to put on socks without support. 3. Patient will demonstrate an increase in right hip strength of 1/2 grade on MMT to assist with don/doffing shoes. Long Term Goals (6 weeks)  1. Patient will demonstrate an increase in bilateral hip ROM to WNL to assist with bed mobility. 2. Patient will demonstrate an increase in right hip strength to 4/5 on MMT. 3. Patient will be able to ascend/descend 15 stairs reciprocally with 1 UE assist safely without imbalance. 4. Patient will be able to ambulate 300 feet on varied terrain without AD. 5. Patient will be able to perform all basic ADLs without modification. 6. Patient will be able to walk for 30 minutes without LOB or need for a rest break at sidewalk in [facility].   Plan  Patient would benefit from: skilled physical therapy  Planned modality interventions: cryotherapy, electrical stimulation/Russian stimulation, TENS, ultrasound, thermotherapy: hydrocollator packs, unattended electrical stimulation, high voltage pulsed current: pain management and high voltage pulsed current: spasm management  Planned therapy interventions: flexibility, functional ROM exercises, graded exercise, home exercise program, joint mobilization, manual therapy, neuromuscular re-education, patient education, strengthening, stretching, therapeutic exercise, therapeutic activities, Vides taping, balance/weight bearing training, gait training, abdominal trunk stabilization, activity modification, balance, body mechanics training, graded activity, self care, postural training, IADL retraining, ADL training, breathing training, behavior modification, muscle pump exercises, therapeutic training and transfer training  Frequency: 2x week  Duration in weeks: 6  Treatment plan discussed with: patient         Subjective Evaluation     History of Present Illness  Date of surgery: 23  Mechanism of injury: Pt presents today after lumbar laminectomy on 23. He reports he has been cleared of restrictions after his surgery. He was seen previously in physical therapy for balance deficits and lumbar radiculopathy. He reports decreased numbness after surgery and improved proprioception in his legs, though he continues to feel weakness and fatigue with prolonged activity. He continues to report balance problems including falling while performing his walking program after surgery. He reports no new trauma and no significant change in symptoms other than gradual improvement since his surgery.     Pain  Current pain ratin  At best pain ratin  At worst pain ratin  Location: Right hip/back  Quality: dull ache  Relieving factors: medications  Aggravating factors: walking, standing, stair climbing and lifting  Progression: improved     Social Support  Steps to enter house: yes  8  Stairs in house: yes   15  Lives with: spouse     Employment status: not working (Retired)  Hand dominance: right  Exercise history: Walking and yard work       Diagnostic Tests  X-ray: normal  Treatments  No previous or current treatments  Patient Goals  Patient goals for therapy: increased motion              Objective      Neurological Testing      Sensation      Hip   Left Hip   Hyposensation: light touch     Right Hip   Hyposensation: light touch     Comments   Left light touch: Reports symmetrical hyposensation below each knee and into both feet; intact above the knee.      Active Range of Motion   Left Hip Flexion: 92 degrees   Abduction: 25 degrees   External rotation (90/90): 26 degrees      Right Hip   Flexion: 94   Abduction: 25 degrees   External rotation (90/90): 28 degrees      Strength/Myotome Testing     Additional Strength Details  MMT:    Hip Flexion: 4, 3+  Hip ER: 4, 2+  Hip Abd: 4, 2+  Hip Ext: 3+, 3+     Ambulation      Comments   Ambulates with wide-based gait with SPC, tibial external rotation R > L; antalgic gait. Foot drop of the R foot, and inversion of the L foot intermittently with leg length discrepancy.     Timed-Up-and-Go Score: 17.5 no SPC  6 min walk: 600 ft w/ SPC  5x STS: 12.9       Precautions:   Past Medical History:   Diagnosis Date   • Anxiety 2010   • Arthritis 1990   • Contreras's esophagus    • Cancer ([address]) 2018    Stage 1 prostrate cancer; under active surveillance.    • Depression    • GERD (gastroesophageal reflux disease) 2005   • Head injury    • Hypertension    • Osteoarthritis 1990   • Psychiatric disorder    • Sleep apnea     CPAP at HS   • Spinal arthritis        FALL'S RISK CG WITH ALL EXERCISE, Prostate C(x) w/ Radiation Treatment    Manuals    8/15                               Neuro Re-Ed       Hip Flexion w/ ER       TA SLR       TA Bridge       TA Clams                            Ther Ex       L/S Gentle Rotation       Seated Hamstring Str       Assessment & Management     POC w/ emphasis on dynamic stability and core stabilization                                      Ther Activity                     Gait Training                     Modalities

## 2023-08-17 ENCOUNTER — OFFICE VISIT (OUTPATIENT)
Dept: URGENT CARE | Facility: CLINIC | Age: 68
End: 2023-08-17
Payer: MEDICARE

## 2023-08-17 ENCOUNTER — APPOINTMENT (OUTPATIENT)
Dept: PHYSICAL THERAPY | Facility: CLINIC | Age: 68
End: 2023-08-17
Payer: MEDICARE

## 2023-08-17 ENCOUNTER — TELEPHONE (OUTPATIENT)
Age: 68
End: 2023-08-17

## 2023-08-17 ENCOUNTER — APPOINTMENT (OUTPATIENT)
Dept: RADIOLOGY | Facility: CLINIC | Age: 68
End: 2023-08-17
Attending: FAMILY MEDICINE
Payer: MEDICARE

## 2023-08-17 VITALS
RESPIRATION RATE: 18 BRPM | OXYGEN SATURATION: 98 % | HEIGHT: 66 IN | WEIGHT: 175.2 LBS | DIASTOLIC BLOOD PRESSURE: 72 MMHG | BODY MASS INDEX: 28.16 KG/M2 | TEMPERATURE: 98.2 F | SYSTOLIC BLOOD PRESSURE: 130 MMHG | HEART RATE: 95 BPM

## 2023-08-17 DIAGNOSIS — R07.81 RIB PAIN ON RIGHT SIDE: ICD-10-CM

## 2023-08-17 DIAGNOSIS — S22.41XA CLOSED FRACTURE OF MULTIPLE RIBS OF RIGHT SIDE, INITIAL ENCOUNTER: Primary | ICD-10-CM

## 2023-08-17 PROBLEM — M54.16 LUMBAR BACK PAIN WITH RADICULOPATHY AFFECTING RIGHT LOWER EXTREMITY: Status: ACTIVE | Noted: 2023-06-29

## 2023-08-17 PROCEDURE — 99213 OFFICE O/P EST LOW 20 MIN: CPT | Performed by: FAMILY MEDICINE

## 2023-08-17 PROCEDURE — 71101 X-RAY EXAM UNILAT RIBS/CHEST: CPT

## 2023-08-17 RX ORDER — GABAPENTIN 100 MG/1
CAPSULE ORAL
COMMUNITY
Start: 2023-08-02

## 2023-08-17 RX ORDER — LIDOCAINE 50 MG/G
PATCH TOPICAL
Qty: 6 PATCH | Refills: 0 | Status: SHIPPED | OUTPATIENT
Start: 2023-08-17

## 2023-08-17 NOTE — TELEPHONE ENCOUNTER
Caller: patient    Doctor: Lidia Rapp    Reason for call: patient called to to find out what clinics were suggested from the dr at last visit    Call back#: n/a

## 2023-08-18 NOTE — PATIENT INSTRUCTIONS
Xray shows acute fracture of the 8th and 9th ribs-- this was discussed w/ the patient at the time of the office visit. Patient is to rest and apply a heating pad to the site. Take Tylenol or Motrin as needed for pain. May use a muscle rub such as [place] or [employer] Advised to use a body pillow as needed for comfort and support. Topical Lidoderm patch prescribed to help w/ pain, use as directed. Counseled on taking full deep breaths to help prevent secondary complications  Follow up w/ Dr. Yesi Morfin office for re-check in 3-5 days   If symptoms persist despite treatment, worsen, or any new symptoms present, patient is to be seen in the ER.
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
## 2023-08-21 ENCOUNTER — APPOINTMENT (OUTPATIENT)
Dept: PHYSICAL THERAPY | Facility: CLINIC | Age: 68
End: 2023-08-21
Payer: MEDICARE

## 2023-08-22 DIAGNOSIS — M54.42 CHRONIC BILATERAL LOW BACK PAIN WITH BILATERAL SCIATICA: ICD-10-CM

## 2023-08-22 DIAGNOSIS — G89.29 CHRONIC BILATERAL LOW BACK PAIN WITH BILATERAL SCIATICA: ICD-10-CM

## 2023-08-22 DIAGNOSIS — R53.83 FATIGUE, UNSPECIFIED TYPE: ICD-10-CM

## 2023-08-22 DIAGNOSIS — N52.9 ERECTILE DYSFUNCTION, UNSPECIFIED ERECTILE DYSFUNCTION TYPE: ICD-10-CM

## 2023-08-22 DIAGNOSIS — M54.41 CHRONIC BILATERAL LOW BACK PAIN WITH BILATERAL SCIATICA: ICD-10-CM

## 2023-08-22 DIAGNOSIS — G47.30 SLEEP APNEA, UNSPECIFIED TYPE: ICD-10-CM

## 2023-08-22 RX ORDER — GABAPENTIN 100 MG/1
CAPSULE ORAL
Refills: 0 | OUTPATIENT
Start: 2023-08-22

## 2023-08-23 ENCOUNTER — APPOINTMENT (OUTPATIENT)
Dept: PHYSICAL THERAPY | Facility: CLINIC | Age: 68
End: 2023-08-23
Payer: MEDICARE

## 2023-08-23 RX ORDER — TADALAFIL 5 MG/1
5 TABLET ORAL DAILY
Qty: 10 TABLET | Refills: 0 | Status: SHIPPED | OUTPATIENT
Start: 2023-08-23 | End: 2024-10-19

## 2023-08-23 RX ORDER — ARMODAFINIL 250 MG/1
250 TABLET ORAL DAILY
Qty: 30 TABLET | Refills: 0 | Status: SHIPPED | OUTPATIENT
Start: 2023-08-23

## 2023-08-23 RX ORDER — MELOXICAM 15 MG/1
15 TABLET ORAL DAILY
Qty: 90 TABLET | Refills: 0 | Status: SHIPPED | OUTPATIENT
Start: 2023-08-23

## 2023-08-24 ENCOUNTER — OFFICE VISIT (OUTPATIENT)
Dept: PHYSICAL THERAPY | Facility: CLINIC | Age: 68
End: 2023-08-24
Payer: MEDICARE

## 2023-08-24 DIAGNOSIS — M54.16 LUMBAR RADICULOPATHY: ICD-10-CM

## 2023-08-24 DIAGNOSIS — R26.89 BALANCE PROBLEM: ICD-10-CM

## 2023-08-24 DIAGNOSIS — Z98.890 HISTORY OF LUMBAR LAMINECTOMY: Primary | ICD-10-CM

## 2023-08-24 DIAGNOSIS — M54.50 LUMBAR PAIN: ICD-10-CM

## 2023-08-24 PROCEDURE — 97112 NEUROMUSCULAR REEDUCATION: CPT | Performed by: PHYSICAL THERAPIST

## 2023-08-24 NOTE — PROGRESS NOTES
Daily Note     Today's date: 2023  Patient name: Cori Gonzalez  : 1955  MRN: [id number]  Referring provider: Monica Raya MD  Dx:   Encounter Diagnosis     ICD-10-CM    1. History of lumbar laminectomy  Z98.890       2. Lumbar radiculopathy  M54.16       3. Lumbar pain  M54.50       4. Balance problem  R26.89                      Subjective: Patient reports he feels less rib pain over the last few days, he has been taking it easy. Objective: See treatment diary below      Assessment: Tolerated treatment well. Emphasized neutral spine with program today to avoid stressing ribs, tolerated supine exercises well though exhibited significant fatigue. Patient demonstrated fatigue post treatment, exhibited good technique with therapeutic exercises and would benefit from continued PT      Plan: Continue per plan of care. Progress treatment as tolerated. Progress challenge as appropriate with irritability. Precautions:   Past Medical History:   Diagnosis Date   • Anxiety    • Arthritis    • Contreras's esophagus    • Cancer ([address] )     Stage 1 prostrate cancer; under active surveillance.    • Depression    • GERD (gastroesophageal reflux disease)    • Head injury    • Hypertension    • Osteoarthritis    • Psychiatric disorder    • Sleep apnea     CPAP at HS   • Spinal arthritis        FALL'S RISK CG WITH ALL EXERCISE, Prostate C(x) w/ Radiation Treatment    Manuals   8/24 8/15                               Neuro Re-Ed       Hip Flexion w/ ER   seated 3x10    TA SLR   3x10    TA Bridge   3x10    TA Clams   supine BTB 3x10    Biodex   LOS 3x static 1x, Lvl 11 2x static, RC 3min static                  Ther Ex       L/S Gentle Rotation       Seated Hamstring Str       Assessment & Management     POC w/ emphasis on dynamic stability and core stabilization   Seated Postural Correction   2x10                                Ther Activity                     Gait Training Modalities

## 2023-08-25 ENCOUNTER — APPOINTMENT (OUTPATIENT)
Dept: PHYSICAL THERAPY | Facility: CLINIC | Age: 68
End: 2023-08-25
Payer: MEDICARE

## 2023-08-28 ENCOUNTER — OFFICE VISIT (OUTPATIENT)
Dept: PHYSICAL THERAPY | Facility: CLINIC | Age: 68
End: 2023-08-28
Payer: MEDICARE

## 2023-08-28 DIAGNOSIS — Z98.890 HISTORY OF LUMBAR LAMINECTOMY: Primary | ICD-10-CM

## 2023-08-28 DIAGNOSIS — M54.16 LUMBAR RADICULOPATHY: ICD-10-CM

## 2023-08-28 DIAGNOSIS — M54.50 LUMBAR PAIN: ICD-10-CM

## 2023-08-28 DIAGNOSIS — R26.89 BALANCE PROBLEM: ICD-10-CM

## 2023-08-28 PROCEDURE — 97112 NEUROMUSCULAR REEDUCATION: CPT | Performed by: PHYSICAL THERAPIST

## 2023-08-28 NOTE — PROGRESS NOTES
Daily Note     Today's date: 2023  Patient name: Tracy Miramontes  : 1955  MRN: [id number]  Referring provider: Gabby Pacheco MD  Dx:   Encounter Diagnosis     ICD-10-CM    1. History of lumbar laminectomy  Z98.890       2. Lumbar radiculopathy  M54.16       3. Lumbar pain  M54.50       4. Balance problem  R26.89                      Subjective: Patient reports he has significant difficulty walking up hills, and had multiple falls over the weekend. Objective: See treatment diary below      Assessment: Tolerated treatment well. Patient continues to demonstrate hip flexion and extension weakness likely contributing to difficulty with navigating steps. Patient demonstrated fatigue post treatment, exhibited good technique with therapeutic exercises and would benefit from continued PT      Plan: Continue per plan of care. Progress treatment as tolerated. Progress challenge as appropriate with irritability. Precautions:   Past Medical History:   Diagnosis Date   • Anxiety    • Arthritis    • Contreras's esophagus    • Cancer ([address] )     Stage 1 prostrate cancer; under active surveillance.    • Depression    • GERD (gastroesophageal reflux disease)    • Head injury    • Hypertension    • Osteoarthritis    • Psychiatric disorder    • Sleep apnea     CPAP at HS   • Spinal arthritis        FALL'S RISK CG WITH ALL EXERCISE, Prostate C(x) w/ Radiation Treatment    Manuals  8/28 8/24 8/15                               Neuro Re-Ed       Hip Flexion w/ ER  seated 3x10 seated 3x10    TA SLR  3x10 3x10    TA Bridge  3x10 3x10    TA Clams  supine BTB 3x10 supine BTB 3x10    Biodex  LOS 3x static 1x, Lvl 11 2x static, RC 3min LOS 3x static 1x, Lvl 11 2x static, RC 3min static    Standing hip Abd  YTB 2x10     Standing Hip Flexion & Abd  YTB 2x10     Ther Ex       L/S Gentle Rotation  2x10     Seated Hamstring Str       Assessment & Management     POC w/ emphasis on dynamic stability and core stabilization   Seated Postural Correction  2x10 2x10                                Ther Activity                     Gait Training                     Modalities

## 2023-08-29 DIAGNOSIS — M54.42 CHRONIC BILATERAL LOW BACK PAIN WITH BILATERAL SCIATICA: ICD-10-CM

## 2023-08-29 DIAGNOSIS — M54.41 CHRONIC BILATERAL LOW BACK PAIN WITH BILATERAL SCIATICA: ICD-10-CM

## 2023-08-29 DIAGNOSIS — G89.29 CHRONIC BILATERAL LOW BACK PAIN WITH BILATERAL SCIATICA: ICD-10-CM

## 2023-08-29 DIAGNOSIS — G89.4 CHRONIC PAIN SYNDROME: ICD-10-CM

## 2023-08-29 NOTE — TELEPHONE ENCOUNTER
Requested medication(s) are due for refill today: Yes  Patient has already received a courtesy refill: No  Other reason request has been forwarded to provider: This refill cannot be delegated.

## 2023-08-30 ENCOUNTER — APPOINTMENT (OUTPATIENT)
Dept: PHYSICAL THERAPY | Facility: CLINIC | Age: 68
End: 2023-08-30
Payer: MEDICARE

## 2023-08-30 RX ORDER — HYDROCODONE BITARTRATE AND ACETAMINOPHEN 10; 325 MG/1; MG/1
1 TABLET ORAL EVERY 4 HOURS PRN
Qty: 180 TABLET | Refills: 0 | Status: SHIPPED | OUTPATIENT
Start: 2023-08-30

## 2023-08-30 RX ORDER — OXYCODONE HCL 10 MG/1
10 TABLET, FILM COATED, EXTENDED RELEASE ORAL 2 TIMES DAILY
Qty: 60 TABLET | Refills: 0 | Status: SHIPPED | OUTPATIENT
Start: 2023-08-30

## 2023-08-31 ENCOUNTER — OFFICE VISIT (OUTPATIENT)
Dept: PHYSICAL THERAPY | Facility: CLINIC | Age: 68
End: 2023-08-31
Payer: MEDICARE

## 2023-08-31 DIAGNOSIS — M54.50 LUMBAR PAIN: ICD-10-CM

## 2023-08-31 DIAGNOSIS — Z98.890 HISTORY OF LUMBAR LAMINECTOMY: Primary | ICD-10-CM

## 2023-08-31 DIAGNOSIS — M54.16 LUMBAR RADICULOPATHY: ICD-10-CM

## 2023-08-31 DIAGNOSIS — R26.89 BALANCE PROBLEM: ICD-10-CM

## 2023-08-31 PROCEDURE — 97110 THERAPEUTIC EXERCISES: CPT | Performed by: PHYSICAL THERAPIST

## 2023-08-31 PROCEDURE — 97112 NEUROMUSCULAR REEDUCATION: CPT | Performed by: PHYSICAL THERAPIST

## 2023-08-31 NOTE — PROGRESS NOTES
Daily Note     Today's date: 2023  Patient name: Adina Leo  : 1955  MRN: [id number]  Referring provider: No ref. provider found  Dx:   Encounter Diagnosis     ICD-10-CM    1. History of lumbar laminectomy  Z98.890       2. Lumbar radiculopathy  M54.16       3. Lumbar pain  M54.50       4. Balance problem  R26.89           Start Time: 1109  Stop Time: 1150  Total time in clinic (min): 41 minutes    Subjective: Client arrives using SPC. He denies any falls since previous session. Objective: See treatment diary below      Assessment: Tolerated treatment well. He continues to demonstrate hip flexion and extension weakness likely contributing to difficulty with navigating steps. Patient demonstrated fatigue post treatment, exhibited good technique with therapeutic exercises and would benefit from continued PT      Plan: Continue per plan of care. Progress treatment as tolerated. Progress challenge as appropriate with irritability. Precautions:   Past Medical History:   Diagnosis Date   • Anxiety    • Arthritis    • Contreras's esophagus    • Cancer ([address] [facility])     Stage 1 prostrate cancer; under active surveillance.    • Depression    • GERD (gastroesophageal reflux disease)    • Head injury    • Hypertension    • Osteoarthritis    • Psychiatric disorder    • Sleep apnea     CPAP at HS   • Spinal arthritis        FALL'S RISK CG WITH ALL EXERCISE, Prostate C(x) w/ Radiation Treatment    Manuals 8/31 8/28 8/24 8/15                               Neuro Re-Ed       Hip Flexion w/ ER seated 3x10 seated 3x10 seated 3x10    TA SLR 3x10 3x10 3x10    TA Bridge 3x10 3x10 3x10    TA Clams supine BTB 3x10 supine BTB 3x10 supine BTB 3x10    Biodex  LOS 3x static 1x, Lvl 11 2x static, RC 3min LOS 3x static 1x, Lvl 11 2x static, RC 3min static    Standing hip Abd YTB 2x10 YTB 2x10     Standing Hip Flexion & Abd YTB 2x10 YTB 2x10     Ther Ex       L/S Gentle Rotation 2x10, emphasis on TA control 2x10     Seated Hamstring Str 30 sec x 3 BLE      Assessment & Management     POC w/ emphasis on dynamic stability and core stabilization   Seated Postural Correction 2x10 2x10 2x10                                Ther Activity                     Gait Training                     Modalities

## 2023-09-05 ENCOUNTER — OFFICE VISIT (OUTPATIENT)
Dept: PHYSICAL THERAPY | Facility: CLINIC | Age: 68
End: 2023-09-05
Payer: MEDICARE

## 2023-09-05 DIAGNOSIS — M54.16 LUMBAR RADICULOPATHY: ICD-10-CM

## 2023-09-05 DIAGNOSIS — R26.89 BALANCE PROBLEM: ICD-10-CM

## 2023-09-05 DIAGNOSIS — Z98.890 HISTORY OF LUMBAR LAMINECTOMY: ICD-10-CM

## 2023-09-05 DIAGNOSIS — M54.50 LUMBAR PAIN: Primary | ICD-10-CM

## 2023-09-05 PROCEDURE — 97110 THERAPEUTIC EXERCISES: CPT | Performed by: PHYSICAL THERAPIST

## 2023-09-05 PROCEDURE — 97112 NEUROMUSCULAR REEDUCATION: CPT | Performed by: PHYSICAL THERAPIST

## 2023-09-05 NOTE — PROGRESS NOTES
Daily Note     Today's date: 2023  Patient name: Yasmany Koch  : 1955  MRN: [id number]  Referring provider: Mary Mejia MD  Dx:   Encounter Diagnosis     ICD-10-CM    1. Lumbar pain  M54.50       2. Lumbar radiculopathy  M54.16       3. History of lumbar laminectomy  Z98.890       4. Balance problem  R26.89                      Subjective: Patient reports he did not fall over the weekend and feels marginal improvement in numbness symptoms. Objective: See treatment diary below      Assessment: Tolerated treatment well. Patient continues to be challenged by hip flexor strengthening exercises and LE strengthening with an emphasis on functional exercises. Patient demonstrated fatigue post treatment, exhibited good technique with therapeutic exercises and would benefit from continued PT      Plan: Continue per plan of care. Progress treatment as tolerated. Progress challenge as appropriate with irritability. Precautions:   Past Medical History:   Diagnosis Date   • Anxiety    • Arthritis    • Contreras's esophagus    • Cancer ([address] [facility])     Stage 1 prostrate cancer; under active surveillance.    • Depression    • GERD (gastroesophageal reflux disease)    • Head injury    • Hypertension    • Osteoarthritis    • Psychiatric disorder    • Sleep apnea     CPAP at HS   • Spinal arthritis        FALL'S RISK CG WITH ALL EXERCISE, Prostate C(x) w/ Radiation Treatment    Manuals 9/5 8/28 8/24 8/15                               Neuro Re-Ed       Hip Flexion w/ ER Seated 3x10 seated 3x10 seated 3x10    TA SLR 3x10 3x10 3x10    TA Bridge 3x10 3x10 3x10    TA Clams Supine BTB 3x10 supine BTB 3x10 supine BTB 3x10    Biodex LOS 3x static 1x, Lvl 11 2x static, RC 3 min LOS 3x static 1x, Lvl 11 2x static, RC 3min LOS 3x static 1x, Lvl 11 2x static, RC 3min static    Standing hip Abd YTB 2x10 YTB 2x10     Standing Hip Flexion & Abd YTB 2x10 YTB 2x10     Ther Ex       L/S Gentle Rotation 2x10 2x10     Seated Hamstring Str       Assessment & Management     POC w/ emphasis on dynamic stability and core stabilization   Seated Postural Correction 2x10 2x10 2x10                                Ther Activity                     Gait Training                     Modalities

## 2023-09-07 ENCOUNTER — TELEPHONE (OUTPATIENT)
Dept: OBGYN CLINIC | Facility: HOSPITAL | Age: 68
End: 2023-09-07

## 2023-09-07 ENCOUNTER — OFFICE VISIT (OUTPATIENT)
Dept: PHYSICAL THERAPY | Facility: CLINIC | Age: 68
End: 2023-09-07
Payer: MEDICARE

## 2023-09-07 DIAGNOSIS — M54.50 LUMBAR PAIN: Primary | ICD-10-CM

## 2023-09-07 DIAGNOSIS — Z98.890 HISTORY OF LUMBAR LAMINECTOMY: ICD-10-CM

## 2023-09-07 DIAGNOSIS — M54.16 LUMBAR RADICULOPATHY: ICD-10-CM

## 2023-09-07 DIAGNOSIS — R26.89 BALANCE PROBLEM: ICD-10-CM

## 2023-09-07 PROCEDURE — 97112 NEUROMUSCULAR REEDUCATION: CPT | Performed by: PHYSICAL THERAPIST

## 2023-09-07 PROCEDURE — 97110 THERAPEUTIC EXERCISES: CPT | Performed by: PHYSICAL THERAPIST

## 2023-09-07 NOTE — PROGRESS NOTES
Daily Note     Today's date: 2023  Patient name: Dana Rosa  : 1955  MRN: [id number]  Referring provider: Jaspreet Taylor MD  Dx:   Encounter Diagnosis     ICD-10-CM    1. Lumbar pain  M54.50       2. Lumbar radiculopathy  M54.16       3. History of lumbar laminectomy  Z98.890       4. Balance problem  R26.89                      Subjective: Patient reports he feels a little more shaky than usual today, though he says the symptoms go up and down. Objective: See treatment diary below      Assessment: Tolerated treatment well. Patient continues to be challenged by dynamic balance activities, patient had significant fatigue with standing resisted YTB abduction, patient felt better after brief rest period. Patient demonstrated fatigue post treatment, exhibited good technique with therapeutic exercises and would benefit from continued PT      Plan: Continue per plan of care. Progress treatment as tolerated. Progress challenge as appropriate with irritability. Precautions:   Past Medical History:   Diagnosis Date   • Anxiety    • Arthritis    • Contreras's esophagus    • Cancer ([address] [facility])     Stage 1 prostrate cancer; under active surveillance.    • Depression    • GERD (gastroesophageal reflux disease)    • Head injury    • Hypertension    • Osteoarthritis    • Psychiatric disorder    • Sleep apnea     CPAP at HS   • Spinal arthritis        FALL'S RISK CG WITH ALL EXERCISE, Prostate C(x) w/ Radiation Treatment    Manuals 9/7 9/5 8/28 8/24 8/15                                   Neuro Re-Ed        Hip Flexion w/ ER Seated 3x10 Seated 3x10 seated 3x10 seated 3x10    TA SLR 3x10 3x10 3x10 3x10    TA Bridge 3x10 3x10 3x10 3x10    TA Clams Supine BTB 3x10 Supine BTB 3x10 supine BTB 3x10 supine BTB 3x10    Biodex LOS 3x static, 1x Lvl 11 2x static LOS 3x static 1x, Lvl 11 2x static, RC 3 min LOS 3x static 1x, Lvl 11 2x static, RC 3min LOS 3x static 1x, Lvl 11 2x static, RC 3min static    Standing hip Abd YTB 2x10 YTB 2x10 YTB 2x10     Standing Hip Flexion & Abd YTB 2x10 YTB 2x10 YTB 2x10     Ther Ex        L/S Gentle Rotation  2x10 2x10     Seated Hamstring Str        Assessment & Management      POC w/ emphasis on dynamic stability and core stabilization   Seated Postural Correction  2x10 2x10 2x10    Supine LE Ext 2x10 ea                               Ther Activity                        Gait Training                        Modalities

## 2023-09-07 NOTE — TELEPHONE ENCOUNTER
Caller: Patient    Doctor: Дмитрий Escobedo    Reason for call: Patient is purchasing a lift for his LLD. They need to know what the discrepancy measurement would be and he would need a new Rx.     [employer] [facility] [facility]  Fax#:  [phone]    Call back#: [phone]

## 2023-09-11 ENCOUNTER — OFFICE VISIT (OUTPATIENT)
Dept: PHYSICAL THERAPY | Facility: CLINIC | Age: 68
End: 2023-09-11
Payer: MEDICARE

## 2023-09-11 DIAGNOSIS — M54.16 LUMBAR RADICULOPATHY: ICD-10-CM

## 2023-09-11 DIAGNOSIS — M54.50 LUMBAR PAIN: ICD-10-CM

## 2023-09-11 DIAGNOSIS — Z98.890 HISTORY OF LUMBAR LAMINECTOMY: Primary | ICD-10-CM

## 2023-09-11 DIAGNOSIS — R26.89 BALANCE PROBLEM: ICD-10-CM

## 2023-09-11 PROCEDURE — 97112 NEUROMUSCULAR REEDUCATION: CPT | Performed by: PHYSICAL THERAPIST

## 2023-09-11 PROCEDURE — 97110 THERAPEUTIC EXERCISES: CPT | Performed by: PHYSICAL THERAPIST

## 2023-09-11 NOTE — PROGRESS NOTES
Daily Note     Today's date: 2023  Patient name: Rosario Fernandez  : 1955  MRN: [id number]  Referring provider: Loa Cooks, MD  Dx:   Encounter Diagnosis     ICD-10-CM    1. History of lumbar laminectomy  Z98.890       2. Lumbar radiculopathy  M54.16       3. Lumbar pain  M54.50       4. Balance problem  R26.89                      Subjective: Patient reports he feels better than he felt last week. Objective: See treatment diary below    Leg Length Measurement:  Left: 35.5" (L ASIS to L Medial Malleolus)  Right: 36.6" (R ASIS to R Medial Malleolus)      Assessment: Tolerated treatment well. Patient continues to progress with dynamic stability exercises, emphasized TA engagement with standing resisted hip pattern exercises. Balance improved with overground ambulation though continues to require CG when fatigued. Patient demonstrated fatigue post treatment, exhibited good technique with therapeutic exercises and would benefit from continued PT      Plan: Continue per plan of care. Progress treatment as tolerated. Progress challenge as appropriate with irritability. Precautions:   Past Medical History:   Diagnosis Date   • Anxiety    • Arthritis    • Contreras's esophagus    • Cancer ([address] )     Stage 1 prostrate cancer; under active surveillance.    • Depression    • GERD (gastroesophageal reflux disease)    • Head injury    • Hypertension    • Osteoarthritis    • Psychiatric disorder    • Sleep apnea     CPAP at    • Spinal arthritis        FALL'S RISK CG WITH ALL EXERCISE, Prostate C(x) w/ Radiation Treatment    Manuals                                    Neuro Re-Ed        Hip Flexion w/ ER Seated 3x10 Seated 3x10 Seated 3x10 seated 3x10 seated 3x10   TA SLR 3x10 3x10 3x10 3x10 3x10   TA Bridge 3x10 3x10 3x10 3x10 3x10   TA Clams  Supine BTB 3x10 Supine BTB 3x10 supine BTB 3x10 supine BTB 3x10   Biodex LOS 3x (1x static, 2x Lvl 11), 3x10 squats in WB w/ Lvl 10 LOS 3x static, 1x Lvl 11 2x static LOS 3x static 1x, Lvl 11 2x static, RC 3 min LOS 3x static 1x, Lvl 11 2x static, RC 3min LOS 3x static 1x, Lvl 11 2x static, RC 3min static   Standing hip Abd YTB 2x10 YTB 2x10 YTB 2x10 YTB 2x10    Standing Hip Flexion & Abd YTB 2x10 YTB 2x10 YTB 2x10 YTB 2x10    Ther Ex        L/S Gentle Rotation 2x10  2x10 2x10    Seated Hamstring Str        Assessment & Management         Seated Postural Correction   2x10 2x10 2x10   Supine LE Ext  2x10 ea      Standing March 2x10 YTB                       Ther Activity                        Gait Training                        Modalities

## 2023-09-12 ENCOUNTER — APPOINTMENT (OUTPATIENT)
Dept: PHYSICAL THERAPY | Facility: CLINIC | Age: 68
End: 2023-09-12
Payer: MEDICARE

## 2023-09-12 DIAGNOSIS — M21.70 LEG LENGTH DISCREPANCY: Primary | ICD-10-CM

## 2023-09-12 NOTE — TELEPHONE ENCOUNTER
Left detailed message for patient concerning script for brace. It has been faxed in to company that he wanted and confirmed.

## 2023-09-14 ENCOUNTER — OFFICE VISIT (OUTPATIENT)
Dept: PHYSICAL THERAPY | Facility: CLINIC | Age: 68
End: 2023-09-14
Payer: MEDICARE

## 2023-09-14 DIAGNOSIS — M54.16 LUMBAR RADICULOPATHY: ICD-10-CM

## 2023-09-14 DIAGNOSIS — R26.89 BALANCE PROBLEM: ICD-10-CM

## 2023-09-14 DIAGNOSIS — Z98.890 HISTORY OF LUMBAR LAMINECTOMY: Primary | ICD-10-CM

## 2023-09-14 DIAGNOSIS — M54.50 LUMBAR PAIN: ICD-10-CM

## 2023-09-14 PROCEDURE — 97112 NEUROMUSCULAR REEDUCATION: CPT | Performed by: PHYSICAL THERAPIST

## 2023-09-14 PROCEDURE — 97110 THERAPEUTIC EXERCISES: CPT | Performed by: PHYSICAL THERAPIST

## 2023-09-14 NOTE — PROGRESS NOTES
Daily Note     Today's date: 2023  Patient name: Aamir Boykin  : 1955  MRN: [id number]  Referring provider: Margaret Benedict MD  Dx:   Encounter Diagnosis     ICD-10-CM    1. History of lumbar laminectomy  Z98.890       2. Lumbar radiculopathy  M54.16       3. Lumbar pain  M54.50       4. Balance problem  R26.89                      Subjective: Patient reports he lost his balance twice yesterday, but did not have any adverse effects, he just felt his legs and body shut down. Objective: See treatment diary below      Assessment: Tolerated treatment well. Patient continues to be challenged by dynamic stability exercises, significantly challenged by active march with straight leg raise. Patient demonstrated fatigue post treatment, exhibited good technique with therapeutic exercises and would benefit from continued PT      Plan: Continue per plan of care. Progress treatment as tolerated. Progress challenge as appropriate with irritability. Precautions:   Past Medical History:   Diagnosis Date   • Anxiety    • Arthritis    • Contreras's esophagus    • Cancer ([address] [facility])     Stage 1 prostrate cancer; under active surveillance.    • Depression    • GERD (gastroesophageal reflux disease)    • Head injury    • Hypertension    • Osteoarthritis    • Psychiatric disorder    • Sleep apnea     CPAP at HS   • Spinal arthritis        FALL'S RISK CG WITH ALL EXERCISE, Prostate C(x) w/ Radiation Treatment    Manuals                                     Neuro Re-Ed        Hip Flexion w/ ER  Seated 3x10 Seated 3x10 Seated 3x10 Seated 3x10   TA SLR  3x10 3x10 3x10 3x10   TA Bridge  3x10 3x10 3x10 3x10   TA Clams    Supine BTB 3x10 Supine BTB 3x10   Biodex  LOS 3x (1x static 2x Lvl 10), 3x10 squats in WB w/ Lvl 10 LOS 3x (1x static, 2x Lvl 11), 3x10 squats in WB w/ Lvl 10 LOS 3x static, 1x Lvl 11 2x static LOS 3x static 1x, Lvl 11 2x static, RC 3 min   Standing hip Abd  YTB 2x10 YTB 2x10 YTB 2x10 YTB 2x10   Standing Hip Flexion & Abd  YTB 2x10 YTB 2x10 YTB 2x10 YTB 2x10   Ther Ex        L/S Gentle Rotation  2x10 2x10  2x10   Seated Hamstring Str        Assessment & Management         Seated Postural Correction     2x10   Supine LE Ext    2x10 ea    Standing March  2x10 YTB 2x10 YTB     Seated March w/ SLR  2x10              Ther Activity                        Gait Training                        Modalities

## 2023-09-18 ENCOUNTER — APPOINTMENT (OUTPATIENT)
Dept: PHYSICAL THERAPY | Facility: CLINIC | Age: 68
End: 2023-09-18
Payer: MEDICARE
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
## 2023-09-18 NOTE — BH TREATMENT PLAN
TREATMENT PLAN (Medication Management Only)        [address] [facility]    Name and Date of Birth:  Tracy Miramontes 76 y.o. 1955  Date of Treatment Plan: September 18, 2023  Diagnosis/Diagnoses:    1. Moderate episode of recurrent major depressive disorder ([address])    2. Mild anxiety      Strengths/Personal Resources for Self-Care: {AMB PATIENT STRENGTHS:42784}. Area/Areas of need (in own words): {AMB PATIENT AREAS OF NEED:92546}  1. Long Term Goal: {AMB LONG TERM GOALS:26247}. Target Date:{AMB TREATMENT PLAN TARGET WOIR:73101}  Person/Persons responsible for completion of goal: {AMB Person TT Plan:66545}  2. Short Term Objective (s) - How will we reach this goal?:   A. Provider new recommended medication/dosage changes and/or continue medication(s): {AMB SHORT TERM OBJECTIVE MEDS:63170}. B. {AMB SHORT TERM OTHER OBJECTIVES:47552::"N/A"}. C. {AMB SHORT TERM OTHER OBJECTIVES:65510::"N/A"}. Target Date:{AMB TREATMENT PLAN TARGET CMNN:62093}  Person/Persons Responsible for Completion of Goal: {AMB Person TT OTOM:47631}  Progress Towards Goals: {AMB Progress Towards Goals TT Plan:27158}  Treatment Modality: {AMB TREATMENT MODALITY:66013}  Review due 180 days from date of this plan: {AMB TREATMENT PLAN REVIEW DUE:64457}  Expected length of service: {AMB LOS:00598}  My Physician/PA/NP and I have developed this plan together and I agree to work on the goals and objectives. I understand the treatment goals that were developed for my treatment.

## 2023-09-26 ENCOUNTER — TELEMEDICINE (OUTPATIENT)
Dept: PSYCHIATRY | Facility: CLINIC | Age: 68
End: 2023-09-26
Payer: MEDICARE

## 2023-09-26 ENCOUNTER — APPOINTMENT (OUTPATIENT)
Dept: PHYSICAL THERAPY | Facility: CLINIC | Age: 68
End: 2023-09-26
Payer: MEDICARE

## 2023-09-26 DIAGNOSIS — F41.1 GAD (GENERALIZED ANXIETY DISORDER): ICD-10-CM

## 2023-09-26 DIAGNOSIS — F33.1 MODERATE EPISODE OF RECURRENT MAJOR DEPRESSIVE DISORDER (HCC): Primary | ICD-10-CM

## 2023-09-26 PROCEDURE — 90792 PSYCH DIAG EVAL W/MED SRVCS: CPT | Performed by: PHYSICIAN ASSISTANT

## 2023-09-26 RX ORDER — SILDENAFIL 100 MG/1
TABLET, FILM COATED ORAL
COMMUNITY
Start: 2023-09-15

## 2023-09-26 RX ORDER — GABAPENTIN 100 MG/1
CAPSULE ORAL
COMMUNITY
Start: 2023-08-02
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
## 2023-09-27 DIAGNOSIS — G89.29 CHRONIC BILATERAL LOW BACK PAIN WITH BILATERAL SCIATICA: ICD-10-CM

## 2023-09-27 DIAGNOSIS — M54.42 CHRONIC BILATERAL LOW BACK PAIN WITH BILATERAL SCIATICA: ICD-10-CM

## 2023-09-27 DIAGNOSIS — M54.41 CHRONIC BILATERAL LOW BACK PAIN WITH BILATERAL SCIATICA: ICD-10-CM

## 2023-09-27 DIAGNOSIS — G89.4 CHRONIC PAIN SYNDROME: ICD-10-CM

## 2023-09-27 DIAGNOSIS — G47.30 SLEEP APNEA, UNSPECIFIED TYPE: ICD-10-CM

## 2023-09-27 DIAGNOSIS — N52.9 ERECTILE DYSFUNCTION, UNSPECIFIED ERECTILE DYSFUNCTION TYPE: ICD-10-CM

## 2023-09-27 DIAGNOSIS — R53.83 FATIGUE, UNSPECIFIED TYPE: ICD-10-CM

## 2023-09-27 RX ORDER — OXYCODONE HCL 10 MG/1
10 TABLET, FILM COATED, EXTENDED RELEASE ORAL 2 TIMES DAILY
Qty: 60 TABLET | Refills: 0 | Status: SHIPPED | OUTPATIENT
Start: 2023-09-27

## 2023-09-27 RX ORDER — HYDROCODONE BITARTRATE AND ACETAMINOPHEN 10; 325 MG/1; MG/1
1 TABLET ORAL EVERY 4 HOURS PRN
Qty: 180 TABLET | Refills: 0 | Status: SHIPPED | OUTPATIENT
Start: 2023-09-27

## 2023-09-27 RX ORDER — ARMODAFINIL 250 MG/1
250 TABLET ORAL DAILY
Qty: 30 TABLET | Refills: 0 | Status: SHIPPED | OUTPATIENT
Start: 2023-09-27

## 2023-09-27 RX ORDER — TADALAFIL 5 MG/1
5 TABLET ORAL DAILY
Qty: 10 TABLET | Refills: 0 | Status: SHIPPED | OUTPATIENT
Start: 2023-09-27 | End: 2024-11-23

## 2023-09-28 ENCOUNTER — OFFICE VISIT (OUTPATIENT)
Dept: PHYSICAL THERAPY | Facility: CLINIC | Age: 68
End: 2023-09-28
Payer: MEDICARE

## 2023-09-28 ENCOUNTER — TELEPHONE (OUTPATIENT)
Dept: PSYCHIATRY | Facility: CLINIC | Age: 68
End: 2023-09-28

## 2023-09-28 DIAGNOSIS — R26.89 BALANCE PROBLEM: ICD-10-CM

## 2023-09-28 DIAGNOSIS — M54.16 LUMBAR RADICULOPATHY: ICD-10-CM

## 2023-09-28 DIAGNOSIS — Z98.890 HISTORY OF LUMBAR LAMINECTOMY: Primary | ICD-10-CM

## 2023-09-28 DIAGNOSIS — M54.50 LUMBAR PAIN: ICD-10-CM

## 2023-09-28 PROCEDURE — 97112 NEUROMUSCULAR REEDUCATION: CPT | Performed by: PHYSICAL THERAPIST

## 2023-09-28 PROCEDURE — 97110 THERAPEUTIC EXERCISES: CPT | Performed by: PHYSICAL THERAPIST
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
## 2023-09-28 NOTE — TELEPHONE ENCOUNTER
Called Pt in regards to referral received. Pt is interested in talk therapy services.  Informed Pt of wait list. Pt agreed to be added to wait list.

## 2023-09-29 ENCOUNTER — TELEPHONE (OUTPATIENT)
Age: 68
End: 2023-09-29

## 2023-09-29 NOTE — TELEPHONE ENCOUNTER
[place]  Prior Auth Submitted via CMM and response back "The patient currently has access to the requested medication and a Prior Authorization is not needed for the patient/medication"     Key: [id number]

## 2023-10-03 ENCOUNTER — OFFICE VISIT (OUTPATIENT)
Dept: PHYSICAL THERAPY | Facility: CLINIC | Age: 68
End: 2023-10-03
Payer: MEDICARE

## 2023-10-03 DIAGNOSIS — M54.50 LUMBAR PAIN: ICD-10-CM

## 2023-10-03 DIAGNOSIS — R26.89 BALANCE PROBLEM: ICD-10-CM

## 2023-10-03 DIAGNOSIS — M54.16 LUMBAR RADICULOPATHY: ICD-10-CM

## 2023-10-03 DIAGNOSIS — Z98.890 HISTORY OF LUMBAR LAMINECTOMY: Primary | ICD-10-CM

## 2023-10-03 PROCEDURE — 97110 THERAPEUTIC EXERCISES: CPT | Performed by: PHYSICAL THERAPIST

## 2023-10-03 PROCEDURE — 97112 NEUROMUSCULAR REEDUCATION: CPT | Performed by: PHYSICAL THERAPIST

## 2023-10-03 NOTE — PROGRESS NOTES
Daily Note     Today's date: 10/3/2023  Patient name: Scottie Ibarra  : 1955  MRN: [id number]  Referring provider: Nancy Eddy MD  Dx:   Encounter Diagnosis     ICD-10-CM    1. History of lumbar laminectomy  Z98.890       2. Lumbar radiculopathy  M54.16       3. Lumbar pain  M54.50       4. Balance problem  R26.89                      Subjective: Patient reports he feels about the same overall, he continues to have balance deficits and intermittent falls. Objective: See treatment diary below      Assessment: Tolerated treatment well. Patient continues to have weakness with hip extensors and knee extensors with decreased endurance as closed chain activities increase or persist.  Patient demonstrated fatigue post treatment, exhibited good technique with therapeutic exercises and would benefit from continued PT      Plan: Continue per plan of care. Progress treatment as tolerated. Progress challenge as appropriate with irritability. Precautions:   Past Medical History:   Diagnosis Date   • Anxiety    • Arthritis    • Contreras's esophagus    • Cancer ([address] [facility])     Stage 1 prostrate cancer; under active surveillance.    • Depression    • GERD (gastroesophageal reflux disease)    • Head injury    • Hypertension    • Osteoarthritis    • Psychiatric disorder    • Sleep apnea     CPAP at HS   • Spinal arthritis        FALL'S RISK CG WITH ALL EXERCISE, Prostate C(x) w/ Radiation Treatment    Manuals 10/3 9/28 9/14 9/11 9/7                                   Neuro Re-Ed        Hip Flexion w/ ER  Lean back in chair  3x10 Seated 3x10 Seated 3x10 Seated 3x10   TA SLR  3x10 3x10 3x10 3x10   TA Bridge  3x10 3x10 3x10 3x10   TA Clams     Supine BTB 3x10   Side step on airex beam  [id number] B UE support on table      Biodex LOS 2x Lvl 9, Maze Lvl 9 1x, Static Lvl 9 3x10 squats  LOS 3x (1x static 2x Lvl 10), 3x10 squats in WB w/ Lvl 10 LOS 3x (1x static, 2x Lvl 11), 3x10 squats in WB w/ Lvl 10 LOS 3x static, 1x Lvl 11 2x static   STS 2x10       STS w/ DB 10# 2x10       Standing hip Abd YTB 2x10  YTB 2x10 YTB 2x10 YTB 2x10   Standing Hip Flexion & Abd YTB 3x10 ea direction No YTB  March 3x10 +  Hip abd 3x10 B YTB 2x10 YTB 2x10 YTB 2x10   Ther Ex        L/S Gentle Rotation  2x10 2x10 2x10    Seated Hamstring Str  Sup SOS  10x10s B      Assessment & Management         Seated Postural Correction        Supine LE Ext     2x10 ea   Standing March 2x10 YTB 2x10  YTB 2x10 YTB 2x10 YTB    Seated March w/ SLR   2x10             Ther Activity                        Gait Training                        Modalities

## 2023-10-03 NOTE — PROGRESS NOTES
PT Re-Evaluation     Today's date: 10/3/2023  Patient name: Yasmany Koch  : 1955  MRN: [id number]  Referring provider: Mary Mejia MD  Dx:   Encounter Diagnosis     ICD-10-CM    1. History of lumbar laminectomy  Z98.890       2. Lumbar radiculopathy  M54.16       3. Lumbar pain  M54.50       4. Balance problem  R26.89                      Assessment details: Yasmany oKch is a 76 y.o. male with history of lumbar laminectomy on 2023 with the associated impairments including: ROM restriction, balance deficits, strength deficits, and activity limitation. He was seen pre-operatively for balance issues related to lumbar and hip pathology with nerve symptoms. He presents with global tightness of the lumbar spine and bilateral hips. He demonstrates R hip weakness and core weakness which contribute to functional activity deficits. He ambulates with an SPC with visible foot slap/foot drop of the R LE and leg length discrepancy which contributes to intermittent inversion of the L LE. He demonstrates with significant balance deficits due in part to radicular symptoms and previous lumbar and cervical pathology. Patient presents with the following impairments: balance deficits with overground walking, stiffness with transferring positions, inability to lift and carry objects, and difficulty performing yard work. Due to these impairments, patient has difficulty performing the following: ADLs stair navigation, prolonged walking, sit-to-stand transfers, walking on uneven terrain, getting in/out of the car, getting in/out of the bath, shopping, driving, lifting, carrying, and sleeping.  . Patient would benefit from skilled physical therapy services to address the above functional limitations through a targeted program consisting of repeated ROM/flexibility exercises, graded core strengthening and functional activity strengthening, static and dynamic balance training, and graded increase in functional activity training in order to progress towards prior level of function and independence with home exercise program.  Impairments: abnormal gait, abnormal muscle firing, abnormal or restricted ROM, activity intolerance, impaired physical strength, lacks appropriate home exercise program, pain with function and poor body mechanics  Understanding of Dx/Px/POC: good   Prognosis: good   Goals  Short Term Goals (3 weeks)  1. Patient will be independent with initial HEP. 2. Patient will demonstrate ability to put on socks without support. 3. Patient will demonstrate an increase in right hip strength of 1/2 grade on MMT to assist with don/doffing shoes. Long Term Goals (6 weeks)  1. Patient will demonstrate an increase in bilateral hip ROM to WNL to assist with bed mobility. 2. Patient will demonstrate an increase in right hip strength to 4/5 on MMT. 3. Patient will be able to ascend/descend 15 stairs reciprocally with 1 UE assist safely without imbalance. 4. Patient will be able to ambulate 300 feet on varied terrain without AD. 5. Patient will be able to perform all basic ADLs without modification. 6. Patient will be able to walk for 30 minutes without LOB or need for a rest break at sidewalk in [facility].   Plan  Patient would benefit from: skilled physical therapy  Planned modality interventions: cryotherapy, electrical stimulation/Russian stimulation, TENS, ultrasound, thermotherapy: hydrocollator packs, unattended electrical stimulation, high voltage pulsed current: pain management and high voltage pulsed current: spasm management  Planned therapy interventions: flexibility, functional ROM exercises, graded exercise, home exercise program, joint mobilization, manual therapy, neuromuscular re-education, patient education, strengthening, stretching, therapeutic exercise, therapeutic activities, Vides taping, balance/weight bearing training, gait training, abdominal trunk stabilization, activity modification, balance, body mechanics training, graded activity, self care, postural training, IADL retraining, ADL training, breathing training, behavior modification, muscle pump exercises, therapeutic training and transfer training  Frequency: 2x week  Duration in weeks: 6  Treatment plan discussed with: patient         Subjective Evaluation     History of Present Illness  Date of surgery: 23  Mechanism of injury: Pt presents today after lumbar laminectomy on 23. He reports he has been cleared of restrictions after his surgery. He was seen previously in physical therapy for balance deficits and lumbar radiculopathy. He reports decreased numbness after surgery and improved proprioception in his legs, though he continues to feel weakness and fatigue with prolonged activity. He continues to report balance problems including falling while performing his walking program after surgery. He reports no new trauma and no significant change in symptoms other than gradual improvement since his surgery.     Pain  Current pain ratin  At best pain ratin  At worst pain ratin  Location: Right hip/back  Quality: dull ache  Relieving factors: medications  Aggravating factors: walking, standing, stair climbing and lifting  Progression: improved     Social Support  Steps to enter house: yes  8  Stairs in house: yes   15  Lives with: spouse     Employment status: not working (Retired)  Hand dominance: right  Exercise history: Walking and yard work       Diagnostic Tests  X-ray: normal  Treatments  No previous or current treatments  Patient Goals  Patient goals for therapy: increased motion              Objective      Neurological Testing      Sensation      Hip   Left Hip   Hyposensation: light touch     Right Hip   Hyposensation: light touch     Comments   Left light touch: Reports symmetrical hyposensation below each knee and into both feet; intact above the knee.      Active Range of Motion   Left Hip Flexion: 92 degrees   Abduction: 25 degrees   External rotation (90/90): 26 degrees      Right Hip   Flexion: 94   Abduction: 25 degrees   External rotation (90/90): 28 degrees      Strength/Myotome Testing     Additional Strength Details  MMT:    Hip Flexion: 4, 3+  Hip ER: 4, 2+  Hip Abd: 4, 2+  Hip Ext: 3+, 3+     Ambulation      Comments   Ambulates with wide-based gait with SPC, tibial external rotation R > L; antalgic gait. Foot drop of the R foot, and inversion of the L foot intermittently with leg length discrepancy.     Timed-Up-and-Go Score: 17.5 no SPC  6 min walk: 600 ft w/ SPC  5x STS: 12.9       Precautions:   Past Medical History:   Diagnosis Date   • Anxiety 2010   • Arthritis 1990   • Contreras's esophagus    • Cancer ([address] [facility]) 2018    Stage 1 prostrate cancer; under active surveillance.    • Depression    • GERD (gastroesophageal reflux disease) 2005   • Head injury    • Hypertension    • Osteoarthritis 1990   • Psychiatric disorder    • Sleep apnea     CPAP at [facility]   • Spinal arthritis        FALL'S RISK CG WITH ALL EXERCISE, Prostate C(x) w/ Radiation Treatment           Manuals 9/28 9/14 9/11 9/7 9/5                                   Neuro Re-Ed        Hip Flexion w/ ER Lean back in chair  3x10 Seated 3x10 Seated 3x10 Seated 3x10 Seated 3x10   TA SLR 3x10 3x10 3x10 3x10 3x10   TA Bridge 3x10 3x10 3x10 3x10 3x10   TA Clams    Supine BTB 3x10 Supine BTB 3x10   Side step on airex beam [id number] UE support on table       Biodex  LOS 3x (1x static 2x Lvl 10), 3x10 squats in WB w/ Lvl 10 LOS 3x (1x static, 2x Lvl 11), 3x10 squats in WB w/ Lvl 10 LOS 3x static, 1x Lvl 11 2x static LOS 3x static 1x, Lvl 11 2x static, RC 3 min   Standing hip Abd  YTB 2x10 YTB 2x10 YTB 2x10 YTB 2x10   Standing Hip Flexion & Abd No YTB  March 3x10 +  Hip abd 3x10 B YTB 2x10 YTB 2x10 YTB 2x10 YTB 2x10   Ther Ex        L/S Gentle Rotation 2x10 2x10 2x10  2x10   Seated Hamstring Str Sup SOS  10x10s B       Assessment & Management Seated Postural Correction     2x10   Supine LE Ext    2x10 ea    Standing March 2x10  YTB 2x10 YTB 2x10 YTB     Seated March w/ SLR  2x10              Ther Activity                        Gait Training                        Modalities

## 2023-10-05 ENCOUNTER — APPOINTMENT (OUTPATIENT)
Dept: PHYSICAL THERAPY | Facility: CLINIC | Age: 68
End: 2023-10-05
Payer: MEDICARE

## 2023-10-05 NOTE — PROGRESS NOTES
PT Re-Evaluation     Today's date: 10/5/2023  Patient name: Keily Bose  : 1955  MRN: [id number]  Referring provider: Evelin Kee MD  Dx: No diagnosis found. Assessment details: Keily Bose is a 76 y.o. male with history of lumbar laminectomy on 2023 with the associated impairments including: ROM restriction, balance deficits, strength deficits, and activity limitation. He was seen pre-operatively for balance issues related to lumbar and hip pathology with nerve symptoms. He presents with global tightness of the lumbar spine and bilateral hips. He demonstrates R hip weakness and core weakness which contribute to functional activity deficits. He ambulates with an SPC with visible foot slap/foot drop of the R LE and leg length discrepancy which contributes to intermittent inversion of the L LE. He demonstrates with significant balance deficits due in part to radicular symptoms and previous lumbar and cervical pathology. Patient presents with the following impairments: balance deficits with overground walking, stiffness with transferring positions, inability to lift and carry objects, and difficulty performing yard work. Due to these impairments, patient has difficulty performing the following: ADLs stair navigation, prolonged walking, sit-to-stand transfers, walking on uneven terrain, getting in/out of the car, getting in/out of the bath, shopping, driving, lifting, carrying, and sleeping.  . Patient would benefit from skilled physical therapy services to address the above functional limitations through a targeted program consisting of repeated ROM/flexibility exercises, graded core strengthening and functional activity strengthening, static and dynamic balance training, and graded increase in functional activity training in order to progress towards prior level of function and independence with home exercise program.  Impairments: abnormal gait, abnormal muscle firing, abnormal or restricted ROM, activity intolerance, impaired physical strength, lacks appropriate home exercise program, pain with function and poor body mechanics  Understanding of Dx/Px/POC: good   Prognosis: good   Goals  Short Term Goals (3 weeks)  1. Patient will be independent with initial HEP. 2. Patient will demonstrate ability to put on socks without support. 3. Patient will demonstrate an increase in right hip strength of 1/2 grade on MMT to assist with don/doffing shoes. Long Term Goals (6 weeks)  1. Patient will demonstrate an increase in bilateral hip ROM to WNL to assist with bed mobility. 2. Patient will demonstrate an increase in right hip strength to 4/5 on MMT. 3. Patient will be able to ascend/descend 15 stairs reciprocally with 1 UE assist safely without imbalance. 4. Patient will be able to ambulate 300 feet on varied terrain without AD. 5. Patient will be able to perform all basic ADLs without modification. 6. Patient will be able to walk for 30 minutes without LOB or need for a rest break at sidewalk in [facility].   Plan  Patient would benefit from: skilled physical therapy  Planned modality interventions: cryotherapy, electrical stimulation/Russian stimulation, TENS, ultrasound, thermotherapy: hydrocollator packs, unattended electrical stimulation, high voltage pulsed current: pain management and high voltage pulsed current: spasm management  Planned therapy interventions: flexibility, functional ROM exercises, graded exercise, home exercise program, joint mobilization, manual therapy, neuromuscular re-education, patient education, strengthening, stretching, therapeutic exercise, therapeutic activities, Vides taping, balance/weight bearing training, gait training, abdominal trunk stabilization, activity modification, balance, body mechanics training, graded activity, self care, postural training, IADL retraining, ADL training, breathing training, behavior modification, muscle pump exercises, therapeutic training and transfer training  Frequency: 2x week  Duration in weeks: 6  Treatment plan discussed with: patient         Subjective Evaluation     History of Present Illness  Date of surgery: 23  Mechanism of injury: Pt presents today after lumbar laminectomy on 23. He reports he has been cleared of restrictions after his surgery. He was seen previously in physical therapy for balance deficits and lumbar radiculopathy. He reports decreased numbness after surgery and improved proprioception in his legs, though he continues to feel weakness and fatigue with prolonged activity. He continues to report balance problems including falling while performing his walking program after surgery. He reports no new trauma and no significant change in symptoms other than gradual improvement since his surgery.     Pain  Current pain ratin  At best pain ratin  At worst pain ratin  Location: Right hip/back  Quality: dull ache  Relieving factors: medications  Aggravating factors: walking, standing, stair climbing and lifting  Progression: improved     Social Support  Steps to enter house: yes  8  Stairs in house: yes   15  Lives with: spouse     Employment status: not working (Retired)  Hand dominance: right  Exercise history: Walking and yard work       Diagnostic Tests  X-ray: normal  Treatments  No previous or current treatments  Patient Goals  Patient goals for therapy: increased motion              Objective      Neurological Testing      Sensation      Hip   Left Hip   Hyposensation: light touch     Right Hip   Hyposensation: light touch     Comments   Left light touch: Reports symmetrical hyposensation below each knee and into both feet; intact above the knee.      Active Range of Motion   Left Hip   Flexion: 92 degrees   Abduction: 25 degrees   External rotation (90/90): 26 degrees      Right Hip   Flexion: 94   Abduction: 25 degrees   External rotation (90/90): 28 degrees    Strength/Myotome Testing     Additional Strength Details  MMT:    Hip Flexion: 4, 3+  Hip ER: 4, 2+  Hip Abd: 4, 2+  Hip Ext: 3+, 3+     Ambulation      Comments   Ambulates with wide-based gait with SPC, tibial external rotation R > L; antalgic gait. Foot drop of the R foot, and inversion of the L foot intermittently with leg length discrepancy.     Timed-Up-and-Go Score: 17.5 no SPC  6 min walk: 600 ft w/ SPC  5x STS: 12.9       Precautions:   Past Medical History:   Diagnosis Date   • Anxiety 2010   • Arthritis 1990   • Contreras's esophagus    • Cancer ([address] [facility]) 2018    Stage 1 prostrate cancer; under active surveillance.    • Depression    • GERD (gastroesophageal reflux disease) 2005   • Head injury    • Hypertension    • Osteoarthritis 1990   • Psychiatric disorder    • Sleep apnea     CPAP at    • Spinal arthritis        FALL'S RISK CG WITH ALL EXERCISE, Prostate C(x) w/ Radiation Treatment       Manuals 10/5 10/3 9/28 9/14 9/11                                   Neuro Re-Ed        Hip Flexion w/ ER   Lean back in chair  3x10 Seated 3x10 Seated 3x10   TA SLR   3x10 3x10 3x10   TA Bridge   3x10 3x10 3x10   TA Clams        Side step on airex beam   [id number] B UE support on table     Biodex  LOS 2x Lvl 9, Maze Lvl 9 1x, Static Lvl 9 3x10 squats  LOS 3x (1x static 2x Lvl 10), 3x10 squats in WB w/ Lvl 10 LOS 3x (1x static, 2x Lvl 11), 3x10 squats in WB w/ Lvl 10   STS  2x10      STS w/ DB  10# 2x10      Standing hip Abd  YTB 2x10  YTB 2x10 YTB 2x10   Standing Hip Flexion & Abd  YTB 3x10 ea direction No YTB  March 3x10 +  Hip abd 3x10 B YTB 2x10 YTB 2x10   Ther Ex        L/S Gentle Rotation   2x10 2x10 2x10   Seated Hamstring Str   Sup SOS  10x10s B     Assessment & Management         Seated Postural Correction        Supine LE Ext        Standing March  2x10 YTB 2x10  YTB 2x10 YTB 2x10 YTB   Seated March w/ SLR    2x10            Ther Activity                        Gait Training                        Modalities

## 2023-10-10 ENCOUNTER — EVALUATION (OUTPATIENT)
Dept: PHYSICAL THERAPY | Facility: CLINIC | Age: 68
End: 2023-10-10
Payer: MEDICARE

## 2023-10-10 DIAGNOSIS — R26.89 BALANCE PROBLEM: ICD-10-CM

## 2023-10-10 DIAGNOSIS — M54.50 LUMBAR PAIN: ICD-10-CM

## 2023-10-10 DIAGNOSIS — Z98.890 HISTORY OF LUMBAR LAMINECTOMY: Primary | ICD-10-CM

## 2023-10-10 DIAGNOSIS — M54.16 LUMBAR RADICULOPATHY: ICD-10-CM

## 2023-10-10 PROCEDURE — 97112 NEUROMUSCULAR REEDUCATION: CPT | Performed by: PHYSICAL THERAPIST

## 2023-10-10 PROCEDURE — 97110 THERAPEUTIC EXERCISES: CPT | Performed by: PHYSICAL THERAPIST

## 2023-10-10 NOTE — PROGRESS NOTES
PT Re-Evaluation     Today's date: 10/10/2023  Patient name: Mario Collado  : 1955  MRN: [id number]  Referring provider: Mara Winter MD  Dx:   Encounter Diagnosis     ICD-10-CM    1. History of lumbar laminectomy  Z98.890       2. Lumbar radiculopathy  M54.16       3. Lumbar pain  M54.50       4. Balance problem  R26.89                      Assessment details: Boni Srivastava is a 76 y.o. male with history of lumbar laminectomy on 2023 with the associated impairments including: ROM restriction, balance deficits, strength deficits, and activity limitation. He demonstrates objective improvements as indicated by improvements with functional outcome measure testing with improved 6 min walk time and 5x STS. He continues to progress with strength as indicated by MMT scores. He has difficulty with static and dynamic balance as he demonstrates loss of balance with change of direction and pivoting. Additionally, patient continues to demonstrate weakness with dorsiflexion strength particularly when ambulating for prolonged periods. Patient presents with the following impairments: balance deficits with overground walking, stiffness with transferring positions, inability to lift and carry objects, and difficulty performing yard work. Due to these impairments, patient has difficulty performing the following: ADLs stair navigation, prolonged walking, sit-to-stand transfers, walking on uneven terrain, getting in/out of the car, getting in/out of the bath, shopping, driving, lifting, carrying, and sleeping.  . Patient would benefit from skilled physical therapy services to address the above functional limitations through a targeted program consisting of repeated ROM/flexibility exercises, graded core strengthening and functional activity strengthening, static and dynamic balance training, and graded increase in functional activity training in order to progress towards prior level of function and independence with home exercise program.  Impairments: abnormal gait, abnormal muscle firing, abnormal or restricted ROM, activity intolerance, impaired physical strength, lacks appropriate home exercise program, pain with function and poor body mechanics  Understanding of Dx/Px/POC: good   Prognosis: good   Goals  Short Term Goals (3 weeks)  1. Patient will be independent with initial HEP. -met  2. Patient will demonstrate ability to put on socks without support.-met  3. Patient will demonstrate an increase in right hip strength of 1/2 grade on MMT to assist with don/doffing shoes. -met    Long Term Goals (6 weeks)  1. Patient will demonstrate an increase in bilateral hip ROM to WNL to assist with bed mobility. -progressing towards  2. Patient will demonstrate an increase in right hip strength to 4/5 on MMT.-progressing towards  3. Patient will be able to ascend/descend 15 stairs reciprocally with 1 UE assist safely without imbalance.-progressing towards  4. Patient will be able to ambulate 300 feet on varied terrain without AD. -met  5. Patient will be able to perform all basic ADLs without modification. -progressing towards  6.  Patient will be able to walk for 30 minutes without LOB or need for a rest break at sidewalk in neighborhood.-progressing towards  Plan  Patient would benefit from: skilled physical therapy  Planned modality interventions: cryotherapy, electrical stimulation/Russian stimulation, TENS, ultrasound, thermotherapy: hydrocollator packs, unattended electrical stimulation, high voltage pulsed current: pain management and high voltage pulsed current: spasm management  Planned therapy interventions: flexibility, functional ROM exercises, graded exercise, home exercise program, joint mobilization, manual therapy, neuromuscular re-education, patient education, strengthening, stretching, therapeutic exercise, therapeutic activities, Vides taping, balance/weight bearing training, gait training, abdominal trunk stabilization, activity modification, balance, body mechanics training, graded activity, self care, postural training, IADL retraining, ADL training, breathing training, behavior modification, muscle pump exercises, therapeutic training and transfer training  Frequency: 2x week  Duration in weeks: 6  Treatment plan discussed with: patient         Subjective Evaluation     History of Present Illness  Date of surgery: 23  Mechanism of injury: Pt presents today after lumbar laminectomy on 23. He reports he has been able to walk for 25 minutes on his own at home though he has to be cautious as he gets fatigued and feels like he loses control of his leg. Additionally he reports his foot slaps the ground particularly when walking up or down hill.     Pain  Current pain ratin  At best pain ratin  At worst pain ratin  Location: Right hip/back  Quality: dull ache  Relieving factors: medications  Aggravating factors: walking, standing, stair climbing and lifting  Progression: improved     Social Support  Steps to enter house: yes  8  Stairs in house: yes   15  Lives with: spouse     Employment status: not working (Retired)  Hand dominance: right  Exercise history: Walking and yard work       Diagnostic Tests  X-ray: normal  Treatments  No previous or current treatments  Patient Goals  Patient goals for therapy: increased motion              Objective      Neurological Testing      Sensation      Hip   Left Hip   Hyposensation: light touch     Right Hip   Hyposensation: light touch     Comments   Left light touch: Reports symmetrical hyposensation below each knee and into both feet; intact above the knee.      Active Range of Motion   Left Hip   Flexion: 108 degrees   Abduction: 32 degrees   External rotation (90/90): 26 degrees      Right Hip   Flexion: 105   Abduction: 31 degrees   External rotation (90/90): 30 degrees      Strength/Myotome Testing     Additional Strength Details  MMT:     Hip Flexion: 4+, 4-  Hip ER: 4+, 4-  Hip Abd: 4, 4-  Hip Ext: 4+, 4-     Ambulation      Comments   Ambulates with wide-based gait with SPC. Decreased stance time on R LE when ambulating and displays LOB episodes when changing directions.     Timed-Up-and-Go Score: 11.9 no SPC  6 min walk: 1050 ft w/ SPC  5x STS: 12.9     Precautions:   Medical History        Past Medical History:   Diagnosis Date   • Anxiety 2010   • Arthritis 1990   • Contreras's esophagus     • Cancer ([address] [facility]) 2018     Stage 1 prostrate cancer; under active surveillance. • Depression     • GERD (gastroesophageal reflux disease) 2005   • Head injury     • Hypertension     • Osteoarthritis 1990   • Psychiatric disorder     • Sleep apnea       CPAP at    • Spinal arthritis              FALL'S RISK CG WITH ALL EXERCISE, Prostate C(x) w/ Radiation Treatment             Manuals 10/10 10/5 10/3 9/28 9/14                                   Neuro Re-Ed        Hip Flexion w/ ER 3x10   Lean back in chair  3x10 Seated 3x10   TA SLR 3x10   3x10 3x10   TA Bridge 3x10   3x10 3x10   TA Clams        Side step on airex beam    [id number] B UE support on table    Biodex LOS 2x lvl 10, maze 1x lvl 10  LOS 2x Lvl 9, Maze Lvl 9 1x, Static Lvl 9 3x10 squats  LOS 3x (1x static 2x Lvl 10), 3x10 squats in WB w/ Lvl 10   STS   2x10     STS w/ DB 3x10 10#  10# 2x10     Standing hip Abd 2x10  YTB 2x10  YTB 2x10   Standing Hip Flexion & Abd YTB 2x10  YTB 3x10 ea direction No YTB  March 3x10 +  Hip abd 3x10 B YTB 2x10   Ther Ex        L/S Gentle Rotation    2x10 2x10   Seated Hamstring Str    Sup SOS  10x10s B    Assessment & Management  POC and continued progresion with dynamic balance activities.        Seated Postural Correction        Supine LE Ext        Standing March   2x10 YTB 2x10  YTB 2x10 YTB   Seated March w/ SLR     2x10           Ther Activity                        Gait Training                        Modalities

## 2023-10-12 ENCOUNTER — APPOINTMENT (OUTPATIENT)
Dept: PHYSICAL THERAPY | Facility: CLINIC | Age: 68
End: 2023-10-12
Payer: MEDICARE

## 2023-10-14 DIAGNOSIS — I10 ESSENTIAL HYPERTENSION: ICD-10-CM

## 2023-10-16 RX ORDER — OLMESARTAN MEDOXOMIL AND HYDROCHLOROTHIAZIDE 40/12.5 40; 12.5 MG/1; MG/1
1 TABLET ORAL DAILY
Qty: 90 TABLET | Refills: 0 | Status: SHIPPED | OUTPATIENT
Start: 2023-10-16

## 2023-10-17 ENCOUNTER — OFFICE VISIT (OUTPATIENT)
Dept: PHYSICAL THERAPY | Facility: CLINIC | Age: 68
End: 2023-10-17
Payer: MEDICARE

## 2023-10-17 DIAGNOSIS — M54.50 LUMBAR PAIN: ICD-10-CM

## 2023-10-17 DIAGNOSIS — M54.16 LUMBAR RADICULOPATHY: ICD-10-CM

## 2023-10-17 DIAGNOSIS — Z98.890 HISTORY OF LUMBAR LAMINECTOMY: Primary | ICD-10-CM

## 2023-10-17 PROCEDURE — 97110 THERAPEUTIC EXERCISES: CPT | Performed by: PHYSICAL THERAPIST

## 2023-10-17 PROCEDURE — 97112 NEUROMUSCULAR REEDUCATION: CPT | Performed by: PHYSICAL THERAPIST

## 2023-10-17 NOTE — PROGRESS NOTES
Daily Note     Today's date: 10/17/2023  Patient name: Mason Naidu  : 1955  MRN: [id number]  Referring provider: Eloisa Molina MD  Dx:   Encounter Diagnosis     ICD-10-CM    1. History of lumbar laminectomy  Z98.890       2. Lumbar pain  M54.50       3. Lumbar radiculopathy  M54.16                      Subjective: Patient reports he is feeling more off than usual today. He feels the effects of the neuropathy are greater than normal.      Objective: See treatment diary below      Assessment: Tolerated treatment well. Held dynamic balance exercises today as patient presented more off balance and reported significant fatigue. Ended session early per patient request as neuropathy symptoms were greater than normal.  Patient demonstrated fatigue post treatment, exhibited good technique with therapeutic exercises, and would benefit from continued PT      Plan: Continue per plan of care. Progress treatment as tolerated. Progress challenge as appropriate with irritability. Manuals 10/16 10/10 10/5 10/3 9/28                                   Neuro Re-Ed        Hip Flexion w/ ER 3x10 3x10   Lean back in chair  3x10   TA SLR 3x10 3x10   3x10   TA Bridge 3x10 3x10   3x10   TA Clams        Side step on airex beam     [id number] UE support on table   Biodex  LOS 2x lvl 10, maze 1x lvl 10  LOS 2x Lvl 9, Maze Lvl 9 1x, Static Lvl 9 3x10 squats    STS    2x10    STS w/ DB 15x no weight 3x10 10#  10# 2x10    Standing hip Abd 2x10 2x10  YTB 2x10    Standing Hip Flexion & Abd  YTB 2x10  YTB 3x10 ea direction No YTB  March 3x10 +  Hip abd 3x10 B   Ther Ex        L/S Gentle Rotation     2x10   Seated Hamstring Str     Sup SOS  10x10s B   Assessment & Management   POC and continued progresion with dynamic balance activities.       Seated Postural Correction        Supine LE Ext        Standing March    2x10 YTB 2x10  YTB   Seated March w/ SLR 2x10       Squats 2x10 mini squat       Ther Activity Gait Training                        Modalities

## 2023-10-19 ENCOUNTER — OFFICE VISIT (OUTPATIENT)
Dept: PHYSICAL THERAPY | Facility: CLINIC | Age: 68
End: 2023-10-19
Payer: MEDICARE

## 2023-10-19 DIAGNOSIS — M54.16 LUMBAR RADICULOPATHY: ICD-10-CM

## 2023-10-19 DIAGNOSIS — Z98.890 HISTORY OF LUMBAR LAMINECTOMY: Primary | ICD-10-CM

## 2023-10-19 DIAGNOSIS — M54.50 LUMBAR PAIN: ICD-10-CM

## 2023-10-19 DIAGNOSIS — R26.89 BALANCE PROBLEM: ICD-10-CM

## 2023-10-19 PROCEDURE — 97110 THERAPEUTIC EXERCISES: CPT | Performed by: PHYSICAL THERAPIST

## 2023-10-19 PROCEDURE — 97112 NEUROMUSCULAR REEDUCATION: CPT | Performed by: PHYSICAL THERAPIST

## 2023-10-19 NOTE — PROGRESS NOTES
Daily Note     Today's date: 10/19/2023  Patient name: Trini Martinez  : 1955  MRN: [id number]  Referring provider: Angie Sanchez MD  Dx:   Encounter Diagnosis     ICD-10-CM    1. History of lumbar laminectomy  Z98.890       2. Lumbar radiculopathy  M54.16       3. Lumbar pain  M54.50       4. Balance problem  R26.89                      Subjective: Patient reports he feels better than he did after his last session, he has less brain fog than early in the week and has less pain. Objective: See treatment diary below      Assessment: Tolerated treatment well. Patient continues to progress with dynamic stabilization training, able to maintain TA contraction while performing squats. Patient demonstrated fatigue post treatment, exhibited good technique with therapeutic exercises, and would benefit from continued PT      Plan: Continue per plan of care. Progress treatment as tolerated. Progress challenge as appropriate with irritability. Manuals 10/19 10/16 10/10 10/5 10/3                                   Neuro Re-Ed        Hip Flexion w/ ER 3x10 3x10 3x10     TA SLR 3x10 3x10 3x10     TA Bridge 3x10 w/ LE ext 3x10 3x10     TA Clams        Side step on airex beam        Biodex Maze Lvl 9 2x, 3x10 squats Lvl 8, Random Control 2min  LOS 2x lvl 10, maze 1x lvl 10  LOS 2x Lvl 9, Maze Lvl 9 1x, Static Lvl 9 3x10 squats   STS 2x10    2x10   STS w/ DB 3x10 w/ 10# 15x no weight 3x10 10#  10# 2x10   Standing hip Abd 2x10 2x10 2x10  YTB 2x10   Standing Hip Flexion & Abd RTB 3x10  YTB 2x10  YTB 3x10 ea direction   Ther Ex        L/S Gentle Rotation        Seated Hamstring Str        Assessment & Management    POC and continued progresion with dynamic balance activities.      Seated Postural Correction        Supine LE Ext        Standing March 2x10 RTB    2x10 YTB   Seated March w/ SLR 2x10 2x10      Squats 2x10 mini squat 2x10 mini squat      Ther Activity                        Gait Training Modalities Yes

## 2023-10-24 ENCOUNTER — OFFICE VISIT (OUTPATIENT)
Dept: PHYSICAL THERAPY | Facility: CLINIC | Age: 68
End: 2023-10-24
Payer: MEDICARE

## 2023-10-24 DIAGNOSIS — R26.89 BALANCE PROBLEM: ICD-10-CM

## 2023-10-24 DIAGNOSIS — M54.50 LUMBAR PAIN: ICD-10-CM

## 2023-10-24 DIAGNOSIS — Z98.890 HISTORY OF LUMBAR LAMINECTOMY: Primary | ICD-10-CM

## 2023-10-24 DIAGNOSIS — M54.16 LUMBAR RADICULOPATHY: ICD-10-CM

## 2023-10-24 PROCEDURE — 97110 THERAPEUTIC EXERCISES: CPT

## 2023-10-24 PROCEDURE — 97112 NEUROMUSCULAR REEDUCATION: CPT

## 2023-10-24 NOTE — PROGRESS NOTES
Daily Note     Today's date: 10/24/2023  Patient name: Mason Naidu  : 1955  MRN: [id number]  Referring provider: Eloisa Molina MD  Dx:   Encounter Diagnosis     ICD-10-CM    1. History of lumbar laminectomy  Z98.890       2. Lumbar radiculopathy  M54.16       3. Lumbar pain  M54.50       4. Balance problem  R26.89           Start Time: 09  Stop Time: 1015  Total time in clinic (min): 40 minutes    Subjective: Pt states that he has his typical low back pain that he has lived with for years but otherwise no other pains. Pt feels fatigued and tired from the hormone therapy prior to the start of his session. Objective: See treatment diary below      Assessment: Tolerated treatment well. Patient demonstrated fatigue post treatment, exhibited good technique with therapeutic exercises, and would benefit from continued PT. Continues to work on core stabilization and LE strengthening to improve overall function, requires rest breaks due to fatigue from his hormone therapy and LE weakness. Plan: Continue per plan of care. Progress treatment as tolerated.        Manuals 10/24 10/19 10/16 10/10 10/5 10/3                                       Neuro Re-Ed         Hip Flexion w/ ER  3x10 3x10 3x10     TA SLR 3x10 3x10 3x10 3x10     TA Bridge 2x10  1x10 w/LE ext 3x10 w/ LE ext 3x10 3x10     TA Clams         Side step on airex beam         Biodex RC 2 mins L10    Squats L8 1x10 Maze Lvl 9 2x, 3x10 squats Lvl 8, Random Control 2min  LOS 2x lvl 10, maze 1x lvl 10  LOS 2x Lvl 9, Maze Lvl 9 1x, Static Lvl 9 3x10 squats   STS 2x10 2x10    2x10   STS w/ DB 2x10 w/10# 3x10 w/ 10# 15x no weight 3x10 10#  10# 2x10   Standing hip Abd 2x10 B 2x10 2x10 2x10  YTB 2x10   Bent knee fallouts w/TA Blue 2x10 B        Standing Hip Flexion & Abd Hip flex RTB 3x10 B RTB 3x10  YTB 2x10  YTB 3x10 ea direction   Ther Ex         L/S Gentle Rotation         Seated Hamstring Str         Assessment & Management     POC and continued progresion with dynamic balance activities.      Seated Postural Correction         Supine LE Ext         Standing March  2x10 RTB    2x10 YTB   Seated March w/ SLR  2x10 2x10      Squats 2x10 2x10 mini squat 2x10 mini squat      Ther Activity                           Gait Training                           Modalities

## 2023-10-26 ENCOUNTER — APPOINTMENT (OUTPATIENT)
Dept: PHYSICAL THERAPY | Facility: CLINIC | Age: 68
End: 2023-10-26
Payer: MEDICARE

## 2023-10-27 DIAGNOSIS — M54.41 CHRONIC BILATERAL LOW BACK PAIN WITH BILATERAL SCIATICA: ICD-10-CM

## 2023-10-27 DIAGNOSIS — G89.4 CHRONIC PAIN SYNDROME: ICD-10-CM

## 2023-10-27 DIAGNOSIS — G89.29 CHRONIC BILATERAL LOW BACK PAIN WITH BILATERAL SCIATICA: ICD-10-CM

## 2023-10-27 DIAGNOSIS — M54.42 CHRONIC BILATERAL LOW BACK PAIN WITH BILATERAL SCIATICA: ICD-10-CM

## 2023-10-30 ENCOUNTER — APPOINTMENT (OUTPATIENT)
Dept: LAB | Facility: CLINIC | Age: 68
End: 2023-10-30
Payer: MEDICARE

## 2023-10-30 DIAGNOSIS — E83.110 HEREDITARY HEMOCHROMATOSIS (HCC): ICD-10-CM

## 2023-10-30 DIAGNOSIS — D50.8 IRON DEFICIENCY ANEMIA SECONDARY TO INADEQUATE DIETARY IRON INTAKE: ICD-10-CM

## 2023-10-30 DIAGNOSIS — E46 PROTEIN-CALORIE MALNUTRITION, UNSPECIFIED SEVERITY (HCC): ICD-10-CM

## 2023-10-30 LAB
ALBUMIN SERPL BCP-MCNC: 4.2 G/DL (ref 3.5–5)
ALP SERPL-CCNC: 80 U/L (ref 34–104)
ALT SERPL W P-5'-P-CCNC: 17 U/L (ref 7–52)
ANION GAP SERPL CALCULATED.3IONS-SCNC: 11 MMOL/L
AST SERPL W P-5'-P-CCNC: 20 U/L (ref 13–39)
BASOPHILS # BLD AUTO: 0.03 THOUSANDS/ÂΜL (ref 0–0.1)
BASOPHILS NFR BLD AUTO: 1 % (ref 0–1)
BILIRUB SERPL-MCNC: 0.63 MG/DL (ref 0.2–1)
BUN SERPL-MCNC: 21 MG/DL (ref 5–25)
CALCIUM SERPL-MCNC: 9 MG/DL (ref 8.4–10.2)
CHLORIDE SERPL-SCNC: 99 MMOL/L (ref 96–108)
CO2 SERPL-SCNC: 25 MMOL/L (ref 21–32)
CREAT SERPL-MCNC: 0.92 MG/DL (ref 0.6–1.3)
EOSINOPHIL # BLD AUTO: 0.12 THOUSAND/ÂΜL (ref 0–0.61)
EOSINOPHIL NFR BLD AUTO: 2 % (ref 0–6)
ERYTHROCYTE [DISTWIDTH] IN BLOOD BY AUTOMATED COUNT: 12.4 % (ref 11.6–15.1)
FERRITIN SERPL-MCNC: 73 NG/ML (ref 24–336)
GFR SERPL CREATININE-BSD FRML MDRD: 85 ML/MIN/1.73SQ M
GLUCOSE P FAST SERPL-MCNC: 87 MG/DL (ref 65–99)
HCT VFR BLD AUTO: 39.1 % (ref 36.5–49.3)
HGB BLD-MCNC: 13.3 G/DL (ref 12–17)
IMM GRANULOCYTES # BLD AUTO: 0.01 THOUSAND/UL (ref 0–0.2)
IMM GRANULOCYTES NFR BLD AUTO: 0 % (ref 0–2)
IRON SATN MFR SERPL: 42 % (ref 15–50)
IRON SERPL-MCNC: 123 UG/DL (ref 50–212)
LYMPHOCYTES # BLD AUTO: 1.09 THOUSANDS/ÂΜL (ref 0.6–4.47)
LYMPHOCYTES NFR BLD AUTO: 19 % (ref 14–44)
MCH RBC QN AUTO: 33.4 PG (ref 26.8–34.3)
MCHC RBC AUTO-ENTMCNC: 34 G/DL (ref 31.4–37.4)
MCV RBC AUTO: 98 FL (ref 82–98)
MONOCYTES # BLD AUTO: 0.62 THOUSAND/ÂΜL (ref 0.17–1.22)
MONOCYTES NFR BLD AUTO: 11 % (ref 4–12)
NEUTROPHILS # BLD AUTO: 3.89 THOUSANDS/ÂΜL (ref 1.85–7.62)
NEUTS SEG NFR BLD AUTO: 67 % (ref 43–75)
NRBC BLD AUTO-RTO: 0 /100 WBCS
PLATELET # BLD AUTO: 319 THOUSANDS/UL (ref 149–390)
PMV BLD AUTO: 9.1 FL (ref 8.9–12.7)
POTASSIUM SERPL-SCNC: 3.5 MMOL/L (ref 3.5–5.3)
PROT SERPL-MCNC: 6.8 G/DL (ref 6.4–8.4)
RBC # BLD AUTO: 3.98 MILLION/UL (ref 3.88–5.62)
SODIUM SERPL-SCNC: 135 MMOL/L (ref 135–147)
TIBC SERPL-MCNC: 296 UG/DL (ref 250–450)
UIBC SERPL-MCNC: 173 UG/DL (ref 155–355)
VIT B12 SERPL-MCNC: 798 PG/ML (ref 180–914)
WBC # BLD AUTO: 5.76 THOUSAND/UL (ref 4.31–10.16)

## 2023-10-30 PROCEDURE — 82728 ASSAY OF FERRITIN: CPT

## 2023-10-30 PROCEDURE — 80053 COMPREHEN METABOLIC PANEL: CPT

## 2023-10-30 PROCEDURE — 36415 COLL VENOUS BLD VENIPUNCTURE: CPT

## 2023-10-30 PROCEDURE — 85025 COMPLETE CBC W/AUTO DIFF WBC: CPT

## 2023-10-30 PROCEDURE — 83540 ASSAY OF IRON: CPT

## 2023-10-30 PROCEDURE — 83550 IRON BINDING TEST: CPT

## 2023-10-30 PROCEDURE — 82607 VITAMIN B-12: CPT

## 2023-10-31 ENCOUNTER — APPOINTMENT (OUTPATIENT)
Dept: PHYSICAL THERAPY | Facility: CLINIC | Age: 68
End: 2023-10-31
Payer: MEDICARE

## 2023-11-01 RX ORDER — OXYCODONE HCL 10 MG/1
10 TABLET, FILM COATED, EXTENDED RELEASE ORAL 2 TIMES DAILY
Qty: 60 TABLET | Refills: 0 | Status: SHIPPED | OUTPATIENT
Start: 2023-11-01

## 2023-11-01 RX ORDER — HYDROCODONE BITARTRATE AND ACETAMINOPHEN 10; 325 MG/1; MG/1
1 TABLET ORAL EVERY 4 HOURS PRN
Qty: 180 TABLET | Refills: 0 | Status: SHIPPED | OUTPATIENT
Start: 2023-11-01

## 2023-11-02 ENCOUNTER — APPOINTMENT (OUTPATIENT)
Dept: PHYSICAL THERAPY | Facility: CLINIC | Age: 68
End: 2023-11-02
Payer: MEDICARE

## 2023-11-03 ENCOUNTER — OFFICE VISIT (OUTPATIENT)
Dept: HEMATOLOGY ONCOLOGY | Facility: CLINIC | Age: 68
End: 2023-11-03
Payer: MEDICARE

## 2023-11-03 VITALS
SYSTOLIC BLOOD PRESSURE: 138 MMHG | DIASTOLIC BLOOD PRESSURE: 88 MMHG | HEART RATE: 113 BPM | HEIGHT: 66 IN | TEMPERATURE: 97 F | WEIGHT: 170 LBS | BODY MASS INDEX: 27.32 KG/M2 | OXYGEN SATURATION: 99 % | RESPIRATION RATE: 17 BRPM

## 2023-11-03 DIAGNOSIS — G47.30 SLEEP APNEA, UNSPECIFIED TYPE: ICD-10-CM

## 2023-11-03 DIAGNOSIS — Z78.9 VEGETARIAN DIET: ICD-10-CM

## 2023-11-03 DIAGNOSIS — E83.110 HEREDITARY HEMOCHROMATOSIS (HCC): Primary | ICD-10-CM

## 2023-11-03 DIAGNOSIS — E46 PROTEIN-CALORIE MALNUTRITION, UNSPECIFIED SEVERITY (HCC): ICD-10-CM

## 2023-11-03 DIAGNOSIS — R53.83 FATIGUE, UNSPECIFIED TYPE: ICD-10-CM

## 2023-11-03 DIAGNOSIS — C61 PROSTATE CANCER (HCC): ICD-10-CM

## 2023-11-03 PROCEDURE — 99215 OFFICE O/P EST HI 40 MIN: CPT | Performed by: PHYSICIAN ASSISTANT

## 2023-11-03 RX ORDER — PREDNISONE 5 MG/1
TABLET ORAL
COMMUNITY
Start: 2023-09-15 | End: 2024-04-11

## 2023-11-03 RX ORDER — ARMODAFINIL 250 MG/1
250 TABLET ORAL DAILY
Qty: 30 TABLET | Refills: 0 | Status: SHIPPED | OUTPATIENT
Start: 2023-11-03

## 2023-11-03 RX ORDER — ABIRATERONE ACETATE 250 MG/1
TABLET ORAL
COMMUNITY
Start: 2023-09-15 | End: 2024-04-11

## 2023-11-03 NOTE — PATIENT INSTRUCTIONS
[facility]  [facility] [place] - [phone]    Your Team Members:  Advanced Practitioner:  Misael Wilcox PA-C  Oncology Nurse:   Tasneem Resendiz, RN ([phone]) M-F 8am - 4:30pm    Please answer Private and Unavailable Calls - this may be your team(s) contacting you.   If you have medical questions/concerns/issues - contact us either by (1) My Chart (2) Hope Line
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
## 2023-11-07 ENCOUNTER — OFFICE VISIT (OUTPATIENT)
Dept: PHYSICAL THERAPY | Facility: CLINIC | Age: 68
End: 2023-11-07
Payer: MEDICARE

## 2023-11-07 DIAGNOSIS — Z98.890 HISTORY OF LUMBAR LAMINECTOMY: Primary | ICD-10-CM

## 2023-11-07 DIAGNOSIS — M54.50 LUMBAR PAIN: ICD-10-CM

## 2023-11-07 PROCEDURE — 97110 THERAPEUTIC EXERCISES: CPT | Performed by: PHYSICAL THERAPIST

## 2023-11-07 PROCEDURE — 97112 NEUROMUSCULAR REEDUCATION: CPT | Performed by: PHYSICAL THERAPIST

## 2023-11-07 NOTE — PROGRESS NOTES
Daily Note     Today's date: 2023  Patient name: Sabas Velez  : 1955  MRN: [id number]  Referring provider: Jorje Linares MD  Dx:   Encounter Diagnosis     ICD-10-CM    1. History of lumbar laminectomy  Z98.890       2. Lumbar pain  M54.50                      Subjective: Patient reports he had a fall on Friday after going for a 4.5 mile hike. He reports feeling some pain in the L hip, but no significant increase overall and no loss of motion. Objective: See treatment diary below      Assessment: Tolerated treatment well. Assessed patient after fall. No change in ROM, strength or status. Advised patient to follow up with urgent care for possible imaging due to fall on concrete. Patient demonstrated fatigue post treatment, exhibited good technique with therapeutic exercises, and would benefit from continued PT      Plan: Continue per plan of care. Progress treatment as tolerated. Progress challenge as appropriate with irritability. Manuals 11/7 10/24 10/19 10/16 10/10                                   Neuro Re-Ed        Hip Flexion w/ ER   3x10 3x10 3x10   TA SLR 3x10 3x10 3x10 3x10 3x10   TA Bridge 2x10, 1x10 w/ LE ext 2x10  1x10 w/LE ext 3x10 w/ LE ext 3x10 3x10   TA Clams        Side step on airex beam        Biodex Biodex Maze control 2x L10, Squats L 8 3x10, LOS Lvl 10 2x RC 2 mins L10    Squats L8 1x10 Maze Lvl 9 2x, 3x10 squats Lvl 8, Random Control 2min  LOS 2x lvl 10, maze 1x lvl 10   STS 2x10 2x10 2x10     STS w/ DB  2x10 w/10# 3x10 w/ 10# 15x no weight 3x10 10#   Standing hip Abd  2x10 B 2x10 2x10 2x10   Bent knee fallouts w/TA  Blue 2x10 B      Standing Hip Flexion & Abd  Hip flex RTB 3x10 B RTB 3x10  YTB 2x10   Ther Ex        L/S Gentle Rotation        Seated Hamstring Str        Assessment & Management  POC with possible follow up with urgent care after fall. POC and continued progresion with dynamic balance activities.    Seated Postural Correction        Supine LE Ext        Standing March   2x10 RTB     Seated March w/ SLR   2x10 2x10    Squats 5x stopped due to pain 2x10 2x10 mini squat 2x10 mini squat    Ther Activity                        Gait Training                        Modalities

## 2023-11-09 ENCOUNTER — APPOINTMENT (OUTPATIENT)
Dept: PHYSICAL THERAPY | Facility: CLINIC | Age: 68
End: 2023-11-09
Payer: MEDICARE

## 2023-11-14 ENCOUNTER — OFFICE VISIT (OUTPATIENT)
Dept: PHYSICAL THERAPY | Facility: CLINIC | Age: 68
End: 2023-11-14
Payer: MEDICARE

## 2023-11-14 DIAGNOSIS — M54.16 LUMBAR RADICULOPATHY: ICD-10-CM

## 2023-11-14 DIAGNOSIS — R26.89 BALANCE PROBLEM: ICD-10-CM

## 2023-11-14 DIAGNOSIS — M54.50 LUMBAR PAIN: ICD-10-CM

## 2023-11-14 DIAGNOSIS — Z98.890 HISTORY OF LUMBAR LAMINECTOMY: Primary | ICD-10-CM

## 2023-11-14 PROCEDURE — 97112 NEUROMUSCULAR REEDUCATION: CPT | Performed by: PHYSICAL THERAPIST

## 2023-11-14 PROCEDURE — 97110 THERAPEUTIC EXERCISES: CPT | Performed by: PHYSICAL THERAPIST

## 2023-11-14 NOTE — PROGRESS NOTES
PT Re-Evaluation     Today's date: 2023  Patient name: Tracy Miramontes  : 1955  MRN: [id number]  Referring provider: Daksha Jasmine MD  Dx:   Encounter Diagnosis     ICD-10-CM    1. History of lumbar laminectomy  Z98.890       2. Lumbar pain  M54.50       3. Lumbar radiculopathy  M54.16       4. Balance problem  R26.89                    Assessment details: Tracy Miramontes is a 76 y.o. male with history of lumbar laminectomy on 2023 with the associated impairments including: ROM restriction, balance deficits, strength deficits, and activity limitation. He demonstrates improved ambulation with trekking poles with decreased LOB and decreased reliance on PT assist/CG for support. He demonstrates similar test scores with objective measures, likely due to a fall while walking outside over last week. He demonstrates increased soreness with manual muscle testing and no change in strength, though he continues to have pain with muscle engagement. No change in ROM measurements noted after the fall. Will complete 6 min walk test at next visit. Advised patient to consider urgent care as pain has persisted since his fall for possible imaging to role out further pathology. Due to these impairments, patient has difficulty performing the following: ADLs stair navigation, prolonged walking, sit-to-stand transfers, walking on uneven terrain, getting in/out of the car, getting in/out of the bath, shopping, driving, lifting, carrying, and sleeping.  . Patient would benefit from skilled physical therapy services to address the above functional limitations through a targeted program consisting of repeated ROM/flexibility exercises, graded core strengthening and functional activity strengthening, static and dynamic balance training, and graded increase in functional activity training in order to progress towards prior level of function and independence with home exercise program.  Impairments: abnormal gait, abnormal muscle firing, abnormal or restricted ROM, activity intolerance, impaired physical strength, lacks appropriate home exercise program, pain with function and poor body mechanics  Understanding of Dx/Px/POC: good   Prognosis: good   Goals  Short Term Goals (3 weeks)  1. Patient will be independent with initial HEP. -met  2. Patient will demonstrate ability to put on socks without support.-met  3. Patient will demonstrate an increase in right hip strength of 1/2 grade on MMT to assist with don/doffing shoes. -met    Long Term Goals (6 weeks)  1. Patient will demonstrate an increase in bilateral hip ROM to WNL to assist with bed mobility. -progressing towards  2. Patient will demonstrate an increase in right hip strength to 4/5 on MMT.-progressing towards  3. Patient will be able to ascend/descend 15 stairs reciprocally with 1 UE assist safely without imbalance.-progressing towards  4. Patient will be able to ambulate 300 feet on varied terrain without AD. -met  5. Patient will be able to perform all basic ADLs without modification. -progressing towards  6.  Patient will be able to walk for 30 minutes without LOB or need for a rest break at sidewalk in neighborhood.-met contingent on trekking poles  Plan  Patient would benefit from: skilled physical therapy  Planned modality interventions: cryotherapy, electrical stimulation/Russian stimulation, TENS, ultrasound, thermotherapy: hydrocollator packs, unattended electrical stimulation, high voltage pulsed current: pain management and high voltage pulsed current: spasm management  Planned therapy interventions: flexibility, functional ROM exercises, graded exercise, home exercise program, joint mobilization, manual therapy, neuromuscular re-education, patient education, strengthening, stretching, therapeutic exercise, therapeutic activities, Vides taping, balance/weight bearing training, gait training, abdominal trunk stabilization, activity modification, balance, body mechanics training, graded activity, self care, postural training, IADL retraining, ADL training, breathing training, behavior modification, muscle pump exercises, therapeutic training and transfer training  Frequency: 2x week  Duration in weeks: 6  Treatment plan discussed with: patient         Subjective Evaluation     History of Present Illness  Date of surgery: 23  Mechanism of injury: Pt presents today after lumbar laminectomy on 23. He repots he fell last week and is still feeling general soreness around his upper back and anterior L hip. He notes that he has felt better, since the fall, but feels he is walking more slowly. He invested in Entassoing poles to improve stability for prolonged walks, but his long term cervical pain flares up the longer he walks. Pain  Current pain ratin  At best pain ratin  At worst pain ratin  Location: Right hip/back  Quality: dull ache  Relieving factors: medications  Aggravating factors: walking, standing, stair climbing and lifting  Progression: improved     Social Support  Steps to enter house: yes  8  Stairs in house: yes   15  Lives with: spouse     Employment status: not working (Retired)  Hand dominance: right  Exercise history: Walking and yard work       Diagnostic Tests  X-ray: normal  Treatments  No previous or current treatments  Patient Goals  Patient goals for therapy: increased motion              Objective      Neurological Testing      Sensation      Hip   Left Hip   Hyposensation: light touch     Right Hip   Hyposensation: light touch     Comments   Left light touch: Reports symmetrical hyposensation below each knee and into both feet; intact above the knee.       Active Range of Motion   Left Hip   Flexion: 108 degrees   Abduction: 36 degrees   External rotation (90/90): 38 degrees      Right Hip   Flexion: 107   Abduction: 38 degrees   External rotation (90/90): 41 degrees      Strength/Myotome Testing     Additional Strength Details  MMT:     Hip Flexion: 4+, 4-  Hip ER: 4+, 4-  Hip Abd: 4, 4-  Hip Ext: 4+, 4-     Ambulation      Comments   Ambulates with wide-based gait with SPC. Decreased stance time on R LE when ambulating and displays LOB episodes when changing directions. Ambulating with trekking poles for prolonged distances, improved dynamic stability noted with trekking poles, but increased cervical/thoracic lumbar pain. Timed-Up-and-Go Score: 10.5 no SPC  6 min walk: 1050 ft w/ SPC (Will re-test next visit due to time constraints)  5x STS: 13.1       Precautions:   Past Medical History:   Diagnosis Date    Anxiety 2010    Arthritis 1990    Contreras's esophagus     Cancer ([facility]) 2018    Stage 1 prostrate cancer; under active surveillance. Depression     GERD (gastroesophageal reflux disease) 2005    Head injury     Hypertension     Osteoarthritis 1990    Prostate cancer ([facility])     Psychiatric disorder     Sleep apnea     CPAP at [facility]    Spinal arthritis            Manuals                                                                 Neuro Re-Ed             Biodex LOS 3x lvl 9, Mini Squat 3x10 lvl 10                                                                                          Ther Ex             Assessment & Management POC with re-evaluation  of gait mechanics and trekking pole walking vs. SPC.                                                                                                        Ther Activity                                       Gait Training                                       Modalities

## 2023-11-16 ENCOUNTER — OFFICE VISIT (OUTPATIENT)
Dept: PHYSICAL THERAPY | Facility: CLINIC | Age: 68
End: 2023-11-16
Payer: MEDICARE

## 2023-11-16 DIAGNOSIS — Z98.890 HISTORY OF LUMBAR LAMINECTOMY: Primary | ICD-10-CM

## 2023-11-16 DIAGNOSIS — M54.50 LUMBAR PAIN: ICD-10-CM

## 2023-11-16 DIAGNOSIS — M54.16 LUMBAR RADICULOPATHY: ICD-10-CM

## 2023-11-16 DIAGNOSIS — R26.89 BALANCE PROBLEM: ICD-10-CM

## 2023-11-16 PROCEDURE — 97112 NEUROMUSCULAR REEDUCATION: CPT | Performed by: PHYSICAL THERAPIST

## 2023-11-16 NOTE — PROGRESS NOTES
Daily Note     Today's date: 2023  Patient name: Aamir Boykin  : 1955  MRN: [id number]  Referring provider: Margaret Benedict MD  Dx:   Encounter Diagnosis     ICD-10-CM    1. History of lumbar laminectomy  Z98.890       2. Lumbar pain  M54.50       3. Lumbar radiculopathy  M54.16       4. Balance problem  R26.89             Start Time: 945  Stop Time: 1015  Total time in clinic (min): 30 minutes  Client seen concurrently with another client nearby from [id number]    Subjective: Patient reports he had a fall last Friday after going for a 4.5 mile hike and he turned to look behind him and lost his balance. He reports having some L sided neck and B pain along shoulder blades. He denies the pain being bad enough to seek higher medical care at this time. Objective: See treatment diary below      Assessment: Tolerated treatment well. Session modified due to reports of neck/shoulder pain from recent fall. No change in ROM, strength or status, consistent with previous screen. Neck pain precipitated with movement. He was able to complete exercises without increase in symptoms. Continued to recommend client follow up with urgent care for possible imaging due to fall on concrete and continued pain. He demonstrated fatigue post treatment, exhibited good technique with therapeutic exercises, and would benefit from continued PT      Plan: Continue per plan of care. Progress treatment as tolerated. Progress challenge as appropriate with irritability.      Manuals 11/16 11/14  11/7 10/24 10/19 10/16 10/10     Re-Eval                                      Neuro Re-Ed          Hip Flexion w/ ER     3x10 3x10 3x10   TA SLR 3x10  3x10 3x10 3x10 3x10 3x10   TA Bridge 3x10 BLE  2x10, 1x10 w/ LE ext 2x10  1x10 w/LE ext 3x10 w/ LE ext 3x10 3x10   TA Clams          Side step on airex beam          Biodex   Biodex Maze control 2x L10, Squats L 8 3x10, LOS Lvl 10 2x RC 2 mins L10    Squats L8 1x10 Maze Lvl 9 2x, 3x10 squats Lvl 8, Random Control 2min  LOS 2x lvl 10, maze 1x lvl 10   STS 2x10  2x10 2x10 2x10     STS w/ DB    2x10 w/10# 3x10 w/ 10# 15x no weight 3x10 10#   Standing hip Abd    2x10 B 2x10 2x10 2x10   Bent knee fallouts w/TA Blue 2x10 BLE   Blue 2x10 B      Standing Hip Flexion & Abd    Hip flex RTB 3x10 B RTB 3x10  YTB 2x10   Ther Ex          L/S Gentle Rotation          Seated Hamstring Str          Hip abd 2x10 BLE         Hip ext 2x10 BLE         Assessment & Management  Reviewed pain in neck and shoulders s/p fall a couple days ago. (-) suspicion of radicular symptoms. Recommended imaging 2/2 client fall history and continued pain  POC with possible follow up with urgent care after fall. POC and continued progresion with dynamic balance activities.    Seated Postural Correction          Supine LE Ext          Standing March     2x10 RTB     Seated March w/ SLR     2x10 2x10    Squats   5x stopped due to pain 2x10 2x10 mini squat 2x10 mini squat    Ther Activity                              Gait Training                              Modalities

## 2023-11-17 DIAGNOSIS — I10 ESSENTIAL HYPERTENSION: Primary | ICD-10-CM

## 2023-11-17 RX ORDER — AMLODIPINE BESYLATE 5 MG/1
5 TABLET ORAL DAILY
Qty: 30 TABLET | Refills: 5 | Status: SHIPPED | OUTPATIENT
Start: 2023-11-17

## 2023-11-21 ENCOUNTER — APPOINTMENT (OUTPATIENT)
Dept: PHYSICAL THERAPY | Facility: CLINIC | Age: 68
End: 2023-11-21
Payer: MEDICARE

## 2023-11-24 ENCOUNTER — APPOINTMENT (OUTPATIENT)
Dept: PHYSICAL THERAPY | Facility: CLINIC | Age: 68
End: 2023-11-24
Payer: MEDICARE

## 2023-11-28 ENCOUNTER — APPOINTMENT (OUTPATIENT)
Dept: PHYSICAL THERAPY | Facility: CLINIC | Age: 68
End: 2023-11-28
Payer: MEDICARE

## 2023-11-29 DIAGNOSIS — R53.83 FATIGUE, UNSPECIFIED TYPE: ICD-10-CM

## 2023-11-29 DIAGNOSIS — M54.41 CHRONIC BILATERAL LOW BACK PAIN WITH BILATERAL SCIATICA: ICD-10-CM

## 2023-11-29 DIAGNOSIS — M54.42 CHRONIC BILATERAL LOW BACK PAIN WITH BILATERAL SCIATICA: ICD-10-CM

## 2023-11-29 DIAGNOSIS — G47.30 SLEEP APNEA, UNSPECIFIED TYPE: ICD-10-CM

## 2023-11-29 DIAGNOSIS — G89.29 CHRONIC BILATERAL LOW BACK PAIN WITH BILATERAL SCIATICA: ICD-10-CM

## 2023-11-29 DIAGNOSIS — G89.4 CHRONIC PAIN SYNDROME: ICD-10-CM

## 2023-11-29 RX ORDER — PREDNISONE 5 MG/1
5 TABLET ORAL DAILY
Qty: 30 TABLET | Refills: 6 | Status: SHIPPED | OUTPATIENT
Start: 2023-11-29 | End: 2024-06-25

## 2023-11-29 RX ORDER — HYDROCODONE BITARTRATE AND ACETAMINOPHEN 10; 325 MG/1; MG/1
1 TABLET ORAL EVERY 4 HOURS PRN
Qty: 180 TABLET | Refills: 0 | Status: SHIPPED | OUTPATIENT
Start: 2023-11-29

## 2023-11-29 RX ORDER — ARMODAFINIL 250 MG/1
250 TABLET ORAL DAILY
Qty: 30 TABLET | Refills: 0 | Status: SHIPPED | OUTPATIENT
Start: 2023-11-29

## 2023-11-29 RX ORDER — GABAPENTIN 100 MG/1
100 CAPSULE ORAL 3 TIMES DAILY
Qty: 90 CAPSULE | Refills: 1 | Status: SHIPPED | OUTPATIENT
Start: 2023-11-29

## 2023-11-29 RX ORDER — OXYCODONE HCL 10 MG/1
10 TABLET, FILM COATED, EXTENDED RELEASE ORAL 2 TIMES DAILY
Qty: 60 TABLET | Refills: 0 | Status: SHIPPED | OUTPATIENT
Start: 2023-11-29

## 2023-11-29 NOTE — TELEPHONE ENCOUNTER
Requested medication(s) are due for refill today: Yes  Patient has already received a courtesy refill: No  Other reason request has been forwarded to provider:  This refill cannot be delegated - GABAPENTIN WAS PREVIOUSLY FILLED BY A HISTORICAL PROVIDER

## 2023-11-30 ENCOUNTER — APPOINTMENT (OUTPATIENT)
Dept: PHYSICAL THERAPY | Facility: CLINIC | Age: 68
End: 2023-11-30
Payer: MEDICARE

## 2023-12-04 DIAGNOSIS — I10 ESSENTIAL HYPERTENSION: ICD-10-CM

## 2023-12-04 RX ORDER — AMLODIPINE BESYLATE 5 MG/1
5 TABLET ORAL DAILY
Qty: 30 TABLET | Refills: 5 | Status: SHIPPED | OUTPATIENT
Start: 2023-12-04

## 2023-12-11 ENCOUNTER — TELEPHONE (OUTPATIENT)
Age: 68
End: 2023-12-11

## 2023-12-11 NOTE — TELEPHONE ENCOUNTER
Patient's wife called to get a refill of medication used to treat shingles. She did not know the name of it and only stated that it was an antiviral and I was unable to find any in his past or current medications.   He is currently having a severe outbreak of shingles but is not amenable to coming in to be seen   Please call patient directly to advise or with further questions

## 2023-12-11 NOTE — TELEPHONE ENCOUNTER
Spoke to Vincent and stated that he would need an appt with one of our providers. He totally understood. Stated that his cancer doctor is the one who saw the shingles and prescribed medication. I advised for him to contact that provider to see if they will send in a prescription. Offered an appt with Dr. Taisha Vidal tomorrow 12/12. But he did not want one. .  No further action required

## 2023-12-14 ENCOUNTER — OFFICE VISIT (OUTPATIENT)
Dept: FAMILY MEDICINE CLINIC | Facility: CLINIC | Age: 68
End: 2023-12-14
Payer: MEDICARE

## 2023-12-14 VITALS
WEIGHT: 170 LBS | TEMPERATURE: 96.7 F | BODY MASS INDEX: 27.44 KG/M2 | SYSTOLIC BLOOD PRESSURE: 122 MMHG | OXYGEN SATURATION: 97 % | RESPIRATION RATE: 16 BRPM | HEART RATE: 84 BPM | DIASTOLIC BLOOD PRESSURE: 80 MMHG

## 2023-12-14 DIAGNOSIS — B02.29 POSTHERPETIC NEURALGIA: Primary | ICD-10-CM

## 2023-12-14 PROCEDURE — 99213 OFFICE O/P EST LOW 20 MIN: CPT | Performed by: STUDENT IN AN ORGANIZED HEALTH CARE EDUCATION/TRAINING PROGRAM

## 2023-12-14 RX ORDER — PREDNISONE 10 MG/1
TABLET ORAL DAILY
Qty: 27 TABLET | Refills: 0 | Status: SHIPPED | OUTPATIENT
Start: 2023-12-14 | End: 2023-12-24

## 2023-12-14 NOTE — PROGRESS NOTES
Name: Scottie Ibarra      : 1955      MRN: [id number]  Encounter Provider: Michael Hammer MD  Encounter Date: 2023   Encounter department: [facility]     1. Postherpetic neuralgia  -     predniSONE 10 mg tablet; Take 4 tablets (40 mg total) by mouth daily for 3 days, THEN 3 tablets (30 mg total) daily for 3 days, THEN 2 tablets (20 mg total) daily for 2 days, THEN 1 tablet (10 mg total) daily for 2 days. Subjective      HPI    Patient had the shingles virus in Nov and notes he is having some persistent facial nerve pain on the right side of his face. Rash is healed. He notes he is having some jaw pain associated with the nerve pain. He takes gabapentin daily. Review of Systems   Constitutional:  Positive for activity change. Negative for appetite change, chills, fatigue and fever. HENT:  Positive for ear pain. Negative for congestion, postnasal drip and rhinorrhea. Respiratory:  Negative for cough, shortness of breath and wheezing. Cardiovascular:  Negative for chest pain, palpitations and leg swelling. Gastrointestinal:  Negative for abdominal pain, constipation, diarrhea, nausea and vomiting. Neurological:  Negative for weakness, light-headedness and headaches.        Current Outpatient Medications on File Prior to Visit   Medication Sig   • abiraterone (ZYTIGA) 250 mg tablet Take by mouth   • amLODIPine (NORVASC) 5 mg tablet Take 1 tablet (5 mg total) by mouth daily (Patient taking differently: Take 10 mg by mouth daily)   • Armodafinil 250 MG tablet Take 1 tablet (250 mg total) by mouth daily   • buPROPion (WELLBUTRIN XL) 300 mg 24 hr tablet Take 1 tablet (300 mg total) by mouth every morning   • Diclofenac Sodium (VOLTAREN) 1 % Apply 2 g topically 4 (four) times a day   • gabapentin (NEURONTIN) 100 mg capsule Take 1 capsule (100 mg total) by mouth 3 (three) times a day   • HYDROcodone-acetaminophen (NORCO)  mg per tablet Take 1 tablet by mouth every 4 (four) hours as needed (PAIN) Max Daily Amount: 6 tablets   • meloxicam (MOBIC) 15 mg tablet Take 1 tablet (15 mg total) by mouth daily   • multivitamin (THERAGRAN) TABS Take 1 tablet by mouth daily   • naloxone (NARCAN) 4 mg/0.1 mL nasal spray Administer 1 spray into a nostril. If breathing does not return to normal or if breathing difficulty resumes after 2-3 minutes, give another dose in the other nostril using a new spray. • olmesartan-hydrochlorothiazide (BENICAR HCT) 40-12.5 MG per tablet Take 1 tablet by mouth daily   • omeprazole (PriLOSEC) 40 MG capsule Take 1 capsule (40 mg total) by mouth every 12 (twelve) hours   • oxyCODONE (OxyCONTIN) 10 mg 12 hr tablet Take 1 tablet (10 mg total) by mouth 2 (two) times a day Max Daily Amount: 20 mg   • predniSONE 5 mg tablet Take 1 tablet (5 mg total) by mouth daily   • sildenafil (Viagra) 100 mg tablet Take by mouth   • Suvorexant 10 MG TABS Take 1 tablet (10 mg total) by mouth daily at bedtime as needed (insomnia)   • tadalafil (CIALIS) 5 MG tablet Take 1 tablet (5 mg total) by mouth daily   • tamsulosin (FLOMAX) 0.4 mg Take 0.4 mg by mouth daily   • vilazodone (Viibryd) 40 mg tablet Take 1 tablet (40 mg total) by mouth daily with breakfast       Objective     /80 (BP Location: Left arm, Patient Position: Sitting, Cuff Size: Extra-Large)   Pulse 84   Temp (!) 96.7 °F (35.9 °C) (Temporal)   Resp 16   Wt 77.1 kg (170 lb)   SpO2 97%   BMI 27.44 kg/m²     Physical Exam  Constitutional:       Appearance: Normal appearance. HENT:      Head: Normocephalic and atraumatic. Cardiovascular:      Rate and Rhythm: Normal rate and regular rhythm. Pulmonary:      Effort: Pulmonary effort is normal.      Breath sounds: Normal breath sounds. Neurological:      General: No focal deficit present. Mental Status: He is alert and oriented to person, place, and time.    Psychiatric:         Mood and Affect: Mood normal. Behavior: Behavior normal.         Thought Content:  Thought content normal.         Judgment: Judgment normal.       Yosef Mckeon MD

## 2023-12-19 ENCOUNTER — TELEPHONE (OUTPATIENT)
Age: 68
End: 2023-12-19

## 2023-12-19 NOTE — TELEPHONE ENCOUNTER
Dr Starkey from [facility] office called with an FYI.     The pt is on Abiraterone for his prostate cancer and that can cause hypertension. His last BPs that he takes at home (their office sent a cuff home with him that transmits to their office) the last 3 days have been 147/102 144/97 155/103.     Also the pt has been having falls recently. Pt told their office that he had fallen at least twice in the last week. Once he said he was leaning forward and just feel onto his face.     They just want us to be aware.

## 2023-12-21 DIAGNOSIS — E78.00 HYPERCHOLESTEROLEMIA: ICD-10-CM

## 2023-12-21 DIAGNOSIS — I10 ESSENTIAL HYPERTENSION: Primary | ICD-10-CM

## 2023-12-21 DIAGNOSIS — E83.110 HEREDITARY HEMOCHROMATOSIS (HCC): ICD-10-CM

## 2023-12-21 DIAGNOSIS — Z00.00 MEDICARE ANNUAL WELLNESS VISIT, SUBSEQUENT: ICD-10-CM

## 2023-12-21 DIAGNOSIS — C61 PROSTATE CANCER (HCC): ICD-10-CM

## 2023-12-21 DIAGNOSIS — Z12.5 PROSTATE CANCER SCREENING: ICD-10-CM

## 2023-12-21 DIAGNOSIS — K21.9 GASTROESOPHAGEAL REFLUX DISEASE WITHOUT ESOPHAGITIS: ICD-10-CM

## 2023-12-22 DIAGNOSIS — I10 ESSENTIAL HYPERTENSION: ICD-10-CM

## 2023-12-22 RX ORDER — AMLODIPINE BESYLATE 10 MG/1
10 TABLET ORAL DAILY
Qty: 90 TABLET | Refills: 1 | Status: SHIPPED | OUTPATIENT
Start: 2023-12-22

## 2023-12-28 DIAGNOSIS — G47.30 SLEEP APNEA, UNSPECIFIED TYPE: ICD-10-CM

## 2023-12-28 DIAGNOSIS — R53.83 FATIGUE, UNSPECIFIED TYPE: ICD-10-CM

## 2023-12-28 DIAGNOSIS — G89.29 CHRONIC BILATERAL LOW BACK PAIN WITH BILATERAL SCIATICA: ICD-10-CM

## 2023-12-28 DIAGNOSIS — M54.42 CHRONIC BILATERAL LOW BACK PAIN WITH BILATERAL SCIATICA: ICD-10-CM

## 2023-12-28 DIAGNOSIS — G47.9 SLEEP DISTURBANCE: ICD-10-CM

## 2023-12-28 DIAGNOSIS — G89.4 CHRONIC PAIN SYNDROME: ICD-10-CM

## 2023-12-28 DIAGNOSIS — M54.41 CHRONIC BILATERAL LOW BACK PAIN WITH BILATERAL SCIATICA: ICD-10-CM

## 2023-12-28 RX ORDER — OXYCODONE HCL 10 MG/1
10 TABLET, FILM COATED, EXTENDED RELEASE ORAL 2 TIMES DAILY
Qty: 60 TABLET | Refills: 0 | Status: SHIPPED | OUTPATIENT
Start: 2023-12-28

## 2023-12-28 RX ORDER — HYDROCODONE BITARTRATE AND ACETAMINOPHEN 10; 325 MG/1; MG/1
1 TABLET ORAL EVERY 4 HOURS PRN
Qty: 180 TABLET | Refills: 0 | Status: SHIPPED | OUTPATIENT
Start: 2023-12-28

## 2023-12-28 RX ORDER — ARMODAFINIL 250 MG/1
250 TABLET ORAL DAILY
Qty: 30 TABLET | Refills: 0 | Status: SHIPPED | OUTPATIENT
Start: 2023-12-28

## 2023-12-28 RX ORDER — GABAPENTIN 100 MG/1
100 CAPSULE ORAL 3 TIMES DAILY
Qty: 90 CAPSULE | Refills: 0 | Status: SHIPPED | OUTPATIENT
Start: 2023-12-28

## 2024-01-08 ENCOUNTER — TELEPHONE (OUTPATIENT)
Dept: LAB | Facility: HOSPITAL | Age: 69
End: 2024-01-08

## 2024-01-08 ENCOUNTER — EVALUATION (OUTPATIENT)
Dept: PHYSICAL THERAPY | Facility: CLINIC | Age: 69
End: 2024-01-08
Payer: MEDICARE

## 2024-01-08 DIAGNOSIS — M25.551 RIGHT HIP PAIN: ICD-10-CM

## 2024-01-08 DIAGNOSIS — M25.552 LEFT HIP PAIN: ICD-10-CM

## 2024-01-08 DIAGNOSIS — R26.89 BALANCE PROBLEM: Primary | ICD-10-CM

## 2024-01-08 PROCEDURE — 97110 THERAPEUTIC EXERCISES: CPT | Performed by: PHYSICAL THERAPIST

## 2024-01-08 NOTE — PROGRESS NOTES
PT Re-Evaluation     Today's date: 2024  Patient name: Boni Forte  : 1955  MRN: [id number]  Referring provider: Alessandro Roldan MD  Dx:   Encounter Diagnosis     ICD-10-CM    1. Balance problem  R26.89       2. Left hip pain  M25.552       3. Right hip pain  M25.551                      Assessment details: Boni Forte is a 68 y.o. male with history of lumbar laminectomy on 2023 with the associated impairments including: ROM restriction, balance deficits, strength deficits, and activity limitation. He demonstrates objective limitations with dynamic and static balance evident with Romberg and 6 min walk performance.  Additionally, he demonstrates loss of strength with global LE likely due to recent shingles virus.  Additionally, he has been falling more while undergoing ADT treatments and history of spinal degeneration/impingement.  He demonstrates risk of falls based on performance with balance tests at this time.  Due to these impairments, patient has difficulty performing the following: ADLs stair navigation, prolonged walking, sit-to-stand transfers, walking on uneven terrain, getting in/out of the car, getting in/out of the bath, shopping, driving, lifting, carrying, and sleeping. . Patient would likely benefit from skilled physical therapy services to address the above functional limitations through a targeted program consisting of repeated ROM/flexibility exercises, graded core strengthening and functional activity strengthening, static and dynamic balance training, and graded increase in functional activity training in order to progress towards prior level of function and independence with home exercise program.  Impairments: abnormal gait, abnormal muscle firing, abnormal or restricted ROM, activity intolerance, impaired physical strength, lacks appropriate home exercise program, pain with function and poor body mechanics  Understanding of Dx/Px/POC: good   Prognosis: good    Goals  Short Term Goals (3 weeks)  1. Patient will be independent with initial HEP emphasizing aerobic conditioning  2. Patient will demonstrate ability to put on socks/shoes without support.  3. Patient will demonstrate an increase in right hip strength of 1/2 grade on MMT to assist with don/doffing shoes    Long Term Goals (6 weeks)  1. Patient will demonstrate an increase in bilateral hip strength of 1/2 grade to assist with bed mobility.  2. Patient will demonstrate an increase in right hip strength to 4/5 on MMT.  3. Patient will be able to ascend/descend 15 stairs reciprocally with 1 UE assist safely without imbalance.  4. Patient will be able to ambulate 500 feet on varied terrain with AD without rest breaks.  5. Patient will be able to perform all basic ADLs without modification.  6. Patient will be able to walk for 30 minutes without LOB or need for a rest break at sidewalk in neighborhood.    Plan  Patient would benefit from: skilled physical therapy  Planned modality interventions: cryotherapy, electrical stimulation/Russian stimulation, TENS, ultrasound, thermotherapy: hydrocollator packs, unattended electrical stimulation, high voltage pulsed current: pain management and high voltage pulsed current: spasm management  Planned therapy interventions: flexibility, functional ROM exercises, graded exercise, home exercise program, joint mobilization, manual therapy, neuromuscular re-education, patient education, strengthening, stretching, therapeutic exercise, therapeutic activities, Vides taping, balance/weight bearing training, gait training, abdominal trunk stabilization, activity modification, balance, body mechanics training, graded activity, self care, postural training, IADL retraining, ADL training, breathing training, behavior modification, muscle pump exercises, therapeutic training and transfer training  Frequency: 2x week  Duration in weeks: 6  Treatment plan discussed with: patient          Subjective Evaluation     History of Present Illness  Date of surgery: 23  Mechanism of injury: Pt presents today for balance deficits after recent shingles virus setback and deconditioning associated with cancer treatments.  He reports he has fallen 6-8 times over the last 2 months, none of which occurred in the last 2 weeks.  He reports he feels more off balance and reports his anxiety contributes to falls, he feels his legs start to shake then he falls.  He reports he continues to have regular ADT treatments and uses a wheelchair when in use.    Pain  Current pain ratin  At best pain ratin  At worst pain ratin  Location: Right hip/back  Quality: dull ache  Relieving factors: medications  Aggravating factors: walking, standing, stair climbing and lifting  Progression: improved     Social Support  Steps to enter house: yes  8  Stairs in house: yes   15  Lives with: spouse     Employment status: not working (Retired)  Hand dominance: right  Exercise history: Walking and yard work       Diagnostic Tests  X-ray: normal  Treatments  No previous or current treatments  Patient Goals  Patient goals for therapy: increased motion              Objective      Neurological Testing      Sensation      Hip   Left Hip   Hyposensation: light touch     Right Hip   Hyposensation: light touch     Comments   Left light touch: Reports symmetrical hyposensation below each knee and into both feet; intact above the knee.      Active Range of Motion   Left Hip   Flexion: 110 degrees   Abduction: 36 degrees   External rotation (90/90): 38 degrees      Right Hip   Flexion: 102   Abduction: 38 degrees   External rotation (90/90): 41 degrees      Strength/Myotome Testing     Additional Strength Details  MMT: L, R     Hip Flexion: 4-, 3+  Hip ER: 3+, 4-  Hip Abd: 3-, 3-  Hip Ext: 3+, 4-    Knee Ext: 3+, 4-  Knee Flex: 4, 4+     Ambulation      Comments   Ambulates with wide-based gait with SPC.  Decreased stance time on R LE  when ambulating and displays LOB episodes when changing directions.  R foot slap noted and landing on the lateral border of his L foot.      Timed-Up-and-Go Score: 15.5s SPC (CO.5s)  6 min walk: 1000 ft w/ SPC   5x STS: 17.2s     Precautions:   Past Medical History:   Diagnosis Date    Anxiety     Arthritis     Contreras's esophagus     Cancer ([facility])     Stage 1 prostrate cancer; under active surveillance.    Depression     GERD (gastroesophageal reflux disease)     Head injury     Hypertension     Osteoarthritis     Prostate cancer ([facility])     Psychiatric disorder     Sleep apnea     CPAP at     Spinal arthritis        Insurance:  AMA/CMS Auth #/ Referral # Total     Start date  Expiration date Visit limitation?  PT only or  PT+OT? Co-Insurance   CMS                                                        AUTH #:  Date                 Authed:  Used 19               Remaining                      Manuals                                                                 Neuro Re-Ed                                                                                                        Ther Ex             Assessment & Management POC with emphasis on functional strengthening and dynamic stability training.                                                                                                       Ther Activity                                       Gait Training                                       Modalities

## 2024-01-09 ENCOUNTER — APPOINTMENT (OUTPATIENT)
Dept: LAB | Facility: CLINIC | Age: 69
End: 2024-01-09
Payer: MEDICARE

## 2024-01-09 DIAGNOSIS — E83.110 HEREDITARY HEMOCHROMATOSIS (HCC): ICD-10-CM

## 2024-01-09 DIAGNOSIS — K21.9 GASTROESOPHAGEAL REFLUX DISEASE WITHOUT ESOPHAGITIS: ICD-10-CM

## 2024-01-09 DIAGNOSIS — I10 ESSENTIAL HYPERTENSION: ICD-10-CM

## 2024-01-09 DIAGNOSIS — Z12.5 PROSTATE CANCER SCREENING: ICD-10-CM

## 2024-01-09 DIAGNOSIS — E78.00 HYPERCHOLESTEROLEMIA: ICD-10-CM

## 2024-01-09 DIAGNOSIS — Z00.00 MEDICARE ANNUAL WELLNESS VISIT, SUBSEQUENT: ICD-10-CM

## 2024-01-09 DIAGNOSIS — C61 PROSTATE CANCER (HCC): ICD-10-CM

## 2024-01-09 LAB
ALBUMIN SERPL BCP-MCNC: 4.1 G/DL (ref 3.5–5)
ALP SERPL-CCNC: 57 U/L (ref 34–104)
ALT SERPL W P-5'-P-CCNC: 21 U/L (ref 7–52)
ANION GAP SERPL CALCULATED.3IONS-SCNC: 10 MMOL/L
AST SERPL W P-5'-P-CCNC: 29 U/L (ref 13–39)
BASOPHILS # BLD AUTO: 0.03 THOUSANDS/ÂΜL (ref 0–0.1)
BASOPHILS NFR BLD AUTO: 1 % (ref 0–1)
BILIRUB SERPL-MCNC: 0.55 MG/DL (ref 0.2–1)
BUN SERPL-MCNC: 17 MG/DL (ref 5–25)
CALCIUM SERPL-MCNC: 8.8 MG/DL (ref 8.4–10.2)
CHLORIDE SERPL-SCNC: 99 MMOL/L (ref 96–108)
CHOLEST SERPL-MCNC: 246 MG/DL
CO2 SERPL-SCNC: 25 MMOL/L (ref 21–32)
CREAT SERPL-MCNC: 0.72 MG/DL (ref 0.6–1.3)
EOSINOPHIL # BLD AUTO: 0.11 THOUSAND/ÂΜL (ref 0–0.61)
EOSINOPHIL NFR BLD AUTO: 2 % (ref 0–6)
ERYTHROCYTE [DISTWIDTH] IN BLOOD BY AUTOMATED COUNT: 13.6 % (ref 11.6–15.1)
GFR SERPL CREATININE-BSD FRML MDRD: 95 ML/MIN/1.73SQ M
GLUCOSE SERPL-MCNC: 106 MG/DL (ref 65–140)
HCT VFR BLD AUTO: 33.5 % (ref 36.5–49.3)
HDLC SERPL-MCNC: 127 MG/DL
HGB BLD-MCNC: 11.4 G/DL (ref 12–17)
IMM GRANULOCYTES # BLD AUTO: 0.02 THOUSAND/UL (ref 0–0.2)
IMM GRANULOCYTES NFR BLD AUTO: 0 % (ref 0–2)
LDLC SERPL CALC-MCNC: 95 MG/DL (ref 0–100)
LYMPHOCYTES # BLD AUTO: 0.34 THOUSANDS/ÂΜL (ref 0.6–4.47)
LYMPHOCYTES NFR BLD AUTO: 6 % (ref 14–44)
MCH RBC QN AUTO: 34.7 PG (ref 26.8–34.3)
MCHC RBC AUTO-ENTMCNC: 34 G/DL (ref 31.4–37.4)
MCV RBC AUTO: 102 FL (ref 82–98)
MONOCYTES # BLD AUTO: 0.38 THOUSAND/ÂΜL (ref 0.17–1.22)
MONOCYTES NFR BLD AUTO: 7 % (ref 4–12)
NEUTROPHILS # BLD AUTO: 4.64 THOUSANDS/ÂΜL (ref 1.85–7.62)
NEUTS SEG NFR BLD AUTO: 84 % (ref 43–75)
NONHDLC SERPL-MCNC: 119 MG/DL
NRBC BLD AUTO-RTO: 0 /100 WBCS
PLATELET # BLD AUTO: 259 THOUSANDS/UL (ref 149–390)
PMV BLD AUTO: 8.9 FL (ref 8.9–12.7)
POTASSIUM SERPL-SCNC: 4.6 MMOL/L (ref 3.5–5.3)
PROT SERPL-MCNC: 6 G/DL (ref 6.4–8.4)
PSA SERPL-MCNC: 0.02 NG/ML (ref 0–4)
RBC # BLD AUTO: 3.29 MILLION/UL (ref 3.88–5.62)
SODIUM SERPL-SCNC: 134 MMOL/L (ref 135–147)
TRIGL SERPL-MCNC: 120 MG/DL
WBC # BLD AUTO: 5.52 THOUSAND/UL (ref 4.31–10.16)

## 2024-01-09 PROCEDURE — G0103 PSA SCREENING: HCPCS

## 2024-01-09 PROCEDURE — 80053 COMPREHEN METABOLIC PANEL: CPT

## 2024-01-09 PROCEDURE — 85025 COMPLETE CBC W/AUTO DIFF WBC: CPT

## 2024-01-09 PROCEDURE — 36415 COLL VENOUS BLD VENIPUNCTURE: CPT

## 2024-01-09 PROCEDURE — 80061 LIPID PANEL: CPT

## 2024-01-10 ENCOUNTER — OFFICE VISIT (OUTPATIENT)
Dept: PHYSICAL THERAPY | Facility: CLINIC | Age: 69
End: 2024-01-10
Payer: MEDICARE

## 2024-01-10 DIAGNOSIS — M25.552 LEFT HIP PAIN: ICD-10-CM

## 2024-01-10 DIAGNOSIS — R26.89 BALANCE PROBLEM: Primary | ICD-10-CM

## 2024-01-10 DIAGNOSIS — M25.551 RIGHT HIP PAIN: ICD-10-CM

## 2024-01-10 PROCEDURE — 97110 THERAPEUTIC EXERCISES: CPT | Performed by: PHYSICAL THERAPIST

## 2024-01-10 PROCEDURE — 97112 NEUROMUSCULAR REEDUCATION: CPT | Performed by: PHYSICAL THERAPIST

## 2024-01-10 NOTE — PROGRESS NOTES
Daily Note     Today's date: 1/10/2024  Patient name: Boni Forte  : 1955  MRN: [id number]  Referring provider: Alessandro Roldan MD  Dx:   Encounter Diagnosis     ICD-10-CM    1. Balance problem  R26.89       2. Right hip pain  M25.551                      Subjective: Patient reports he has a little more brain fog today than he did with the last session.      Objective: See treatment diary below      Assessment: Tolerated treatment well. Patient demonstrates need for CgA when performing exercises pushing towards his stability limit, specifically posterior swaying which he quickly loses balance.  Patient demonstrated fatigue post treatment, exhibited good technique with therapeutic exercises, and would benefit from continued PT      Plan: Continue per plan of care.  Progress treatment as tolerated.  Progress challenge as appropriate with irritability.     Precautions:   Past Medical History:   Diagnosis Date    Anxiety     Arthritis     Contreras's esophagus     Cancer (HCC)     Stage 1 prostrate cancer; under active surveillance.    Depression     GERD (gastroesophageal reflux disease)     Head injury     Hypertension     Osteoarthritis     Prostate cancer (HCC)     Psychiatric disorder     Sleep apnea     CPAP at     Spinal arthritis        Insurance:  AMA/CMS Auth #/ Referral # Total     Start date  Expiration date Visit limitation?  PT only or  PT+OT? Co-Insurance   CMS                                                        AUTH #:  Date 1/8 1/10               Authed:  Used 19 20              Remaining                      Manuals 1/8 1/10                                      Neuro Re-Ed        Standing Rows  2x10 BTB      Standing Biceps Curls  2x10 BTB      Standing Shoulder Extension  10x BTB      Biodex  LOS Lvl 10 2x, Weight Shift Lvl 11 2x10 squats, RCT 2x lvl 10      STS  Flat ground 2x10, 2x10 airex                      Ther Ex        Assessment & Management POC with  emphasis on functional strengthening and dynamic stability training.       Hip Abd & March  Airex 2x10 ea                                                      Ther Activity                        Gait Training                        Modalities

## 2024-01-11 ENCOUNTER — TELEPHONE (OUTPATIENT)
Dept: PSYCHIATRY | Facility: CLINIC | Age: 69
End: 2024-01-11

## 2024-01-15 ENCOUNTER — APPOINTMENT (OUTPATIENT)
Dept: PHYSICAL THERAPY | Facility: CLINIC | Age: 69
End: 2024-01-15
Payer: MEDICARE

## 2024-01-15 RX ORDER — DICYCLOMINE HYDROCHLORIDE 10 MG/1
1 CAPSULE ORAL EVERY 6 HOURS
COMMUNITY
Start: 2024-01-12

## 2024-01-16 ENCOUNTER — OFFICE VISIT (OUTPATIENT)
Dept: FAMILY MEDICINE CLINIC | Facility: CLINIC | Age: 69
End: 2024-01-16
Payer: MEDICARE

## 2024-01-16 VITALS
DIASTOLIC BLOOD PRESSURE: 90 MMHG | WEIGHT: 163 LBS | TEMPERATURE: 97.7 F | OXYGEN SATURATION: 98 % | HEIGHT: 67 IN | RESPIRATION RATE: 20 BRPM | BODY MASS INDEX: 25.58 KG/M2 | HEART RATE: 99 BPM | SYSTOLIC BLOOD PRESSURE: 122 MMHG

## 2024-01-16 DIAGNOSIS — N18.30 STAGE 3 CHRONIC KIDNEY DISEASE, UNSPECIFIED WHETHER STAGE 3A OR 3B CKD (HCC): ICD-10-CM

## 2024-01-16 DIAGNOSIS — F11.20 UNCOMPLICATED OPIOID DEPENDENCE (HCC): ICD-10-CM

## 2024-01-16 DIAGNOSIS — G89.4 CHRONIC PAIN DISORDER: ICD-10-CM

## 2024-01-16 DIAGNOSIS — M54.16 LUMBAR BACK PAIN WITH RADICULOPATHY AFFECTING RIGHT LOWER EXTREMITY: ICD-10-CM

## 2024-01-16 DIAGNOSIS — Z23 ENCOUNTER FOR IMMUNIZATION: ICD-10-CM

## 2024-01-16 DIAGNOSIS — K22.0 ACHALASIA: ICD-10-CM

## 2024-01-16 DIAGNOSIS — E83.110 HEREDITARY HEMOCHROMATOSIS (HCC): ICD-10-CM

## 2024-01-16 DIAGNOSIS — I10 PRIMARY HYPERTENSION: Primary | ICD-10-CM

## 2024-01-16 DIAGNOSIS — F33.0 MILD EPISODE OF RECURRENT MAJOR DEPRESSIVE DISORDER (HCC): ICD-10-CM

## 2024-01-16 DIAGNOSIS — M51.06 INTERVERTEBRAL DISC DISORDER OF LUMBAR REGION WITH MYELOPATHY: ICD-10-CM

## 2024-01-16 DIAGNOSIS — M54.42 CHRONIC BILATERAL LOW BACK PAIN WITH BILATERAL SCIATICA: ICD-10-CM

## 2024-01-16 DIAGNOSIS — G89.29 CHRONIC BILATERAL LOW BACK PAIN WITH BILATERAL SCIATICA: ICD-10-CM

## 2024-01-16 DIAGNOSIS — F33.1 MODERATE EPISODE OF RECURRENT MAJOR DEPRESSIVE DISORDER (HCC): ICD-10-CM

## 2024-01-16 DIAGNOSIS — Z00.00 MEDICARE ANNUAL WELLNESS VISIT, SUBSEQUENT: ICD-10-CM

## 2024-01-16 DIAGNOSIS — E78.00 HYPERCHOLESTEROLEMIA: ICD-10-CM

## 2024-01-16 DIAGNOSIS — M54.41 CHRONIC BILATERAL LOW BACK PAIN WITH BILATERAL SCIATICA: ICD-10-CM

## 2024-01-16 DIAGNOSIS — C61 MALIGNANT NEOPLASM OF PROSTATE (HCC): ICD-10-CM

## 2024-01-16 DIAGNOSIS — K21.9 GASTROESOPHAGEAL REFLUX DISEASE WITHOUT ESOPHAGITIS: ICD-10-CM

## 2024-01-16 DIAGNOSIS — M15.9 GENERALIZED OSTEOARTHRITIS OF MULTIPLE SITES: ICD-10-CM

## 2024-01-16 PROBLEM — G62.9 PERIPHERAL NEUROPATHY: Status: ACTIVE | Noted: 2023-12-18

## 2024-01-16 PROBLEM — F41.9 ANXIETY: Status: ACTIVE | Noted: 2023-12-18

## 2024-01-16 PROBLEM — F32.0 CURRENT MILD EPISODE OF MAJOR DEPRESSIVE DISORDER (HCC): Status: ACTIVE | Noted: 2023-09-12

## 2024-01-16 PROCEDURE — G0009 ADMIN PNEUMOCOCCAL VACCINE: HCPCS

## 2024-01-16 PROCEDURE — 99215 OFFICE O/P EST HI 40 MIN: CPT | Performed by: FAMILY MEDICINE

## 2024-01-16 PROCEDURE — 90677 PCV20 VACCINE IM: CPT

## 2024-01-16 PROCEDURE — G0439 PPPS, SUBSEQ VISIT: HCPCS | Performed by: FAMILY MEDICINE

## 2024-01-16 RX ORDER — SACCHAROMYCES BOULARDII 250 MG
1 CAPSULE ORAL
COMMUNITY

## 2024-01-16 RX ORDER — ABIRATERONE ACETATE 500 MG/1
750 TABLET, FILM COATED ORAL
COMMUNITY
Start: 2023-09-20
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
## 2024-01-16 NOTE — PATIENT INSTRUCTIONS
DISCUSSED HEALTH MAINTENENCE ISSUES  BW WILL BE REVIEWED  ENCOURAGED HEALTHY DIET AND EXERCISE  COLONOSCOPY WILL BE ORDERED  RV FOR ANNUAL HEALTH EXAM IN 1 YEAR  RV SOONER IF THERE ARE ANY CONCERNS    RV 3 M    Recent Results (from the past 672 hour(s))   CBC and differential    Collection Time: 01/09/24 11:15 AM   Result Value Ref Range    WBC 5.52 4.31 - 10.16 Thousand/uL    RBC 3.29 (L) 3.88 - 5.62 Million/uL    Hemoglobin 11.4 (L) 12.0 - 17.0 g/dL    Hematocrit 33.5 (L) 36.5 - 49.3 %     (H) 82 - 98 fL    MCH 34.7 (H) 26.8 - 34.3 pg    MCHC 34.0 31.4 - 37.4 g/dL    RDW 13.6 11.6 - 15.1 %    MPV 8.9 8.9 - 12.7 fL    Platelets 259 149 - 390 Thousands/uL    nRBC 0 /100 WBCs    Neutrophils Relative 84 (H) 43 - 75 %    Immat GRANS % 0 0 - 2 %    Lymphocytes Relative 6 (L) 14 - 44 %    Monocytes Relative 7 4 - 12 %    Eosinophils Relative 2 0 - 6 %    Basophils Relative 1 0 - 1 %    Neutrophils Absolute 4.64 1.85 - 7.62 Thousands/µL    Immature Grans Absolute 0.02 0.00 - 0.20 Thousand/uL    Lymphocytes Absolute 0.34 (L) 0.60 - 4.47 Thousands/µL    Monocytes Absolute 0.38 0.17 - 1.22 Thousand/µL    Eosinophils Absolute 0.11 0.00 - 0.61 Thousand/µL    Basophils Absolute 0.03 0.00 - 0.10 Thousands/µL   Comprehensive metabolic panel    Collection Time: 01/09/24 11:15 AM   Result Value Ref Range    Sodium 134 (L) 135 - 147 mmol/L    Potassium 4.6 3.5 - 5.3 mmol/L    Chloride 99 96 - 108 mmol/L    CO2 25 21 - 32 mmol/L    ANION GAP 10 mmol/L    BUN 17 5 - 25 mg/dL    Creatinine 0.72 0.60 - 1.30 mg/dL    Glucose 106 65 - 140 mg/dL    Calcium 8.8 8.4 - 10.2 mg/dL    AST 29 13 - 39 U/L    ALT 21 7 - 52 U/L    Alkaline Phosphatase 57 34 - 104 U/L    Total Protein 6.0 (L) 6.4 - 8.4 g/dL    Albumin 4.1 3.5 - 5.0 g/dL    Total Bilirubin 0.55 0.20 - 1.00 mg/dL    eGFR 95 ml/min/1.73sq m   Lipid panel    Collection Time: 01/09/24 11:15 AM   Result Value Ref Range    Cholesterol 246 (H) See Comment mg/dL    Triglycerides 120  See Comment mg/dL    HDL, Direct 127 >=40 mg/dL    LDL Calculated 95 0 - 100 mg/dL    Non-HDL-Chol (CHOL-HDL) 119 mg/dl   PSA, Total Screen    Collection Time: 01/09/24 11:15 AM   Result Value Ref Range    PSA 0.02 0.00 - 4.00 ng/mL       Medicare Preventive Visit Patient Instructions  Thank you for completing your Welcome to Medicare Visit or Medicare Annual Wellness Visit today. Your next wellness visit will be due in one year (1/16/2025).  The screening/preventive services that you may require over the next 5-10 years are detailed below. Some tests may not apply to you based off risk factors and/or age. Screening tests ordered at today's visit but not completed yet may show as past due. Also, please note that scanned in results may not display below.  Preventive Screenings:  Service Recommendations Previous Testing/Comments   Colorectal Cancer Screening  Colonoscopy    Fecal Occult Blood Test (FOBT)/Fecal Immunochemical Test (FIT)  Fecal DNA/Cologuard Test  Flexible Sigmoidoscopy Age: 45-75 years old   Colonoscopy: every 10 years (May be performed more frequently if at higher risk)  OR  FOBT/FIT: every 1 year  OR  Cologuard: every 3 years  OR  Sigmoidoscopy: every 5 years  Screening may be recommended earlier than age 45 if at higher risk for colorectal cancer. Also, an individualized decision between you and your healthcare provider will decide whether screening between the ages of 76-85 would be appropriate. Colonoscopy: 02/01/2016  FOBT/FIT: 11/20/2022  Cologuard: Not on file  Sigmoidoscopy: 02/01/2016          Prostate Cancer Screening Individualized decision between patient and health care provider in men between ages of 55-69   Medicare will cover every 12 months beginning on the day after your 50th birthday PSA: 0.02 ng/mL     History Prostate Cancer     Hepatitis C Screening Once for adults born between 1945 and 1965  More frequently in patients at high risk for Hepatitis C Hep C Antibody: Not on  file    Screening Current   Diabetes Screening 1-2 times per year if you're at risk for diabetes or have pre-diabetes Fasting glucose: 87 mg/dL (10/30/2023)  A1C: No results in last 5 years (No results in last 5 years)  Screening Current   Cholesterol Screening Once every 5 years if you don't have a lipid disorder. May order more often based on risk factors. Lipid panel: 01/09/2024  Screening Not Indicated  History Lipid Disorder      Other Preventive Screenings Covered by Medicare:  Abdominal Aortic Aneurysm (AAA) Screening: covered once if your at risk. You're considered to be at risk if you have a family history of AAA or a male between the age of 65-75 who smoking at least 100 cigarettes in your lifetime.  Lung Cancer Screening: covers low dose CT scan once per year if you meet all of the following conditions: (1) Age 55-77; (2) No signs or symptoms of lung cancer; (3) Current smoker or have quit smoking within the last 15 years; (4) You have a tobacco smoking history of at least 20 pack years (packs per day x number of years you smoked); (5) You get a written order from a healthcare provider.  Glaucoma Screening: covered annually if you're considered high risk: (1) You have diabetes OR (2) Family history of glaucoma OR (3)  aged 50 and older OR (4)  American aged 65 and older  Osteoporosis Screening: covered every 2 years if you meet one of the following conditions: (1) Have a vertebral abnormality; (2) On glucocorticoid therapy for more than 3 months; (3) Have primary hyperparathyroidism; (4) On osteoporosis medications and need to assess response to drug therapy.  HIV Screening: covered annually if you're between the age of 15-65. Also covered annually if you are younger than 15 and older than 65 with risk factors for HIV infection. For pregnant patients, it is covered up to 3 times per pregnancy.    Immunizations:  Immunization Recommendations   Influenza Vaccine Annual influenza  vaccination during flu season is recommended for all persons aged >= 6 months who do not have contraindications   Pneumococcal Vaccine   * Pneumococcal conjugate vaccine = PCV13 (Prevnar 13), PCV15 (Vaxneuvance), PCV20 (Prevnar 20)  * Pneumococcal polysaccharide vaccine = PPSV23 (Pneumovax) Adults 19-63 yo with certain risk factors or if 65+ yo  If never received any pneumonia vaccine: recommend Prevnar 20 (PCV20)  Give PCV20 if previously received 1 dose of PCV13 or PPSV23   Hepatitis B Vaccine 3 dose series if at intermediate or high risk (ex: diabetes, end stage renal disease, liver disease)   Respiratory syncytial virus (RSV) Vaccine - COVERED BY MEDICARE PART D  * RSVPreF3 (Arexvy) CDC recommends that adults 60 years of age and older may receive a single dose of RSV vaccine using shared clinical decision-making (SCDM)   Tetanus (Td) Vaccine - COST NOT COVERED BY MEDICARE PART B Following completion of primary series, a booster dose should be given every 10 years to maintain immunity against tetanus. Td may also be given as tetanus wound prophylaxis.   Tdap Vaccine - COST NOT COVERED BY MEDICARE PART B Recommended at least once for all adults. For pregnant patients, recommended with each pregnancy.   Shingles Vaccine (Shingrix) - COST NOT COVERED BY MEDICARE PART B  2 shot series recommended in those 19 years and older who have or will have weakened immune systems or those 50 years and older     Health Maintenance Due:      Topic Date Due   • Colorectal Cancer Screening  11/20/2023   • Hepatitis C Screening  Addressed     Immunizations Due:      Topic Date Due   • Pneumococcal Vaccine: 65+ Years (2 - PCV) 01/23/2021   • COVID-19 Vaccine (5 - 2023-24 season) 09/01/2023     Advance Directives   What are advance directives?  Advance directives are legal documents that state your wishes and plans for medical care. These plans are made ahead of time in case you lose your ability to make decisions for yourself.  Advance directives can apply to any medical decision, such as the treatments you want, and if you want to donate organs.   What are the types of advance directives?  There are many types of advance directives, and each state has rules about how to use them. You may choose a combination of any of the following:  Living will:  This is a written record of the treatment you want. You can also choose which treatments you do not want, which to limit, and which to stop at a certain time. This includes surgery, medicine, IV fluid, and tube feedings.   Durable power of  for healthcare (DPAHC):  This is a written record that states who you want to make healthcare choices for you when you are unable to make them for yourself. This person, called a proxy, is usually a family member or a friend. You may choose more than 1 proxy.  Do not resuscitate (DNR) order:  A DNR order is used in case your heart stops beating or you stop breathing. It is a request not to have certain forms of treatment, such as CPR. A DNR order may be included in other types of advance directives.  Medical directive:  This covers the care that you want if you are in a coma, near death, or unable to make decisions for yourself. You can list the treatments you want for each condition. Treatment may include pain medicine, surgery, blood transfusions, dialysis, IV or tube feedings, and a ventilator (breathing machine).  Values history:  This document has questions about your views, beliefs, and how you feel and think about life. This information can help others choose the care that you would choose.  Why are advance directives important?  An advance directive helps you control your care. Although spoken wishes may be used, it is better to have your wishes written down. Spoken wishes can be misunderstood, or not followed. Treatments may be given even if you do not want them. An advance directive may make it easier for your family to make difficult  choices about your care.   Fall Prevention    Fall prevention  includes ways to make your home and other areas safer. It also includes ways you can move more carefully to prevent a fall. Health conditions that cause changes in your blood pressure, vision, or muscle strength and coordination may increase your risk for falls. Medicines may also increase your risk for falls if they make you dizzy, weak, or sleepy.   Fall prevention tips:   Stand or sit up slowly.    Use assistive devices as directed.    Wear shoes that fit well and have soles that .    Wear a personal alarm.    Stay active.    Manage your medical conditions.    Home Safety Tips:  Add items to prevent falls in the bathroom.    Keep paths clear.    Install bright lights in your home.    Keep items you use often on shelves within reach.    Paint or place reflective tape on the edges of your stairs.    Weight Management   Why it is important to manage your weight:  Being overweight increases your risk of health conditions such as heart disease, high blood pressure, type 2 diabetes, and certain types of cancer. It can also increase your risk for osteoarthritis, sleep apnea, and other respiratory problems. Aim for a slow, steady weight loss. Even a small amount of weight loss can lower your risk of health problems.  How to lose weight safely:  A safe and healthy way to lose weight is to eat fewer calories and get regular exercise. You can lose up about 1 pound a week by decreasing the number of calories you eat by 500 calories each day.   Healthy meal plan for weight management:  A healthy meal plan includes a variety of foods, contains fewer calories, and helps you stay healthy. A healthy meal plan includes the following:  Eat whole-grain foods more often.  A healthy meal plan should contain fiber. Fiber is the part of grains, fruits, and vegetables that is not broken down by your body. Whole-grain foods are healthy and provide extra fiber in your diet.  Some examples of whole-grain foods are whole-wheat breads and pastas, oatmeal, brown rice, and bulgur.  Eat a variety of vegetables every day.  Include dark, leafy greens such as spinach, kale, brandy greens, and mustard greens. Eat yellow and orange vegetables such as carrots, sweet potatoes, and winter squash.   Eat a variety of fruits every day.  Choose fresh or canned fruit (canned in its own juice or light syrup) instead of juice. Fruit juice has very little or no fiber.  Eat low-fat dairy foods.  Drink fat-free (skim) milk or 1% milk. Eat fat-free yogurt and low-fat cottage cheese. Try low-fat cheeses such as mozzarella and other reduced-fat cheeses.  Choose meat and other protein foods that are low in fat.  Choose beans or other legumes such as split peas or lentils. Choose fish, skinless poultry (chicken or turkey), or lean cuts of red meat (beef or pork). Before you cook meat or poultry, cut off any visible fat.   Use less fat and oil.  Try baking foods instead of frying them. Add less fat, such as margarine, sour cream, regular salad dressing and mayonnaise to foods. Eat fewer high-fat foods. Some examples of high-fat foods include french fries, doughnuts, ice cream, and cakes.  Eat fewer sweets.  Limit foods and drinks that are high in sugar. This includes candy, cookies, regular soda, and sweetened drinks.  Exercise:  Exercise at least 30 minutes per day on most days of the week. Some examples of exercise include walking, biking, dancing, and swimming. You can also fit in more physical activity by taking the stairs instead of the elevator or parking farther away from stores. Ask your healthcare provider about the best exercise plan for you.   Narcotic (Opioid) Safety    Use narcotics safely:  Take prescribed narcotics exactly as directed  Do not give narcotics to others or take narcotics that belong to someone else  Do not mix narcotics without medicines or alcohol  Do not drive or operate heavy  machinery after you take the narcotic  Monitor for side effects and notify your healthcare provider if you experienced side effects such as nausea, sleepiness, itching, or trouble thinking clearly.    Manage constipation:    Constipation is the most common side effect of narcotic medicine. Constipation is when you have hard, dry bowel movements, or you go longer than usual between bowel movements. Tell your healthcare provider about all changes in your bowel movements while you are taking narcotics. He or she may recommend laxative medicine to help you have a bowel movement. He or she may also change the kind of narcotic you are taking, or change when you take it. The following are more ways you can prevent or relieve constipation:    Drink liquids as directed.  You may need to drink extra liquids to help soften and move your bowels. Ask how much liquid to drink each day and which liquids are best for you.  Eat high-fiber foods.  This may help decrease constipation by adding bulk to your bowel movements. High-fiber foods include fruits, vegetables, whole-grain breads and cereals, and beans. Your healthcare provider or dietitian can help you create a high-fiber meal plan. Your provider may also recommend a fiber supplement if you cannot get enough fiber from food.  Exercise regularly.  Regular physical activity can help stimulate your intestines. Walking is a good exercise to prevent or relieve constipation. Ask which exercises are best for you.  Schedule a time each day to have a bowel movement.  This may help train your body to have regular bowel movements. Bend forward while you are on the toilet to help move the bowel movement out. Sit on the toilet for at least 10 minutes, even if you do not have a bowel movement.    Store narcotics safely:   Store narcotics where others cannot easily get them.  Keep them in a locked cabinet or secure area. Do not  keep them in a purse or other bag you carry with you. A person  may be looking for something else and find the narcotics.  Make sure narcotics are stored out of the reach of children.  A child can easily overdose on narcotics. Narcotics may look like candy to a small child.    The best way to dispose of narcotics:      The laws vary by country and area. In the [place], the best way is to return the narcotics through a take-back program. This program is offered by the [place] Drug Enforcement Agency (NOAH). The following are options for using the program:  Take the narcotics to a NOAH collection site.  The site is often a law enforcement center. Call your local law enforcement center for scheduled take-back days in your area. You will be given information on where to go if the collection site is in a different location.  Take the narcotics to an approved pharmacy or hospital.  A pharmacy or hospital may be set up as a collection site. You will need to ask if it is a NOAH collection site if you were not directed there. A pharmacy or doctor's office may not be able to take back narcotics unless it is a NOAH site.  Use a mail-back system.  This means you are given containers to put the narcotics into. You will then mail them in the containers.  Use a take-back drop box.  This is a place to leave the narcotics at any time. People and animals will not be able to get into the box. Your local law enforcement agency can tell you where to find a drop box in your area.    Other ways to manage pain:   Ask your healthcare provider about non-narcotic medicines to control pain.  Nonprescription medicines include NSAIDs (such as ibuprofen) and acetaminophen. Prescription medicines include muscle relaxers, antidepressants, and steroids.  Pain may be managed without any medicines.  Some ways to relieve pain include massage, aromatherapy, or meditation. Physical or occupational therapy may also help.    For more information:   Drug Enforcement Administration  [address] [place] [address]  [address]  Phone: 0- 768 - 746-9158  Web Address: https://www.deadiversion.[facility].gov/drug_disposal/    [place] Food and Drug Administration  [address] [place] [address]  Phone: 0- 791 - 501-7807  Web Address: http://www.fda.gov     © Copyright [employer] 2018 Information is for End User's use only and may not be sold, redistributed or otherwise used for commercial purposes. All illustrations and images included in CareNotes® are the copyrighted property of [employer].D.A[employer]M., Inc. or [employer]

## 2024-01-16 NOTE — LETTER
RAMIREZ SNYDER      Current Outpatient Medications:     amLODIPine (NORVASC) 10 mg tablet, Take 1 tablet (10 mg total) by mouth daily, Disp: 90 tablet, Rfl: 1    Armodafinil 250 MG tablet, Take 1 tablet (250 mg total) by mouth daily, Disp: 30 tablet, Rfl: 0    buPROPion (WELLBUTRIN XL) 300 mg 24 hr tablet, Take 1 tablet (300 mg total) by mouth every morning, Disp: 90 tablet, Rfl: 1    Calcium Citrate-Vitamin D 250 mg-2.5 mcg tablet, Take 1 tablet by mouth 2 (two) times a day, Disp: , Rfl:     Diclofenac Sodium (VOLTAREN) 1 %, Apply 2 g topically 4 (four) times a day, Disp: 150 g, Rfl: 1    dicyclomine (BENTYL) 10 mg capsule, Take 1 capsule by mouth every 6 (six) hours, Disp: , Rfl:     gabapentin (NEURONTIN) 100 mg capsule, Take 1 capsule (100 mg total) by mouth 3 (three) times a day, Disp: 90 capsule, Rfl: 0    HYDROcodone-acetaminophen (NORCO)  mg per tablet, Take 1 tablet by mouth every 4 (four) hours as needed (PAIN) Max Daily Amount: 6 tablets, Disp: 180 tablet, Rfl: 0    meloxicam (MOBIC) 15 mg tablet, Take 1 tablet (15 mg total) by mouth daily, Disp: 90 tablet, Rfl: 0    multivitamin (THERAGRAN) TABS, Take 1 tablet by mouth daily, Disp: , Rfl:     naloxone (NARCAN) 4 mg/0.1 mL nasal spray, Administer 1 spray into a nostril. If breathing does not return to normal or if breathing difficulty resumes after 2-3 minutes, give another dose in the other nostril using a new spray., Disp: 1 each, Rfl: 1    olmesartan-hydrochlorothiazide (BENICAR HCT) 40-12.5 MG per tablet, Take 1 tablet by mouth daily, Disp: 90 tablet, Rfl: 0    omeprazole (PriLOSEC) 40 MG capsule, Take 1 capsule (40 mg total) by mouth every 12 (twelve) hours, Disp: 180 capsule, Rfl: 0    oxyCODONE (OxyCONTIN) 10 mg 12 hr tablet, Take 1 tablet (10 mg total) by mouth 2 (two) times a day Max Daily Amount: 20 mg, Disp: 60 tablet, Rfl: 0    predniSONE 5 mg tablet, Take 1 tablet (5 mg total) by mouth daily, Disp: 30 tablet, Rfl: 6    saccharomyces  boulardii (Florastor) 250 mg capsule, Take 1 capsule by mouth, Disp: , Rfl:     sildenafil (Viagra) 100 mg tablet, Take by mouth, Disp: , Rfl:     Suvorexant 10 MG TABS, Take 1 tablet (10 mg total) by mouth daily at bedtime as needed (insomnia), Disp: 30 tablet, Rfl: 0    tadalafil (CIALIS) 5 MG tablet, Take 1 tablet (5 mg total) by mouth daily, Disp: 10 tablet, Rfl: 0    tamsulosin (FLOMAX) 0.4 mg, Take 0.4 mg by mouth daily, Disp: , Rfl:     vilazodone (Viibryd) 40 mg tablet, Take 1 tablet (40 mg total) by mouth daily with breakfast, Disp: 90 tablet, Rfl: 1    Zytiga 500 MG, 750 mg, Disp: , Rfl:       Recent Results (from the past 672 hour(s))   CBC and differential    Collection Time: 01/09/24 11:15 AM   Result Value Ref Range    WBC 5.52 4.31 - 10.16 Thousand/uL    RBC 3.29 (L) 3.88 - 5.62 Million/uL    Hemoglobin 11.4 (L) 12.0 - 17.0 g/dL    Hematocrit 33.5 (L) 36.5 - 49.3 %     (H) 82 - 98 fL    MCH 34.7 (H) 26.8 - 34.3 pg    MCHC 34.0 31.4 - 37.4 g/dL    RDW 13.6 11.6 - 15.1 %    MPV 8.9 8.9 - 12.7 fL    Platelets 259 149 - 390 Thousands/uL    nRBC 0 /100 WBCs    Neutrophils Relative 84 (H) 43 - 75 %    Immat GRANS % 0 0 - 2 %    Lymphocytes Relative 6 (L) 14 - 44 %    Monocytes Relative 7 4 - 12 %    Eosinophils Relative 2 0 - 6 %    Basophils Relative 1 0 - 1 %    Neutrophils Absolute 4.64 1.85 - 7.62 Thousands/µL    Immature Grans Absolute 0.02 0.00 - 0.20 Thousand/uL    Lymphocytes Absolute 0.34 (L) 0.60 - 4.47 Thousands/µL    Monocytes Absolute 0.38 0.17 - 1.22 Thousand/µL    Eosinophils Absolute 0.11 0.00 - 0.61 Thousand/µL    Basophils Absolute 0.03 0.00 - 0.10 Thousands/µL   Comprehensive metabolic panel    Collection Time: 01/09/24 11:15 AM   Result Value Ref Range    Sodium 134 (L) 135 - 147 mmol/L    Potassium 4.6 3.5 - 5.3 mmol/L    Chloride 99 96 - 108 mmol/L    CO2 25 21 - 32 mmol/L    ANION GAP 10 mmol/L    BUN 17 5 - 25 mg/dL    Creatinine 0.72 0.60 - 1.30 mg/dL    Glucose 106 65 - 140  mg/dL    Calcium 8.8 8.4 - 10.2 mg/dL    AST 29 13 - 39 U/L    ALT 21 7 - 52 U/L    Alkaline Phosphatase 57 34 - 104 U/L    Total Protein 6.0 (L) 6.4 - 8.4 g/dL    Albumin 4.1 3.5 - 5.0 g/dL    Total Bilirubin 0.55 0.20 - 1.00 mg/dL    eGFR 95 ml/min/1.73sq m   Lipid panel    Collection Time: 01/09/24 11:15 AM   Result Value Ref Range    Cholesterol 246 (H) See Comment mg/dL    Triglycerides 120 See Comment mg/dL    HDL, Direct 127 >=40 mg/dL    LDL Calculated 95 0 - 100 mg/dL    Non-HDL-Chol (CHOL-HDL) 119 mg/dl   PSA, Total Screen    Collection Time: 01/09/24 11:15 AM   Result Value Ref Range    PSA 0.02 0.00 - 4.00 ng/mL

## 2024-01-17 ENCOUNTER — TELEPHONE (OUTPATIENT)
Dept: ADMINISTRATIVE | Facility: OTHER | Age: 69
End: 2024-01-17

## 2024-01-17 NOTE — TELEPHONE ENCOUNTER
Upon review of the In Basket request and the patient's chart, initial outreach has been made via fax to facility. Please see Contacts section for details.     Thank you  Alessandro Smatr

## 2024-01-17 NOTE — TELEPHONE ENCOUNTER
----- Message from Ino Damon LPN sent at 1/16/2024  3:05 PM EST -----  Regarding: Care Gap Request - [facility] immunizations  01/16/24 3:05 PM    Hello, our patient Boni Forte has had Covid/RSV Immunization(s) completed/performed. Please assist in updating the patient chart by making an External outreach to [facility] facility located in [address] [place] [address] [phone]. The date of service is 2023.    Thank you,  Ino Damon LPN  [facility]

## 2024-01-17 NOTE — LETTER
Vaccination Request Form: RSV      Date Requested: 24  Patient: Boni Forte  Patient : 1955   Referring Provider: Elier Oh MD       The above patient has informed us that they have had their   most recent RSV administered at your facility. Please   complete this form and attach all corresponding documentation.    Date of Vaccine(s) Given  ______________________________    Lot Number(s) _______________________________________    Manufacture(s) ______________________________________    Dose Amount (s) _____________________________________    Expiration Date(s) ____________________________________    Comments __________________________________________________________  ____________________________________________________________________  ____________________________________________________________________  ____________________________________________________________________    Administering Facility  ________________________________________________    Vaccine Administered By (print name) ___________________________________      Form Completed By (print name) _______________________________________      Signature ___________________________________________________________      These reports are needed for  compliance.    Please fax this completed form and a copy of the Vaccine Document(s) to our office located at [address] [place] [address] as soon as possible to Fax [phone] attention Alessandro: Phone [phone]    We thank you for your assistance in treating our mutual patient.

## 2024-01-17 NOTE — LETTER
Vaccination Request Form: COVID-19 aka SARS-CoV-2 (Moderna or Pfizer or J & J)      Date Requested: 24  Patient: Boni Forte  Patient : 1955   Referring Provider: Elier Oh MD       The above patient has informed us that they have had their   most recent COVID-19 aka SARS-CoV-2 (Moderna or Pfizer or J & J) administered at your facility. Please   complete this form and attach all corresponding documentation.    Date of Vaccine(s) Given  ______________________________    Lot Number(s) _______________________________________    Manufacture(s) ______________________________________    Dose Amount (s) _____________________________________    Expiration Date(s) ____________________________________    Comments __________________________________________________________  ____________________________________________________________________  ____________________________________________________________________  ____________________________________________________________________    Administering Facility  ________________________________________________    Vaccine Administered By (print name) ___________________________________      Form Completed By (print name) _______________________________________      Signature ___________________________________________________________      These reports are needed for  compliance.    Please fax this completed form and a copy of the Vaccine Document(s) to our office located at [address] [place] [address] as soon as possible to Fax [phone] attention Alessandro: Phone [phone]    We thank you for your assistance in treating our mutual patient.

## 2024-01-18 ENCOUNTER — OFFICE VISIT (OUTPATIENT)
Dept: PHYSICAL THERAPY | Facility: CLINIC | Age: 69
End: 2024-01-18
Payer: MEDICARE

## 2024-01-18 DIAGNOSIS — M25.551 RIGHT HIP PAIN: ICD-10-CM

## 2024-01-18 DIAGNOSIS — M25.552 LEFT HIP PAIN: ICD-10-CM

## 2024-01-18 DIAGNOSIS — R26.89 BALANCE PROBLEM: Primary | ICD-10-CM

## 2024-01-18 DIAGNOSIS — F33.1 MODERATE EPISODE OF RECURRENT MAJOR DEPRESSIVE DISORDER (HCC): ICD-10-CM

## 2024-01-18 PROCEDURE — 97112 NEUROMUSCULAR REEDUCATION: CPT | Performed by: PHYSICAL THERAPIST

## 2024-01-18 PROCEDURE — 97110 THERAPEUTIC EXERCISES: CPT | Performed by: PHYSICAL THERAPIST

## 2024-01-18 RX ORDER — VILAZODONE HYDROCHLORIDE 40 MG/1
40 TABLET ORAL
Qty: 90 TABLET | Refills: 1 | Status: SHIPPED | OUTPATIENT
Start: 2024-01-18

## 2024-01-18 NOTE — PROGRESS NOTES
"Daily Note     Today's date: 2024  Patient name: Boni Forte  : 1955  MRN: [id number]  Referring provider: Alessandro Roldan MD  Dx:   Encounter Diagnosis     ICD-10-CM    1. Balance problem  R26.89       2. Right hip pain  M25.551       3. Left hip pain  M25.552           Start Time: 1402  Stop Time: 1449  Total time in clinic (min): 47 minutes    Subjective: John reports having a couple of \"minor\" falls when shoveling the snow over the past couple of days.      Objective: See treatment diary below      Assessment: Tolerated treatment well. Client required min cues for sequencing tasks. He also required CG and incidental min A for steadying balance. He demonstrated fatigue post treatment, exhibited good technique with therapeutic exercises, and would benefit from continued PT      Plan: Continue per plan of care.  Progress treatment as tolerated.  Progress challenge as appropriate with irritability.     Precautions:   Past Medical History:   Diagnosis Date    Anxiety     Arthritis     Contreras's esophagus     Cancer (HCC)     Stage 1 prostrate cancer; under active surveillance.    Depression     GERD (gastroesophageal reflux disease)     Head injury     Hypertension     Osteoarthritis     Prostate cancer (HCC)     Psychiatric disorder     Sleep apnea     CPAP at     Spinal arthritis        Insurance:  AMA/CMS Auth #/ Referral # Total     Start date  Expiration date Visit limitation?  PT only or  PT+OT? Co-Insurance   CMS                                                        AUTH #:  Date 1/8 1/10               Authed:  Used 19 20              Remaining                      Manuals 1/8 1/10 1/18                                     Neuro Re-Ed        Standing Rows  2x10 BTB 2x10 BTB     Standing Biceps Curls  2x10 BTB 2x10 BTB     Standing Shoulder Extension  10x BTB 2x10 BTB (+) VC     Biodex  LOS Lvl 10 2x, Weight Shift Lvl 11 2x10 squats, RCT 2x lvl 10 LOS Lvl 10 2x, Weight " Shift Lvl 11 2x10 squats, RCT 2x lvl 10     STS  Flat ground 2x10, 2x10 airex Flat ground 2x10, 2x10 airex                     Ther Ex        Assessment & Management POC with emphasis on functional strengthening and dynamic stability training.       Hip Abd & March  Airex 2x10 ea 2x10 on airex pad                                                     Ther Activity                        Gait Training                        Modalities

## 2024-01-22 ENCOUNTER — TELEPHONE (OUTPATIENT)
Age: 69
End: 2024-01-22

## 2024-01-22 ENCOUNTER — APPOINTMENT (OUTPATIENT)
Dept: PHYSICAL THERAPY | Facility: CLINIC | Age: 69
End: 2024-01-22
Payer: MEDICARE

## 2024-01-22 DIAGNOSIS — G89.4 CHRONIC PAIN SYNDROME: ICD-10-CM

## 2024-01-22 DIAGNOSIS — M54.41 CHRONIC BILATERAL LOW BACK PAIN WITH BILATERAL SCIATICA: ICD-10-CM

## 2024-01-22 DIAGNOSIS — G89.29 CHRONIC BILATERAL LOW BACK PAIN WITH BILATERAL SCIATICA: ICD-10-CM

## 2024-01-22 DIAGNOSIS — M54.42 CHRONIC BILATERAL LOW BACK PAIN WITH BILATERAL SCIATICA: ICD-10-CM

## 2024-01-22 RX ORDER — GABAPENTIN 300 MG/1
300 CAPSULE ORAL 3 TIMES DAILY
Qty: 90 CAPSULE | Refills: 1 | Status: SHIPPED | OUTPATIENT
Start: 2024-01-22

## 2024-01-22 NOTE — TELEPHONE ENCOUNTER
----- Message from Boni Forte sent at 1/21/2024 11:54 PM EST -----  Regarding: Gabapentin   Contact: [phone]  Camden Oh. During my Medicare wellness check last week you recommended I increase my gabapentin dosage from 10 mg 3x daily to 300 mg 3x daily, and gradually build up to 400 mg. The higher dose seemed to help reduce the neuropathic pain and itching from shingles.     I have run out of the 100 mg. Can you please call in an Rx for 300 mg? [facility] [place].     Thanks in advance  John   119

## 2024-01-23 DIAGNOSIS — M54.42 CHRONIC BILATERAL LOW BACK PAIN WITH BILATERAL SCIATICA: ICD-10-CM

## 2024-01-23 DIAGNOSIS — G89.29 CHRONIC BILATERAL LOW BACK PAIN WITH BILATERAL SCIATICA: ICD-10-CM

## 2024-01-23 DIAGNOSIS — M54.41 CHRONIC BILATERAL LOW BACK PAIN WITH BILATERAL SCIATICA: ICD-10-CM

## 2024-01-24 DIAGNOSIS — I10 ESSENTIAL HYPERTENSION: ICD-10-CM

## 2024-01-24 RX ORDER — OLMESARTAN MEDOXOMIL AND HYDROCHLOROTHIAZIDE 40/12.5 40; 12.5 MG/1; MG/1
1 TABLET ORAL DAILY
Qty: 90 TABLET | Refills: 1 | Status: SHIPPED | OUTPATIENT
Start: 2024-01-24

## 2024-01-24 RX ORDER — MELOXICAM 15 MG/1
15 TABLET ORAL DAILY
Qty: 90 TABLET | Refills: 0 | Status: SHIPPED | OUTPATIENT
Start: 2024-01-24

## 2024-01-25 ENCOUNTER — OFFICE VISIT (OUTPATIENT)
Dept: PHYSICAL THERAPY | Facility: CLINIC | Age: 69
End: 2024-01-25
Payer: MEDICARE

## 2024-01-25 DIAGNOSIS — M25.551 RIGHT HIP PAIN: ICD-10-CM

## 2024-01-25 DIAGNOSIS — R26.89 BALANCE PROBLEM: Primary | ICD-10-CM

## 2024-01-25 DIAGNOSIS — M25.552 LEFT HIP PAIN: ICD-10-CM

## 2024-01-25 PROCEDURE — 97110 THERAPEUTIC EXERCISES: CPT | Performed by: PHYSICAL THERAPIST

## 2024-01-25 PROCEDURE — 97112 NEUROMUSCULAR REEDUCATION: CPT | Performed by: PHYSICAL THERAPIST

## 2024-01-25 NOTE — PROGRESS NOTES
Daily Note     Today's date: 2024  Patient name: Boni Forte  : 1955  MRN: [id number]  Referring provider: Alessandro Roldan MD  Dx:   Encounter Diagnosis     ICD-10-CM    1. Balance problem  R26.89       2. Left hip pain  M25.552       3. Right hip pain  M25.551                      Subjective: Patient reports he has not had a fall in the past week though he continues to feel significantly off-balance.      Objective: See treatment diary below      Assessment: Tolerated treatment well. Patient continues to be challenged by standing balance exercises, particularly standing unilateral marches and requires CG for lateral walking.  Patient demonstrated fatigue post treatment, exhibited good technique with therapeutic exercises, and would benefit from continued PT      Plan: Continue per plan of care.  Progress treatment as tolerated.  Progress challenge as appropriate with irritability.     Precautions:   Past Medical History:   Diagnosis Date    Anxiety     Arthritis     Contreras's esophagus     Cancer (HCC)     Stage 1 prostrate cancer; under active surveillance.    Depression     GERD (gastroesophageal reflux disease)     Head injury     Hypertension     Osteoarthritis     Prostate cancer (HCC)     Psychiatric disorder     Sleep apnea     CPAP at     Spinal arthritis        Insurance:  AMA/CMS Auth #/ Referral # Total     Start date  Expiration date Visit limitation?  PT only or  PT+OT? Co-Insurance   CMS                                                        AUTH #:  Date 1/8 1/10 1/25              Authed:  Used 19 20 21             Remaining                      Manuals 1/8 1/10 1/18 1/25                                    Neuro Re-Ed        Standing Rows  2x10 BTB 2x10 BTB     Standing Biceps Curls  2x10 BTB 2x10 BTB     Standing Shoulder Extension  10x BTB 2x10 BTB (+) VC     Biodex  LOS Lvl 10 2x, Weight Shift Lvl 11 2x10 squats, RCT 2x lvl 10 LOS Lvl 10 2x, Weight Shift  Lvl 11 2x10 squats, RCT 2x lvl 10 LOS lvl 10 2x, weight  shift lvl 10 2x10 squats, RCT 2x lvl 10, skaters lvl 10 2 min    STS  Flat ground 2x10, 2x10 airex Flat ground 2x10, 2x10 airex Flat ground 2x10, 2x10 airex                    Ther Ex        Assessment & Management POC with emphasis on functional strengthening and dynamic stability training.       Hip Abd & March  Airex 2x10 ea 2x10 on airex pad 2x10 on airex pad    Bridge    2x10    SLR    2x10    LE Ext w/ TA    2x10                            Ther Activity                        Gait Training                        Modalities

## 2024-01-29 ENCOUNTER — OFFICE VISIT (OUTPATIENT)
Dept: PHYSICAL THERAPY | Facility: CLINIC | Age: 69
End: 2024-01-29
Payer: MEDICARE

## 2024-01-29 DIAGNOSIS — M25.552 LEFT HIP PAIN: ICD-10-CM

## 2024-01-29 DIAGNOSIS — R26.89 BALANCE PROBLEM: Primary | ICD-10-CM

## 2024-01-29 DIAGNOSIS — M25.551 RIGHT HIP PAIN: ICD-10-CM

## 2024-01-29 PROCEDURE — 97110 THERAPEUTIC EXERCISES: CPT | Performed by: PHYSICAL THERAPIST

## 2024-01-29 PROCEDURE — 97112 NEUROMUSCULAR REEDUCATION: CPT | Performed by: PHYSICAL THERAPIST

## 2024-01-29 NOTE — PROGRESS NOTES
Daily Note     Today's date: 2024  Patient name: Boni Forte  : 1955  MRN: [id number]  Referring provider: Alessandro Roldan MD  Dx:   Encounter Diagnosis     ICD-10-CM    1. Balance problem  R26.89       2. Right hip pain  M25.551       3. Left hip pain  M25.552                      Subjective: Patient reports he feels about the same overall, though continues to have numbness throughout the legs and feet.      Objective: See treatment diary below      Assessment: Tolerated treatment well. Patient continues to be significantly challenged by intensity of dynamic balance movements, requires CG and has difficulty maintaining control near stability limits.  Patient demonstrated fatigue post treatment, exhibited good technique with therapeutic exercises, and would benefit from continued PT      Plan: Continue per plan of care.  Progress treatment as tolerated.  Progress challenge as appropriate with irritability.     Precautions:   Past Medical History:   Diagnosis Date    Anxiety     Arthritis     Contreras's esophagus     Cancer (HCC) 2018    Stage 1 prostrate cancer; under active surveillance.    Depression     GERD (gastroesophageal reflux disease)     Head injury     Hypertension     Osteoarthritis     Prostate cancer (HCC)     Psychiatric disorder     Sleep apnea     CPAP at     Spinal arthritis        Insurance:  AMA/CMS Auth #/ Referral # Total     Start date  Expiration date Visit limitation?  PT only or  PT+OT? Co-Insurance   CMS                                                        AUTH #:  Date 1/8 1/10 1/25 1/29             Authed:  Used 19 20 21 22            Remaining                      Manuals 1/8 1/10 1/18 1/25 1/29                                   Neuro Re-Ed        Standing Rows  2x10 BTB 2x10 BTB     Standing Biceps Curls  2x10 BTB 2x10 BTB     Standing Shoulder Extension  10x BTB 2x10 BTB (+) VC     Biodex  LOS Lvl 10 2x, Weight Shift Lvl 11 2x10 squats, RCT 2x  lvl 10 LOS Lvl 10 2x, Weight Shift Lvl 11 2x10 squats, RCT 2x lvl 10 LOS lvl 10 2x, weight  shift lvl 10 2x10 squats, RCT 2x lvl 10, skaters lvl 10 2 min LOS lvl 9 2x, weight shift lvl 9 3x10 squats, RCT 2x lvl 9, skatersl lvl 9 2 mins   STS  Flat ground 2x10, 2x10 airex Flat ground 2x10, 2x10 airex Flat ground 2x10, 2x10 airex Flat ground 2x10, 2x10 w/ 10#, 10x w/ airex                   Ther Ex        Assessment & Management POC with emphasis on functional strengthening and dynamic stability training.       Hip Abd & March  Airex 2x10 ea 2x10 on airex pad 2x10 on airex pad 2x10 on airex   Bridge    2x10    SLR    2x10    LE Ext w/ TA    2x10    Lateral Walk     On airex [id number]                   Ther Activity                        Gait Training                        Modalities

## 2024-01-30 DIAGNOSIS — G89.4 CHRONIC PAIN SYNDROME: ICD-10-CM

## 2024-01-30 DIAGNOSIS — M54.41 CHRONIC BILATERAL LOW BACK PAIN WITH BILATERAL SCIATICA: ICD-10-CM

## 2024-01-30 DIAGNOSIS — M54.42 CHRONIC BILATERAL LOW BACK PAIN WITH BILATERAL SCIATICA: ICD-10-CM

## 2024-01-30 DIAGNOSIS — R53.83 FATIGUE, UNSPECIFIED TYPE: ICD-10-CM

## 2024-01-30 DIAGNOSIS — G89.29 CHRONIC BILATERAL LOW BACK PAIN WITH BILATERAL SCIATICA: ICD-10-CM

## 2024-01-30 DIAGNOSIS — G47.30 SLEEP APNEA, UNSPECIFIED TYPE: ICD-10-CM

## 2024-01-30 RX ORDER — OXYCODONE HCL 10 MG/1
10 TABLET, FILM COATED, EXTENDED RELEASE ORAL 2 TIMES DAILY
Qty: 60 TABLET | Refills: 0 | Status: SHIPPED | OUTPATIENT
Start: 2024-01-30

## 2024-01-30 RX ORDER — HYDROCODONE BITARTRATE AND ACETAMINOPHEN 10; 325 MG/1; MG/1
1 TABLET ORAL EVERY 4 HOURS PRN
Qty: 180 TABLET | Refills: 0 | Status: SHIPPED | OUTPATIENT
Start: 2024-01-30

## 2024-01-30 RX ORDER — ARMODAFINIL 250 MG/1
250 TABLET ORAL DAILY
Qty: 30 TABLET | Refills: 0 | Status: SHIPPED | OUTPATIENT
Start: 2024-01-30

## 2024-02-01 ENCOUNTER — APPOINTMENT (OUTPATIENT)
Dept: PHYSICAL THERAPY | Facility: CLINIC | Age: 69
End: 2024-02-01
Payer: MEDICARE

## 2024-02-01 ENCOUNTER — TELEMEDICINE (OUTPATIENT)
Dept: PSYCHIATRY | Facility: CLINIC | Age: 69
End: 2024-02-01
Payer: MEDICARE

## 2024-02-01 DIAGNOSIS — F41.1 GAD (GENERALIZED ANXIETY DISORDER): ICD-10-CM

## 2024-02-01 DIAGNOSIS — F33.1 MODERATE EPISODE OF RECURRENT MAJOR DEPRESSIVE DISORDER (HCC): Primary | ICD-10-CM

## 2024-02-01 PROCEDURE — 99214 OFFICE O/P EST MOD 30 MIN: CPT | Performed by: PHYSICIAN ASSISTANT

## 2024-02-01 RX ORDER — BUSPIRONE HYDROCHLORIDE 5 MG/1
5 TABLET ORAL 2 TIMES DAILY
Qty: 60 TABLET | Refills: 1 | Status: SHIPPED | OUTPATIENT
Start: 2024-02-01
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
## 2024-02-05 ENCOUNTER — OFFICE VISIT (OUTPATIENT)
Dept: PHYSICAL THERAPY | Facility: CLINIC | Age: 69
End: 2024-02-05
Payer: MEDICARE

## 2024-02-05 DIAGNOSIS — M25.552 LEFT HIP PAIN: ICD-10-CM

## 2024-02-05 DIAGNOSIS — R26.89 BALANCE PROBLEM: Primary | ICD-10-CM

## 2024-02-05 DIAGNOSIS — M25.551 RIGHT HIP PAIN: ICD-10-CM

## 2024-02-05 PROCEDURE — 97112 NEUROMUSCULAR REEDUCATION: CPT | Performed by: PHYSICAL THERAPIST

## 2024-02-05 PROCEDURE — 97110 THERAPEUTIC EXERCISES: CPT | Performed by: PHYSICAL THERAPIST

## 2024-02-05 NOTE — PROGRESS NOTES
Daily Note     Today's date: 2024  Patient name: Boni Forte  : 1955  MRN: [id number]  Referring provider: Alessandro Roldan MD  Dx:   Encounter Diagnosis     ICD-10-CM    1. Balance problem  R26.89       2. Right hip pain  M25.551       3. Left hip pain  M25.552                      Subjective: Patient reports he feels about the same overall, he feels a significant increase in fatigue today.      Objective: See treatment diary below      Assessment: Tolerated treatment well. Patient presented with significant increase in fatigue, held intensity of program with extra rest breaks between exercises.  Continues to require CG for closed chain movements due to significant stability deficits.  Patient demonstrated fatigue post treatment, exhibited good technique with therapeutic exercises, and would benefit from continued PT      Plan: Continue per plan of care.  Progress treatment as tolerated.  Progress challenge as appropriate with irritability.     Precautions:   Past Medical History:   Diagnosis Date    Anxiety     Arthritis     Contreras's esophagus     Cancer (HCC)     Stage 1 prostrate cancer; under active surveillance.    Depression     GERD (gastroesophageal reflux disease)     Head injury     Hypertension     Osteoarthritis     Prostate cancer (HCC)     Psychiatric disorder     Sleep apnea     CPAP at     Spinal arthritis        Insurance:  AMA/CMS Auth #/ Referral # Total     Start date  Expiration date Visit limitation?  PT only or  PT+OT? Co-Insurance   CMS                                                        AUTH #:  Date 1/8 1/10 1/25 1/29 2/5            Authed:  Used 19 20 21 22 23           Remaining                      Manuals 1/8 1/10 1/18 1/25 2/5                                   Neuro Re-Ed        Standing Rows  2x10 BTB 2x10 BTB     Standing Biceps Curls  2x10 BTB 2x10 BTB     Standing Shoulder Extension  10x BTB 2x10 BTB (+) VC     Biodex  LOS Lvl 10 2x,  "Weight Shift Lvl 11 2x10 squats, RCT 2x lvl 10 LOS Lvl 10 2x, Weight Shift Lvl 11 2x10 squats, RCT 2x lvl 10 LOS lvl 10 2x, weight  shift lvl 10 2x10 squats, RCT 2x lvl 10, skaters lvl 10 2 min LOS lvl 9 3x, weight shift 3 min lvl 9, 3x10 squats lvl 9, RCT lvl 9 1x   STS  Flat ground 2x10, 2x10 airex Flat ground 2x10, 2x10 airex Flat ground 2x10, 2x10 airex Flat ground 2x10                   Ther Ex        Assessment & Management POC with emphasis on functional strengthening and dynamic stability training.       Hip Abd & March  Airex 2x10 ea 2x10 on airex pad 2x10 on airex pad 2x10 flat ground   Bridge    2x10    SLR    2x10    LE Ext w/ TA    2x10    Lateral Walk        Squats on Airex     5x10\"            Ther Activity                        Gait Training                        Modalities                                   "

## 2024-02-07 NOTE — TELEPHONE ENCOUNTER
As a follow-up, a second attempt has been made for outreach via fax to facility. Please see Contacts section for details.    Thank you  Alessandro Smart

## 2024-02-08 ENCOUNTER — APPOINTMENT (OUTPATIENT)
Dept: PHYSICAL THERAPY | Facility: CLINIC | Age: 69
End: 2024-02-08
Payer: MEDICARE

## 2024-02-08 NOTE — TELEPHONE ENCOUNTER
Upon review of the In Basket request we have found as a result of outreach that patient did not have the requested item(s) completed at this location in 2023.     Any additional questions or concerns should be emailed to the Practice Liaisons via the appropriate education email address, please do not reply via In Basket.    Thank you  Alessandro Smart

## 2024-02-12 ENCOUNTER — OFFICE VISIT (OUTPATIENT)
Dept: PHYSICAL THERAPY | Facility: CLINIC | Age: 69
End: 2024-02-12
Payer: MEDICARE

## 2024-02-12 DIAGNOSIS — R26.89 BALANCE PROBLEM: Primary | ICD-10-CM

## 2024-02-12 DIAGNOSIS — M25.551 RIGHT HIP PAIN: ICD-10-CM

## 2024-02-12 DIAGNOSIS — M25.552 LEFT HIP PAIN: ICD-10-CM

## 2024-02-12 PROCEDURE — 97110 THERAPEUTIC EXERCISES: CPT | Performed by: PHYSICAL THERAPIST

## 2024-02-12 PROCEDURE — 97112 NEUROMUSCULAR REEDUCATION: CPT | Performed by: PHYSICAL THERAPIST

## 2024-02-12 NOTE — PROGRESS NOTES
Daily Note     Today's date: 2024  Patient name: Boni Forte  : 1955  MRN: [id number]  Referring provider: Alessandro Roldan MD  Dx:   Encounter Diagnosis     ICD-10-CM    1. Balance problem  R26.89       2. Left hip pain  M25.552       3. Right hip pain  M25.551                      Subjective: Patient reports he feels overwhelming fatigue at times, which will make him fall even while standing still.      Objective: See treatment diary below      Assessment: Tolerated treatment well. Patient demonstrated fatigue post treatment, exhibited good technique with therapeutic exercises, and would benefit from continued PT      Plan: Continue per plan of care.  Progress treatment as tolerated.  Progress challenge as appropriate with irritability.     Precautions:   Past Medical History:   Diagnosis Date    Anxiety     Arthritis     Contreras's esophagus     Cancer (HCC)     Stage 1 prostrate cancer; under active surveillance.    Depression     GERD (gastroesophageal reflux disease)     Head injury     Hypertension     Osteoarthritis     Prostate cancer (HCC)     Psychiatric disorder     Sleep apnea     CPAP at     Spinal arthritis        Insurance:  AMA/CMS Auth #/ Referral # Total     Start date  Expiration date Visit limitation?  PT only or  PT+OT? Co-Insurance   CMS                                                        AUTH #:  Date 1/8 1/10 1/25 1/29 2/5 2/12           Authed:  Used 19 20 21 22 23 24          Remaining                      Manuals 1/8 1/10 1/18 1/25 2/5 2/12                                       Neuro Re-Ed         Standing Rows  2x10 BTB 2x10 BTB      Standing Biceps Curls  2x10 BTB 2x10 BTB      Standing Shoulder Extension  10x BTB 2x10 BTB (+) VC      Biodex  LOS Lvl 10 2x, Weight Shift Lvl 11 2x10 squats, RCT 2x lvl 10 LOS Lvl 10 2x, Weight Shift Lvl 11 2x10 squats, RCT 2x lvl 10 LOS lvl 10 2x, weight  shift lvl 10 2x10 squats, RCT 2x lvl 10, skaters lvl 10 2  "min LOS lvl 9 3x, weight shift 3 min lvl 9, 3x10 squats lvl 9, RCT lvl 9 1x LOS lvl 9 3x, weight shift 3 min lvl 9, 3x10 squats lvl 9, RCT lvl 9 1x   STS  Flat ground 2x10, 2x10 airex Flat ground 2x10, 2x10 airex Flat ground 2x10, 2x10 airex Flat ground 2x10 Flat ground 2x10, 2x10 10#                     Ther Ex         Assessment & Management POC with emphasis on functional strengthening and dynamic stability training.     POC with continued dynamic balance movements.    Hip Abd & March  Airex 2x10 ea 2x10 on airex pad 2x10 on airex pad 2x10 flat ground March only 15x ea   Bridge    2x10     SLR    2x10     LE Ext w/ TA    2x10     Lateral Walk         Squats on Airex     5x10\"              Ther Activity                           Gait Training                           Modalities                                        "

## 2024-02-15 ENCOUNTER — APPOINTMENT (OUTPATIENT)
Dept: PHYSICAL THERAPY | Facility: CLINIC | Age: 69
End: 2024-02-15
Payer: MEDICARE

## 2024-02-19 ENCOUNTER — APPOINTMENT (OUTPATIENT)
Dept: PHYSICAL THERAPY | Facility: CLINIC | Age: 69
End: 2024-02-19
Payer: MEDICARE

## 2024-02-19 DIAGNOSIS — M54.41 CHRONIC BILATERAL LOW BACK PAIN WITH BILATERAL SCIATICA: ICD-10-CM

## 2024-02-19 DIAGNOSIS — G89.29 CHRONIC BILATERAL LOW BACK PAIN WITH BILATERAL SCIATICA: ICD-10-CM

## 2024-02-19 DIAGNOSIS — M54.42 CHRONIC BILATERAL LOW BACK PAIN WITH BILATERAL SCIATICA: ICD-10-CM

## 2024-02-19 DIAGNOSIS — G89.4 CHRONIC PAIN SYNDROME: ICD-10-CM

## 2024-02-19 DIAGNOSIS — Z00.6 ENCOUNTER FOR EXAMINATION FOR NORMAL COMPARISON OR CONTROL IN CLINICAL RESEARCH PROGRAM: ICD-10-CM

## 2024-02-19 RX ORDER — GABAPENTIN 400 MG/1
400 CAPSULE ORAL 3 TIMES DAILY
Qty: 90 CAPSULE | Refills: 3 | Status: SHIPPED | OUTPATIENT
Start: 2024-02-19 | End: 2024-02-21 | Stop reason: SDUPTHER

## 2024-02-21 DIAGNOSIS — G89.4 CHRONIC PAIN SYNDROME: ICD-10-CM

## 2024-02-21 DIAGNOSIS — M54.41 CHRONIC BILATERAL LOW BACK PAIN WITH BILATERAL SCIATICA: ICD-10-CM

## 2024-02-21 DIAGNOSIS — G89.29 CHRONIC BILATERAL LOW BACK PAIN WITH BILATERAL SCIATICA: ICD-10-CM

## 2024-02-21 DIAGNOSIS — M54.42 CHRONIC BILATERAL LOW BACK PAIN WITH BILATERAL SCIATICA: ICD-10-CM

## 2024-02-21 RX ORDER — GABAPENTIN 400 MG/1
400 CAPSULE ORAL 3 TIMES DAILY
Qty: 90 CAPSULE | Refills: 1 | Status: SHIPPED | OUTPATIENT
Start: 2024-02-21

## 2024-02-21 NOTE — TELEPHONE ENCOUNTER
Reason for call:   [x] Refill   [] Prior Auth  [x] Other: This is not a duplicate.  The pharmacy it was sent to does not have it.  [facility] like it sent to [facility].     Office:   [x] PCP/Provider -   [] Specialty/Provider -     Medication: gabapentin (NEURONTIN) 400 mg capsule     Dose/Frequency:   Take 1 capsule (400 mg total) by mouth 3 (three) times a day        Quantity: #90    Pharmacy: [facility] - [place], [place] - [address] [phone]

## 2024-02-22 ENCOUNTER — OFFICE VISIT (OUTPATIENT)
Dept: PHYSICAL THERAPY | Facility: CLINIC | Age: 69
End: 2024-02-22
Payer: MEDICARE

## 2024-02-22 DIAGNOSIS — R26.89 BALANCE PROBLEM: Primary | ICD-10-CM

## 2024-02-22 DIAGNOSIS — M25.552 LEFT HIP PAIN: ICD-10-CM

## 2024-02-22 DIAGNOSIS — M25.551 RIGHT HIP PAIN: ICD-10-CM

## 2024-02-22 PROCEDURE — 97112 NEUROMUSCULAR REEDUCATION: CPT | Performed by: PHYSICAL THERAPIST

## 2024-02-22 PROCEDURE — 97110 THERAPEUTIC EXERCISES: CPT | Performed by: PHYSICAL THERAPIST

## 2024-02-22 NOTE — PROGRESS NOTES
Daily Note     Today's date: 2024  Patient name: Boni Forte  : 1955  MRN: [id number]  Referring provider: Alessandro Roldan MD  Dx:   Encounter Diagnosis     ICD-10-CM    1. Balance problem  R26.89       2. Right hip pain  M25.551       3. Left hip pain  M25.552                      Subjective: Patient reports he had a fall over the weekend after just waking up, but feels no injury.      Objective: See treatment diary below      Assessment: Tolerated treatment well. Patient continues to have difficulty with single limb support movements, particularly with multi-directional movements reaching towards the end of his stability limits.  Patient demonstrated fatigue post treatment, exhibited good technique with therapeutic exercises, and would benefit from continued PT      Plan: Continue per plan of care.  Progress treatment as tolerated.  Progress challenge as appropriate with irritability.     Precautions:   Past Medical History:   Diagnosis Date    Anxiety     Arthritis     Contreras's esophagus     Cancer (HCC)     Stage 1 prostrate cancer; under active surveillance.    Depression     GERD (gastroesophageal reflux disease)     Head injury     Hypertension     Osteoarthritis     Prostate cancer (HCC)     Psychiatric disorder     Sleep apnea     CPAP at     Spinal arthritis        Insurance:  AMA/CMS Auth #/ Referral # Total     Start date  Expiration date Visit limitation?  PT only or  PT+OT? Co-Insurance   CMS                                                        AUTH #:  Date 1/8 1/10 1/25 1/29 2/5 2/12 2/22          Authed:  Used 19 20 21 22 23 24 25         Remaining                      Manuals                                Neuro Re-Ed       Standing Rows       Standing Biceps Curls       Standing Shoulder Extension       Biodex LOS lvl 10 2x, weight  shift lvl 10 2x10 squats, RCT 2x lvl 10, skaters lvl 10 2 min LOS lvl 9 3x, weight shift 3 min lvl 9,  "3x10 squats lvl 9, RCT lvl 9 1x LOS lvl 9 3x, weight shift 3 min lvl 9, 3x10 squats lvl 9, RCT lvl 9 1x LOS lvl 9 3x, weight shift 3 min lvl 9, 3x10 squats lvl 9   STS Flat ground 2x10, 2x10 airex Flat ground 2x10 Flat ground 2x10, 2x10 10# Flat ground 2x10, 2x10 tidal tank   Rotations in Static Stance    BMB 2x10          Ther Ex       Assessment & Management   POC with continued dynamic balance movements.     Hip Abd & March 2x10 on airex pad 2x10 flat ground March only 15x ea 2x10 ea   Bridge 2x10      SLR 2x10      LE Ext w/ TA 2x10      Lateral Walk    [id number]    Squats on Airex  5x10\"             Ther Activity                     Gait Training                     Modalities                                    "

## 2024-02-23 ENCOUNTER — TELEPHONE (OUTPATIENT)
Dept: FAMILY MEDICINE CLINIC | Facility: CLINIC | Age: 69
End: 2024-02-23

## 2024-02-23 NOTE — TELEPHONE ENCOUNTER
Received  a letter from Becky regarding [facility] Oxycontin not being on his formulary list anymore,    Spoke to [facility] and there is no alternative.    Spoke to John and explained that he will need a prior authorization done.  This will generate when he refills his prescription.  The prescription will go to pharmacy and will be rejected and the pharmacy will send us a PA notice.  We will send to the [employer] to work on PA    Patient understood

## 2024-02-26 ENCOUNTER — APPOINTMENT (OUTPATIENT)
Dept: PHYSICAL THERAPY | Facility: CLINIC | Age: 69
End: 2024-02-26
Payer: MEDICARE

## 2024-02-26 DIAGNOSIS — F41.1 GAD (GENERALIZED ANXIETY DISORDER): ICD-10-CM

## 2024-02-26 RX ORDER — BUSPIRONE HYDROCHLORIDE 7.5 MG/1
7.5 TABLET ORAL 2 TIMES DAILY
Qty: 60 TABLET | Refills: 1 | Status: SHIPPED | OUTPATIENT
Start: 2024-02-26 | End: 2024-02-28 | Stop reason: SDUPTHER

## 2024-02-28 ENCOUNTER — APPOINTMENT (OUTPATIENT)
Dept: LAB | Facility: CLINIC | Age: 69
End: 2024-02-28
Payer: MEDICARE

## 2024-02-28 DIAGNOSIS — R53.83 FATIGUE, UNSPECIFIED TYPE: ICD-10-CM

## 2024-02-28 DIAGNOSIS — E83.110 HEREDITARY HEMOCHROMATOSIS (HCC): ICD-10-CM

## 2024-02-28 DIAGNOSIS — E46 PROTEIN-CALORIE MALNUTRITION, UNSPECIFIED SEVERITY (HCC): ICD-10-CM

## 2024-02-28 DIAGNOSIS — Z00.6 ENCOUNTER FOR EXAMINATION FOR NORMAL COMPARISON OR CONTROL IN CLINICAL RESEARCH PROGRAM: ICD-10-CM

## 2024-02-28 DIAGNOSIS — F41.1 GAD (GENERALIZED ANXIETY DISORDER): ICD-10-CM

## 2024-02-28 DIAGNOSIS — Z78.9 VEGETARIAN DIET: ICD-10-CM

## 2024-02-28 LAB
BASOPHILS # BLD AUTO: 0.01 THOUSANDS/ÂΜL (ref 0–0.1)
BASOPHILS NFR BLD AUTO: 0 % (ref 0–1)
EOSINOPHIL # BLD AUTO: 0.08 THOUSAND/ÂΜL (ref 0–0.61)
EOSINOPHIL NFR BLD AUTO: 2 % (ref 0–6)
ERYTHROCYTE [DISTWIDTH] IN BLOOD BY AUTOMATED COUNT: 13.2 % (ref 11.6–15.1)
FERRITIN SERPL-MCNC: 65 NG/ML (ref 24–336)
FOLATE SERPL-MCNC: 11.7 NG/ML
HCT VFR BLD AUTO: 32.3 % (ref 36.5–49.3)
HGB BLD-MCNC: 10.9 G/DL (ref 12–17)
IMM GRANULOCYTES # BLD AUTO: 0.02 THOUSAND/UL (ref 0–0.2)
IMM GRANULOCYTES NFR BLD AUTO: 0 % (ref 0–2)
IRON SATN MFR SERPL: 39 % (ref 15–50)
IRON SERPL-MCNC: 100 UG/DL (ref 50–212)
LYMPHOCYTES # BLD AUTO: 0.2 THOUSANDS/ÂΜL (ref 0.6–4.47)
LYMPHOCYTES NFR BLD AUTO: 4 % (ref 14–44)
MCH RBC QN AUTO: 34.9 PG (ref 26.8–34.3)
MCHC RBC AUTO-ENTMCNC: 33.7 G/DL (ref 31.4–37.4)
MCV RBC AUTO: 104 FL (ref 82–98)
MONOCYTES # BLD AUTO: 0.59 THOUSAND/ÂΜL (ref 0.17–1.22)
MONOCYTES NFR BLD AUTO: 12 % (ref 4–12)
NEUTROPHILS # BLD AUTO: 4.17 THOUSANDS/ÂΜL (ref 1.85–7.62)
NEUTS SEG NFR BLD AUTO: 82 % (ref 43–75)
NRBC BLD AUTO-RTO: 0 /100 WBCS
PLATELET # BLD AUTO: 273 THOUSANDS/UL (ref 149–390)
PMV BLD AUTO: 8.7 FL (ref 8.9–12.7)
RBC # BLD AUTO: 3.12 MILLION/UL (ref 3.88–5.62)
TIBC SERPL-MCNC: 256 UG/DL (ref 250–450)
UIBC SERPL-MCNC: 156 UG/DL (ref 155–355)
VIT B12 SERPL-MCNC: 495 PG/ML (ref 180–914)
WBC # BLD AUTO: 5.07 THOUSAND/UL (ref 4.31–10.16)

## 2024-02-28 PROCEDURE — 36415 COLL VENOUS BLD VENIPUNCTURE: CPT

## 2024-02-28 PROCEDURE — 82607 VITAMIN B-12: CPT

## 2024-02-28 PROCEDURE — 83540 ASSAY OF IRON: CPT

## 2024-02-28 PROCEDURE — 82728 ASSAY OF FERRITIN: CPT

## 2024-02-28 PROCEDURE — 83550 IRON BINDING TEST: CPT

## 2024-02-28 PROCEDURE — 85025 COMPLETE CBC W/AUTO DIFF WBC: CPT

## 2024-02-28 PROCEDURE — 83918 ORGANIC ACIDS TOTAL QUANT: CPT

## 2024-02-28 PROCEDURE — 82746 ASSAY OF FOLIC ACID SERUM: CPT

## 2024-02-28 NOTE — TELEPHONE ENCOUNTER
Script was sent to a speciality pharmacy and Pt do not use that pharmacy any more       Medication Refill Request     Name of Medication busPIRone (BUSPAR) 7.5 mg tablet   Dose/Frequency      Take 1 tablet (7.5 mg total) by mouth 2 (two) times a day     Quantity 60  Verified pharmacy   [x]  Verified ordering Provider   [x]  Does patient have enough for the next 3 days? Yes [] No [x]  Does patient have a follow-up appointment scheduled? Yes [x] No []   If so when is appointment: 03/15/24 @ 1 pm

## 2024-02-29 ENCOUNTER — EVALUATION (OUTPATIENT)
Dept: PHYSICAL THERAPY | Facility: CLINIC | Age: 69
End: 2024-02-29
Payer: MEDICARE

## 2024-02-29 DIAGNOSIS — G89.4 CHRONIC PAIN SYNDROME: ICD-10-CM

## 2024-02-29 DIAGNOSIS — R53.83 FATIGUE, UNSPECIFIED TYPE: ICD-10-CM

## 2024-02-29 DIAGNOSIS — M25.552 LEFT HIP PAIN: ICD-10-CM

## 2024-02-29 DIAGNOSIS — G47.30 SLEEP APNEA, UNSPECIFIED TYPE: ICD-10-CM

## 2024-02-29 DIAGNOSIS — G89.29 CHRONIC BILATERAL LOW BACK PAIN WITH BILATERAL SCIATICA: ICD-10-CM

## 2024-02-29 DIAGNOSIS — M54.42 CHRONIC BILATERAL LOW BACK PAIN WITH BILATERAL SCIATICA: ICD-10-CM

## 2024-02-29 DIAGNOSIS — R26.89 BALANCE PROBLEM: Primary | ICD-10-CM

## 2024-02-29 DIAGNOSIS — M25.551 RIGHT HIP PAIN: ICD-10-CM

## 2024-02-29 DIAGNOSIS — M54.41 CHRONIC BILATERAL LOW BACK PAIN WITH BILATERAL SCIATICA: ICD-10-CM

## 2024-02-29 PROCEDURE — 97110 THERAPEUTIC EXERCISES: CPT | Performed by: PHYSICAL THERAPIST

## 2024-02-29 PROCEDURE — 97112 NEUROMUSCULAR REEDUCATION: CPT | Performed by: PHYSICAL THERAPIST

## 2024-02-29 RX ORDER — BUSPIRONE HYDROCHLORIDE 7.5 MG/1
7.5 TABLET ORAL 2 TIMES DAILY
Qty: 60 TABLET | Refills: 1 | Status: SHIPPED | OUTPATIENT
Start: 2024-02-29

## 2024-02-29 NOTE — PROGRESS NOTES
PT Re-Evaluation     Today's date: 2024  Patient name: Boni Forte  : 1955  MRN: [id number]  Referring provider: Alessandro Roldan MD  Dx:   Encounter Diagnosis     ICD-10-CM    1. Balance problem  R26.89       2. Left hip pain  M25.552       3. Right hip pain  M25.551                        Assessment details: Boni Forte is a 68 y.o. male with history of lumbar laminectomy on 2023 with the associated impairments including: ROM restriction, balance deficits, strength deficits, and activity limitation. He demonstrates mild improvements in objective measures, though progress on the whole has likely been limited by recent radiation treatments and global overwhelming fatigue.  He would likely benefit from increased functional and aerobic activity with emphasis on dynamic and static balance.  Due to these impairments, patient has difficulty performing the following: ADLs stair navigation, prolonged walking, sit-to-stand transfers, walking on uneven terrain, getting in/out of the car, getting in/out of the bath, shopping, driving, lifting, carrying, and sleeping.  Patient would likely benefit from skilled physical therapy services to address the above functional limitations through a targeted program consisting of repeated graded core strengthening and functional activity strengthening, static and dynamic balance training, and graded increase in functional activity training in order to progress towards prior level of function and independence with home exercise program.  Impairments: abnormal gait, abnormal muscle firing, abnormal or restricted ROM, activity intolerance, impaired physical strength, lacks appropriate home exercise program, pain with function and poor body mechanics  Understanding of Dx/Px/POC: good   Prognosis: good   Goals  Short Term Goals (3 weeks)  1. Patient will be independent with initial HEP emphasizing aerobic conditioning-progressing towards  2. Patient will  demonstrate ability to put on socks/shoes without support.-met  3. Patient will demonstrate an increase in right hip strength of 1/2 grade on MMT to assist with don/doffing shoes-progressing towards    Long Term Goals (6 weeks)  1. Patient will demonstrate an increase in bilateral hip strength of 1/2 grade to assist with bed mobility.-progressing towards  2. Patient will demonstrate an increase in right hip strength to 4/5 on MMT.-progressing towards  3. Patient will be able to ascend/descend 15 stairs reciprocally with 1 UE assist safely without imbalance.-progressing towards  4. Patient will be able to ambulate 500 feet on varied terrain with AD without rest breaks.-progressing towards  5. Patient will be able to perform all basic ADLs without modification.  6. Patient will be able to walk for 30 minutes without LOB or need for a rest break at sidewalk in neighborhood.-progressing towards    Plan  Patient would benefit from: skilled physical therapy  Planned modality interventions: cryotherapy, electrical stimulation/Russian stimulation, TENS, ultrasound, thermotherapy: hydrocollator packs, unattended electrical stimulation, high voltage pulsed current: pain management and high voltage pulsed current: spasm management  Planned therapy interventions: flexibility, functional ROM exercises, graded exercise, home exercise program, joint mobilization, manual therapy, neuromuscular re-education, patient education, strengthening, stretching, therapeutic exercise, therapeutic activities, Vides taping, balance/weight bearing training, gait training, abdominal trunk stabilization, activity modification, balance, body mechanics training, graded activity, self care, postural training, IADL retraining, ADL training, breathing training, behavior modification, muscle pump exercises, therapeutic training and transfer training  Frequency: 2x week  Duration in weeks: 6  Treatment plan discussed with: patient         Subjective  Evaluation     History of Present Illness  Date of surgery: 23  Mechanism of injury: Pt presents today for balance deficits after recent shingles virus setback and deconditioning associated with cancer treatments.  He reports he has struggled with fatigue over the last few weeks and continues to have ambulatory deficits.  He reports he is hoping to join a gym when he is medically cleared.      Pain  Current pain ratin  At best pain ratin  At worst pain ratin  Location: Right hip/back  Quality: dull ache  Relieving factors: medications  Aggravating factors: walking, standing, stair climbing and lifting  Progression: improved     Social Support  Steps to enter house: yes  8  Stairs in house: yes   15  Lives with: spouse     Employment status: not working (Retired)  Hand dominance: right  Exercise history: Walking and yard work       Diagnostic Tests  X-ray: normal  Treatments  No previous or current treatments  Patient Goals  Patient goals for therapy: increased motion              Objective      Neurological Testing      Sensation      Hip   Left Hip   Hyposensation: light touch     Right Hip   Hyposensation: light touch     Comments   Left light touch: Reports symmetrical hyposensation below each knee and into both feet; intact above the knee.      Active Range of Motion   Left Hip   Flexion: 110 degrees   Abduction: 36 degrees   External rotation (90/90): 38 degrees      Right Hip   Flexion: 102   Abduction: 38 degrees   External rotation (90/90): 41 degrees      Strength/Myotome Testing     Additional Strength Details  MMT: L, R     Hip Flexion: 4-, 4  Hip ER: 4-, 4-  Hip Abd: 3, 3  Hip Ext: 4-, 4-    Knee Ext: 3+, 4-  Knee Flex: 4, 4+     Ambulation      Comments   Ambulates with wide-based gait with SPC.  Decreased stance time on the L with ambulation, and continues to have foot slap of the R foot in midstance.      Timed-Up-and-Go Score: 14.04s SPC (CO.5s)  6 min walk: 1050 ft w/ SPC   5x  STS: 14.5s            Precautions:   Past Medical History:   Diagnosis Date    Anxiety 2010    Arthritis 1990    Contreras's esophagus     Cancer (HCC) 2018    Stage 1 prostrate cancer; under active surveillance.    Depression     GERD (gastroesophageal reflux disease) 2005    Head injury     Hypertension     Osteoarthritis 1990    Prostate cancer (HCC)     Psychiatric disorder     Sleep apnea     CPAP at     Spinal arthritis            Manuals    2/29                               Neuro Re-Ed       Biodex    LOS 3x lvl 9, Weight Shift lvl 9 2min, Weight shift squats 2x10 lvl 9, RCT lvl 9 w/ CG   STS    2x10                                      Ther Ex       Assessment & Managment    POC with emphasis on increased aerobic capacity and dynamic balance., Re-assessment.                                                    Ther Activity                     Gait Training                     Modalities

## 2024-03-01 DIAGNOSIS — I10 ESSENTIAL HYPERTENSION: ICD-10-CM

## 2024-03-01 RX ORDER — OXYCODONE HCL 10 MG/1
10 TABLET, FILM COATED, EXTENDED RELEASE ORAL 2 TIMES DAILY
Qty: 60 TABLET | Refills: 0 | Status: SHIPPED | OUTPATIENT
Start: 2024-03-01

## 2024-03-01 RX ORDER — AMLODIPINE BESYLATE 5 MG/1
5 TABLET ORAL DAILY
Qty: 90 TABLET | Refills: 1 | Status: SHIPPED | OUTPATIENT
Start: 2024-03-01

## 2024-03-01 RX ORDER — PREDNISONE 5 MG/1
5 TABLET ORAL DAILY
Qty: 30 TABLET | Refills: 0 | Status: SHIPPED | OUTPATIENT
Start: 2024-03-01 | End: 2024-09-26

## 2024-03-01 RX ORDER — HYDROCODONE BITARTRATE AND ACETAMINOPHEN 10; 325 MG/1; MG/1
1 TABLET ORAL EVERY 4 HOURS PRN
Qty: 180 TABLET | Refills: 0 | Status: SHIPPED | OUTPATIENT
Start: 2024-03-01

## 2024-03-01 RX ORDER — ARMODAFINIL 250 MG/1
250 TABLET ORAL DAILY
Qty: 30 TABLET | Refills: 0 | Status: SHIPPED | OUTPATIENT
Start: 2024-03-01

## 2024-03-04 ENCOUNTER — TELEPHONE (OUTPATIENT)
Age: 69
End: 2024-03-04

## 2024-03-04 NOTE — TELEPHONE ENCOUNTER
PA for Oxycodone (Oxycontin) 10 mg 12 hr tablet    Submitted via    []CMM-KEY   [x]Performance Indicator-Case ID # [id number]  []Faxed to plan   []Other website   []Phone call Case ID #     Office notes sent, clinical questions answered. Awaiting determination    Turnaround time for your insurance to make a decision on your Prior Authorization can take 7-21 business days.

## 2024-03-05 ENCOUNTER — APPOINTMENT (OUTPATIENT)
Dept: PHYSICAL THERAPY | Facility: CLINIC | Age: 69
End: 2024-03-05
Payer: MEDICARE

## 2024-03-05 ENCOUNTER — TELEPHONE (OUTPATIENT)
Age: 69
End: 2024-03-05

## 2024-03-05 NOTE — TELEPHONE ENCOUNTER
Duplicate PA Request please see telephone encounter from 3/4/2024 regarding Oxycodone (Oxycontin) PA. Please review patient's chart to see status of PA before creating another encounter Thank You.

## 2024-03-05 NOTE — TELEPHONE ENCOUNTER
The patient called he needs a refill on his oxycontin  it was sent to [facility] pharmacy his insurance denied it for a prior auth   please let the patient know when it is approved  thank you

## 2024-03-06 LAB — METHYLMALONATE SERPL-SCNC: 333 NMOL/L (ref 0–378)

## 2024-03-07 ENCOUNTER — OFFICE VISIT (OUTPATIENT)
Dept: PHYSICAL THERAPY | Facility: CLINIC | Age: 69
End: 2024-03-07
Payer: MEDICARE

## 2024-03-07 ENCOUNTER — TELEPHONE (OUTPATIENT)
Dept: OBGYN CLINIC | Facility: CLINIC | Age: 69
End: 2024-03-07

## 2024-03-07 DIAGNOSIS — M25.551 RIGHT HIP PAIN: ICD-10-CM

## 2024-03-07 DIAGNOSIS — M25.552 LEFT HIP PAIN: ICD-10-CM

## 2024-03-07 DIAGNOSIS — R26.89 BALANCE PROBLEM: Primary | ICD-10-CM

## 2024-03-07 PROCEDURE — 97112 NEUROMUSCULAR REEDUCATION: CPT | Performed by: PHYSICAL THERAPIST

## 2024-03-07 NOTE — TELEPHONE ENCOUNTER
PLEASE PRINT DME RX IN OTHER ORDERS AND FAX. PLEASE CONTACT PATIENT ONCE COMPLETE   ----- Message from Alessandro Roldan MD sent at 3/7/2024  9:16 AM EST -----  Hello there    Per this patient's request in [employer] message would someone be able to fax his DME order from 9/2023 to   [facility] in [place]:    Fax [phone]    Thanks!

## 2024-03-07 NOTE — PROGRESS NOTES
Daily Note     Today's date: 3/7/2024  Patient name: Boni Forte  : 1955  MRN: [id number]  Referring provider: Alessandro Roldan MD  Dx:   Encounter Diagnosis     ICD-10-CM    1. Balance problem  R26.89       2. Left hip pain  M25.552       3. Right hip pain  M25.551           Start Time: 1150  Stop Time: 1230  Total time in clinic (min): 40 minutes    Subjective: John reports he has been having a rough week overall and has been feeling pretty fatigued.  He believes this is a side effect from his radiation and changes in medication.      Objective: See treatment diary below      Assessment: Tolerated treatment well. John did well with dynamic balance challenges today on compliant surfaces with contact guard/close supervision. Pt required frequent seated rest breaks between exercises due to decreased endurance and muscular fatigue. Pt had minimal LOB with activity however intermittently had difficulty with movement coordination and proprioceptive awareness. Patient demonstrated fatigue post treatment, exhibited good technique with therapeutic exercises, and would benefit from continued PT      Plan: Continue per plan of care.  Progress treatment as tolerated.       Precautions:   Past Medical History:   Diagnosis Date    Anxiety     Arthritis     Contreras's esophagus     Cancer (HCC) 2018    Stage 1 prostrate cancer; under active surveillance.    Depression     GERD (gastroesophageal reflux disease)     Head injury     Hypertension     Osteoarthritis     Prostate cancer (HCC)     Psychiatric disorder     Sleep apnea     CPAP at     Spinal arthritis            Manuals   3/7 2/29                               Neuro Re-Ed       Biodex    LOS 3x lvl 9, Weight Shift lvl 9 2min, Weight shift squats 2x10 lvl 9, RCT lvl 9 w/ CG   STS   2x10  2x10   Side stepping   Airex beam 15ft 10x CGA    Romberg stance on airex    Horizontal and vertical head turns 2x10ea CGA    Stepping over hurdles    Fwd  20x at table, CGA    Lateral hurdles   Airex on each side 20x CGA    SLS   +cone taps on airex beam 20x    Ther Ex       Assessment & Managment    POC with emphasis on increased aerobic capacity and dynamic balance., Re-assessment.                                                    Ther Activity                     Gait Training                     Modalities                       Patient treated by DEBI Mcintosh under my direct supervision.

## 2024-03-08 ENCOUNTER — TELEPHONE (OUTPATIENT)
Dept: OBGYN CLINIC | Facility: CLINIC | Age: 69
End: 2024-03-08

## 2024-03-08 ENCOUNTER — OFFICE VISIT (OUTPATIENT)
Dept: HEMATOLOGY ONCOLOGY | Facility: CLINIC | Age: 69
End: 2024-03-08
Payer: MEDICARE

## 2024-03-08 VITALS
WEIGHT: 165 LBS | RESPIRATION RATE: 18 BRPM | BODY MASS INDEX: 25.9 KG/M2 | HEIGHT: 67 IN | OXYGEN SATURATION: 98 % | TEMPERATURE: 98.7 F | HEART RATE: 80 BPM | SYSTOLIC BLOOD PRESSURE: 138 MMHG | DIASTOLIC BLOOD PRESSURE: 86 MMHG

## 2024-03-08 DIAGNOSIS — Z78.9 VEGETARIAN DIET: ICD-10-CM

## 2024-03-08 DIAGNOSIS — E83.110 HEREDITARY HEMOCHROMATOSIS (HCC): Primary | ICD-10-CM

## 2024-03-08 DIAGNOSIS — D51.8 OTHER VITAMIN B12 DEFICIENCY ANEMIAS: ICD-10-CM

## 2024-03-08 DIAGNOSIS — C61 PROSTATE CANCER (HCC): ICD-10-CM

## 2024-03-08 PROCEDURE — 99214 OFFICE O/P EST MOD 30 MIN: CPT | Performed by: PHYSICIAN ASSISTANT

## 2024-03-08 PROCEDURE — G2211 COMPLEX E/M VISIT ADD ON: HCPCS | Performed by: PHYSICIAN ASSISTANT

## 2024-03-08 RX ORDER — DIPHENOXYLATE HYDROCHLORIDE AND ATROPINE SULFATE 2.5; .025 MG/1; MG/1
1 TABLET ORAL AS NEEDED
COMMUNITY

## 2024-03-08 NOTE — PATIENT INSTRUCTIONS
[facility] Medical Oncology and Hematology Team  [place] - [phone]    Your Team Members:  Advanced Practitioner:  Sarah Lopez PA-C  Oncology Nurse:   AYAN Aranda ([phone]) M-F 8am - 4:30pm    Please answer Private and Unavailable Calls - this may be your team(s) contacting you.  If you have medical questions/concerns/issues - contact us either by (1) My Chart (2) [facility]       B 12 extra strength 3000 mcg daily x 2 months then 1500 mcg daily  Nature's made.
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
## 2024-03-08 NOTE — TELEPHONE ENCOUNTER
LM for the correct fax # for [facility]. The number we were given did not go through after several attempts. [phone]

## 2024-03-11 ENCOUNTER — OFFICE VISIT (OUTPATIENT)
Dept: PHYSICAL THERAPY | Facility: CLINIC | Age: 69
End: 2024-03-11
Payer: MEDICARE

## 2024-03-11 ENCOUNTER — TELEPHONE (OUTPATIENT)
Dept: FAMILY MEDICINE CLINIC | Facility: CLINIC | Age: 69
End: 2024-03-11

## 2024-03-11 DIAGNOSIS — M54.41 CHRONIC BILATERAL LOW BACK PAIN WITH BILATERAL SCIATICA: ICD-10-CM

## 2024-03-11 DIAGNOSIS — M25.551 RIGHT HIP PAIN: ICD-10-CM

## 2024-03-11 DIAGNOSIS — R26.89 BALANCE PROBLEM: Primary | ICD-10-CM

## 2024-03-11 DIAGNOSIS — G89.29 CHRONIC BILATERAL LOW BACK PAIN WITH BILATERAL SCIATICA: ICD-10-CM

## 2024-03-11 DIAGNOSIS — M54.42 CHRONIC BILATERAL LOW BACK PAIN WITH BILATERAL SCIATICA: ICD-10-CM

## 2024-03-11 DIAGNOSIS — M25.552 LEFT HIP PAIN: ICD-10-CM

## 2024-03-11 DIAGNOSIS — G89.4 CHRONIC PAIN SYNDROME: ICD-10-CM

## 2024-03-11 PROCEDURE — 97110 THERAPEUTIC EXERCISES: CPT | Performed by: PHYSICAL THERAPIST

## 2024-03-11 PROCEDURE — 97112 NEUROMUSCULAR REEDUCATION: CPT | Performed by: PHYSICAL THERAPIST

## 2024-03-11 RX ORDER — OXYCODONE HCL 10 MG/1
10 TABLET, FILM COATED, EXTENDED RELEASE ORAL 2 TIMES DAILY
Qty: 60 TABLET | Refills: 0 | Status: SHIPPED | OUTPATIENT
Start: 2024-03-11 | End: 2024-03-12

## 2024-03-11 NOTE — TELEPHONE ENCOUNTER
Telephone call from patient that he needs to the doctor to send in a script for the generic Oxycontin. He needs the doctor to send in Oxycodone HCl ER Tablets for insurance to cover it.     Please send to [facility].     Reach out to the patient with any questions or concerns.

## 2024-03-11 NOTE — PROGRESS NOTES
Daily Note     Today's date: 3/11/2024  Patient name: Boni Forte  : 1955  MRN: [id number]  Referring provider: Alessandro Roldan MD  Dx:   Encounter Diagnosis     ICD-10-CM    1. Balance problem  R26.89       2. Right hip pain  M25.551       3. Left hip pain  M25.552                      Subjective: Patient reports he had another fall last night when tripping over a guitar, he reports feeling bruised, but no change in walking.      Objective: See treatment diary below      Assessment: Tolerated treatment well. No change in ROM/strength or status after fall with re-assessment.  Advised patient to monitor symptoms and follow up with emergent care if possible.  Continues to demonstrate limitations with stability limit particularly with motions emphasizing posterior translation.  Patient demonstrated fatigue post treatment, exhibited good technique with therapeutic exercises, and would benefit from continued PT      Plan: Continue per plan of care.  Progress treatment as tolerated.  Progress core stabilization challenge as appropriate with irritability.     Precautions:   Past Medical History:   Diagnosis Date    Anxiety     Arthritis     Ocntreras's esophagus     Cancer (HCC)     Stage 1 prostrate cancer; under active surveillance.    Depression     GERD (gastroesophageal reflux disease)     Head injury     Hypertension     Osteoarthritis     Prostate cancer (HCC)     Psychiatric disorder     Sleep apnea     CPAP at     Spinal arthritis            Manuals  3/11 3/7 2/29                               Neuro Re-Ed       Bi[employer]x  LOS 3x lvl 10, weight shift 10 2 min, weight shift squats 3x10 lvl 9, RCT lvl 8 w/ CG 2 min  LOS 3x lvl 9, Weight Shift lvl 9 2min, Weight shift squats 2x10 lvl 9, RCT lvl 9 w/ CG   STS  2x10, 2x10 w/ tidal tank presses, 2x10 w/ 10# 2x10  2x10   Side stepping  W/ tidal tank [id number] Airex beam 15ft 10x CGA    Romberg stance on airex    Horizontal and vertical head  turns 2x10ea CGA    Stepping over hurdles    Fwd 20x at table, CGA    Lateral hurdles   Airex on each side 20x CGA    SLS   +cone taps on airex beam 20x    Ther Ex       Assessment & Managment    POC with emphasis on increased aerobic capacity and dynamic balance., Re-assessment.   Seated SLR-LAQ  2x10 ea side     Seated L/S Flexion  2x (3x10)                                        Ther Activity                     Gait Training                     Modalities

## 2024-03-12 ENCOUNTER — TELEPHONE (OUTPATIENT)
Dept: FAMILY MEDICINE CLINIC | Facility: CLINIC | Age: 69
End: 2024-03-12

## 2024-03-12 DIAGNOSIS — M54.41 CHRONIC BILATERAL LOW BACK PAIN WITH BILATERAL SCIATICA: Primary | ICD-10-CM

## 2024-03-12 DIAGNOSIS — G89.29 CHRONIC BILATERAL LOW BACK PAIN WITH BILATERAL SCIATICA: Primary | ICD-10-CM

## 2024-03-12 DIAGNOSIS — M54.42 CHRONIC BILATERAL LOW BACK PAIN WITH BILATERAL SCIATICA: Primary | ICD-10-CM

## 2024-03-12 RX ORDER — MORPHINE SULFATE 15 MG/1
15 TABLET, FILM COATED, EXTENDED RELEASE ORAL 2 TIMES DAILY
Qty: 60 TABLET | Refills: 0 | Status: SHIPPED | OUTPATIENT
Start: 2024-03-12

## 2024-03-12 RX ORDER — ABIRATERONE ACETATE 250 MG/1
TABLET ORAL
COMMUNITY
Start: 2024-01-22

## 2024-03-12 NOTE — TELEPHONE ENCOUNTER
"----- Message from Elier Oh MD sent at 3/12/2024 12:09 PM EDT -----  Regarding: FW: Insurance  Contact: [phone]  PLEASE SEE ME  ----- Message -----  From: Juani Jeffrey  Sent: 3/12/2024  11:56 AM EDT  To: Elier Oh MD  Subject: FW: Insurance                                      ----- Message -----  From: Zainab Ferrara RN  Sent: 3/12/2024  11:46 AM EDT  To: [facility]  Subject: FW: Insurance                                      ----- Message -----  From: Boni Forte \"John\"  Sent: 3/11/2024   9:58 PM EDT  To: [facility]  Subject: Insurance                                        Hi Dr. Oh. I’m in a catch-22 situation and hope you can help me.     [employer] wouldn’t cover my OxyContin 10 mg. Rx. With your help (thanks!) they granted me an override for “Oxycodone HCL ER Tab 12hr” - the generic version.  I called your office and they changed the Rx to the generic name. My pharmacist then informed me that the generic is no longer being manufactured.    So now I have [employer]’s approval for a drug that is no longer being made.     I will call [employer] again tomorrow to get an explanation but was hoping you (or someone in your office) might advise me on how to handle this bizarre situation. Thanks for the help you’ve provided to date.    John"

## 2024-03-12 NOTE — TELEPHONE ENCOUNTER
The [employer] message was converted to a telephone message      I called the pharmacy to get clarification.    Pharmacist stated that they do NOT make Oxycodone HCL ER tablet.     If anyone is getting them, it is stock that the pharmacy has on their shelves.  They suggested to him to call other pharmacies to see who has them    I asked what will his insurance cover    Pharmacist stated his insurance will cover    Morphine-sulphate ER 15 mg tablets (MS Contin)

## 2024-03-13 NOTE — TELEPHONE ENCOUNTER
Spoke to John and relayed him the information regarding the RX that is discontinued.  He says thank you for checking into this for him.

## 2024-03-14 ENCOUNTER — OFFICE VISIT (OUTPATIENT)
Dept: PHYSICAL THERAPY | Facility: CLINIC | Age: 69
End: 2024-03-14
Payer: MEDICARE

## 2024-03-14 DIAGNOSIS — M25.552 LEFT HIP PAIN: ICD-10-CM

## 2024-03-14 DIAGNOSIS — R26.89 BALANCE PROBLEM: Primary | ICD-10-CM

## 2024-03-14 DIAGNOSIS — M25.551 RIGHT HIP PAIN: ICD-10-CM

## 2024-03-14 PROCEDURE — 97112 NEUROMUSCULAR REEDUCATION: CPT | Performed by: PHYSICAL THERAPIST

## 2024-03-14 NOTE — PROGRESS NOTES
Daily Note     Today's date: 3/14/2024  Patient name: Boni Forte  : 1955  MRN: [id number]  Referring provider: Alessandro Roldan MD  Dx:   Encounter Diagnosis     ICD-10-CM    1. Balance problem  R26.89       2. Right hip pain  M25.551       3. Left hip pain  M25.552             Start Time: 1200  Stop Time: 1215  Total time in clinic (min): 15 minutes    Subjective: Client denies any other falls since the other day when he fell over a guitar.       Objective: See treatment diary below      Assessment: Tolerated treatment well. Session focused on standing weight shift in all planes today. CG provided throughout all balance activities due to multidirectional LOB. He demonstrated fatigue post treatment, exhibited good technique with therapeutic exercises, and would benefit from continued PT      Plan: Continue per plan of care.  Progress treatment as tolerated.  Progress core stabilization challenge as appropriate with irritability.     Precautions:   Past Medical History:   Diagnosis Date    Anxiety     Arthritis     Contreras's esophagus     Cancer (HCC)     Stage 1 prostrate cancer; under active surveillance.    Depression     GERD (gastroesophageal reflux disease)     Head injury     Hypertension     Osteoarthritis     Prostate cancer (HCC)     Psychiatric disorder     Sleep apnea     CPAP at     Spinal arthritis            Manuals 3/14 3/11 3/7 2/29                               Neuro Re-Ed       Biodex LOS x 4 rounds    Weight shift x 4 rounds AP/ML in standing and squatting LOS 3x lvl 10, weight shift 10 2 min, weight shift squats 3x10 lvl 9, RCT lvl 8 w/ CG 2 min  LOS 3x lvl 9, Weight Shift lvl 9 2min, Weight shift squats 2x10 lvl 9, RCT lvl 9 w/ CG   STS  2x10, 2x10 w/ tidal tank presses, 2x10 w/ 10# 2x10  2x10   Side stepping Along airex beam 10 feet x 15 W/ tidal tank [id number] Airex beam 15ft 10x CGA    Romberg stance on airex    Horizontal and vertical head turns 2x10ea CGA     Stepping over hurdles    Fwd 20x at table, CGA    Lateral hurdles   Airex on each side 20x CGA    SLS   +cone taps on airex beam 20x    Ther Ex       Assessment & Managment    POC with emphasis on increased aerobic capacity and dynamic balance., Re-assessment.   Seated SLR-LAQ  2x10 ea side     Seated L/S Flexion  2x (3x10)                                        Ther Activity                     Gait Training                     Modalities

## 2024-03-15 ENCOUNTER — TELEMEDICINE (OUTPATIENT)
Dept: PSYCHIATRY | Facility: CLINIC | Age: 69
End: 2024-03-15
Payer: MEDICARE

## 2024-03-15 DIAGNOSIS — F41.1 GAD (GENERALIZED ANXIETY DISORDER): ICD-10-CM

## 2024-03-15 DIAGNOSIS — F33.1 MODERATE EPISODE OF RECURRENT MAJOR DEPRESSIVE DISORDER (HCC): Primary | ICD-10-CM

## 2024-03-15 PROCEDURE — G2211 COMPLEX E/M VISIT ADD ON: HCPCS | Performed by: PHYSICIAN ASSISTANT

## 2024-03-15 PROCEDURE — 99214 OFFICE O/P EST MOD 30 MIN: CPT | Performed by: PHYSICIAN ASSISTANT

## 2024-03-15 RX ORDER — BUSPIRONE HYDROCHLORIDE 10 MG/1
10 TABLET ORAL 3 TIMES DAILY
Qty: 90 TABLET | Refills: 1 | Status: SHIPPED | OUTPATIENT
Start: 2024-03-15
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
## 2024-03-18 ENCOUNTER — OFFICE VISIT (OUTPATIENT)
Dept: PHYSICAL THERAPY | Facility: CLINIC | Age: 69
End: 2024-03-18
Payer: MEDICARE

## 2024-03-18 DIAGNOSIS — R26.89 BALANCE PROBLEM: Primary | ICD-10-CM

## 2024-03-18 DIAGNOSIS — M25.552 LEFT HIP PAIN: ICD-10-CM

## 2024-03-18 DIAGNOSIS — M25.551 RIGHT HIP PAIN: ICD-10-CM

## 2024-03-18 PROCEDURE — 97112 NEUROMUSCULAR REEDUCATION: CPT | Performed by: PHYSICAL THERAPIST

## 2024-03-18 PROCEDURE — 97110 THERAPEUTIC EXERCISES: CPT | Performed by: PHYSICAL THERAPIST

## 2024-03-18 NOTE — PROGRESS NOTES
Daily Note     Today's date: 3/18/2024  Patient name: Boni Forte  : 1955  MRN: [id number]  Referring provider: Alessandro Roldan MD  Dx:   Encounter Diagnosis     ICD-10-CM    1. Balance problem  R26.89       2. Right hip pain  M25.551       3. Left hip pain  M25.552                      Subjective: Patient reports he feels about the same overall, he continues to feel off balance, but has not had any falls over the last few days.      Objective: See treatment diary below      Assessment: Tolerated treatment well. Patient continues to be challenged by dynamic stability exercises, requires UE assist to avoid LOB when reaching outside of stability limit with LE or UE.  Patient demonstrated fatigue post treatment, exhibited good technique with therapeutic exercises, and would benefit from continued PT      Plan: Continue per plan of care.  Progress treatment as tolerated.  Progress challenge as appropriate with irritability.     Precautions:   Past Medical History:   Diagnosis Date    Anxiety     Arthritis     Contreras's esophagus     Cancer (HCC)     Stage 1 prostrate cancer; under active surveillance.    Depression     GERD (gastroesophageal reflux disease)     Head injury     Hypertension     Osteoarthritis     Prostate cancer (HCC)     Psychiatric disorder     Sleep apnea     CPAP at     Spinal arthritis            Manuals 3/18 3/14 3/11 3                               Neuro Re-Ed       Biodex LOS lvl 9, weight shift 2min, squats weight shift 3x10 lvl 8 LOS x 4 rounds    Weight shift x 4 rounds AP/ML in standing and squatting LOS 3x lvl 10, weight shift 10 2 min, weight shift squats 3x10 lvl 9, RCT lvl 8 w/ CG 2 min    STS 2x10, 2x10 w/ tidal tank presses, 2x10 w/ 10#  2x10, 2x10 w/ tidal tank presses, 2x10 w/ 10# 2x10    Side stepping W/ tidal tank [id number] Along airex beam 10 feet x 15 W/ tidal tank [id number] Airex beam 15ft 10x CGA   Romberg stance on airex     Horizontal and  vertical head turns 2x10ea CGA   Stepping over hurdles  Stepping onto bosu 2x10 ea   Fwd 20x at table, CGA   Lateral hurdles Stepping into side lunge 10x ea   Airex on each side 20x CGA   SLS    +cone taps on airex beam 20x   Ther Ex       Assessment & Managment       Seated SLR-LAQ 2x10 ea  2x10 ea side    Seated L/S Flexion   2x (3x10)                                       Ther Activity                     Gait Training                     Modalities

## 2024-03-21 ENCOUNTER — OFFICE VISIT (OUTPATIENT)
Dept: PHYSICAL THERAPY | Facility: CLINIC | Age: 69
End: 2024-03-21
Payer: MEDICARE

## 2024-03-21 DIAGNOSIS — R26.89 BALANCE PROBLEM: Primary | ICD-10-CM

## 2024-03-21 DIAGNOSIS — M25.551 RIGHT HIP PAIN: ICD-10-CM

## 2024-03-21 DIAGNOSIS — M25.552 LEFT HIP PAIN: ICD-10-CM

## 2024-03-21 PROCEDURE — 97112 NEUROMUSCULAR REEDUCATION: CPT

## 2024-03-21 NOTE — PROGRESS NOTES
Daily Note     Today's date: 3/21/2024  Patient name: Boni Forte  : 1955  MRN: [id number]  Referring provider: Alessandro Roldan MD  Dx:   Encounter Diagnosis     ICD-10-CM    1. Balance problem  R26.89       2. Right hip pain  M25.551       3. Left hip pain  M25.552           Start Time: 0850  Stop Time: 09  Total time in clinic (min): 38 minutes    Subjective: Pt reports that he is feeling good this morning, he is surprised how good he feels compared to what he normally feels in the morning. Pt states feeling fine after his last session, fatigued but no increases in pain. No new complaints.       Objective: See treatment diary below      Assessment: Tolerated treatment well. Patient demonstrated fatigue post treatment, exhibited good technique with therapeutic exercises, and would benefit from continued PT. Pt continues to have difficulty with dynamic balance exercises due to LOB and continued need for UE assistance.       Plan: Continue per plan of care.  Progress treatment as tolerated.       Precautions:   Past Medical History:   Diagnosis Date    Anxiety     Arthritis     Contreras's esophagus     Cancer (HCC)     Stage 1 prostrate cancer; under active surveillance.    Depression     GERD (gastroesophageal reflux disease)     Head injury     Hypertension     Osteoarthritis     Prostate cancer (HCC)     Psychiatric disorder     Sleep apnea     CPAP at     Spinal arthritis            Manuals 3/21 3/18 3/14 3/11 3/7                                   Neuro Re-Ed        Biodex Weight shift L8 2 mins, weight shift L9 squats 3x10    LOS lvl 9 LOS lvl 9, weight shift 2min, squats weight shift 3x10 lvl 8 LOS x 4 rounds    Weight shift x 4 rounds AP/ML in standing and squatting LOS 3x lvl 10, weight shift 10 2 min, weight shift squats 3x10 lvl 9, RCT lvl 8 w/ CG 2 min    STS W/feet on airex 2x10    1x10 flat 2x10, 2x10 w/ tidal tank presses, 2x10 w/ 10#  2x10, 2x10 w/ tidal tank  presses, 2x10 w/ 10# 2x10    Side stepping Along airex  20 laps W/ tidal tank [id number] Along airex beam 10 feet x 15 W/ tidal tank [id number] Airex beam 15ft 10x CGA   Romberg stance on airex      Horizontal and vertical head turns 2x10ea CGA   Stepping over hurdles  Step up onto bosu 1x10 ea Stepping onto bosu 2x10 ea   Fwd 20x at table, CGA   Lateral hurdles Stepping into side lunge 2x10 BLE Stepping into side lunge 10x ea   Airex on each side 20x CGA   SLS     +cone taps on airex beam 20x   Ther Ex        Assessment & Managment        Seated SLR-LAQ 2x10 ea 2x10 ea  2x10 ea side    Seated L/S Flexion    2x (3x10)                                            Ther Activity                        Gait Training                        Modalities

## 2024-03-22 DIAGNOSIS — M54.42 CHRONIC BILATERAL LOW BACK PAIN WITH BILATERAL SCIATICA: ICD-10-CM

## 2024-03-22 DIAGNOSIS — G89.4 CHRONIC PAIN SYNDROME: ICD-10-CM

## 2024-03-22 DIAGNOSIS — M54.41 CHRONIC BILATERAL LOW BACK PAIN WITH BILATERAL SCIATICA: ICD-10-CM

## 2024-03-22 DIAGNOSIS — G89.29 CHRONIC BILATERAL LOW BACK PAIN WITH BILATERAL SCIATICA: ICD-10-CM

## 2024-03-22 RX ORDER — GABAPENTIN 400 MG/1
CAPSULE ORAL
Qty: 90 CAPSULE | Refills: 5 | Status: SHIPPED | OUTPATIENT
Start: 2024-03-22

## 2024-03-24 LAB
APOB+LDLR+PCSK9 GENE MUT ANL BLD/T: NOT DETECTED
BRCA1+BRCA2 DEL+DUP + FULL MUT ANL BLD/T: NOT DETECTED
MLH1+MSH2+MSH6+PMS2 GN DEL+DUP+FUL M: NOT DETECTED

## 2024-03-25 ENCOUNTER — APPOINTMENT (OUTPATIENT)
Dept: PHYSICAL THERAPY | Facility: CLINIC | Age: 69
End: 2024-03-25
Payer: MEDICARE

## 2024-03-28 ENCOUNTER — TELEPHONE (OUTPATIENT)
Dept: FAMILY MEDICINE CLINIC | Facility: CLINIC | Age: 69
End: 2024-03-28

## 2024-03-28 ENCOUNTER — OFFICE VISIT (OUTPATIENT)
Dept: PHYSICAL THERAPY | Facility: CLINIC | Age: 69
End: 2024-03-28
Payer: MEDICARE

## 2024-03-28 DIAGNOSIS — M25.551 RIGHT HIP PAIN: ICD-10-CM

## 2024-03-28 DIAGNOSIS — M25.552 LEFT HIP PAIN: ICD-10-CM

## 2024-03-28 DIAGNOSIS — R26.89 BALANCE PROBLEM: Primary | ICD-10-CM

## 2024-03-28 PROCEDURE — 97112 NEUROMUSCULAR REEDUCATION: CPT | Performed by: PHYSICAL THERAPIST

## 2024-03-28 NOTE — PROGRESS NOTES
Daily Note     Today's date: 3/28/2024  Patient name: Boni Forte  : 1955  MRN: [id number]  Referring provider: Alessandro Roldan MD  Dx:   Encounter Diagnosis     ICD-10-CM    1. Balance problem  R26.89       2. Right hip pain  M25.551       3. Left hip pain  M25.552                      Subjective: Patient reports he feels very sore after going to the gym yesterday.      Objective: See treatment diary below      Assessment: Tolerated treatment fair. Patient presented with significant fatigue and soreness post workout yesterday.  Required frequent rest breaks as he had difficulty performing exercises emphasizing quadriceps engagement in closed chain with significant weakness in greater ranges of knee flexion.  Patient demonstrated fatigue post treatment, exhibited good technique with therapeutic exercises, and would benefit from continued PT      Plan: Continue per plan of care.  Progress treatment as tolerated.  Progress challenge as appropriate with irritability.     Precautions:   Past Medical History:   Diagnosis Date    Anxiety     Arthritis     Contreras's esophagus     Cancer (HCC)     Stage 1 prostrate cancer; under active surveillance.    Depression     GERD (gastroesophageal reflux disease)     Head injury     Hypertension     Osteoarthritis     Prostate cancer (HCC)     Psychiatric disorder     Sleep apnea     CPAP at     Spinal arthritis            Manuals 3/28 3/21 3/18 3/14                               Neuro Re-Ed       Biodex Weight shift L9 2 mins, weight shift squats L10 2x10, LOS lvl 10 3x, RCT 2 min lvl 10 Weight shift L8 2 mins, weight shift L9 squats 3x10    LOS lvl 9 LOS lvl 9, weight shift 2min, squats weight shift 3x10 lvl 8 LOS x 4 rounds    Weight shift x 4 rounds AP/ML in standing and squatting   STS W/ feet on flat 2x10, tidal tank 10x W/feet on airex 2x10    1x10 flat 2x10, 2x10 w/ tidal tank presses, 2x10 w/ 10#    Side stepping 5x10ft w/ tidal tank  Along airex  20 laps W/ tidal tank [id number] Along airex beam 10 feet x 15   Romberg stance on airex        Stepping over hurdles  Step to march 15x ea Step up onto bosu 1x10 ea Stepping onto bosu 2x10 ea    Lateral hurdles  Stepping into side lunge 2x10 BLE Stepping into side lunge 10x ea    SLS W/ hip abd 2x10      Ther Ex       Assessment & Managment       Seated SLR-LAQ 10x ea 2x10 ea 2x10 ea    Seated L/S Flexion                                          Ther Activity                     Gait Training                     Modalities

## 2024-04-01 ENCOUNTER — OFFICE VISIT (OUTPATIENT)
Dept: PHYSICAL THERAPY | Facility: CLINIC | Age: 69
End: 2024-04-01
Payer: MEDICARE

## 2024-04-01 DIAGNOSIS — R26.89 BALANCE PROBLEM: Primary | ICD-10-CM

## 2024-04-01 DIAGNOSIS — M25.551 RIGHT HIP PAIN: ICD-10-CM

## 2024-04-01 DIAGNOSIS — M25.552 LEFT HIP PAIN: ICD-10-CM

## 2024-04-01 PROCEDURE — 97112 NEUROMUSCULAR REEDUCATION: CPT | Performed by: PHYSICAL THERAPIST

## 2024-04-01 NOTE — PROGRESS NOTES
Daily Note     Today's date: 2024  Patient name: Boni Forte  : 1955  MRN: [id number]  Referring provider: Alessandro Roldan MD  Dx:   Encounter Diagnosis     ICD-10-CM    1. Balance problem  R26.89       2. Left hip pain  M25.552       3. Right hip pain  M25.551                      Subjective: Patient reports he feels about the same overall, he has not fallen in a few weeks.      Objective: See treatment diary below      Assessment: Tolerated treatment well. Patient continues to have difficulty with movements of posterior weight translation onto heels typically causing LOB once weight translates beyond neutral position.  Patient demonstrated fatigue post treatment, exhibited good technique with therapeutic exercises, and would benefit from continued PT      Plan: Continue per plan of care.  Progress treatment as tolerated.  Progress challenge as appropriate with irritability.     Precautions:   Past Medical History:   Diagnosis Date    Anxiety     Arthritis     Contreras's esophagus     Cancer (HCC)     Stage 1 prostrate cancer; under active surveillance.    Depression     GERD (gastroesophageal reflux disease)     Head injury     Hypertension     Osteoarthritis     Prostate cancer (HCC)     Psychiatric disorder     Sleep apnea     CPAP at     Spinal arthritis            Manuals 4/1 3/28 3/21 3/18                               Neuro Re-Ed       [employer] Weight shift L9 2 mins, weight shift squats L9 3x10, LOS lvl 9,8,7, RCT 2 min lvl 10 Weight shift L9 2 mins, weight shift squats L10 2x10, LOS lvl 10 3x, RCT 2 min lvl 10 Weight shift L8 2 mins, weight shift L9 squats 3x10    LOS lvl 9 LOS lvl 9, weight shift 2min, squats weight shift 3x10 lvl 8   STS W/ feet on flat 2x10, tidal tank 2x10 W/ feet on flat 2x10, tidal tank 10x W/feet on airex 2x10    1x10 flat 2x10, 2x10 w/ tidal tank presses, 2x10 w/ 10#   Side stepping [id number] w/ tidal tank 5x10ft w/ tidal tank Along airex  20  laps W/ tidal tank [id number]   Romberg stance on airex        Stepping over hurdles   Step to march 15x ea Step up onto bosu 1x10 ea Stepping onto bosu 2x10 ea   Lateral hurdles   Stepping into side lunge 2x10 BLE Stepping into side lunge 10x ea   SLS On/Off Horse 2x10 ea W/ hip abd 2x10     Ther Ex       Assessment & Managment       Seated SLR-LAQ 15x ea 10x ea 2x10 ea 2x10 ea   Seated L/S Flexion                                          Ther Activity                     Gait Training                     Modalities

## 2024-04-04 ENCOUNTER — APPOINTMENT (OUTPATIENT)
Dept: PHYSICAL THERAPY | Facility: CLINIC | Age: 69
End: 2024-04-04
Payer: MEDICARE

## 2024-04-08 ENCOUNTER — APPOINTMENT (OUTPATIENT)
Dept: PHYSICAL THERAPY | Facility: CLINIC | Age: 69
End: 2024-04-08
Payer: MEDICARE

## 2024-04-08 ENCOUNTER — OFFICE VISIT (OUTPATIENT)
Dept: FAMILY MEDICINE CLINIC | Facility: CLINIC | Age: 69
End: 2024-04-08
Payer: MEDICARE

## 2024-04-08 VITALS
OXYGEN SATURATION: 98 % | HEART RATE: 80 BPM | HEIGHT: 67 IN | WEIGHT: 169.6 LBS | TEMPERATURE: 98.4 F | BODY MASS INDEX: 26.62 KG/M2 | RESPIRATION RATE: 18 BRPM | DIASTOLIC BLOOD PRESSURE: 50 MMHG | SYSTOLIC BLOOD PRESSURE: 100 MMHG

## 2024-04-08 DIAGNOSIS — I10 PRIMARY HYPERTENSION: ICD-10-CM

## 2024-04-08 DIAGNOSIS — F11.20 UNCOMPLICATED OPIOID DEPENDENCE (HCC): ICD-10-CM

## 2024-04-08 DIAGNOSIS — I95.1 ORTHOSTATIC HYPOTENSION: Primary | ICD-10-CM

## 2024-04-08 DIAGNOSIS — R29.6 FALLS FREQUENTLY: ICD-10-CM

## 2024-04-08 PROCEDURE — G2211 COMPLEX E/M VISIT ADD ON: HCPCS | Performed by: STUDENT IN AN ORGANIZED HEALTH CARE EDUCATION/TRAINING PROGRAM

## 2024-04-08 PROCEDURE — 99214 OFFICE O/P EST MOD 30 MIN: CPT | Performed by: STUDENT IN AN ORGANIZED HEALTH CARE EDUCATION/TRAINING PROGRAM

## 2024-04-08 RX ORDER — AMLODIPINE BESYLATE 5 MG/1
5 TABLET ORAL DAILY
Qty: 30 TABLET | Refills: 0 | Status: SHIPPED | OUTPATIENT
Start: 2024-04-08

## 2024-04-08 NOTE — ASSESSMENT & PLAN NOTE
-Initial BP in office today 110/58, orthostats 90/40 and 100/50  -Advised patient to not make quick movements  -Increase fluid hydration  -Monitor Bps at home and do not take BP medication if BP readings are low at home  -Advised patient to take Norvasc once daily instead of twice daily  -Patient reports limited appetite due to chemotherapy

## 2024-04-08 NOTE — ASSESSMENT & PLAN NOTE
-Patient takes norvasc twice a day and splits Benicar HCT 40-12.5 mg taking 1/2 in am and other half 1/2 in pm  -Notes with Zytiga in the past BP was elevated  -Due to orthostatic hypotension and recent falls advised patient to take Norvasc 5 mg once a day and continue rest of medical regimen  -Advised patient to monitor BP prior to taking BP medication  -Denies dizziness, headache, shortness of breath at visit today

## 2024-04-08 NOTE — PROGRESS NOTES
Name: Boni Forte      : 1955      MRN: [id number]  Encounter Provider: Madhuri Milner MD  Encounter Date: 2024   Encounter department: [facility]    Assessment & Plan     1. Orthostatic hypotension  Assessment & Plan:  -Initial BP in office today 110/58, orthostats 90/40 and 100/50  -Advised patient to not make quick movements  -Increase fluid hydration  -Monitor Bps at home and do not take BP medication if BP readings are low at home  -Advised patient to take Norvasc once daily instead of twice daily  -Patient reports limited appetite due to chemotherapy      2. Primary hypertension  Assessment & Plan:  -Patient takes norvasc twice a day and splits Benicar HCT 40-12.5 mg taking 1/2 in am and other half 1/2 in pm  -Notes with Zytiga in the past BP was elevated  -Due to orthostatic hypotension and recent falls advised patient to take Norvasc 5 mg once a day and continue rest of medical regimen  -Advised patient to monitor BP prior to taking BP medication  -Denies dizziness, headache, shortness of breath at visit today    Orders:  -     amLODIPine (NORVASC) 5 mg tablet; Take 1 tablet (5 mg total) by mouth daily    3. Uncomplicated opioid dependence (HCC)    4. Falls frequently  Assessment & Plan:  -Possibly 2/2 orthostatic hypotension  -Monitor BP at home  -Increase fluid hydration  -Patient denies headache, nausea, vomiting, and dizziness at visit today  -Patient is not on blood thinners         Orthostatic hypotension noted in office today, worsened by increase pain medication and lack of appetite. Patient notes decrease food intake. Follow up in 2 days.     Subjective      HPI    Fell yesterday and hit his head. Denies LOC. Has baseline cervical pain issues. Takes 5 mg of norvasc twice a day and benicar hct dose split in half. He has not been eating or drinking much due to chemotherapy and lack of appetite. He denies feeling dizzy at visit today. He ambulates with a cane.  He has chronic pain and is on opiates. He notes in the past when he started the zytiga it caused his BP to go high.     Review of Systems   Constitutional:  Negative for activity change, appetite change, chills, fatigue and fever.   HENT:  Negative for congestion.    Respiratory:  Negative for cough, shortness of breath and wheezing.    Cardiovascular:  Negative for chest pain, palpitations and leg swelling.   Gastrointestinal:  Negative for abdominal pain, constipation, diarrhea, nausea and vomiting.   Musculoskeletal:  Positive for arthralgias, gait problem, myalgias and neck pain.   Skin:  Negative for rash.   Neurological:  Negative for light-headedness and headaches.   Psychiatric/Behavioral:  The patient is not nervous/anxious.        Current Outpatient Medications on File Prior to Visit   Medication Sig   • abiraterone (ZYTIGA) 250 mg tablet 2 (two) times a day   • Armodafinil 250 MG tablet Take 1 tablet (250 mg total) by mouth daily   • buPROPion (WELLBUTRIN XL) 300 mg 24 hr tablet Take 1 tablet (300 mg total) by mouth every morning   • busPIRone (BUSPAR) 10 mg tablet Take 1 tablet (10 mg total) by mouth 3 (three) times a day   • Calcium Citrate-Vitamin D 250 mg-2.5 mcg tablet Take 1 tablet by mouth 2 (two) times a day   • Diclofenac Sodium (VOLTAREN) 1 % Apply 2 g topically 4 (four) times a day   • diphenoxylate-atropine (Lomotil) 2.5-0.025 mg per tablet Take 1 tablet by mouth as needed for diarrhea   • gabapentin (NEURONTIN) 400 mg capsule TAKE 1 CAPSULE (400 MG TOTAL) BY MOUTH THREE (THREE) TIMES A DAY NEW DOSE   • HYDROcodone-acetaminophen (NORCO)  mg per tablet Take 1 tablet by mouth every 4 (four) hours as needed (PAIN) Max Daily Amount: 6 tablets   • leuprolide (LUPRON DEPOT 3 MONTH KIT) 22.5 mg injection Inject into a muscle every 3 (three) months Every three months   • morphine (MS CONTIN) 15 mg 12 hr tablet Take 1 tablet (15 mg total) by mouth 2 (two) times a day Max Daily Amount: 30 mg   •  "multivitamin (THERAGRAN) TABS Take 1 tablet by mouth daily   • naloxone (NARCAN) 4 mg/0.1 mL nasal spray Administer 1 spray into a nostril. If breathing does not return to normal or if breathing difficulty resumes after 2-3 minutes, give another dose in the other nostril using a new spray.   • olmesartan-hydrochlorothiazide (BENICAR HCT) 40-12.5 MG per tablet Take 1 tablet by mouth daily   • omeprazole (PriLOSEC) 40 MG capsule Take 1 capsule (40 mg total) by mouth every 12 (twelve) hours   • predniSONE 5 mg tablet Take 1 tablet (5 mg total) by mouth daily   • sildenafil (Viagra) 100 mg tablet Take by mouth   • Suvorexant 10 MG TABS Take 1 tablet (10 mg total) by mouth daily at bedtime as needed (insomnia)   • tadalafil (CIALIS) 5 MG tablet Take 1 tablet (5 mg total) by mouth daily   • tamsulosin (FLOMAX) 0.4 mg Take 0.4 mg by mouth daily   • vilazodone (Viibryd) 40 mg tablet Take 1 tablet (40 mg total) by mouth daily with breakfast   • [DISCONTINUED] amLODIPine (NORVASC) 5 mg tablet Take 1 tablet (5 mg total) by mouth daily   • Zytiga 500  mg (Patient not taking: Reported on 4/8/2024)       Objective     /50 (BP Location: Left arm, Patient Position: Standing, Cuff Size: Standard)   Pulse 80   Temp 98.4 °F (36.9 °C) (Temporal)   Resp 18   Ht 5' 6.5\" (1.689 m)   Wt 76.9 kg (169 lb 9.6 oz)   SpO2 98%   BMI 26.96 kg/m²     Physical Exam  Constitutional:       Appearance: Normal appearance.   HENT:      Head: Normocephalic and atraumatic.   Cardiovascular:      Rate and Rhythm: Normal rate and regular rhythm.      Pulses: Normal pulses.      Heart sounds: Normal heart sounds.   Pulmonary:      Effort: Pulmonary effort is normal.      Breath sounds: Normal breath sounds.   Neurological:      General: No focal deficit present.      Mental Status: He is alert and oriented to person, place, and time.      Cranial Nerves: No cranial nerve deficit.   Psychiatric:         Mood and Affect: Mood normal.         " Behavior: Behavior normal.         Thought Content: Thought content normal.         Judgment: Judgment normal.       Madhuri Milner MD

## 2024-04-08 NOTE — ASSESSMENT & PLAN NOTE
-Possibly 2/2 orthostatic hypotension  -Monitor BP at home  -Increase fluid hydration  -Patient denies headache, nausea, vomiting, and dizziness at visit today  -Patient is not on blood thinners

## 2024-04-09 DIAGNOSIS — K21.9 GASTROESOPHAGEAL REFLUX DISEASE WITHOUT ESOPHAGITIS: ICD-10-CM

## 2024-04-09 DIAGNOSIS — E78.00 HYPERCHOLESTEROLEMIA: ICD-10-CM

## 2024-04-09 DIAGNOSIS — I10 PRIMARY HYPERTENSION: Primary | ICD-10-CM

## 2024-04-10 ENCOUNTER — TELEPHONE (OUTPATIENT)
Dept: ADMINISTRATIVE | Facility: OTHER | Age: 69
End: 2024-04-10

## 2024-04-10 ENCOUNTER — OFFICE VISIT (OUTPATIENT)
Dept: FAMILY MEDICINE CLINIC | Facility: CLINIC | Age: 69
End: 2024-04-10
Payer: MEDICARE

## 2024-04-10 VITALS
HEIGHT: 67 IN | TEMPERATURE: 97.4 F | WEIGHT: 174 LBS | OXYGEN SATURATION: 97 % | SYSTOLIC BLOOD PRESSURE: 112 MMHG | DIASTOLIC BLOOD PRESSURE: 78 MMHG | HEART RATE: 88 BPM | BODY MASS INDEX: 27.31 KG/M2 | RESPIRATION RATE: 18 BRPM

## 2024-04-10 DIAGNOSIS — L98.9 SKIN LESION OF RIGHT ARM: ICD-10-CM

## 2024-04-10 DIAGNOSIS — R29.6 FALLS FREQUENTLY: ICD-10-CM

## 2024-04-10 DIAGNOSIS — I95.1 ORTHOSTATIC HYPOTENSION: ICD-10-CM

## 2024-04-10 DIAGNOSIS — I10 PRIMARY HYPERTENSION: Primary | ICD-10-CM

## 2024-04-10 PROCEDURE — G2211 COMPLEX E/M VISIT ADD ON: HCPCS | Performed by: STUDENT IN AN ORGANIZED HEALTH CARE EDUCATION/TRAINING PROGRAM

## 2024-04-10 PROCEDURE — 99214 OFFICE O/P EST MOD 30 MIN: CPT | Performed by: STUDENT IN AN ORGANIZED HEALTH CARE EDUCATION/TRAINING PROGRAM

## 2024-04-10 RX ORDER — OLMESARTAN MEDOXOMIL 20 MG/1
20 TABLET ORAL DAILY
Qty: 30 TABLET | Refills: 0 | Status: SHIPPED | OUTPATIENT
Start: 2024-04-10

## 2024-04-10 NOTE — ASSESSMENT & PLAN NOTE
-Falls most likely caused by multiple factors including hypotension, opioid dependence and alcohol use  -Continue to use cane to ambulate  -Updated blood pressure treatment regimen  -Follow up in a few days in office to monitor BP

## 2024-04-10 NOTE — TELEPHONE ENCOUNTER
04/10/24 4:10 PM    Patient contacted (left message) to bring Advance Directive, POLST, or Living Will document to next scheduled pcp visit.    Thank you.  Alessandro Smart  [facility]

## 2024-04-10 NOTE — PROGRESS NOTES
Name: Boni Forte      : 1955      MRN: [id number]  Encounter Provider: Madhuri Milner MD  Encounter Date: 4/10/2024   Encounter department: [facility]    Assessment & Plan     1. Primary hypertension  Assessment & Plan:  -/78 in office, patient notes he fell once yesterday  -Stop Benicar HCT and start Benicar 20 mg daily and Norvasc 5 mg daily  -Denies chest pain, SOB, headache and dizziness    Orders:  -     olmesartan (BENICAR) 20 mg tablet; Take 1 tablet (20 mg total) by mouth daily    2. Skin lesion of right arm  -     hydrocortisone 2.5 % cream; Apply topically 2 (two) times a day    3. Orthostatic hypotension  Assessment & Plan:  -Continue increased fluid hydration  -See plan under primary HTN      4. Falls frequently  Assessment & Plan:  -Falls most likely caused by multiple factors including hypotension, opioid dependence and alcohol use  -Continue to use cane to ambulate  -Updated blood pressure treatment regimen  -Follow up in a few days in office to monitor BP           Subjective      HPI    Patient presents for blood pressure check. He notes he fell again once since his last visit a couple day ago. He notes his right rib is sore but denies breathing difficulty. He notes he drinks about two  glasses of wine per week. He notes he does not take pain medications as frequently stating he does not take norco as much and cut back on morphine. He has a lesion on his right arm that appeared a couple days ago. He notes it is not painful or itchy.     Review of Systems   Constitutional:  Positive for activity change and appetite change.   HENT:  Negative for congestion.    Respiratory:  Negative for cough, shortness of breath and wheezing.    Cardiovascular:  Negative for chest pain, palpitations and leg swelling.   Gastrointestinal:  Negative for abdominal pain, constipation, diarrhea, nausea and vomiting.   Musculoskeletal:  Positive for arthralgias and myalgias.    Skin:  Positive for rash.   Neurological:  Positive for weakness. Negative for dizziness and light-headedness.   Psychiatric/Behavioral:  The patient is not nervous/anxious.        Current Outpatient Medications on File Prior to Visit   Medication Sig   • abiraterone (ZYTIGA) 250 mg tablet 2 (two) times a day   • amLODIPine (NORVASC) 5 mg tablet Take 1 tablet (5 mg total) by mouth daily   • Armodafinil 250 MG tablet Take 1 tablet (250 mg total) by mouth daily   • buPROPion (WELLBUTRIN XL) 300 mg 24 hr tablet Take 1 tablet (300 mg total) by mouth every morning   • busPIRone (BUSPAR) 10 mg tablet Take 1 tablet (10 mg total) by mouth 3 (three) times a day   • Calcium Citrate-Vitamin D 250 mg-2.5 mcg tablet Take 1 tablet by mouth 2 (two) times a day   • Diclofenac Sodium (VOLTAREN) 1 % Apply 2 g topically 4 (four) times a day   • diphenoxylate-atropine (Lomotil) 2.5-0.025 mg per tablet Take 1 tablet by mouth as needed for diarrhea   • gabapentin (NEURONTIN) 400 mg capsule TAKE 1 CAPSULE (400 MG TOTAL) BY MOUTH THREE (THREE) TIMES A DAY NEW DOSE   • HYDROcodone-acetaminophen (NORCO)  mg per tablet Take 1 tablet by mouth every 4 (four) hours as needed (PAIN) Max Daily Amount: 6 tablets   • leuprolide (LUPRON DEPOT 3 MONTH KIT) 22.5 mg injection Inject into a muscle every 3 (three) months Every three months   • morphine (MS CONTIN) 15 mg 12 hr tablet Take 1 tablet (15 mg total) by mouth 2 (two) times a day Max Daily Amount: 30 mg   • multivitamin (THERAGRAN) TABS Take 1 tablet by mouth daily   • naloxone (NARCAN) 4 mg/0.1 mL nasal spray Administer 1 spray into a nostril. If breathing does not return to normal or if breathing difficulty resumes after 2-3 minutes, give another dose in the other nostril using a new spray.   • omeprazole (PriLOSEC) 40 MG capsule Take 1 capsule (40 mg total) by mouth every 12 (twelve) hours   • predniSONE 5 mg tablet Take 1 tablet (5 mg total) by mouth daily   • sildenafil (Viagra) 100  "mg tablet Take by mouth   • Suvorexant 10 MG TABS Take 1 tablet (10 mg total) by mouth daily at bedtime as needed (insomnia)   • tadalafil (CIALIS) 5 MG tablet Take 1 tablet (5 mg total) by mouth daily   • tamsulosin (FLOMAX) 0.4 mg Take 0.4 mg by mouth daily   • vilazodone (Viibryd) 40 mg tablet Take 1 tablet (40 mg total) by mouth daily with breakfast   • Zytiga 500  mg   • [DISCONTINUED] olmesartan-hydrochlorothiazide (BENICAR HCT) 40-12.5 MG per tablet Take 1 tablet by mouth daily       Objective     /78 (BP Location: Left arm, Patient Position: Sitting, Cuff Size: Large)   Pulse 88   Temp (!) 97.4 °F (36.3 °C) (Temporal)   Resp 18   Ht 5' 6.5\" (1.689 m)   Wt 78.9 kg (174 lb)   SpO2 97%   BMI 27.66 kg/m²     Physical Exam  Constitutional:       Appearance: Normal appearance.   HENT:      Head: Normocephalic and atraumatic.   Cardiovascular:      Rate and Rhythm: Normal rate and regular rhythm.      Pulses: Normal pulses.      Heart sounds: Normal heart sounds.   Pulmonary:      Effort: Pulmonary effort is normal.      Breath sounds: Normal breath sounds.   Neurological:      General: No focal deficit present.      Mental Status: He is alert and oriented to person, place, and time.   Psychiatric:         Mood and Affect: Mood normal.         Behavior: Behavior normal.         Thought Content: Thought content normal.         Judgment: Judgment normal.       Madhuri Milner MD    "

## 2024-04-10 NOTE — ASSESSMENT & PLAN NOTE
-/78 in office, patient notes he fell once yesterday  -Stop Benicar HCT and start Benicar 20 mg daily and Norvasc 5 mg daily  -Denies chest pain, SOB, headache and dizziness

## 2024-04-11 ENCOUNTER — APPOINTMENT (OUTPATIENT)
Dept: PHYSICAL THERAPY | Facility: CLINIC | Age: 69
End: 2024-04-11
Payer: MEDICARE

## 2024-04-14 DIAGNOSIS — G47.9 SLEEP DISTURBANCE: ICD-10-CM

## 2024-04-14 DIAGNOSIS — M54.41 CHRONIC BILATERAL LOW BACK PAIN WITH BILATERAL SCIATICA: ICD-10-CM

## 2024-04-14 DIAGNOSIS — G47.30 SLEEP APNEA, UNSPECIFIED TYPE: ICD-10-CM

## 2024-04-14 DIAGNOSIS — R53.83 FATIGUE, UNSPECIFIED TYPE: ICD-10-CM

## 2024-04-14 DIAGNOSIS — G89.29 CHRONIC BILATERAL LOW BACK PAIN WITH BILATERAL SCIATICA: ICD-10-CM

## 2024-04-14 DIAGNOSIS — K21.9 GASTROESOPHAGEAL REFLUX DISEASE WITHOUT ESOPHAGITIS: ICD-10-CM

## 2024-04-14 DIAGNOSIS — M54.42 CHRONIC BILATERAL LOW BACK PAIN WITH BILATERAL SCIATICA: ICD-10-CM

## 2024-04-15 ENCOUNTER — OFFICE VISIT (OUTPATIENT)
Dept: FAMILY MEDICINE CLINIC | Facility: CLINIC | Age: 69
End: 2024-04-15
Payer: MEDICARE

## 2024-04-15 ENCOUNTER — OFFICE VISIT (OUTPATIENT)
Dept: PHYSICAL THERAPY | Facility: CLINIC | Age: 69
End: 2024-04-15
Payer: MEDICARE

## 2024-04-15 VITALS
WEIGHT: 169 LBS | TEMPERATURE: 97.5 F | SYSTOLIC BLOOD PRESSURE: 148 MMHG | OXYGEN SATURATION: 93 % | RESPIRATION RATE: 14 BRPM | HEIGHT: 67 IN | DIASTOLIC BLOOD PRESSURE: 90 MMHG | BODY MASS INDEX: 26.53 KG/M2 | HEART RATE: 86 BPM

## 2024-04-15 DIAGNOSIS — C61 MALIGNANT NEOPLASM OF PROSTATE (HCC): ICD-10-CM

## 2024-04-15 DIAGNOSIS — R26.89 BALANCE PROBLEM: Primary | ICD-10-CM

## 2024-04-15 DIAGNOSIS — K21.9 GASTROESOPHAGEAL REFLUX DISEASE WITHOUT ESOPHAGITIS: ICD-10-CM

## 2024-04-15 DIAGNOSIS — M25.552 LEFT HIP PAIN: ICD-10-CM

## 2024-04-15 DIAGNOSIS — M15.9 GENERALIZED OSTEOARTHRITIS OF MULTIPLE SITES: ICD-10-CM

## 2024-04-15 DIAGNOSIS — E78.00 HYPERCHOLESTEROLEMIA: ICD-10-CM

## 2024-04-15 DIAGNOSIS — G89.29 CHRONIC BILATERAL LOW BACK PAIN WITH BILATERAL SCIATICA: ICD-10-CM

## 2024-04-15 DIAGNOSIS — M54.41 CHRONIC BILATERAL LOW BACK PAIN WITH BILATERAL SCIATICA: ICD-10-CM

## 2024-04-15 DIAGNOSIS — M54.42 CHRONIC BILATERAL LOW BACK PAIN WITH BILATERAL SCIATICA: ICD-10-CM

## 2024-04-15 DIAGNOSIS — M48.062 SPINAL STENOSIS, LUMBAR REGION WITH NEUROGENIC CLAUDICATION: ICD-10-CM

## 2024-04-15 DIAGNOSIS — M25.551 RIGHT HIP PAIN: ICD-10-CM

## 2024-04-15 DIAGNOSIS — M51.06 INTERVERTEBRAL DISC DISORDER OF LUMBAR REGION WITH MYELOPATHY: ICD-10-CM

## 2024-04-15 DIAGNOSIS — N18.30 STAGE 3 CHRONIC KIDNEY DISEASE, UNSPECIFIED WHETHER STAGE 3A OR 3B CKD (HCC): ICD-10-CM

## 2024-04-15 DIAGNOSIS — I10 PRIMARY HYPERTENSION: Primary | ICD-10-CM

## 2024-04-15 PROBLEM — M21.70 LEG LENGTH DISCREPANCY: Status: RESOLVED | Noted: 2017-12-15 | Resolved: 2024-04-15

## 2024-04-15 PROBLEM — I95.1 ORTHOSTATIC HYPOTENSION: Status: RESOLVED | Noted: 2024-04-08 | Resolved: 2024-04-15

## 2024-04-15 PROBLEM — R29.6 FALLS FREQUENTLY: Status: RESOLVED | Noted: 2024-04-08 | Resolved: 2024-04-15

## 2024-04-15 PROCEDURE — 97112 NEUROMUSCULAR REEDUCATION: CPT | Performed by: PHYSICAL THERAPIST

## 2024-04-15 PROCEDURE — 99214 OFFICE O/P EST MOD 30 MIN: CPT | Performed by: FAMILY MEDICINE

## 2024-04-15 PROCEDURE — G2211 COMPLEX E/M VISIT ADD ON: HCPCS | Performed by: FAMILY MEDICINE

## 2024-04-15 RX ORDER — OMEPRAZOLE 40 MG/1
40 CAPSULE, DELAYED RELEASE ORAL EVERY 12 HOURS
Qty: 180 CAPSULE | Refills: 1 | Status: SHIPPED | OUTPATIENT
Start: 2024-04-15

## 2024-04-15 NOTE — PROGRESS NOTES
Name: Boni Forte      : 1955      MRN: [id number]  Encounter Provider: Elier Oh MD  Encounter Date: 4/15/2024   Encounter department: [facility]    Assessment & Plan     1. Primary hypertension  Assessment & Plan:  STABLE  DENIES ANY CP, SOB, PALPITATIONS, OR HEADACHE  NOTES NO WATER RETENTION  COMPLIANT WITH MEDICATION  NO CONCERNS    - CONTINUE CURRENT TREATMENT PLAN  - MONITOR DIETARY SODIUM INTAKE  - ENCOURAGE PHYSICAL ACTIVITY  - RV 3 MONTHS        2. Gastroesophageal reflux disease without esophagitis  Assessment & Plan:  STABLE  DENIES ANY CP, INDIGESTION, OR COUGH  NOTES NO MELENA OR HEMATOCHEZIA  NO HEMATEMESIS  COMPLIANT WITH MEDICATION  NO CONCERNS    - CONTINUE CURRENT TREATMENT PLAN  - MEDICATION AS PRESCRIBED  - AVOID CAFFEINE, ALCOHOL OR SMOKING        3. Chronic bilateral low back pain with bilateral sciatica    4. 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    5. Generalized osteoarthritis of multiple sites  Assessment & Plan:  STABLE  DENIES ANY JOINT SWELLING OR REDNESS  JOINT STIFFNESS PRESENT  PAIN MANAGEMENT ADEQUATE    - CONTINUE CURRENT MANAGEMENT  - MEDICATION AS DIRECTED  - CALL / RETURN IF SYMPTOMS WORSEN        6. Malignant neoplasm of prostate (HCC)    7. Stage 3 chronic kidney disease, unspecified whether stage 3a or 3b CKD (HCC)    8. Spinal stenosis, lumbar region with neurogenic claudication    9. Hypercholesterolemia  Assessment & Plan:  WATCHING CHOLESTEROL INTAKE  COMPLIANT WITH MEDICATION  NO CONCERNS    - CONTINUE TO MONITOR DIETARY CHOL INTAKE  - CONTINUE CURRENT MEDICATION  - ENCOURAGED PHYSICAL ACTIVITY               Subjective      PATIENT RETURNS FOR ROUTINE EVALUATION OF PATIENT'S MEDICAL ISSUES    INDIVIDUAL MEDICAL ISSUES WITH THEIR CURRENT STATUS, ASSESSMENT AND PLANS ARE LISTED ABOVE           Review of Systems   Constitutional:  Positive for fatigue. Negative for chills and fever.   HENT:  Negative  for congestion, ear discharge, ear pain, mouth sores, postnasal drip, sore throat and trouble swallowing.    Eyes:  Negative for pain, discharge and visual disturbance.   Respiratory:  Negative for cough, shortness of breath and wheezing.    Cardiovascular:  Negative for chest pain, palpitations and leg swelling.   Gastrointestinal:  Negative for abdominal distention, abdominal pain, blood in stool, diarrhea and nausea.   Endocrine: Negative for polydipsia, polyphagia and polyuria.   Genitourinary:  Negative for dysuria, frequency, hematuria and urgency.   Musculoskeletal:  Positive for arthralgias, back pain, gait problem, neck pain and neck stiffness. Negative for joint swelling.   Skin:  Negative for pallor and rash.   Neurological:  Negative for dizziness, syncope, speech difficulty, weakness, light-headedness, numbness and headaches.   Hematological:  Negative for adenopathy.   Psychiatric/Behavioral:  Positive for dysphoric mood. Negative for behavioral problems, confusion and sleep disturbance. The patient is nervous/anxious.        Current Outpatient Medications on File Prior to Visit   Medication Sig   • abiraterone (ZYTIGA) 250 mg tablet 500 mg daily   • amLODIPine (NORVASC) 5 mg tablet Take 1 tablet (5 mg total) by mouth daily   • Armodafinil 250 MG tablet Take 1 tablet (250 mg total) by mouth daily   • buPROPion (WELLBUTRIN XL) 300 mg 24 hr tablet Take 1 tablet (300 mg total) by mouth every morning   • busPIRone (BUSPAR) 10 mg tablet Take 1 tablet (10 mg total) by mouth 3 (three) times a day   • Calcium Citrate-Vitamin D 250 mg-2.5 mcg tablet Take 1 tablet by mouth 2 (two) times a day   • Diclofenac Sodium (VOLTAREN) 1 % Apply 2 g topically 4 (four) times a day   • diphenoxylate-atropine (Lomotil) 2.5-0.025 mg per tablet Take 1 tablet by mouth as needed for diarrhea   • gabapentin (NEURONTIN) 400 mg capsule TAKE 1 CAPSULE (400 MG TOTAL) BY MOUTH THREE (THREE) TIMES A DAY NEW DOSE   •  "HYDROcodone-acetaminophen (NORCO)  mg per tablet Take 1 tablet by mouth every 4 (four) hours as needed (PAIN) Max Daily Amount: 6 tablets   • hydrocortisone 2.5 % cream Apply topically 2 (two) times a day   • leuprolide (LUPRON DEPOT 3 MONTH KIT) 22.5 mg injection Inject into a muscle every 3 (three) months Every three months   • morphine (MS CONTIN) 15 mg 12 hr tablet Take 1 tablet (15 mg total) by mouth 2 (two) times a day Max Daily Amount: 30 mg   • multivitamin (THERAGRAN) TABS Take 1 tablet by mouth daily   • naloxone (NARCAN) 4 mg/0.1 mL nasal spray Administer 1 spray into a nostril. If breathing does not return to normal or if breathing difficulty resumes after 2-3 minutes, give another dose in the other nostril using a new spray.   • olmesartan (BENICAR) 20 mg tablet Take 1 tablet (20 mg total) by mouth daily   • omeprazole (PriLOSEC) 40 MG capsule Take 1 capsule (40 mg total) by mouth every 12 (twelve) hours   • predniSONE 5 mg tablet Take 1 tablet (5 mg total) by mouth daily   • sildenafil (Viagra) 100 mg tablet Take by mouth   • Suvorexant 10 MG TABS Take 1 tablet (10 mg total) by mouth daily at bedtime as needed (insomnia)   • tadalafil (CIALIS) 5 MG tablet Take 1 tablet (5 mg total) by mouth daily   • vilazodone (Viibryd) 40 mg tablet Take 1 tablet (40 mg total) by mouth daily with breakfast   • tamsulosin (FLOMAX) 0.4 mg Take 0.4 mg by mouth daily   • Zytiga 500  mg (Patient not taking: Reported on 4/15/2024)       Objective     /90 (BP Location: Left arm, Patient Position: Sitting, Cuff Size: Large)   Pulse 86   Temp 97.5 °F (36.4 °C) (Temporal)   Resp 14   Ht 5' 6.5\" (1.689 m)   Wt 76.7 kg (169 lb)   SpO2 93%   BMI 26.87 kg/m²     Physical Exam  Vitals reviewed.   Constitutional:       General: He is not in acute distress.     Appearance: Normal appearance. He is well-developed and normal weight. He is not ill-appearing.   HENT:      Head: Normocephalic and atraumatic. "   Eyes:      General:         Right eye: No discharge.         Left eye: No discharge.      Conjunctiva/sclera: Conjunctivae normal.      Pupils: Pupils are equal, round, and reactive to light.   Neck:      Thyroid: No thyromegaly.      Vascular: No JVD.   Cardiovascular:      Rate and Rhythm: Normal rate and regular rhythm.      Heart sounds: Normal heart sounds. No murmur heard.  Pulmonary:      Effort: Pulmonary effort is normal.      Breath sounds: Normal breath sounds. No wheezing or rales.   Abdominal:      General: Bowel sounds are normal.      Palpations: Abdomen is soft. There is no mass.      Tenderness: There is no abdominal tenderness. There is no guarding or rebound.   Musculoskeletal:         General: No tenderness or deformity.      Cervical back: Neck supple.      Comments: MODERATELY SEVERE DJD CHANGES     Lymphadenopathy:      Cervical: No cervical adenopathy.   Skin:     General: Skin is warm and dry.      Findings: No erythema or rash.   Neurological:      General: No focal deficit present.      Mental Status: He is alert and oriented to person, place, and time.   Psychiatric:         Mood and Affect: Mood normal.         Behavior: Behavior normal.         Thought Content: Thought content normal.         Judgment: Judgment normal.       Elier Oh MD

## 2024-04-15 NOTE — PROGRESS NOTES
"Daily Note     Today's date: 4/15/2024  Patient name: Boni Forte  : 1955  MRN: [id number]  Referring provider: Alessandro Roldan MD  Dx:   Encounter Diagnosis     ICD-10-CM    1. Balance problem  R26.89       2. Left hip pain  M25.552       3. Right hip pain  M25.551                      Subjective: Boni Forte reports he had 3-4 falls over past couple weeks.  One fall in particular he bruised his ribs on left side but they have recovered since.  He does report his left side of neck has been sore since.        Objective: See treatment diary below      Assessment: Tolerated treatment well. Patient with left sided CS pain that limited responded well to gentle ROM and targeted stretching.  He however continues with left sided neck pain throughout all standing exercises as well.  He required frequent cues to perform scap retraction to reduce \"tension\" in upper trap and lev scap.  He also continues with poor proprioceptive responses during all balance exercises seen on biodex training.      Plan: Continue per plan of care.  Progress note during next visit.      Precautions:   Past Medical History:   Diagnosis Date    Anxiety     Arthritis     Contreras's esophagus     Cancer (HCC)     Stage 1 prostrate cancer; under active surveillance.    Depression     GERD (gastroesophageal reflux disease)     Head injury     Hypertension     Osteoarthritis     Prostate cancer (HCC)     Psychiatric disorder     Sleep apnea     CPAP at     Spinal arthritis            Manuals 4/15 4/1 3/28 3/21 3/18                                   Neuro Re-Ed        Biodex LOS @L11 3x +  Maze control - @L11 1x w/CgA+ 2x static Weight shift L9 2 mins, weight shift squats L9 3x10, LOS lvl 9,8,7, RCT 2 min lvl 10 Weight shift L9 2 mins, weight shift squats L10 2x10, LOS lvl 10 3x, RCT 2 min lvl 10 Weight shift L8 2 mins, weight shift L9 squats 3x10    LOS lvl 9 LOS lvl 9, weight shift 2min, squats weight shift 3x10 " lvl 8   STS  W/ feet on flat 2x10, tidal tank 2x10 W/ feet on flat 2x10, tidal tank 10x W/feet on airex 2x10    1x10 flat 2x10, 2x10 w/ tidal tank presses, 2x10 w/ 10#   Side stepping  [id number] w/ tidal tank 5x10ft w/ tidal tank Along airex  20 laps W/ tidal tank [id number]   Standing slouch/overcorrect At railing  10x10s w/ TCs for proper recruitment       Stand hip abd at rail 2x10 B limited by left CS pain + required cues to hip hinge       Stepping over hurdles    Step to march 15x ea Step up onto bosu 1x10 ea Stepping onto bosu 2x10 ea   Lateral hurdles    Stepping into side lunge 2x10 BLE Stepping into side lunge 10x ea   SLS  On/Off Horse 2x10 ea W/ hip abd 2x10     Ther Ex        Assessment & Managment        Seated SLR-LAQ  15x ea 10x ea 2x10 ea 2x10 ea   Seated L/S Flexion        CS retraction 15x5s +  15x5s w/ nod       Lev scap str 3x30s                                Ther Activity                        Gait Training                        Modalities

## 2024-04-15 NOTE — TELEPHONE ENCOUNTER
Requested medication(s) are due for refill today: Yes  Patient has already received a courtesy refill: No  Other reason request has been forwarded to provider: Medication not assigned to a protocol, review manually.

## 2024-04-15 NOTE — PATIENT INSTRUCTIONS
CONTINUE CURRENT TREATMENT PLAN  MONITOR SYMPTOMS  CONTINUE PT    DISCUSSED MEDICATION OPTIONS FOR DEPRESSION  CONSIDER INCREASE IN BUSPAR TO QID      RV 3 M

## 2024-04-16 RX ORDER — HYDROCODONE BITARTRATE AND ACETAMINOPHEN 10; 325 MG/1; MG/1
1 TABLET ORAL EVERY 4 HOURS PRN
Qty: 180 TABLET | Refills: 0 | Status: SHIPPED | OUTPATIENT
Start: 2024-04-16

## 2024-04-16 RX ORDER — ARMODAFINIL 250 MG/1
250 TABLET ORAL DAILY
Qty: 30 TABLET | Refills: 0 | Status: SHIPPED | OUTPATIENT
Start: 2024-04-16

## 2024-04-16 RX ORDER — MORPHINE SULFATE 15 MG/1
15 TABLET, FILM COATED, EXTENDED RELEASE ORAL 2 TIMES DAILY
Qty: 60 TABLET | Refills: 0 | Status: SHIPPED | OUTPATIENT
Start: 2024-04-16

## 2024-04-18 ENCOUNTER — APPOINTMENT (OUTPATIENT)
Dept: PHYSICAL THERAPY | Facility: CLINIC | Age: 69
End: 2024-04-18
Payer: MEDICARE

## 2024-04-22 ENCOUNTER — EVALUATION (OUTPATIENT)
Dept: PHYSICAL THERAPY | Facility: CLINIC | Age: 69
End: 2024-04-22
Payer: MEDICARE

## 2024-04-22 DIAGNOSIS — R26.89 BALANCE PROBLEM: ICD-10-CM

## 2024-04-22 DIAGNOSIS — M25.551 RIGHT HIP PAIN: Primary | ICD-10-CM

## 2024-04-22 DIAGNOSIS — M25.552 LEFT HIP PAIN: ICD-10-CM

## 2024-04-22 PROCEDURE — 97110 THERAPEUTIC EXERCISES: CPT | Performed by: PHYSICAL THERAPIST

## 2024-04-22 NOTE — PROGRESS NOTES
PT Re-Evaluation     Today's date: 2024  Patient name: Boni Forte  : 1955  MRN: [id number]  Referring provider: Alessandro Roldan MD  Dx:   Encounter Diagnosis     ICD-10-CM    1. Right hip pain  M25.551       2. Left hip pain  M25.552       3. Balance problem  R26.89                      Assessment details: Boni Forte is a 69 y.o. male with history of lumbar laminectomy on 2023 with the associated impairments including: ROM restriction, balance deficits, strength deficits, and activity limitation. He demonstrates minimal improvements over the last few weeks, though he has been unable to attend formal physical therapy due to health issues surrounding cancer treatments and a fall with rib injury.  He demonstrates mild improvements with functional outcome measures, specifically with dual task TUG and 6 min walk, though he does not demonstrate improvement with static balance testing and objective MMT scores.  He would likely benefit from continued functional training to address dynamic balance deficits as he continues to fall consistently and presents a fall risk with overground ambulation at this time.  Due to these impairments, patient has continues to have difficulty performing the following tasks: ADLs stair navigation, prolonged walking, sit-to-stand transfers, walking on uneven terrain, getting in/out of the car, getting in/out of the bath, lifting objects, carrying objects, and sleeping.  Patient would likely benefit from continued skilled physical therapy services to address the above functional limitations through a targeted program consisting of global functional strengthening, static and dynamic balance training, and graded increase in functional activity training in order to progress towards prior level of function and independence with home exercise program.  Impairments: abnormal gait, abnormal muscle firing, abnormal or restricted ROM, activity intolerance, impaired  physical strength, lacks appropriate home exercise program, pain with function and poor body mechanics  Understanding of Dx/Px/POC: good   Prognosis: good   Goals  Short Term Goals (3 weeks)  1. Patient will be independent with initial HEP emphasizing aerobic conditioning-met (patient able to ride the bike for short periods at the gym)  2. Patient will demonstrate ability to put on socks/shoes without support.-met  3. Patient will demonstrate an increase in right hip strength of 1/2 grade on MMT to assist with don/doffing shoes-progressing towards    Long Term Goals (6 weeks)  1. Patient will demonstrate an increase in bilateral hip strength of 1/2 grade to assist with bed mobility.-met  2. Patient will demonstrate an increase in right hip strength to 4/5 on MMT.-progressing towards  3. Patient will be able to ascend/descend 15 stairs reciprocally with 1 UE assist safely without imbalance.-progressing towards  4. Patient will be able to ambulate 500 feet on varied terrain with AD without rest breaks.-progressing towards  5. Patient will be able to perform all basic ADLs without modification.  6. Patient will be able to walk for 30 minutes without LOB or need for a rest break at sidewalk in neighborhood.-progressing towards    Plan  Patient would benefit from: skilled physical therapy  Planned modality interventions: cryotherapy, electrical stimulation/Russian stimulation, TENS, ultrasound, thermotherapy: hydrocollator packs, unattended electrical stimulation, high voltage pulsed current: pain management and high voltage pulsed current: spasm management  Planned therapy interventions: flexibility, functional ROM exercises, graded exercise, home exercise program, joint mobilization, manual therapy, neuromuscular re-education, patient education, strengthening, stretching, therapeutic exercise, therapeutic activities, Vides taping, balance/weight bearing training, gait training, abdominal trunk stabilization,  activity modification, balance, body mechanics training, graded activity, self care, postural training, IADL retraining, ADL training, breathing training, behavior modification, muscle pump exercises, therapeutic training and transfer training  Frequency: 1-2x week  Duration in weeks: 6  Treatment plan discussed with: patient         Subjective Evaluation     History of Present Illness  Date of surgery: 23  Mechanism of injury: Pt presents today with minimal pain after a fall last week in which he bruised his ribs without fracture.  He reports he lost his balance and fell 4 times in the previous 3 weeks.  He has not fallen in the previous week.  He reports worry that he has not progressed appropriately due in part to difficulty with side effects regarding cancer treatment and loss of balance episodes.  Additionally, he is making an appointment to follow up with neurosurgeon.      Pain  Current pain ratin  At best pain ratin  At worst pain ratin  Location: Right hip/back  Quality: dull ache  Relieving factors: medications  Aggravating factors: walking, standing, stair climbing and lifting  Progression: improved     Social Support  Steps to enter house: yes  8  Stairs in house: yes   15  Lives with: spouse     Employment status: not working (Retired)  Hand dominance: right  Exercise history: Walking and yard work       Diagnostic Tests  X-ray: normal  Treatments  No previous or current treatments  Patient Goals  Patient goals for therapy: increased motion              Objective      Neurological Testing      Sensation      Hip   Left Hip   Hyposensation: light touch     Right Hip   Hyposensation: light touch     Comments   Left light touch: Reports symmetrical hyposensation below each knee and into both feet; intact above the knee.      Active Range of Motion   Left Hip   Flexion: 110 degrees   Abduction: 36 degrees   External rotation (90/90): 38 degrees      Right Hip   Flexion: 105   Abduction: 38  degrees   External rotation (90/90): 41 degrees      Strength/Myotome Testing     Additional Strength Details  MMT: L, R     Hip Flexion: 4-, 4  Hip ER: 4-, 4-  Hip Abd: 3, 3  Hip Ext: 4-, 4-    Knee Ext: 4-, 4-  Knee Flex: 4, 4+     Ambulation      Comments   Ambulates with wide-based gait with SPC.  Foot slap noted of the R foot with ambulation and increased deficit with dorsiflexion with prolonged ambulation.  Increased foot slap noted with 6 min walk.    Timed-Up-and-Go Score: 12.27s SPC (CO.23s)  6 min walk: 1150 ft w/ SPC   5x STS: 14.57s          Precautions:   Past Medical History:   Diagnosis Date    Anxiety     Arthritis     Contreras's esophagus     Cancer (HCC) 2018    Stage 1 prostrate cancer; under active surveillance.    Depression     GERD (gastroesophageal reflux disease)     Head injury     Hypertension     Osteoarthritis     Prostate cancer (HCC)     Psychiatric disorder     Sleep apnea     CPAP at [facility]    Spinal arthritis            Manuals                              Neuro Re-Ed                                                Ther Ex      Assessment & Management   Re-assessment and POC with emphasis on optimizing safe function and minimizing fall risk with ADLs                                             Ther Activity                  Gait Training                  Modalities

## 2024-04-25 ENCOUNTER — OFFICE VISIT (OUTPATIENT)
Dept: PHYSICAL THERAPY | Facility: CLINIC | Age: 69
End: 2024-04-25
Payer: MEDICARE

## 2024-04-25 DIAGNOSIS — M25.552 LEFT HIP PAIN: ICD-10-CM

## 2024-04-25 DIAGNOSIS — M25.551 RIGHT HIP PAIN: Primary | ICD-10-CM

## 2024-04-25 DIAGNOSIS — R26.89 BALANCE PROBLEM: ICD-10-CM

## 2024-04-25 PROCEDURE — 97110 THERAPEUTIC EXERCISES: CPT | Performed by: PHYSICAL THERAPIST

## 2024-04-25 NOTE — PROGRESS NOTES
Daily Note     Today's date: 2024  Patient name: Boni Forte  : 1955  MRN: [id number]  Referring provider: Alessandro Roldan MD  Dx:   Encounter Diagnosis     ICD-10-CM    1. Right hip pain  M25.551       2. Left hip pain  M25.552       3. Balance problem  R26.89                      Subjective: Patient reports he feels about the same overall, though he notices that his leg leg is giving out intermittently more regularly.      Objective: See treatment diary below      Assessment: Tolerated treatment well. Discussed POC with patient with emphasis on functional mobility and reducing fall risk going forward.  No sign of status change with hip strength and ROM, advised patient to monitor symptoms carefully.  Patient demonstrated fatigue post treatment, exhibited good technique with therapeutic exercises, and would benefit from continued PT      Plan: Continue per plan of care.  Progress treatment as tolerated.  Progress challenge as appropriate with irritability.     Precautions:   Past Medical History:   Diagnosis Date    Anxiety     Arthritis     Contreras's esophagus     Cancer (HCC)     Stage 1 prostrate cancer; under active surveillance.    Depression     GERD (gastroesophageal reflux disease)     Head injury     Hypertension     Osteoarthritis     Prostate cancer (HCC)     Psychiatric disorder     Sleep apnea     CPAP at     Spinal arthritis            Manuals                             Neuro Re-Ed                                                Ther Ex      Assessment & Management  POC with emphasis on balance and mobility improvement for function.  Assessed patients hip status  Re-assessment and POC with emphasis on optimizing safe function and minimizing fall risk with ADLs                                             Ther Activity                  Gait Training                  Modalities

## 2024-04-26 DIAGNOSIS — F33.0 MILD EPISODE OF RECURRENT MAJOR DEPRESSIVE DISORDER (HCC): ICD-10-CM

## 2024-04-26 RX ORDER — BUPROPION HYDROCHLORIDE 300 MG/1
300 TABLET ORAL EVERY MORNING
Qty: 90 TABLET | Refills: 1 | Status: SHIPPED | OUTPATIENT
Start: 2024-04-26

## 2024-04-29 ENCOUNTER — APPOINTMENT (OUTPATIENT)
Dept: PHYSICAL THERAPY | Facility: CLINIC | Age: 69
End: 2024-04-29
Payer: MEDICARE

## 2024-05-01 ENCOUNTER — APPOINTMENT (OUTPATIENT)
Dept: PHYSICAL THERAPY | Facility: CLINIC | Age: 69
End: 2024-05-01
Payer: MEDICARE

## 2024-05-02 ENCOUNTER — APPOINTMENT (OUTPATIENT)
Dept: PHYSICAL THERAPY | Facility: CLINIC | Age: 69
End: 2024-05-02
Payer: MEDICARE

## 2024-05-03 DIAGNOSIS — R73.09 ELEVATED GLUCOSE: Primary | ICD-10-CM

## 2024-05-06 ENCOUNTER — APPOINTMENT (OUTPATIENT)
Dept: PHYSICAL THERAPY | Facility: CLINIC | Age: 69
End: 2024-05-06
Payer: MEDICARE

## 2024-05-09 ENCOUNTER — OFFICE VISIT (OUTPATIENT)
Dept: PHYSICAL THERAPY | Facility: CLINIC | Age: 69
End: 2024-05-09
Payer: MEDICARE

## 2024-05-09 DIAGNOSIS — R26.89 BALANCE PROBLEM: ICD-10-CM

## 2024-05-09 DIAGNOSIS — M25.552 LEFT HIP PAIN: ICD-10-CM

## 2024-05-09 DIAGNOSIS — M25.551 RIGHT HIP PAIN: Primary | ICD-10-CM

## 2024-05-09 PROCEDURE — 97112 NEUROMUSCULAR REEDUCATION: CPT | Performed by: PHYSICAL THERAPIST

## 2024-05-09 PROCEDURE — 97110 THERAPEUTIC EXERCISES: CPT | Performed by: PHYSICAL THERAPIST

## 2024-05-09 NOTE — PROGRESS NOTES
"Daily Note     Today's date: 2024  Patient name: Boni Forte  : 1955  MRN: [id number]  Referring provider: Alessandro Roldan MD  Dx:   Encounter Diagnosis     ICD-10-CM    1. Right hip pain  M25.551       2. Left hip pain  M25.552       3. Balance problem  R26.89                      Subjective: Patient reports he has been going to the gym more consistently and has felt more tightness as a result.      Objective: See treatment diary below      Assessment: Tolerated treatment well. Emphasized more flexibility through lumbar and bilateral hips to decrease pain with functional movements.  Patient demonstrated fatigue post treatment, exhibited good technique with therapeutic exercises, and would benefit from continued PT      Plan: Continue per plan of care.  Progress treatment as tolerated.  Progress challenge as appropriate with irritability.     Precautions:   Past Medical History:   Diagnosis Date    Anxiety     Arthritis     Contreras's esophagus     Cancer (HCC)     Stage 1 prostrate cancer; under active surveillance.    Depression     GERD (gastroesophageal reflux disease)     Head injury     Hypertension     Osteoarthritis     Prostate cancer (HCC)     Psychiatric disorder     Sleep apnea     CPAP at [facility]    Spinal arthritis            Manuals                            Neuro Re-Ed      STS 2x10     Bidoex LOS 2x lvl 8, weight shift squats lvl 8 2x10                                   Ther Ex      Assessment & Management  POC with emphasis on balance and mobility improvement for function.  Assessed patients hip status  Re-assessment and POC with emphasis on optimizing safe function and minimizing fall risk with ADLs   Figure 4 Str 10x10\"     Figure 4 rotation 2x10     L/S Rot 2x10     Seated Hamstring Str 10x5s     Hamstring 90-90 2x10                 Ther Activity                  Gait Training                  Modalities                         "

## 2024-05-13 ENCOUNTER — OFFICE VISIT (OUTPATIENT)
Dept: PHYSICAL THERAPY | Facility: CLINIC | Age: 69
End: 2024-05-13
Payer: MEDICARE

## 2024-05-13 DIAGNOSIS — R26.89 BALANCE PROBLEM: ICD-10-CM

## 2024-05-13 DIAGNOSIS — M25.551 RIGHT HIP PAIN: Primary | ICD-10-CM

## 2024-05-13 DIAGNOSIS — M25.552 LEFT HIP PAIN: ICD-10-CM

## 2024-05-13 PROCEDURE — 97112 NEUROMUSCULAR REEDUCATION: CPT | Performed by: PHYSICAL THERAPIST

## 2024-05-13 PROCEDURE — 97110 THERAPEUTIC EXERCISES: CPT | Performed by: PHYSICAL THERAPIST

## 2024-05-13 NOTE — PROGRESS NOTES
"Daily Note     Today's date: 2024  Patient name: Boni Forte  : 1955  MRN: [id number]  Referring provider: Alessandro Roldan MD  Dx:   Encounter Diagnosis     ICD-10-CM    1. Right hip pain  M25.551       2. Balance problem  R26.89       3. Left hip pain  M25.552                      Subjective: Patient reports he feels about the same, he was not able to stretch as much as he'd like this week.      Objective: See treatment diary below      Assessment: Tolerated treatment well. Patient continues to have difficulty with with significant lumbar and global hip ROM, continued to emphasize increased repetition of mobility exercises.  Patient demonstrated fatigue post treatment, exhibited good technique with therapeutic exercises, and would benefit from continued PT      Plan: Continue per plan of care.  Progress treatment as tolerated.  Progress challenge as appropriate with irritability.     Precautions:   Past Medical History:   Diagnosis Date    Anxiety     Arthritis     Contreras's esophagus     Cancer (HCC)     Stage 1 prostrate cancer; under active surveillance.    Depression     GERD (gastroesophageal reflux disease)     Head injury     Hypertension     Osteoarthritis     Prostate cancer (HCC)     Psychiatric disorder     Sleep apnea     CPAP at     Spinal arthritis            Manuals                            Neuro Re-Ed      STS 2x10 2x10    Bidoex LOS 2x lvl 8, weight shift squats  LOS 2x lvl 8, weight shift squats lvl 8 2x10                                  Ther Ex      Assessment & Management   POC with emphasis on balance and mobility improvement for function.  Assessed patients hip status    Figure 4 Str 10x10\" 10x10\"    Figure 4 rotation 2x10 2x10    L/S Rot 2x10 2x10    Seated Hamstring Str 10x10\" 10x5s    Hamstring 90-90 2x10 2x10    S/L T/S Rot 2x10     Gastroc Str 10x10\"     Ther Activity                  Gait Training                  Modalities    "

## 2024-05-16 ENCOUNTER — OFFICE VISIT (OUTPATIENT)
Dept: PHYSICAL THERAPY | Facility: CLINIC | Age: 69
End: 2024-05-16
Payer: MEDICARE

## 2024-05-16 DIAGNOSIS — M54.42 CHRONIC BILATERAL LOW BACK PAIN WITH BILATERAL SCIATICA: ICD-10-CM

## 2024-05-16 DIAGNOSIS — R53.83 FATIGUE, UNSPECIFIED TYPE: ICD-10-CM

## 2024-05-16 DIAGNOSIS — M54.41 CHRONIC BILATERAL LOW BACK PAIN WITH BILATERAL SCIATICA: ICD-10-CM

## 2024-05-16 DIAGNOSIS — R26.89 BALANCE PROBLEM: ICD-10-CM

## 2024-05-16 DIAGNOSIS — M25.551 RIGHT HIP PAIN: Primary | ICD-10-CM

## 2024-05-16 DIAGNOSIS — G56.00 CARPAL TUNNEL SYNDROME, UNSPECIFIED LATERALITY: Primary | ICD-10-CM

## 2024-05-16 DIAGNOSIS — G89.29 CHRONIC BILATERAL LOW BACK PAIN WITH BILATERAL SCIATICA: ICD-10-CM

## 2024-05-16 DIAGNOSIS — M25.552 LEFT HIP PAIN: ICD-10-CM

## 2024-05-16 DIAGNOSIS — I10 PRIMARY HYPERTENSION: ICD-10-CM

## 2024-05-16 DIAGNOSIS — G47.30 SLEEP APNEA, UNSPECIFIED TYPE: ICD-10-CM

## 2024-05-16 DIAGNOSIS — M15.9 GENERALIZED OSTEOARTHRITIS OF MULTIPLE SITES: Primary | ICD-10-CM

## 2024-05-16 PROCEDURE — 97112 NEUROMUSCULAR REEDUCATION: CPT | Performed by: PHYSICAL THERAPIST

## 2024-05-16 PROCEDURE — 97110 THERAPEUTIC EXERCISES: CPT | Performed by: PHYSICAL THERAPIST

## 2024-05-16 NOTE — PROGRESS NOTES
"Daily Note     Today's date: 2024  Patient name: Boni Forte  : 1955  MRN: [id number]  Referring provider: Alessandro Roldan MD  Dx:   Encounter Diagnosis     ICD-10-CM    1. Right hip pain  M25.551       2. Balance problem  R26.89       3. Left hip pain  M25.552                      Subjective: Patient reports he has been trying to stretch best he can.      Objective: See treatment diary below      Assessment: Tolerated treatment well. Patient continues to have significant flexibility deficits with hip ER and global LE ROM.  Emphasized continued flexibility with high repetitions.  Patient demonstrated fatigue post treatment, exhibited good technique with therapeutic exercises, and would benefit from continued PT      Plan: Continue per plan of care.  Progress treatment as tolerated.  Progress challenge as appropriate with irritability.     Precautions:   Past Medical History:   Diagnosis Date    Anxiety     Arthritis     Contreras's esophagus     Cancer (HCC)     Stage 1 prostrate cancer; under active surveillance.    Depression     GERD (gastroesophageal reflux disease)     Head injury     Hypertension     Osteoarthritis     Prostate cancer (HCC)     Psychiatric disorder     Sleep apnea     CPAP at HS    Spinal arthritis            Manuals                            Neuro Re-Ed      STS  2x10 2x10   Bidoex LOS 2x lvl 7, weight shift squats  2x10 lvl7 LOS 2x lvl 8, weight shift squats  LOS 2x lvl 8, weight shift squats lvl 8 2x10                                 Ther Ex      Assessment & Management      Figure 4 Str 10x10\" seated 10x10\" 10x10\"   Figure 4 rotation  2x10 2x10   L/S Rot 2x10 2x10 2x10   Seated Hamstring Str  10x10\" 10x5s   Hamstring 90-90 2x10 2x10 2x10   S/L T/S Rot 2x10 2x10    Gastroc Str  10x10\"    Ther Activity                  Gait Training                  Modalities                             "

## 2024-05-17 ENCOUNTER — TELEPHONE (OUTPATIENT)
Dept: HEMATOLOGY ONCOLOGY | Facility: MEDICAL CENTER | Age: 69
End: 2024-05-17

## 2024-05-17 DIAGNOSIS — F41.1 GAD (GENERALIZED ANXIETY DISORDER): ICD-10-CM

## 2024-05-17 RX ORDER — ARMODAFINIL 250 MG/1
250 TABLET ORAL DAILY
Qty: 30 TABLET | Refills: 0 | Status: SHIPPED | OUTPATIENT
Start: 2024-05-17

## 2024-05-17 RX ORDER — HYDROCODONE BITARTRATE AND ACETAMINOPHEN 10; 325 MG/1; MG/1
1 TABLET ORAL EVERY 4 HOURS PRN
Qty: 180 TABLET | Refills: 0 | Status: SHIPPED | OUTPATIENT
Start: 2024-05-17

## 2024-05-17 RX ORDER — BUSPIRONE HYDROCHLORIDE 10 MG/1
10 TABLET ORAL 3 TIMES DAILY
Qty: 90 TABLET | Refills: 1 | Status: SHIPPED | OUTPATIENT
Start: 2024-05-17

## 2024-05-17 RX ORDER — MORPHINE SULFATE 15 MG/1
15 TABLET, FILM COATED, EXTENDED RELEASE ORAL 2 TIMES DAILY
Qty: 60 TABLET | Refills: 0 | Status: SHIPPED | OUTPATIENT
Start: 2024-05-17

## 2024-05-17 RX ORDER — BUSPIRONE HYDROCHLORIDE 10 MG/1
10 TABLET ORAL 3 TIMES DAILY
Qty: 90 TABLET | Refills: 1 | Status: CANCELLED | OUTPATIENT
Start: 2024-05-17

## 2024-05-17 RX ORDER — OLMESARTAN MEDOXOMIL 20 MG/1
20 TABLET ORAL DAILY
Qty: 30 TABLET | Refills: 5 | Status: SHIPPED | OUTPATIENT
Start: 2024-05-17

## 2024-05-17 NOTE — TELEPHONE ENCOUNTER
Voicemail left for patient that due to Blanca being out of the office on 7/10/24 his 1pm appointment has been rescheduled to 7/15/24 at 12:40pm.  I asked him to call back to confirm.

## 2024-05-20 ENCOUNTER — APPOINTMENT (OUTPATIENT)
Dept: PHYSICAL THERAPY | Facility: CLINIC | Age: 69
End: 2024-05-20
Payer: MEDICARE

## 2024-05-23 ENCOUNTER — APPOINTMENT (OUTPATIENT)
Dept: PHYSICAL THERAPY | Facility: CLINIC | Age: 69
End: 2024-05-23
Payer: MEDICARE

## 2024-05-30 ENCOUNTER — OFFICE VISIT (OUTPATIENT)
Dept: PHYSICAL THERAPY | Facility: CLINIC | Age: 69
End: 2024-05-30
Payer: MEDICARE

## 2024-05-30 DIAGNOSIS — M25.552 LEFT HIP PAIN: ICD-10-CM

## 2024-05-30 DIAGNOSIS — M25.551 RIGHT HIP PAIN: Primary | ICD-10-CM

## 2024-05-30 DIAGNOSIS — R26.89 BALANCE PROBLEM: ICD-10-CM

## 2024-05-30 PROCEDURE — 97112 NEUROMUSCULAR REEDUCATION: CPT | Performed by: PHYSICAL THERAPIST

## 2024-05-30 PROCEDURE — 97110 THERAPEUTIC EXERCISES: CPT | Performed by: PHYSICAL THERAPIST

## 2024-05-31 NOTE — PROGRESS NOTES
"Daily Note     Today's date: 2024  Patient name: Boni Forte  : 1955  MRN: [id number]  Referring provider: Alessandro Roldan MD  Dx:   Encounter Diagnosis     ICD-10-CM    1. Right hip pain  M25.551       2. Left hip pain  M25.552       3. Balance problem  R26.89                      Subjective: Patient reports he feels about the same overall, he has been stretching more, but continues to feel tightness globally.      Objective: See treatment diary below      Assessment: Tolerated treatment well. Patient continues to benefit from ROM and flexibility emphasis, decreased lumbar pain with multidirectional motions.  Patient demonstrated fatigue post treatment, exhibited good technique with therapeutic exercises, and would benefit from continued PT      Plan: Continue per plan of care.  Progress treatment as tolerated.  Re-evaluation next visit.     Precautions:   Past Medical History:   Diagnosis Date    Anxiety     Arthritis     Contreras's esophagus     Cancer (HCC)     Stage 1 prostrate cancer; under active surveillance.    Depression     GERD (gastroesophageal reflux disease)     Head injury     Hypertension     Osteoarthritis     Prostate cancer (HCC)     Psychiatric disorder     Sleep apnea     CPAP at     Spinal arthritis            Manuals                                Neuro Re-Ed       STS 2x10  2x10 2x10   Bidoex  LOS 2x lvl 7, weight shift squats  2x10 lvl7 LOS 2x lvl 8, weight shift squats  LOS 2x lvl 8, weight shift squats lvl 8 2x10                                      Ther Ex       Assessment & Management       Figure 4 Str 10x10\" seated 10x10\" seated 10x10\" 10x10\"   Figure 4 rotation 2x10  2x10 2x10   L/S Rot 2x10 2x10 2x10 2x10   Seated Hamstring Str 5x10\"  10x10\" 10x5s   Hamstring 90-90 2x10 5s 2x10 2x10 2x10   S/L T/S Rot 2x10 2x10 2x10    Gastroc Str 10x10\" & soleus str 10x10\"  10x10\"    Ther Activity                     Gait Training          "            Modalities

## 2024-06-06 ENCOUNTER — TELEPHONE (OUTPATIENT)
Age: 69
End: 2024-06-06

## 2024-06-06 NOTE — TELEPHONE ENCOUNTER
Patient is being referred to a orthopedics. Please schedule accordingly.    [facility]   [phone]

## 2024-06-13 NOTE — TELEPHONE ENCOUNTER
Patient is being referred to a orthopedics. Please schedule accordingly.     [facility]   [phone]

## 2024-06-16 DIAGNOSIS — F41.1 GAD (GENERALIZED ANXIETY DISORDER): ICD-10-CM

## 2024-06-17 DIAGNOSIS — M54.42 CHRONIC BILATERAL LOW BACK PAIN WITH BILATERAL SCIATICA: ICD-10-CM

## 2024-06-17 DIAGNOSIS — G47.30 SLEEP APNEA, UNSPECIFIED TYPE: ICD-10-CM

## 2024-06-17 DIAGNOSIS — M15.9 GENERALIZED OSTEOARTHRITIS OF MULTIPLE SITES: ICD-10-CM

## 2024-06-17 DIAGNOSIS — M54.41 CHRONIC BILATERAL LOW BACK PAIN WITH BILATERAL SCIATICA: ICD-10-CM

## 2024-06-17 DIAGNOSIS — G89.4 CHRONIC PAIN SYNDROME: ICD-10-CM

## 2024-06-17 DIAGNOSIS — G89.29 CHRONIC BILATERAL LOW BACK PAIN WITH BILATERAL SCIATICA: ICD-10-CM

## 2024-06-17 DIAGNOSIS — K21.9 GASTROESOPHAGEAL REFLUX DISEASE WITHOUT ESOPHAGITIS: ICD-10-CM

## 2024-06-17 DIAGNOSIS — R53.83 FATIGUE, UNSPECIFIED TYPE: ICD-10-CM

## 2024-06-17 RX ORDER — ARMODAFINIL 250 MG/1
250 TABLET ORAL DAILY
Qty: 30 TABLET | Refills: 0 | Status: SHIPPED | OUTPATIENT
Start: 2024-06-17

## 2024-06-17 RX ORDER — MORPHINE SULFATE 15 MG/1
15 TABLET, FILM COATED, EXTENDED RELEASE ORAL 2 TIMES DAILY
Qty: 60 TABLET | Refills: 0 | Status: SHIPPED | OUTPATIENT
Start: 2024-06-17

## 2024-06-17 RX ORDER — NALOXONE HYDROCHLORIDE 4 MG/.1ML
SPRAY NASAL
Qty: 1 EACH | Refills: 0 | Status: SHIPPED | OUTPATIENT
Start: 2024-06-17

## 2024-06-17 RX ORDER — GABAPENTIN 400 MG/1
CAPSULE ORAL
Qty: 90 CAPSULE | Refills: 0 | OUTPATIENT
Start: 2024-06-17

## 2024-06-17 RX ORDER — HYDROCODONE BITARTRATE AND ACETAMINOPHEN 10; 325 MG/1; MG/1
1 TABLET ORAL EVERY 4 HOURS PRN
Qty: 180 TABLET | Refills: 0 | Status: SHIPPED | OUTPATIENT
Start: 2024-06-17

## 2024-06-17 RX ORDER — OMEPRAZOLE 40 MG/1
40 CAPSULE, DELAYED RELEASE ORAL EVERY 12 HOURS
Qty: 180 CAPSULE | Refills: 0 | OUTPATIENT
Start: 2024-06-17

## 2024-06-17 NOTE — TELEPHONE ENCOUNTER
Refill must be reviewed and completed by the office or provider. The refill is unable to be approved or denied by the medication management team.    Naloxone - off protocol  Armodafinil, morphine, norco - cannot be delegated

## 2024-06-18 ENCOUNTER — TELEPHONE (OUTPATIENT)
Dept: PSYCHIATRY | Facility: CLINIC | Age: 69
End: 2024-06-18

## 2024-06-18 RX ORDER — BUSPIRONE HYDROCHLORIDE 10 MG/1
10 TABLET ORAL 3 TIMES DAILY
Qty: 270 TABLET | Refills: 1 | Status: SHIPPED | OUTPATIENT
Start: 2024-06-18

## 2024-06-18 NOTE — TELEPHONE ENCOUNTER
Called patient but had to leave message on voicemail requesting call back for patient's no show appointment today with Damaris Chen PA-C.  Stated that we had a cancellation at 12:00 pm and patient could arrive  for that time also.  Awaiting Call back or patient to show up.

## 2024-06-24 ENCOUNTER — TELEPHONE (OUTPATIENT)
Dept: PSYCHIATRY | Facility: CLINIC | Age: 69
End: 2024-06-24

## 2024-06-24 NOTE — TELEPHONE ENCOUNTER
Called patient but had to leave message on voicemail to call back to reschedule his no show appointment from today 6/24/2024 with Damaris Chen PA-C.

## 2024-07-09 ENCOUNTER — APPOINTMENT (OUTPATIENT)
Dept: RADIOLOGY | Facility: CLINIC | Age: 69
End: 2024-07-09
Payer: MEDICARE

## 2024-07-09 ENCOUNTER — PREP FOR PROCEDURE (OUTPATIENT)
Dept: OBGYN CLINIC | Facility: CLINIC | Age: 69
End: 2024-07-09

## 2024-07-09 ENCOUNTER — OFFICE VISIT (OUTPATIENT)
Dept: OBGYN CLINIC | Facility: CLINIC | Age: 69
End: 2024-07-09
Payer: MEDICARE

## 2024-07-09 ENCOUNTER — OFFICE VISIT (OUTPATIENT)
Dept: FAMILY MEDICINE CLINIC | Facility: CLINIC | Age: 69
End: 2024-07-09
Payer: COMMERCIAL

## 2024-07-09 ENCOUNTER — TELEPHONE (OUTPATIENT)
Age: 69
End: 2024-07-09

## 2024-07-09 VITALS
BODY MASS INDEX: 25.83 KG/M2 | DIASTOLIC BLOOD PRESSURE: 80 MMHG | HEART RATE: 82 BPM | HEIGHT: 67 IN | OXYGEN SATURATION: 96 % | RESPIRATION RATE: 16 BRPM | TEMPERATURE: 98.3 F | WEIGHT: 164.6 LBS | SYSTOLIC BLOOD PRESSURE: 134 MMHG

## 2024-07-09 VITALS
DIASTOLIC BLOOD PRESSURE: 84 MMHG | BODY MASS INDEX: 25.74 KG/M2 | HEIGHT: 67 IN | SYSTOLIC BLOOD PRESSURE: 144 MMHG | HEART RATE: 84 BPM | WEIGHT: 164 LBS

## 2024-07-09 DIAGNOSIS — K21.9 GASTROESOPHAGEAL REFLUX DISEASE WITHOUT ESOPHAGITIS: ICD-10-CM

## 2024-07-09 DIAGNOSIS — G89.4 CHRONIC PAIN SYNDROME: ICD-10-CM

## 2024-07-09 DIAGNOSIS — R07.1 INSPIRATORY PAIN: Primary | ICD-10-CM

## 2024-07-09 DIAGNOSIS — G89.29 CHRONIC BILATERAL LOW BACK PAIN WITH BILATERAL SCIATICA: ICD-10-CM

## 2024-07-09 DIAGNOSIS — M54.42 CHRONIC BILATERAL LOW BACK PAIN WITH BILATERAL SCIATICA: ICD-10-CM
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
## 2024-07-09 DIAGNOSIS — G56.01 CARPAL TUNNEL SYNDROME ON RIGHT: Primary | ICD-10-CM

## 2024-07-09 DIAGNOSIS — R60.0 BILATERAL LOWER EXTREMITY EDEMA: ICD-10-CM

## 2024-07-09 DIAGNOSIS — R07.1 INSPIRATORY PAIN: ICD-10-CM

## 2024-07-09 DIAGNOSIS — G56.02 CARPAL TUNNEL SYNDROME ON LEFT: ICD-10-CM

## 2024-07-09 DIAGNOSIS — M54.41 CHRONIC BILATERAL LOW BACK PAIN WITH BILATERAL SCIATICA: ICD-10-CM

## 2024-07-09 PROCEDURE — 99213 OFFICE O/P EST LOW 20 MIN: CPT | Performed by: PHYSICIAN ASSISTANT

## 2024-07-09 PROCEDURE — 99214 OFFICE O/P EST MOD 30 MIN: CPT | Performed by: STUDENT IN AN ORGANIZED HEALTH CARE EDUCATION/TRAINING PROGRAM

## 2024-07-09 PROCEDURE — 71046 X-RAY EXAM CHEST 2 VIEWS: CPT

## 2024-07-09 RX ORDER — IBUPROFEN 200 MG
TABLET ORAL
COMMUNITY
Start: 2024-05-07

## 2024-07-09 RX ORDER — PREDNISONE 5 MG/1
5 TABLET ORAL DAILY
Qty: 3 TABLET | Refills: 0 | Status: SHIPPED | OUTPATIENT
Start: 2024-07-09 | End: 2024-07-12

## 2024-07-09 RX ORDER — PSEUDOEPHEDRINE HCL 30 MG
TABLET ORAL
COMMUNITY
Start: 2024-05-07

## 2024-07-09 RX ORDER — CEFAZOLIN SODIUM 1 G/50ML
1000 SOLUTION INTRAVENOUS ONCE
OUTPATIENT
Start: 2024-07-09 | End: 2024-07-09

## 2024-07-09 RX ORDER — AMLODIPINE BESYLATE 10 MG/1
TABLET ORAL
COMMUNITY
Start: 2024-05-17

## 2024-07-09 RX ORDER — CEFAZOLIN SODIUM 1 G/50ML
1000 SOLUTION INTRAVENOUS ONCE
Status: CANCELLED | OUTPATIENT
Start: 2024-07-09 | End: 2024-07-09

## 2024-07-09 RX ORDER — PREDNISONE 5 MG/1
5 TABLET ORAL DAILY
Qty: 30 TABLET | Refills: 0 | Status: SHIPPED | OUTPATIENT
Start: 2024-07-09 | End: 2024-07-09

## 2024-07-09 NOTE — TELEPHONE ENCOUNTER
Fyi-  Called  to confirm Prednisone Rx. Received two for Prednisone. One for 30 tablets and the other for 3 tablets. Review chart note and Med list. Prednisone 30 tablets was discontinued , chart note says Prednisone 5mg for 3 doses . Pharmacy will fill the 3 dose Rx.

## 2024-07-09 NOTE — TELEPHONE ENCOUNTER
Yes please fill 3 dose rx. Patient reported he will get prescription refilled fully by [facility].
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
## 2024-07-10 ENCOUNTER — APPOINTMENT (OUTPATIENT)
Dept: LAB | Facility: CLINIC | Age: 69
End: 2024-07-10
Payer: MEDICARE

## 2024-07-10 DIAGNOSIS — K21.9 GASTROESOPHAGEAL REFLUX DISEASE WITHOUT ESOPHAGITIS: ICD-10-CM

## 2024-07-10 DIAGNOSIS — I10 PRIMARY HYPERTENSION: ICD-10-CM

## 2024-07-10 DIAGNOSIS — Z78.9 VEGETARIAN DIET: ICD-10-CM

## 2024-07-10 DIAGNOSIS — E78.00 HYPERCHOLESTEROLEMIA: ICD-10-CM

## 2024-07-10 DIAGNOSIS — D51.8 OTHER VITAMIN B12 DEFICIENCY ANEMIAS: ICD-10-CM

## 2024-07-10 DIAGNOSIS — E83.110 HEREDITARY HEMOCHROMATOSIS (HCC): ICD-10-CM

## 2024-07-10 DIAGNOSIS — R73.09 ELEVATED GLUCOSE: ICD-10-CM

## 2024-07-10 LAB
ALBUMIN SERPL BCG-MCNC: 3.8 G/DL (ref 3.5–5)
ALP SERPL-CCNC: 91 U/L (ref 34–104)
ALT SERPL W P-5'-P-CCNC: 21 U/L (ref 7–52)
ANION GAP SERPL CALCULATED.3IONS-SCNC: 8 MMOL/L (ref 4–13)
AST SERPL W P-5'-P-CCNC: 25 U/L (ref 13–39)
BASOPHILS # BLD AUTO: 0.01 THOUSANDS/ÂΜL (ref 0–0.1)
BASOPHILS NFR BLD AUTO: 0 % (ref 0–1)
BILIRUB SERPL-MCNC: 0.47 MG/DL (ref 0.2–1)
BUN SERPL-MCNC: 15 MG/DL (ref 5–25)
CALCIUM SERPL-MCNC: 8.7 MG/DL (ref 8.4–10.2)
CHLORIDE SERPL-SCNC: 105 MMOL/L (ref 96–108)
CO2 SERPL-SCNC: 26 MMOL/L (ref 21–32)
CREAT SERPL-MCNC: 0.71 MG/DL (ref 0.6–1.3)
EOSINOPHIL # BLD AUTO: 0.04 THOUSAND/ÂΜL (ref 0–0.61)
EOSINOPHIL NFR BLD AUTO: 1 % (ref 0–6)
ERYTHROCYTE [DISTWIDTH] IN BLOOD BY AUTOMATED COUNT: 13.7 % (ref 11.6–15.1)
EST. AVERAGE GLUCOSE BLD GHB EST-MCNC: 100 MG/DL
FERRITIN SERPL-MCNC: 39 NG/ML (ref 24–336)
FOLATE SERPL-MCNC: 18.4 NG/ML
GFR SERPL CREATININE-BSD FRML MDRD: 95 ML/MIN/1.73SQ M
GLUCOSE SERPL-MCNC: 87 MG/DL (ref 65–140)
HBA1C MFR BLD: 5.1 %
HCT VFR BLD AUTO: 32.1 % (ref 36.5–49.3)
HGB BLD-MCNC: 10.4 G/DL (ref 12–17)
IMM GRANULOCYTES # BLD AUTO: 0.02 THOUSAND/UL (ref 0–0.2)
IMM GRANULOCYTES NFR BLD AUTO: 1 % (ref 0–2)
IRON SATN MFR SERPL: 33 % (ref 15–50)
IRON SERPL-MCNC: 89 UG/DL (ref 50–212)
LYMPHOCYTES # BLD AUTO: 0.24 THOUSANDS/ÂΜL (ref 0.6–4.47)
LYMPHOCYTES NFR BLD AUTO: 6 % (ref 14–44)
MCH RBC QN AUTO: 32.2 PG (ref 26.8–34.3)
MCHC RBC AUTO-ENTMCNC: 32.4 G/DL (ref 31.4–37.4)
MCV RBC AUTO: 99 FL (ref 82–98)
MONOCYTES # BLD AUTO: 0.29 THOUSAND/ÂΜL (ref 0.17–1.22)
MONOCYTES NFR BLD AUTO: 7 % (ref 4–12)
NEUTROPHILS # BLD AUTO: 3.8 THOUSANDS/ÂΜL (ref 1.85–7.62)
NEUTS SEG NFR BLD AUTO: 85 % (ref 43–75)
NRBC BLD AUTO-RTO: 0 /100 WBCS
PLATELET # BLD AUTO: 345 THOUSANDS/UL (ref 149–390)
PMV BLD AUTO: 8.7 FL (ref 8.9–12.7)
POTASSIUM SERPL-SCNC: 4.2 MMOL/L (ref 3.5–5.3)
PROT SERPL-MCNC: 5.9 G/DL (ref 6.4–8.4)
RBC # BLD AUTO: 3.23 MILLION/UL (ref 3.88–5.62)
SODIUM SERPL-SCNC: 139 MMOL/L (ref 135–147)
TIBC SERPL-MCNC: 270 UG/DL (ref 250–450)
UIBC SERPL-MCNC: 181 UG/DL (ref 155–355)
VIT B12 SERPL-MCNC: 1010 PG/ML (ref 180–914)
WBC # BLD AUTO: 4.4 THOUSAND/UL (ref 4.31–10.16)

## 2024-07-10 PROCEDURE — 82607 VITAMIN B-12: CPT

## 2024-07-10 PROCEDURE — 80053 COMPREHEN METABOLIC PANEL: CPT

## 2024-07-10 PROCEDURE — 83918 ORGANIC ACIDS TOTAL QUANT: CPT

## 2024-07-10 PROCEDURE — 85025 COMPLETE CBC W/AUTO DIFF WBC: CPT

## 2024-07-10 PROCEDURE — 82728 ASSAY OF FERRITIN: CPT

## 2024-07-10 PROCEDURE — 82746 ASSAY OF FOLIC ACID SERUM: CPT

## 2024-07-10 PROCEDURE — 36415 COLL VENOUS BLD VENIPUNCTURE: CPT

## 2024-07-10 PROCEDURE — 83550 IRON BINDING TEST: CPT

## 2024-07-10 PROCEDURE — 83540 ASSAY OF IRON: CPT

## 2024-07-10 PROCEDURE — 83036 HEMOGLOBIN GLYCOSYLATED A1C: CPT

## 2024-07-11 ENCOUNTER — APPOINTMENT (OUTPATIENT)
Dept: LAB | Facility: CLINIC | Age: 69
End: 2024-07-11
Payer: MEDICARE

## 2024-07-11 LAB
CHOLEST SERPL-MCNC: 203 MG/DL
HDLC SERPL-MCNC: 85 MG/DL
LDLC SERPL CALC-MCNC: 99 MG/DL (ref 0–100)
NONHDLC SERPL-MCNC: 118 MG/DL
TRIGL SERPL-MCNC: 94 MG/DL

## 2024-07-11 PROCEDURE — 36415 COLL VENOUS BLD VENIPUNCTURE: CPT

## 2024-07-11 PROCEDURE — 80061 LIPID PANEL: CPT

## 2024-07-15 ENCOUNTER — OFFICE VISIT (OUTPATIENT)
Dept: HEMATOLOGY ONCOLOGY | Facility: MEDICAL CENTER | Age: 69
End: 2024-07-15
Payer: MEDICARE

## 2024-07-15 ENCOUNTER — TELEPHONE (OUTPATIENT)
Dept: HEMATOLOGY ONCOLOGY | Facility: MEDICAL CENTER | Age: 69
End: 2024-07-15

## 2024-07-15 VITALS
BODY MASS INDEX: 25.58 KG/M2 | OXYGEN SATURATION: 98 % | SYSTOLIC BLOOD PRESSURE: 122 MMHG | HEART RATE: 82 BPM | WEIGHT: 163 LBS | HEIGHT: 67 IN | DIASTOLIC BLOOD PRESSURE: 68 MMHG | RESPIRATION RATE: 15 BRPM | TEMPERATURE: 97.7 F

## 2024-07-15 DIAGNOSIS — D51.8 OTHER VITAMIN B12 DEFICIENCY ANEMIAS: ICD-10-CM

## 2024-07-15 DIAGNOSIS — C61 PROSTATE CANCER (HCC): ICD-10-CM

## 2024-07-15 DIAGNOSIS — E83.110 HEREDITARY HEMOCHROMATOSIS (HCC): Primary | ICD-10-CM

## 2024-07-15 DIAGNOSIS — T14.8XXA BRUISING: ICD-10-CM

## 2024-07-15 PROCEDURE — 99213 OFFICE O/P EST LOW 20 MIN: CPT | Performed by: PHYSICIAN ASSISTANT
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
## 2024-07-17 ENCOUNTER — TELEPHONE (OUTPATIENT)
Age: 69
End: 2024-07-17

## 2024-07-17 DIAGNOSIS — G89.29 CHRONIC BILATERAL LOW BACK PAIN WITH BILATERAL SCIATICA: ICD-10-CM

## 2024-07-17 DIAGNOSIS — M54.41 CHRONIC BILATERAL LOW BACK PAIN WITH BILATERAL SCIATICA: ICD-10-CM

## 2024-07-17 DIAGNOSIS — M54.42 CHRONIC BILATERAL LOW BACK PAIN WITH BILATERAL SCIATICA: ICD-10-CM

## 2024-07-17 RX ORDER — HYDROCODONE BITARTRATE AND ACETAMINOPHEN 10; 325 MG/1; MG/1
1 TABLET ORAL EVERY 4 HOURS PRN
Qty: 180 TABLET | Refills: 0 | Status: SHIPPED | OUTPATIENT
Start: 2024-07-17

## 2024-07-17 RX ORDER — MORPHINE SULFATE 15 MG/1
15 TABLET, FILM COATED, EXTENDED RELEASE ORAL 2 TIMES DAILY
Qty: 60 TABLET | Refills: 0 | Status: SHIPPED | OUTPATIENT
Start: 2024-07-17

## 2024-07-17 NOTE — TELEPHONE ENCOUNTER
Caller: patient    Doctor: Vira    Reason for call: patient can not find a ride for after surgery so he does not want to go under anesthesia for surgery scheduled for 08/08/2024 & 08/22/2024    Call back#: [phone]

## 2024-07-18 NOTE — TELEPHONE ENCOUNTER
Caller: Patient    Doctor: Vira    Reason for call:     Patient is calling back, for his surgery on 8/8/24, he would like the local anesthesia please.   He cannot find anyone to drive him after the surgery.  Thank you.    Call back#: [phone]

## 2024-07-22 LAB — METHYLMALONATE SERPL-SCNC: 90 NMOL/L (ref 0–378)

## 2024-07-26 ENCOUNTER — TELEPHONE (OUTPATIENT)
Dept: PSYCHIATRY | Facility: CLINIC | Age: 69
End: 2024-07-26

## 2024-07-26 ENCOUNTER — TELEMEDICINE (OUTPATIENT)
Dept: PSYCHIATRY | Facility: CLINIC | Age: 69
End: 2024-07-26
Payer: MEDICARE

## 2024-07-26 DIAGNOSIS — F41.1 GAD (GENERALIZED ANXIETY DISORDER): ICD-10-CM

## 2024-07-26 DIAGNOSIS — F33.1 MODERATE EPISODE OF RECURRENT MAJOR DEPRESSIVE DISORDER (HCC): Primary | ICD-10-CM

## 2024-07-26 PROCEDURE — 99214 OFFICE O/P EST MOD 30 MIN: CPT | Performed by: PHYSICIAN ASSISTANT

## 2024-07-26 PROCEDURE — G2211 COMPLEX E/M VISIT ADD ON: HCPCS | Performed by: PHYSICIAN ASSISTANT

## 2024-07-26 RX ORDER — ARIPIPRAZOLE 2 MG/1
2 TABLET ORAL DAILY
Qty: 30 TABLET | Refills: 1 | Status: SHIPPED | OUTPATIENT
Start: 2024-07-26

## 2024-07-26 RX ORDER — AMOXICILLIN 500 MG/1
CAPSULE ORAL
COMMUNITY
Start: 2024-07-19

## 2024-07-26 RX ORDER — PREDNISONE 5 MG/1
5 TABLET ORAL DAILY
COMMUNITY

## 2024-07-26 RX ORDER — VILAZODONE HYDROCHLORIDE 40 MG/1
40 TABLET ORAL
Qty: 90 TABLET | Refills: 1 | Status: SHIPPED | OUTPATIENT
Start: 2024-07-26

## 2024-07-26 NOTE — BH TREATMENT PLAN
TREATMENT PLAN (Medication Management Only)        [facility] - [facility]    Name and Date of Birth:  Boni Forte 69 y.o. 1955  Date of Treatment Plan: July 26, 2024  Diagnosis/Diagnoses:    1. Moderate episode of recurrent major depressive disorder (HCC)    2. SERGEY (generalized anxiety disorder)      Strengths/Personal Resources for Self-Care: taking medications as prescribed, motivation for treatment, willingness to work on problems.  Area/Areas of need (in own words): anxiety symptoms, depressive symptoms  1. Long Term Goal: decrease anxiety.  Target Date:6 months - 1/26/2025  Person/Persons responsible for completion of goal: Boni  2.  Short Term Objective (s) - How will we reach this goal?:   A. Provider new recommended medication/dosage changes and/or continue medication(s): continue current medications as prescribed.  B. Attend psychotherapy regularly.  C. N/A.  Target Date:6 months - 1/26/2025  Person/Persons Responsible for Completion of Goal: Boni  Progress Towards Goals: continuing treatment  Treatment Modality: medication management every 4 weeks  Review due 180 days from date of this plan: 6 months - 1/26/2025  Expected length of service: ongoing treatment  My Physician/PA/NP and I have developed this plan together and I agree to work on the goals and objectives. I understand the treatment goals that were developed for my treatment.

## 2024-07-26 NOTE — BH CRISIS PLAN
Client Name: John Forte       Client YOB: 1955    [facility] [place] [facility]      Creation Date: 7/26/24 Update Date: 7/26/24   Created By: José Luis Del Castillo DO Last Updated By: José Luis Del Castillo DO      Step 1: Warning Signs:   Warning Signs   N/A            Step 2: Internal Coping Strategies:   Internal Coping Strategies   meditation            Step 3: People and social settings that provide distraction:   Name Contact Information   Sandi (wife) lives with/phone   brothers phone            Step 4: People whom I can ask for help during a crisis:      Name Contact Information    Sandi (wife) lives with/phone    brothers phone      Step 5: Professionals or agencies I can contact during a crisis:      Clinican/Agency Name Phone Emergency Contact    [facility]      [facility] Emergency Department Emergency Department Phone Emergency Department Address    [facility]          Crisis Phone Numbers:   Suicide Prevention Lifeline: Call or Text  988 Crisis Text Line: Text HOME to 950-834   Please note: Some [facility] do not have a separate number for Child/Adolescent specific crisis. If your county is not listed under Child/Adolescent, please call the adult number for your county      Adult Crisis Numbers: Child/Adolescent Crisis Numbers   [facility]: [phone] [facility]: [phone]   [facility]: [phone] [facility]: [phone]   [facility]: [phone] [place]: [phone]   [facility]: [phone] [place]: [phone]   [place] [facility]: [phone]   [facility]: [phone]   [facility]: [phone]   [facility]: [phone] (daytime) [phone] (after hours, weekends, holidays)      Step 6: Making the environment safer (plan for lethal means safety):   Patient did not identify any lethal methods: Yes     Optional: What is most important to me and worth  living for?   My continued health and my relationships with my friends and family     [place] Safety Plan. Dayanna Ramirez and Brett Hilliard. Used with permission of the authors.

## 2024-07-26 NOTE — TELEPHONE ENCOUNTER
Called patient but had to leave voice mail message requesting call back to schedule follow up appointment with Damaris Chen PA-C for 4 to 6 weeks from today. Also, mailed Controlled Substance Contract to patient for signature per provider.  Awaiting call back.

## 2024-07-26 NOTE — PSYCH
This note was not shared with the patient due to reasonable likelihood of causing patient harm     Virtual Regular Visit    Visit Date: 07/26/24     Verification of patient location:    Patient is located at Home in the following state in which I hold an active license [place]    Problem List Items Addressed This Visit    None  Visit Diagnoses       Moderate episode of recurrent major depressive disorder (HCC)    -  Primary    Relevant Medications    vilazodone (Viibryd) 40 mg tablet    ARIPiprazole (ABILIFY) 2 mg tablet    SERGEY (generalized anxiety disorder)        Relevant Medications    vilazodone (Viibryd) 40 mg tablet    ARIPiprazole (ABILIFY) 2 mg tablet          Reason for visit is   Chief Complaint   Patient presents with    Follow-up    Medication Management    Virtual Regular Visit     Encounter provider Damaris Chen    Provider located at [address]  [facility] [address] [phone]    Recent Visits  No visits were found meeting these conditions.  Showing recent visits within past 7 days and meeting all other requirements  Today's Visits  Date Type Provider Dept   07/26/24 Telemedicine [facility]   Showing today's visits and meeting all other requirements  Future Appointments  No visits were found meeting these conditions.  Showing future appointments within next 150 days and meeting all other requirements       The patient was identified by name and date of birth. Boni Forte was informed that this is a telemedicine visit and that the visit is being conducted through the Epic Embedded platform. He agrees to proceed.. My office door was closed. No one else was in the room. He acknowledged consent and understanding of privacy and security of the video platform. The patient has agreed to participate and understands they can discontinue the visit at any  time.    Patient is aware this is a billable service.     Insurance: Payor: MEDICARE / Plan: MEDICARE A AND B / Product Type: Medicare A & B Fee for Service /      SUBJECTIVE:    Boni Forte is a alta 69 y.o. male with a history of Major Depressive Disorder and Generalized Anxiety Disorder who presents today for follow-up and medication management. Since his last visit he reports that he has continued to be quite anxious, despite the increase in buspirone. He does not feel the buspirone has done anything. He admits that he went through a bad bout of depression a month or two ago, but started seeing a therapist and this helped get him out of that. He is having more health issues and states he and his wife have a stan relationship and they have talked about divorce which is upsetting to him. He also has a new puppy.     He denies any suicidal ideation, intent or plan at present; denies any homicidal ideation, intent or plan at present.    He denies any auditory hallucinations, denies any visual hallucinations, denies any delusions.    He denies any side effects from current psychiatric medications.    HPI ROS Appetite Changes and Sleep:     He reports adequate number of sleep hours, adequate appetite, adequate energy level    Current Rating Scores:     Current PHQ-9   PHQ-2/9 Depression Screening    Little interest or pleasure in doing things: 1 - several days  Feeling down, depressed, or hopeless: 2 - more than half the days  Trouble falling or staying asleep, or sleeping too much: 0 - not at all  Feeling tired or having little energy: 1 - several days  Poor appetite or overeatin - several days  Feeling bad about yourself - or that you are a failure or have let yourself or your family down: 2 - more than half the days  Trouble concentrating on things, such as reading the newspaper or watching television: 1 - several days  Moving or speaking so slowly that other people could have noticed. Or the opposite -  being so fidgety or restless that you have been moving around a lot more than usual: 0 - not at all  Thoughts that you would be better off dead, or of hurting yourself in some way: 0 - not at all  PHQ-9 Score: 8  PHQ-9 Interpretation: Mild depression         Review Of Systems:    Mood anxiety   Behavior appropriate, cooperative, and calm   Thought Content daily worries   General relationship problems and marital problems   Personality no change in personality   Other Psych Symptoms normal   Constitutional as noted in HPI   ENT as noted in HPI   Cardiovascular as noted in HPI   Respiratory as noted in HPI   Gastrointestinal as noted in HPI   Genitourinary as noted in HPI   Musculoskeletal as noted in HPI   Integumentary as noted in HPI   Neurological as noted in HPI   Endocrine negative   Other Symptoms none, all other systems are negative     Family Psychiatric History:     Family History   Problem Relation Age of Onset    Diabetes Mother     Depression Mother     Hypertension Mother     Stroke Mother     Alcohol abuse Mother     Aneurysm Father         Brain    Stroke Father     Aneurysm Brother         Brain    Depression Brother     Arthritis Brother     Other Maternal Grandmother         Myocardial infarction    Arthritis Maternal Grandmother     Transient ischemic attack Paternal Grandfather     Hypertension Family         Sibling    Other Family         Spinal stenosis and Thyroid disorder - Sibling    No Known Problems Sister     No Known Problems Maternal Aunt     No Known Problems Maternal Uncle     No Known Problems Paternal Aunt     No Known Problems Paternal Uncle     No Known Problems Maternal Grandfather     No Known Problems Paternal Grandmother     Arthritis Brother     Hypertension Brother     Hypertension Brother     Hypertension Sister     Substance Abuse Neg Hx     Mental illness Neg Hx     ADD / ADHD Neg Hx     Anesthesia problems Neg Hx     Cancer Neg Hx     Clotting disorder Neg Hx      Collagen disease Neg Hx     Dislocations Neg Hx     Learning disabilities Neg Hx     Neurological problems Neg Hx     Osteoporosis Neg Hx     Rheumatologic disease Neg Hx     Scoliosis Neg Hx     Vascular Disease Neg Hx        Social/Substance Abuse History:    Social History     Socioeconomic History    Marital status: /Civil Union     Spouse name: Not on file    Number of children: 1    Years of education: Not on file    Highest education level: Master's degree (e.g., MA, MS, Benito, MEd, MSW, FERMIN)   Occupational History    Occupation:     Occupation: Teacher     Employer: [email or web address]   Tobacco Use    Smoking status: Former     Current packs/day: 0.00     Average packs/day: 1 pack/day for 16.0 years (16.0 ttl pk-yrs)     Types: Cigarettes     Start date: 1969     Quit date: 1984     Years since quittin.5     Passive exposure: Never    Smokeless tobacco: Never   Vaping Use    Vaping status: Never Used   Substance and Sexual Activity    Alcohol use: Yes     Alcohol/week: 2.0 standard drinks of alcohol     Types: 1 Glasses of wine, 1 Shots of liquor per week     Comment: one drink a week    Drug use: Yes     Frequency: 1.0 times per week     Types: Marijuana     Comment: occassionaly    Sexual activity: Yes     Partners: Female     Birth control/protection: Post-menopausal, Male Sterilization     Comment: Very low / no libido   Other Topics Concern    Not on file   Social History Narrative    Dental care, regularly    Drinks coffee:  2-3 cups per day    Exercises moderately 3 or more times a week    Vegetarian diet     Social Determinants of Health     Financial Resource Strain: Low Risk  (2024)    Overall Financial Resource Strain (CARDIA)     Difficulty of Paying Living Expenses: Not very hard   Food Insecurity: No Food Insecurity (2022)    Hunger Vital Sign     Worried About Running Out of Food in the Last Year: Never true     Ran Out of Food in the Last  Year: Never true   Transportation Needs: No Transportation Needs (1/16/2024)    PRAPARE - Transportation     Lack of Transportation (Medical): No     Lack of Transportation (Non-Medical): No   Physical Activity: Not on file   Stress: Not on file   Social Connections: Not on file   Intimate Partner Violence: Not on file   Housing Stability: Low Risk  (11/21/2022)    Housing Stability Vital Sign     Unable to Pay for Housing in the Last Year: No     Number of Places Lived in the Last Year: 1     Unstable Housing in the Last Year: No       The following portions of the patient's history were reviewed and updated as appropriate: past family history, past medical history, past social history, past surgical history and problem list.    OBJECTIVE:     Mental Status Evaluation:  Appearance:  dressed appropriately, adequate grooming, looks stated age   Behavior:  pleasant, cooperative, calm, interacts appropriately with this writer   Speech:  normal rate, normal volume, normal pitch   Mood:  anxious   Affect:  constricted   Thought Process:  organized, logical, coherent   Associations: intact associations   Thought Content:  no overt delusions, no paranoia noted on exam   Perceptual Disturbances: no auditory hallucinations, no visual hallucinations   Risk Potential: Suicidal ideation - None  Homicidal ideation - None  Potential for aggression - No   Sensorium:  oriented to person, place, and time/date   Memory:  recent and remote memory grossly intact   Consciousness:  alert and awake   Attention/Concentration: attention span and concentration are age appropriate   Insight:  age appropriate   Judgment: age appropriate   Gait/Station: Unable to assess today due to virtual visit   Motor Activity: unable to assess today due to virtual visit        Laboratory Results: I have personally reviewed all pertinent laboratory/tests results    Suicide/Homicide Risk Assessment:    Risk of Harm to Self:  The following ratings are based on  "assessment at the time of the interview  Based on today's assessment, Boni presents the following risk of harm to self: none    Risk of Harm to Others:  The following ratings are based on assessment at the time of the interview  Based on today's assessment, Boni presents the following risk of harm to others: none    The following interventions are recommended: no intervention changes needed     Assessment/Plan:      He states that the buspirone has done \"nothing\" for the anxiety unfortunately. He not longer wants to take this, so this can be stopped. Instead I would suggest starting on aripiprazole 2 mg qhs as an adjunct to the antidepressants. Patient education for SGA completed including dizziness, sedation, GI distress, akathisia, agitation, orthostatic hypotension, headache, cardiovascular risks, metabolic syndrome, NMS, TD, EPS, seizures, and others. He is due for a new EKG and has a recent HgA1C and lipid panel on file. The other medications will remain unchanged. He will continue working with his outside therapist, Satya Dumont PsyD, as well. We have discussed his safety plan and he agrees that if he experience unsafe thoughts that he will reach out to his supports including this office, the suicide hotline, and emergency services if necessary. Boni is aware of non-emergent and emergent mental health resources. They are able to contract for their own safety at this time.    Will follow up in 1 months. Patient is aware to call the office if questions or concerns arise sooner.      Diagnoses and all orders for this visit:    Moderate episode of recurrent major depressive disorder (HCC)  -     vilazodone (Viibryd) 40 mg tablet; Take 1 tablet (40 mg total) by mouth daily with breakfast  -     ARIPiprazole (ABILIFY) 2 mg tablet; Take 1 tablet (2 mg total) by mouth daily    SERGEY (generalized anxiety disorder)  -     ARIPiprazole (ABILIFY) 2 mg tablet; Take 1 tablet (2 mg total) by mouth daily    Other " orders  -     amoxicillin (AMOXIL) 500 mg capsule  -     predniSONE 5 mg tablet; Take 5 mg by mouth daily w/ Zytiga        Treatment Recommendations/Precautions:    Continue current medications:     - bupropion  mg qAM     - vilazodone 40 mg qd w/ food     Stop medications:     - buspirone 10 mg TID    Start new medication:    - aripiprazole 2 mg qhs    Medication management every 4 weeks  Continue psychotherapy with own therapist  Aware of need to follow up with family physician for glucose and lipid monitoring due to current therapy with antipsychotic medication  Aware of 24 hour and weekend coverage for urgent situations accessed by calling [facility] main practice number  I am scheduling this patient out for greater than 3 months: No    Medications Risks/Benefits      Risks, Benefits And Possible Side Effects Of Medications:    Risks, benefits, and possible side effects of medications explained to Boni and he verbalizes understanding and agreement for treatment.    Controlled Medication Discussion:     Not applicable - controlled prescriptions are not prescribed by this practice    Psychotherapy Provided:     Individual psychotherapy provided: No     Treatment Plan:    Completed and signed during the session: Yes - Treatment Plan done but not signed at time of office visit due to:  Plan reviewed by video and verbal consent given due to virtual visit. Treatment Plan sent to patient via [employer] for signature.     Visit Time    Visit Start Time:  11:00 AM  Visit End Time:  11:25 AM  Total Visit Duration:  25 minutes    Damaris Chen 07/26/24      VIRTUAL VISIT DISCLAIMER    Boni Forte verbally agrees to participate in Virtual Care Services. Pt is aware that Virtual Care Services could be limited without vital signs or the ability to perform a full hands-on physical exam. Boni Forte understands he or the provider may request at any time to terminate the video visit and  request the patient to seek care or treatment in person.

## 2024-07-31 ENCOUNTER — OFFICE VISIT (OUTPATIENT)
Dept: FAMILY MEDICINE CLINIC | Facility: CLINIC | Age: 69
End: 2024-07-31
Payer: MEDICARE

## 2024-07-31 VITALS
TEMPERATURE: 97 F | SYSTOLIC BLOOD PRESSURE: 140 MMHG | OXYGEN SATURATION: 96 % | HEIGHT: 67 IN | DIASTOLIC BLOOD PRESSURE: 92 MMHG | RESPIRATION RATE: 18 BRPM | WEIGHT: 157 LBS | BODY MASS INDEX: 24.64 KG/M2 | HEART RATE: 86 BPM

## 2024-07-31 DIAGNOSIS — I10 PRIMARY HYPERTENSION: ICD-10-CM

## 2024-07-31 DIAGNOSIS — K21.9 GASTROESOPHAGEAL REFLUX DISEASE WITHOUT ESOPHAGITIS: Primary | ICD-10-CM

## 2024-07-31 PROCEDURE — 99214 OFFICE O/P EST MOD 30 MIN: CPT | Performed by: STUDENT IN AN ORGANIZED HEALTH CARE EDUCATION/TRAINING PROGRAM

## 2024-07-31 PROCEDURE — G2211 COMPLEX E/M VISIT ADD ON: HCPCS | Performed by: STUDENT IN AN ORGANIZED HEALTH CARE EDUCATION/TRAINING PROGRAM

## 2024-07-31 RX ORDER — OLMESARTAN MEDOXOMIL 40 MG/1
40 TABLET ORAL DAILY
Qty: 30 TABLET | Refills: 1 | Status: SHIPPED | OUTPATIENT
Start: 2024-07-31

## 2024-07-31 NOTE — ASSESSMENT & PLAN NOTE
-/92, prior readings at home have been 150-160/100  -Patient is asymptomatic   -Stopped taking Zytiga as per [facility] but they would like him to restart this medication  -Increase Benicar from 20 mg to 40 mg daily. Continue Norvasc 10 mg daily.

## 2024-07-31 NOTE — PROGRESS NOTES
"Ambulatory Visit  Name: Boni Forte      : 1955      MRN: [id number]  Encounter Provider: Madhuri Milner MD  Encounter Date: 2024   Encounter department: [facility]    Assessment & Plan   1. Gastroesophageal reflux disease without esophagitis  Assessment & Plan:  -Continue Prilosec 40 mg BID  2. Primary hypertension  Assessment & Plan:  -/92, prior readings at home have been 150-160/100  -Patient is asymptomatic   -Stopped taking Zytiga as per [facility] but they would like him to restart this medication  -Increase Benicar from 20 mg to 40 mg daily. Continue Norvasc 10 mg daily.  Orders:  -     olmesartan (BENICAR) 40 mg tablet; Take 1 tablet (40 mg total) by mouth daily     History of Present Illness     HPI      Patient presents for BP check. Notes Bps have been elevated at home. He has stopped taking anti cancer drug as per [facility]. He denies dizziness, SOB, and chest pain.     Review of Systems   Constitutional:  Negative for activity change, appetite change, chills, fatigue and fever.   HENT:  Negative for congestion.    Respiratory:  Negative for cough, shortness of breath and wheezing.    Cardiovascular:  Negative for chest pain, palpitations and leg swelling.   Gastrointestinal:  Negative for abdominal pain, constipation, diarrhea, nausea and vomiting.   Musculoskeletal:  Positive for arthralgias, back pain and myalgias.   Skin:  Negative for rash.   Neurological:  Negative for light-headedness and headaches.   Psychiatric/Behavioral:  The patient is not nervous/anxious.        Objective     /92 (BP Location: Left arm, Patient Position: Sitting, Cuff Size: Large)   Pulse 86   Temp (!) 97 °F (36.1 °C) (Temporal)   Resp 18   Ht 5' 6.5\" (1.689 m)   Wt 71.2 kg (157 lb)   SpO2 96%   BMI 24.96 kg/m²     Physical Exam  Constitutional:       Appearance: Normal appearance.      Comments: Ambulates with cane   HENT:      Head: Normocephalic and atraumatic. "   Cardiovascular:      Rate and Rhythm: Normal rate and regular rhythm.      Pulses: Normal pulses.      Heart sounds: Normal heart sounds.   Pulmonary:      Effort: Pulmonary effort is normal.      Breath sounds: Normal breath sounds.   Neurological:      General: No focal deficit present.      Mental Status: He is alert and oriented to person, place, and time.   Psychiatric:         Mood and Affect: Mood normal.         Behavior: Behavior normal.         Thought Content: Thought content normal.         Judgment: Judgment normal.       Administrative Statements

## 2024-08-05 ENCOUNTER — APPOINTMENT (OUTPATIENT)
Dept: RADIOLOGY | Facility: CLINIC | Age: 69
End: 2024-08-05
Payer: MEDICARE

## 2024-08-05 ENCOUNTER — OFFICE VISIT (OUTPATIENT)
Dept: FAMILY MEDICINE CLINIC | Facility: CLINIC | Age: 69
End: 2024-08-05
Payer: MEDICARE

## 2024-08-05 VITALS
HEART RATE: 74 BPM | WEIGHT: 156.6 LBS | RESPIRATION RATE: 18 BRPM | BODY MASS INDEX: 24.58 KG/M2 | OXYGEN SATURATION: 98 % | SYSTOLIC BLOOD PRESSURE: 138 MMHG | TEMPERATURE: 98 F | HEIGHT: 67 IN | DIASTOLIC BLOOD PRESSURE: 74 MMHG

## 2024-08-05 DIAGNOSIS — K21.9 GASTROESOPHAGEAL REFLUX DISEASE WITHOUT ESOPHAGITIS: ICD-10-CM

## 2024-08-05 DIAGNOSIS — M25.421 ELBOW SWELLING, RIGHT: ICD-10-CM

## 2024-08-05 DIAGNOSIS — I10 PRIMARY HYPERTENSION: Primary | ICD-10-CM

## 2024-08-05 PROCEDURE — 99214 OFFICE O/P EST MOD 30 MIN: CPT | Performed by: STUDENT IN AN ORGANIZED HEALTH CARE EDUCATION/TRAINING PROGRAM

## 2024-08-05 PROCEDURE — G2211 COMPLEX E/M VISIT ADD ON: HCPCS | Performed by: STUDENT IN AN ORGANIZED HEALTH CARE EDUCATION/TRAINING PROGRAM

## 2024-08-05 PROCEDURE — 73080 X-RAY EXAM OF ELBOW: CPT

## 2024-08-05 NOTE — ASSESSMENT & PLAN NOTE
-/74  -Patient is asymptomatic   -Stopped taking Zytiga as per [facility] but they would like him to restart this medication once Bps improve  -Continue Benicar 40 mg daily. Continue Norvasc 10 mg daily.

## 2024-08-05 NOTE — PROGRESS NOTES
"Ambulatory Visit  Name: Boni Forte      : 1955      MRN: [id number]  Encounter Provider: Madhuri Milner MD  Encounter Date: 2024   Encounter department: [facility]    Assessment & Plan   1. Primary hypertension  Assessment & Plan:  -/74  -Patient is asymptomatic   -Stopped taking Zytiga as per [facility] but they would like him to restart this medication once Bps improve  -Continue Benicar 40 mg daily. Continue Norvasc 10 mg daily.  2. Gastroesophageal reflux disease without esophagitis  Assessment & Plan:  -Continue Prilosec 40 mg daily  3. Elbow swelling, right    -Apply ice packs to swelling of right elbow  -Follow up with Xray       History of Present Illness     HPI    Patient presents for BP check. Notes his Bps have improved with medication increase. He notes he fell three days ago and noted some swelling in his right elbow. He denies pain in elbow. He did not hit his head during this fall.     Review of Systems   Constitutional:  Negative for activity change, appetite change, chills, fatigue and fever.   HENT:  Negative for congestion.    Respiratory:  Negative for cough, shortness of breath and wheezing.    Cardiovascular:  Negative for chest pain, palpitations and leg swelling.   Gastrointestinal:  Negative for abdominal pain, constipation, diarrhea, nausea and vomiting.   Musculoskeletal:  Positive for arthralgias, gait problem, joint swelling and myalgias.   Skin:  Negative for rash.   Neurological:  Negative for light-headedness and headaches.   Psychiatric/Behavioral:  The patient is not nervous/anxious.        Objective     /74   Pulse 74   Temp 98 °F (36.7 °C) (Temporal)   Resp 18   Ht 5' 6.5\" (1.689 m)   Wt 71 kg (156 lb 9.6 oz)   SpO2 98%   BMI 24.90 kg/m²     Physical Exam  Constitutional:       Appearance: Normal appearance.   HENT:      Head: Normocephalic and atraumatic.   Cardiovascular:      Rate and Rhythm: Normal rate and regular " rhythm.      Pulses: Normal pulses.      Heart sounds: Normal heart sounds.   Pulmonary:      Effort: Pulmonary effort is normal.      Breath sounds: Normal breath sounds.   Musculoskeletal:      Right elbow: Swelling present. No effusion. Normal range of motion. No tenderness.      Left elbow: Normal.   Neurological:      General: No focal deficit present.      Mental Status: He is alert and oriented to person, place, and time.   Psychiatric:         Mood and Affect: Mood normal.         Behavior: Behavior normal.         Thought Content: Thought content normal.         Judgment: Judgment normal.       Administrative Statements

## 2024-08-05 NOTE — LETTER
[facility]  [address] [place] [address]      August 8, 2024    MRN: [id number]     Phone: [phone]     Dear Mr. Gonzalezara,    You recently had a(n) Diagnostic Imaging performed on 8/5/2024 at  [facility] that was requested by Madhuri Milner MD. The study was reviewed by a radiologist, which is a physician who specializes in medical imaging. The radiologist issued a report describing his or her findings. In that report there was a finding that the radiologist felt warranted further discussion with your health care provider and that discussion would be beneficial to you.      The results were sent to Madhuri Milner MD on 08/05/2024  3:39 PM. We recommend that you contact Madhuri Milner MD at [phone] or set up an appointment to discuss the results of the imaging test. If you have already heard from Madhuri Milner MD regarding the results of your study, you can disregard this letter.     This letter is not meant to alarm you, but intended to encourage you to follow-up on your results with the provider that sent you for the imaging study. In addition, we have enclosed answers to frequently asked questions by other patients who have also received a letter to review results with their health care provider (see page two).      Thank you for choosing [facility] for your medical imaging needs.                                                                                                                                                        FREQUENTLY ASKED QUESTIONS    Why am I receiving this letter?  [place] State Law requires us to notify patients who have findings on imaging exams that may require more testing or follow-up with a health professional within the next 3 months.        How serious is the finding on the imaging test?  This letter is sent to all patients who may need follow-up or more testing within the next 3 months.   Receiving this letter does not necessarily mean you have a life-threatening imaging finding or disease.  Recommendations in the radiologist’s imaging report are general in nature and it is up to your healthcare provider to say whether those recommendations make sense for your situation.  You are strongly encouraged to talk to your health care provider about the results and ask whether additional steps need to be taken.    Where can I get a copy of the final report for my recent radiology exam?  To get a full copy of the report you can access your records online at [email or web address] or please contact [facility] Medical Records Department at [phone] Monday through Friday between 8 am and 6 pm.         What do I need to do now?           Please contact your health care provider who requested the imaging study to discuss what further actions (if any) are needed.  You may have already heard from (your ordering provider) in regard to this test in which case you can disregard this letter.        NOTICE IN ACCORDANCE WITH THE [place] STATE “PATIENT TEST RESULT INFORMATION ACT OF 2018”    You are receiving this notice as a result of a determination by your diagnostic imaging service that further discussions of your test results are warranted and would be beneficial to you.    The complete results of your test or tests have been or will be sent to the health care practitioner that ordered the test or tests. It is recommended that you contact your health care practitioner to discuss your results as soon as possible.
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
## 2024-08-06 NOTE — RESULT ENCOUNTER NOTE
Patient notified of results and provider recommendations and acknowledged understanding of same. No further action needed.

## 2024-08-07 RX ORDER — COVID-19 ANTIGEN TEST
KIT MISCELLANEOUS
COMMUNITY

## 2024-08-07 NOTE — PRE-PROCEDURE INSTRUCTIONS
Pre-Surgery Instructions:   Medication Instructions    abiraterone (ZYTIGA) 250 mg tablet Hold day of surgery.use last week    amLODIPine (NORVASC) 10 mg tablet Take day of surgery.    ARIPiprazole (ABILIFY) 2 mg tablet Take day of surgery.    Armodafinil 250 MG tablet Hold day of surgery.    buPROPion (WELLBUTRIN XL) 300 mg 24 hr tablet Take day of surgery.    Calcium Citrate-Vitamin D 250 mg-2.5 mcg tablet Hold day of surgery.    Diclofenac Sodium (VOLTAREN) 1 % Stop taking 7 days prior to surgery.use > 1month    diphenoxylate-atropine (Lomotil) 2.5-0.025 mg per tablet Hold day of surgery.    gabapentin (NEURONTIN) 400 mg capsule Take day of surgery.    HYDROcodone-acetaminophen (NORCO)  mg per tablet Uses PRN- OK to take day of surgery    hydrocortisone 2.5 % cream Hold day of surgery.    leuprolide (LUPRON DEPOT 3 MONTH KIT) 22.5 mg injection Last dose 1month ago    morphine (MS CONTIN) 15 mg 12 hr tablet Uses PRN- OK to take day of surgery    multivitamin (THERAGRAN) TABS Hold day of surgery.    Naproxen Sodium (Aleve) 220 MG CAPS Last dose 8/7-aware to hold till post surgery     olmesartan (BENICAR) 40 mg tablet Hold day of surgery.    omeprazole (PriLOSEC) 40 MG capsule Take day of surgery.    Papav-Phentolamine-Alprostadil (PGE1 / PHENTOLAMINE / PAPAVERINE, TRIMIX, 40 MCG / 1 MG / 30 MG INJECTION) Hold day of surgery.    predniSONE 5 mg tablet Take day of surgery.    pseudoephedrine (SUDAFED) 30 mg tablet Hold day of surgery.    tadalafil (CIALIS) 5 MG tablet Take night before surgery    tamsulosin (FLOMAX) 0.4 mg Take night before surgery    vilazodone (Viibryd) 40 mg tablet Take day of surgery.    Medication instructions for day surgery reviewed. Please use only a sip of water to take your instructed medications. Avoid all over the counter vitamins, supplements and NSAIDS for one week prior to surgery per anesthesia guidelines. Tylenol is ok to take as needed.     You will receive a call one business  day prior to surgery with an arrival time and hospital directions. If your surgery is scheduled on a Monday, the hospital will be calling you on the Friday prior to your surgery. If you have not heard from anyone by 8pm, please call the hospital supervisor through the hospital  at [phone]. ([place] [phone] or [place] [phone]).    Do not eat or drink anything after midnight the night before your surgery, including candy, mints, lifesavers, or chewing gum. Do not drink alcohol 24hrs before your surgery. Try not to smoke at least 24hrs before your surgery.       Follow the pre surgery showering instructions as listed in the “My Surgical Experience Booklet” or otherwise provided by your surgeon's office. Do not use a blade to shave the surgical area 1 week before surgery. It is okay to use a clean electric clippers up to 24 hours before surgery. Do not apply any lotions, creams, including makeup, cologne, deodorant, or perfumes after showering on the day of your surgery. Do not use dry shampoo, hair spray, hair gel, or any type of hair products.     No contact lenses, eye make-up, or artificial eyelashes. Remove nail polish, including gel polish, and any artificial, gel, or acrylic nails if possible. Remove all jewelry including rings and body piercing jewelry.     Wear causal clothing that is easy to take on and off. Consider your type of surgery.    Keep any valuables, jewelry, piercings at home. Please bring any specially ordered equipment (sling, braces) if indicated.    Arrange for a responsible person to drive you to and from the hospital on the day of your surgery. Please confirm the visitor policy for the day of your procedure when you receive your phone call with an arrival time.     Call the surgeon's office with any new illnesses, exposures, or additional questions prior to surgery.    Please reference your “My Surgical Experience Booklet” for additional information to prepare for  your upcoming surgery.Medication instructions for day surgery reviewed. Please use only a sip of water to take your instructed medications. Avoid all over the counter vitamins, supplements and NSAIDS for one week prior to surgery per anesthesia guidelines. Tylenol is ok to take as needed.     You will receive a call one business day prior to surgery with an arrival time and hospital directions. If your surgery is scheduled on a Monday, the hospital will be calling you on the Friday prior to your surgery. If you have not heard from anyone by 8pm, please call the hospital supervisor through the hospital  at [phone]. ([place] [phone] or [place] [phone]).    Do not eat or drink anything after midnight the night before your surgery, including candy, mints, lifesavers, or chewing gum. Do not drink alcohol 24hrs before your surgery. Try not to smoke at least 24hrs before your surgery.       Follow the pre surgery showering instructions as listed in the “My Surgical Experience Booklet” or otherwise provided by your surgeon's office. Do not use a blade to shave the surgical area 1 week before surgery. It is okay to use a clean electric clippers up to 24 hours before surgery. Do not apply any lotions, creams, including makeup, cologne, deodorant, or perfumes after showering on the day of your surgery. Do not use dry shampoo, hair spray, hair gel, or any type of hair products.     No contact lenses, eye make-up, or artificial eyelashes. Remove nail polish, including gel polish, and any artificial, gel, or acrylic nails if possible. Remove all jewelry including rings and body piercing jewelry.     Wear causal clothing that is easy to take on and off. Consider your type of surgery.    Keep any valuables, jewelry, piercings at home. Please bring any specially ordered equipment (sling, braces) if indicated.    Arrange for a responsible person to drive you to and from the hospital on the day of your  surgery. Please confirm the visitor policy for the day of your procedure when you receive your phone call with an arrival time.     Call the surgeon's office with any new illnesses, exposures, or additional questions prior to surgery.    Please reference your “My Surgical Experience Booklet” for additional information to prepare for your upcoming surgery.    Pt. Verbalized an understanding of all instructions reviewed and offers no concerns at this time.Pt. Verbalized an understanding of all instructions reviewed and offers no concerns at this time.

## 2024-08-08 ENCOUNTER — ANESTHESIA (OUTPATIENT)
Dept: PERIOP | Facility: HOSPITAL | Age: 69
End: 2024-08-08
Payer: MEDICARE

## 2024-08-08 ENCOUNTER — HOSPITAL ENCOUNTER (OUTPATIENT)
Facility: HOSPITAL | Age: 69
Setting detail: OUTPATIENT SURGERY
Discharge: HOME | End: 2024-08-08
Attending: ORTHOPAEDIC SURGERY | Admitting: ORTHOPAEDIC SURGERY
Payer: MEDICARE

## 2024-08-08 ENCOUNTER — ANESTHESIA EVENT (OUTPATIENT)
Dept: PERIOP | Facility: HOSPITAL | Age: 69
End: 2024-08-08
Payer: MEDICARE

## 2024-08-08 VITALS
OXYGEN SATURATION: 97 % | WEIGHT: 156 LBS | HEIGHT: 66 IN | HEART RATE: 58 BPM | RESPIRATION RATE: 16 BRPM | BODY MASS INDEX: 25.07 KG/M2 | SYSTOLIC BLOOD PRESSURE: 130 MMHG | DIASTOLIC BLOOD PRESSURE: 82 MMHG | TEMPERATURE: 97.9 F

## 2024-08-08 PROCEDURE — 64721 CARPAL TUNNEL SURGERY: CPT

## 2024-08-08 PROCEDURE — NC001 PR NO CHARGE: Performed by: ORTHOPAEDIC SURGERY

## 2024-08-08 PROCEDURE — 64721 CARPAL TUNNEL SURGERY: CPT | Performed by: ORTHOPAEDIC SURGERY

## 2024-08-08 RX ORDER — SODIUM CHLORIDE, SODIUM LACTATE, POTASSIUM CHLORIDE, CALCIUM CHLORIDE 600; 310; 30; 20 MG/100ML; MG/100ML; MG/100ML; MG/100ML
125 INJECTION, SOLUTION INTRAVENOUS CONTINUOUS
Status: DISCONTINUED | OUTPATIENT
Start: 2024-08-08 | End: 2024-08-08 | Stop reason: HOSPADM

## 2024-08-08 RX ORDER — SODIUM CHLORIDE, SODIUM LACTATE, POTASSIUM CHLORIDE, CALCIUM CHLORIDE 600; 310; 30; 20 MG/100ML; MG/100ML; MG/100ML; MG/100ML
INJECTION, SOLUTION INTRAVENOUS CONTINUOUS PRN
Status: DISCONTINUED | OUTPATIENT
Start: 2024-08-08 | End: 2024-08-08

## 2024-08-08 RX ORDER — ONDANSETRON 2 MG/ML
4 INJECTION INTRAMUSCULAR; INTRAVENOUS EVERY 6 HOURS PRN
Status: DISCONTINUED | OUTPATIENT
Start: 2024-08-08 | End: 2024-08-08 | Stop reason: HOSPADM

## 2024-08-08 RX ORDER — CEFAZOLIN SODIUM 1 G/50ML
1000 SOLUTION INTRAVENOUS ONCE
Status: DISCONTINUED | OUTPATIENT
Start: 2024-08-08 | End: 2024-08-08 | Stop reason: HOSPADM

## 2024-08-08 RX ORDER — PROPOFOL 10 MG/ML
INJECTION, EMULSION INTRAVENOUS CONTINUOUS PRN
Status: DISCONTINUED | OUTPATIENT
Start: 2024-08-08 | End: 2024-08-08

## 2024-08-08 RX ORDER — FENTANYL CITRATE 50 UG/ML
INJECTION, SOLUTION INTRAMUSCULAR; INTRAVENOUS AS NEEDED
Status: DISCONTINUED | OUTPATIENT
Start: 2024-08-08 | End: 2024-08-08

## 2024-08-08 RX ORDER — CEFAZOLIN SODIUM 1 G/50ML
SOLUTION INTRAVENOUS AS NEEDED
Status: DISCONTINUED | OUTPATIENT
Start: 2024-08-08 | End: 2024-08-08

## 2024-08-08 RX ORDER — ONDANSETRON 2 MG/ML
4 INJECTION INTRAMUSCULAR; INTRAVENOUS ONCE AS NEEDED
Status: DISCONTINUED | OUTPATIENT
Start: 2024-08-08 | End: 2024-08-08 | Stop reason: HOSPADM

## 2024-08-08 RX ORDER — FENTANYL CITRATE/PF 50 MCG/ML
25 SYRINGE (ML) INJECTION
Status: DISCONTINUED | OUTPATIENT
Start: 2024-08-08 | End: 2024-08-08 | Stop reason: HOSPADM

## 2024-08-08 RX ORDER — ACETAMINOPHEN 325 MG/1
650 TABLET ORAL EVERY 6 HOURS PRN
Status: DISCONTINUED | OUTPATIENT
Start: 2024-08-08 | End: 2024-08-08 | Stop reason: HOSPADM

## 2024-08-08 RX ORDER — LIDOCAINE HYDROCHLORIDE 20 MG/ML
INJECTION, SOLUTION EPIDURAL; INFILTRATION; INTRACAUDAL; PERINEURAL AS NEEDED
Status: DISCONTINUED | OUTPATIENT
Start: 2024-08-08 | End: 2024-08-08

## 2024-08-08 RX ORDER — PROPOFOL 10 MG/ML
INJECTION, EMULSION INTRAVENOUS AS NEEDED
Status: DISCONTINUED | OUTPATIENT
Start: 2024-08-08 | End: 2024-08-08

## 2024-08-08 RX ORDER — MAGNESIUM HYDROXIDE 1200 MG/15ML
LIQUID ORAL AS NEEDED
Status: DISCONTINUED | OUTPATIENT
Start: 2024-08-08 | End: 2024-08-08 | Stop reason: HOSPADM

## 2024-08-08 RX ADMIN — SODIUM CHLORIDE, SODIUM LACTATE, POTASSIUM CHLORIDE, AND CALCIUM CHLORIDE: .6; .31; .03; .02 INJECTION, SOLUTION INTRAVENOUS at 13:14

## 2024-08-08 RX ADMIN — FENTANYL CITRATE 25 MCG: 50 INJECTION, SOLUTION INTRAMUSCULAR; INTRAVENOUS at 13:27

## 2024-08-08 RX ADMIN — PROPOFOL 20 MG: 10 INJECTION, EMULSION INTRAVENOUS at 13:26

## 2024-08-08 RX ADMIN — FENTANYL CITRATE 50 MCG: 50 INJECTION, SOLUTION INTRAMUSCULAR; INTRAVENOUS at 13:18

## 2024-08-08 RX ADMIN — CEFAZOLIN SODIUM 1000 MG: 1 SOLUTION INTRAVENOUS at 13:18

## 2024-08-08 RX ADMIN — PROPOFOL 30 MG: 10 INJECTION, EMULSION INTRAVENOUS at 13:24

## 2024-08-08 RX ADMIN — SODIUM CHLORIDE, SODIUM LACTATE, POTASSIUM CHLORIDE, AND CALCIUM CHLORIDE 125 ML/HR: .6; .31; .03; .02 INJECTION, SOLUTION INTRAVENOUS at 13:09

## 2024-08-08 RX ADMIN — LIDOCAINE HYDROCHLORIDE 100 MG: 20 INJECTION, SOLUTION EPIDURAL; INFILTRATION; INTRACAUDAL; PERINEURAL at 13:24

## 2024-08-08 RX ADMIN — FENTANYL CITRATE 25 MCG: 50 INJECTION, SOLUTION INTRAMUSCULAR; INTRAVENOUS at 13:24

## 2024-08-08 RX ADMIN — PROPOFOL 50 MCG/KG/MIN: 10 INJECTION, EMULSION INTRAVENOUS at 13:24

## 2024-08-08 NOTE — PERIOPERATIVE NURSING NOTE
Received awake,alert. Right hand ace dry,intact. Pt able to wiggle fingers of right hand.Denies pain at present

## 2024-08-08 NOTE — ANESTHESIA PREPROCEDURE EVALUATION
Procedure:  RELEASE CARPAL TUNNEL (Right: Wrist)    Relevant Problems   CARDIO   (+) Hypercholesterolemia   (+) Primary hypertension      GI/HEPATIC   (+) GERD (gastroesophageal reflux disease)      /RENAL   (+) Malignant neoplasm of prostate (HCC)   (+) Stage 3 chronic kidney disease, unspecified whether stage 3a or 3b CKD (HCC)      HEMATOLOGY   (+) Iron deficiency anemia secondary to inadequate dietary iron intake      MUSCULOSKELETAL   (+) Chronic bilateral low back pain with bilateral sciatica   (+) Lumbar back pain with radiculopathy affecting right lower extremity      NEURO/PSYCH   (+) Anxiety   (+) Chronic bilateral low back pain with bilateral sciatica   (+) Chronic pain disorder   (+) Current mild episode of major depressive disorder (HCC)      PULMONARY   (+) Obstructive sleep apnea syndrome in adult   (+) Sleep apnea      Behavioral Health   (+) Opioid dependence (HCC)      Other   (+) Hereditary hemochromatosis (HCC)      Former smoker  On prednisone with zytiga  Physical Exam    Airway    Mallampati score: II  TM Distance: >3 FB  Neck ROM: full     Dental   Comment: Denies loose teeth     Cardiovascular  Cardiovascular exam normal    Pulmonary  Pulmonary exam normal     Other Findings  Portions of exam deferred due to low yield and/or unknown COVID status      Anesthesia Plan  ASA Score- 3     Anesthesia Type- IV sedation with anesthesia with ASA Monitors.         Additional Monitors:     Airway Plan:            Plan Factors-Exercise tolerance (METS): >4 METS.    Chart reviewed.   Existing labs reviewed. Patient summary reviewed.    Patient is not a current smoker.              Induction- intravenous.    Postoperative Plan-         Informed Consent- Anesthetic plan and risks discussed with patient.  I personally reviewed this patient with the CRNA. Discussed and agreed on the Anesthesia Plan with the CRNA..

## 2024-08-08 NOTE — OP NOTE
OPERATIVE REPORT  PATIENT NAME: Boni Forte    :  1955  MRN: [id number]  Pt Location: [facility] OR ROOM 03    SURGERY DATE: 2024    Surgeons and Role:     * Tara Constantino MD - Primary     * Sheree Rocha PA-C - Assisting    Preop Diagnosis:  Carpal tunnel syndrome on right [G56.01]    Post-Op Diagnosis Codes:     * Carpal tunnel syndrome on right [G56.01]    Procedure(s):  Right - RELEASE CARPAL TUNNEL    Specimen(s):  * No specimens in log *    Estimated Blood Loss:   Minimal    Drains:  * No LDAs found *    Anesthesia Type:   IV Sedation with Anesthesia    Operative Indications:  Carpal tunnel syndrome on right [G56.01]      Operative Findings:  Compression of median nerve through the carpal canal      Complications:   None    Procedure and Technique:  The patient was correctly identified in the preanesthesia holding area.  The operative site was identified and marked with a marker.  We reviewed the informed consent which included the planned surgical procedure, risk, benefits, alternatives, as well as, postoperative care expectations.  Questions were answered to the patient satisfaction.  The patient voluntarily signed informed consent.    The patient was taken back to the operating room.  The patient remained on the gurney with a hand table attached to the gurney.  I applied tourniquet to the operative extremity.  The anesthesiologist sedated the patient and monitored the airway during the case.  Prophylactic antibiotics were administered intravenously.  I prepped the skin with alcohol and injected local into the planned surgical site.  The operative extremity was prepped and draped in a sterile fashion.  A timeout was performed.  I used a marker to plan the surgical incision.  Next, I elevated the upper extremity, applied an Esmarch, and inflated the tourniquet to 250 mmHg.  I used a #15 blade to make the skin incision.  I carefully dissected to the level of the palmar fascia.  I repositioned  the retractors to allow for direct visualization of the palmar fascia.  I incised the palmar fascia using a #15 blade.  I repositioned the retractors to allow for direct visualization of the transverse carpal ligament.  I used a #15 blade to incise the transverse carpal ligament along its ulnar border, thereby, creating a radially based flap.  The distal extent of the release was marked by the presence of fat.  I turned my attention to release of the proximal portion of the transverse carpal ligament.  I used Littler scissors to bluntly dissect to create a space for position of a Ragnell retractor.  I position the retractors to allow for direct visualization of the proximal portion of the transverse carpal ligament and the distal forearm fascia.  I used a #15 blade to incise the proximal portion of the transverse carpal ligament and the distal forearm fascia.  Next I inserted a freer elevator to evaluate the quality of the release.  No fascial bands were appreciated.  I irrigated the wound with normal saline delivered the syringe.  The tourniquet  was deflated at 6 minutes.  Hemostasis was achieved using electrocautery and direct pressure.  I approximated the skin using 4-0 nylon in horizontal mattress pattern.  A sterile dressing and a volar splint were applied to the operative extremity.  The patient tolerated the procedure well and was taken to the postanesthesia care unit in stable condition.  ITara, performed the entire procedure.  A qualified resident physician was not available. and A physician assistant was required during the procedure for retraction, tissue handling, dissection and suturing.    Postoperative plan:   The patient was instructed on activity limitations, elevation, and use of ice for pain management.    Pain management will include the use of over-the-counter Tylenol and ibuprofen.  The patient was given instructions on appropriate dosing and timing for postoperative use of  Tylenol and ibuprofen.    The patient will follow-up in 7 to 10 days for removal of the splint, sutures, and to receive a home exercise program to include scar massage and nerve glide exercises.        Patient Disposition:  PACU         SIGNATURE: Tara Constantino MD  DATE: August 8, 2024  TIME: 1:06 PM

## 2024-08-08 NOTE — NURSING NOTE
1418: Patient returned from PACU alert and oriented times 4. Pt reports numbness, no pain. Dressing is CDI. Per pt, no questions or concerns at this time. Call bell within reach, beverage provided, ride at bedside.    15:10: Pt discharged to home. D/C instructions reviewed with pt and pt expressed understanding. Pt continues to report no pain. Dressing remains CDI. Per pt, no questions or concerns at this time. Advised to contact Dr. Constantino's office with any questions/concerns once home. Pt taken to ride via wheelchair and was able to ambulate from ride to wheelchair.

## 2024-08-08 NOTE — DISCHARGE INSTR - AVS FIRST PAGE
Post Operative Instructions    You have had surgery on your arm today, please read and follow the information below:  Elevate your hand above your elbow during the next 24-48 hours to help with swelling.  Place your hand and arm over your head with motion at your shoulder three times a day.  Do not apply any cream/ointment/oil to your incisions including antibiotics.  Do not soak your hands in standing water (dishwater, tubs, Jacuzzi's, pools, etc.) until given permission (typically 2-3 weeks after injury)    Call the office if you notice any:  Increased numbness or tingling of your hand or fingers that is not relieved with elevation.  Increasing pain that is not controlled with medication.  Difficulty chewing, breathing, swallowing.  Chest pains or shortness of breath.  Fever over 101.4 degrees.    Bandage: Do NOT remove bandage until follow-up appointment.    Motion: Move fingers into a fist 5 times a day, DO NOT move any splinted fingers.    Weight bearing status: Avoid heavy lifting (>5 pounds) with the extremity that was operated on until follow up appointment. Normal activities of daily living are OK.    Ice: Ice for 10 minutes every hour as needed for swelling x 24 hours.    Sling: No sling necessary.    Medications:   Tylenol and Ibuprofen- You may take over-the-counter Tylenol and Ibuprofen.  Tylenol dosing can be either Extra Strength (500 mg) or 2 tablets of Regular Strength (650 mg). Ibuprofen dosing can be up to 3 tablets of 200 mg for a total of 600mg.  Timing is as follows:  Take Tylenol, then 3 hours later take ibuprofen, then 3 hours later take Tylenol, then 3 hours later take ibuprofen.  Every 3 hours, you will take either Tylenol or ibuprofen.  Antibiotics will be prescribed only if indicated.        Follow-up Appointment: As scheduled      Please call the office if you have any questions or concerns regarding your post-operative care.

## 2024-08-08 NOTE — ANESTHESIA POSTPROCEDURE EVALUATION
Post-Op Assessment Note    CV Status:  Stable  Pain Score: 0    Pain management: adequate    Multimodal analgesia used between 6 hours prior to anesthesia start to PACU discharge    Mental Status:  Alert   Hydration Status:  Stable   PONV Controlled:  None  Two or more mitigation strategies used for obstructive sleep apnea   Post Op Vitals Reviewed: Yes    No anethesia notable event occurred.    Staff: CRNA               BP   119/75   Temp 98   Pulse 75   Resp 16   SpO2 100
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
## 2024-08-09 DIAGNOSIS — G47.30 SLEEP APNEA, UNSPECIFIED TYPE: ICD-10-CM

## 2024-08-09 DIAGNOSIS — R53.83 FATIGUE, UNSPECIFIED TYPE: ICD-10-CM

## 2024-08-09 RX ORDER — ARMODAFINIL 250 MG/1
250 TABLET ORAL DAILY
Qty: 30 TABLET | Refills: 0 | Status: SHIPPED | OUTPATIENT
Start: 2024-08-09

## 2024-08-17 DIAGNOSIS — F41.1 GAD (GENERALIZED ANXIETY DISORDER): ICD-10-CM

## 2024-08-17 DIAGNOSIS — F33.1 MODERATE EPISODE OF RECURRENT MAJOR DEPRESSIVE DISORDER (HCC): ICD-10-CM

## 2024-08-19 ENCOUNTER — TELEPHONE (OUTPATIENT)
Age: 69
End: 2024-08-19

## 2024-08-19 DIAGNOSIS — F11.20 UNCOMPLICATED OPIOID DEPENDENCE (HCC): ICD-10-CM

## 2024-08-19 DIAGNOSIS — M15.9 GENERALIZED OSTEOARTHRITIS OF MULTIPLE SITES: Primary | ICD-10-CM

## 2024-08-19 DIAGNOSIS — G89.4 CHRONIC PAIN SYNDROME: ICD-10-CM

## 2024-08-19 RX ORDER — HYDROCODONE BITARTRATE AND ACETAMINOPHEN 10; 325 MG/1; MG/1
1 TABLET ORAL EVERY 4 HOURS
Qty: 180 TABLET | Refills: 0 | Status: SHIPPED | OUTPATIENT
Start: 2024-08-19

## 2024-08-19 RX ORDER — ARIPIPRAZOLE 2 MG/1
2 TABLET ORAL DAILY
Qty: 90 TABLET | Refills: 1 | Status: SHIPPED | OUTPATIENT
Start: 2024-08-19

## 2024-08-19 NOTE — TELEPHONE ENCOUNTER
Caller: patient     Doctor: Vira     Reason for call: wants to reschedule 8/22 sx     Call back#: [phone]

## 2024-08-19 NOTE — TELEPHONE ENCOUNTER
Called and confirmed cancellation with patient.  He will call to reschedule when he is ready.  Advised surgery will not be with Dr. Constantino

## 2024-08-19 NOTE — TELEPHONE ENCOUNTER
Patient is almost out of Norco and MS Contin.  Please advise when these will be reordered.  Thank you.

## 2024-08-19 NOTE — TELEPHONE ENCOUNTER
Patient is requesting refills for the following medications: morphine (MS CONTIN) 15 mg 12 hr tablet and Norco  mg. He would like them sent to:  [facility] - [place], [place] - [address]

## 2024-08-20 ENCOUNTER — OFFICE VISIT (OUTPATIENT)
Dept: OBGYN CLINIC | Facility: CLINIC | Age: 69
End: 2024-08-20

## 2024-08-20 VITALS
HEART RATE: 73 BPM | BODY MASS INDEX: 25.07 KG/M2 | DIASTOLIC BLOOD PRESSURE: 90 MMHG | SYSTOLIC BLOOD PRESSURE: 151 MMHG | WEIGHT: 156 LBS | HEIGHT: 66 IN

## 2024-08-20 DIAGNOSIS — Z47.89 AFTERCARE FOLLOWING SURGERY OF THE MUSCULOSKELETAL SYSTEM: Primary | ICD-10-CM

## 2024-08-20 PROCEDURE — 99024 POSTOP FOLLOW-UP VISIT: CPT | Performed by: PHYSICIAN ASSISTANT

## 2024-08-21 ENCOUNTER — DOCUMENTATION (OUTPATIENT)
Dept: FAMILY MEDICINE CLINIC | Facility: CLINIC | Age: 69
End: 2024-08-21

## 2024-08-22 ENCOUNTER — TELEPHONE (OUTPATIENT)
Dept: PSYCHIATRY | Facility: CLINIC | Age: 69
End: 2024-08-22

## 2024-08-22 NOTE — TELEPHONE ENCOUNTER
Called pt due to we received his Controlled substance agreement that Marco Chen  sent. John had added his primary on the contract. Writer called John to let him know we would have to mail out a new contract due to this was a contract between Damaris and John. She could not sign for another provider. John understood and and agreed to sign another one

## 2024-08-25 DIAGNOSIS — F33.0 MILD EPISODE OF RECURRENT MAJOR DEPRESSIVE DISORDER (HCC): ICD-10-CM

## 2024-08-26 RX ORDER — BUPROPION HYDROCHLORIDE 300 MG/1
300 TABLET ORAL EVERY MORNING
Qty: 90 TABLET | Refills: 1 | Status: SHIPPED | OUTPATIENT
Start: 2024-08-26

## 2024-09-10 ENCOUNTER — TELEPHONE (OUTPATIENT)
Age: 69
End: 2024-09-10

## 2024-09-10 NOTE — TELEPHONE ENCOUNTER
Patient called in to schedule a follow up with their medication management provider.    Patient is now scheduled on 9/11/24 @11:30am virtually via [facility].

## 2024-09-11 ENCOUNTER — TELEMEDICINE (OUTPATIENT)
Dept: PSYCHIATRY | Facility: CLINIC | Age: 69
End: 2024-09-11
Payer: MEDICARE

## 2024-09-11 DIAGNOSIS — F33.0 MILD EPISODE OF RECURRENT MAJOR DEPRESSIVE DISORDER (HCC): Primary | ICD-10-CM

## 2024-09-11 DIAGNOSIS — F41.1 GAD (GENERALIZED ANXIETY DISORDER): ICD-10-CM

## 2024-09-11 DIAGNOSIS — F51.01 PRIMARY INSOMNIA: Primary | ICD-10-CM

## 2024-09-11 PROBLEM — F32.A DEPRESSION: Chronic | Status: ACTIVE | Noted: 2019-04-14

## 2024-09-11 PROBLEM — F41.9 ANXIETY: Status: RESOLVED | Noted: 2023-12-18 | Resolved: 2024-09-11

## 2024-09-11 PROBLEM — F32.0 CURRENT MILD EPISODE OF MAJOR DEPRESSIVE DISORDER (HCC): Chronic | Status: ACTIVE | Noted: 2019-04-14

## 2024-09-11 PROCEDURE — 99214 OFFICE O/P EST MOD 30 MIN: CPT | Performed by: PHYSICIAN ASSISTANT

## 2024-09-11 PROCEDURE — G2211 COMPLEX E/M VISIT ADD ON: HCPCS | Performed by: PHYSICIAN ASSISTANT

## 2024-09-11 RX ORDER — ZOLPIDEM TARTRATE 12.5 MG/1
12.5 TABLET, FILM COATED, EXTENDED RELEASE ORAL
Qty: 30 TABLET | Refills: 0 | Status: SHIPPED | OUTPATIENT
Start: 2024-09-11

## 2024-09-11 NOTE — PSYCH
This note was not shared with the patient due to reasonable likelihood of causing patient harm     Virtual Regular Visit    Visit Date: 09/11/24     Verification of patient location:    Patient is located at Home in the following state in which I hold an active license [place]    Problem List Items Addressed This Visit       Current mild episode of major depressive disorder (HCC) - Primary (Chronic)    SERGEY (generalized anxiety disorder)     Reason for visit is   No chief complaint on file.    Encounter provider Damaris Chen    Provider located at [facility] [address]  [facility] [address] [phone]    Recent Visits  No visits were found meeting these conditions.  Showing recent visits within past 7 days and meeting all other requirements  Future Appointments  No visits were found meeting these conditions.  Showing future appointments within next 150 days and meeting all other requirements       The patient was identified by name and date of birth. Boni Forte was informed that this is a telemedicine visit and that the visit is being conducted through the Epic Embedded platform. He agrees to proceed.. My office door was closed. No one else was in the room. He acknowledged consent and understanding of privacy and security of the video platform. The patient has agreed to participate and understands they can discontinue the visit at any time.    Patient is aware this is a billable service.     Insurance: Payor: MEDICARE / Plan: MEDICARE A AND B / Product Type: Medicare A & B Fee for Service /      Assessment & Plan  Mild episode of recurrent major depressive disorder (HCC)  Not at goal - continue aripiprazole 2 mg qd for another month, then re-eval; continue bupropion  mg qAM and vilazodone 40 mg qd w/ food unchanged         SERGEY (generalized anxiety disorder)  See MDD           SUBJECTIVE:    Boni Forte is a alta  69 y.o. male with a history of Major Depressive Disorder and Generalized Anxiety Disorder who presents today for follow-up and medication management. Since his last visit he stopped the buspirone with no ill effects. He also started on aripiprazole and is tolerating it well but has not noticed any benefit. He wants to give it a little longer before changing the dose. His main stressor is still the cancer.     He denies any suicidal ideation, intent or plan at present; denies any homicidal ideation, intent or plan at present.    He denies any auditory hallucinations, denies any visual hallucinations, denies any delusions.    He denies any side effects from current psychiatric medications.    HPI ROS Appetite Changes and Sleep:     He reports adequate number of sleep hours, adequate appetite, adequate energy level    Current Rating Scores:     None completed today.     Review Of Systems:    Mood anxiety   Behavior appropriate, cooperative, and calm   Thought Content daily worries   General relationship problems and marital problems   Personality no change in personality   Other Psych Symptoms normal   Constitutional as noted in HPI   ENT as noted in HPI   Cardiovascular as noted in HPI   Respiratory as noted in HPI   Gastrointestinal as noted in HPI   Genitourinary as noted in HPI   Musculoskeletal as noted in HPI   Integumentary as noted in HPI   Neurological as noted in HPI   Endocrine negative   Other Symptoms none, all other systems are negative     Family Psychiatric History:     Family History   Problem Relation Age of Onset    Diabetes Mother     Depression Mother     Hypertension Mother     Stroke Mother     Alcohol abuse Mother     Aneurysm Father         Brain    Stroke Father     Aneurysm Brother         Brain    Depression Brother     Arthritis Brother     Other Maternal Grandmother         Myocardial infarction    Arthritis Maternal Grandmother     Transient ischemic attack Paternal Grandfather      Hypertension Family         Sibling    Other Family         Spinal stenosis and Thyroid disorder - Sibling    No Known Problems Sister     No Known Problems Maternal Aunt     No Known Problems Maternal Uncle     No Known Problems Paternal Aunt     No Known Problems Paternal Uncle     No Known Problems Maternal Grandfather     No Known Problems Paternal Grandmother     Arthritis Brother     Hypertension Brother     Hypertension Brother     Hypertension Sister     Substance Abuse Neg Hx     Mental illness Neg Hx     ADD / ADHD Neg Hx     Anesthesia problems Neg Hx     Cancer Neg Hx     Clotting disorder Neg Hx     Collagen disease Neg Hx     Dislocations Neg Hx     Learning disabilities Neg Hx     Neurological problems Neg Hx     Osteoporosis Neg Hx     Rheumatologic disease Neg Hx     Scoliosis Neg Hx     Vascular Disease Neg Hx      Social/Substance Abuse History:    Social History     Socioeconomic History    Marital status: /Civil Union     Spouse name: Not on file    Number of children: 1    Years of education: Not on file    Highest education level: Master's degree (e.g., MA, MS, Benito, MEd, MSW, FERMIN)   Occupational History    Occupation:     Occupation: Teacher     Employer: [employer]   Tobacco Use    Smoking status: Former     Current packs/day: 0.00     Average packs/day: 1 pack/day for 16.0 years (16.0 ttl pk-yrs)     Types: Cigarettes     Start date: 1969     Quit date: 1984     Years since quittin.7     Passive exposure: Never    Smokeless tobacco: Never   Vaping Use    Vaping status: Never Used   Substance and Sexual Activity    Alcohol use: Yes     Alcohol/week: 2.0 standard drinks of alcohol     Types: 1 Glasses of wine, 1 Shots of liquor per week     Comment: one drink a week    Drug use: Yes     Frequency: 1.0 times per week     Types: Marijuana     Comment: occassionaly    Sexual activity: Yes     Partners: Female     Birth control/protection:  Post-menopausal, Male Sterilization     Comment: Very low / no libido   Other Topics Concern    Not on file   Social History Narrative    Dental care, regularly    Drinks coffee:  2-3 cups per day    Exercises moderately 3 or more times a week    Vegetarian diet     Social Determinants of Health     Financial Resource Strain: Low Risk  (1/16/2024)    Overall Financial Resource Strain (CARDIA)     Difficulty of Paying Living Expenses: Not very hard   Food Insecurity: No Food Insecurity (11/21/2022)    Hunger Vital Sign     Worried About Running Out of Food in the Last Year: Never true     Ran Out of Food in the Last Year: Never true   Transportation Needs: No Transportation Needs (1/16/2024)    PRAPARE - Transportation     Lack of Transportation (Medical): No     Lack of Transportation (Non-Medical): No   Physical Activity: Not on file   Stress: Not on file   Social Connections: Not on file   Intimate Partner Violence: Not on file   Housing Stability: Low Risk  (11/21/2022)    Housing Stability Vital Sign     Unable to Pay for Housing in the Last Year: No     Number of Places Lived in the Last Year: 1     Unstable Housing in the Last Year: No     The following portions of the patient's history were reviewed and updated as appropriate: past family history, past medical history, past social history, past surgical history and problem list.    OBJECTIVE:     Mental Status Evaluation:  Appearance:  dressed appropriately, adequate grooming, looks stated age   Behavior:  pleasant, cooperative, calm, interacts appropriately with this writer   Speech:  normal rate, normal volume, normal pitch   Mood:  anxious   Affect:  constricted, slightly brighter   Thought Process:  organized, logical, coherent   Associations: intact associations   Thought Content:  no overt delusions, no paranoia noted on exam   Perceptual Disturbances: no auditory hallucinations, no visual hallucinations   Risk Potential: Suicidal ideation -  None  Homicidal ideation - None  Potential for aggression - No   Sensorium:  oriented to person, place, and time/date   Memory:  recent and remote memory grossly intact   Consciousness:  alert and awake   Attention/Concentration: attention span and concentration are age appropriate   Insight:  age appropriate   Judgment: age appropriate   Gait/Station: Unable to assess today due to virtual visit   Motor Activity: unable to assess today due to virtual visit        Laboratory Results: I have personally reviewed all pertinent laboratory/tests results    Suicide/Homicide Risk Assessment:    Risk of Harm to Self:  The following ratings are based on assessment at the time of the interview  Based on today's assessment, Boni presents the following risk of harm to self: none    Risk of Harm to Others:  The following ratings are based on assessment at the time of the interview  Based on today's assessment, Boni presents the following risk of harm to others: none    The following interventions are recommended: no intervention changes needed     Assessment/Plan:      No change with stopping buspirone. Tolerating aripiprazole but no benefit yet. He wants to give it longer before considering dose change which is reasonable. No med changes at this time. He will continue working with his outside therapist, Satya Dumont PsyD, as well. We have discussed his safety plan and he agrees that if he experience unsafe thoughts that he will reach out to his supports including this office, the suicide hotline, and emergency services if necessary. Boni is aware of non-emergent and emergent mental health resources. They are able to contract for their own safety at this time.    Will follow up in 2 months. Patient is aware to call the office if questions or concerns arise sooner.      Diagnoses and all orders for this visit:    Mild episode of recurrent major depressive disorder (HCC)    SERGEY (generalized anxiety disorder)         Treatment Recommendations/Precautions:    Continue current medications:     - bupropion  mg qAM     - vilazodone 40 mg qd w/ food    - aripiprazole 2 mg qhs    Aware of 24 hour and weekend coverage for urgent situations accessed by calling [facility] main practice number  Follows with family physician for glucose and lipid monitoring due to current therapy with antipsychotic medication  Medication management every 2 months  Continue psychotherapy with own therapist  I am scheduling this patient out for greater than 3 months: No    Medications Risks/Benefits      Risks, Benefits And Possible Side Effects Of Medications:    Risks, benefits, and possible side effects of medications explained to Boni and he verbalizes understanding and agreement for treatment.    Controlled Medication Discussion:     Not applicable - controlled prescriptions are not prescribed by this practice    Psychotherapy Provided:     Individual psychotherapy provided: No     Treatment Plan:    Completed and signed during the session: Not applicable - Treatment Plan not due at this session     Visit Time    Visit Start Time:  11:30 AM  Visit End Time:  11:45 AM  Total Visit Duration:  15 minutes    Damaris Chen 09/11/24      VIRTUAL VISIT DISCLAIMER    Boni Forte verbally agrees to participate in Virtual Care Services. Pt is aware that Virtual Care Services could be limited without vital signs or the ability to perform a full hands-on physical exam. Boni Forte understands he or the provider may request at any time to terminate the video visit and request the patient to seek care or treatment in person.

## 2024-09-11 NOTE — ASSESSMENT & PLAN NOTE
Not at goal - continue aripiprazole 2 mg qd for another month, then re-eval; continue bupropion  mg qAM and vilazodone 40 mg qd w/ food unchanged

## 2024-09-15 DIAGNOSIS — G47.30 SLEEP APNEA, UNSPECIFIED TYPE: ICD-10-CM

## 2024-09-15 DIAGNOSIS — M15.9 GENERALIZED OSTEOARTHRITIS OF MULTIPLE SITES: ICD-10-CM

## 2024-09-15 DIAGNOSIS — I10 PRIMARY HYPERTENSION: Primary | ICD-10-CM

## 2024-09-15 DIAGNOSIS — R53.83 FATIGUE, UNSPECIFIED TYPE: ICD-10-CM

## 2024-09-16 RX ORDER — ARMODAFINIL 250 MG/1
250 TABLET ORAL DAILY
Qty: 30 TABLET | Refills: 0 | Status: SHIPPED | OUTPATIENT
Start: 2024-09-16

## 2024-09-16 NOTE — PROGRESS NOTES
Assessment/Plan:  Patient ID: Boni Forte 69 y.o. male  with bilateral carpal tunnel syndrome s.p. right carpal tunnel release 8/8/24  Surgery: Release Carpal Tunnel - Right  Date of Surgery: 8/8/2024        Patient doing well post op from right carpal tunnel release. Patient educated to continue scar massage and regular daily digital ROM.     2. Patient may benefit from release of contralateral carpal tunnel surgery however he would still like some time to recover from his right side. He said he will call us to schedule follow up when he is ready to proceed with the contralateral side.             CHIEF COMPLAINT:  Post-op Right CTR with left carpal tunnel syndrome        SUBJECTIVE:  Boni Forte is a 69 y.o. year old male who presents for follow up after Release Carpal Tunnel - Right. Today patient has no new complaints. Says night pain and paresthesias to right side improving since last visit. Now experiencing increasing discomfort about left side given recent right side surgery. Interested in proceeding with staged release in the coming months.      PHYSICAL EXAMINATION:  General: well developed and well nourished, alert, oriented times 3, and appears comfortable  Psychiatric: Normal    MUSCULOSKELETAL EXAMINATION:  Incision: Clean, dry, intact  Surgery Site: normal, no evidence of infection   Range of Motion: As expected  Neurovascular status: Neuro intact, good cap refill  Activity Restrictions: No restrictions       FOCUSED MUSCULOSKELETAL EXAMINATION:  Right Upper Extremity  Inspection: incision clean, dry and intact. No surrounding erythema.   Palpation:  Appropriate markel-incisional tenderness to palpation  Neurologic:  5/5 elbow flexion, 5/5 elbow extension, 5/5 wrist extension, 5/5 wrist flexion, 5/5 finger flexion, 5/5 finger extension, 5/5 FPL, 5/5 EPL, 4/5 APB, 5/5 intrinsics, sensation intact to median, radial, and ulnar nerve distributions  Vascular: Palpable radial pulse, brisk cap refill  <2sec, hand warm and well perfused  MSK:  full painless ROM, negative tinel at wrist    Left Upper Extremity  Inspection: incision clean, dry and intact. No surrounding erythema.   Palpation:  Appropriate markel-incisional tenderness to palpation  Neurologic:  5/5 elbow flexion, 5/5 elbow extension, 5/5 wrist extension, 5/5 wrist flexion, 5/5 finger flexion, 5/5 finger extension, 5/5 FPL, 5/5 EPL, 4/5 APB, 5/5 intrinsics, sensation intact to median, radial, and ulnar nerve distributions  Vascular: Palpable radial pulse, brisk cap refill <2sec, hand warm and well perfused  MSK: + carpal compression test        STUDIES REVIEWED:  No Studies to review      PROCEDURES PERFORMED:  Procedures  No Procedures performed today      I agree with the history, physical examination, assessment and plan of care as documented above.    Star Akins M.D.  Attending, Orthopaedic Surgery  Hand, Wrist, and Elbow Surgery  [facility]

## 2024-09-17 ENCOUNTER — OFFICE VISIT (OUTPATIENT)
Dept: OBGYN CLINIC | Facility: CLINIC | Age: 69
End: 2024-09-17

## 2024-09-17 VITALS
SYSTOLIC BLOOD PRESSURE: 178 MMHG | HEIGHT: 66 IN | BODY MASS INDEX: 26.52 KG/M2 | DIASTOLIC BLOOD PRESSURE: 89 MMHG | WEIGHT: 165 LBS | HEART RATE: 89 BPM

## 2024-09-17 DIAGNOSIS — G56.02 CARPAL TUNNEL SYNDROME ON LEFT: ICD-10-CM

## 2024-09-17 DIAGNOSIS — G56.01 CARPAL TUNNEL SYNDROME ON RIGHT: Primary | ICD-10-CM

## 2024-09-17 PROCEDURE — 99024 POSTOP FOLLOW-UP VISIT: CPT | Performed by: STUDENT IN AN ORGANIZED HEALTH CARE EDUCATION/TRAINING PROGRAM

## 2024-09-17 RX ORDER — AMLODIPINE BESYLATE 10 MG/1
10 TABLET ORAL DAILY
Qty: 90 TABLET | Refills: 1 | Status: SHIPPED | OUTPATIENT
Start: 2024-09-17

## 2024-09-19 DIAGNOSIS — M54.42 CHRONIC BILATERAL LOW BACK PAIN WITH BILATERAL SCIATICA: ICD-10-CM

## 2024-09-19 DIAGNOSIS — M15.9 GENERALIZED OSTEOARTHRITIS OF MULTIPLE SITES: ICD-10-CM

## 2024-09-19 DIAGNOSIS — K21.9 GASTROESOPHAGEAL REFLUX DISEASE WITHOUT ESOPHAGITIS: ICD-10-CM

## 2024-09-19 DIAGNOSIS — M54.41 CHRONIC BILATERAL LOW BACK PAIN WITH BILATERAL SCIATICA: ICD-10-CM

## 2024-09-19 DIAGNOSIS — G89.29 CHRONIC BILATERAL LOW BACK PAIN WITH BILATERAL SCIATICA: ICD-10-CM

## 2024-09-19 DIAGNOSIS — G89.4 CHRONIC PAIN SYNDROME: ICD-10-CM

## 2024-09-19 DIAGNOSIS — F11.20 UNCOMPLICATED OPIOID DEPENDENCE (HCC): ICD-10-CM

## 2024-09-19 DIAGNOSIS — I10 PRIMARY HYPERTENSION: ICD-10-CM

## 2024-09-20 RX ORDER — GABAPENTIN 400 MG/1
400 CAPSULE ORAL 3 TIMES DAILY
Qty: 90 CAPSULE | Refills: 5 | Status: SHIPPED | OUTPATIENT
Start: 2024-09-20

## 2024-09-20 RX ORDER — OMEPRAZOLE 40 MG/1
40 CAPSULE, DELAYED RELEASE ORAL EVERY 12 HOURS
Qty: 180 CAPSULE | Refills: 1 | Status: SHIPPED | OUTPATIENT
Start: 2024-09-20

## 2024-09-20 RX ORDER — OLMESARTAN MEDOXOMIL 40 MG/1
40 TABLET ORAL DAILY
Qty: 30 TABLET | Refills: 5 | Status: SHIPPED | OUTPATIENT
Start: 2024-09-20

## 2024-09-20 NOTE — TELEPHONE ENCOUNTER
Anti-thymocyte (ATGAM)  test dose placed to left forearm  After 15 minutes no immediate reaction, will follow throughout the night  signed out  to night provide to RN to monitor Requested medication(s) are due for refill today: Yes  Patient has already received a courtesy refill: No  Other reason request has been forwarded to provider: This refill cannot be delegated

## 2024-09-22 RX ORDER — HYDROCODONE BITARTRATE AND ACETAMINOPHEN 10; 325 MG/1; MG/1
1 TABLET ORAL EVERY 4 HOURS
Qty: 180 TABLET | Refills: 0 | Status: SHIPPED | OUTPATIENT
Start: 2024-09-22

## 2024-09-23 NOTE — PROGRESS NOTES
"Assessment:   S/P Release Carpal Tunnel - Right on 8/8/2024    Plan:   Resume activities as tolerated  - use pain as a guide  Resume normal hygiene activities    Follow Up:  PRN      CHIEF COMPLAINT:  Chief Complaint   Patient presents with    Right Wrist - Post-op         SUBJECTIVE:  Boni Forte is a 69 y.o. male who presents for follow up after Release Carpal Tunnel - Right on 8/8/2024.  Today patient has improvement in his symptoms at this time. He denies any significant pain. No other acute complaints.        PHYSICAL EXAMINATION:  Vital signs: /90   Pulse 73   Ht 5' 6\" (1.676 m)   Wt 70.8 kg (156 lb)   BMI 25.18 kg/m²   General: well developed and well nourished, alert, oriented times 3, and appears comfortable  Psychiatric: Normal    MUSCULOSKELETAL EXAMINATION:  Incision: Clean, dry, intact  Range of Motion: full composite fist possible  Neurovascular status: Neuro intact, good cap refill  Activity Restrictions: No restrictions  Done today: Sutures out      STUDIES REVIEWED:  No Studies to review      PROCEDURES PERFORMED:  Procedures  No Procedures performed today    "

## 2024-10-04 ENCOUNTER — TELEPHONE (OUTPATIENT)
Age: 69
End: 2024-10-04

## 2024-10-04 NOTE — TELEPHONE ENCOUNTER
Pt's appt needs to be rescheduled d/t provider not being in the office. Attempted to reach patient about need of appointment change with no success. Appointment rescheduled and detailed VM left with [facility] number [phone] for patient to return call to reschedule.     Additional message sent Via [employer].

## 2024-10-16 DIAGNOSIS — R53.83 FATIGUE, UNSPECIFIED TYPE: ICD-10-CM

## 2024-10-16 DIAGNOSIS — G47.30 SLEEP APNEA, UNSPECIFIED TYPE: ICD-10-CM

## 2024-10-17 ENCOUNTER — TELEPHONE (OUTPATIENT)
Age: 69
End: 2024-10-17

## 2024-10-17 RX ORDER — ARMODAFINIL 250 MG/1
250 TABLET ORAL DAILY
Qty: 30 TABLET | Refills: 0 | Status: SHIPPED | OUTPATIENT
Start: 2024-10-17

## 2024-10-17 NOTE — TELEPHONE ENCOUNTER
PA for Armodafinil 250 MG tablet APPROVED     Date(s) approved 1-1-2024 - 10-        Patient advised by          [x]Hi[employer]art Message  [x]Phone call   []LMOM  []L/M to call office as no active Communication consent on file  []Unable to leave detailed message as VM not approved on Communication consent       Pharmacy advised by    [x]Fax  []Phone call    Approval letter scanned into Media Yes

## 2024-10-17 NOTE — TELEPHONE ENCOUNTER
PA for Armodafinil 250 MG tablet SUBMITTED     via    []CMM-KEY:   [x]Surescripts-Case ID # [id number]  []Availity-Auth ID # NDC #   []Faxed to plan   []Other website   []Phone call Case ID #     Office notes sent, clinical questions answered. Awaiting determination    Turnaround time for your insurance to make a decision on your Prior Authorization can take 7-21 business days.

## 2024-10-18 DIAGNOSIS — M54.42 CHRONIC BILATERAL LOW BACK PAIN WITH BILATERAL SCIATICA: ICD-10-CM

## 2024-10-18 DIAGNOSIS — M15.9 GENERALIZED OSTEOARTHRITIS OF MULTIPLE SITES: ICD-10-CM

## 2024-10-18 DIAGNOSIS — G89.4 CHRONIC PAIN SYNDROME: ICD-10-CM

## 2024-10-18 DIAGNOSIS — I10 PRIMARY HYPERTENSION: ICD-10-CM

## 2024-10-18 DIAGNOSIS — M54.41 CHRONIC BILATERAL LOW BACK PAIN WITH BILATERAL SCIATICA: ICD-10-CM

## 2024-10-18 DIAGNOSIS — F11.20 UNCOMPLICATED OPIOID DEPENDENCE (HCC): ICD-10-CM

## 2024-10-18 DIAGNOSIS — G89.29 CHRONIC BILATERAL LOW BACK PAIN WITH BILATERAL SCIATICA: ICD-10-CM

## 2024-10-18 RX ORDER — GABAPENTIN 400 MG/1
400 CAPSULE ORAL 3 TIMES DAILY
Qty: 90 CAPSULE | Refills: 5 | Status: SHIPPED | OUTPATIENT
Start: 2024-10-18

## 2024-10-18 RX ORDER — OLMESARTAN MEDOXOMIL 40 MG/1
40 TABLET ORAL DAILY
Qty: 30 TABLET | Refills: 5 | Status: SHIPPED | OUTPATIENT
Start: 2024-10-18

## 2024-10-20 RX ORDER — HYDROCODONE BITARTRATE AND ACETAMINOPHEN 10; 325 MG/1; MG/1
1 TABLET ORAL EVERY 4 HOURS
Qty: 180 TABLET | Refills: 0 | Status: SHIPPED | OUTPATIENT
Start: 2024-10-20

## 2024-11-11 NOTE — PSYCH
This note was not shared with the patient due to reasonable likelihood of causing patient harm     Virtual Regular Visit    Visit Date: 11/13/24     Verification of patient location:    Patient is located at Home in the following state in which I hold an active license [place]    Reason for visit is   Chief Complaint   Patient presents with    Follow-up    Medication Management    Virtual Regular Visit     Encounter provider Damaris Chen    Provider located at [address]  [address]  [facility] [id number] [phone]    Recent Visits  No visits were found meeting these conditions.  Showing recent visits within past 7 days and meeting all other requirements  Today's Visits  Date Type Provider Dept   11/13/24 Telemedicine Damaris Chen  [facility]iatric [facility]   Showing today's visits and meeting all other requirements  Future Appointments  No visits were found meeting these conditions.  Showing future appointments within next 150 days and meeting all other requirements       The patient was identified by name and date of birth. Boni Forte was informed that this is a telemedicine visit and that the visit is being conducted through the Epic Embedded platform. He agrees to proceed.. My office door was closed. No one else was in the room. He acknowledged consent and understanding of privacy and security of the video platform. The patient has agreed to participate and understands they can discontinue the visit at any time.    Patient is aware this is a billable service.     Insurance: Payor: MEDICARE / Plan: MEDICARE A AND B / Product Type: Medicare A & B Fee for Service /      Assessment & Plan  Mild episode of recurrent major depressive disorder (HCC)  Not at goal - increase aripiprazole to 5 mg qd; continue vilazodone 40 mg qd w/ food, bupropion  mg qAM; continue with outside therapist    Orders:    ARIPiprazole  "(ABILIFY) 5 mg tablet; Take 1 tablet (5 mg total) by mouth daily    SERGEY (generalized anxiety disorder)  Not at goal - increase aripiprazole to 5 mg qd; continue vilazodone 40 mg qd w/ food, bupropion  mg qAM; continue with outside therapist    Orders:    ARIPiprazole (ABILIFY) 5 mg tablet; Take 1 tablet (5 mg total) by mouth daily      SUBJECTIVE:    Boni Forte is a alta 69 y.o. male with a history of Major Depressive Disorder and Generalized Anxiety Disorder who presents today for follow-up and medication management. Since his last visit he started on the aripiprazole and is tolerating it well but has not found benefit yet. He is feeling \"crappy\" recently but he is not sure if this may be related to several BP med changes that were made recently. He feels \"the same\". He has lower energy recently.     He denies any suicidal ideation, intent or plan at present; denies any homicidal ideation, intent or plan at present.    He denies any auditory hallucinations, denies any visual hallucinations, denies any delusions.    He denies any side effects from current psychiatric medications.    HPI ROS Appetite Changes and Sleep:     He reports adequate number of sleep hours, adequate appetite, low energy    Current Rating Scores:     None completed today.     Review Of Systems:    Mood anxiety and depression   Behavior appropriate, cooperative, and calm   Thought Content daily worries and negative thoughts   General emotional problems and decreased functioning   Personality no change in personality   Other Psych Symptoms normal   Constitutional as noted in HPI   ENT as noted in HPI   Cardiovascular as noted in HPI   Respiratory as noted in HPI   Gastrointestinal as noted in HPI   Genitourinary as noted in HPI   Musculoskeletal as noted in HPI   Integumentary as noted in HPI   Neurological as noted in HPI   Endocrine negative   Other Symptoms none, all other systems are negative     Family Psychiatric History: "     Family History   Problem Relation Age of Onset    Diabetes Mother     Depression Mother     Hypertension Mother     Stroke Mother     Alcohol abuse Mother     Aneurysm Father         Brain    Stroke Father     Aneurysm Brother         Brain    Depression Brother     Arthritis Brother     Other Maternal Grandmother         Myocardial infarction    Arthritis Maternal Grandmother     Transient ischemic attack Paternal Grandfather     Hypertension Family         Sibling    Other Family         Spinal stenosis and Thyroid disorder - Sibling    No Known Problems Sister     No Known Problems Maternal Aunt     No Known Problems Maternal Uncle     No Known Problems Paternal Aunt     No Known Problems Paternal Uncle     No Known Problems Maternal Grandfather     No Known Problems Paternal Grandmother     Arthritis Brother     Hypertension Brother     Hypertension Brother     Hypertension Sister     Substance Abuse Neg Hx     Mental illness Neg Hx     ADD / ADHD Neg Hx     Anesthesia problems Neg Hx     Cancer Neg Hx     Clotting disorder Neg Hx     Collagen disease Neg Hx     Dislocations Neg Hx     Learning disabilities Neg Hx     Neurological problems Neg Hx     Osteoporosis Neg Hx     Rheumatologic disease Neg Hx     Scoliosis Neg Hx     Vascular Disease Neg Hx      Social/Substance Abuse History:    Social History     Socioeconomic History    Marital status: /Civil Union     Spouse name: Not on file    Number of children: 1    Years of education: Not on file    Highest education level: Master's degree (e.g., MA, MS, Benito, MEd, MSW, FERMIN)   Occupational History    Occupation:     Occupation: Teacher     Employer: [employer]   Tobacco Use    Smoking status: Former     Current packs/day: 0.00     Average packs/day: 1 pack/day for 16.0 years (16.0 ttl pk-yrs)     Types: Cigarettes     Start date: 1969     Quit date: 1984     Years since quittin.8     Passive exposure:  Never    Smokeless tobacco: Never   Vaping Use    Vaping status: Never Used   Substance and Sexual Activity    Alcohol use: Yes     Alcohol/week: 2.0 standard drinks of alcohol     Types: 1 Glasses of wine, 1 Shots of liquor per week     Comment: one drink a week    Drug use: Yes     Frequency: 1.0 times per week     Types: Marijuana     Comment: occassionaly    Sexual activity: Yes     Partners: Female     Birth control/protection: Post-menopausal, Male Sterilization     Comment: Very low / no libido   Other Topics Concern    Not on file   Social History Narrative    Dental care, regularly    Drinks coffee:  2-3 cups per day    Exercises moderately 3 or more times a week    Vegetarian diet     Social Drivers of Health     Financial Resource Strain: Low Risk  (1/16/2024)    Overall Financial Resource Strain (CARDIA)     Difficulty of Paying Living Expenses: Not very hard   Food Insecurity: No Food Insecurity (11/21/2022)    Hunger Vital Sign     Worried About Running Out of Food in the Last Year: Never true     Ran Out of Food in the Last Year: Never true   Transportation Needs: No Transportation Needs (1/16/2024)    PRAPARE - Transportation     Lack of Transportation (Medical): No     Lack of Transportation (Non-Medical): No   Physical Activity: Not on file   Stress: Not on file   Social Connections: Not on file   Intimate Partner Violence: Not on file   Housing Stability: Low Risk  (11/21/2022)    Housing Stability Vital Sign     Unable to Pay for Housing in the Last Year: No     Number of Places Lived in the Last Year: 1     Unstable Housing in the Last Year: No     The following portions of the patient's history were reviewed and updated as appropriate: past family history, past medical history, past social history, past surgical history and problem list.    OBJECTIVE:     Mental Status Evaluation:  Appearance:  dressed appropriately, adequate grooming, looks stated age   Behavior:  pleasant, cooperative, calm,  interacts appropriately with this writer   Speech:  normal rate, normal volume, normal pitch   Mood:  anxious, mildly depressed   Affect:  constricted   Thought Process:  organized, logical, coherent   Associations: intact associations   Thought Content:  no overt delusions, no paranoia noted on exam   Perceptual Disturbances: no auditory hallucinations, no visual hallucinations   Risk Potential: Suicidal ideation - None  Homicidal ideation - None  Potential for aggression - No   Sensorium:  oriented to person, place, and time/date   Memory:  recent and remote memory grossly intact   Consciousness:  alert and awake   Attention/Concentration: attention span and concentration are age appropriate   Insight:  age appropriate   Judgment: age appropriate   Gait/Station: Unable to assess today due to virtual visit   Motor Activity: unable to assess today due to virtual visit        Laboratory Results: I have personally reviewed all pertinent laboratory/tests results    Suicide/Homicide Risk Assessment:    Risk of Harm to Self:  The following ratings are based on assessment at the time of the interview  Based on today's assessment, Boni presents the following risk of harm to self: none    Risk of Harm to Others:  The following ratings are based on assessment at the time of the interview  Based on today's assessment, Boni presents the following risk of harm to others: none    The following interventions are recommended: no intervention changes needed     Assessment/Plan:      He is tolerating aripiprazole well but has not noticed any benefit. Advised increasing to 5 mg qd. No other med changes advised. Patient education for SGA completed including dizziness, sedation, GI distress, akathisia, agitation, orthostatic hypotension, headache, cardiovascular risks, metabolic syndrome, NMS, TD, EPS, seizures, and others. He will continue working with his outside therapist, Satya Dumont PsyD, as well. We have discussed his  safety plan and he agrees that if he experience unsafe thoughts that he will reach out to his supports including this office, the suicide hotline, and emergency services if necessary. Boni is aware of non-emergent and emergent mental health resources. They are able to contract for their own safety at this time.    Will follow up in 1 months. Patient is aware to call the office if questions or concerns arise sooner.      Diagnoses and all orders for this visit:    Mild episode of recurrent major depressive disorder (HCC)    SERGEY (generalized anxiety disorder)  -     ARIPiprazole (ABILIFY) 5 mg tablet; Take 1 tablet (5 mg total) by mouth daily    Moderate episode of recurrent major depressive disorder (HCC)  -     ARIPiprazole (ABILIFY) 5 mg tablet; Take 1 tablet (5 mg total) by mouth daily    Other orders  -     rosuvastatin (CRESTOR) 10 MG tablet; Take 10 mg by mouth every evening  -     hydroCHLOROthiazide 12.5 mg tablet        Treatment Recommendations/Precautions:    Continue current medications:     - bupropion  mg qAM     - vilazodone 40 mg qd w/ food    Increase medication:    - aripiprazole 2 mg qhs to 5 mg qhs    *armodafinil, zolpidem, and gabapentin rx by other providers    Aware of 24 hour and weekend coverage for urgent situations accessed by calling [facility] main practice number  Follows with family physician for glucose and lipid monitoring due to current therapy with antipsychotic medication  Medication management every 1 month  Continue psychotherapy with own therapist  I am scheduling this patient out for greater than 3 months: No    Medications Risks/Benefits      Risks, Benefits And Possible Side Effects Of Medications:    Risks, benefits, and possible side effects of medications explained to Boni and he verbalizes understanding and agreement for treatment.    Controlled Medication Discussion:     Not applicable - controlled prescriptions are not prescribed by  this practice    Psychotherapy Provided:     Individual psychotherapy provided: No     Treatment Plan:    Completed and signed during the session: Not applicable - Treatment Plan not due at this session     Visit Time    Visit Start Time:  11:00 AM  Visit End Time:  11:15 AM  Total Visit Duration:  15 minutes    Tamysohan Rivasgale 11/13/24      VIRTUAL VISIT DISCLAIMER    Boni Forte verbally agrees to participate in Virtual Care Services. Pt is aware that Virtual Care Services could be limited without vital signs or the ability to perform a full hands-on physical exam. Boni Forte understands he or the provider may request at any time to terminate the video visit and request the patient to seek care or treatment in person.

## 2024-11-13 ENCOUNTER — TELEMEDICINE (OUTPATIENT)
Dept: PSYCHIATRY | Facility: CLINIC | Age: 69
End: 2024-11-13
Payer: MEDICARE

## 2024-11-13 DIAGNOSIS — F33.0 MILD EPISODE OF RECURRENT MAJOR DEPRESSIVE DISORDER (HCC): Primary | ICD-10-CM

## 2024-11-13 DIAGNOSIS — F41.1 GAD (GENERALIZED ANXIETY DISORDER): ICD-10-CM

## 2024-11-13 PROCEDURE — 99214 OFFICE O/P EST MOD 30 MIN: CPT | Performed by: PHYSICIAN ASSISTANT

## 2024-11-13 PROCEDURE — G2211 COMPLEX E/M VISIT ADD ON: HCPCS | Performed by: PHYSICIAN ASSISTANT

## 2024-11-13 RX ORDER — HYDROCHLOROTHIAZIDE 12.5 MG/1
TABLET ORAL
COMMUNITY
Start: 2024-10-04

## 2024-11-13 RX ORDER — ROSUVASTATIN CALCIUM 10 MG/1
10 TABLET, COATED ORAL EVERY EVENING
COMMUNITY
Start: 2024-10-14

## 2024-11-13 RX ORDER — ARIPIPRAZOLE 5 MG/1
5 TABLET ORAL DAILY
Qty: 30 TABLET | Refills: 1 | Status: SHIPPED | OUTPATIENT
Start: 2024-11-13 | End: 2025-01-12

## 2024-11-13 NOTE — ASSESSMENT & PLAN NOTE
Not at goal - increase aripiprazole to 5 mg qd; continue vilazodone 40 mg qd w/ food, bupropion  mg qAM; continue with outside therapist

## 2024-11-23 DIAGNOSIS — E78.00 HYPERCHOLESTEROLEMIA: Primary | ICD-10-CM

## 2024-11-23 DIAGNOSIS — R53.83 FATIGUE, UNSPECIFIED TYPE: ICD-10-CM

## 2024-11-23 DIAGNOSIS — F11.20 UNCOMPLICATED OPIOID DEPENDENCE (HCC): ICD-10-CM

## 2024-11-23 DIAGNOSIS — G89.4 CHRONIC PAIN SYNDROME: ICD-10-CM

## 2024-11-23 DIAGNOSIS — I10 PRIMARY HYPERTENSION: ICD-10-CM

## 2024-11-23 DIAGNOSIS — G47.30 SLEEP APNEA, UNSPECIFIED TYPE: ICD-10-CM

## 2024-11-23 DIAGNOSIS — M15.9 GENERALIZED OSTEOARTHRITIS OF MULTIPLE SITES: ICD-10-CM

## 2024-11-26 RX ORDER — ARMODAFINIL 250 MG/1
250 TABLET ORAL DAILY
Qty: 30 TABLET | Refills: 0 | Status: SHIPPED | OUTPATIENT
Start: 2024-11-26

## 2024-11-26 RX ORDER — ROSUVASTATIN CALCIUM 10 MG/1
10 TABLET, COATED ORAL EVERY EVENING
Qty: 30 TABLET | Refills: 0 | Status: SHIPPED | OUTPATIENT
Start: 2024-11-26

## 2024-11-26 RX ORDER — HYDROCHLOROTHIAZIDE 12.5 MG/1
12.5 TABLET ORAL DAILY
Qty: 90 TABLET | Refills: 1 | Status: SHIPPED | OUTPATIENT
Start: 2024-11-26

## 2024-11-26 RX ORDER — HYDROCODONE BITARTRATE AND ACETAMINOPHEN 10; 325 MG/1; MG/1
1 TABLET ORAL EVERY 4 HOURS
Qty: 180 TABLET | Refills: 0 | Status: SHIPPED | OUTPATIENT
Start: 2024-11-26

## 2024-11-26 RX ORDER — DIPHENOXYLATE HYDROCHLORIDE AND ATROPINE SULFATE 2.5; .025 MG/1; MG/1
1 TABLET ORAL AS NEEDED
Qty: 30 TABLET | Refills: 0 | Status: SHIPPED | OUTPATIENT
Start: 2024-11-26

## 2024-12-03 ENCOUNTER — TELEPHONE (OUTPATIENT)
Age: 69
End: 2024-12-03

## 2024-12-03 NOTE — TELEPHONE ENCOUNTER
Patient called to schedule mobile lab services.  His call was disconnected.  Please give him this number if he calls back [phone]

## 2024-12-18 ENCOUNTER — TELEMEDICINE (OUTPATIENT)
Dept: PSYCHIATRY | Facility: CLINIC | Age: 69
End: 2024-12-18
Payer: MEDICARE

## 2024-12-18 DIAGNOSIS — F41.1 GAD (GENERALIZED ANXIETY DISORDER): ICD-10-CM

## 2024-12-18 DIAGNOSIS — F33.0 MILD EPISODE OF RECURRENT MAJOR DEPRESSIVE DISORDER (HCC): Primary | Chronic | ICD-10-CM

## 2024-12-18 PROCEDURE — 99214 OFFICE O/P EST MOD 30 MIN: CPT | Performed by: PHYSICIAN ASSISTANT

## 2024-12-18 PROCEDURE — G2211 COMPLEX E/M VISIT ADD ON: HCPCS | Performed by: PHYSICIAN ASSISTANT

## 2024-12-18 RX ORDER — ROSUVASTATIN CALCIUM 20 MG/1
20 TABLET, COATED ORAL DAILY
COMMUNITY
Start: 2024-12-13

## 2024-12-18 RX ORDER — HYDROCHLOROTHIAZIDE 25 MG/1
1 TABLET ORAL DAILY
COMMUNITY
Start: 2024-12-10

## 2024-12-18 RX ORDER — VILAZODONE HYDROCHLORIDE 40 MG/1
40 TABLET ORAL
Qty: 90 TABLET | Refills: 1 | Status: SHIPPED | OUTPATIENT
Start: 2024-12-18
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
## 2024-12-18 NOTE — ASSESSMENT & PLAN NOTE
Stable but not at goal - continue vilazodone 40 mg qd w/ food, bupropion  mg qAM, aripiprazole 5 mg qd; continue with outside therapist; f/u in 2 months

## 2024-12-19 ENCOUNTER — VBI (OUTPATIENT)
Dept: ADMINISTRATIVE | Facility: OTHER | Age: 69
End: 2024-12-19

## 2024-12-19 NOTE — TELEPHONE ENCOUNTER
12/19/24 12:35 PM    Patient contacted to bring Advance Directive, POLST, or Living Will document to next scheduled pcp visit.[facility] Department was unable to leave a message; no answer/ line busy.    Thank you.  Lida Wilson  [facility]

## 2024-12-20 ENCOUNTER — OFFICE VISIT (OUTPATIENT)
Dept: FAMILY MEDICINE CLINIC | Facility: CLINIC | Age: 69
End: 2024-12-20
Payer: MEDICARE

## 2024-12-20 VITALS
RESPIRATION RATE: 16 BRPM | BODY MASS INDEX: 24.59 KG/M2 | DIASTOLIC BLOOD PRESSURE: 86 MMHG | SYSTOLIC BLOOD PRESSURE: 134 MMHG | WEIGHT: 153 LBS | TEMPERATURE: 98.1 F | HEART RATE: 78 BPM | HEIGHT: 66 IN | OXYGEN SATURATION: 97 %

## 2024-12-20 DIAGNOSIS — G89.4 CHRONIC PAIN DISORDER: ICD-10-CM

## 2024-12-20 DIAGNOSIS — F11.20 UNCOMPLICATED OPIOID DEPENDENCE (HCC): ICD-10-CM

## 2024-12-20 DIAGNOSIS — G89.29 CHRONIC BILATERAL LOW BACK PAIN WITH BILATERAL SCIATICA: Primary | ICD-10-CM

## 2024-12-20 DIAGNOSIS — M25.531 RIGHT WRIST PAIN: ICD-10-CM

## 2024-12-20 DIAGNOSIS — M54.41 CHRONIC BILATERAL LOW BACK PAIN WITH BILATERAL SCIATICA: Primary | ICD-10-CM

## 2024-12-20 DIAGNOSIS — M96.1 LUMBAR POST-LAMINECTOMY SYNDROME: ICD-10-CM

## 2024-12-20 DIAGNOSIS — M54.42 CHRONIC BILATERAL LOW BACK PAIN WITH BILATERAL SCIATICA: Primary | ICD-10-CM

## 2024-12-20 DIAGNOSIS — L57.0 ACTINIC KERATOSIS: ICD-10-CM

## 2024-12-20 PROBLEM — G47.30 SLEEP APNEA: Status: RESOLVED | Noted: 2021-03-23 | Resolved: 2024-12-20

## 2024-12-20 PROCEDURE — 99214 OFFICE O/P EST MOD 30 MIN: CPT | Performed by: FAMILY MEDICINE

## 2024-12-20 PROCEDURE — G2211 COMPLEX E/M VISIT ADD ON: HCPCS | Performed by: FAMILY MEDICINE

## 2024-12-20 NOTE — PROGRESS NOTES
Name: Boni Forte      : 1955      MRN: [id number]  Encounter Provider: Elier Oh MD  Encounter Date: 2024   Encounter department: [facility]    Assessment & Plan  Chronic bilateral low back pain with bilateral sciatica         Lumbar post-laminectomy syndrome         Chronic pain disorder    Orders:  •  [facility] All Prescribed Meds and Special Instructions  •  Amphetamines, Methamphetamines  •  Butalbital  •  Phenobarbital  •  Secobarbital  •  Alprazolam  •  Clonazepam  •  Diazepam, Temazepam, Oxazepam  •  Lorazepam  •  Gabapentin  •  Pregabalin  •  Cocaine  •  Heroin  •  Buprenorphine  •  Levorphanol  •  Meperidine  •  Naltrexone  •  Fentanyl  •  Methadone  •  Oxycodone  •  Tapentadol  •  THC  •  Tramadol  •  Codeine, Hydrocodone, Hydropmorphone, Morphine  •  Bath Salts  •  Ethyl Glucuronide/Ethyl Sulfate  •  Kratom  •  Spice  •  Methylphenidate  •  Phentermine  •  Validity Oxidant  •  Validity Creatinine  •  Validity pH  •  Validity Specific  •  Xylazine Definitive Test    Uncomplicated opioid dependence (HCC)    Orders:  •  [facility] All Prescribed Meds and Special Instructions  •  Amphetamines, Methamphetamines  •  Butalbital  •  Phenobarbital  •  Secobarbital  •  Alprazolam  •  Clonazepam  •  Diazepam, Temazepam, Oxazepam  •  Lorazepam  •  Gabapentin  •  Pregabalin  •  Cocaine  •  Heroin  •  Buprenorphine  •  Levorphanol  •  Meperidine  •  Naltrexone  •  Fentanyl  •  Methadone  •  Oxycodone  •  Tapentadol  •  THC  •  Tramadol  •  Codeine, Hydrocodone, Hydropmorphone, Morphine  •  Bath Salts  •  Ethyl Glucuronide/Ethyl Sulfate  •  Kratom  •  Spice  •  Methylphenidate  •  Phentermine  •  Validity Oxidant  •  Validity Creatinine  •  Validity pH  •  Validity Specific  •  Xylazine Definitive Test    Right wrist pain    Orders:  •  XR wrist 3+ vw right; Future    Actinic keratosis           Treatment Plan: PASSIVE STRETCHING  PAIN MANAGEMENT    Treatment  Goals: IMPROVED MOBILITY  PAIN RELIEF    Opiate risks  There are risks associated with opioid medications, including dependence, addiction and tolerance. The patient understands and agrees to use these medications only as prescribed. Potential side effects of the medications include, but are not limited to, constipation, drowsiness, addiction, impaired judgment and risk of fatal overdose if not taken as prescribed. The patient was warned against driving while taking sedation medications.  Sharing medications is a felony. At this point in time, the patient is showing no signs of addiction, abuse, diversion or suicidal ideation.      Drug screen  Drug screen collected during today's visit      PDMP review  [place] or [place]  reviewed. No red flags were identified; safe to proceed with prescription      risks and benefits of treatment options and impressions.           History of Present Illness     Opioid Management:   Type of visit: Follow-up    Pain related diagnoses: CERVICAL SPINAL STENOSIS, POST LAMINECTOMY SYNDROME, SEVERE OSTEOARTHRITIS, CHRONIC PAIN MANAGEMENT    Interval history: STABLE  TRYING TO DECREASE OPIOID USE    Aberrant behavior?: No      Adverse effects from medication?: No      Screening Tools/Assessments:    PHQ-2/9:  Last PHQ-9 score: 11 (Last PHQ-9 date: 12/18/2024)    Brief Pain Inventory (BPI):  1) Throughout our lives, most of us have had pain from time to time (such as minor headaches, sprains, and toothaches). Have you had pain other than these everyday kinds of pain today? Yes  2) Where is your pain located? Lower and upper back  3) Rate your pain at its worst in the last 24 hours: 5  4) Rate your pain at its least in the last 24 hours: 3  5) Rate your average level of pain: 5  6) Rate your pain right now: 5  7) What treatments or medications are you receiving for your pain? MS Sally Castellanos  8) In the past 24 hours, how much relief have pain treatments or medication provided? 60%  9)  During the past 24 hours, pain has interfered with your:     A) General activity: 5     B) Mood: 6     C) Walking ability: 7     D) Normal work (work outside the home & housework): 7     E) Relations with other people: 5     F) Sleep: 3     G) Enjoyment of life: 5    COMM:  Current COMM Score: 11 (positive, at risk patient)    Drug Screen:  Date of last drug screen: 12/20/2024  Drug screen result based off current prescriptions: consistent    Opioid agreement:  Active Opioid agreement on file?: No    Opioid agreement signed date: 1/14/2020  Opioid agreement expiration date: 1/13/2021    Naloxone:  Currently prescribed Naloxone (Narcan): No      Outpatient Morphine Milligram Equivalents Per Day     12/20/24 and after 90 MME/Day    Order Name Dose Route Frequency Maximum MME/Day     Morphine Sulfate ER 15 MG TBEA 1 tablet Oral 2 times daily 30 MME/Day     HYDROcodone-acetaminophen (NORCO)  mg per tablet 1 tablet Oral Every 4 hours 60 MME/Day    Total Potential Morphine Milligram Equivalents Per Day 90 MME/Day    Calculation Information        Morphine Sulfate ER 15 MG TBEA    Morphine Sulfate ER 15 MG Tbea: maximum daily dose of 30 mg * morphine equivalence factor of 1 = 30 MME/Day       HYDROcodone-acetaminophen (NORCO)  mg per tablet    HYDROcodone-acetaminophen  mg Tabs: maximum daily dose of 60 mg of opioid in combo * morphine equivalence factor of 1 = 60 MME/Day                         PDMP Review       Value Time User    PDMP Reviewed  Yes 12/20/2024  2:42 PM Elier Oh MD         Review of Systems   Constitutional:  Negative for chills, fatigue and fever.   HENT:  Negative for congestion, ear discharge, ear pain, mouth sores, postnasal drip, sore throat and trouble swallowing.    Eyes:  Negative for pain, discharge and visual disturbance.   Respiratory:  Negative for cough, shortness of breath and wheezing.    Cardiovascular:  Negative for chest pain, palpitations and leg swelling.  "  Gastrointestinal:  Negative for abdominal distention, abdominal pain, blood in stool, diarrhea and nausea.   Endocrine: Negative for polydipsia, polyphagia and polyuria.   Genitourinary:  Negative for dysuria, frequency, hematuria and urgency.   Musculoskeletal:  Positive for arthralgias, back pain, gait problem, neck pain and neck stiffness. Negative for joint swelling.   Skin:  Negative for pallor and rash.   Neurological:  Negative for dizziness, syncope, speech difficulty, weakness, light-headedness, numbness and headaches.   Hematological:  Negative for adenopathy.   Psychiatric/Behavioral:  Negative for behavioral problems, confusion and sleep disturbance. The patient is nervous/anxious.      Objective   /86 (BP Location: Left arm, Patient Position: Sitting, Cuff Size: Standard)   Pulse 78   Temp 98.1 °F (36.7 °C) (Temporal)   Resp 16   Ht 5' 6\" (1.676 m)   Wt 69.4 kg (153 lb)   SpO2 97%   BMI 24.69 kg/m²     Physical Exam  Constitutional:       General: He is not in acute distress.     Appearance: Normal appearance. He is well-developed and normal weight. He is not ill-appearing or diaphoretic.   HENT:      Head: Normocephalic and atraumatic.      Nose: Nose normal.      Mouth/Throat:      Mouth: Mucous membranes are moist.      Pharynx: No oropharyngeal exudate.   Eyes:      General: No scleral icterus.        Right eye: No discharge.         Left eye: No discharge.      Conjunctiva/sclera: Conjunctivae normal.      Pupils: Pupils are equal, round, and reactive to light.   Neck:      Thyroid: No thyromegaly.      Vascular: No JVD.      Trachea: No tracheal deviation.   Cardiovascular:      Rate and Rhythm: Normal rate and regular rhythm.      Heart sounds: Normal heart sounds. No murmur heard.     No friction rub. No gallop.   Pulmonary:      Effort: Pulmonary effort is normal. No respiratory distress.      Breath sounds: No stridor. No wheezing or rales.      Comments: COARSE BS  Chest:      " Chest wall: No tenderness.   Abdominal:      General: Bowel sounds are normal. There is no distension.      Palpations: Abdomen is soft. There is no mass.      Tenderness: There is no abdominal tenderness. There is no guarding or rebound.      Hernia: No hernia is present.   Genitourinary:     Penis: No tenderness.    Musculoskeletal:         General: Tenderness present. No deformity.      Cervical back: Normal range of motion and neck supple.      Comments: MODERATELY SEVERE DJD   Lymphadenopathy:      Cervical: No cervical adenopathy.   Skin:     General: Skin is warm and dry.      Coloration: Skin is not pale.      Findings: No erythema or rash.   Neurological:      General: No focal deficit present.      Mental Status: He is alert and oriented to person, place, and time.      Cranial Nerves: No cranial nerve deficit.      Sensory: No sensory deficit.      Motor: No weakness or abnormal muscle tone.      Coordination: Coordination normal.      Gait: Gait normal.      Deep Tendon Reflexes: Reflexes normal.   Psychiatric:         Mood and Affect: Mood normal.         Behavior: Behavior normal.         Thought Content: Thought content normal.         Judgment: Judgment normal.

## 2024-12-20 NOTE — ASSESSMENT & PLAN NOTE
Orders:  •  [facility] All Prescribed Meds and Special Instructions  •  Amphetamines, Methamphetamines  •  Butalbital  •  Phenobarbital  •  Secobarbital  •  Alprazolam  •  Clonazepam  •  Diazepam, Temazepam, Oxazepam  •  Lorazepam  •  Gabapentin  •  Pregabalin  •  Cocaine  •  Heroin  •  Buprenorphine  •  Levorphanol  •  Meperidine  •  Naltrexone  •  Fentanyl  •  Methadone  •  Oxycodone  •  Tapentadol  •  THC  •  Tramadol  •  Codeine, Hydrocodone, Hydropmorphone, Morphine  •  Bath Salts  •  Ethyl Glucuronide/Ethyl Sulfate  •  Kratom  •  Spice  •  Methylphenidate  •  Phentermine  •  Validity Oxidant  •  Validity Creatinine  •  Validity pH  •  Validity Specific  •  Xylazine Definitive Test

## 2024-12-20 NOTE — PATIENT INSTRUCTIONS
CONTINUE CURRENT TREATMENT PLAN  X RAY R WRIST  WRIST SPLINT    RV 3 M  Goals of care:  Maximize your health and quality of life by:   Increasing your level of function and activity  Decreasing the negative effects of pain on your life  Minimizing the risks and side effects of medications and ensuring safe use of opioid medication     Ways for you to help meet your goals:  Maintain a healthy lifestyle. This includes proper nutrition, regular physical activity as able, try for 8 hours of sleep per night, use stress reduction strategies, avoid triggers.      Risks and side effects of opioid use:  Prescription opioids carry serious risks of addiction and  overdose, especially with prolonged use. An opioid overdose,  often marked by slowed breathing, can cause sudden death. The  use of prescription opioids can have a number of side effects as  well, even when taken as directed:  Tolerance--meaning you might need to take more of a medication for the same pain relief  Physical dependence--meaning you have symptoms of withdrawal when a medication is stopped  Increased sensitivity to pain  Constipation  Nausea, vomiting, dry mouth  Sleepiness and dizziness   Confusion  Depression  Low levels of testosterone that can result in lower sex drive, energy, and strength  Itching and sweating    If you are prescribed opioids for pain:  Never take opioids in greater amounts or more often than prescribed.  Help prevent misuse and abuse.        - Never sell or share prescription opioids.        - Never use another person’s prescription opioids.  ‡Store prescription opioids in a secure place and out of reach of others (this may include visitors, children, friends, and family).  Safely dispose of unused prescription opioids: Find your community drug take-back program or your pharmacy mail-back program, or flush them down the toilet, following guidance from the Food and Drug Administration (www.fda.gov/Drugs/ResourcesForYou).  ‡Visit  www.cdc.gov/drugoverdose to learn about the risks of opioid abuse and overdose.  If you believe you may be struggling with addiction, tell your health care provider and ask for guidance or call [facility]’s National Helpline at 0-629-265-ITUC.

## 2024-12-27 DIAGNOSIS — F11.20 UNCOMPLICATED OPIOID DEPENDENCE (HCC): ICD-10-CM

## 2024-12-27 DIAGNOSIS — G47.30 SLEEP APNEA, UNSPECIFIED TYPE: ICD-10-CM

## 2024-12-27 DIAGNOSIS — M15.9 GENERALIZED OSTEOARTHRITIS OF MULTIPLE SITES: ICD-10-CM

## 2024-12-27 DIAGNOSIS — G89.4 CHRONIC PAIN SYNDROME: ICD-10-CM

## 2024-12-27 DIAGNOSIS — R53.83 FATIGUE, UNSPECIFIED TYPE: ICD-10-CM

## 2024-12-30 LAB

## 2024-12-30 RX ORDER — HYDROCODONE BITARTRATE AND ACETAMINOPHEN 10; 325 MG/1; MG/1
1 TABLET ORAL EVERY 4 HOURS
Qty: 180 TABLET | Refills: 0 | Status: SHIPPED | OUTPATIENT
Start: 2024-12-30

## 2024-12-30 RX ORDER — ARMODAFINIL 250 MG/1
250 TABLET ORAL DAILY
Qty: 30 TABLET | Refills: 0 | Status: SHIPPED | OUTPATIENT
Start: 2024-12-30

## 2025-01-07 ENCOUNTER — APPOINTMENT (OUTPATIENT)
Dept: RADIOLOGY | Facility: CLINIC | Age: 70
End: 2025-01-07
Payer: MEDICARE

## 2025-01-07 ENCOUNTER — RESULTS FOLLOW-UP (OUTPATIENT)
Dept: FAMILY MEDICINE CLINIC | Facility: CLINIC | Age: 70
End: 2025-01-07

## 2025-01-07 ENCOUNTER — TELEPHONE (OUTPATIENT)
Age: 70
End: 2025-01-07

## 2025-01-07 DIAGNOSIS — S62.009D CLOSED NONDISPLACED FRACTURE OF SCAPHOID WITH ROUTINE HEALING, UNSPECIFIED LATERALITY, UNSPECIFIED PORTION OF SCAPHOID, SUBSEQUENT ENCOUNTER: Primary | ICD-10-CM

## 2025-01-07 DIAGNOSIS — M25.531 RIGHT WRIST PAIN: ICD-10-CM

## 2025-01-07 PROCEDURE — 73110 X-RAY EXAM OF WRIST: CPT

## 2025-01-07 NOTE — TELEPHONE ENCOUNTER
----- Message from Elier Oh MD sent at 1/7/2025  4:17 PM EST -----  PLEASE CALL    X RAY DOES SHOW A CARPAL BONE FRACTURE  WE NEED TO GO SEE A HAND SURGEON  I WILL PUT A REFERRAL IN    DR MARTINEZ

## 2025-01-08 ENCOUNTER — OFFICE VISIT (OUTPATIENT)
Dept: OBGYN CLINIC | Facility: CLINIC | Age: 70
End: 2025-01-08
Payer: MEDICARE

## 2025-01-08 VITALS — HEIGHT: 66 IN | BODY MASS INDEX: 24.59 KG/M2 | WEIGHT: 153 LBS

## 2025-01-08 DIAGNOSIS — G56.02 CARPAL TUNNEL SYNDROME ON LEFT: ICD-10-CM

## 2025-01-08 DIAGNOSIS — S62.009D CLOSED NONDISPLACED FRACTURE OF SCAPHOID WITH ROUTINE HEALING, UNSPECIFIED LATERALITY, UNSPECIFIED PORTION OF SCAPHOID, SUBSEQUENT ENCOUNTER: Primary | ICD-10-CM

## 2025-01-08 PROCEDURE — 99214 OFFICE O/P EST MOD 30 MIN: CPT | Performed by: STUDENT IN AN ORGANIZED HEALTH CARE EDUCATION/TRAINING PROGRAM
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
## 2025-01-13 ENCOUNTER — TELEPHONE (OUTPATIENT)
Dept: HEMATOLOGY ONCOLOGY | Facility: MEDICAL CENTER | Age: 70
End: 2025-01-13

## 2025-01-13 NOTE — TELEPHONE ENCOUNTER
Spoke with patient regarding labs needed to be drawn prior to appointment.  John said labs should have been draw by at home phlebotomy team, and we would reconnect tomorrow. Understanding verbalized.

## 2025-01-17 ENCOUNTER — OFFICE VISIT (OUTPATIENT)
Dept: HEMATOLOGY ONCOLOGY | Facility: MEDICAL CENTER | Age: 70
End: 2025-01-17
Payer: MEDICARE

## 2025-01-17 ENCOUNTER — HOSPITAL ENCOUNTER (OUTPATIENT)
Dept: RADIOLOGY | Facility: HOSPITAL | Age: 70
Discharge: HOME | End: 2025-01-17
Attending: STUDENT IN AN ORGANIZED HEALTH CARE EDUCATION/TRAINING PROGRAM
Payer: MEDICARE

## 2025-01-17 VITALS
SYSTOLIC BLOOD PRESSURE: 138 MMHG | DIASTOLIC BLOOD PRESSURE: 80 MMHG | OXYGEN SATURATION: 98 % | HEART RATE: 77 BPM | WEIGHT: 165.6 LBS | HEIGHT: 66 IN | TEMPERATURE: 97.9 F | BODY MASS INDEX: 26.61 KG/M2

## 2025-01-17 DIAGNOSIS — E46 PROTEIN-CALORIE MALNUTRITION, UNSPECIFIED SEVERITY (HCC): ICD-10-CM

## 2025-01-17 DIAGNOSIS — C61 PROSTATE CANCER (HCC): ICD-10-CM

## 2025-01-17 DIAGNOSIS — S62.009D CLOSED NONDISPLACED FRACTURE OF SCAPHOID WITH ROUTINE HEALING, UNSPECIFIED LATERALITY, UNSPECIFIED PORTION OF SCAPHOID, SUBSEQUENT ENCOUNTER: ICD-10-CM

## 2025-01-17 DIAGNOSIS — E83.110 HEREDITARY HEMOCHROMATOSIS (HCC): Primary | ICD-10-CM

## 2025-01-17 DIAGNOSIS — T14.8XXA BRUISING: ICD-10-CM

## 2025-01-17 DIAGNOSIS — D51.8 OTHER VITAMIN B12 DEFICIENCY ANEMIAS: ICD-10-CM

## 2025-01-17 PROCEDURE — 99213 OFFICE O/P EST LOW 20 MIN: CPT | Performed by: PHYSICIAN ASSISTANT

## 2025-01-17 PROCEDURE — 73200 CT UPPER EXTREMITY W/O DYE: CPT

## 2025-01-17 NOTE — PROGRESS NOTES
Name: Boni Forte      : 1955      MRN: [id number]  Encounter Provider: Blanca Knox PA-C  Encounter Date: 2025   Encounter department: [facility]  :  Assessment & Plan  Hereditary hemochromatosis (HCC)  (C282Y, C282Y)     Patient had lab work drawn on 2025. Ferritin is 41.9, iron saturation is 41%.  Orders:  •  CBC and differential; Future  •  Iron Panel (Includes Ferritin, Iron Sat%, Iron, and TIBC); Future  •  Folate; Future  •  Comprehensive metabolic panel; Future  Patient currently on observation for HH, has not required a phlebotomy in many years. He follows a vegetarian diet.     Patient's ferritin remains < 100.  Phlebotomies are still not indicated. We will continue to follow-up in 6 months with the blood work below.  Other vitamin B12 deficiency anemias  B12 level is around 1000. Patient can continue B complex vitamin daily.       Patient will have blood work retested prior to his next visit.  Patient's B12 deficiency is secondary to dietary restrictions.  Orders:  •  Vitamin B12; Future    Prostate cancer (HCC)  Under the care of [facility].  Status post radiation + ADT for management of local prostate disease.     Regimen:  Zytiga 500 mg p.o. daily (dose reduced due to toxicities including diarrhea)  Prednisone 5 mg twice daily          Bruising  Probably related to prednisone as bruising started around the same time. No bleeding. He knows to avoid aspirin, NSAIDs, ETOH as this will probably make bruising worse. He will call if bruising worsens/ becomes spontaneous.              History of Present Illness {?Quick Links Encounters * My Last Note * Last Note in Specialty * Snapshot * Since Last Visit * History :27457}  Chief Complaint   Patient presents with   • Follow-up   History of present illness:   This is a 69-year-old male with past medical history of Contreras's esophagus with achalasia, prostate cancer on Zytiga, GERD,  depression, anxiety, hypertension, sleep apnea and osteoarthritis-with 4 hip surgeries including 3 revisions of joint replacement who presents now to the hematology clinic for evaluation of anemia.     In March 2022 patient was found to be anemic with microcytosis.  Patient was not hospitalized at this time.  Patient does not remember the situation around blood work in March 2022 beyond just going to a doctor for regular screening.  Patient denies history of blood loss anemia but does state that in his past he has been anemic.       Patient was not treated with oral iron until fall 2022.  Patient was also admitted secondary to back issues.  At that time, patient was found to be anemic and was given 500 mg of IV Venofer-see outlined below.     Patient is a vegetarian.  He eats a well-rounded diet which also includes leafy green vegetables as well as beans.  Patient notes that years ago he was diagnosed with hemochromatosis but there is no diagnostic test at that time.  Patient was advised to eat a diet low in iron.  This did not influence the patient's desire to be vegetarian.     Patient was asked to follow-up with hematology.  9/21/2020 hemoglobin = 11.7, MCV 87, RDW normal, platelet count 305 (BASELINE)     3/21/2022 hemoglobin = 8.7, MCV 81, RDW 14.9, platelet count 305  12/5/2022 hemoglobin = 10.6, MCV 83, RDW high at 16.5, platelet count 515  Given 2 doses of Venofer total 500 mg during hospitalization.  12/20/2022 WBC = 5.4, hemoglobin 9.8, HCT 29.4, MCV 82.4, RDW 17.4  Patient started on oral iron but was only able to take it 3 weeks before he became constipated.  1/9/2023 hemoglobin = 12.0, HCT = 36.0, RDW 18.6, platelet count 320, white blood cell count 5.8     Patient notes that he is feeling fatigued but is unsure if it is related to his anxiety or depression.     5/5/2023 iron saturation = 18% ferritin = 15, hemoglobin 11.7, white blood cell count 6.06, platelet count 264     5/12 and 5/19/2023:  Venofer  300mg IV x 2      07/18/2023 iron saturation 38%, ferritin 67,hemoglobin 13.1,white blood cell count 5.67,platelet count 314k     Late summer early fall 2023, diagnosed with locally advanced prostate cancer, status post radiation and presently on ADT therapy.     10/30/2023: Ferritin = 73, WBC 5.7, hemoglobin 13.3, platelet 319, BUN 21, creatinine 0.92, EGFR 85, LFTs WNL     2/28/2024 ferritin = 60, iron saturation 39%              WBC 5.0, hemoglobin 10.9, , platelet count 273     7/10/2024 WBC 4.40, hemoglobin 10.4, hematocrit 32.1, MCV 99, platelets 345, ferritin 39, iron saturation 33, vitamin B12 1,010, potassium 4.2, BUN 15, creatinine 0.71, AST 25, ALT 21    1/8/2025 WBC 6.3, hemoglobin 11.1, hematocrit 31.9, platelets 325, creatinine 0.79, BUN 23, ALT 27, AST 30, ferritin 41.9, percent saturation 41.  Vitamin B12 1011.     Interval history: Patient was seen today in follow-up.  Currently he is feeling well.  He has mild increased bruising on his extremities which he attributes to the prednisone.  He denies any obvious bleeding.  He admits to fatigue.  He denies nausea, vomiting, bowel changes, chest pain, shortness of breath.  He ambulates with a cane.  He says that this limits his ability to be as physically active as he would like.     Review of Systems   Constitutional:  Positive for fatigue. Negative for activity change, appetite change and fever.   HENT:  Negative for nosebleeds.    Respiratory:  Negative for cough, choking and shortness of breath.    Cardiovascular:  Negative for chest pain, palpitations and leg swelling.   Gastrointestinal:  Negative for abdominal distention, abdominal pain, anal bleeding, blood in stool, constipation, diarrhea, nausea and vomiting.   Endocrine: Negative for cold intolerance.   Genitourinary:  Negative for hematuria.   Musculoskeletal:  Negative for myalgias.   Skin:  Negative for color change, pallor and rash.   Allergic/Immunologic: Negative for  immunocompromised state.   Neurological:  Negative for headaches.   Hematological:  Negative for adenopathy. Bruises/bleeds easily.   All other systems reviewed and are negative.      Objective {?Quick Links Trend Vitals * Enter New Vitals * Results Review * Timeline (Adult) * Labs * Imaging * Cardiology * Procedures * Lung Cancer Screening * Surgical eConsent :95781}  Vitals:    01/17/25 1305   BP: 138/80   Pulse: 77   Temp: 97.9 °F (36.6 °C)   SpO2: 98%     Physical Exam  Constitutional:       General: Not in acute distress.     Appearance: Normal appearance. Well-developed.   HENT:      Head: Normocephalic and atraumatic.   Eyes:      General: No scleral icterus.     Conjunctiva/sclera: Conjunctivae normal.   Cardiovascular:      Rate and Rhythm: Normal rate and regular rhythm.   Pulmonary:      Effort: Pulmonary effort is normal. No respiratory distress.      Breath sounds: Normal breath sounds.   Abdominal:      General: Abdomen is flat. There is no distension.      Palpations: Abdomen is soft.      Tenderness: There is no abdominal tenderness.   Musculoskeletal:      Right lower leg: No edema.      Left lower leg: No edema.   Skin:     General: Skin is warm.      Coloration: Skin is not pale.      Findings: No rash.   Neurological:      Mental Status: Alert and oriented to person, place, and time.   Psychiatric:         Mood and Affect: Mood normal.         Thought Content: Thought content normal.         Judgment: Judgment normal.

## 2025-01-17 NOTE — ASSESSMENT & PLAN NOTE
(C282Y, C282Y)     Patient had lab work drawn on 1/8/2025. Ferritin is 41.9, iron saturation is 41%.  Orders:  •  CBC and differential; Future  •  Iron Panel (Includes Ferritin, Iron Sat%, Iron, and TIBC); Future  •  Folate; Future  •  Comprehensive metabolic panel; Future  Patient currently on observation for HH, has not required a phlebotomy in many years. He follows a vegetarian diet.     Patient's ferritin remains < 100.  Phlebotomies are still not indicated. We will continue to follow-up in 6 months with the blood work below.

## 2025-01-22 ENCOUNTER — OFFICE VISIT (OUTPATIENT)
Dept: OBGYN CLINIC | Facility: CLINIC | Age: 70
End: 2025-01-22
Payer: MEDICARE

## 2025-01-22 VITALS — WEIGHT: 165 LBS | BODY MASS INDEX: 26.63 KG/M2

## 2025-01-22 DIAGNOSIS — M19.131 SCAPHOID NONUNION ADVANCED COLLAPSE OF RIGHT WRIST: Primary | ICD-10-CM

## 2025-01-22 DIAGNOSIS — S62.001S SCAPHOID NONUNION ADVANCED COLLAPSE OF RIGHT WRIST: Primary | ICD-10-CM

## 2025-01-22 PROCEDURE — 99214 OFFICE O/P EST MOD 30 MIN: CPT | Performed by: STUDENT IN AN ORGANIZED HEALTH CARE EDUCATION/TRAINING PROGRAM

## 2025-01-22 RX ORDER — ROSUVASTATIN CALCIUM 40 MG/1
1 TABLET, COATED ORAL DAILY
COMMUNITY
Start: 2024-12-23
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
## 2025-01-23 ENCOUNTER — OFFICE VISIT (OUTPATIENT)
Dept: FAMILY MEDICINE CLINIC | Facility: CLINIC | Age: 70
End: 2025-01-23
Payer: MEDICARE

## 2025-01-23 VITALS
SYSTOLIC BLOOD PRESSURE: 130 MMHG | RESPIRATION RATE: 16 BRPM | WEIGHT: 165 LBS | TEMPERATURE: 97.2 F | HEART RATE: 72 BPM | BODY MASS INDEX: 26.52 KG/M2 | OXYGEN SATURATION: 97 % | DIASTOLIC BLOOD PRESSURE: 78 MMHG | HEIGHT: 66 IN

## 2025-01-23 DIAGNOSIS — F10.21 ALCOHOL DEPENDENCE IN REMISSION (HCC): ICD-10-CM

## 2025-01-23 DIAGNOSIS — E78.00 HYPERCHOLESTEROLEMIA: ICD-10-CM

## 2025-01-23 DIAGNOSIS — Z00.00 MEDICARE ANNUAL WELLNESS VISIT, SUBSEQUENT: ICD-10-CM

## 2025-01-23 DIAGNOSIS — C61 MALIGNANT TUMOR OF PROSTATE (HCC): ICD-10-CM

## 2025-01-23 DIAGNOSIS — E83.110 HEREDITARY HEMOCHROMATOSIS (HCC): ICD-10-CM

## 2025-01-23 DIAGNOSIS — F33.0 MILD EPISODE OF RECURRENT MAJOR DEPRESSIVE DISORDER (HCC): Chronic | ICD-10-CM

## 2025-01-23 DIAGNOSIS — G89.4 CHRONIC PAIN DISORDER: ICD-10-CM

## 2025-01-23 DIAGNOSIS — N18.30 STAGE 3 CHRONIC KIDNEY DISEASE, UNSPECIFIED WHETHER STAGE 3A OR 3B CKD (HCC): ICD-10-CM

## 2025-01-23 DIAGNOSIS — I10 PRIMARY HYPERTENSION: Primary | ICD-10-CM

## 2025-01-23 DIAGNOSIS — K21.9 GASTROESOPHAGEAL REFLUX DISEASE WITHOUT ESOPHAGITIS: ICD-10-CM

## 2025-01-23 DIAGNOSIS — F11.20 UNCOMPLICATED OPIOID DEPENDENCE (HCC): ICD-10-CM

## 2025-01-23 DIAGNOSIS — M15.9 GENERALIZED OSTEOARTHRITIS OF MULTIPLE SITES: ICD-10-CM

## 2025-01-23 DIAGNOSIS — K22.0 ACHALASIA: ICD-10-CM

## 2025-01-23 PROCEDURE — G2211 COMPLEX E/M VISIT ADD ON: HCPCS | Performed by: FAMILY MEDICINE

## 2025-01-23 PROCEDURE — G0439 PPPS, SUBSEQ VISIT: HCPCS | Performed by: FAMILY MEDICINE

## 2025-01-23 PROCEDURE — 99214 OFFICE O/P EST MOD 30 MIN: CPT | Performed by: FAMILY MEDICINE

## 2025-01-23 NOTE — ASSESSMENT & PLAN NOTE
Depression Screening Follow-up Plan: Patient's depression screening was positive with a PHQ-9 score of 5. Continue regular follow-up with their psychologist/therapist/psychiatrist who is managing their mental health condition(s).

## 2025-01-23 NOTE — LETTER
"RAMIREZ CLAUDIO      Current Outpatient Medications:     abiraterone (ZYTIGA) 250 mg tablet, 500 mg daily, Disp: , Rfl:     amLODIPine (NORVASC) 10 mg tablet, Take 1 tablet (10 mg total) by mouth daily, Disp: 90 tablet, Rfl: 1    ARIPiprazole (ABILIFY) 5 mg tablet, Take 1 tablet (5 mg total) by mouth daily, Disp: 30 tablet, Rfl: 1    Armodafinil 250 MG tablet, Take 1 tablet (250 mg total) by mouth daily, Disp: 30 tablet, Rfl: 0    buPROPion (WELLBUTRIN XL) 300 mg 24 hr tablet, TAKE 1 TABLET (300 MG TOTAL) BY MOUTH EVERY MORNING., Disp: 90 tablet, Rfl: 1    Calcium Citrate-Vitamin D 250 mg-2.5 mcg tablet, Take 1 tablet by mouth 2 (two) times a day, Disp: 180 tablet, Rfl: 1    diphenoxylate-atropine (Lomotil) 2.5-0.025 mg per tablet, Take 1 tablet by mouth as needed for diarrhea, Disp: 30 tablet, Rfl: 0    gabapentin (NEURONTIN) 400 mg capsule, Take 1 capsule (400 mg total) by mouth 3 (three) times a day (Patient taking differently: Take 400 mg by mouth 2 (two) times a day), Disp: 90 capsule, Rfl: 5    hydroCHLOROthiazide 25 mg tablet, Take 1 tablet by mouth in the morning, Disp: , Rfl:     HYDROcodone-acetaminophen (NORCO)  mg per tablet, Take 1 tablet by mouth every 4 (four) hours Max Daily Amount: 6 tablets, Disp: 180 tablet, Rfl: 0    INSULIN SYRINGE .5CC/29G ([employer] Insulin Syringe) 29G X 1/2\" 0.5 ML MISC, Syringe 0.5 mL 29g 1/2 inch ; 1 each Quantity: 30 Refills: 0 Ordered: 7-May-2024 Juancarlos Wilson: 7-May-2024 Generic Substitution Allowed, Disp: , Rfl:     Isopropyl Alcohol (ALCOHOL PREPS EX), Alcohol Preps Sterile (1 each once a day for 100 days); 1 each Quantity: 100 Refills: 0 Ordered: 7-May-2024 Juancarlos Wilson: 7-May-2024 Generic Substitution Allowed, Disp: , Rfl:     leuprolide (LUPRON DEPOT 3 MONTH KIT) 22.5 mg injection, Inject into a muscle every 3 (three) months Every three months, Disp: , Rfl:     Morphine Sulfate ER 15 MG TBEA, Take 1 tablet by mouth 2 (two) times a day Max Daily Amount: 2 " tablets, Disp: 60 tablet, Rfl: 0    multivitamin (THERAGRAN) TABS, Take 1 tablet by mouth daily, Disp: , Rfl:     olmesartan (BENICAR) 40 mg tablet, Take 1 tablet (40 mg total) by mouth daily, Disp: 30 tablet, Rfl: 5    omeprazole (PriLOSEC) 40 MG capsule, Take 1 capsule (40 mg total) by mouth every 12 (twelve) hours, Disp: 180 capsule, Rfl: 1    Papav-Phentolamine-Alprostadil (PGE1 / PHENTOLAMINE / PAPAVERINE, TRIMIX, 40 MCG / 1 MG / 30 MG INJECTION), alprostadil 10 mcg/mL + papaverine 30 mg/mL + phentolamine 1 mg/mL (trimix); 10 unit(s) intracavernosal Administer 15 minutes prior to sexual activity. Do not take more than 3 times a week. Quantity: 5 Refills: 0 Ordered: 7-May-2024 Brooklyn Wilsonart: 7-May-2024 Generic Substitution Allowed, Disp: , Rfl:     predniSONE 5 mg tablet, Take 5 mg by mouth daily w/ Zytiga, Disp: , Rfl:     rosuvastatin (CRESTOR) 40 MG tablet, Take 1 tablet by mouth in the morning, Disp: , Rfl:     tamsulosin (FLOMAX) 0.4 mg, Take 0.4 mg by mouth daily, Disp: , Rfl:     vilazodone (Viibryd) 40 mg tablet, Take 1 tablet (40 mg total) by mouth daily with breakfast, Disp: 90 tablet, Rfl: 1    zolpidem (AMBIEN CR) 12.5 MG CR tablet, Take 1 tablet (12.5 mg total) by mouth daily at bedtime as needed for sleep, Disp: 30 tablet, Rfl: 0    rosuvastatin (CRESTOR) 20 MG tablet, Take 20 mg by mouth daily (Patient not taking: Reported on 1/23/2025), Disp: , Rfl:     No results found for this or any previous visit (from the past 4 weeks).

## 2025-01-23 NOTE — ASSESSMENT & PLAN NOTE
Lab Results   Component Value Date    EGFR 95 07/10/2024    EGFR 95 01/09/2024    EGFR 85 10/30/2023    CREATININE 0.71 07/10/2024    CREATININE 0.72 01/09/2024    CREATININE 0.92 10/30/2023

## 2025-01-23 NOTE — PROGRESS NOTES
Name: Boni Forte      : 1955      MRN: [id number]  Encounter Provider: Elier Oh MD  Encounter Date: 2025   Encounter department: [facility]    Assessment & Plan  Primary hypertension  STABLE  DENIES ANY CP, SOB, PALPITATIONS, OR HEADACHE  NOTES NO WATER RETENTION  COMPLIANT WITH MEDICATION  NO CONCERNS    - CONTINUE CURRENT TREATMENT PLAN  - MONITOR DIETARY SODIUM INTAKE  - ENCOURAGE PHYSICAL ACTIVITY  - RV 3 MONTHS         Hypercholesterolemia  WATCHING CHOLESTEROL INTAKE  COMPLIANT WITH MEDICATION  NO CONCERNS    - CONTINUE TO MONITOR DIETARY CHOL INTAKE  - CONTINUE CURRENT MEDICATION  - ENCOURAGED PHYSICAL ACTIVITY         Hereditary hemochromatosis (HCC)  STABLE  FOLLOWED BY HEMATOLOGY       Generalized osteoarthritis of multiple sites  SEVERE    STABLE  DENIES ANY JOINT SWELLING OR REDNESS  JOINT STIFFNESS PRESENT  PAIN MANAGEMENT ADEQUATE    - CONTINUE CURRENT MANAGEMENT  - MEDICATION AS DIRECTED  - CALL / RETURN IF SYMPTOMS WORSEN         Gastroesophageal reflux disease without esophagitis  STABLE  DENIES ANY CP, INDIGESTION, OR COUGH  NOTES NO MELENA OR HEMATOCHEZIA  NO HEMATEMESIS  COMPLIANT WITH MEDICATION  NO CONCERNS    - CONTINUE CURRENT TREATMENT PLAN  - MEDICATION AS PRESCRIBED  - AVOID CAFFEINE, ALCOHOL OR SMOKING         Achalasia         Mild episode of recurrent major depressive disorder (HCC)  Depression Screening Follow-up Plan: Patient's depression screening was positive with a PHQ-9 score of 5. Continue regular follow-up with their psychologist/therapist/psychiatrist who is managing their mental health condition(s).           Malignant tumor of prostate (HCC)  FOLLOWED BY UROLOGY / ONCOLOGY       Chronic pain disorder         Stage 3 chronic kidney disease, unspecified whether stage 3a or 3b CKD (HCC)  Lab Results   Component Value Date    EGFR 95 07/10/2024    EGFR 95 2024    EGFR 85 10/30/2023    CREATININE 0.71 07/10/2024     CREATININE 0.72 01/09/2024    CREATININE 0.92 10/30/2023          Alcohol dependence in remission (HCC)         Medicare annual wellness visit, subsequent         Uncomplicated opioid dependence (HCC)           Depression Screening and Follow-up Plan: Patient's depression screening was positive with a PHQ-9 score of 5.   Continue regular follow-up with their mental health provider who is managing their mental health condition(s).     Preventive health issues were discussed with patient, and age appropriate screening tests were ordered as noted in patient's After Visit Summary. Personalized health advice and appropriate referrals for health education or preventive services given if needed, as noted in patient's After Visit Summary.    History of Present Illness     PATIENT RETURNS FOR ANNUAL WELLNESS EXAM AND ANNUAL EVALUATION OF PATIENT'S MEDICAL ISSUES    INDIVIDUAL MEDICAL ISSUES WITH THEIR CURRENT STATUS, ASSESSMENT AND PLANS ARE LISTED ABOVE             Patient Care Team:  Elier Oh MD as PCP - General  MD Skyler Denton MD (Gastroenterology)  Jarred Russo MD (Urology)  Blanca Knox PA-C as Physician Assistant (Hematology and Oncology)    Review of Systems   Constitutional:  Negative for chills, fatigue and fever.   HENT:  Negative for congestion, ear discharge, ear pain, mouth sores, postnasal drip, sore throat and trouble swallowing.    Eyes:  Negative for pain, discharge and visual disturbance.   Respiratory:  Negative for cough, shortness of breath and wheezing.    Cardiovascular:  Negative for chest pain, palpitations and leg swelling.   Gastrointestinal:  Negative for abdominal distention, abdominal pain, blood in stool, diarrhea and nausea.   Endocrine: Negative for polydipsia, polyphagia and polyuria.   Genitourinary:  Negative for dysuria, frequency, hematuria and urgency.   Musculoskeletal:  Positive for arthralgias, back pain, gait problem, joint swelling, neck pain and  neck stiffness.   Skin:  Negative for pallor and rash.   Neurological:  Negative for dizziness, syncope, speech difficulty, weakness, light-headedness, numbness and headaches.   Hematological:  Negative for adenopathy.   Psychiatric/Behavioral:  Positive for dysphoric mood. Negative for behavioral problems, confusion and sleep disturbance. The patient is nervous/anxious.      Medical History Reviewed by provider this encounter:  Tobacco  Allergies  Meds  Problems  Med Hx  Surg Hx  Fam Hx       Annual Wellness Visit Questionnaire       Health Risk Assessment:   Patient rates overall health as fair. Patient feels that their physical health rating is same. Patient is satisfied with their life. Eyesight was rated as slightly worse. Hearing was rated as same. Patient feels that their emotional and mental health rating is same. Patients states they are never, rarely angry. Patient states they are often unusually tired/fatigued. Pain experienced in the last 7 days has been some. Patient's pain rating has been 5/10. Patient states that he has experienced no weight loss or gain in last 6 months.     Depression Screening:   PHQ-9 Score: 5      Fall Risk Screening:   In the past year, patient has experienced: history of falling in past year      Home Safety:  Patient has trouble with stairs inside or outside of their home. Patient has working smoke alarms and has working carbon monoxide detector. Home safety hazards include: medications that cause fatigue.     Nutrition:   Current diet is Regular.     Medications:   Patient is currently taking over-the-counter supplements. OTC medications include: see medication list. Patient is able to manage medications.     Activities of Daily Living (ADLs)/Instrumental Activities of Daily Living (IADLs):   Walk and transfer into and out of bed and chair?: Yes  Dress and groom yourself?: Yes    Bathe or shower yourself?: Yes    Feed yourself? Yes  Do your laundry/housekeeping?:  Yes  Manage your money, pay your bills and track your expenses?: Yes  Make your own meals?: Yes    Do your own shopping?: Yes    Previous Hospitalizations:   Any hospitalizations or ED visits within the last 12 months?: No      Advance Care Planning:   Living will: Yes    Durable POA for healthcare: Yes    Advanced directive: Yes    Provider agrees with end of life decisions: Yes      PREVENTIVE SCREENINGS      Cardiovascular Screening:    General: Screening Not Indicated and History Lipid Disorder      Diabetes Screening:     General: Screening Current      Prostate Cancer Screening:    General: History Prostate Cancer      Abdominal Aortic Aneurysm (AAA) Screening:    Risk factors include: age between 65-76 yo and tobacco use        Lung Cancer Screening:     General: Screening Not Indicated and History Lung Cancer      Hepatitis C Screening:    General: Screening Current    Screening, Brief Intervention, and Referral to Treatment (SBIRT)    Screening  Typical number of drinks in a day: 0  Typical number of drinks in a week: 1  Interpretation: Low risk drinking behavior.    AUDIT-C Screenin) How often did you have a drink containing alcohol in the past year? 2 to 4 times a month  2) How many drinks did you have on a typical day when you were drinking in the past year? 1 to 2  3) How often did you have 6 or more drinks on one occasion in the past year? never    AUDIT-C Score: 2  Interpretation: Score 0-3 (male): Negative screen for alcohol misuse    Single Item Drug Screening:  How often have you used an illegal drug (including marijuana) or a prescription medication for non-medical reasons in the past year? weekly    Single Item Drug Screen Score: 3  Interpretation: POSITIVE screen for possible drug use disorder    Drug Abuse Screening Test (DAST-10):  1) Have you used drugs other than those required for medical reasons? Yes  2) Do you abuse more than one drug at a time? No  3) Are you always able to stop  "using drugs when you want to? Yes  4) Have you had \"blackouts\" or \"flashbacks\" as a result of drug use? No  5) Do you ever feel bad or guilty about your drug use? No  6) Does your spouse (or parents) ever complain about your involvement with drugs? No  7) Have you neglected your family because of your use of drugs? No  8) Have you engaged in illegal activities in order to obtain drugs? No  9) Have you ever experienced withdrawal symptoms (felt sick) when you stopped taking drugs? No  10) Have you had medical problems as a result of your drug use (e.g., memory loss, hepatitis, convulsions, bleeding, etc.)? No    DAST-10 Score: 1  Interpretation: Low level problems related to drug abuse    Review of Current Opioid Use    Opioid Risk Tool (ORT) Interpretation: Complete Opioid Risk Tool (ORT)    Social Drivers of Health     Financial Resource Strain: Low Risk  (1/16/2024)    Overall Financial Resource Strain (CARDIA)    • Difficulty of Paying Living Expenses: Not very hard   Food Insecurity: No Food Insecurity (1/22/2025)    Hunger Vital Sign    • Worried About Running Out of Food in the Last Year: Never true    • Ran Out of Food in the Last Year: Never true   Transportation Needs: No Transportation Needs (1/22/2025)    PRAPARE - Transportation    • Lack of Transportation (Medical): No    • Lack of Transportation (Non-Medical): No   Housing Stability: Low Risk  (1/22/2025)    Housing Stability Vital Sign    • Unable to Pay for Housing in the Last Year: No    • Number of Times Moved in the Last Year: 1    • Homeless in the Last Year: No   Utilities: Not At Risk (1/22/2025)    [facility] Utilities    • Threatened with loss of utilities: No     No results found.    Objective   /78 (BP Location: Left arm, Patient Position: Sitting, Cuff Size: Large)   Pulse 72   Temp (!) 97.2 °F (36.2 °C) (Temporal)   Resp 16   Ht 5' 6\" (1.676 m)   Wt 74.8 kg (165 lb)   SpO2 97%   BMI 26.63 kg/m²     Physical Exam  Constitutional:     "   General: He is not in acute distress.     Appearance: Normal appearance. He is well-developed and normal weight. He is not ill-appearing or diaphoretic.   HENT:      Head: Normocephalic and atraumatic.      Right Ear: Tympanic membrane and external ear normal.      Left Ear: Tympanic membrane and external ear normal.      Nose: Nose normal.      Mouth/Throat:      Mouth: Mucous membranes are moist.      Pharynx: No oropharyngeal exudate.   Eyes:      General: No scleral icterus.        Right eye: No discharge.         Left eye: No discharge.      Conjunctiva/sclera: Conjunctivae normal.      Pupils: Pupils are equal, round, and reactive to light.   Neck:      Thyroid: No thyromegaly.      Vascular: No JVD.      Trachea: No tracheal deviation.   Cardiovascular:      Rate and Rhythm: Normal rate and regular rhythm.      Heart sounds: Murmur heard.      No friction rub. No gallop.   Pulmonary:      Effort: Pulmonary effort is normal. No respiratory distress.      Breath sounds: Normal breath sounds. No stridor. No wheezing or rales.   Chest:      Chest wall: No tenderness.   Abdominal:      General: Bowel sounds are normal. There is no distension.      Palpations: Abdomen is soft. There is no mass.      Tenderness: There is no abdominal tenderness. There is no guarding or rebound.      Hernia: No hernia is present.   Genitourinary:     Penis: No tenderness.       Comments: PATIENT SEES UROLOGY  Musculoskeletal:         General: Tenderness present. No deformity.      Cervical back: Rigidity present.      Comments: SEVERE DJD CHANGES     Lymphadenopathy:      Cervical: No cervical adenopathy.   Skin:     General: Skin is warm and dry.      Coloration: Skin is not pale.      Findings: No erythema or rash.   Neurological:      General: No focal deficit present.      Mental Status: He is alert and oriented to person, place, and time.      Cranial Nerves: No cranial nerve deficit.      Sensory: No sensory deficit.       Motor: No weakness or abnormal muscle tone.      Coordination: Coordination normal.      Gait: Gait normal.      Deep Tendon Reflexes: Reflexes normal.   Psychiatric:         Mood and Affect: Mood normal.         Behavior: Behavior normal.         Thought Content: Thought content normal.         Judgment: Judgment normal.

## 2025-01-23 NOTE — ASSESSMENT & PLAN NOTE
SEVERE    STABLE  DENIES ANY JOINT SWELLING OR REDNESS  JOINT STIFFNESS PRESENT  PAIN MANAGEMENT ADEQUATE    - CONTINUE CURRENT MANAGEMENT  - MEDICATION AS DIRECTED  - CALL / RETURN IF SYMPTOMS WORSEN

## 2025-01-23 NOTE — PATIENT INSTRUCTIONS
DISCUSSED HEALTH MAINTENENCE ISSUES  BW WILL BE REVIEWED  ENCOURAGED HEALTHY DIET AND EXERCISE  COLONOSCOPY WILL BE ORDERED  RV FOR ANNUAL HEALTH EXAM IN 1 YEAR  RV SOONER IF THERE ARE ANY CONCERNS      RV 3M Medicare Preventive Visit Patient Instructions  Thank you for completing your Welcome to Medicare Visit or Medicare Annual Wellness Visit today. Your next wellness visit will be due in one year (1/24/2026).  The screening/preventive services that you may require over the next 5-10 years are detailed below. Some tests may not apply to you based off risk factors and/or age. Screening tests ordered at today's visit but not completed yet may show as past due. Also, please note that scanned in results may not display below.  Preventive Screenings:  Service Recommendations Previous Testing/Comments   Colorectal Cancer Screening  Colonoscopy    Fecal Occult Blood Test (FOBT)/Fecal Immunochemical Test (FIT)  Fecal DNA/Cologuard Test  Flexible Sigmoidoscopy Age: 45-75 years old   Colonoscopy: every 10 years (May be performed more frequently if at higher risk)  OR  FOBT/FIT: every 1 year  OR  Cologuard: every 3 years  OR  Sigmoidoscopy: every 5 years  Screening may be recommended earlier than age 45 if at higher risk for colorectal cancer. Also, an individualized decision between you and your healthcare provider will decide whether screening between the ages of 76-85 would be appropriate. Colonoscopy: 01/01/2009  FOBT/FIT: 11/20/2022  Cologuard: Not on file  Sigmoidoscopy: Not on file          Prostate Cancer Screening Individualized decision between patient and health care provider in men between ages of 55-69   Medicare will cover every 12 months beginning on the day after your 50th birthday PSA: 0.02 ng/mL     History Prostate Cancer     Hepatitis C Screening Once for adults born between 1945 and 1965  More frequently in patients at high risk for Hepatitis C Hep C Antibody: Not on file    Screening Current   Diabetes  Screening 1-2 times per year if you're at risk for diabetes or have pre-diabetes Fasting glucose: 87 mg/dL (10/30/2023)  A1C: 5.1 % (7/10/2024)  Screening Current   Cholesterol Screening Once every 5 years if you don't have a lipid disorder. May order more often based on risk factors. Lipid panel: 07/11/2024  Screening Not Indicated  History Lipid Disorder      Other Preventive Screenings Covered by Medicare:  Abdominal Aortic Aneurysm (AAA) Screening: covered once if your at risk. You're considered to be at risk if you have a family history of AAA or a male between the age of 65-75 who smoking at least 100 cigarettes in your lifetime.  Lung Cancer Screening: covers low dose CT scan once per year if you meet all of the following conditions: (1) Age 55-77; (2) No signs or symptoms of lung cancer; (3) Current smoker or have quit smoking within the last 15 years; (4) You have a tobacco smoking history of at least 20 pack years (packs per day x number of years you smoked); (5) You get a written order from a healthcare provider.  Glaucoma Screening: covered annually if you're considered high risk: (1) You have diabetes OR (2) Family history of glaucoma OR (3)  aged 50 and older OR (4)  American aged 65 and older  Osteoporosis Screening: covered every 2 years if you meet one of the following conditions: (1) Have a vertebral abnormality; (2) On glucocorticoid therapy for more than 3 months; (3) Have primary hyperparathyroidism; (4) On osteoporosis medications and need to assess response to drug therapy.  HIV Screening: covered annually if you're between the age of 15-65. Also covered annually if you are younger than 15 and older than 65 with risk factors for HIV infection. For pregnant patients, it is covered up to 3 times per pregnancy.    Immunizations:  Immunization Recommendations   Influenza Vaccine Annual influenza vaccination during flu season is recommended for all persons aged >= 6 months  who do not have contraindications   Pneumococcal Vaccine   * Pneumococcal conjugate vaccine = PCV13 (Prevnar 13), PCV15 (Vaxneuvance), PCV20 (Prevnar 20)  * Pneumococcal polysaccharide vaccine = PPSV23 (Pneumovax) Adults 19-65 yo with certain risk factors or if 65+ yo  If never received any pneumonia vaccine: recommend Prevnar 20 (PCV20)  Give PCV20 if previously received 1 dose of PCV13 or PPSV23   Hepatitis B Vaccine 3 dose series if at intermediate or high risk (ex: diabetes, end stage renal disease, liver disease)   Respiratory syncytial virus (RSV) Vaccine - COVERED BY MEDICARE PART D  * RSVPreF3 (Arexvy) CDC recommends that adults 60 years of age and older may receive a single dose of RSV vaccine using shared clinical decision-making (SCDM)   Tetanus (Td) Vaccine - COST NOT COVERED BY MEDICARE PART B Following completion of primary series, a booster dose should be given every 10 years to maintain immunity against tetanus. Td may also be given as tetanus wound prophylaxis.   Tdap Vaccine - COST NOT COVERED BY MEDICARE PART B Recommended at least once for all adults. For pregnant patients, recommended with each pregnancy.   Shingles Vaccine (Shingrix) - COST NOT COVERED BY MEDICARE PART B  2 shot series recommended in those 19 years and older who have or will have weakened immune systems or those 50 years and older     Health Maintenance Due:      Topic Date Due   • Colorectal Cancer Screening  11/20/2023   • Hepatitis C Screening  Addressed     Immunizations Due:      Topic Date Due   • Influenza Vaccine (1) 09/01/2024   • COVID-19 Vaccine (6 - 2024-25 season) 09/18/2024     Advance Directives   What are advance directives?  Advance directives are legal documents that state your wishes and plans for medical care. These plans are made ahead of time in case you lose your ability to make decisions for yourself. Advance directives can apply to any medical decision, such as the treatments you want, and if you want  to donate organs.   What are the types of advance directives?  There are many types of advance directives, and each state has rules about how to use them. You may choose a combination of any of the following:  Living will:  This is a written record of the treatment you want. You can also choose which treatments you do not want, which to limit, and which to stop at a certain time. This includes surgery, medicine, IV fluid, and tube feedings.   Durable power of  for healthcare (DPAHC):  This is a written record that states who you want to make healthcare choices for you when you are unable to make them for yourself. This person, called a proxy, is usually a family member or a friend. You may choose more than 1 proxy.  Do not resuscitate (DNR) order:  A DNR order is used in case your heart stops beating or you stop breathing. It is a request not to have certain forms of treatment, such as CPR. A DNR order may be included in other types of advance directives.  Medical directive:  This covers the care that you want if you are in a coma, near death, or unable to make decisions for yourself. You can list the treatments you want for each condition. Treatment may include pain medicine, surgery, blood transfusions, dialysis, IV or tube feedings, and a ventilator (breathing machine).  Values history:  This document has questions about your views, beliefs, and how you feel and think about life. This information can help others choose the care that you would choose.  Why are advance directives important?  An advance directive helps you control your care. Although spoken wishes may be used, it is better to have your wishes written down. Spoken wishes can be misunderstood, or not followed. Treatments may be given even if you do not want them. An advance directive may make it easier for your family to make difficult choices about your care.   Fall Prevention    Fall prevention  includes ways to make your home and other  areas safer. It also includes ways you can move more carefully to prevent a fall. Health conditions that cause changes in your blood pressure, vision, or muscle strength and coordination may increase your risk for falls. Medicines may also increase your risk for falls if they make you dizzy, weak, or sleepy.   Fall prevention tips:   Stand or sit up slowly.    Use assistive devices as directed.    Wear shoes that fit well and have soles that .    Wear a personal alarm.    Stay active.    Manage your medical conditions.    Home Safety Tips:  Add items to prevent falls in the bathroom.    Keep paths clear.    Install bright lights in your home.    Keep items you use often on shelves within reach.    Paint or place reflective tape on the edges of your stairs.    Weight Management   Why it is important to manage your weight:  Being overweight increases your risk of health conditions such as heart disease, high blood pressure, type 2 diabetes, and certain types of cancer. It can also increase your risk for osteoarthritis, sleep apnea, and other respiratory problems. Aim for a slow, steady weight loss. Even a small amount of weight loss can lower your risk of health problems.  How to lose weight safely:  A safe and healthy way to lose weight is to eat fewer calories and get regular exercise. You can lose up about 1 pound a week by decreasing the number of calories you eat by 500 calories each day.   Healthy meal plan for weight management:  A healthy meal plan includes a variety of foods, contains fewer calories, and helps you stay healthy. A healthy meal plan includes the following:  Eat whole-grain foods more often.  A healthy meal plan should contain fiber. Fiber is the part of grains, fruits, and vegetables that is not broken down by your body. Whole-grain foods are healthy and provide extra fiber in your diet. Some examples of whole-grain foods are whole-wheat breads and pastas, oatmeal, brown rice, and  bulgur.  Eat a variety of vegetables every day.  Include dark, leafy greens such as spinach, kale, brandy greens, and mustard greens. Eat yellow and orange vegetables such as carrots, sweet potatoes, and winter squash.   Eat a variety of fruits every day.  Choose fresh or canned fruit (canned in its own juice or light syrup) instead of juice. Fruit juice has very little or no fiber.  Eat low-fat dairy foods.  Drink fat-free (skim) milk or 1% milk. Eat fat-free yogurt and low-fat cottage cheese. Try low-fat cheeses such as mozzarella and other reduced-fat cheeses.  Choose meat and other protein foods that are low in fat.  Choose beans or other legumes such as split peas or lentils. Choose fish, skinless poultry (chicken or turkey), or lean cuts of red meat (beef or pork). Before you cook meat or poultry, cut off any visible fat.   Use less fat and oil.  Try baking foods instead of frying them. Add less fat, such as margarine, sour cream, regular salad dressing and mayonnaise to foods. Eat fewer high-fat foods. Some examples of high-fat foods include french fries, doughnuts, ice cream, and cakes.  Eat fewer sweets.  Limit foods and drinks that are high in sugar. This includes candy, cookies, regular soda, and sweetened drinks.  Exercise:  Exercise at least 30 minutes per day on most days of the week. Some examples of exercise include walking, biking, dancing, and swimming. You can also fit in more physical activity by taking the stairs instead of the elevator or parking farther away from stores. Ask your healthcare provider about the best exercise plan for you.   Narcotic (Opioid) Safety    Use narcotics safely:  Take prescribed narcotics exactly as directed  Do not give narcotics to others or take narcotics that belong to someone else  Do not mix narcotics without medicines or alcohol  Do not drive or operate heavy machinery after you take the narcotic  Monitor for side effects and notify your healthcare provider if  you experienced side effects such as nausea, sleepiness, itching, or trouble thinking clearly.    Manage constipation:    Constipation is the most common side effect of narcotic medicine. Constipation is when you have hard, dry bowel movements, or you go longer than usual between bowel movements. Tell your healthcare provider about all changes in your bowel movements while you are taking narcotics. He or she may recommend laxative medicine to help you have a bowel movement. He or she may also change the kind of narcotic you are taking, or change when you take it. The following are more ways you can prevent or relieve constipation:    Drink liquids as directed.  You may need to drink extra liquids to help soften and move your bowels. Ask how much liquid to drink each day and which liquids are best for you.  Eat high-fiber foods.  This may help decrease constipation by adding bulk to your bowel movements. High-fiber foods include fruits, vegetables, whole-grain breads and cereals, and beans. Your healthcare provider or dietitian can help you create a high-fiber meal plan. Your provider may also recommend a fiber supplement if you cannot get enough fiber from food.  Exercise regularly.  Regular physical activity can help stimulate your intestines. Walking is a good exercise to prevent or relieve constipation. Ask which exercises are best for you.  Schedule a time each day to have a bowel movement.  This may help train your body to have regular bowel movements. Bend forward while you are on the toilet to help move the bowel movement out. Sit on the toilet for at least 10 minutes, even if you do not have a bowel movement.    Store narcotics safely:   Store narcotics where others cannot easily get them.  Keep them in a locked cabinet or secure area. Do not  keep them in a purse or other bag you carry with you. A person may be looking for something else and find the narcotics.  Make sure narcotics are stored out of the  reach of children.  A child can easily overdose on narcotics. Narcotics may look like candy to a small child.    The best way to dispose of narcotics:      The laws vary by country and area. In the [place], the best way is to return the narcotics through a take-back program. This program is offered by the [place] Drug Enforcement Agency (NOAH). The following are options for using the program:  Take the narcotics to a NOAH collection site.  The site is often a law enforcement center. Call your local law enforcement center for scheduled take-back days in your area. You will be given information on where to go if the collection site is in a different location.  Take the narcotics to an approved pharmacy or hospital.  A pharmacy or hospital may be set up as a collection site. You will need to ask if it is a NOAH collection site if you were not directed there. A pharmacy or doctor's office may not be able to take back narcotics unless it is a NOAH site.  Use a mail-back system.  This means you are given containers to put the narcotics into. You will then mail them in the containers.  Use a take-back drop box.  This is a place to leave the narcotics at any time. People and animals will not be able to get into the box. Your local law enforcement agency can tell you where to find a drop box in your area.    Other ways to manage pain:   Ask your healthcare provider about non-narcotic medicines to control pain.  Nonprescription medicines include NSAIDs (such as ibuprofen) and acetaminophen. Prescription medicines include muscle relaxers, antidepressants, and steroids.  Pain may be managed without any medicines.  Some ways to relieve pain include massage, aromatherapy, or meditation. Physical or occupational therapy may also help.    For more information:   Drug Enforcement Administration  [address] [place] [address]  Phone: 2- 758 - [phone]  Web Address: https://www.deadiversion.[employer].gov/drug_disposal/    [place]  Food and Drug Administration  [address] [place] [address]  Phone: 0- 322 - 680-8555  Web Address: http://www.fda.gov     © Copyright [employer] 2018 Information is for End User's use only and may not be sold, redistributed or otherwise used for commercial purposes. All illustrations and images included in CareNotes® are the copyrighted property of [employer]M., Inc. or [employer]

## 2025-01-31 DIAGNOSIS — G47.30 SLEEP APNEA, UNSPECIFIED TYPE: ICD-10-CM

## 2025-01-31 DIAGNOSIS — G89.4 CHRONIC PAIN SYNDROME: ICD-10-CM

## 2025-01-31 DIAGNOSIS — M15.9 GENERALIZED OSTEOARTHRITIS OF MULTIPLE SITES: ICD-10-CM

## 2025-01-31 DIAGNOSIS — F11.20 UNCOMPLICATED OPIOID DEPENDENCE (HCC): ICD-10-CM

## 2025-01-31 DIAGNOSIS — R53.83 FATIGUE, UNSPECIFIED TYPE: ICD-10-CM

## 2025-01-31 DIAGNOSIS — F51.01 PRIMARY INSOMNIA: ICD-10-CM

## 2025-01-31 RX ORDER — HYDROCODONE BITARTRATE AND ACETAMINOPHEN 10; 325 MG/1; MG/1
1 TABLET ORAL EVERY 4 HOURS
Qty: 180 TABLET | Refills: 0 | Status: SHIPPED | OUTPATIENT
Start: 2025-01-31

## 2025-01-31 RX ORDER — ZOLPIDEM TARTRATE 12.5 MG/1
12.5 TABLET, FILM COATED, EXTENDED RELEASE ORAL
Qty: 30 TABLET | Refills: 0 | Status: SHIPPED | OUTPATIENT
Start: 2025-01-31

## 2025-01-31 RX ORDER — ARMODAFINIL 250 MG/1
250 TABLET ORAL DAILY
Qty: 30 TABLET | Refills: 0 | Status: SHIPPED | OUTPATIENT
Start: 2025-01-31

## 2025-02-03 DIAGNOSIS — F33.0 MILD EPISODE OF RECURRENT MAJOR DEPRESSIVE DISORDER (HCC): ICD-10-CM

## 2025-02-03 DIAGNOSIS — F41.1 GAD (GENERALIZED ANXIETY DISORDER): ICD-10-CM

## 2025-02-03 NOTE — TELEPHONE ENCOUNTER
Reason for call:   [x] Refill   [] Prior Auth  [] Other:     Office:   [] PCP/Provider -   [x] Specialty/Provider - Psychiatry    Medication:  (ABILIFY) 5 mg tablet      Dose/Frequency: Take 1 tablet (5 mg total) by mouth daily,     Quantity: 30 tablet    Pharmacy: [facility] - [place], [place] -      Does the patient have enough for 3 days?   [x] Yes   [] No - Send as HP to POD

## 2025-02-04 RX ORDER — ARIPIPRAZOLE 5 MG/1
5 TABLET ORAL DAILY
Qty: 30 TABLET | Refills: 2 | Status: SHIPPED | OUTPATIENT
Start: 2025-02-04 | End: 2025-05-05

## 2025-02-05 ENCOUNTER — TELEPHONE (OUTPATIENT)
Age: 70
End: 2025-02-05

## 2025-02-05 NOTE — TELEPHONE ENCOUNTER
PA Armodafinil 250 MG SUBMITTED     to [employer]     via    []CMM-KEY:    [x]Surescripts-Case ID # [id number]  []Availity-Auth ID #  NDC #    []Faxed to plan   []Other website    []Phone call Case ID #      []PA sent as URGENT    All office notes, labs and other pertaining documents and studies sent. Clinical questions answered. Awaiting determination from insurance company.     Turnaround time for your insurance to make a decision on your Prior Authorization can take 7-21 business days.

## 2025-02-05 NOTE — TELEPHONE ENCOUNTER
PA Armodafinil 250 MG DENIED    Reason:(Screenshot if applicable)        Message sent to office clinical pool Yes    Denial letter scanned into Media Yes    Appeal started No (Provider will need to decide if appeal is warranted and send clinical documentation to Prior Authorization Team for initiation.)    **Please follow up with your patient regarding denial and next steps**

## 2025-02-18 NOTE — PSYCH
This note was not shared with the patient due to reasonable likelihood of causing patient harm     Virtual Regular Visit    Visit Date: 02/19/25     Verification of patient location:    Patient is located at Home in the following state in which I hold an active license [place]    Reason for visit is   Chief Complaint   Patient presents with    Follow-up    Medication Management    Virtual Regular Visit     Encounter provider Damaris Chen    Provider located at [address]  [address]  [facility] [address] [phone]    Recent Visits  No visits were found meeting these conditions.  Showing recent visits within past 7 days and meeting all other requirements  Today's Visits  Date Type Provider Dept   02/19/25 Telemedicine Damaris Chen  [facility]   Showing today's visits and meeting all other requirements  Future Appointments  No visits were found meeting these conditions.  Showing future appointments within next 150 days and meeting all other requirements       The patient was identified by name and date of birth. Boni Forte was informed that this is a telemedicine visit and that the visit is being conducted through the Epic Embedded platform. He agrees to proceed.. My office door was closed. No one else was in the room. He acknowledged consent and understanding of privacy and security of the video platform. The patient has agreed to participate and understands they can discontinue the visit at any time.    Patient is aware this is a billable service.     Insurance: Payor: MEDICARE / Plan: MEDICARE A AND B / Product Type: Medicare A & B Fee for Service /      Assessment & Plan  Mild episode of recurrent major depressive disorder (HCC)  Not yet at goal - increase bupropion XL to 450 mg qAM to help with focus as well; continue vilazodone 40 mg qd w/ food, aripiprazole 5 mg qd; continue with outside  therapist; f/u in 1 month    Orders:    buPROPion (Wellbutrin XL) 150 mg 24 hr tablet; Take 1 tablet (150 mg total) by mouth daily    SERGEY (generalized anxiety disorder)  Not yet at goal - increase bupropion XL to 450 mg qAM to help with focus as well; continue vilazodone 40 mg qd w/ food, aripiprazole 5 mg qd; continue with outside therapist; f/u in 1 month    Orders:    buPROPion (Wellbutrin XL) 150 mg 24 hr tablet; Take 1 tablet (150 mg total) by mouth daily      SUBJECTIVE:    Boni Forte is a alta 70 y.o. male with a history of Major Depressive Disorder and Generalized Anxiety Disorder who presents today for follow-up and medication management. Since his last visit he moved into a separate place from his wife and this has actually helped reduce his anxiety and stress levels. He feels the mood is decent as well. However, he feels the focus and attention are problematic and he was told by other providers this is likely due to Zytiga. His son is on Vyvanse and his therapist wondered if this might be helpful. He has been on higher doses of bupropion in past with no benefit for mood but well tolerated.     He denies any suicidal ideation, intent or plan at present; denies any homicidal ideation, intent or plan at present.    He denies any auditory hallucinations, denies any visual hallucinations, denies any delusions.    He denies any side effects from current psychiatric medications.    HPI ROS Appetite Changes and Sleep:     He reports adequate number of sleep hours, adequate appetite, low energy    Current Rating Scores:     None completed today.     Review Of Systems:    Mood euthymic   Behavior appropriate, cooperative, and calm   Thought Content normal   General normal    Personality no change in personality   Other Psych Symptoms decreased concentration and decreased attention   Constitutional as noted in HPI   ENT as noted in HPI   Cardiovascular as noted in HPI   Respiratory as noted in HPI    Gastrointestinal as noted in HPI   Genitourinary as noted in HPI   Musculoskeletal as noted in HPI   Integumentary as noted in HPI   Neurological as noted in HPI   Endocrine negative   Other Symptoms none, all other systems are negative     Family Psychiatric History:     Family History   Problem Relation Age of Onset    Diabetes Mother     Depression Mother     Hypertension Mother     Stroke Mother     Alcohol abuse Mother     Aneurysm Father         Brain    Stroke Father     Aneurysm Brother         Brain    Depression Brother     Arthritis Brother     Other Maternal Grandmother         Myocardial infarction    Arthritis Maternal Grandmother     Transient ischemic attack Paternal Grandfather     Hypertension Family         Sibling    Other Family         Spinal stenosis and Thyroid disorder - Sibling    No Known Problems Sister     No Known Problems Maternal Aunt     No Known Problems Maternal Uncle     No Known Problems Paternal Aunt     No Known Problems Paternal Uncle     No Known Problems Maternal Grandfather     No Known Problems Paternal Grandmother     Arthritis Brother     Hypertension Brother     Hypertension Brother     Hypertension Sister     Substance Abuse Neg Hx     Mental illness Neg Hx     ADD / ADHD Neg Hx     Anesthesia problems Neg Hx     Cancer Neg Hx     Clotting disorder Neg Hx     Collagen disease Neg Hx     Dislocations Neg Hx     Learning disabilities Neg Hx     Neurological problems Neg Hx     Osteoporosis Neg Hx     Rheumatologic disease Neg Hx     Scoliosis Neg Hx     Vascular Disease Neg Hx      Social/Substance Abuse History:    Social History     Socioeconomic History    Marital status: /Civil Union     Spouse name: Not on file    Number of children: 1    Years of education: Not on file    Highest education level: Master's degree (e.g., MA, MS, Benito, MEd, MSW, FERMIN)   Occupational History    Occupation:     Occupation: Teacher     Employer: [employer]   Tobacco Use    Smoking status: Former     Current packs/day: 0.00     Average packs/day: 1 pack/day for 17.0 years (16.5 ttl pk-yrs)     Types: Cigarettes     Start date: 1969     Quit date: 1984     Years since quittin.1     Passive exposure: Never    Smokeless tobacco: Never   Vaping Use    Vaping status: Never Used   Substance and Sexual Activity    Alcohol use: Yes     Alcohol/week: 2.0 standard drinks of alcohol     Types: 1 Glasses of wine, 1 Shots of liquor per week     Comment: one drink a week    Drug use: Yes     Frequency: 1.0 times per week     Types: Marijuana     Comment: occassionaly    Sexual activity: Yes     Partners: Female     Birth control/protection: Post-menopausal, Male Sterilization     Comment: Very low / no libido   Other Topics Concern    Not on file   Social History Narrative    Dental care, regularly    Drinks coffee:  2-3 cups per day    Exercises moderately 3 or more times a week    Vegetarian diet     Social Drivers of Health     Financial Resource Strain: Low Risk  (2024)    Overall Financial Resource Strain (CARDIA)     Difficulty of Paying Living Expenses: Not very hard   Food Insecurity: No Food Insecurity (2025)    Hunger Vital Sign     Worried About Running Out of Food in the Last Year: Never true     Ran Out of Food in the Last Year: Never true   Transportation Needs: No Transportation Needs (2025)    PRAPARE - Transportation     Lack of Transportation (Medical): No     Lack of Transportation (Non-Medical): No   Physical Activity: Not on file   Stress: Not on file   Social Connections: Not on file   Intimate Partner Violence: Not on file   Housing Stability: Low Risk  (2025)    Housing Stability Vital Sign     Unable to Pay for Housing in the Last Year: No     Number of Times Moved in the Last Year: 1     Homeless in the Last Year: No     The following portions of the patient's history were reviewed and updated as  appropriate: past family history, past medical history, past social history, past surgical history and problem list.    OBJECTIVE:     Mental Status Evaluation:  Appearance:  dressed appropriately, adequate grooming, looks stated age   Behavior:  pleasant, cooperative, calm, interacts appropriately with this writer   Speech:  normal rate, normal volume, normal pitch   Mood:  improved, less anxious   Affect:  slightly brighter   Thought Process:  organized, logical, coherent   Associations: intact associations   Thought Content:  no overt delusions, no paranoia noted on exam   Perceptual Disturbances: no auditory hallucinations, no visual hallucinations   Risk Potential: Suicidal ideation - None  Homicidal ideation - None  Potential for aggression - No   Sensorium:  oriented to person, place, and time/date   Memory:  recent and remote memory grossly intact   Consciousness:  alert and awake   Attention/Concentration: attention span and concentration are age appropriate   Insight:  age appropriate   Judgment: age appropriate   Gait/Station: Unable to assess today due to virtual visit   Motor Activity: unable to assess today due to virtual visit        Laboratory Results: I have personally reviewed all pertinent laboratory/tests results    Suicide/Homicide Risk Assessment:    Risk of Harm to Self:  The following ratings are based on assessment at the time of the interview  Based on today's assessment, Boni presents the following risk of harm to self: none    Risk of Harm to Others:  The following ratings are based on assessment at the time of the interview  Based on today's assessment, Boni presents the following risk of harm to others: none    The following interventions are recommended: no intervention changes needed     Assessment/Plan:      Thankfully his depression and anxiety are improving since moving out to a separate space from his wife. However, his focus has gotten worse and he was told this was a SE of  the Zytiga. Advised trial of bupropion  mg qAM to see if it helps improve focus. We could try Vyvanse for symptomatic sx but if we did so would have to be a low doses due to interaction with Zytiga and would need to stop armodafinil. Also would need to watch BP as has had issues with controlling this due med SE in the past. PARQ completed including induction of shekhar, decreased seizure threshold and risk with alcohol or electrolyte disturbances, headaches, hypertension and cardiovascular effects, GI distress, weight loss, agitation/activation, dizziness, tremor, anxiety, potential for drug interactions, and others.  He will continue working with his outside therapist, Satya Dumont PsyD, as well. He is also looking into marriage counseling as he and his wife are taking a break. We have discussed his safety plan and he agrees that if he experience unsafe thoughts that he will reach out to his supports including this office, the suicide hotline, and emergency services if necessary. Boni is aware of non-emergent and emergent mental health resources. They are able to contract for their own safety at this time.    Will follow up in 1 months. Patient is aware to call the office if questions or concerns arise sooner.      Diagnoses and all orders for this visit:    Mild episode of recurrent major depressive disorder (HCC)  -     buPROPion (Wellbutrin XL) 150 mg 24 hr tablet; Take 1 tablet (150 mg total) by mouth daily    SERGEY (generalized anxiety disorder)  -     buPROPion (Wellbutrin XL) 150 mg 24 hr tablet; Take 1 tablet (150 mg total) by mouth daily        Treatment Recommendations/Precautions:    Continue current medications:     - vilazodone 40 mg qd w/ food    - aripiprazole 5 mg qhs    Increase medication:    - bupropion  mg qAM to 450 mg qAM    *armodafinil, zolpidem, and gabapentin rx by other providers    Aware of 24 hour and weekend coverage for urgent situations accessed by calling [facility] main practice number  Follows with family physician for glucose and lipid monitoring due to current therapy with antipsychotic medication  Medication management every 1 month  Continue psychotherapy with own therapist  I am scheduling this patient out for greater than 3 months: No    Medications Risks/Benefits      Risks, Benefits And Possible Side Effects Of Medications:    Risks, benefits, and possible side effects of medications explained to Boni and he verbalizes understanding and agreement for treatment.    Controlled Medication Discussion:     Not applicable - controlled prescriptions are not prescribed by this practice    Psychotherapy Provided:     Individual psychotherapy provided: No     Treatment Plan:    Completed and signed during the session: Yes - Treatment Plan done but not signed at time of office visit due to:  Plan reviewed by video and verbal consent given due to virtual visit. Treatment Plan sent to patient via Lo[employer] for signature.     Visit Time    Visit Start Time:  11:00 AM  Visit End Time:  11:15 AM  Total Visit Duration:  15 minutes    Damaris Chen 02/19/25      VIRTUAL VISIT DISCLAIMER    Boni Forte verbally agrees to participate in Virtual Care Services. Pt is aware that Virtual Care Services could be limited without vital signs or the ability to perform a full hands-on physical exam. Boni Forte understands he or the provider may request at any time to terminate the video visit and request the patient to seek care or treatment in person.

## 2025-02-19 ENCOUNTER — TELEMEDICINE (OUTPATIENT)
Dept: PSYCHIATRY | Facility: CLINIC | Age: 70
End: 2025-02-19
Payer: MEDICARE

## 2025-02-19 DIAGNOSIS — F41.1 GAD (GENERALIZED ANXIETY DISORDER): ICD-10-CM

## 2025-02-19 DIAGNOSIS — F33.0 MILD EPISODE OF RECURRENT MAJOR DEPRESSIVE DISORDER (HCC): Primary | ICD-10-CM

## 2025-02-19 PROCEDURE — G2211 COMPLEX E/M VISIT ADD ON: HCPCS | Performed by: PHYSICIAN ASSISTANT

## 2025-02-19 PROCEDURE — 99214 OFFICE O/P EST MOD 30 MIN: CPT | Performed by: PHYSICIAN ASSISTANT

## 2025-02-19 RX ORDER — BUPROPION HYDROCHLORIDE 150 MG/1
150 TABLET ORAL DAILY
Qty: 30 TABLET | Refills: 1 | Status: SHIPPED | OUTPATIENT
Start: 2025-02-19 | End: 2025-04-20

## 2025-02-19 NOTE — ASSESSMENT & PLAN NOTE
Not yet at goal - increase bupropion XL to 450 mg qAM to help with focus as well; continue vilazodone 40 mg qd w/ food, aripiprazole 5 mg qd; continue with outside therapist; f/u in 1 month

## 2025-02-19 NOTE — BH TREATMENT PLAN
TREATMENT PLAN (Medication Management Only)        [facility]    Name and Date of Birth:  Boni Forte 70 y.o. 1955  Date of Treatment Plan: February 19, 2025  Diagnosis/Diagnoses:    1. Mild episode of recurrent major depressive disorder (HCC)    2. SERGEY (generalized anxiety disorder)      Strengths/Personal Resources for Self-Care: taking medications as prescribed, motivation for treatment, willingness to work on problems.  Area/Areas of need (in own words): anxiety symptoms, depressive symptoms  1. Long Term Goal: decrease anxiety.  Target Date:6 months - 8/19/2025  Person/Persons responsible for completion of goal: Boni  2.  Short Term Objective (s) - How will we reach this goal?:   A. Provider new recommended medication/dosage changes and/or continue medication(s): continue current medications as prescribed.  B. Attend psychotherapy regularly.  C. N/A.  Target Date:6 months - 8/19/2025  Person/Persons Responsible for Completion of Goal: Boni  Progress Towards Goals: continuing treatment  Treatment Modality: medication management every 4 weeks  Review due 180 days from date of this plan: 6 months - 8/19/2025  Expected length of service: ongoing treatment  My Physician/PA/NP and I have developed this plan together and I agree to work on the goals and objectives. I understand the treatment goals that were developed for my treatment.

## 2025-02-26 DIAGNOSIS — I10 PRIMARY HYPERTENSION: ICD-10-CM

## 2025-02-27 RX ORDER — OLMESARTAN MEDOXOMIL 40 MG/1
TABLET ORAL
Qty: 90 TABLET | Refills: 1 | Status: SHIPPED | OUTPATIENT
Start: 2025-02-27

## 2025-03-04 DIAGNOSIS — G89.4 CHRONIC PAIN SYNDROME: ICD-10-CM

## 2025-03-04 DIAGNOSIS — F11.20 UNCOMPLICATED OPIOID DEPENDENCE (HCC): ICD-10-CM

## 2025-03-04 DIAGNOSIS — R53.83 FATIGUE, UNSPECIFIED TYPE: ICD-10-CM

## 2025-03-04 DIAGNOSIS — M15.9 GENERALIZED OSTEOARTHRITIS OF MULTIPLE SITES: ICD-10-CM

## 2025-03-04 DIAGNOSIS — G47.30 SLEEP APNEA, UNSPECIFIED TYPE: ICD-10-CM

## 2025-03-04 DIAGNOSIS — I10 PRIMARY HYPERTENSION: ICD-10-CM

## 2025-03-05 RX ORDER — AMLODIPINE BESYLATE 10 MG/1
10 TABLET ORAL DAILY
Qty: 90 TABLET | Refills: 1 | Status: SHIPPED | OUTPATIENT
Start: 2025-03-05

## 2025-03-09 RX ORDER — HYDROCODONE BITARTRATE AND ACETAMINOPHEN 10; 325 MG/1; MG/1
1 TABLET ORAL EVERY 4 HOURS
Qty: 180 TABLET | Refills: 0 | Status: SHIPPED | OUTPATIENT
Start: 2025-03-09

## 2025-03-09 RX ORDER — ARMODAFINIL 250 MG/1
250 TABLET ORAL DAILY
Qty: 30 TABLET | Refills: 0 | Status: SHIPPED | OUTPATIENT
Start: 2025-03-09

## 2025-03-13 ENCOUNTER — APPOINTMENT (EMERGENCY)
Dept: CT IMAGING | Facility: HOSPITAL | Age: 70
DRG: 101 | End: 2025-03-13
Payer: MEDICARE

## 2025-03-13 ENCOUNTER — HOSPITAL ENCOUNTER (INPATIENT)
Facility: HOSPITAL | Age: 70
LOS: 5 days | Discharge: HOME HEALTH | DRG: 101 | End: 2025-03-18
Attending: SURGERY | Admitting: INTERNAL MEDICINE
Payer: MEDICARE

## 2025-03-13 ENCOUNTER — APPOINTMENT (EMERGENCY)
Dept: RADIOLOGY | Facility: HOSPITAL | Age: 70
DRG: 101 | End: 2025-03-13
Payer: MEDICARE
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
## 2025-03-13 DIAGNOSIS — F32.A DEPRESSION: ICD-10-CM

## 2025-03-13 DIAGNOSIS — S00.93XA CONTUSION OF HEAD, UNSPECIFIED PART OF HEAD, INITIAL ENCOUNTER: ICD-10-CM

## 2025-03-13 DIAGNOSIS — T07.XXXA MULTIPLE ABRASIONS: ICD-10-CM

## 2025-03-13 DIAGNOSIS — R56.9 SEIZURE-LIKE ACTIVITY (HCC): ICD-10-CM

## 2025-03-13 PROBLEM — G93.40 ACUTE ENCEPHALOPATHY: Status: ACTIVE | Noted: 2025-03-13

## 2025-03-13 LAB
2HR DELTA HS TROPONIN: 4 NG/L
2HR DELTA HS TROPONIN: 4 NG/L
ABO GROUP BLD: NORMAL
ABO GROUP BLD: NORMAL
ANION GAP SERPL CALCULATED.3IONS-SCNC: 8 MMOL/L (ref 4–13)
ANION GAP SERPL CALCULATED.3IONS-SCNC: 8 MMOL/L (ref 4–13)
APAP SERPL-MCNC: <2 UG/ML (ref 10–20)
APAP SERPL-MCNC: <2 UG/ML (ref 10–20)
BACTERIA UR QL AUTO: ABNORMAL /HPF
BACTERIA UR QL AUTO: ABNORMAL /HPF
BASE EX.OXY STD BLDV CALC-SCNC: 87.9 % (ref 60–80)
BASE EX.OXY STD BLDV CALC-SCNC: 87.9 % (ref 60–80)
BASE EXCESS BLDA CALC-SCNC: 0 MMOL/L (ref -2–3)
BASE EXCESS BLDA CALC-SCNC: 0 MMOL/L (ref -2–3)
BASE EXCESS BLDV CALC-SCNC: -0.4 MMOL/L
BASE EXCESS BLDV CALC-SCNC: -0.4 MMOL/L
BASOPHILS # BLD MANUAL: 0 THOUSAND/UL (ref 0–0.1)
BASOPHILS # BLD MANUAL: 0 THOUSAND/UL (ref 0–0.1)
BASOPHILS NFR MAR MANUAL: 0 % (ref 0–1)
BASOPHILS NFR MAR MANUAL: 0 % (ref 0–1)
BILIRUB UR QL STRIP: NEGATIVE
BILIRUB UR QL STRIP: NEGATIVE
BLD GP AB SCN SERPL QL: NEGATIVE
BLD GP AB SCN SERPL QL: NEGATIVE
BUN SERPL-MCNC: 20 MG/DL (ref 5–25)
BUN SERPL-MCNC: 20 MG/DL (ref 5–25)
CA-I BLD-SCNC: 1.06 MMOL/L (ref 1.12–1.32)
CA-I BLD-SCNC: 1.06 MMOL/L (ref 1.12–1.32)
CALCIUM SERPL-MCNC: 8.5 MG/DL (ref 8.4–10.2)
CALCIUM SERPL-MCNC: 8.5 MG/DL (ref 8.4–10.2)
CARDIAC TROPONIN I PNL SERPL HS: 12 NG/L (ref ?–50)
CARDIAC TROPONIN I PNL SERPL HS: 12 NG/L (ref ?–50)
CARDIAC TROPONIN I PNL SERPL HS: 16 NG/L (ref ?–50)
CARDIAC TROPONIN I PNL SERPL HS: 16 NG/L (ref ?–50)
CHLORIDE SERPL-SCNC: 100 MMOL/L (ref 96–108)
CHLORIDE SERPL-SCNC: 100 MMOL/L (ref 96–108)
CK SERPL-CCNC: 1018 U/L (ref 39–308)
CK SERPL-CCNC: 1018 U/L (ref 39–308)
CLARITY UR: CLEAR
CLARITY UR: CLEAR
CO2 SERPL-SCNC: 25 MMOL/L (ref 21–32)
CO2 SERPL-SCNC: 25 MMOL/L (ref 21–32)
COLOR UR: YELLOW
COLOR UR: YELLOW
CREAT SERPL-MCNC: 0.77 MG/DL (ref 0.6–1.3)
CREAT SERPL-MCNC: 0.77 MG/DL (ref 0.6–1.3)
EOSINOPHIL # BLD MANUAL: 0 THOUSAND/UL (ref 0–0.4)
EOSINOPHIL # BLD MANUAL: 0 THOUSAND/UL (ref 0–0.4)
EOSINOPHIL NFR BLD MANUAL: 0 % (ref 0–6)
EOSINOPHIL NFR BLD MANUAL: 0 % (ref 0–6)
ERYTHROCYTE [DISTWIDTH] IN BLOOD BY AUTOMATED COUNT: 13.5 % (ref 11.6–15.1)
ERYTHROCYTE [DISTWIDTH] IN BLOOD BY AUTOMATED COUNT: 13.5 % (ref 11.6–15.1)
ETHANOL SERPL-MCNC: <10 MG/DL
ETHANOL SERPL-MCNC: <10 MG/DL
GFR SERPL CREATININE-BSD FRML MDRD: 91 ML/MIN/1.73SQ M
GFR SERPL CREATININE-BSD FRML MDRD: 91 ML/MIN/1.73SQ M
GLUCOSE SERPL-MCNC: 101 MG/DL (ref 65–140)
GLUCOSE SERPL-MCNC: 101 MG/DL (ref 65–140)
GLUCOSE SERPL-MCNC: 126 MG/DL (ref 65–140)
GLUCOSE SERPL-MCNC: 126 MG/DL (ref 65–140)
GLUCOSE UR STRIP-MCNC: NEGATIVE MG/DL
GLUCOSE UR STRIP-MCNC: NEGATIVE MG/DL
HCO3 BLDA-SCNC: 23.6 MMOL/L (ref 24–30)
HCO3 BLDA-SCNC: 23.6 MMOL/L (ref 24–30)
HCO3 BLDV-SCNC: 23.9 MMOL/L (ref 24–30)
HCO3 BLDV-SCNC: 23.9 MMOL/L (ref 24–30)
HCT VFR BLD AUTO: 29.2 % (ref 36.5–49.3)
HCT VFR BLD AUTO: 29.2 % (ref 36.5–49.3)
HCT VFR BLD CALC: 30 % (ref 36.5–49.3)
HCT VFR BLD CALC: 30 % (ref 36.5–49.3)
HGB BLD-MCNC: 10 G/DL (ref 12–17)
HGB BLD-MCNC: 10 G/DL (ref 12–17)
HGB BLDA-MCNC: 10.2 G/DL (ref 12–17)
HGB BLDA-MCNC: 10.2 G/DL (ref 12–17)
HGB UR QL STRIP.AUTO: ABNORMAL
HGB UR QL STRIP.AUTO: ABNORMAL
INR PPP: 0.94 (ref 0.85–1.19)
INR PPP: 0.94 (ref 0.85–1.19)
KETONES UR STRIP-MCNC: NEGATIVE MG/DL
KETONES UR STRIP-MCNC: NEGATIVE MG/DL
LACTATE SERPL-SCNC: 0.7 MMOL/L (ref 0.5–2)
LACTATE SERPL-SCNC: 0.7 MMOL/L (ref 0.5–2)
LEUKOCYTE ESTERASE UR QL STRIP: NEGATIVE
LEUKOCYTE ESTERASE UR QL STRIP: NEGATIVE
LYMPHOCYTES # BLD AUTO: 0.11 THOUSAND/UL (ref 0.6–4.47)
LYMPHOCYTES # BLD AUTO: 0.11 THOUSAND/UL (ref 0.6–4.47)
LYMPHOCYTES # BLD AUTO: 1 % (ref 14–44)
LYMPHOCYTES # BLD AUTO: 1 % (ref 14–44)
MCH RBC QN AUTO: 31.3 PG (ref 26.8–34.3)
MCH RBC QN AUTO: 31.3 PG (ref 26.8–34.3)
MCHC RBC AUTO-ENTMCNC: 34.2 G/DL (ref 31.4–37.4)
MCHC RBC AUTO-ENTMCNC: 34.2 G/DL (ref 31.4–37.4)
MCV RBC AUTO: 92 FL (ref 82–98)
MCV RBC AUTO: 92 FL (ref 82–98)
MONOCYTES # BLD AUTO: 0.11 THOUSAND/UL (ref 0–1.22)
MONOCYTES # BLD AUTO: 0.11 THOUSAND/UL (ref 0–1.22)
MONOCYTES NFR BLD: 1 % (ref 4–12)
MONOCYTES NFR BLD: 1 % (ref 4–12)
NEUTROPHILS # BLD MANUAL: 10.34 THOUSAND/UL (ref 1.85–7.62)
NEUTROPHILS # BLD MANUAL: 10.34 THOUSAND/UL (ref 1.85–7.62)
NEUTS BAND NFR BLD MANUAL: 1 % (ref 0–8)
NEUTS BAND NFR BLD MANUAL: 1 % (ref 0–8)
NEUTS SEG NFR BLD AUTO: 97 % (ref 43–75)
NEUTS SEG NFR BLD AUTO: 97 % (ref 43–75)
NITRITE UR QL STRIP: NEGATIVE
NITRITE UR QL STRIP: NEGATIVE
NON-SQ EPI CELLS URNS QL MICRO: ABNORMAL /HPF
NON-SQ EPI CELLS URNS QL MICRO: ABNORMAL /HPF
O2 CT BLDV-SCNC: 13.1 ML/DL
O2 CT BLDV-SCNC: 13.1 ML/DL
PCO2 BLD: 25 MMOL/L (ref 21–32)
PCO2 BLD: 25 MMOL/L (ref 21–32)
PCO2 BLD: 34.8 MM HG (ref 42–50)
PCO2 BLD: 34.8 MM HG (ref 42–50)
PCO2 BLDV: 37.8 MM HG (ref 42–50)
PCO2 BLDV: 37.8 MM HG (ref 42–50)
PH BLD: 7.44 [PH] (ref 7.3–7.4)
PH BLD: 7.44 [PH] (ref 7.3–7.4)
PH BLDV: 7.42 [PH] (ref 7.3–7.4)
PH BLDV: 7.42 [PH] (ref 7.3–7.4)
PH UR STRIP.AUTO: 7 [PH]
PH UR STRIP.AUTO: 7 [PH]
PLATELET # BLD AUTO: 228 THOUSANDS/UL (ref 149–390)
PLATELET # BLD AUTO: 228 THOUSANDS/UL (ref 149–390)
PLATELET BLD QL SMEAR: ADEQUATE
PLATELET BLD QL SMEAR: ADEQUATE
PMV BLD AUTO: 8.2 FL (ref 8.9–12.7)
PMV BLD AUTO: 8.2 FL (ref 8.9–12.7)
PO2 BLD: 44 MM HG (ref 35–45)
PO2 BLD: 44 MM HG (ref 35–45)
PO2 BLDV: 57.3 MM HG (ref 35–45)
PO2 BLDV: 57.3 MM HG (ref 35–45)
POTASSIUM BLD-SCNC: 4.9 MMOL/L (ref 3.5–5.3)
POTASSIUM BLD-SCNC: 4.9 MMOL/L (ref 3.5–5.3)
POTASSIUM SERPL-SCNC: 3.7 MMOL/L (ref 3.5–5.3)
POTASSIUM SERPL-SCNC: 3.7 MMOL/L (ref 3.5–5.3)
PROT UR STRIP-MCNC: NEGATIVE MG/DL
PROT UR STRIP-MCNC: NEGATIVE MG/DL
PROTHROMBIN TIME: 13.3 SECONDS (ref 12.3–15)
PROTHROMBIN TIME: 13.3 SECONDS (ref 12.3–15)
RBC # BLD AUTO: 3.19 MILLION/UL (ref 3.88–5.62)
RBC # BLD AUTO: 3.19 MILLION/UL (ref 3.88–5.62)
RBC #/AREA URNS AUTO: ABNORMAL /HPF
RBC #/AREA URNS AUTO: ABNORMAL /HPF
RBC MORPH BLD: NORMAL
RBC MORPH BLD: NORMAL
RH BLD: POSITIVE
RH BLD: POSITIVE
SALICYLATES SERPL-MCNC: <5 MG/DL (ref 3–20)
SALICYLATES SERPL-MCNC: <5 MG/DL (ref 3–20)
SAO2 % BLD FROM PO2: 81 % (ref 60–85)
SAO2 % BLD FROM PO2: 81 % (ref 60–85)
SODIUM BLD-SCNC: 136 MMOL/L (ref 136–145)
SODIUM BLD-SCNC: 136 MMOL/L (ref 136–145)
SODIUM SERPL-SCNC: 133 MMOL/L (ref 135–147)
SODIUM SERPL-SCNC: 133 MMOL/L (ref 135–147)
SP GR UR STRIP.AUTO: 1.03 (ref 1–1.03)
SP GR UR STRIP.AUTO: 1.03 (ref 1–1.03)
SPECIMEN EXPIRATION DATE: NORMAL
SPECIMEN EXPIRATION DATE: NORMAL
SPECIMEN SOURCE: ABNORMAL
SPECIMEN SOURCE: ABNORMAL
UROBILINOGEN UR STRIP-ACNC: <2 MG/DL
UROBILINOGEN UR STRIP-ACNC: <2 MG/DL
WBC # BLD AUTO: 10.55 THOUSAND/UL (ref 4.31–10.16)
WBC # BLD AUTO: 10.55 THOUSAND/UL (ref 4.31–10.16)
WBC #/AREA URNS AUTO: ABNORMAL /HPF
WBC #/AREA URNS AUTO: ABNORMAL /HPF

## 2025-03-13 PROCEDURE — 84295 ASSAY OF SERUM SODIUM: CPT

## 2025-03-13 PROCEDURE — 85014 HEMATOCRIT: CPT

## 2025-03-13 PROCEDURE — 82330 ASSAY OF CALCIUM: CPT

## 2025-03-13 PROCEDURE — 83605 ASSAY OF LACTIC ACID: CPT

## 2025-03-13 PROCEDURE — 85610 PROTHROMBIN TIME: CPT | Performed by: NURSE PRACTITIONER

## 2025-03-13 PROCEDURE — 82607 VITAMIN B-12: CPT

## 2025-03-13 PROCEDURE — 85027 COMPLETE CBC AUTOMATED: CPT | Performed by: NURSE PRACTITIONER

## 2025-03-13 PROCEDURE — 85007 BL SMEAR W/DIFF WBC COUNT: CPT | Performed by: NURSE PRACTITIONER

## 2025-03-13 PROCEDURE — 82728 ASSAY OF FERRITIN: CPT

## 2025-03-13 PROCEDURE — 82010 KETONE BODYS QUAN: CPT

## 2025-03-13 PROCEDURE — 71045 X-RAY EXAM CHEST 1 VIEW: CPT

## 2025-03-13 PROCEDURE — 74177 CT ABD & PELVIS W/CONTRAST: CPT

## 2025-03-13 PROCEDURE — 76705 ECHO EXAM OF ABDOMEN: CPT | Performed by: NURSE PRACTITIONER

## 2025-03-13 PROCEDURE — 82947 ASSAY GLUCOSE BLOOD QUANT: CPT

## 2025-03-13 PROCEDURE — 80048 BASIC METABOLIC PNL TOTAL CA: CPT | Performed by: NURSE PRACTITIONER

## 2025-03-13 PROCEDURE — 71260 CT THORAX DX C+: CPT

## 2025-03-13 PROCEDURE — 81001 URINALYSIS AUTO W/SCOPE: CPT | Performed by: NURSE PRACTITIONER

## 2025-03-13 PROCEDURE — 90471 IMMUNIZATION ADMIN: CPT

## 2025-03-13 PROCEDURE — 84484 ASSAY OF TROPONIN QUANT: CPT | Performed by: NURSE PRACTITIONER

## 2025-03-13 PROCEDURE — 72125 CT NECK SPINE W/O DYE: CPT

## 2025-03-13 PROCEDURE — 82533 TOTAL CORTISOL: CPT

## 2025-03-13 PROCEDURE — 80179 DRUG ASSAY SALICYLATE: CPT

## 2025-03-13 PROCEDURE — 86901 BLOOD TYPING SEROLOGIC RH(D): CPT | Performed by: NURSE PRACTITIONER

## 2025-03-13 PROCEDURE — 82803 BLOOD GASES ANY COMBINATION: CPT

## 2025-03-13 PROCEDURE — 70450 CT HEAD/BRAIN W/O DYE: CPT

## 2025-03-13 PROCEDURE — 80143 DRUG ASSAY ACETAMINOPHEN: CPT

## 2025-03-13 PROCEDURE — 84132 ASSAY OF SERUM POTASSIUM: CPT

## 2025-03-13 PROCEDURE — 93005 ELECTROCARDIOGRAM TRACING: CPT

## 2025-03-13 PROCEDURE — 82077 ASSAY SPEC XCP UR&BREATH IA: CPT

## 2025-03-13 PROCEDURE — 82805 BLOOD GASES W/O2 SATURATION: CPT

## 2025-03-13 PROCEDURE — EDAIR PR ED AIR: Performed by: EMERGENCY MEDICINE

## 2025-03-13 PROCEDURE — 36415 COLL VENOUS BLD VENIPUNCTURE: CPT | Performed by: NURSE PRACTITIONER

## 2025-03-13 PROCEDURE — 83550 IRON BINDING TEST: CPT

## 2025-03-13 PROCEDURE — 86900 BLOOD TYPING SEROLOGIC ABO: CPT | Performed by: NURSE PRACTITIONER

## 2025-03-13 PROCEDURE — 83735 ASSAY OF MAGNESIUM: CPT

## 2025-03-13 PROCEDURE — 80307 DRUG TEST PRSMV CHEM ANLYZR: CPT

## 2025-03-13 PROCEDURE — 82550 ASSAY OF CK (CPK): CPT | Performed by: NURSE PRACTITIONER

## 2025-03-13 PROCEDURE — 70486 CT MAXILLOFACIAL W/O DYE: CPT

## 2025-03-13 PROCEDURE — 99285 EMERGENCY DEPT VISIT HI MDM: CPT

## 2025-03-13 PROCEDURE — 84443 ASSAY THYROID STIM HORMONE: CPT

## 2025-03-13 PROCEDURE — 82306 VITAMIN D 25 HYDROXY: CPT

## 2025-03-13 PROCEDURE — 82746 ASSAY OF FOLIC ACID SERUM: CPT

## 2025-03-13 PROCEDURE — 86850 RBC ANTIBODY SCREEN: CPT | Performed by: NURSE PRACTITIONER

## 2025-03-13 PROCEDURE — 90715 TDAP VACCINE 7 YRS/> IM: CPT | Performed by: NURSE PRACTITIONER

## 2025-03-13 PROCEDURE — 93308 TTE F-UP OR LMTD: CPT | Performed by: NURSE PRACTITIONER

## 2025-03-13 PROCEDURE — 83540 ASSAY OF IRON: CPT

## 2025-03-13 PROCEDURE — 99223 1ST HOSP IP/OBS HIGH 75: CPT | Performed by: SURGERY

## 2025-03-13 RX ORDER — SODIUM CHLORIDE, SODIUM GLUCONATE, SODIUM ACETATE, POTASSIUM CHLORIDE, MAGNESIUM CHLORIDE, SODIUM PHOSPHATE, DIBASIC, AND POTASSIUM PHOSPHATE .53; .5; .37; .037; .03; .012; .00082 G/100ML; G/100ML; G/100ML; G/100ML; G/100ML; G/100ML; G/100ML
75 INJECTION, SOLUTION INTRAVENOUS CONTINUOUS
Status: DISCONTINUED | OUTPATIENT
Start: 2025-03-13 | End: 2025-03-16

## 2025-03-13 RX ORDER — GINSENG 100 MG
1 CAPSULE ORAL 2 TIMES DAILY
Status: DISPENSED | OUTPATIENT
Start: 2025-03-13 | End: 2025-03-16

## 2025-03-13 RX ORDER — ACETAMINOPHEN 325 MG/1
650 TABLET ORAL EVERY 6 HOURS PRN
Status: DISCONTINUED | OUTPATIENT
Start: 2025-03-13 | End: 2025-03-16

## 2025-03-13 RX ORDER — ENOXAPARIN SODIUM 100 MG/ML
40 INJECTION SUBCUTANEOUS DAILY
Status: DISCONTINUED | OUTPATIENT
Start: 2025-03-14 | End: 2025-03-18 | Stop reason: HOSPADM

## 2025-03-13 RX ADMIN — BACITRACIN 1 LARGE APPLICATION: 500 OINTMENT TOPICAL at 22:41

## 2025-03-13 RX ADMIN — TETANUS TOXOID, REDUCED DIPHTHERIA TOXOID AND ACELLULAR PERTUSSIS VACCINE, ADSORBED 0.5 ML: 5; 2.5; 8; 8; 2.5 SUSPENSION INTRAMUSCULAR at 20:17

## 2025-03-13 RX ADMIN — SODIUM CHLORIDE, SODIUM GLUCONATE, SODIUM ACETATE, POTASSIUM CHLORIDE, MAGNESIUM CHLORIDE, SODIUM PHOSPHATE, DIBASIC, AND POTASSIUM PHOSPHATE 75 ML/HR: .53; .5; .37; .037; .03; .012; .00082 INJECTION, SOLUTION INTRAVENOUS at 23:03

## 2025-03-13 RX ADMIN — IOHEXOL 100 ML: 350 INJECTION, SOLUTION INTRAVENOUS at 19:36

## 2025-03-13 NOTE — ED PROVIDER NOTES
Emergency Department Airway Evaluation and Management Form    History  Obtained from: EMS/Patient  Patient has no allergy information on record.  No chief complaint on file.    HPI    70-year-old male brought by EMS after his Apple Watch alerted them that he had fallen.  Arrives on 4 L nasal cannula with normal oxygen saturation.  Continued supplemental oxygen.  Breathing comfortably.  No other acute airway intervention needed at this time.  Further management per trauma team.    No past medical history on file.  No past surgical history on file.  No family history on file.     I have reviewed and agree with the history as documented.    Review of Systems    Physical Exam  /80   Pulse 80   Resp 18   Wt 71.4 kg (157 lb 6.5 oz)   SpO2 96%     Physical Exam    ED Medications  Medications - No data to display    Intubation  Procedures    Notes      Final Diagnosis  Final diagnoses:   None       ED Provider  Electronically Signed by     Eduardo Campos MD  03/13/25 1919

## 2025-03-13 NOTE — ED NOTES
Received call from patients wife Vaishali ([phone]). Vaishali was given an update on the patient. She requested we call the son Asaf ([phone]) who is driving here from [place]. Called Asaf with no answer. Vaishali was informed that the provider will give her a call with an update.      Hiwot Younger RN  03/13/25 1940

## 2025-03-13 NOTE — PROCEDURES
POC FAST US    Date/Time: 3/13/2025 7:55 PM    Performed by: NAPOLEON Martínez  Authorized by: NAPOLEON Martínez    Patient location:  Trauma  Procedure details:     Exam Type:  Diagnostic    Indications: blunt abdominal trauma and blunt chest trauma      Assess for:  Intra-abdominal fluid and pericardial effusion    Technique: FAST      Views obtained:  Heart - Pericardial sac, LUQ - Splenorenal space, Suprapubic - Pouch of Jose and RUQ - Yousif's Pouch    Image quality: diagnostic      Image availability:  Images available in PACS and video obtained  FAST Findings:     RUQ (Hepatorenal) free fluid: absent      LUQ (Splenorenal) free fluid: absent      Suprapubic free fluid: absent      Cardiac wall motion: identified      Pericardial effusion: absent    Interpretation:     Impressions: negative

## 2025-03-13 NOTE — H&P
"H&P - Trauma   Name: Levon Cruz 70 y.o. male I MRN: [id number]  Unit/Bed#: ED-35 I Date of Admission: 3/13/2025   Date of Service: 3/13/2025 I Hospital Day: 0     Assessment & Plan  Fall, initial encounter  Unknown origin  CT imaging was negative for acute traumatic injury  Admit to medicine for further workup of possible syncope, encephalopathy, possible seizure-like activity.  Trauma will continue to follow for tertiary evaluation on 3/14/2025.  Multiple abrasions  Scattered abrasions  Local wound care  Tetanus updated  Contusion of head, unspecified part of head, initial encounter  CT head and face were negative for acute traumatic injury  Ice and analgesia as needed    Trauma Alert: Level B   Model of Arrival: Ambulance    Trauma Team: Attending Roger and ARY Taylor  Consultants:     None     History of Present Illness   Chief Complaint: \"Nothing hurts\"  Mechanism:Fall     Levon Cruz is a 70 y.o. male with history of anxiety, prostate cancer, CKD, presenting today for evaluation after being found my EMS on the ground at home. EMS reports that his Apple watch notified his son of a possible fall who subsequently called 911.  EMS found patient on the ground with obvious signs of trauma to the head and urinary incontinence.  Unknown last well time. Patient is confused with a GCS of 14. He was reported to have had a seizure in route to the hospital and received 2mg of ativan and 4 of zofran. No specific complaints offered but ROS is limited do to confusion.     Review of Systems   Unable to perform ROS: Mental status change     Medical History Review: I have reviewed the patient's PMH, PSH, Social History, Family History, Meds, and Allergies     There is no immunization history on file for this patient.  Last Tetanus: unknown    1. Before the illness or injury that brought you to the Emergency, did you need someone to help you on a regular basis? 0=No   2. Since the illness or injury that brought you to " the Emergency, have you needed more help than usual to take care of yourself? 1=Yes   3. Have you been hospitalized for one or more nights during the past 6 months (excluding a stay in the Emergency Department)? 0=No   4. In general, do you see well? 0=Yes   5. In general, do you have serious problems with your memory? 1=Yes   6. Do you take more than three different medications everyday? 1=Yes   TOTAL   3     Did you order a geriatric consult if the score was 2 or greater?: Per Medical team       Objective :  Temp:  [97.4 °F (36.3 °C)] 97.4 °F (36.3 °C)  HR:  [70-82] 70  BP: (121-138)/(70-80) 128/70  Resp:  [18] 18  SpO2:  [92 %-96 %] 92 %  O2 Device: None (Room air)    Initial Vitals:   Temperature: (!) 97.4 °F (36.3 °C) (03/13/25 1943)  Pulse: 82 (03/13/25 1913)  Respirations: 18 (03/13/25 1913)  Blood Pressure: 121/77 (03/13/25 1913)    Primary Survey:   Airway:        Status: patent;        Pre-hospital Interventions: none        Hospital Interventions: none  Breathing:        Pre-hospital Interventions: none       Effort: normal       Right breath sounds: normal       Left breath sounds: normal  Circulation:        Rhythm: regular       Rate: regular   Right Pulses Left Pulses    R radial: 2+  R femoral: 2+  R pedal: 2+     L radial: 2+  L femoral: 2+  L pedal: 2+       Disability:        GCS: Eye: 4; Verbal: 4 Motor: 6 Total: 14       Right Pupil: 4 mm;  round;  reactive         Left Pupil:  4 mm;  round;  reactive      R Motor Strength L Motor Strength    R : 5/5  R dorsiflex: 5/5  R plantarflex: 5/5 L : 5/5  L dorsiflex: 5/5  L plantarflex: 5/5        Sensory:  No sensory deficit  Exposure:       Completed: Yes      Secondary Survey:  Physical Exam  Constitutional:       General: He is not in acute distress.     Appearance: He is not ill-appearing.   HENT:      Head:      Comments: Contusion around periorbital region, bilaterally.  Swelling to nose with overlying contusion.  Dried blood in bilateral  nares.  Abrasion on lip and superficial laceration to the oral mucosa of lower lip.     Right Ear: External ear normal.      Left Ear: External ear normal.   Eyes:      Extraocular Movements: Extraocular movements intact.      Pupils: Pupils are equal, round, and reactive to light.   Neck:      Comments: Cervical collar in place.  Cardiovascular:      Rate and Rhythm: Normal rate. Rhythm irregular.      Pulses: Normal pulses.      Heart sounds: Normal heart sounds.   Pulmonary:      Effort: Pulmonary effort is normal. No respiratory distress.      Breath sounds: Normal breath sounds. No wheezing or rales.   Abdominal:      General: Abdomen is flat. Bowel sounds are normal. There is no distension.      Palpations: Abdomen is soft.      Tenderness: There is no abdominal tenderness. There is no guarding.   Genitourinary:     Comments: Pelvis is stable.  Musculoskeletal:      Comments: Patient moving all extremities.  No deformities.  No C, T, L-spine tenderness.  No palpable step-offs.   Skin:     Capillary Refill: Capillary refill takes less than 2 seconds.      Comments: Abrasion on nose.  Abrasion on bilateral lower legs.  Contusions on bilateral knees.   Neurological:      Mental Status: He is alert. He is confused.      GCS: GCS eye subscore is 4. GCS verbal subscore is 4. GCS motor subscore is 6.      Comments: Patient having jerking/tremor like activity in upper extremities.  No appreciable weakness or extremity drifting.  No facial droop.  Light touch sensation intact in all extremities.   Psychiatric:      Comments: Garbled speech.          Lab Results: I have reviewed the following results:  Recent Labs     03/13/25 1921 03/13/25 1923   WBC  --  10.55*   HGB 10.2* 10.0*   HCT 30* 29.2*   PLT  --  228   BANDSPCT  --  1   CO2 25  --    CAIONIZED 1.06*  --    INR  --  0.94       Imaging Results: I have personally reviewed pertinent images saved in PACS. CT scan findings (and other pertinent positive findings  on images) were discussed with radiology. My interpretation of the images/reports are as follows:  Chest Xray(s): negative for acute findings   FAST exam(s): negative for acute findings   CT Scan(s): negative for acute findings   Additional Xray(s): N/A     Other Studies: Other Study Results Review: No additional pertinent studies reviewed.    Family discussion- Son and wife updated to there satisfaction.

## 2025-03-14 ENCOUNTER — APPOINTMENT (INPATIENT)
Dept: MRI IMAGING | Facility: HOSPITAL | Age: 70
DRG: 101 | End: 2025-03-14
Payer: MEDICARE

## 2025-03-14 ENCOUNTER — TELEPHONE (OUTPATIENT)
Age: 70
End: 2025-03-14

## 2025-03-14 ENCOUNTER — APPOINTMENT (INPATIENT)
Dept: NEUROLOGY | Facility: HOSPITAL | Age: 70
DRG: 101 | End: 2025-03-14
Payer: MEDICARE

## 2025-03-14 PROBLEM — T07.XXXA MULTIPLE CONTUSIONS: Status: ACTIVE | Noted: 2025-03-14

## 2025-03-14 LAB
25(OH)D3 SERPL-MCNC: 35.6 NG/ML (ref 30–100)
25(OH)D3 SERPL-MCNC: 35.6 NG/ML (ref 30–100)
4HR DELTA HS TROPONIN: 0 NG/L
4HR DELTA HS TROPONIN: 0 NG/L
ALBUMIN SERPL BCG-MCNC: 4.1 G/DL (ref 3.5–5)
ALBUMIN SERPL BCG-MCNC: 4.1 G/DL (ref 3.5–5)
ALP SERPL-CCNC: 68 U/L (ref 34–104)
ALP SERPL-CCNC: 68 U/L (ref 34–104)
ALT SERPL W P-5'-P-CCNC: 40 U/L (ref 7–52)
ALT SERPL W P-5'-P-CCNC: 40 U/L (ref 7–52)
AMPHETAMINES SERPL QL SCN: NEGATIVE
AMPHETAMINES SERPL QL SCN: NEGATIVE
ANION GAP SERPL CALCULATED.3IONS-SCNC: 12 MMOL/L (ref 4–13)
ANION GAP SERPL CALCULATED.3IONS-SCNC: 12 MMOL/L (ref 4–13)
AST SERPL W P-5'-P-CCNC: 79 U/L (ref 13–39)
AST SERPL W P-5'-P-CCNC: 79 U/L (ref 13–39)
B-OH-BUTYR SERPL-MCNC: 0.13 MMOL/L (ref 0.02–0.27)
B-OH-BUTYR SERPL-MCNC: 0.13 MMOL/L (ref 0.02–0.27)
BARBITURATES UR QL: NEGATIVE
BARBITURATES UR QL: NEGATIVE
BASOPHILS # BLD MANUAL: 0 THOUSAND/UL (ref 0–0.1)
BASOPHILS # BLD MANUAL: 0 THOUSAND/UL (ref 0–0.1)
BASOPHILS NFR MAR MANUAL: 0 % (ref 0–1)
BASOPHILS NFR MAR MANUAL: 0 % (ref 0–1)
BENZODIAZ UR QL: NEGATIVE
BENZODIAZ UR QL: NEGATIVE
BILIRUB DIRECT SERPL-MCNC: 0.12 MG/DL (ref 0–0.2)
BILIRUB DIRECT SERPL-MCNC: 0.12 MG/DL (ref 0–0.2)
BILIRUB SERPL-MCNC: 0.78 MG/DL (ref 0.2–1)
BILIRUB SERPL-MCNC: 0.78 MG/DL (ref 0.2–1)
BUN SERPL-MCNC: 15 MG/DL (ref 5–25)
BUN SERPL-MCNC: 15 MG/DL (ref 5–25)
CALCIUM SERPL-MCNC: 8.9 MG/DL (ref 8.4–10.2)
CALCIUM SERPL-MCNC: 8.9 MG/DL (ref 8.4–10.2)
CARDIAC TROPONIN I PNL SERPL HS: 12 NG/L (ref ?–50)
CARDIAC TROPONIN I PNL SERPL HS: 12 NG/L (ref ?–50)
CHLORIDE SERPL-SCNC: 98 MMOL/L (ref 96–108)
CHLORIDE SERPL-SCNC: 98 MMOL/L (ref 96–108)
CK SERPL-CCNC: 1901 U/L (ref 39–308)
CK SERPL-CCNC: 1901 U/L (ref 39–308)
CO2 SERPL-SCNC: 23 MMOL/L (ref 21–32)
CO2 SERPL-SCNC: 23 MMOL/L (ref 21–32)
COCAINE UR QL: NEGATIVE
COCAINE UR QL: NEGATIVE
CORTIS SERPL-MCNC: 11.5 UG/DL
CORTIS SERPL-MCNC: 11.5 UG/DL
CREAT SERPL-MCNC: 0.74 MG/DL (ref 0.6–1.3)
CREAT SERPL-MCNC: 0.74 MG/DL (ref 0.6–1.3)
EOSINOPHIL # BLD MANUAL: 0 THOUSAND/UL (ref 0–0.4)
EOSINOPHIL # BLD MANUAL: 0 THOUSAND/UL (ref 0–0.4)
EOSINOPHIL NFR BLD MANUAL: 0 % (ref 0–6)
EOSINOPHIL NFR BLD MANUAL: 0 % (ref 0–6)
ERYTHROCYTE [DISTWIDTH] IN BLOOD BY AUTOMATED COUNT: 13.6 % (ref 11.6–15.1)
ERYTHROCYTE [DISTWIDTH] IN BLOOD BY AUTOMATED COUNT: 13.6 % (ref 11.6–15.1)
FENTANYL UR QL SCN: NEGATIVE
FENTANYL UR QL SCN: NEGATIVE
FERRITIN SERPL-MCNC: 19 NG/ML (ref 24–336)
FERRITIN SERPL-MCNC: 19 NG/ML (ref 24–336)
FOLATE SERPL-MCNC: >22.3 NG/ML
FOLATE SERPL-MCNC: >22.3 NG/ML
GFR SERPL CREATININE-BSD FRML MDRD: 93 ML/MIN/1.73SQ M
GFR SERPL CREATININE-BSD FRML MDRD: 93 ML/MIN/1.73SQ M
GLUCOSE SERPL-MCNC: 99 MG/DL (ref 65–140)
GLUCOSE SERPL-MCNC: 99 MG/DL (ref 65–140)
HCT VFR BLD AUTO: 29.6 % (ref 36.5–49.3)
HCT VFR BLD AUTO: 29.6 % (ref 36.5–49.3)
HGB BLD-MCNC: 10.1 G/DL (ref 12–17)
HGB BLD-MCNC: 10.1 G/DL (ref 12–17)
HYDROCODONE UR QL SCN: NEGATIVE
HYDROCODONE UR QL SCN: NEGATIVE
IRON SATN MFR SERPL: 37 % (ref 15–50)
IRON SATN MFR SERPL: 37 % (ref 15–50)
IRON SERPL-MCNC: 115 UG/DL (ref 50–212)
IRON SERPL-MCNC: 115 UG/DL (ref 50–212)
LYMPHOCYTES # BLD AUTO: 0.26 THOUSAND/UL (ref 0.6–4.47)
LYMPHOCYTES # BLD AUTO: 0.26 THOUSAND/UL (ref 0.6–4.47)
LYMPHOCYTES # BLD AUTO: 3 % (ref 14–44)
LYMPHOCYTES # BLD AUTO: 3 % (ref 14–44)
MAGNESIUM SERPL-MCNC: 1.6 MG/DL (ref 1.9–2.7)
MAGNESIUM SERPL-MCNC: 1.6 MG/DL (ref 1.9–2.7)
MAGNESIUM SERPL-MCNC: 1.7 MG/DL (ref 1.9–2.7)
MAGNESIUM SERPL-MCNC: 1.7 MG/DL (ref 1.9–2.7)
MCH RBC QN AUTO: 31 PG (ref 26.8–34.3)
MCH RBC QN AUTO: 31 PG (ref 26.8–34.3)
MCHC RBC AUTO-ENTMCNC: 34.1 G/DL (ref 31.4–37.4)
MCHC RBC AUTO-ENTMCNC: 34.1 G/DL (ref 31.4–37.4)
MCV RBC AUTO: 91 FL (ref 82–98)
MCV RBC AUTO: 91 FL (ref 82–98)
METHADONE UR QL: NEGATIVE
METHADONE UR QL: NEGATIVE
MONOCYTES # BLD AUTO: 0.26 THOUSAND/UL (ref 0–1.22)
MONOCYTES # BLD AUTO: 0.26 THOUSAND/UL (ref 0–1.22)
MONOCYTES NFR BLD: 3 % (ref 4–12)
MONOCYTES NFR BLD: 3 % (ref 4–12)
NEUTROPHILS # BLD MANUAL: 8.28 THOUSAND/UL (ref 1.85–7.62)
NEUTROPHILS # BLD MANUAL: 8.28 THOUSAND/UL (ref 1.85–7.62)
NEUTS SEG NFR BLD AUTO: 94 % (ref 43–75)
NEUTS SEG NFR BLD AUTO: 94 % (ref 43–75)
OPIATES UR QL SCN: POSITIVE
OPIATES UR QL SCN: POSITIVE
OXYCODONE+OXYMORPHONE UR QL SCN: NEGATIVE
OXYCODONE+OXYMORPHONE UR QL SCN: NEGATIVE
PCP UR QL: NEGATIVE
PCP UR QL: NEGATIVE
PLATELET # BLD AUTO: 220 THOUSANDS/UL (ref 149–390)
PLATELET # BLD AUTO: 220 THOUSANDS/UL (ref 149–390)
PLATELET BLD QL SMEAR: ADEQUATE
PLATELET BLD QL SMEAR: ADEQUATE
PMV BLD AUTO: 8.4 FL (ref 8.9–12.7)
PMV BLD AUTO: 8.4 FL (ref 8.9–12.7)
POTASSIUM SERPL-SCNC: 3.5 MMOL/L (ref 3.5–5.3)
POTASSIUM SERPL-SCNC: 3.5 MMOL/L (ref 3.5–5.3)
PROT SERPL-MCNC: 6.2 G/DL (ref 6.4–8.4)
PROT SERPL-MCNC: 6.2 G/DL (ref 6.4–8.4)
RBC # BLD AUTO: 3.26 MILLION/UL (ref 3.88–5.62)
RBC # BLD AUTO: 3.26 MILLION/UL (ref 3.88–5.62)
RBC MORPH BLD: NORMAL
RBC MORPH BLD: NORMAL
SODIUM SERPL-SCNC: 133 MMOL/L (ref 135–147)
SODIUM SERPL-SCNC: 133 MMOL/L (ref 135–147)
THC UR QL: POSITIVE
THC UR QL: POSITIVE
TIBC SERPL-MCNC: 309.4 UG/DL (ref 250–450)
TIBC SERPL-MCNC: 309.4 UG/DL (ref 250–450)
TRANSFERRIN SERPL-MCNC: 221 MG/DL (ref 203–362)
TRANSFERRIN SERPL-MCNC: 221 MG/DL (ref 203–362)
TSH SERPL DL<=0.05 MIU/L-ACNC: 1.01 UIU/ML (ref 0.45–4.5)
TSH SERPL DL<=0.05 MIU/L-ACNC: 1.01 UIU/ML (ref 0.45–4.5)
UIBC SERPL-MCNC: 194 UG/DL (ref 155–355)
UIBC SERPL-MCNC: 194 UG/DL (ref 155–355)
VIT B12 SERPL-MCNC: 562 PG/ML (ref 180–914)
VIT B12 SERPL-MCNC: 562 PG/ML (ref 180–914)
WBC # BLD AUTO: 8.81 THOUSAND/UL (ref 4.31–10.16)
WBC # BLD AUTO: 8.81 THOUSAND/UL (ref 4.31–10.16)

## 2025-03-14 PROCEDURE — 99231 SBSQ HOSP IP/OBS SF/LOW 25: CPT | Performed by: PHYSICIAN ASSISTANT

## 2025-03-14 PROCEDURE — 80076 HEPATIC FUNCTION PANEL: CPT | Performed by: PHYSICIAN ASSISTANT

## 2025-03-14 PROCEDURE — 97167 OT EVAL HIGH COMPLEX 60 MIN: CPT

## 2025-03-14 PROCEDURE — 82550 ASSAY OF CK (CPK): CPT

## 2025-03-14 PROCEDURE — 83735 ASSAY OF MAGNESIUM: CPT

## 2025-03-14 PROCEDURE — 95816 EEG AWAKE AND DROWSY: CPT

## 2025-03-14 PROCEDURE — 92610 EVALUATE SWALLOWING FUNCTION: CPT

## 2025-03-14 PROCEDURE — 99223 1ST HOSP IP/OBS HIGH 75: CPT | Performed by: PSYCHIATRY & NEUROLOGY

## 2025-03-14 PROCEDURE — 99223 1ST HOSP IP/OBS HIGH 75: CPT | Performed by: INTERNAL MEDICINE

## 2025-03-14 PROCEDURE — 85007 BL SMEAR W/DIFF WBC COUNT: CPT

## 2025-03-14 PROCEDURE — 80048 BASIC METABOLIC PNL TOTAL CA: CPT

## 2025-03-14 PROCEDURE — 36415 COLL VENOUS BLD VENIPUNCTURE: CPT

## 2025-03-14 PROCEDURE — 95819 EEG AWAKE AND ASLEEP: CPT | Performed by: PSYCHIATRY & NEUROLOGY

## 2025-03-14 PROCEDURE — 90662 IIV NO PRSV INCREASED AG IM: CPT | Performed by: INTERNAL MEDICINE

## 2025-03-14 PROCEDURE — 85027 COMPLETE CBC AUTOMATED: CPT

## 2025-03-14 PROCEDURE — G0008 ADMIN INFLUENZA VIRUS VAC: HCPCS | Performed by: INTERNAL MEDICINE

## 2025-03-14 RX ORDER — ARIPIPRAZOLE 5 MG/1
5 TABLET ORAL DAILY
COMMUNITY
End: 2025-03-31

## 2025-03-14 RX ORDER — VILAZODONE HYDROCHLORIDE 40 MG/1
40 TABLET ORAL
COMMUNITY

## 2025-03-14 RX ORDER — VILAZODONE HYDROCHLORIDE 20 MG/1
40 TABLET ORAL
Status: DISCONTINUED | OUTPATIENT
Start: 2025-03-15 | End: 2025-03-14

## 2025-03-14 RX ORDER — PANTOPRAZOLE SODIUM 40 MG/1
40 TABLET, DELAYED RELEASE ORAL
Status: DISCONTINUED | OUTPATIENT
Start: 2025-03-15 | End: 2025-03-14

## 2025-03-14 RX ORDER — HYDROCHLOROTHIAZIDE 25 MG/1
25 TABLET ORAL DAILY
Status: DISCONTINUED | OUTPATIENT
Start: 2025-03-15 | End: 2025-03-18 | Stop reason: HOSPADM

## 2025-03-14 RX ORDER — HYDROCHLOROTHIAZIDE 25 MG/1
25 TABLET ORAL DAILY
COMMUNITY
End: 2025-03-31

## 2025-03-14 RX ORDER — BUPROPION HYDROCHLORIDE 150 MG/1
300 TABLET ORAL DAILY
Status: DISCONTINUED | OUTPATIENT
Start: 2025-03-15 | End: 2025-03-18 | Stop reason: HOSPADM

## 2025-03-14 RX ORDER — LORAZEPAM 2 MG/ML
0.5 INJECTION INTRAMUSCULAR ONCE
Status: DISCONTINUED | OUTPATIENT
Start: 2025-03-14 | End: 2025-03-14

## 2025-03-14 RX ORDER — LORAZEPAM 2 MG/ML
0.5 INJECTION INTRAMUSCULAR ONCE AS NEEDED
Status: DISCONTINUED | OUTPATIENT
Start: 2025-03-14 | End: 2025-03-18 | Stop reason: HOSPADM

## 2025-03-14 RX ORDER — DIPHENOXYLATE HYDROCHLORIDE AND ATROPINE SULFATE 2.5; .025 MG/1; MG/1
1 TABLET ORAL 4 TIMES DAILY PRN
COMMUNITY

## 2025-03-14 RX ORDER — OMEPRAZOLE 40 MG/1
40 CAPSULE, DELAYED RELEASE ORAL DAILY
COMMUNITY

## 2025-03-14 RX ORDER — HYDROCODONE BITARTRATE AND ACETAMINOPHEN 5; 325 MG/1; MG/1
1 TABLET ORAL EVERY 6 HOURS PRN
Status: DISCONTINUED | OUTPATIENT
Start: 2025-03-14 | End: 2025-03-15

## 2025-03-14 RX ORDER — LOSARTAN POTASSIUM 50 MG/1
100 TABLET ORAL DAILY
Status: DISCONTINUED | OUTPATIENT
Start: 2025-03-15 | End: 2025-03-18 | Stop reason: HOSPADM

## 2025-03-14 RX ORDER — MODAFINIL 100 MG/1
200 TABLET ORAL DAILY
Status: DISCONTINUED | OUTPATIENT
Start: 2025-03-15 | End: 2025-03-18 | Stop reason: HOSPADM

## 2025-03-14 RX ORDER — OLMESARTAN MEDOXOMIL 40 MG/1
40 TABLET ORAL DAILY
COMMUNITY

## 2025-03-14 RX ORDER — ZOLPIDEM TARTRATE 12.5 MG/1
12.5 TABLET, FILM COATED, EXTENDED RELEASE ORAL
COMMUNITY
End: 2025-03-31

## 2025-03-14 RX ORDER — MAGNESIUM SULFATE HEPTAHYDRATE 40 MG/ML
2 INJECTION, SOLUTION INTRAVENOUS ONCE
Status: COMPLETED | OUTPATIENT
Start: 2025-03-14 | End: 2025-03-14

## 2025-03-14 RX ORDER — FOLIC ACID 1 MG/1
1 TABLET ORAL DAILY
Status: DISCONTINUED | OUTPATIENT
Start: 2025-03-14 | End: 2025-03-18 | Stop reason: HOSPADM

## 2025-03-14 RX ORDER — HYDROCODONE BITARTRATE AND ACETAMINOPHEN 5; 325 MG/1; MG/1
1 TABLET ORAL EVERY 6 HOURS PRN
COMMUNITY

## 2025-03-14 RX ORDER — BUPROPION HYDROCHLORIDE 150 MG/1
150 TABLET ORAL DAILY
Status: ON HOLD | COMMUNITY
End: 2025-03-18

## 2025-03-14 RX ORDER — GABAPENTIN 400 MG/1
400 CAPSULE ORAL 2 TIMES DAILY
COMMUNITY

## 2025-03-14 RX ORDER — MORPHINE SULFATE 15 MG/1
15 TABLET, FILM COATED, EXTENDED RELEASE ORAL 2 TIMES DAILY
COMMUNITY
End: 2025-03-31

## 2025-03-14 RX ORDER — VILAZODONE HYDROCHLORIDE 20 MG/1
40 TABLET ORAL
Status: DISCONTINUED | OUTPATIENT
Start: 2025-03-14 | End: 2025-03-18 | Stop reason: HOSPADM

## 2025-03-14 RX ORDER — ROSUVASTATIN CALCIUM 40 MG/1
40 TABLET, COATED ORAL DAILY
COMMUNITY

## 2025-03-14 RX ORDER — AMLODIPINE BESYLATE 10 MG/1
10 TABLET ORAL DAILY
COMMUNITY
End: 2025-03-31

## 2025-03-14 RX ORDER — AMLODIPINE BESYLATE 10 MG/1
10 TABLET ORAL DAILY
Status: DISCONTINUED | OUTPATIENT
Start: 2025-03-15 | End: 2025-03-18 | Stop reason: HOSPADM

## 2025-03-14 RX ORDER — PANTOPRAZOLE SODIUM 40 MG/1
40 TABLET, DELAYED RELEASE ORAL
Status: DISCONTINUED | OUTPATIENT
Start: 2025-03-14 | End: 2025-03-18 | Stop reason: HOSPADM

## 2025-03-14 RX ORDER — ARMODAFINIL 250 MG/1
250 TABLET ORAL DAILY
COMMUNITY
End: 2025-03-31

## 2025-03-14 RX ORDER — GABAPENTIN 400 MG/1
400 CAPSULE ORAL 2 TIMES DAILY
Status: DISCONTINUED | OUTPATIENT
Start: 2025-03-14 | End: 2025-03-18 | Stop reason: HOSPADM

## 2025-03-14 RX ORDER — DIVALPROEX SODIUM 125 MG/1
250 TABLET, DELAYED RELEASE ORAL EVERY 12 HOURS SCHEDULED
Status: DISCONTINUED | OUTPATIENT
Start: 2025-03-14 | End: 2025-03-15

## 2025-03-14 RX ORDER — LANOLIN ALCOHOL/MO/W.PET/CERES
100 CREAM (GRAM) TOPICAL DAILY
Status: DISCONTINUED | OUTPATIENT
Start: 2025-03-14 | End: 2025-03-18 | Stop reason: HOSPADM

## 2025-03-14 RX ORDER — ARIPIPRAZOLE 5 MG/1
5 TABLET ORAL DAILY
Status: DISCONTINUED | OUTPATIENT
Start: 2025-03-15 | End: 2025-03-18 | Stop reason: HOSPADM

## 2025-03-14 RX ORDER — MORPHINE SULFATE 15 MG/1
15 TABLET, FILM COATED, EXTENDED RELEASE ORAL 2 TIMES DAILY
Status: DISCONTINUED | OUTPATIENT
Start: 2025-03-14 | End: 2025-03-18 | Stop reason: HOSPADM

## 2025-03-14 RX ADMIN — BACITRACIN 1 LARGE APPLICATION: 500 OINTMENT TOPICAL at 09:34

## 2025-03-14 RX ADMIN — MULTIPLE VITAMINS W/ MINERALS TAB 1 TABLET: TAB ORAL at 16:11

## 2025-03-14 RX ADMIN — GABAPENTIN 400 MG: 400 CAPSULE ORAL at 17:57

## 2025-03-14 RX ADMIN — ENOXAPARIN SODIUM 40 MG: 40 INJECTION SUBCUTANEOUS at 08:55

## 2025-03-14 RX ADMIN — FOLIC ACID 1 MG: 1 TABLET ORAL at 16:11

## 2025-03-14 RX ADMIN — PANTOPRAZOLE SODIUM 40 MG: 40 TABLET, DELAYED RELEASE ORAL at 16:11

## 2025-03-14 RX ADMIN — SODIUM CHLORIDE 1000 MG: 9 INJECTION, SOLUTION INTRAVENOUS at 17:58

## 2025-03-14 RX ADMIN — VILAZODONE HYDROCHLORIDE 40 MG: 20 TABLET ORAL at 20:44

## 2025-03-14 RX ADMIN — INFLUENZA A VIRUS A/VICTORIA/4897/2022 IVR-238 (H1N1) ANTIGEN (FORMALDEHYDE INACTIVATED), INFLUENZA A VIRUS A/CALIFORNIA/122/2022 SAN-022 (H3N2) ANTIGEN (FORMALDEHYDE INACTIVATED), AND INFLUENZA B VIRUS B/MICHIGAN/01/2021 ANTIGEN (FORMALDEHYDE INACTIVATED) 0.5 ML: 60; 60; 60 INJECTION, SUSPENSION INTRAMUSCULAR at 17:57

## 2025-03-14 RX ADMIN — DIVALPROEX SODIUM 250 MG: 125 TABLET, DELAYED RELEASE ORAL at 20:44

## 2025-03-14 RX ADMIN — Medication 100 MG: at 16:11

## 2025-03-14 RX ADMIN — MAGNESIUM SULFATE HEPTAHYDRATE 2 G: 40 INJECTION, SOLUTION INTRAVENOUS at 08:48

## 2025-03-14 RX ADMIN — MORPHINE SULFATE 15 MG: 15 TABLET, EXTENDED RELEASE ORAL at 17:57

## 2025-03-14 NOTE — ASSESSMENT & PLAN NOTE
Unknown origin  CT imaging was negative for acute traumatic injury  Admit to medicine for further workup of possible syncope, encephalopathy, possible seizure-like activity.  Tertiary exam completed no new findings. No further work up required.

## 2025-03-14 NOTE — SPEECH THERAPY NOTE
Speech Language/Pathology  Speech/Language Pathology  Assessment    Patient Name: Levon Cruz  Today's Date: 3/14/2025     Problem List  Principal Problem:    Fall  Active Problems:    Acute encephalopathy    Seizure-like activity (HCC)    Multiple contusions    Past Medical History  No past medical history on file.  Past Surgical History  No past surgical history on file.  Speech-Language Pathology Bedside Swallow Evaluation        Patient Name: Levon Cruz    Today's Date: 3/14/2025     Problem List  Principal Problem:    Fall  Active Problems:    Acute encephalopathy    Seizure-like activity (HCC)    Multiple contusions         Past Medical History  No past medical history on file.    Past Surgical History  No past surgical history on file.    Summary   Pt presents with oral and pharyngeal stages WNL. Pt stating no reported c/o dysphagia and/or mastication difficulties due to facial lacerations and bruising. No further needs identified at this time- please reconsult with any concerns/changes.      Recommendations:   Diet: regular diet and thin liquids   Meds: whole with liquid   Frequent Oral care: 4x/day  Aspiration precautions and compensatory swallowing strategies: upright posture and small bites/sips  Other Recommendations/ considerations: Can consider GI due to esophageal hx- if pt reporting globus       Current Medical Status  70-year-old male brought by EMS after his Apple Watch alerted them that he had fallen.  Arrives on 4 L nasal cannula with normal oxygen saturation.  Continued supplemental oxygen.  Breathing comfortably.  No other acute airway intervention needed at this time.  Further management per trauma team.     Special Studies:  Chest CT 3/13/25:  IMPRESSION:     1.  No findings of acute traumatic injury in the chest, abdomen or pelvis. Motion distortion limits evaluation for subtle fractures. If there is specific concern suggest dedicated radiographs.  2.  Few small pulmonary nodules  measuring up to 5 mm. Based on current Fleischner Society 2017 Guidelines on incidental pulmonary nodule, no routine follow-up is needed if the patient is low risk. If the patient is high risk, optional follow-up chest CT   at 12 months can be considered.  3.  Mild right hydronephrosis without hydroureter likely due to chronic UPJ obstruction.    Social/Education/Vocational Hx:  Pt lives alone    Swallow Information   Prior speech/swallowing tx: None found on Epic or per pt report   Current Risks for Dysphagia & Aspiration: Facial lacerations/fall   Current Symptoms/Concerns:  Facial bruising   Current Diet: NPO   Baseline Diet: regular diet and thin liquids  Takes pills- As tolerated     Baseline Assessment   Behavior/Cognition: alert  Speech/Language Status: able to participate in conversation  Patient Positioning: upright in bed     Swallow Mechanism Exam   Facial: symmetrical  Labial: WFL  Lingual: WFL  Velum: unable to visualize  Mandible: adequate ROM  Dentition: adequate  Vocal quality:clear/adequate   Volitional Cough: strong/productive   Respiratory: RA  *[facility]    Consistencies Assessed and Performance   Consistencies Administered: thin liquids, puree, and hard solids    Oral Stage: WNL  Adequate labial seal and retrieval. Timely and effective mastication. No reported pain while masticating regular solids. Adequate AP transfer.    Pharyngeal Stage:   Timely and effective pharyngeal swallow felt with palpation. No overt s/s of aspiration with all consistencies and consecutive sips of thins.     Esophageal Concerns: none reported  Per pt report, has extensive hx of esophageal dysmotility- no current complaints at bedside    Results Reviewed with: patient, RN, and MD     Dysphagia Goals: None at this time, eval only     Discharge recommendation: no follow up needed

## 2025-03-14 NOTE — PLAN OF CARE
Problem: OCCUPATIONAL THERAPY ADULT  Goal: Performs self-care activities at highest level of function for planned discharge setting.  See evaluation for individualized goals.  Description: Treatment Interventions: ADL retraining, Functional transfer training, Cognitive reorientation, Endurance training, Patient/family training, Equipment evaluation/education, Compensatory technique education, Energy conservation, Activityengagement (cognitive assessments)          See flowsheet documentation for full assessment, interventions and recommendations.   Note: Limitation: Decreased ADL status, Decreased UE strength, Decreased Safe judgement during ADL, Decreased cognition, Decreased endurance, Decreased self-care trans, Decreased high-level ADLs (balance, safety awarenss, standing tolerance)  Prognosis: Good  Assessment: Patient is a 70 y.o. male seen for OT evaluation following admission on 3/13/2025  s/p Fall. Please see above for comprehensive list of comorbidities and significant PMHx impacting functional performance. Upon initial evaluation, pt appears to be performing below baseline functional status. Pt requires GENNARO for UB ADLs, MAXA for LB ADLs, GENNARO for bed mobility, GENNARO for transfers and MODA for functional mobility short distance  with RW. The AM-PAC & Barthel Index outcome tools were used to assist in determining pt safety w/self care /mobility and appropriate d/c recommendations, see above for score. Occupational performance is affected by the following deficits:  decreased muscular strength , decreased balance , decreased standing tolerance for self care tasks , decreased dynamic balance impacting functional reach, decreased functional reach , decreased activity tolerance , impaired safety awareness , impulsive behavior, and (+) pain . Personal/Environmental factors impacting D/C include: (+) Hx of falls , decreased caregiver status , steps to enter/navigate the home, decreased social/family support  within the home, FOS to second floor, Assistance needed for ADL/IADLs, Assistance needed for ADLs and functional mobility, High fall risk , decreased insight toward deficits , and health management. Supporting factors include: support system available and attitude towards recovery . Patient would benefit from OT services within the acute care setting to maximize level of functional independence in the following areas safety procedures , fall prevention , energy conservation , self-care transfers, bed mobility , functional mobility, formal cognitive evaluation, and ADLs.  From OT standpoint, recommendation at time of D/C would be Level I (Maximum Resource Intensity) .     Rehab Resource Intensity Level, OT: I (Maximum Resource Intensity)     Natacha Kirby MS OTR/L   [place] Licensure# [id number]

## 2025-03-14 NOTE — ASSESSMENT & PLAN NOTE
Per ex-wife patient has been having progressively worsening ambulatory dysfunction  Falls frequently at home  Apple watch alerted EMS due to fall   Was found down in house, multiple facial lacerations, ecchymosis, bilateral lower extremity skin tears  Patient endorses some ambulatory dysfunction however notes that he fell at a park earlier in the day  Came in as a trauma-workup was negative  CK 1018    Plan-  Will check TSH, B12, Folate  MRI brain  Seizure precautions   Isolyte 75cc/hr

## 2025-03-14 NOTE — H&P
H&P - Hospitalist   Name: Levon Cruz 70 y.o. male I MRN: [id number]  Unit/Bed#: ED-19 I Date of Admission: 3/13/2025   Date of Service: 3/13/2025 I Hospital Day: 0     Assessment & Plan  Fall  Per ex-wife patient has been having progressively worsening ambulatory dysfunction  Falls frequently at home  Apple watch alerted EMS due to fall   Was found down in house, multiple facial lacerations, ecchymosis, bilateral lower extremity skin tears  Patient endorses some ambulatory dysfunction however notes that he fell at a park earlier in the day  Came in as a trauma-workup was negative  CK 1018    Plan-  Will check TSH, B12, Folate  MRI brain  Seizure precautions   Isolyte 75cc/hr     Acute encephalopathy  Baseline mentation- Aox4, lives alone per ex wife, has been progressively declining   Per EMS patient was having seizure-like activity s/p 1 dose of Ativan, further episodes in ED  Unreliable historian?  But did endorse a mechanical fall  Alert to place and self  Mild leukocytosis likely reactive to fall   Head, CT C-spine, CT facial bones-negative  CT CAP-mild right hydronephrosis likely chronic due to UPJ  Polypharmacy with psych and pain meds?    Plan-  Will check lactate, VBG, UDS, ETOH, TSH, Coma panel, random Cortisol   Urinary retention protocol she was retaining in ED  Stat CT head if mentation worsens  Treat underlying causes, hold offending agents  Holding home medications due to mentation   Serial Mental Status Exams  Bedside dysphagia if fails will do speech evaluation    Seizure-like activity (HCC)  Per EMS en route  Received 2mg of Ativan  DDX including but not limited to: seizure disorder, pseudoseizure, metabolic abnormality, cardiac etiology, syncope, tumor, ICH, other intracranial process, substance abuse, overdose    Plan-   Will get EEG and MRI   Seizure precautions  Will monitor on telemetry as well, lower suspicion for syncope at this time however does remain on the differential   UDS, ETOH  pending   Patient will need to transitioned to different depressive medication as Wellbutrin lowers seizure threshold   Also seems to be recently increased from 150 to 300mg QD           VTE Pharmacologic Prophylaxis: VTE Score: 4 Moderate Risk (Score 3-4) - Pharmacological DVT Prophylaxis Ordered: enoxaparin (Lovenox).  Code Status: Level 1 - Full Code   Discussion with family: Updated  (wife) via phone.    Anticipated Length of Stay: Patient will be admitted on an inpatient basis with an anticipated length of stay of greater than 2 midnights secondary to Fall/AMS.    History of Present Illness   Chief Complaint: Victoria Cruz is a 70 y.o. male with a PMH prostate CA, MDD, HTN, HLD, hemochromatosis of  who presents after a fall at home.  Per EMS they were alerted by patient's Apple Watch due to fall at home, he was noted to be soiled with urine with multiple lacerations and ecchymosis.  EMS also reported that patient was having seizure-like activity and route and was given 2 Mg of Ativan with resolution of seizure-like activity.  Upon my evaluation patient was awake and alert however encephalopathic.  He reported that he fell at a park earlier in the day?  Patient also noted that he has been having ambulatory dysfunction that has been progressively worsening.  He denies any presyncopal symptoms, chest pain, shortness of breath, palpitations, abdominal pain, NVD, drug abuse, EtOH abuse, leg pain, recent illnesses, poor p.o. intake, or any symptoms at this time.    Review of Systems   Unable to perform ROS: Mental status change       Historical Information   No past medical history on file.  No past surgical history on file.  Social History     Tobacco Use    Smoking status: Not on file    Smokeless tobacco: Not on file   Substance and Sexual Activity    Alcohol use: Not on file    Drug use: Not on file    Sexual activity: Not on file     No existing history information found.  No existing  history information found.  No family history on file.  Social History:  Marital Status: /Civil Union   Occupation: retired  Patient Pre-hospital Living Situation: Home  Patient Pre-hospital Level of Mobility: unable to be assessed at time of evaluation  Patient Pre-hospital Diet Restrictions: none    Meds/Allergies   I have reviewed home medications using recent Epic encounter.  Prior to Admission medications    Not on File     Not on File    Objective :  Temp:  [97.4 °F (36.3 °C)] 97.4 °F (36.3 °C)  HR:  [70-82] 75  BP: (121-145)/() 133/78  Resp:  [18] 18  SpO2:  [91 %-98 %] 93 %  O2 Device: None (Room air)  Nasal Cannula O2 Flow Rate (L/min):  [4 L/min] 4 L/min    Physical Exam  Vitals and nursing note reviewed.   Constitutional:       General: He is not in acute distress.     Appearance: He is well-developed.      Comments: Frail   HENT:      Head: Normocephalic.      Comments: Bilateral periorbital edema, diffuse ecchymosis to the face, nose, dried blood in the oropharynx     Mouth/Throat:      Mouth: Mucous membranes are dry.   Eyes:      Extraocular Movements: Extraocular movements intact.      Conjunctiva/sclera: Conjunctivae normal.      Pupils: Pupils are equal, round, and reactive to light.   Cardiovascular:      Rate and Rhythm: Normal rate and regular rhythm.      Pulses: Normal pulses.      Heart sounds: Normal heart sounds. No murmur heard.     No friction rub. No gallop.   Pulmonary:      Effort: Pulmonary effort is normal. No respiratory distress.      Breath sounds: Normal breath sounds. No wheezing or rhonchi.   Chest:      Chest wall: No tenderness.   Abdominal:      General: Bowel sounds are normal. There is no distension.      Palpations: Abdomen is soft. There is no mass.      Tenderness: There is no abdominal tenderness. There is no guarding or rebound.   Musculoskeletal:         General: No swelling or deformity. Normal range of motion.      Cervical back: Normal range of motion  "and neck supple.      Right lower leg: No edema.      Left lower leg: No edema.      Comments: Multiple superficial skin tears and ecchymosis to the legs bilaterally   Skin:     General: Skin is warm and dry.      Capillary Refill: Capillary refill takes less than 2 seconds.   Neurological:      Mental Status: He is alert.      Motor: No weakness.      Comments: Alert and oriented to person and place, no overt focal neurologic deficits answering questions appropriately however encephalopathic   Psychiatric:         Mood and Affect: Mood normal.          Lines/Drains:            Lab Results: I have reviewed the following results:  Results from last 7 days   Lab Units 03/13/25  1923   WBC Thousand/uL 10.55*   HEMOGLOBIN g/dL 10.0*   HEMATOCRIT % 29.2*   PLATELETS Thousands/uL 228   BANDS PCT % 1   LYMPHO PCT % 1*   MONO PCT % 1*   EOS PCT % 0     Results from last 7 days   Lab Units 03/13/25  2032   SODIUM mmol/L 133*   POTASSIUM mmol/L 3.7   CHLORIDE mmol/L 100   CO2 mmol/L 25   BUN mg/dL 20   CREATININE mg/dL 0.77   ANION GAP mmol/L 8   CALCIUM mg/dL 8.5   GLUCOSE RANDOM mg/dL 101     Results from last 7 days   Lab Units 03/13/25  1923   INR  0.94         No results found for: \"HGBA1C\"  Results from last 7 days   Lab Units 03/13/25  2253   LACTIC ACID mmol/L 0.7       Imaging Results Review: I reviewed radiology reports from this admission including: chest xray, CT abdomen/pelvis, CT head, CT C-spine, and CT facial .  Other Study Results Review: EKG was reviewed.     Administrative Statements   I have spent a total time of 35 minutes in caring for this patient on the day of the visit/encounter including Diagnostic results, Risks and benefits of tx options, Instructions for management, Patient and family education, Importance of tx compliance, Risk factor reductions, Counseling / Coordination of care, Reviewing/placing orders in the medical record (including tests, medications, and/or procedures), and Obtaining or " reviewing history  .    ** Please Note: This note has been constructed using a voice recognition system. **

## 2025-03-14 NOTE — ASSESSMENT & PLAN NOTE
Per EMS en route  Received 2mg of Ativan  DDX including but not limited to: seizure disorder, pseudoseizure, metabolic abnormality, cardiac etiology, syncope, tumor, ICH, other intracranial process, substance abuse, overdose    Plan-   Will get EEG and MRI   Seizure precautions  Will monitor on telemetry as well, lower suspicion for syncope at this time however does remain on the differential   UDS, ETOH pending   Patient will need to transitioned to different depressive medication as Wellbutrin lowers seizure threshold   Also seems to be recently increased from 150 to 300mg QD

## 2025-03-14 NOTE — ASSESSMENT & PLAN NOTE
"Levon Cruz is a 70 y.o. male with  reported alcohol abuse per son, prostate cancer, HTN, HLD, hereditary hemochromatosis, CKD stage III, known peripheral neuropathy, depression, chronic pain disorder who presents to [facility] ED on 3/13/2025 after being found down at home.    Multiple falls at home with significant trauma to face and extremities, patient with no recollection of the falls. Found by EMS incontinent of urine.  Noted to have seizure-like activity witnessed by EMS en route, described as \"Patient is observed having intermittent tremors in distal extremities followed by a tonic / clonic seizure last approximately 60 seconds\". S/p IV Ativan 2 mg x 1 with resolution of seizure activity.    Workup:  - Labs on presentation:  Leukocytosis, 10.55  Hyponatremia, 133  Total CK elevated, 1018  RDU: Positive for opiate and THC  Labs WNL: Troponin, UA, TSH, vitamin B12, folate, vitamin D, coma panel, lactic acid  - CT head: Unremarkable for acute intracranial abnormalities  - CT C-spine: Unremarkable for fracture or traumatic malalignment  - CT facial bones: Unremarkable for fracture  - Routine EEG: Normal study    Concern for new onset seizures resulting in multiple LOC events with no recollection.  Routine EEG was unremarkable.  Will obtain MRI brain to rule out acute intracranial pathology.  Other considerations include alcohol withdrawal (unclear when patient's last drink was) vs secondary to increased Wellbutrin dose (increased from 150 mg to 300 mg proximately 3 weeks ago) which would likely lower seizure threshold.    Plan:  - MRI brain w wo seizure protocol pending  - Pending: Vitamin B1, hepatic function panel  - Would recommend initiating an AED given suspected 3 seizures (2 unwitnessed, 1 witnessed by EMS)  If hepatic function panel returns unremarkable, would recommend loading with IV Depacon 1 g x 1; communicated with primary team  With then initiate maintenance Depakote 500 mg twice daily  - Ativan " PRN for seizure activity lasting greater than 1 minute  - Recommend CIWA protocol  - Discussed seizure precautions:  No driving, lifting heavy machinery, climbing heights, swimming unattended, hot tub baths or any other activity that will place you in danger in case of a seizure for at least six months.  - [employer] form completed and submitted online on 3/14/2025  - Medical management and supportive care per primary team, notify with changes

## 2025-03-14 NOTE — ASSESSMENT & PLAN NOTE
Some older in appearance   Some more consistent with deep tissue injury/pressure related injury  -facial contusions-mild edema  L knee and L pre-tibial area- monitor. These are not consistent with cellulitis more more consistent with pressure injury/ecchymosis   Would recommend wound care nursing to follow- local wound care

## 2025-03-14 NOTE — ASSESSMENT & PLAN NOTE
Unknown origin  CT imaging was negative for acute traumatic injury  Admit to medicine for further workup of possible syncope, encephalopathy, possible seizure-like activity.  Trauma will continue to follow for tertiary evaluation on 3/14/2025.

## 2025-03-14 NOTE — TELEPHONE ENCOUNTER
STILL ADMITTED:3/13/2025 - present (1 day)  [facility]          HFU/ SL MICHAEL/ Seizure-like activity     DC-    ----- Message from Jeni Jauregui PA-C sent at 3/14/2025  2:53 PM EDT -----  Regarding: Hospital Follow-Up  Levon Cruz will need follow-up in in 6 weeks with epilepsy team for Other = ATTENDING in 60 minute appointment. They will not require outpatient neurological testing.

## 2025-03-14 NOTE — PROGRESS NOTES
"Progress Note - Trauma   Name: Levon Cruz 70 y.o. male I MRN: [id number]  Unit/Bed#: ED-19 I Date of Admission: 3/13/2025   Date of Service: 3/14/2025 I Hospital Day: 1    Assessment & Plan  Fall  Unknown origin  CT imaging was negative for acute traumatic injury  Admit to medicine for further workup of possible syncope, encephalopathy, possible seizure-like activity.  Tertiary exam completed no new findings. No further work up required.  Acute encephalopathy  Work up per medicine team  Seizure-like activity (HCC)  Work up per medicine team  Multiple contusions  Some older in appearance   Some more consistent with deep tissue injury/pressure related injury  -facial contusions-mild edema  L knee and L pre-tibial area- monitor. These are not consistent with cellulitis more more consistent with pressure injury/ecchymosis   Would recommend wound care nursing to follow- local wound care                VTE Prophylaxis: Enoxaparin (Lovenox)     Disposition: dispo per medicine team, no trauma follow up indicated, trauma will sign off. Please call with any questions/concerns        Trauma service signing off.    TRAUMA TERTIARY SURVEY  Summary of Diagnosed Injuries: no injuries    Transfer from: N/A    Mechanism of Injury:Fall     Chief Complaint: acute encephalopathy     24 Hour Events : Fall, admitted for acute encephalopathy and possible seizure like activity   Subjective : Patient states he does not remember his fall, but thinks he recalls falling on steps. Otherwise, does not remember the event but does know he falls frequently. He reports no pain. No headaches or dizziness. Does not notice any pain in his arms or legs. No chest pains, shortness of breath. No abdominal pain. No nausea or vomiting. 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     Objective :  Temp:  [97.4 °F (36.3 °C)] 97.4 °F (36.3 °C)  HR:  [67-85] 67  BP: (121-145)/() 141/82  Resp:  [16-18] " 18  SpO2:  [91 %-98 %] 98 %  O2 Device: None (Room air)  Nasal Cannula O2 Flow Rate (L/min):  [4 L/min] 4 L/min    I/O         03/12 0701  03/13 0700 03/13 0701  03/14 0700 03/14 0701  03/15 0700    Urine (mL/kg/hr)  1745     Total Output  1745     Net  -1745            Unmeasured Urine Occurrence  2 x             Physical Exam  Constitutional:       General: He is not in acute distress.     Appearance: He is not toxic-appearing.   HENT:      Head: Normocephalic.      Comments: Facial ecchymosis-extensive periorbital  Mild edema  Dried blood in bilateral nares        Mouth/Throat:      Mouth: Mucous membranes are moist.      Pharynx: Oropharynx is clear.   Eyes:      Pupils: Pupils are equal, round, and reactive to light.   Cardiovascular:      Rate and Rhythm: Normal rate.      Pulses: Normal pulses.   Pulmonary:      Effort: Pulmonary effort is normal.      Breath sounds: No wheezing.   Chest:      Chest wall: No tenderness.   Abdominal:      General: There is no distension.      Palpations: Abdomen is soft.      Tenderness: There is no abdominal tenderness. There is no guarding or rebound.   Musculoskeletal:         General: No swelling, tenderness or deformity.      Cervical back: Normal range of motion. No rigidity or tenderness.      Comments: Midline cervical, thoracic, lumbar spine without tenderness   Moving upper and lower extremities normally  No tenderness to palpation     Skin:     General: Skin is warm and dry.      Findings: Bruising present.      Comments: Scattered ecchymosis  Most notable on face-periorbital ecchymosis, forehead ecchymosis   R leg abrasion  L knee ecchymosis/erythema- small abrasion  L pre-tibial region with ecchymosis, there is abrasion and skin breakdown/small areas of scabbing/eschar appears consistent with pressure injury   Neurological:      General: No focal deficit present.      Mental Status: He is alert.      GCS: GCS eye subscore is 4. GCS verbal subscore is 4. GCS motor  subscore is 6.      Sensory: Sensation is intact.      Motor: Motor function is intact. No weakness or tremor.      Comments: Does not know exact location or date                    Lab Results: I have reviewed the following results:  Recent Labs       0000 03/13/25 1921 03/13/25 1923 03/13/25 2032 03/13/25 2144 03/13/25  2253 03/14/25  0602   WBC   < >  --  10.55*  --   --   --  8.81   HGB   < > 10.2* 10.0*  --   --   --  10.1*   HCT   < > 30* 29.2*  --   --   --  29.6*   PLT   < >  --  228  --   --   --  220   BANDSPCT  --   --  1  --   --   --   --    SODIUM  --   --   --    < >  --   --  133*   K  --   --   --    < >  --   --  3.5   CL  --   --   --    < >  --   --  98   CO2  --  25  --    < >  --   --  23   BUN  --   --   --    < >  --   --  15   CREATININE  --   --   --    < >  --   --  0.74   GLUC  --   --   --    < >  --   --  99   CAIONIZED  --  1.06*  --   --   --   --   --    MG  --   --   --    < > 1.7*  --  1.6*   INR  --   --  0.94  --   --   --   --    HSTNI0  --   --  12  --   --   --   --    HSTNI2  --   --   --   --  16  --   --    LACTICACID  --   --   --   --   --  0.7  --     < > = values in this interval not displayed.       Imaging Results Review: I reviewed radiology reports from this admission including: chest xray, CT chest, CT abdomen/pelvis, CT head, and CT C-spine.  Other Study Results Review: No additional pertinent studies reviewed.
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
## 2025-03-14 NOTE — ED NOTES
Patient stated he needed to urinate. Bladder scan volume was 1000ml. RN offered urinal, but patient had difficulty with urinating, only 50ml and stated he was finished. RN notified AP and straight cath order verbalized at this time.      Jenny Lehman, ANASTASIA  03/13/25 1708

## 2025-03-14 NOTE — ASSESSMENT & PLAN NOTE
Baseline mentation- Aox4, lives alone per ex wife, has been progressively declining   Per EMS patient was having seizure-like activity s/p 1 dose of Ativan, further episodes in ED  Unreliable historian?  But did endorse a mechanical fall  Alert to place and self  Mild leukocytosis likely reactive to fall   Head, CT C-spine, CT facial bones-negative  CT CAP-mild right hydronephrosis likely chronic due to UPJ  Polypharmacy with psych and pain meds?    Plan-  Will check lactate, VBG, UDS, ETOH, TSH, Coma panel, random Cortisol   Urinary retention protocol she was retaining in ED  Stat CT head if mentation worsens  Treat underlying causes, hold offending agents  Holding home medications due to mentation   Serial Mental Status Exams  Bedside dysphagia if fails will do speech evaluation

## 2025-03-14 NOTE — ASSESSMENT & PLAN NOTE
"- Reports a fall on Tuesday 3/11 and another fall prior to coming into the ED on 3/13  - Reports he does not recall how he fell for both these events  - States he has numbness in his feet at baseline, ongoing for several years, as well as weakness in distal right lower extremity, also at baseline.  - CT CAP with contrast on presentation:  \"1.  No findings of acute traumatic injury in the chest, abdomen or pelvis. Motion distortion limits evaluation for subtle fractures. If there is specific concern suggest dedicated radiographs.  2.  Few small pulmonary nodules measuring up to 5 mm. Based on current Fleischner Society 2017 Guidelines on incidental pulmonary nodule, no routine follow-up is needed if the patient is low risk. If the patient is high risk, optional follow-up chest CT   at 12 months can be considered.  3.  Mild right hydronephrosis without hydroureter likely due to chronic UPJ obstruction.\"  "

## 2025-03-14 NOTE — CONSULTS
"Consultation - Neurology   Name: Levon Cruz 70 y.o. male I MRN: [id number]  Unit/Bed#: ED-19 I Date of Admission: 3/13/2025   Date of Service: 3/14/2025 I Hospital Day: 1   Inpatient consult to Neurology  Consult performed by: Jeni Jauregui PA-C  Consult ordered by: Sagar Maurice MD      Physician Requesting Evaluation: Saima Warren MD   Reason for Evaluation / Principal Problem: Fall, seizure like activity   Assessment & Plan  Seizure-like activity (HCC)  Levon Cruz is a 70 y.o. male with  reported alcohol abuse per son, prostate cancer, HTN, HLD, hereditary hemochromatosis, CKD stage III, known peripheral neuropathy, depression, chronic pain disorder who presents to [place] ED on 3/13/2025 after being found down at home.    Multiple falls at home with significant trauma to face and extremities, patient with no recollection of the falls. Found by EMS incontinent of urine.  Noted to have seizure-like activity witnessed by EMS en route, described as \"Patient is observed having intermittent tremors in distal extremities followed by a tonic / clonic seizure last approximately 60 seconds\". S/p IV Ativan 2 mg x 1 with resolution of seizure activity.    Workup:  - Labs on presentation:  Leukocytosis, 10.55  Hyponatremia, 133  Total CK elevated, 1018  RDU: Positive for opiate and THC  Labs WNL: Troponin, UA, TSH, vitamin B12, folate, vitamin D, coma panel, lactic acid  - CT head: Unremarkable for acute intracranial abnormalities  - CT C-spine: Unremarkable for fracture or traumatic malalignment  - CT facial bones: Unremarkable for fracture  - Routine EEG: Normal study    Concern for new onset seizures resulting in multiple LOC events with no recollection.  Routine EEG was unremarkable.  Will obtain MRI brain to rule out acute intracranial pathology.  Other considerations include alcohol withdrawal (unclear when patient's last drink was) vs secondary to increased Wellbutrin dose (increased from 150 " "mg to 300 mg proximately 3 weeks ago) which would likely lower seizure threshold.    Plan:  - MRI brain w wo seizure protocol pending  - Pending: Vitamin B1, hepatic function panel  - Would recommend initiating an AED given suspected 3 seizures (2 unwitnessed, 1 witnessed by EMS)  If hepatic function panel returns unremarkable, would recommend loading with IV Depacon 1 g x 1; communicated with primary team  With then initiate maintenance Depakote 500 mg twice daily  - Ativan PRN for seizure activity lasting greater than 1 minute  - Recommend CIWA protocol  - Discussed seizure precautions:  No driving, lifting heavy machinery, climbing heights, swimming unattended, hot tub baths or any other activity that will place you in danger in case of a seizure for at least six months.  - [employer] form completed and submitted online on 3/14/2025  - Medical management and supportive care per primary team, notify with changes  Fall  - Reports a fall on Tuesday 3/11 and another fall prior to coming into the ED on 3/13  - Reports he does not recall how he fell for both these events  - States he has numbness in his feet at baseline, ongoing for several years, as well as weakness in distal right lower extremity, also at baseline.  - CT CAP with contrast on presentation:  \"1.  No findings of acute traumatic injury in the chest, abdomen or pelvis. Motion distortion limits evaluation for subtle fractures. If there is specific concern suggest dedicated radiographs.  2.  Few small pulmonary nodules measuring up to 5 mm. Based on current Fleischner Society 2017 Guidelines on incidental pulmonary nodule, no routine follow-up is needed if the patient is low risk. If the patient is high risk, optional follow-up chest CT   at 12 months can be considered.  3.  Mild right hydronephrosis without hydroureter likely due to chronic UPJ obstruction.\"    Neurology follow-up:  Levon Cruz will need follow-up in in 6 weeks with epilepsy team for " Other in 60 minute appointment. They will not require outpatient neurological testing.    Message sent to schedulers    History of Present Illness   Levon Cruz is a 70 y.o.  male with reported alcohol abuse per son, prostate cancer, HTN, HLD, hereditary hemochromatosis, CKD stage III, known peripheral neuropathy, depression, chronic pain disorder who presents to [facility] ED on 3/13/2025 after being found down at home.    Patient states he fell on the stairs on Tuesday 3/11/2025 where is unsure of how it happened.  He states that he does not recall the event.  Patient reports that he fell again prior to coming into the hospital and his Apple Watch alerted EMS to come and help him.  Patient reports that he does not remember this fall either. Patient denies any history of stroke, seizure history, family history of seizures, recent head trauma prior to the fall on Tuesday, prior CNS infection, developmental delays, childhood seizures.    Patient reports that he resides alone, stating he  from his wife recently.  Patient reports that he is typically good with taking his medications, however could have missed medications recently.  He states that his doctor recently increased his Wellbutrin for his depression.  He denies any tobacco abuse, however does admit to smoking marijuana, using his wife's medical marijuana.  Patient reports he does drink 1-2 days a week, only reporting 1 beer and 1 shot.  Per EMS report, patient's son had reported patient is known to abuse alcohol.    Per chart review and per review of EMS report, patient's son was contacted by his Apple Watch due to notification for a fall.  Patient was found lying on the floor with a large blood trail around his residence.  He was also found altered with a GCS of 14.  EMS reports patient was found to have dark ecchymosis and swelling around his eyes and nose, dried blood from his nose, and dried and fresh blood in his mouth with lacerations on his  "lip and inside his cheek.  En route to the hospital, EMS reports, \"Patient is observed having intermittent tremors in distal extremities followed by a tonic / clonic seizure last approximately 60 seconds\".  He was then given 2 mg of Ativan with resolution of the seizure activity.    Currently patient reports that his mentation is back to baseline, however continues to experience pain from the recent falls and bruising.  Denies any chest pain, shortness of breath, abdominal pain, nausea, vomiting, headache, weakness, new numbness/tingling.    Review of Systems   12 point ROS performed, as stated above, all others negative.  Historical Information   No past medical history on file.  No past surgical history on file.  Social History     Tobacco Use    Smoking status: Not on file    Smokeless tobacco: Not on file   Substance and Sexual Activity    Alcohol use: Not on file    Drug use: Not on file    Sexual activity: Not on file     No existing history information found.  No existing history information found.  No family history on file.  Social History     Tobacco Use    Smoking status: Not on file    Smokeless tobacco: Not on file   Substance and Sexual Activity    Alcohol use: Not on file    Drug use: Not on file    Sexual activity: Not on file       Current Facility-Administered Medications:     acetaminophen (TYLENOL) tablet 650 mg, Q6H PRN    [START ON 3/15/2025] amLODIPine (NORVASC) tablet 10 mg, Daily    [START ON 3/15/2025] ARIPiprazole (ABILIFY) tablet 5 mg, Daily    bacitracin topical ointment 1 large application, BID    enoxaparin (LOVENOX) subcutaneous injection 40 mg, Daily    folic acid (FOLVITE) tablet 1 mg, Daily    gabapentin (NEURONTIN) capsule 400 mg, BID    [Held by provider] hydroCHLOROthiazide tablet 25 mg, Daily    [Held by provider] HYDROcodone-acetaminophen (NORCO) 5-325 mg per tablet 1 tablet, Q6H PRN    LORazepam (ATIVAN) injection 0.5 mg, Once PRN    [START ON 3/15/2025] losartan (COZAAR) " tablet 100 mg, Daily    [Held by provider] modafinil (PROVIGIL) tablet 200 mg, Daily    morphine (MS CONTIN) ER tablet 15 mg, BID    multi-electrolyte (Plasmalyte-A/Isolyte-S PH 7.4/Normosol-R) IV solution, Continuous, Last Rate: 75 mL/hr (03/13/25 2303)    multivitamin-minerals (CENTRUM) tablet 1 tablet, Daily    pantoprazole (PROTONIX) EC tablet 40 mg, Early Morning    thiamine tablet 100 mg, Daily    vilazodone (VIIBRYD) tablet 40 mg, HS  Prior to Admission Medications   Prescriptions Last Dose Informant Patient Reported? Taking?   ARIPiprazole (ABILIFY) 5 mg tablet 3/14/2025 Morning  Yes Yes   Sig: Take 5 mg by mouth daily   Armodafinil 250 MG tablet 3/14/2025 Morning  Yes Yes   Sig: Take 250 mg by mouth daily   HYDROcodone-acetaminophen (NORCO) 5-325 mg per tablet 3/14/2025 Morning  Yes Yes   Sig: Take 1 tablet by mouth every 6 (six) hours as needed for pain   amLODIPine (NORVASC) 10 mg tablet 3/14/2025 Morning  Yes Yes   Sig: Take 10 mg by mouth daily   buPROPion (WELLBUTRIN XL) 150 mg 24 hr tablet 3/14/2025 Morning  Yes Yes   Sig: Take 150 mg by mouth daily   diphenoxylate-atropine (LOMOTIL) 2.5-0.025 mg per tablet Past Week  Yes Yes   Sig: Take 1 tablet by mouth 4 (four) times a day as needed for diarrhea   gabapentin (NEURONTIN) 400 mg capsule 3/14/2025 Morning  Yes Yes   Sig: Take 400 mg by mouth 2 (two) times a day   hydroCHLOROthiazide 25 mg tablet 3/14/2025 Morning  Yes Yes   Sig: Take 25 mg by mouth daily   morphine (MS Contin) 15 mg 12 hr tablet 3/14/2025 Morning  Yes Yes   Sig: Take 15 mg by mouth 2 (two) times a day   olmesartan (BENICAR) 40 mg tablet 3/14/2025 Morning  Yes Yes   Sig: Take 40 mg by mouth daily   omeprazole (PriLOSEC) 40 MG capsule 3/14/2025 Morning  Yes Yes   Sig: Take 40 mg by mouth daily   rosuvastatin (CRESTOR) 40 MG tablet 3/13/2025  Yes Yes   Sig: Take 40 mg by mouth daily   vilazodone (VIIBRYD) 40 mg tablet 3/14/2025 Morning  Yes Yes   Sig: Take 40 mg by mouth daily with  breakfast   zolpidem (AMBIEN CR) 12.5 MG CR tablet 3/14/2025 Morning  Yes Yes   Sig: Take 12.5 mg by mouth daily at bedtime as needed for sleep      Facility-Administered Medications: None     Molds & smuts, Rifampin, and Thimerosal (thiomersal)    Objective :  Temp:  [97.4 °F (36.3 °C)] 97.4 °F (36.3 °C)  HR:  [64-85] 68  BP: (115-145)/() 127/81  Resp:  [16-18] 18  SpO2:  [91 %-98 %] 95 %  O2 Device: None (Room air)  Nasal Cannula O2 Flow Rate (L/min):  [4 L/min] 4 L/min    Physical Exam  Vitals and nursing note reviewed.   Constitutional:       General: He is not in acute distress.     Appearance: He is normal weight. He is not diaphoretic.      Comments: Somnolent throughout encounter   HENT:      Head:      Comments: Significant facial swelling, bruising, and erythema noted     Nose:      Comments: Bruising/discoloration of the nasal bridge     Mouth/Throat:      Comments: Appears to have lacerations on bottom lip, also noted to have dried blood  Eyes:      General: No scleral icterus.        Right eye: No discharge.         Left eye: No discharge.      Extraocular Movements: Extraocular movements intact.      Conjunctiva/sclera: Conjunctivae normal.      Comments: Periorbital erythema and ecchymosis noted bilaterally   Skin:     General: Skin is warm and dry.      Comments: Significant bruising and erythema on bilateral shins and knees   Psychiatric:         Mood and Affect: Mood normal.         Behavior: Behavior normal.       Neurological Exam  Mental Status    Patient is asleep upon entering the room  Awakens to verbal stimuli, however repeatedly falls asleep throughout exam  Oriented to person and place  Incorrectly states the month is May, followed by June  Incorrectly states the year is 2022, followed by 2023, then 2024  Able to name the president  Able to name objects provided and follow central and appendicular commands  Answers questions appropriately  No dysarthria or aphasia noted.    Cranial  Nerves  CN II: Vision test: Periorbital erythema and ecchymosis noted bilaterally.  CN III, IV, VI: Extraocular movements intact bilaterally.  Primary gaze midline, conjugate gaze noted  EOMs intact, no evidence of nystagmus  Pupils equal, round, minimally reactive bilaterally  No visual deficits noted  Facial sensation to light touch and pinprick intact throughout  No obvious facial asymmetry  Hearing intact  Tongue midline, no lacerations.    Motor    No pronation or drift noted in BUE  BUE strength 5/5 throughout  Bilateral hip flexion strength 5/5  Left dorsiflexion and plantarflexion strength 5/5  Right dorsiflexion strength 3/5, chronic per patient  Right plantarflexion strength 5/5.    Sensory  Sensation to light touch intact in BUE  Sensation pinprick diminished in RUE, intact in LUE    Sensation to light touch intact in proximal BLE, diminished in distal BLE  Sensation to pinprick diminished in RLE, intact in LLE.    Reflexes  Bilateral upper and lower extremity reflexes diminished throughout    No involuntary movements or rhythmic seizure-like activity noted throughout exam.    Coordination    No ataxia or dysmetria in bilateral upper extremity finger-nose testing, essential tremor noted bilaterally.    Gait    Gait testing deferred due to somnolence and fall risk.      Lab Results: I have personally reviewed pertinent reports.  Recent Results (from the past 24 hours)   POCT Blood Gas (CG8+)    Collection Time: 03/13/25  7:21 PM   Result Value Ref Range    ph, Oz ISTAT 7.441 (H) 7.300 - 7.400    pCO2, Oz i-STAT 34.8 (L) 42.0 - 50.0 mm HG    pO2, Oz i-STAT 44.0 35.0 - 45.0 mm HG    BE, i-STAT 0 -2 - 3 mmol/L    HCO3, Oz i-STAT 23.6 (L) 24.0 - 30.0 mmol/L    CO2, i-STAT 25 21 - 32 mmol/L    O2 Sat, i-STAT 81 60 - 85 %    SODIUM, I-STAT 136 136 - 145 mmol/l    Potassium, i-STAT 4.9 3.5 - 5.3 mmol/L    Calcium, Ionized i-STAT 1.06 (L) 1.12 - 1.32 mmol/L    Hct, i-STAT 30 (L) 36.5 - 49.3 %    Hgb, i-STAT  "10.2 (L) 12.0 - 17.0 g/dl    Glucose, i-STAT 126 65 - 140 mg/dl    Specimen Type VENOUS    CBC and differential    Collection Time: 03/13/25  7:23 PM   Result Value Ref Range    WBC 10.55 (H) 4.31 - 10.16 Thousand/uL    RBC 3.19 (L) 3.88 - 5.62 Million/uL    Hemoglobin 10.0 (L) 12.0 - 17.0 g/dL    Hematocrit 29.2 (L) 36.5 - 49.3 %    MCV 92 82 - 98 fL    MCH 31.3 26.8 - 34.3 pg    MCHC 34.2 31.4 - 37.4 g/dL    RDW 13.5 11.6 - 15.1 %    MPV 8.2 (L) 8.9 - 12.7 fL    Platelets 228 149 - 390 Thousands/uL   Protime-INR    Collection Time: 03/13/25  7:23 PM   Result Value Ref Range    Protime 13.3 12.3 - 15.0 seconds    INR 0.94 0.85 - 1.19   HS Troponin 0hr (reflex protocol)    Collection Time: 03/13/25  7:23 PM   Result Value Ref Range    hs TnI 0hr 12 \"Refer to ACS Flowchart\"- see link ng/L   Manual Differential(PHLEBS Do Not Order)    Collection Time: 03/13/25  7:23 PM   Result Value Ref Range    Segmented % 97 (H) 43 - 75 %    Bands % 1 0 - 8 %    Lymphocytes % 1 (L) 14 - 44 %    Monocytes % 1 (L) 4 - 12 %    Eosinophils % 0 0 - 6 %    Basophils % 0 0 - 1 %    Absolute Neutrophils 10.34 (H) 1.85 - 7.62 Thousand/uL    Absolute Lymphocytes 0.11 (L) 0.60 - 4.47 Thousand/uL    Absolute Monocytes 0.11 0.00 - 1.22 Thousand/uL    Absolute Eosinophils 0.00 0.00 - 0.40 Thousand/uL    Absolute Basophils 0.00 0.00 - 0.10 Thousand/uL    Total Counted      RBC Morphology Normal     Platelet Estimate Adequate Adequate   Type and screen    Collection Time: 03/13/25  7:24 PM   Result Value Ref Range    ABO Grouping O     Rh Factor Positive     Antibody Screen Negative     Specimen Expiration Date 20250316    ECG 12 lead    Collection Time: 03/13/25  7:45 PM   Result Value Ref Range    Ventricular Rate 86 BPM    Atrial Rate 79 BPM    IL Interval  ms    QRSD Interval 102 ms    QT Interval 448 ms    QTC Interval 536 ms    P Axis  degrees    QRS Axis 63 degrees    T Wave Axis 52 degrees   Basic metabolic panel    Collection Time: " "03/13/25  8:32 PM   Result Value Ref Range    Sodium 133 (L) 135 - 147 mmol/L    Potassium 3.7 3.5 - 5.3 mmol/L    Chloride 100 96 - 108 mmol/L    CO2 25 21 - 32 mmol/L    ANION GAP 8 4 - 13 mmol/L    BUN 20 5 - 25 mg/dL    Creatinine 0.77 0.60 - 1.30 mg/dL    Glucose 101 65 - 140 mg/dL    Calcium 8.5 8.4 - 10.2 mg/dL    eGFR 91 ml/min/1.73sq m   CK    Collection Time: 03/13/25  8:32 PM   Result Value Ref Range    Total CK 1,018 (H) 39 - 308 U/L   UA w Reflex to Microscopic w Reflex to Culture    Collection Time: 03/13/25  9:39 PM    Specimen: Urine, Clean Catch   Result Value Ref Range    Color, UA Yellow     Clarity, UA Clear     Specific Gravity, UA 1.026 1.003 - 1.030    pH, UA 7.0 4.5, 5.0, 5.5, 6.0, 6.5, 7.0, 7.5, 8.0    Leukocytes, UA Negative Negative    Nitrite, UA Negative Negative    Protein, UA Negative Negative mg/dl    Glucose, UA Negative Negative mg/dl    Ketones, UA Negative Negative mg/dl    Urobilinogen, UA <2.0 <2.0 mg/dl mg/dl    Bilirubin, UA Negative Negative    Occult Blood, UA Small (A) Negative   Urine Microscopic    Collection Time: 03/13/25  9:39 PM   Result Value Ref Range    RBC, UA 2-4 (A) None Seen, 1-2 /hpf    WBC, UA 1-2 None Seen, 1-2 /hpf    Epithelial Cells None Seen None Seen, Occasional /hpf    Bacteria, UA None Seen None Seen, Occasional /hpf   Rapid drug screen, urine    Collection Time: 03/13/25  9:39 PM   Result Value Ref Range    Amph/Meth UR Negative Negative    Barbiturate Ur Negative Negative    Benzodiazepine Urine Negative Negative    Cocaine Urine Negative Negative    Methadone Urine Negative Negative    Opiate Urine Positive (A) Negative    PCP Ur Negative Negative    THC Urine Positive (A) Negative    Oxycodone Urine Negative Negative    Fentanyl Urine Negative Negative    HYDROCODONE URINE Negative Negative   HS Troponin I 2hr    Collection Time: 03/13/25  9:44 PM   Result Value Ref Range    hs TnI 2hr 16 \"Refer to ACS Flowchart\"- see link ng/L    Delta 2hr hsTnI " 4 <20 ng/L   TSH, 3rd generation with Free T4 reflex    Collection Time: 03/13/25  9:44 PM   Result Value Ref Range    TSH 3RD GENERATON 1.007 0.450 - 4.500 uIU/mL   Vitamin B12    Collection Time: 03/13/25  9:44 PM   Result Value Ref Range    Vitamin B-12 562 180 - 914 pg/mL   Folate    Collection Time: 03/13/25  9:44 PM   Result Value Ref Range    Folate >22.3 >5.9 ng/mL   Cortisol    Collection Time: 03/13/25  9:44 PM   Result Value Ref Range    Cortisol, Random 11.5 ug/dL   Vitamin D 25 hydroxy    Collection Time: 03/13/25  9:44 PM   Result Value Ref Range    Vit D, 25-Hydroxy 35.6 30.0 - 100.0 ng/mL   Beta Hydroxybutyrate    Collection Time: 03/13/25  9:44 PM   Result Value Ref Range    Beta- Hydroxybutyrate 0.13 0.02 - 0.27 mmol/L   Magnesium    Collection Time: 03/13/25  9:44 PM   Result Value Ref Range    Magnesium 1.7 (L) 1.9 - 2.7 mg/dL   TIBC Panel (incl. Iron, TIBC, % Iron Saturation)    Collection Time: 03/13/25  9:44 PM   Result Value Ref Range    Iron Saturation 37 15 - 50 %    TIBC 309.4 250 - 450 ug/dL    Iron 115 50 - 212 ug/dL    Transferrin 221 203 - 362 mg/dL    UIBC 194 155 - 355 ug/dL   Ferritin    Collection Time: 03/13/25  9:44 PM   Result Value Ref Range    Ferritin 19 (L) 24 - 336 ng/mL   Lactic acid, plasma (w/reflex if result > 2.0)    Collection Time: 03/13/25 10:53 PM   Result Value Ref Range    LACTIC ACID 0.7 0.5 - 2.0 mmol/L   Blood gas, venous    Collection Time: 03/13/25 10:53 PM   Result Value Ref Range    pH, Oz 7.419 (H) 7.300 - 7.400    pCO2, Oz 37.8 (L) 42.0 - 50.0 mm Hg    pO2, Oz 57.3 (H) 35.0 - 45.0 mm Hg    HCO3, Oz 23.9 (L) 24 - 30 mmol/L    Base Excess, Oz -0.4 mmol/L    O2 Content, Oz 13.1 ml/dL    O2 HGB, VENOUS 87.9 (H) 60.0 - 80.0 %   Ethanol    Collection Time: 03/13/25 10:53 PM   Result Value Ref Range    Ethanol Lvl <10 <10 mg/dL   Salicylate level    Collection Time: 03/13/25 10:53 PM   Result Value Ref Range    Salicylate Lvl <5 3 - 20 mg/dL  "  Acetaminophen level-If concentration is detectable, please discuss with medical  on call.    Collection Time: 03/13/25 10:53 PM   Result Value Ref Range    Acetaminophen Level <2 (L) 10 - 20 ug/mL   ECG 12 lead    Collection Time: 03/13/25 10:56 PM   Result Value Ref Range    Ventricular Rate 72 BPM    Atrial Rate 72 BPM    VA Interval 208 ms    QRSD Interval 94 ms    QT Interval 452 ms    QTC Interval 494 ms    P Axis 6 degrees    QRS Axis 21 degrees    T Wave [place] 52 degrees   HS Troponin I 4hr    Collection Time: 03/13/25 11:35 PM   Result Value Ref Range    hs TnI 4hr 12 \"Refer to ACS Flowchart\"- see link ng/L    Delta 4hr hsTnI 0 <20 ng/L   CBC and differential    Collection Time: 03/14/25  6:02 AM   Result Value Ref Range    WBC 8.81 4.31 - 10.16 Thousand/uL    RBC 3.26 (L) 3.88 - 5.62 Million/uL    Hemoglobin 10.1 (L) 12.0 - 17.0 g/dL    Hematocrit 29.6 (L) 36.5 - 49.3 %    MCV 91 82 - 98 fL    MCH 31.0 26.8 - 34.3 pg    MCHC 34.1 31.4 - 37.4 g/dL    RDW 13.6 11.6 - 15.1 %    MPV 8.4 (L) 8.9 - 12.7 fL    Platelets 220 149 - 390 Thousands/uL   Basic metabolic panel    Collection Time: 03/14/25  6:02 AM   Result Value Ref Range    Sodium 133 (L) 135 - 147 mmol/L    Potassium 3.5 3.5 - 5.3 mmol/L    Chloride 98 96 - 108 mmol/L    CO2 23 21 - 32 mmol/L    ANION GAP 12 4 - 13 mmol/L    BUN 15 5 - 25 mg/dL    Creatinine 0.74 0.60 - 1.30 mg/dL    Glucose 99 65 - 140 mg/dL    Calcium 8.9 8.4 - 10.2 mg/dL    eGFR 93 ml/min/1.73sq m   Magnesium    Collection Time: 03/14/25  6:02 AM   Result Value Ref Range    Magnesium 1.6 (L) 1.9 - 2.7 mg/dL   CK    Collection Time: 03/14/25  6:02 AM   Result Value Ref Range    Total CK 1,901 (H) 39 - 308 U/L   Manual Differential(PHLEBS Do Not Order)    Collection Time: 03/14/25  6:02 AM   Result Value Ref Range    Segmented % 94 (H) 43 - 75 %    Lymphocytes % 3 (L) 14 - 44 %    Monocytes % 3 (L) 4 - 12 %    Eosinophils % 0 0 - 6 %    Basophils % 0 0 - 1 %    Absolute " Neutrophils 8.28 (H) 1.85 - 7.62 Thousand/uL    Absolute Lymphocytes 0.26 (L) 0.60 - 4.47 Thousand/uL    Absolute Monocytes 0.26 0.00 - 1.22 Thousand/uL    Absolute Eosinophils 0.00 0.00 - 0.40 Thousand/uL    Absolute Basophils 0.00 0.00 - 0.10 Thousand/uL    Total Counted      RBC Morphology Normal     Platelet Estimate Adequate Adequate     Imaging Studies: I have personally reviewed pertinent reports and I have personally reviewed pertinent films in PACS.    EKG, Pathology, and Other Studies: I have personally reviewed pertinent reports.    VTE Prophylaxis: Enoxaparin (Lovenox)    Dictation voice to text software has been used in the creation of this document.  Please consider this in light of any contextual or grammatical errors.

## 2025-03-15 ENCOUNTER — APPOINTMENT (INPATIENT)
Dept: MRI IMAGING | Facility: HOSPITAL | Age: 70
DRG: 101 | End: 2025-03-15
Payer: MEDICARE

## 2025-03-15 LAB
ALBUMIN SERPL BCG-MCNC: 3.6 G/DL (ref 3.5–5)
ALBUMIN SERPL BCG-MCNC: 3.6 G/DL (ref 3.5–5)
ALP SERPL-CCNC: 59 U/L (ref 34–104)
ALP SERPL-CCNC: 59 U/L (ref 34–104)
ALT SERPL W P-5'-P-CCNC: 31 U/L (ref 7–52)
ALT SERPL W P-5'-P-CCNC: 31 U/L (ref 7–52)
ANION GAP SERPL CALCULATED.3IONS-SCNC: 7 MMOL/L (ref 4–13)
ANION GAP SERPL CALCULATED.3IONS-SCNC: 7 MMOL/L (ref 4–13)
AST SERPL W P-5'-P-CCNC: 40 U/L (ref 13–39)
AST SERPL W P-5'-P-CCNC: 40 U/L (ref 13–39)
BASOPHILS # BLD AUTO: 0.02 THOUSANDS/ÂΜL (ref 0–0.1)
BASOPHILS # BLD AUTO: 0.02 THOUSANDS/ÂΜL (ref 0–0.1)
BASOPHILS NFR BLD AUTO: 0 % (ref 0–1)
BASOPHILS NFR BLD AUTO: 0 % (ref 0–1)
BILIRUB SERPL-MCNC: 0.58 MG/DL (ref 0.2–1)
BILIRUB SERPL-MCNC: 0.58 MG/DL (ref 0.2–1)
BUN SERPL-MCNC: 16 MG/DL (ref 5–25)
BUN SERPL-MCNC: 16 MG/DL (ref 5–25)
CALCIUM SERPL-MCNC: 8.5 MG/DL (ref 8.4–10.2)
CALCIUM SERPL-MCNC: 8.5 MG/DL (ref 8.4–10.2)
CHLORIDE SERPL-SCNC: 101 MMOL/L (ref 96–108)
CHLORIDE SERPL-SCNC: 101 MMOL/L (ref 96–108)
CO2 SERPL-SCNC: 27 MMOL/L (ref 21–32)
CO2 SERPL-SCNC: 27 MMOL/L (ref 21–32)
CREAT SERPL-MCNC: 0.79 MG/DL (ref 0.6–1.3)
CREAT SERPL-MCNC: 0.79 MG/DL (ref 0.6–1.3)
EOSINOPHIL # BLD AUTO: 0.15 THOUSAND/ÂΜL (ref 0–0.61)
EOSINOPHIL # BLD AUTO: 0.15 THOUSAND/ÂΜL (ref 0–0.61)
EOSINOPHIL NFR BLD AUTO: 3 % (ref 0–6)
EOSINOPHIL NFR BLD AUTO: 3 % (ref 0–6)
ERYTHROCYTE [DISTWIDTH] IN BLOOD BY AUTOMATED COUNT: 13.8 % (ref 11.6–15.1)
ERYTHROCYTE [DISTWIDTH] IN BLOOD BY AUTOMATED COUNT: 13.8 % (ref 11.6–15.1)
GFR SERPL CREATININE-BSD FRML MDRD: 90 ML/MIN/1.73SQ M
GFR SERPL CREATININE-BSD FRML MDRD: 90 ML/MIN/1.73SQ M
GLUCOSE SERPL-MCNC: 106 MG/DL (ref 65–140)
GLUCOSE SERPL-MCNC: 106 MG/DL (ref 65–140)
HCT VFR BLD AUTO: 28.7 % (ref 36.5–49.3)
HCT VFR BLD AUTO: 28.7 % (ref 36.5–49.3)
HGB BLD-MCNC: 9.9 G/DL (ref 12–17)
HGB BLD-MCNC: 9.9 G/DL (ref 12–17)
IMM GRANULOCYTES # BLD AUTO: 0.02 THOUSAND/UL (ref 0–0.2)
IMM GRANULOCYTES # BLD AUTO: 0.02 THOUSAND/UL (ref 0–0.2)
IMM GRANULOCYTES NFR BLD AUTO: 0 % (ref 0–2)
IMM GRANULOCYTES NFR BLD AUTO: 0 % (ref 0–2)
LYMPHOCYTES # BLD AUTO: 0.32 THOUSANDS/ÂΜL (ref 0.6–4.47)
LYMPHOCYTES # BLD AUTO: 0.32 THOUSANDS/ÂΜL (ref 0.6–4.47)
LYMPHOCYTES NFR BLD AUTO: 5 % (ref 14–44)
LYMPHOCYTES NFR BLD AUTO: 5 % (ref 14–44)
MCH RBC QN AUTO: 31.1 PG (ref 26.8–34.3)
MCH RBC QN AUTO: 31.1 PG (ref 26.8–34.3)
MCHC RBC AUTO-ENTMCNC: 34.5 G/DL (ref 31.4–37.4)
MCHC RBC AUTO-ENTMCNC: 34.5 G/DL (ref 31.4–37.4)
MCV RBC AUTO: 90 FL (ref 82–98)
MCV RBC AUTO: 90 FL (ref 82–98)
MONOCYTES # BLD AUTO: 0.49 THOUSAND/ÂΜL (ref 0.17–1.22)
MONOCYTES # BLD AUTO: 0.49 THOUSAND/ÂΜL (ref 0.17–1.22)
MONOCYTES NFR BLD AUTO: 8 % (ref 4–12)
MONOCYTES NFR BLD AUTO: 8 % (ref 4–12)
NEUTROPHILS # BLD AUTO: 4.89 THOUSANDS/ÂΜL (ref 1.85–7.62)
NEUTROPHILS # BLD AUTO: 4.89 THOUSANDS/ÂΜL (ref 1.85–7.62)
NEUTS SEG NFR BLD AUTO: 84 % (ref 43–75)
NEUTS SEG NFR BLD AUTO: 84 % (ref 43–75)
NRBC BLD AUTO-RTO: 0 /100 WBCS
NRBC BLD AUTO-RTO: 0 /100 WBCS
PLATELET # BLD AUTO: 220 THOUSANDS/UL (ref 149–390)
PLATELET # BLD AUTO: 220 THOUSANDS/UL (ref 149–390)
PMV BLD AUTO: 8.6 FL (ref 8.9–12.7)
PMV BLD AUTO: 8.6 FL (ref 8.9–12.7)
POTASSIUM SERPL-SCNC: 3.3 MMOL/L (ref 3.5–5.3)
POTASSIUM SERPL-SCNC: 3.3 MMOL/L (ref 3.5–5.3)
PROT SERPL-MCNC: 5.6 G/DL (ref 6.4–8.4)
PROT SERPL-MCNC: 5.6 G/DL (ref 6.4–8.4)
RBC # BLD AUTO: 3.18 MILLION/UL (ref 3.88–5.62)
RBC # BLD AUTO: 3.18 MILLION/UL (ref 3.88–5.62)
SODIUM SERPL-SCNC: 135 MMOL/L (ref 135–147)
SODIUM SERPL-SCNC: 135 MMOL/L (ref 135–147)
WBC # BLD AUTO: 5.89 THOUSAND/UL (ref 4.31–10.16)
WBC # BLD AUTO: 5.89 THOUSAND/UL (ref 4.31–10.16)

## 2025-03-15 PROCEDURE — 99232 SBSQ HOSP IP/OBS MODERATE 35: CPT | Performed by: INTERNAL MEDICINE

## 2025-03-15 PROCEDURE — 97116 GAIT TRAINING THERAPY: CPT

## 2025-03-15 PROCEDURE — 70553 MRI BRAIN STEM W/O & W/DYE: CPT

## 2025-03-15 PROCEDURE — 97163 PT EVAL HIGH COMPLEX 45 MIN: CPT

## 2025-03-15 PROCEDURE — A9585 GADOBUTROL INJECTION: HCPCS | Performed by: INTERNAL MEDICINE

## 2025-03-15 PROCEDURE — 85025 COMPLETE CBC W/AUTO DIFF WBC: CPT | Performed by: INTERNAL MEDICINE

## 2025-03-15 PROCEDURE — 80053 COMPREHEN METABOLIC PANEL: CPT | Performed by: INTERNAL MEDICINE

## 2025-03-15 PROCEDURE — 99233 SBSQ HOSP IP/OBS HIGH 50: CPT | Performed by: PSYCHIATRY & NEUROLOGY

## 2025-03-15 RX ORDER — OXYCODONE HYDROCHLORIDE 5 MG/1
5 TABLET ORAL EVERY 4 HOURS PRN
Refills: 0 | Status: DISCONTINUED | OUTPATIENT
Start: 2025-03-15 | End: 2025-03-18 | Stop reason: HOSPADM

## 2025-03-15 RX ORDER — ABIRATERONE ACETATE 250 MG/1
500 TABLET ORAL
Status: DISCONTINUED | OUTPATIENT
Start: 2025-03-16 | End: 2025-03-18 | Stop reason: HOSPADM

## 2025-03-15 RX ORDER — DIVALPROEX SODIUM 500 MG/1
500 TABLET, DELAYED RELEASE ORAL EVERY 12 HOURS SCHEDULED
Status: DISCONTINUED | OUTPATIENT
Start: 2025-03-15 | End: 2025-03-18 | Stop reason: HOSPADM

## 2025-03-15 RX ORDER — GADOBUTROL 604.72 MG/ML
7 INJECTION INTRAVENOUS
Status: COMPLETED | OUTPATIENT
Start: 2025-03-15 | End: 2025-03-15

## 2025-03-15 RX ORDER — TAMSULOSIN HYDROCHLORIDE 0.4 MG/1
0.4 CAPSULE ORAL
Status: DISCONTINUED | OUTPATIENT
Start: 2025-03-15 | End: 2025-03-18 | Stop reason: HOSPADM

## 2025-03-15 RX ORDER — POTASSIUM CHLORIDE 1500 MG/1
20 TABLET, EXTENDED RELEASE ORAL ONCE
Status: COMPLETED | OUTPATIENT
Start: 2025-03-15 | End: 2025-03-15

## 2025-03-15 RX ORDER — OXYCODONE HYDROCHLORIDE 10 MG/1
10 TABLET ORAL EVERY 4 HOURS PRN
Refills: 0 | Status: DISCONTINUED | OUTPATIENT
Start: 2025-03-15 | End: 2025-03-18 | Stop reason: HOSPADM

## 2025-03-15 RX ORDER — PREDNISONE 5 MG/1
5 TABLET ORAL DAILY
Status: DISCONTINUED | OUTPATIENT
Start: 2025-03-15 | End: 2025-03-18

## 2025-03-15 RX ORDER — ATORVASTATIN CALCIUM 40 MG/1
80 TABLET, FILM COATED ORAL
Status: DISCONTINUED | OUTPATIENT
Start: 2025-03-15 | End: 2025-03-18 | Stop reason: HOSPADM

## 2025-03-15 RX ADMIN — OXYCODONE HYDROCHLORIDE 10 MG: 10 TABLET ORAL at 17:36

## 2025-03-15 RX ADMIN — ENOXAPARIN SODIUM 40 MG: 40 INJECTION SUBCUTANEOUS at 11:05

## 2025-03-15 RX ADMIN — GABAPENTIN 400 MG: 400 CAPSULE ORAL at 12:36

## 2025-03-15 RX ADMIN — DIVALPROEX SODIUM 500 MG: 500 TABLET, DELAYED RELEASE ORAL at 20:29

## 2025-03-15 RX ADMIN — GABAPENTIN 400 MG: 400 CAPSULE ORAL at 20:29

## 2025-03-15 RX ADMIN — TAMSULOSIN HYDROCHLORIDE 0.4 MG: 0.4 CAPSULE ORAL at 17:36

## 2025-03-15 RX ADMIN — Medication 100 MG: at 11:05

## 2025-03-15 RX ADMIN — ARIPIPRAZOLE 5 MG: 5 TABLET ORAL at 11:04

## 2025-03-15 RX ADMIN — LOSARTAN POTASSIUM 100 MG: 50 TABLET, FILM COATED ORAL at 11:05

## 2025-03-15 RX ADMIN — POTASSIUM CHLORIDE 20 MEQ: 1500 TABLET, EXTENDED RELEASE ORAL at 12:35

## 2025-03-15 RX ADMIN — GADOBUTROL 7 ML: 604.72 INJECTION INTRAVENOUS at 11:57

## 2025-03-15 RX ADMIN — ACETAMINOPHEN 650 MG: 325 TABLET, FILM COATED ORAL at 22:21

## 2025-03-15 RX ADMIN — BACITRACIN 1 LARGE APPLICATION: 500 OINTMENT TOPICAL at 12:35

## 2025-03-15 RX ADMIN — PANTOPRAZOLE SODIUM 40 MG: 40 TABLET, DELAYED RELEASE ORAL at 04:50

## 2025-03-15 RX ADMIN — AMLODIPINE BESYLATE 10 MG: 10 TABLET ORAL at 11:05

## 2025-03-15 RX ADMIN — BUPROPION HYDROCHLORIDE 300 MG: 150 TABLET, EXTENDED RELEASE ORAL at 11:05

## 2025-03-15 RX ADMIN — OXYCODONE HYDROCHLORIDE 5 MG: 5 TABLET ORAL at 12:35

## 2025-03-15 RX ADMIN — OXYCODONE HYDROCHLORIDE 5 MG: 5 TABLET ORAL at 20:28

## 2025-03-15 RX ADMIN — BACITRACIN 1 LARGE APPLICATION: 500 OINTMENT TOPICAL at 17:37

## 2025-03-15 RX ADMIN — DIVALPROEX SODIUM 500 MG: 500 TABLET, DELAYED RELEASE ORAL at 11:05

## 2025-03-15 RX ADMIN — ACETAMINOPHEN 650 MG: 325 TABLET, FILM COATED ORAL at 04:52

## 2025-03-15 RX ADMIN — VILAZODONE HYDROCHLORIDE 40 MG: 20 TABLET ORAL at 12:42

## 2025-03-15 RX ADMIN — FOLIC ACID 1 MG: 1 TABLET ORAL at 11:05

## 2025-03-15 RX ADMIN — MULTIPLE VITAMINS W/ MINERALS TAB 1 TABLET: TAB ORAL at 11:05

## 2025-03-15 NOTE — PLAN OF CARE
Problem: PHYSICAL THERAPY ADULT  Goal: Performs mobility at highest level of function for planned discharge setting.  See evaluation for individualized goals.  Description: Treatment/Interventions: Functional transfer training, LE strengthening/ROM, Elevations, Therapeutic exercise, Endurance training, Patient/family training, Equipment eval/education, Bed mobility, Gait training          See flowsheet documentation for full assessment, interventions and recommendations.  Note: Prognosis: Fair  Problem List: Decreased strength, Decreased endurance, Impaired balance, Decreased mobility, Decreased safety awareness, Decreased skin integrity, Pain  Assessment: Pt is a 70 y.o. male seen for PT evaluation s/p admit to [facility] on 8/18/2022 w/ Fall.  Order placed for PT. Comorbidities affecting pt's physical performance at time of assessment include:  seizure like activity . Personal factors affecting pt at time of IE include: steps to enter environment, multi-level environment, limited home support, advanced age, inability to perform IADLs, inability to perform ADLs, and recent fall(s). Prior to admission, pt was independent w/ all functional mobility w/ SPC outside only, lived in multi-level home, had 13 SHIMON (+) railing, and lived alone . Upon evaluation: Pt requires supervision for bed mobility, min A for sit to stand, and min A for ambulation with and without AD.  Currently, pt presents with the following deficits: weakness, impaired balance, decreased endurance, gait deviations, pain, decreased activity tolerance, decreased safety awareness, fall risk, and decreased skin integrity.  Pt's clinical presentation is currently unstable/unpredictable seen in pt's presentation of fall risk, limited insight into deficits, and significant decline in functional mobility compared to baseline.  Pt to benefit from continued skilled PT tx while in hospital and upon DC to address deficits as defined above and maximize  level of functional mobility.  Recommend  progression of ambulation and initiation of HEP as appropriate .        Rehab Resource Intensity Level, PT: I (Maximum Resource Intensity)    See flowsheet documentation for full assessment.

## 2025-03-15 NOTE — PROGRESS NOTES
Progress Note - Hospitalist   Name: Levon Cruz 70 y.o. male I MRN: [id number]  Unit/Bed#: S -01 I Date of Admission: 3/13/2025   Date of Service: 3/15/2025 I Hospital Day: 2    Assessment & Plan  Fall  Per ex-wife patient has been having progressively worsening ambulatory dysfunction  Falls frequently at home  Apple watch alerted EMS due to fall   Was found down in house, multiple facial lacerations, ecchymosis, bilateral lower extremity skin tears  Patient endorses some ambulatory dysfunction however notes that he fell at a park earlier in the day  Came in as a trauma-workup was negative    Plan-  MRI brain pending result  Seizure precautions   Isolyte 75cc/hr   PT/OT  Acute encephalopathy  Baseline mentation- Aox4, lives alone per ex wife, has been progressively declining   Per EMS patient was having seizure-like activity s/p 1 dose of Ativan, further episodes in ED  Unreliable historian?  But did endorse a mechanical fall  Alert to place and self  Mild leukocytosis likely reactive to fall   Head, CT C-spine, CT facial bones-negative  CT CAP-mild right hydronephrosis likely chronic due to UPJ  Polypharmacy with psych and pain meds?  UDS positive for opiates/THC, per son patient does abuse alcohol    Plan-  Urinary retention protocol she was retaining in ED  Stat CT head if mentation worsens  Treat underlying causes, hold offending agents  Holding home medications due to mentation   Serial Mental Status Exams  Seizure-like activity (HCC)  Per EMS en route  Received 2mg of Ativan  DDX including but not limited to: seizure disorder, pseudoseizure, metabolic abnormality, cardiac etiology, syncope, tumor, ICH, other intracranial process, substance abuse, overdose    Plan-   EEG is negative, cannot completely rule out seizure, per neurology s/p 1 g Depakote loading and started patient on Depakote 500 mg twice daily  Seizure precautions  Will monitor on telemetry as well, lower suspicion for syncope at this  time however does remain on the differential   Patient's BuSpar's dose was recently increased to 450 mg from 300 mg, will resume prior 300 mg dose.  Multiple contusions      VTE Pharmacologic Prophylaxis: VTE Score: 4 Moderate Risk (Score 3-4) - Pharmacological DVT Prophylaxis Ordered: enoxaparin (Lovenox).    Mobility:   Basic Mobility Inpatient Raw Score: 21  -[facility] Goal: 6: Walk 10 steps or more  -HL Achieved: 2: Bed activities/Dependent transfer  Not assessed by me    Patient Centered Rounds: I performed bedside rounds with nursing staff today.   Discussions with Specialists or Other Care Team Provider: None    Education and Discussions with Family / Patient:  Unable to contact will update at bedside once family is here.     Current Length of Stay: 2 day(s)  Current Patient Status: Inpatient   Certification Statement: The patient will continue to require additional inpatient hospital stay due to fall/possible seizure  Discharge Plan: Anticipate discharge in 24-48 hrs to discharge location to be determined pending rehab evaluations.    Code Status: Level 1 - Full Code    Subjective   I have examined the patient bedside this morning. Overnight: No significant overnight events. He/She is AAO x 2-3, denies any headache, CP, SOB, abdominal pain, N/V/D/C, urinary symptoms. Reports soreness around facial wounds. Last BM yesterday and UO is normal. Pt is hemodynamical stable.      Objective :  Temp:  [99.3 °F (37.4 °C)-100.3 °F (37.9 °C)] 99.6 °F (37.6 °C)  HR:  [60-74] 74  BP: (121-138)/(76-87) 124/84  Resp:  [16-18] 18  SpO2:  [95 %-97 %] 96 %  O2 Device: None (Room air)    Body mass index is 25.34 kg/m².     Input and Output Summary (last 24 hours):     Intake/Output Summary (Last 24 hours) at 3/15/2025 1331  Last data filed at 3/15/2025 0828  Gross per 24 hour   Intake 660 ml   Output 800 ml   Net -140 ml       Physical Exam  Vitals and nursing note reviewed.   Constitutional:       General: He is not in acute  distress.     Appearance: He is well-developed.   HENT:      Head: Normocephalic and atraumatic.   Eyes:      Conjunctiva/sclera: Conjunctivae normal.   Cardiovascular:      Rate and Rhythm: Normal rate and regular rhythm.      Heart sounds: No murmur heard.  Pulmonary:      Effort: Pulmonary effort is normal. No respiratory distress.      Breath sounds: Normal breath sounds.   Abdominal:      Palpations: Abdomen is soft.      Tenderness: There is no abdominal tenderness.   Musculoskeletal:         General: No swelling.      Cervical back: Neck supple.      Right lower leg: No edema.      Left lower leg: No edema.   Skin:     General: Skin is warm and dry.      Capillary Refill: Capillary refill takes less than 2 seconds.   Neurological:      Mental Status: He is alert.   Psychiatric:         Mood and Affect: Mood normal.           Lines/Drains:              Lab Results: I have reviewed the following results:   Results from last 7 days   Lab Units 03/15/25  0444 03/14/25  0602 03/13/25  1923   WBC Thousand/uL 5.89   < > 10.55*   HEMOGLOBIN g/dL 9.9*   < > 10.0*   HEMATOCRIT % 28.7*   < > 29.2*   PLATELETS Thousands/uL 220   < > 228   BANDS PCT %  --   --  1   SEGS PCT % 84*  --   --    LYMPHO PCT % 5*   < > 1*   MONO PCT % 8   < > 1*   EOS PCT % 3   < > 0    < > = values in this interval not displayed.     Results from last 7 days   Lab Units 03/15/25  0444   SODIUM mmol/L 135   POTASSIUM mmol/L 3.3*   CHLORIDE mmol/L 101   CO2 mmol/L 27   BUN mg/dL 16   CREATININE mg/dL 0.79   ANION GAP mmol/L 7   CALCIUM mg/dL 8.5   ALBUMIN g/dL 3.6   TOTAL BILIRUBIN mg/dL 0.58   ALK PHOS U/L 59   ALT U/L 31   AST U/L 40*   GLUCOSE RANDOM mg/dL 106     Results from last 7 days   Lab Units 03/13/25  1923   INR  0.94             Results from last 7 days   Lab Units 03/13/25  2253   LACTIC ACID mmol/L 0.7       Recent Cultures (last 7 days):         Imaging Results Review: No pertinent imaging studies reviewed.  Other Study Results  Review: No additional pertinent studies reviewed.    Last 24 Hours Medication List:     Current Facility-Administered Medications:     acetaminophen (TYLENOL) tablet 650 mg, Q6H PRN    amLODIPine (NORVASC) tablet 10 mg, Daily    ARIPiprazole (ABILIFY) tablet 5 mg, Daily    bacitracin topical ointment 1 large application, BID    buPROPion (WELLBUTRIN XL) 24 hr tablet 300 mg, Daily    divalproex sodium (DEPAKOTE) DR tablet 500 mg, Q12H KYLE    enoxaparin (LOVENOX) subcutaneous injection 40 mg, Daily    folic acid (FOLVITE) tablet 1 mg, Daily    gabapentin (NEURONTIN) capsule 400 mg, BID    [Held by provider] hydroCHLOROthiazide tablet 25 mg, Daily    LORazepam (ATIVAN) injection 0.5 mg, Once PRN    losartan (COZAAR) tablet 100 mg, Daily    [Held by provider] modafinil (PROVIGIL) tablet 200 mg, Daily    morphine (MS CONTIN) ER tablet 15 mg, BID    multi-electrolyte (Plasmalyte-A/Isolyte-S PH 7.4/Normosol-R) IV solution, Continuous, Last Rate: 75 mL/hr (03/13/25 8933)    multivitamin-minerals (CENTRUM) tablet 1 tablet, Daily    oxyCODONE (ROXICODONE) immediate release tablet 10 mg, Q4H PRN    oxyCODONE (ROXICODONE) IR tablet 5 mg, Q4H PRN    pantoprazole (PROTONIX) EC tablet 40 mg, Early Morning    thiamine tablet 100 mg, Daily    vilazodone (VIIBRYD) tablet 40 mg, HS    Administrative Statements   Today, Patient Was Seen By: Sagar Maurice MD      **Please Note: This note may have been constructed using a voice recognition system.**

## 2025-03-15 NOTE — PROGRESS NOTES
"Progress Note - Neurology   Name: Levon Cruz 70 y.o. male I MRN: [id number]  Unit/Bed#: S -01 I Date of Admission: 3/13/2025   Date of Service: 3/15/2025 I Hospital Day: 2     Assessment & Plan  Seizure-like activity (HCC)  Levon Cruz is a 70 y.o. male with  reported alcohol abuse per son, prostate cancer, HTN, HLD, hereditary hemochromatosis, CKD stage III, known peripheral neuropathy, depression, chronic pain disorder who presents to [place] ED on 3/13/2025 after being found down at home.    Multiple falls at home with significant trauma to face and extremities, patient with no recollection of the falls. Found by EMS incontinent of urine.  Noted to have seizure-like activity witnessed by EMS en route, described as \"Patient is observed having intermittent tremors in distal extremities followed by a tonic / clonic seizure last approximately 60 seconds\". S/p IV Ativan 2 mg x 1 with resolution of seizure activity.    Workup:  - Labs on presentation:  Leukocytosis, 10.55  Hyponatremia, 133  Total CK elevated, 1018  RDU: Positive for opiate and THC  Labs WNL: Troponin, UA, TSH, vitamin B12, folate, vitamin D, coma panel, lactic acid  AST 40, ALT 31  - CT head: Unremarkable for acute intracranial abnormalities  - CT C-spine: Unremarkable for fracture or traumatic malalignment  - CT facial bones: Unremarkable for fracture  - Routine EEG: Normal study  - MRI brain w/wo contrast: No mass effect, acute intracranial hemorrhage or evidence of recent infarction. No abnormal parenchymal or leptomeningeal enhancement identified.  - 3/14 Orthostatic vitals:  Lyin/81  Sittin/89  Standing 115/53    Assessment: Suspect pt did seizures as described by EMS with tonic/clonic activity lasting at least 60 seconds, however, etiology of seizure unclear. Pt reports drinks 2 beers per week, last drink >1 week ago. No prior history of seizures including no h/o withdrawal seizures. No history of CNS " "infection/tumor. Did have major head trauma at age 9, but none since. No structural abnormality noted on MRI brain w/wo contrast. rEEG unremarkable. Consider possible orthostatic syncope with seizure following head trauma in setting of increased Wellbutrin XL dosage (increased from 300mg to 450mg approximately 3 weeks ago), which likely lowered seizure threshold. Recommend syncope workup per primary team, including evaluating for cardiogenic syncope.    Plan:  - Pending: Vitamin B1  - Would recommend initiating an AED given suspected 3 seizures (2 unwitnessed, 1 witnessed by EMS)  S/p IV VPA 1g, followed by maintenance Depakote 500 mg twice daily for seizure ppx and mood mgt  Plan for VPA total and free level check in 1 weeks, along with CMP and CBCD (orders placed)  Also consider syncope workup including orthostatic vitals  Recommend wean off of Wellbutrin gradually and f/u with psychiatry.   - Ativan PRN for seizure activity lasting greater than 4 minutes  - Discussed seizure precautions:  No driving, lifting heavy machinery, climbing heights, swimming unattended, hot tub baths or any other activity that will place you in danger in case of a seizure for at least six months.  - [employer] form completed and submitted online on 3/14/2025  - Medical management and supportive care per primary team, notify with changes  Fall  - Reports a fall on Tuesday 3/11 and another fall prior to coming into the ED on 3/13  - Reports he does not recall how he fell for both these events  - States he has numbness in his feet at baseline, ongoing for several years, as well as weakness in distal right lower extremity, also at baseline.  - CT CAP with contrast on presentation:  \"1.  No findings of acute traumatic injury in the chest, abdomen or pelvis. Motion distortion limits evaluation for subtle fractures. If there is specific concern suggest dedicated radiographs.  2.  Few small pulmonary nodules measuring up to 5 mm. Based on current " "Fleischner Society 2017 Guidelines on incidental pulmonary nodule, no routine follow-up is needed if the patient is low risk. If the patient is high risk, optional follow-up chest CT   at 12 months can be considered.  3.  Mild right hydronephrosis without hydroureter likely due to chronic UPJ obstruction.\"  - 3/14 Orthostatic vitals: Positive  Lyin/81  Sittin/89  Standing 115/53    Levon Cruz will need follow-up in in 6 weeks with epilepsy team for Other in 60 minute appointment. They will not require outpatient neurological testing. Msg sent to scheduling team.    No further inpatient neurology recommendations at this time. Thank you for allowing us to be a part of his care. Please let us know if there are any acute questions or concerns.      Subjective   Pt seen and examined at bedside. Does not recall LOC events, but knows he was told by someone he had a seizure. Facial pain associated with swelling and bruising; however, no other acute concerns at this time. Feels he is back to baseline.    Review of Systems   Constitutional:  Negative for chills, diaphoresis and fever.   HENT:  Negative for trouble swallowing.    Eyes:  Negative for photophobia and visual disturbance.   Respiratory:  Negative for shortness of breath.    Cardiovascular:  Negative for chest pain.   Gastrointestinal:  Negative for abdominal pain.   Neurological:  Positive for seizures and syncope. Negative for dizziness, tremors, facial asymmetry, speech difficulty, weakness, light-headedness, numbness and headaches.   All other systems reviewed and are negative.      Objective :  Temp:  [99.3 °F (37.4 °C)-100.5 °F (38.1 °C)] 100.5 °F (38.1 °C)  HR:  [60-78] 78  BP: (107-135)/(67-84) 107/67  Resp:  [16-18] 18  SpO2:  [95 %-97 %] 95 %  O2 Device: None (Room air)    Physical Exam   Vitals reviewed.   Constitutional:    Not in acute distress. Normal appearance. Not ill-appearing, toxic-appearing or diaphoretic.   HENT:   Swollen " and bruised face throughout. No congestion or rhinorrhea. Mucous membranes are moist.  Oropharynx is clear. No oropharyngeal exudate or posterior oropharyngeal erythema.   Eyes:    No scleral icterus.  No discharge b/l.  Conjunctivae normal.   Cardiovascular: no clear edema  Pulmonary:  No respiratory distress.   Musculoskeletal: no gross deformities  Skin:    Skin is not pale.   Psychiatric:      Mood normal. Affect congruent    Neurologic Exam   MENTAL STATUS: AAOx3. Follows simple/complex commands. Affect normal w/ congruent mood.    LANGUAGE: Speech clear, spontaneous, and fluent. No aphasia -  naming, repetition, comprehension. No dysarthria - normal volume and intonation.    CRANIAL NERVES:  CN II: Visual acuity grossly intact. No visual field deficit. PERRLA.   CN III, IV, VI: EOM's intact w/o nystagmus, gaze palsy, or preference.   CN V: Normal masseter bulk. V1-V3 sensation intact and symmetric bilaterally.   CN VII: Face movement symmetric. Smile, eyebrow raise, eyelid bury symmetric. Nasolabial folds and palpebral folds symmetric.   CN VIII: Hearing grossly intact bilaterally.   CN IX-X: No dysarthria. Palate elevates symmetrically. Uvula midline.   CN XI: Shoulder shrug symmetric.   CN XII: Tongue midline. No deviation, atrophy, or fasciculations.    MOTOR: Normal muscle bulk. Normal tone. No tremors or abnormal involuntary movements appreciated. Formal strength testing as follows:  Upper extremity:   Shoulder abduction Elbow flexion Elbow extension Wrist flexion Wrist extension Finger flexion   Right 5/5 5/5 5/5 5/5 5/5 5/5   Left 5/5 5/5 5/5 5/5 5/5 5/5     Lower extremity:   Hip flexion Knee flexion Knee extension Ankle dorsiflexion Ankle plantarflexion   Right 5/5 5/5 5/5 5/5 5/5   Left 5/5 5/5 5/5 5/5 5/5     SENSORY:  Light touch: Intact and symmetric throughout.    COORDINATION: Finger-to-nose w/ eyes open intact bilaterally w/o dysmetria or ataxia.     GAIT: Deferred        Lab Results: I have  reviewed the following results:  Results from last 7 days   Lab Units 03/15/25  0444 03/14/25  0602 03/13/25 1923   WBC Thousand/uL 5.89 8.81 10.55*   HEMOGLOBIN g/dL 9.9* 10.1* 10.0*   HEMATOCRIT % 28.7* 29.6* 29.2*   PLATELETS Thousands/uL 220 220 228   SEGS PCT % 84*  --   --    MONO PCT % 8 3* 1*   EOS PCT % 3 0 0      Results from last 7 days   Lab Units 03/15/25  0444 03/14/25  0602 03/13/25 2032 03/13/25  1921   POTASSIUM mmol/L 3.3* 3.5 3.7  --    CHLORIDE mmol/L 101 98 100  --    CO2 mmol/L 27 23 25  --    CO2, I-STAT mmol/L  --   --   --  25   BUN mg/dL 16 15 20  --    CREATININE mg/dL 0.79 0.74 0.77  --    CALCIUM mg/dL 8.5 8.9 8.5  --    ALK PHOS U/L 59 68  --   --    ALT U/L 31 40  --   --    AST U/L 40* 79*  --   --    GLUCOSE, ISTAT mg/dl  --   --   --  126     Results from last 7 days   Lab Units 03/14/25  0602 03/13/25  2144   MAGNESIUM mg/dL 1.6* 1.7*          Results from last 7 days   Lab Units 03/13/25  1923   INR  0.94     Results from last 7 days   Lab Units 03/13/25  2253   LACTIC ACID mmol/L 0.7           MRI brain w wo contrast   Final Result by Giovanni Cramer MD (03/15 1341)      No mass effect, acute intracranial hemorrhage or evidence of recent infarction. No abnormal parenchymal or leptomeningeal enhancement identified.      Workstation performed: [id number]         CT facial bones wo contrast   Final Result by Kwame Lion MD (03/13 2029)      No fracture.         The study was marked in EPIC for immediate notification.      Workstation performed: [id number]         TRAUMA - CT head wo contrast   Final Result by Kwame Lion MD (03/13 2030)      No acute intracranial abnormality.         The study was marked in EPIC for immediate notification.            Workstation performed: [id number]         TRAUMA - CT spine cervical wo contrast   Final Result by Kwame Lion MD (03/13 2029)      No cervical spine fracture or traumatic malalignment.            The study was marked in EPIC for  immediate notification.      Workstation performed: [id number]         TRAUMA - CT chest abdomen pelvis w contrast   Final Result by Kwame Lion MD (03/13 2028)      1.  No findings of acute traumatic injury in the chest, abdomen or pelvis. Motion distortion limits evaluation for subtle fractures. If there is specific concern suggest dedicated radiographs.   2.  Few small pulmonary nodules measuring up to 5 mm. Based on current Fleischner Society 2017 Guidelines on incidental pulmonary nodule, no routine follow-up is needed if the patient is low risk. If the patient is high risk, optional follow-up chest CT    at 12 months can be considered.   3.  Mild right hydronephrosis without hydroureter likely due to chronic UPJ obstruction.            The study was marked in EPIC for immediate notification.      Workstation performed: [id number]         XR Trauma multiple ([facility] trauma bay ONLY)    (Results Pending)   XR chest 1 view    (Results Pending)     VTE Pharmacologic Prophylaxis: VTE covered by:  enoxaparin, Subcutaneous, 40 mg at 03/15/25 1108       Chloe Moran DO  [facility] Neurology Resident PGY3

## 2025-03-15 NOTE — PHYSICAL THERAPY NOTE
PHYSICAL THERAPY EVALUATION  NAME: Levon Cruz  AGE:   70 y.o.  MRN:  [id number]  ADMIT DX: Multiple injuries [T07.XXXA]  Seizure-like activity (HCC) [R56.9]  Multiple abrasions [T07.XXXA]  Fall, initial encounter [W19.XXXA]  Contusion of head, unspecified part of head, initial encounter [S00.93XA]    PMH: History reviewed. No pertinent past medical history.  LENGTH OF STAY: 2        03/15/25 1327   PT Last Visit   PT Visit Date 03/15/25   Note Type   Note type Evaluation   Pain Assessment   Pain Assessment Tool 0-10   Pain Score 5   Pain Location/Orientation Location: Back;Orientation: Lower;Location: Leg;Orientation: Left   Hospital Pain Intervention(s) Repositioned;Ambulation/increased activity   Restrictions/Precautions   Weight Bearing Precautions Per Order No   Braces or Orthoses Other (Comment)  (pt reports he has a shoe lift in LLE shoe, not currently present in room)   Other Precautions Cognitive;Chair Alarm;Bed Alarm;Multiple lines;Telemetry;Fall Risk;Pain;Impulsive;Seizure  ((+) bowser catheter)   Home Living   Type of Home House   Home Layout Two level;Stairs to enter with rails  (13 SHIMON)   Bathroom Shower/Tub Tub/shower unit   Bathroom Toilet Standard   Bathroom Equipment Grab bars in shower   Home Equipment Cane;Grab bars   Additional Comments Ambulates with SPC primarily outside of home and no use of AD inside of home.   Prior Function   Level of [place] Independent with ADLs;Independent with functional mobility   Lives With (S)  Alone   Receives Help From Family;Friend(s)   IADLs Family/Friend/Other provides transportation;Independent with meal prep;Independent with medication management  (uber for transportation)   Falls in the last 6 months 5 to 10  (5 or 6)   Vocational Retired   General   Additional Pertinent History h/o RLE weakness/numbness due to spinal injuries/surgies in past   Family/Caregiver Present No   Cognition   Overall Cognitive Status Impaired   Arousal/Participation  Cooperative   Attention Within functional limits   Orientation Level Oriented to person;Oriented to place;Oriented to situation   Memory Decreased recall of precautions;Decreased short term memory  (pt self reports memory deficits)   Following Commands Follows one step commands without difficulty   Comments Pt identified by name and .   Subjective   Subjective Pt agrees to PT evaluation and is pleasant and cooperative throughout session.   RLE Assessment   RLE Assessment X   Strength RLE   RLE Overall Strength 4-/5  (functionally)   R Ankle Dorsiflexion 2+/5   R Ankle Plantar Flexion 2+/5   LLE Assessment   LLE Assessment X   Strength LLE   LLE Overall Strength 4-/5  (functionally)   Bed Mobility   Supine to Sit 5  Supervision   Additional items HOB elevated;Bedrails;Increased time required;Verbal cues   Sit to Supine Unable to assess  (left OOB in chair post session with alarm intact)   Transfers   Sit to Stand 4  Minimal assistance   Additional items Assist x 1;Increased time required;Verbal cues;Armrests  (hand placement)   Stand to Sit 4  Minimal assistance   Additional items Assist x 1;Increased time required;Verbal cues;Armrests  (hand placement)   Ambulation/Elevation   Gait pattern Improper Weight shift;Decreased foot clearance;Short stride;Excessively slow;Step to;Decreased L stance;Antalgic;Decreased heel strike;Decreased toe off  (RLE externally rotated with mild foot drop and occasional drag; leg length discrepancy)   Gait Assistance 4  Minimal assist   Additional items Assist x 1;Verbal cues   Assistive Device None   Distance 30` x1   Balance   Static Sitting Fair -   Dynamic Sitting Poor +   Static Standing Fair -   Dynamic Standing Poor +   Ambulatory Poor +   Endurance Deficit   Endurance Deficit Yes   Endurance Deficit Description limited ambulation distance, fatigue   Activity Tolerance   Activity Tolerance Patient limited by fatigue   Nurse Made Aware Per RN, pt appropriate to evaluate    Assessment   Prognosis Fair   Problem List Decreased strength;Decreased endurance;Impaired balance;Decreased mobility;Decreased safety awareness;Decreased skin integrity;Pain   Assessment Pt is a 70 y.o. male seen for PT evaluation s/p admit to [facility]18[facility]20[facility] w/ Fall.  Order placed for PT. Comorbidities affecting pt's physical performance at time of assessment include:  seizure like activity . Personal factors affecting pt at time of IE include: steps to enter environment, multi-level environment, limited home support, advanced age, inability to perform IADLs, inability to perform ADLs, and recent fall(s). Prior to admission, pt was independent w/ all functional mobility w/ SPC outside only, lived in multi-level home, had 13 SHIMON (+) railing, and lived alone . Upon evaluation: Pt requires supervision for bed mobility, min A for sit to stand, and min A for ambulation with and without AD.  Currently, pt presents with the following deficits: weakness, impaired balance, decreased endurance, gait deviations, pain, decreased activity tolerance, decreased safety awareness, fall risk, and decreased skin integrity.  Pt's clinical presentation is currently unstable/unpredictable seen in pt's presentation of fall risk, limited insight into deficits, and significant decline in functional mobility compared to baseline.  Pt to benefit from continued skilled PT tx while in hospital and upon DC to address deficits as defined above and maximize level of functional mobility.  Recommend  progression of ambulation and initiation of HEP as appropriate .   Goals   Patient Goals to get stronger   STG Expiration Date 03/25/25   Short Term Goal #1 Pt will be able to: (1) perform bed mobility with mod I to promote OOB activity (2) perform sit to stand with supervision to decrease burden of care (3) ambulate at least 200` with supervision and least restrictive AD to increase activity tolerance (4) increase standing balance  by 1 grade to decrease risk of falls (5) negotiate at least 13 stairs with supervision and use of handrail to allow safe access into home   PT Treatment Day 1   Plan   Treatment/Interventions Functional transfer training;LE strengthening/ROM;Elevations;Therapeutic exercise;Endurance training;Patient/family training;Equipment eval/education;Bed mobility;Gait training   PT Frequency 3-5x/wk   Discharge Recommendation   Rehab Resource Intensity Level, PT I (Maximum Resource Intensity)   AM-PAC Basic Mobility Inpatient   Turning in Flat Bed Without Bedrails 3   Lying on Back to Sitting on Edge of Flat Bed Without Bedrails 3   Moving Bed to Chair 3   Standing Up From Chair Using Arms 3   Walk in Room 3   Climb 3-5 Stairs With Railing 2   Basic Mobility Inpatient Raw Score 17   Basic Mobility Standardized Score 39.67   [facility] Highest Level Of Mobility   [facility] Goal 5: Stand one or more mins   -[facility] Achieved 7: Walk 25 feet or more   Additional Treatment Session   Start Time 1315   End Time 1327   Treatment Assessment Pt agrees to further mobility.  Education provided on use and precautions of RW.  Able to ambulate an additional ~40` x2 + 25` x2 with min A.  Multiple standing rest breaks required and all mobility requires increased time to complete.  Will continue to benefit from ongoing skilled PT to maximize his functional mobility and increase his level of independence.   Equipment Use RW   Additional Treatment Day 1   End of Consult   Patient Position at End of Consult Bedside chair;Bed/Chair alarm activated;All needs within reach     The patient's AM-PAC Basic Mobility Inpatient Short Form Raw Score is 17, Standardized Score is 39.67.  A Raw Score of greater than or equal to 16 suggests the patient may benefit from discharge to home. However please refer to therapist recommendation for discharge planning given other factors that may influence destination.     Adapted from Nigel Mosher, Taran [place],  Corby [place]. Association of [facility] “6-Clicks” Basic Mobility and Daily Activity Scores With Discharge Destination. Physical Therapy, 2021;101:1-9. DOI: [email or web address]      Asuncion Hall PT,DPT

## 2025-03-15 NOTE — ASSESSMENT & PLAN NOTE
"Levon Cruz is a 70 y.o. male with  reported alcohol abuse per son, prostate cancer, HTN, HLD, hereditary hemochromatosis, CKD stage III, known peripheral neuropathy, depression, chronic pain disorder who presents to [place] ED on 3/13/2025 after being found down at home.    Multiple falls at home with significant trauma to face and extremities, patient with no recollection of the falls. Found by EMS incontinent of urine.  Noted to have seizure-like activity witnessed by EMS en route, described as \"Patient is observed having intermittent tremors in distal extremities followed by a tonic / clonic seizure last approximately 60 seconds\". S/p IV Ativan 2 mg x 1 with resolution of seizure activity.    Workup:  - Labs on presentation:  Leukocytosis, 10.55  Hyponatremia, 133  Total CK elevated, 1018  RDU: Positive for opiate and THC  Labs WNL: Troponin, UA, TSH, vitamin B12, folate, vitamin D, coma panel, lactic acid  AST 40, ALT 31  - CT head: Unremarkable for acute intracranial abnormalities  - CT C-spine: Unremarkable for fracture or traumatic malalignment  - CT facial bones: Unremarkable for fracture  - Routine EEG: Normal study  - MRI brain w/wo contrast: No mass effect, acute intracranial hemorrhage or evidence of recent infarction. No abnormal parenchymal or leptomeningeal enhancement identified.  - 3/14 Orthostatic vitals:  Lyin/81  Sittin/89  Standing 115/53    Assessment: Suspect pt did seizures as described by EMS with tonic/clonic activity lasting at least 60 seconds, however, etiology of seizure unclear. Pt reports drinks 2 beers per week, last drink >1 week ago. No prior history of seizures including no h/o withdrawal seizures. No history of CNS infection/tumor. Did have major head trauma at age 9, but none since. No structural abnormality noted on MRI brain w/wo contrast. rEEG unremarkable. Consider possible orthostatic syncope with seizure following head trauma in setting of increased " Wellbutrin XL dosage (increased from 300mg to 450mg approximately 3 weeks ago), which likely lowered seizure threshold. Recommend syncope workup per primary team, including evaluating for cardiogenic syncope.    Plan:  - Pending: Vitamin B1  - Would recommend initiating an AED given suspected 3 seizures (2 unwitnessed, 1 witnessed by EMS)  S/p IV VPA 1g, followed by maintenance Depakote 500 mg twice daily for seizure ppx and mood mgt  Plan for VPA total and free level check in 1 weeks, along with CMP and CBCD (orders placed)  Also consider syncope workup including orthostatic vitals  Recommend wean off of Wellbutrin gradually and f/u with psychiatry.   - Ativan PRN for seizure activity lasting greater than 4 minutes  - Discussed seizure precautions:  No driving, lifting heavy machinery, climbing heights, swimming unattended, hot tub baths or any other activity that will place you in danger in case of a seizure for at least six months.  - [employer] form completed and submitted online on 3/14/2025  - Medical management and supportive care per primary team, notify with changes

## 2025-03-15 NOTE — ASSESSMENT & PLAN NOTE
Baseline mentation- Aox4, lives alone per ex wife, has been progressively declining   Per EMS patient was having seizure-like activity s/p 1 dose of Ativan, further episodes in ED  Unreliable historian?  But did endorse a mechanical fall  Alert to place and self  Mild leukocytosis likely reactive to fall   Head, CT C-spine, CT facial bones-negative  CT CAP-mild right hydronephrosis likely chronic due to UPJ  Polypharmacy with psych and pain meds?  UDS positive for opiates/THC, per son patient does abuse alcohol    Plan-  Urinary retention protocol she was retaining in ED  Stat CT head if mentation worsens  Treat underlying causes, hold offending agents  Holding home medications due to mentation   Serial Mental Status Exams

## 2025-03-15 NOTE — ASSESSMENT & PLAN NOTE
"- Reports a fall on Tuesday 3/11 and another fall prior to coming into the ED on 3/13  - Reports he does not recall how he fell for both these events  - States he has numbness in his feet at baseline, ongoing for several years, as well as weakness in distal right lower extremity, also at baseline.  - CT CAP with contrast on presentation:  \"1.  No findings of acute traumatic injury in the chest, abdomen or pelvis. Motion distortion limits evaluation for subtle fractures. If there is specific concern suggest dedicated radiographs.  2.  Few small pulmonary nodules measuring up to 5 mm. Based on current Fleischner Society 2017 Guidelines on incidental pulmonary nodule, no routine follow-up is needed if the patient is low risk. If the patient is high risk, optional follow-up chest CT   at 12 months can be considered.  3.  Mild right hydronephrosis without hydroureter likely due to chronic UPJ obstruction.\"  - 3/14 Orthostatic vitals: Positive  Lyin/81  Sittin/89  Standing 115/53  "

## 2025-03-15 NOTE — ASSESSMENT & PLAN NOTE
Per ex-wife patient has been having progressively worsening ambulatory dysfunction  Falls frequently at home  Apple watch alerted EMS due to fall   Was found down in house, multiple facial lacerations, ecchymosis, bilateral lower extremity skin tears  Patient endorses some ambulatory dysfunction however notes that he fell at a park earlier in the day  Came in as a trauma-workup was negative    Plan-  MRI brain pending result  Seizure precautions   Isolyte 75cc/hr   PT/OT

## 2025-03-15 NOTE — ASSESSMENT & PLAN NOTE
Per EMS en route  Received 2mg of Ativan  DDX including but not limited to: seizure disorder, pseudoseizure, metabolic abnormality, cardiac etiology, syncope, tumor, ICH, other intracranial process, substance abuse, overdose    Plan-   EEG is negative, cannot completely rule out seizure, per neurology s/p 1 g Depakote loading and started patient on Depakote 500 mg twice daily  Seizure precautions  Will monitor on telemetry as well, lower suspicion for syncope at this time however does remain on the differential   Patient's BuSpar's dose was recently increased to 450 mg from 300 mg, will resume prior 300 mg dose.

## 2025-03-16 PROBLEM — R50.9 FEVER: Status: ACTIVE | Noted: 2025-03-16

## 2025-03-16 LAB
ALBUMIN SERPL BCG-MCNC: 3.5 G/DL (ref 3.5–5)
ALBUMIN SERPL BCG-MCNC: 3.5 G/DL (ref 3.5–5)
ALP SERPL-CCNC: 55 U/L (ref 34–104)
ALP SERPL-CCNC: 55 U/L (ref 34–104)
ALT SERPL W P-5'-P-CCNC: 26 U/L (ref 7–52)
ALT SERPL W P-5'-P-CCNC: 26 U/L (ref 7–52)
ANION GAP SERPL CALCULATED.3IONS-SCNC: 2 MMOL/L (ref 4–13)
ANION GAP SERPL CALCULATED.3IONS-SCNC: 2 MMOL/L (ref 4–13)
AST SERPL W P-5'-P-CCNC: 25 U/L (ref 13–39)
AST SERPL W P-5'-P-CCNC: 25 U/L (ref 13–39)
ATRIAL RATE: 72 BPM
ATRIAL RATE: 72 BPM
ATRIAL RATE: 79 BPM
ATRIAL RATE: 79 BPM
BILIRUB SERPL-MCNC: 0.5 MG/DL (ref 0.2–1)
BILIRUB SERPL-MCNC: 0.5 MG/DL (ref 0.2–1)
BUN SERPL-MCNC: 22 MG/DL (ref 5–25)
BUN SERPL-MCNC: 22 MG/DL (ref 5–25)
CALCIUM SERPL-MCNC: 8.3 MG/DL (ref 8.4–10.2)
CALCIUM SERPL-MCNC: 8.3 MG/DL (ref 8.4–10.2)
CHLORIDE SERPL-SCNC: 104 MMOL/L (ref 96–108)
CHLORIDE SERPL-SCNC: 104 MMOL/L (ref 96–108)
CO2 SERPL-SCNC: 28 MMOL/L (ref 21–32)
CO2 SERPL-SCNC: 28 MMOL/L (ref 21–32)
CREAT SERPL-MCNC: 0.73 MG/DL (ref 0.6–1.3)
CREAT SERPL-MCNC: 0.73 MG/DL (ref 0.6–1.3)
ERYTHROCYTE [DISTWIDTH] IN BLOOD BY AUTOMATED COUNT: 13.9 % (ref 11.6–15.1)
ERYTHROCYTE [DISTWIDTH] IN BLOOD BY AUTOMATED COUNT: 13.9 % (ref 11.6–15.1)
GFR SERPL CREATININE-BSD FRML MDRD: 93 ML/MIN/1.73SQ M
GFR SERPL CREATININE-BSD FRML MDRD: 93 ML/MIN/1.73SQ M
GLUCOSE SERPL-MCNC: 101 MG/DL (ref 65–140)
GLUCOSE SERPL-MCNC: 101 MG/DL (ref 65–140)
HCT VFR BLD AUTO: 29.6 % (ref 36.5–49.3)
HCT VFR BLD AUTO: 29.6 % (ref 36.5–49.3)
HGB BLD-MCNC: 9.5 G/DL (ref 12–17)
HGB BLD-MCNC: 9.5 G/DL (ref 12–17)
MAGNESIUM SERPL-MCNC: 1.8 MG/DL (ref 1.9–2.7)
MAGNESIUM SERPL-MCNC: 1.8 MG/DL (ref 1.9–2.7)
MCH RBC QN AUTO: 30.1 PG (ref 26.8–34.3)
MCH RBC QN AUTO: 30.1 PG (ref 26.8–34.3)
MCHC RBC AUTO-ENTMCNC: 32.1 G/DL (ref 31.4–37.4)
MCHC RBC AUTO-ENTMCNC: 32.1 G/DL (ref 31.4–37.4)
MCV RBC AUTO: 94 FL (ref 82–98)
MCV RBC AUTO: 94 FL (ref 82–98)
P AXIS: 6 DEGREES
P AXIS: 6 DEGREES
PLATELET # BLD AUTO: 205 THOUSANDS/UL (ref 149–390)
PLATELET # BLD AUTO: 205 THOUSANDS/UL (ref 149–390)
PMV BLD AUTO: 8.5 FL (ref 8.9–12.7)
PMV BLD AUTO: 8.5 FL (ref 8.9–12.7)
POTASSIUM SERPL-SCNC: 3.7 MMOL/L (ref 3.5–5.3)
POTASSIUM SERPL-SCNC: 3.7 MMOL/L (ref 3.5–5.3)
PR INTERVAL: 208 MS
PR INTERVAL: 208 MS
PROCALCITONIN SERPL-MCNC: 0.1 NG/ML
PROCALCITONIN SERPL-MCNC: 0.1 NG/ML
PROT SERPL-MCNC: 5.4 G/DL (ref 6.4–8.4)
PROT SERPL-MCNC: 5.4 G/DL (ref 6.4–8.4)
QRS AXIS: 21 DEGREES
QRS AXIS: 21 DEGREES
QRS AXIS: 63 DEGREES
QRS AXIS: 63 DEGREES
QRSD INTERVAL: 102 MS
QRSD INTERVAL: 102 MS
QRSD INTERVAL: 94 MS
QRSD INTERVAL: 94 MS
QT INTERVAL: 448 MS
QT INTERVAL: 448 MS
QT INTERVAL: 452 MS
QT INTERVAL: 452 MS
QTC INTERVAL: 494 MS
QTC INTERVAL: 494 MS
QTC INTERVAL: 536 MS
QTC INTERVAL: 536 MS
RBC # BLD AUTO: 3.16 MILLION/UL (ref 3.88–5.62)
RBC # BLD AUTO: 3.16 MILLION/UL (ref 3.88–5.62)
SODIUM SERPL-SCNC: 134 MMOL/L (ref 135–147)
SODIUM SERPL-SCNC: 134 MMOL/L (ref 135–147)
T WAVE AXIS: 52 DEGREES
VENTRICULAR RATE: 72 BPM
VENTRICULAR RATE: 72 BPM
VENTRICULAR RATE: 86 BPM
VENTRICULAR RATE: 86 BPM
WBC # BLD AUTO: 5.54 THOUSAND/UL (ref 4.31–10.16)
WBC # BLD AUTO: 5.54 THOUSAND/UL (ref 4.31–10.16)

## 2025-03-16 PROCEDURE — 99232 SBSQ HOSP IP/OBS MODERATE 35: CPT | Performed by: INTERNAL MEDICINE

## 2025-03-16 PROCEDURE — 84145 PROCALCITONIN (PCT): CPT

## 2025-03-16 PROCEDURE — 93010 ELECTROCARDIOGRAM REPORT: CPT | Performed by: INTERNAL MEDICINE

## 2025-03-16 PROCEDURE — 80053 COMPREHEN METABOLIC PANEL: CPT

## 2025-03-16 PROCEDURE — 85027 COMPLETE CBC AUTOMATED: CPT

## 2025-03-16 PROCEDURE — 83735 ASSAY OF MAGNESIUM: CPT

## 2025-03-16 RX ORDER — ACETAMINOPHEN 325 MG/1
650 TABLET ORAL EVERY 8 HOURS SCHEDULED
Status: DISCONTINUED | OUTPATIENT
Start: 2025-03-16 | End: 2025-03-18 | Stop reason: HOSPADM

## 2025-03-16 RX ORDER — MAGNESIUM SULFATE HEPTAHYDRATE 40 MG/ML
2 INJECTION, SOLUTION INTRAVENOUS ONCE
Status: COMPLETED | OUTPATIENT
Start: 2025-03-16 | End: 2025-03-16

## 2025-03-16 RX ADMIN — PANTOPRAZOLE SODIUM 40 MG: 40 TABLET, DELAYED RELEASE ORAL at 04:32

## 2025-03-16 RX ADMIN — ENOXAPARIN SODIUM 40 MG: 40 INJECTION SUBCUTANEOUS at 09:25

## 2025-03-16 RX ADMIN — MAGNESIUM SULFATE HEPTAHYDRATE 2 G: 40 INJECTION, SOLUTION INTRAVENOUS at 09:27

## 2025-03-16 RX ADMIN — DIVALPROEX SODIUM 500 MG: 500 TABLET, DELAYED RELEASE ORAL at 22:10

## 2025-03-16 RX ADMIN — LOSARTAN POTASSIUM 100 MG: 50 TABLET, FILM COATED ORAL at 09:22

## 2025-03-16 RX ADMIN — FOLIC ACID 1 MG: 1 TABLET ORAL at 09:22

## 2025-03-16 RX ADMIN — DIVALPROEX SODIUM 500 MG: 500 TABLET, DELAYED RELEASE ORAL at 09:23

## 2025-03-16 RX ADMIN — MULTIPLE VITAMINS W/ MINERALS TAB 1 TABLET: TAB ORAL at 09:23

## 2025-03-16 RX ADMIN — SODIUM CHLORIDE, SODIUM GLUCONATE, SODIUM ACETATE, POTASSIUM CHLORIDE, MAGNESIUM CHLORIDE, SODIUM PHOSPHATE, DIBASIC, AND POTASSIUM PHOSPHATE 75 ML/HR: .53; .5; .37; .037; .03; .012; .00082 INJECTION, SOLUTION INTRAVENOUS at 07:24

## 2025-03-16 RX ADMIN — MORPHINE SULFATE 15 MG: 15 TABLET, EXTENDED RELEASE ORAL at 17:57

## 2025-03-16 RX ADMIN — OXYCODONE HYDROCHLORIDE 10 MG: 10 TABLET ORAL at 04:32

## 2025-03-16 RX ADMIN — BUPROPION HYDROCHLORIDE 300 MG: 150 TABLET, EXTENDED RELEASE ORAL at 09:22

## 2025-03-16 RX ADMIN — BACITRACIN 1 LARGE APPLICATION: 500 OINTMENT TOPICAL at 09:26

## 2025-03-16 RX ADMIN — TAMSULOSIN HYDROCHLORIDE 0.4 MG: 0.4 CAPSULE ORAL at 17:57

## 2025-03-16 RX ADMIN — Medication 100 MG: at 09:23

## 2025-03-16 RX ADMIN — VILAZODONE HYDROCHLORIDE 40 MG: 20 TABLET ORAL at 22:10

## 2025-03-16 RX ADMIN — MORPHINE SULFATE 15 MG: 15 TABLET, EXTENDED RELEASE ORAL at 09:23

## 2025-03-16 RX ADMIN — ACETAMINOPHEN 650 MG: 325 TABLET, FILM COATED ORAL at 22:10

## 2025-03-16 RX ADMIN — GABAPENTIN 400 MG: 400 CAPSULE ORAL at 09:22

## 2025-03-16 RX ADMIN — ARIPIPRAZOLE 5 MG: 5 TABLET ORAL at 09:22

## 2025-03-16 RX ADMIN — AMLODIPINE BESYLATE 10 MG: 10 TABLET ORAL at 09:22

## 2025-03-16 RX ADMIN — ABIRATERONE ACETATE 500 MG: 250 TABLET, FILM COATED ORAL at 09:23

## 2025-03-16 RX ADMIN — GABAPENTIN 400 MG: 400 CAPSULE ORAL at 17:57

## 2025-03-16 RX ADMIN — PREDNISONE 5 MG: 5 TABLET ORAL at 09:23

## 2025-03-16 NOTE — ASSESSMENT & PLAN NOTE
Per ex-wife patient has been having progressively worsening ambulatory dysfunction  Falls frequently at home  Apple watch alerted EMS due to fall   Was found down in house, multiple facial lacerations, ecchymosis, bilateral lower extremity skin tears  Patient endorses some ambulatory dysfunction however notes that he fell at a park earlier in the day  Came in as a trauma-workup was negative  MRI brain negative  PT/OT level 1, pending placement  Fall likely secondary to new onset seizure  Telemetry reviewed and patient bradycardic 51-55 overnight while sleeping.    Plan-    Neurology on board recommendations appreciated  Seizure precautions   S/p IV VPA 1g  Continue Depakote 500 mg BID  Recommend weaning off Wellbutrin gradually and follow-up with psychiatry  [employer] form completed and submitted online on 3/14/2025   Recommend evaluating for cardiogenic cause of fall  F/u echo   D/c Isolyte 75cc/hr

## 2025-03-16 NOTE — ASSESSMENT & PLAN NOTE
Baseline mentation- Aox4, lives alone per ex wife, has been progressively declining   Per EMS patient was having seizure-like activity s/p 1 dose of Ativan, further episodes in ED  Unreliable historian?  But did endorse a mechanical fall  Alert to place and self  Mild leukocytosis likely reactive to fall   Head, CT C-spine, CT facial bones-negative  CT CAP-mild right hydronephrosis likely chronic due to UPJ  Polypharmacy with psych and pain meds?  UDS positive for opiates/THC, per son patient does abuse alcohol    Plan-  Urinary retention protocol she was retaining in ED  Stat CT head if mentation worsens  Mentation is improving

## 2025-03-16 NOTE — ASSESSMENT & PLAN NOTE
Per EMS en route  Received 2mg of Ativan  DDX including but not limited to: seizure disorder, pseudoseizure, metabolic abnormality, cardiac etiology, syncope, tumor, other intracranial process, substance abuse, overdose    Plan-   EEG is negative, cannot completely rule out seizure, per neurology s/p 1 g Depakote loading and started patient on Depakote 500 mg twice daily  Seizure precautions  Patient's BuSpar's dose was recently increased to 450 mg from 300 mg, will resume prior 300 mg dose.  Rest of plan as above

## 2025-03-16 NOTE — ASSESSMENT & PLAN NOTE
3/16 Patient noted to have a fever Tmax 101.2 last night p.m. that resolved with Tylenol.  Patient denies any respiratory symptoms including cough, congestion, chest pain.  He is also without any urinary complaints.  Will order Pro-Gigi  Monitor vitals and trend CBC  Monitor for any other signs or symptoms of infection.

## 2025-03-16 NOTE — PROGRESS NOTES
Progress Note - Hospitalist   Name: Levon Cruz 70 y.o. male I MRN: [id number]  Unit/Bed#: S -01 I Date of Admission: 3/13/2025   Date of Service: 3/16/2025 I Hospital Day: 3    Assessment & Plan  Fall  Per ex-wife patient has been having progressively worsening ambulatory dysfunction  Falls frequently at home  Apple watch alerted EMS due to fall   Was found down in house, multiple facial lacerations, ecchymosis, bilateral lower extremity skin tears  Patient endorses some ambulatory dysfunction however notes that he [place] a park earlier in the day  Came in as a trauma-workup was negative  MRI brain negative  PT/OT level 1, pending placement  Fall likely secondary to new onset seizure  Telemetry reviewed and patient bradycardic 51-55 overnight while sleeping.    Plan-    Neurology on board recommendations appreciated  Seizure precautions   S/p IV VPA 1g  Continue Depakote 500 mg BID  Recommend weaning off Wellbutrin gradually and follow-up with psychiatry  [employer] form completed and submitted online on 3/14/2025   Recommend evaluating for cardiogenic cause of fall  F/u echo   D/c Isolyte 75cc/hr   Seizure-like activity (HCC)  Per EMS en route  Received 2mg of Ativan  DDX including but not limited to: seizure disorder, pseudoseizure, metabolic abnormality, cardiac etiology, syncope, tumor, other intracranial process, substance abuse, overdose    Plan-   EEG is negative, cannot completely rule out seizure, per neurology s/p 1 g Depakote loading and started patient on Depakote 500 mg twice daily  Seizure precautions  Patient's BuSpar's dose was recently increased to 450 mg from 300 mg, will resume prior 300 mg dose.  Rest of plan as above  Acute encephalopathy  Baseline mentation- Aox4, lives alone per ex wife, has been progressively declining   Per EMS patient was having seizure-like activity s/p 1 dose of Ativan, further episodes in ED  Unreliable historian?  But did endorse a mechanical fall  Alert to  place and self  Mild leukocytosis likely reactive to fall   Head, CT C-spine, CT facial bones-negative  CT CAP-mild right hydronephrosis likely chronic due to UPJ  Polypharmacy with psych and pain meds?  UDS positive for opiates/THC, per son patient does abuse alcohol    Plan-  Urinary retention protocol she was retaining in ED  Stat CT head if mentation worsens  Mentation is improving  Multiple contusions    Fever  3/16 Patient noted to have a fever Tmax 101.2 last night p.m. that resolved with Tylenol.  Patient denies any respiratory symptoms including cough, congestion, chest pain.  He is also without any urinary complaints.  Will order Pro-Gigi  Monitor vitals and trend CBC  Monitor for any other signs or symptoms of infection.    VTE Pharmacologic Prophylaxis: VTE Score: 4 Moderate Risk (Score 3-4) - Pharmacological DVT Prophylaxis Ordered: enoxaparin (Lovenox).    Mobility:   Basic Mobility Inpatient Raw Score: 17  -[facility] Goal: 5: Stand one or more mins  -[facility] Achieved: 7: Walk 25 feet or more  Not assessed by me    Patient Centered Rounds: I performed bedside rounds with nursing staff today.   Discussions with Specialists or Other Care Team Provider: None    Education and Discussions with Family / Patient: Updated  (Vaishali) via phone.     Current Length of Stay: 3 day(s)  Current Patient Status: Inpatient   Certification Statement: The patient will continue to require additional inpatient hospital stay due to fall/possible seizure  Discharge Plan: Anticipate discharge in 24-48 hrs to discharge location to be determined pending rehab evaluations.    Code Status: Level 1 - Full Code    Subjective   Patient seen and examined at bedside.  Overnight patient had fever Tmax 101.2 that resolved with Tylenol.  Patient only endorses upper back pain.  He is tolerating p.o. without difficulty.  Denies any other signs or symptoms.    Objective :  Temp:  [98.7 °F (37.1 °C)-101.2 °F (38.4 °C)] 99 °F (37.2  °C)  HR:  [68-78] 68  BP: (107-138)/(67-85) 120/81  Resp:  [18] 18  SpO2:  [95 %-96 %] 95 %  O2 Device: None (Room air)    Body mass index is 25.34 kg/m².     Input and Output Summary (last 24 hours):     Intake/Output Summary (Last 24 hours) at 3/16/2025 0948  Last data filed at 3/16/2025 0922  Gross per 24 hour   Intake 970 ml   Output 325 ml   Net 645 ml       Physical Exam  Vitals and nursing note reviewed.   Constitutional:       General: He is not in acute distress.     Appearance: He is well-developed.   HENT:      Head: Normocephalic.      Comments: Bilateral periorbital edema, diffuse ecchymosis to the face, nose  Abrasions noted to forehead, nose, chin  Eyes:      Extraocular Movements: Extraocular movements intact.      Conjunctiva/sclera: Conjunctivae normal.      Pupils: Pupils are equal, round, and reactive to light.   Cardiovascular:      Rate and Rhythm: Normal rate and regular rhythm.      Pulses: Normal pulses.      Heart sounds: Normal heart sounds. No murmur heard.     No friction rub. No gallop.   Pulmonary:      Effort: Pulmonary effort is normal. No respiratory distress.      Breath sounds: Normal breath sounds. No wheezing or rhonchi.   Chest:      Chest wall: No tenderness.   Abdominal:      General: Bowel sounds are normal. There is no distension.      Palpations: Abdomen is soft. There is no mass.      Tenderness: There is no abdominal tenderness. There is no guarding or rebound.   Musculoskeletal:         General: No swelling or deformity. Normal range of motion.      Cervical back: Normal range of motion and neck supple.      Right lower leg: No edema.      Left lower leg: No edema.      Comments: Multiple superficial skin tears and ecchymosis to the legs bilaterally  Abrasion noted to left elbow   Skin:     General: Skin is warm and dry.      Capillary Refill: Capillary refill takes less than 2 seconds.   Neurological:      Mental Status: He is alert.      Sensory: Sensory deficit  present.      Motor: No weakness.      Coordination: Romberg sign positive. Finger-Nose-Finger Test abnormal (Mild past-pointing on L).      Comments: Decreased sensation and proprioception noted to bilateral lower extremities   Psychiatric:         Mood and Affect: Mood normal.           Lines/Drains:              Lab Results: I have reviewed the following results:   Results from last 7 days   Lab Units 03/16/25  0430 03/15/25  0444 03/14/25  0602 03/13/25  1923   WBC Thousand/uL 5.54 5.89   < > 10.55*   HEMOGLOBIN g/dL 9.5* 9.9*   < > 10.0*   HEMATOCRIT % 29.6* 28.7*   < > 29.2*   PLATELETS Thousands/uL 205 220   < > 228   BANDS PCT %  --   --   --  1   SEGS PCT %  --  84*  --   --    LYMPHO PCT %  --  5*   < > 1*   MONO PCT %  --  8   < > 1*   EOS PCT %  --  3   < > 0    < > = values in this interval not displayed.     Results from last 7 days   Lab Units 03/16/25  0430   SODIUM mmol/L 134*   POTASSIUM mmol/L 3.7   CHLORIDE mmol/L 104   CO2 mmol/L 28   BUN mg/dL 22   CREATININE mg/dL 0.73   ANION GAP mmol/L 2*   CALCIUM mg/dL 8.3*   ALBUMIN g/dL 3.5   TOTAL BILIRUBIN mg/dL 0.50   ALK PHOS U/L 55   ALT U/L 26   AST U/L 25   GLUCOSE RANDOM mg/dL 101     Results from last 7 days   Lab Units 03/13/25  1923   INR  0.94             Results from last 7 days   Lab Units 03/13/25  2253   LACTIC ACID mmol/L 0.7       Recent Cultures (last 7 days):         Imaging Results Review: No pertinent imaging studies reviewed.  Other Study Results Review: No additional pertinent studies reviewed.    Last 24 Hours Medication List:     Current Facility-Administered Medications:     abiraterone (ZYTIGA) tablet 500 mg, Daily Before Breakfast    acetaminophen (TYLENOL) tablet 650 mg, Q6H PRN    amLODIPine (NORVASC) tablet 10 mg, Daily    ARIPiprazole (ABILIFY) tablet 5 mg, Daily    [Held by provider] atorvastatin (LIPITOR) tablet 80 mg, Daily With Dinner    bacitracin topical ointment 1 large application, BID    buPROPion (WELLBUTRIN XL)  24 hr tablet 300 mg, Daily    divalproex sodium (DEPAKOTE) DR tablet 500 mg, Q12H KYLE    enoxaparin (LOVENOX) subcutaneous injection 40 mg, Daily    folic acid (FOLVITE) tablet 1 mg, Daily    gabapentin (NEURONTIN) capsule 400 mg, BID    [Held by provider] hydroCHLOROthiazide tablet 25 mg, Daily    LORazepam (ATIVAN) injection 0.5 mg, Once PRN    losartan (COZAAR) tablet 100 mg, Daily    magnesium sulfate 2 g/50 mL IVPB (premix) 2 g, Once, Last Rate: 2 g (03/16/25 0988)    [Held by provider] modafinil (PROVIGIL) tablet 200 mg, Daily    morphine (MS CONTIN) ER tablet 15 mg, BID    multi-electrolyte (Plasmalyte-A/Isolyte-S PH 7.4/Normosol-R) IV solution, Continuous, Last Rate: 75 mL/hr (03/16/25 0724)    multivitamin-minerals (CENTRUM) tablet 1 tablet, Daily    oxyCODONE (ROXICODONE) immediate release tablet 10 mg, Q4H PRN    oxyCODONE (ROXICODONE) IR tablet 5 mg, Q4H PRN    pantoprazole (PROTONIX) EC tablet 40 mg, Early Morning    predniSONE tablet 5 mg, Daily    tamsulosin (FLOMAX) capsule 0.4 mg, Daily With Dinner    thiamine tablet 100 mg, Daily    vilazodone (VIIBRYD) tablet 40 mg, HS    Administrative Statements   Today, Patient Was Seen By: Val Edward MD      **Please Note: This note may have been constructed using a voice recognition system.**

## 2025-03-17 ENCOUNTER — APPOINTMENT (INPATIENT)
Dept: NON INVASIVE DIAGNOSTICS | Facility: HOSPITAL | Age: 70
DRG: 101 | End: 2025-03-17
Payer: MEDICARE

## 2025-03-17 PROBLEM — G93.40 ACUTE ENCEPHALOPATHY: Status: RESOLVED | Noted: 2025-03-13 | Resolved: 2025-03-17

## 2025-03-17 LAB
ALBUMIN SERPL BCG-MCNC: 3.4 G/DL (ref 3.5–5)
ALBUMIN SERPL BCG-MCNC: 3.4 G/DL (ref 3.5–5)
ALP SERPL-CCNC: 57 U/L (ref 34–104)
ALP SERPL-CCNC: 57 U/L (ref 34–104)
ALT SERPL W P-5'-P-CCNC: 27 U/L (ref 7–52)
ALT SERPL W P-5'-P-CCNC: 27 U/L (ref 7–52)
ANION GAP SERPL CALCULATED.3IONS-SCNC: 7 MMOL/L (ref 4–13)
ANION GAP SERPL CALCULATED.3IONS-SCNC: 7 MMOL/L (ref 4–13)
AORTIC ROOT: 3.6 CM
AORTIC ROOT: 3.6 CM
ASCENDING AORTA: 3.7 CM
ASCENDING AORTA: 3.7 CM
AST SERPL W P-5'-P-CCNC: 30 U/L (ref 13–39)
AST SERPL W P-5'-P-CCNC: 30 U/L (ref 13–39)
BASOPHILS # BLD AUTO: 0.02 THOUSANDS/ÂΜL (ref 0–0.1)
BASOPHILS # BLD AUTO: 0.02 THOUSANDS/ÂΜL (ref 0–0.1)
BASOPHILS NFR BLD AUTO: 1 % (ref 0–1)
BASOPHILS NFR BLD AUTO: 1 % (ref 0–1)
BILIRUB SERPL-MCNC: 0.31 MG/DL (ref 0.2–1)
BILIRUB SERPL-MCNC: 0.31 MG/DL (ref 0.2–1)
BSA FOR ECHO PROCEDURE: 1.8 M2
BSA FOR ECHO PROCEDURE: 1.8 M2
BUN SERPL-MCNC: 23 MG/DL (ref 5–25)
BUN SERPL-MCNC: 23 MG/DL (ref 5–25)
CALCIUM ALBUM COR SERPL-MCNC: 8.8 MG/DL (ref 8.3–10.1)
CALCIUM ALBUM COR SERPL-MCNC: 8.8 MG/DL (ref 8.3–10.1)
CALCIUM SERPL-MCNC: 8.3 MG/DL (ref 8.4–10.2)
CALCIUM SERPL-MCNC: 8.3 MG/DL (ref 8.4–10.2)
CHLORIDE SERPL-SCNC: 102 MMOL/L (ref 96–108)
CHLORIDE SERPL-SCNC: 102 MMOL/L (ref 96–108)
CO2 SERPL-SCNC: 25 MMOL/L (ref 21–32)
CO2 SERPL-SCNC: 25 MMOL/L (ref 21–32)
CREAT SERPL-MCNC: 0.74 MG/DL (ref 0.6–1.3)
CREAT SERPL-MCNC: 0.74 MG/DL (ref 0.6–1.3)
DOP CALC LVOT AREA: 3.14 CM2
DOP CALC LVOT AREA: 3.14 CM2
DOP CALC LVOT DIAMETER: 2 CM
DOP CALC LVOT DIAMETER: 2 CM
E WAVE DECELERATION TIME: 256 MS
E WAVE DECELERATION TIME: 256 MS
E/A RATIO: 0.52
E/A RATIO: 0.52
EOSINOPHIL # BLD AUTO: 0.16 THOUSAND/ÂΜL (ref 0–0.61)
EOSINOPHIL # BLD AUTO: 0.16 THOUSAND/ÂΜL (ref 0–0.61)
EOSINOPHIL NFR BLD AUTO: 5 % (ref 0–6)
EOSINOPHIL NFR BLD AUTO: 5 % (ref 0–6)
ERYTHROCYTE [DISTWIDTH] IN BLOOD BY AUTOMATED COUNT: 14 % (ref 11.6–15.1)
ERYTHROCYTE [DISTWIDTH] IN BLOOD BY AUTOMATED COUNT: 14 % (ref 11.6–15.1)
FLUAV RNA RESP QL NAA+PROBE: NEGATIVE
FLUAV RNA RESP QL NAA+PROBE: NEGATIVE
FLUBV RNA RESP QL NAA+PROBE: NEGATIVE
FLUBV RNA RESP QL NAA+PROBE: NEGATIVE
FRACTIONAL SHORTENING: 37 (ref 28–44)
FRACTIONAL SHORTENING: 37 (ref 28–44)
GFR SERPL CREATININE-BSD FRML MDRD: 93 ML/MIN/1.73SQ M
GFR SERPL CREATININE-BSD FRML MDRD: 93 ML/MIN/1.73SQ M
GLUCOSE SERPL-MCNC: 100 MG/DL (ref 65–140)
GLUCOSE SERPL-MCNC: 100 MG/DL (ref 65–140)
HCT VFR BLD AUTO: 29.1 % (ref 36.5–49.3)
HCT VFR BLD AUTO: 29.1 % (ref 36.5–49.3)
HGB BLD-MCNC: 9.5 G/DL (ref 12–17)
HGB BLD-MCNC: 9.5 G/DL (ref 12–17)
IMM GRANULOCYTES # BLD AUTO: 0.01 THOUSAND/UL (ref 0–0.2)
IMM GRANULOCYTES # BLD AUTO: 0.01 THOUSAND/UL (ref 0–0.2)
IMM GRANULOCYTES NFR BLD AUTO: 0 % (ref 0–2)
IMM GRANULOCYTES NFR BLD AUTO: 0 % (ref 0–2)
INTERVENTRICULAR SEPTUM IN DIASTOLE (PARASTERNAL SHORT AXIS VIEW): 1.2 CM
INTERVENTRICULAR SEPTUM IN DIASTOLE (PARASTERNAL SHORT AXIS VIEW): 1.2 CM
INTERVENTRICULAR SEPTUM: 1.2 CM (ref 0.6–1.1)
INTERVENTRICULAR SEPTUM: 1.2 CM (ref 0.6–1.1)
LAAS-AP2: 19.4 CM2
LAAS-AP2: 19.4 CM2
LAAS-AP4: 21.9 CM2
LAAS-AP4: 21.9 CM2
LEFT ATRIUM SIZE: 3.8 CM
LEFT ATRIUM SIZE: 3.8 CM
LEFT ATRIUM VOLUME (MOD BIPLANE): 67 ML
LEFT ATRIUM VOLUME (MOD BIPLANE): 67 ML
LEFT ATRIUM VOLUME INDEX (MOD BIPLANE): 37.2 ML/M2
LEFT ATRIUM VOLUME INDEX (MOD BIPLANE): 37.2 ML/M2
LEFT INTERNAL DIMENSION IN SYSTOLE: 2.9 CM (ref 2.1–4)
LEFT INTERNAL DIMENSION IN SYSTOLE: 2.9 CM (ref 2.1–4)
LEFT VENTRICULAR INTERNAL DIMENSION IN DIASTOLE: 4.6 CM (ref 3.5–6)
LEFT VENTRICULAR INTERNAL DIMENSION IN DIASTOLE: 4.6 CM (ref 3.5–6)
LEFT VENTRICULAR POSTERIOR WALL IN END DIASTOLE: 1.2 CM
LEFT VENTRICULAR POSTERIOR WALL IN END DIASTOLE: 1.2 CM
LEFT VENTRICULAR STROKE VOLUME: 65 ML
LEFT VENTRICULAR STROKE VOLUME: 65 ML
LV EF US.2D.A4C+ESTIMATED: 60 %
LV EF US.2D.A4C+ESTIMATED: 60 %
LVSV (TEICH): 65 ML
LVSV (TEICH): 65 ML
LYMPHOCYTES # BLD AUTO: 0.29 THOUSANDS/ÂΜL (ref 0.6–4.47)
LYMPHOCYTES # BLD AUTO: 0.29 THOUSANDS/ÂΜL (ref 0.6–4.47)
LYMPHOCYTES NFR BLD AUTO: 9 % (ref 14–44)
LYMPHOCYTES NFR BLD AUTO: 9 % (ref 14–44)
MAGNESIUM SERPL-MCNC: 1.9 MG/DL (ref 1.9–2.7)
MAGNESIUM SERPL-MCNC: 1.9 MG/DL (ref 1.9–2.7)
MCH RBC QN AUTO: 30.3 PG (ref 26.8–34.3)
MCH RBC QN AUTO: 30.3 PG (ref 26.8–34.3)
MCHC RBC AUTO-ENTMCNC: 32.6 G/DL (ref 31.4–37.4)
MCHC RBC AUTO-ENTMCNC: 32.6 G/DL (ref 31.4–37.4)
MCV RBC AUTO: 93 FL (ref 82–98)
MCV RBC AUTO: 93 FL (ref 82–98)
MONOCYTES # BLD AUTO: 0.39 THOUSAND/ÂΜL (ref 0.17–1.22)
MONOCYTES # BLD AUTO: 0.39 THOUSAND/ÂΜL (ref 0.17–1.22)
MONOCYTES NFR BLD AUTO: 12 % (ref 4–12)
MONOCYTES NFR BLD AUTO: 12 % (ref 4–12)
MV E'TISSUE VEL-LAT: 4 CM/S
MV E'TISSUE VEL-LAT: 4 CM/S
MV E'TISSUE VEL-SEP: 4 CM/S
MV E'TISSUE VEL-SEP: 4 CM/S
MV PEAK A VEL: 0.84 M/S
MV PEAK A VEL: 0.84 M/S
MV PEAK E VEL: 44 CM/S
MV PEAK E VEL: 44 CM/S
MV STENOSIS PRESSURE HALF TIME: 74 MS
MV STENOSIS PRESSURE HALF TIME: 74 MS
MV VALVE AREA P 1/2 METHOD: 2.97
MV VALVE AREA P 1/2 METHOD: 2.97
NEUTROPHILS # BLD AUTO: 2.53 THOUSANDS/ÂΜL (ref 1.85–7.62)
NEUTROPHILS # BLD AUTO: 2.53 THOUSANDS/ÂΜL (ref 1.85–7.62)
NEUTS SEG NFR BLD AUTO: 73 % (ref 43–75)
NEUTS SEG NFR BLD AUTO: 73 % (ref 43–75)
NRBC BLD AUTO-RTO: 0 /100 WBCS
NRBC BLD AUTO-RTO: 0 /100 WBCS
PLATELET # BLD AUTO: 201 THOUSANDS/UL (ref 149–390)
PLATELET # BLD AUTO: 201 THOUSANDS/UL (ref 149–390)
PMV BLD AUTO: 8.4 FL (ref 8.9–12.7)
PMV BLD AUTO: 8.4 FL (ref 8.9–12.7)
POTASSIUM SERPL-SCNC: 3.9 MMOL/L (ref 3.5–5.3)
POTASSIUM SERPL-SCNC: 3.9 MMOL/L (ref 3.5–5.3)
PROT SERPL-MCNC: 5.3 G/DL (ref 6.4–8.4)
PROT SERPL-MCNC: 5.3 G/DL (ref 6.4–8.4)
RA PRESSURE ESTIMATED: 3 MMHG
RA PRESSURE ESTIMATED: 3 MMHG
RBC # BLD AUTO: 3.14 MILLION/UL (ref 3.88–5.62)
RBC # BLD AUTO: 3.14 MILLION/UL (ref 3.88–5.62)
RIGHT VENTRICLE ID DIMENSION: 3.9 CM
RIGHT VENTRICLE ID DIMENSION: 3.9 CM
RSV RNA RESP QL NAA+PROBE: NEGATIVE
RSV RNA RESP QL NAA+PROBE: NEGATIVE
RV PSP: 36 MMHG
RV PSP: 36 MMHG
SARS-COV-2 RNA RESP QL NAA+PROBE: NEGATIVE
SARS-COV-2 RNA RESP QL NAA+PROBE: NEGATIVE
SINOTUBULAR JUNCTION: 3.1 CM
SINOTUBULAR JUNCTION: 3.1 CM
SL CV LEFT ATRIUM LENGTH A2C: 6 CM
SL CV LEFT ATRIUM LENGTH A2C: 6 CM
SL CV LV EF: 70
SL CV LV EF: 70
SL CV PED ECHO LEFT VENTRICLE DIASTOLIC VOLUME (MOD BIPLANE) 2D: 99 ML
SL CV PED ECHO LEFT VENTRICLE DIASTOLIC VOLUME (MOD BIPLANE) 2D: 99 ML
SL CV PED ECHO LEFT VENTRICLE SYSTOLIC VOLUME (MOD BIPLANE) 2D: 34 ML
SL CV PED ECHO LEFT VENTRICLE SYSTOLIC VOLUME (MOD BIPLANE) 2D: 34 ML
SL CV SINUS OF VALSALVA 2D: 3.6 CM
SL CV SINUS OF VALSALVA 2D: 3.6 CM
SODIUM SERPL-SCNC: 134 MMOL/L (ref 135–147)
SODIUM SERPL-SCNC: 134 MMOL/L (ref 135–147)
STJ: 3.1 CM
STJ: 3.1 CM
TR MAX PG: 33 MMHG
TR MAX PG: 33 MMHG
TR PEAK VELOCITY: 2.9 M/S
TR PEAK VELOCITY: 2.9 M/S
TRICUSPID ANNULAR PLANE SYSTOLIC EXCURSION: 2.5 CM
TRICUSPID ANNULAR PLANE SYSTOLIC EXCURSION: 2.5 CM
TRICUSPID VALVE PEAK REGURGITATION VELOCITY: 2.87 M/S
TRICUSPID VALVE PEAK REGURGITATION VELOCITY: 2.87 M/S
WBC # BLD AUTO: 3.4 THOUSAND/UL (ref 4.31–10.16)
WBC # BLD AUTO: 3.4 THOUSAND/UL (ref 4.31–10.16)

## 2025-03-17 PROCEDURE — 80053 COMPREHEN METABOLIC PANEL: CPT | Performed by: INTERNAL MEDICINE

## 2025-03-17 PROCEDURE — 83735 ASSAY OF MAGNESIUM: CPT

## 2025-03-17 PROCEDURE — 99232 SBSQ HOSP IP/OBS MODERATE 35: CPT | Performed by: INTERNAL MEDICINE

## 2025-03-17 PROCEDURE — 93306 TTE W/DOPPLER COMPLETE: CPT | Performed by: INTERNAL MEDICINE

## 2025-03-17 PROCEDURE — 85025 COMPLETE CBC W/AUTO DIFF WBC: CPT | Performed by: INTERNAL MEDICINE

## 2025-03-17 PROCEDURE — 93306 TTE W/DOPPLER COMPLETE: CPT

## 2025-03-17 PROCEDURE — 0241U HB NFCT DS VIR RESP RNA 4 TRGT: CPT

## 2025-03-17 PROCEDURE — 84425 ASSAY OF VITAMIN B-1: CPT | Performed by: INTERNAL MEDICINE

## 2025-03-17 RX ADMIN — ABIRATERONE ACETATE 500 MG: 250 TABLET, FILM COATED ORAL at 08:52

## 2025-03-17 RX ADMIN — ACETAMINOPHEN 650 MG: 325 TABLET, FILM COATED ORAL at 04:27

## 2025-03-17 RX ADMIN — DIVALPROEX SODIUM 500 MG: 500 TABLET, DELAYED RELEASE ORAL at 08:52

## 2025-03-17 RX ADMIN — AMLODIPINE BESYLATE 10 MG: 10 TABLET ORAL at 08:53

## 2025-03-17 RX ADMIN — OXYCODONE HYDROCHLORIDE 5 MG: 5 TABLET ORAL at 19:37

## 2025-03-17 RX ADMIN — DIVALPROEX SODIUM 500 MG: 500 TABLET, DELAYED RELEASE ORAL at 21:54

## 2025-03-17 RX ADMIN — ACETAMINOPHEN 650 MG: 325 TABLET, FILM COATED ORAL at 21:53

## 2025-03-17 RX ADMIN — Medication 100 MG: at 08:53

## 2025-03-17 RX ADMIN — MULTIPLE VITAMINS W/ MINERALS TAB 1 TABLET: TAB ORAL at 08:52

## 2025-03-17 RX ADMIN — ARIPIPRAZOLE 5 MG: 5 TABLET ORAL at 08:52

## 2025-03-17 RX ADMIN — MORPHINE SULFATE 15 MG: 15 TABLET, EXTENDED RELEASE ORAL at 08:52

## 2025-03-17 RX ADMIN — ENOXAPARIN SODIUM 40 MG: 40 INJECTION SUBCUTANEOUS at 08:53

## 2025-03-17 RX ADMIN — GABAPENTIN 400 MG: 400 CAPSULE ORAL at 08:53

## 2025-03-17 RX ADMIN — PANTOPRAZOLE SODIUM 40 MG: 40 TABLET, DELAYED RELEASE ORAL at 04:28

## 2025-03-17 RX ADMIN — FOLIC ACID 1 MG: 1 TABLET ORAL at 08:53

## 2025-03-17 RX ADMIN — VILAZODONE HYDROCHLORIDE 40 MG: 20 TABLET ORAL at 21:54

## 2025-03-17 RX ADMIN — PREDNISONE 5 MG: 5 TABLET ORAL at 08:52

## 2025-03-17 RX ADMIN — GABAPENTIN 400 MG: 400 CAPSULE ORAL at 16:46

## 2025-03-17 RX ADMIN — MORPHINE SULFATE 15 MG: 15 TABLET, EXTENDED RELEASE ORAL at 16:46

## 2025-03-17 RX ADMIN — TAMSULOSIN HYDROCHLORIDE 0.4 MG: 0.4 CAPSULE ORAL at 16:46

## 2025-03-17 RX ADMIN — BUPROPION HYDROCHLORIDE 300 MG: 150 TABLET, EXTENDED RELEASE ORAL at 08:52

## 2025-03-17 RX ADMIN — ACETAMINOPHEN 650 MG: 325 TABLET, FILM COATED ORAL at 16:46

## 2025-03-17 RX ADMIN — LOSARTAN POTASSIUM 100 MG: 50 TABLET, FILM COATED ORAL at 08:52

## 2025-03-17 NOTE — ASSESSMENT & PLAN NOTE
Per ex-wife patient has been having progressively worsening ambulatory dysfunction  Falls frequently at home  Apple watch alerted EMS due to fall   Was found down in house, multiple facial lacerations, ecchymosis, bilateral lower extremity skin tears  Patient endorses some ambulatory dysfunction however notes that he fell at a park earlier in the day  Came in as a trauma-workup was negative  MRI brain negative  Fall likely secondary to new onset seizure  Telemetry reviewed and patient bradycardic 51-55 overnight while sleeping.  3/17 echo EF 75% w/ grade 1 DD   PT/OT level 1  Patient medically stable for discharge, and is pending placement.  Plan-    Neurology on board recommendations appreciated  Seizure precautions   S/p IV VPA 1g  Continue Depakote 500 mg BID  Recommend weaning off Wellbutrin gradually and follow-up with psychiatry  [employer] form completed and submitted online on 3/14/2025   Recommend evaluating for cardiogenic cause of fall

## 2025-03-17 NOTE — PLAN OF CARE
Problem: Prexisting or High Potential for Compromised Skin Integrity  Goal: Skin integrity is maintained or improved  Description: INTERVENTIONS:  - Identify patients at risk for skin breakdown  - Assess and monitor skin integrity  - Assess and monitor nutrition and hydration status  - Monitor labs   - Assess for incontinence   - Turn and reposition patient  - Assist with mobility/ambulation  - Relieve pressure over bony prominences  - Avoid friction and shearing  - Provide appropriate hygiene as needed including keeping skin clean and dry  - Evaluate need for skin moisturizer/barrier cream  - Collaborate with interdisciplinary team   - Patient/family teaching  - Consider wound care consult   Outcome: Progressing     Problem: NEUROSENSORY - ADULT  Goal: Achieves stable or improved neurological status  Description: INTERVENTIONS  - Monitor and report changes in neurological status  - Monitor vital signs such as temperature, blood pressure, glucose, and any other labs ordered   - Initiate measures to prevent increased intracranial pressure  - Monitor for seizure activity and implement precautions if appropriate      Outcome: Progressing  Goal: Remains free of injury related to seizures activity  Description: INTERVENTIONS  - Maintain airway, patient safety  and administer oxygen as ordered  - Monitor patient for seizure activity, document and report duration and description of seizure to physician/advanced practitioner  - If seizure occurs,  ensure patient safety during seizure  - Reorient patient post seizure  - Seizure pads on all 4 side rails  - Instruct patient/family to notify RN of any seizure activity including if an aura is experienced  - Instruct patient/family to call for assistance with activity based on nursing assessment  - Administer anti-seizure medications if ordered    Outcome: Progressing  Goal: Achieves maximal functionality and self care  Description: INTERVENTIONS  - Monitor swallowing and  airway patency with patient fatigue and changes in neurological status  - Encourage and assist patient to increase activity and self care.   - Encourage visually impaired, hearing impaired and aphasic patients to use assistive/communication devices  Outcome: Progressing     Problem: CARDIOVASCULAR - ADULT  Goal: Maintains optimal cardiac output and hemodynamic stability  Description: INTERVENTIONS:  - Monitor I/O, vital signs and rhythm  - Monitor for S/S and trends of decreased cardiac output  - Administer and titrate ordered vasoactive medications to optimize hemodynamic stability  - Assess quality of pulses, skin color and temperature  - Assess for signs of decreased coronary artery perfusion  - Instruct patient to report change in severity of symptoms  Outcome: Progressing  Goal: Absence of cardiac dysrhythmias or at baseline rhythm  Description: INTERVENTIONS:  - Continuous cardiac monitoring, vital signs, obtain 12 lead EKG if ordered  - Administer antiarrhythmic and heart rate control medications as ordered  - Monitor electrolytes and administer replacement therapy as ordered  Outcome: Progressing     Problem: RESPIRATORY - ADULT  Goal: Achieves optimal ventilation and oxygenation  Description: INTERVENTIONS:  - Assess for changes in respiratory status  - Assess for changes in mentation and behavior  - Position to facilitate oxygenation and minimize respiratory effort  - Oxygen administered by appropriate delivery if ordered  - Initiate smoking cessation education as indicated  - Encourage broncho-pulmonary hygiene including cough, deep breathe, Incentive Spirometry  - Assess the need for suctioning and aspirate as needed  - Assess and instruct to report SOB or any respiratory difficulty  - Respiratory Therapy support as indicated  Outcome: Progressing     Problem: GASTROINTESTINAL - ADULT  Goal: Minimal or absence of nausea and/or vomiting  Description: INTERVENTIONS:  - Administer IV fluids if ordered to  ensure adequate hydration  - Maintain NPO status until nausea and vomiting are resolved  - Nasogastric tube if ordered  - Administer ordered antiemetic medications as needed  - Provide nonpharmacologic comfort measures as appropriate  - Advance diet as tolerated, if ordered  - Consider nutrition services referral to assist patient with adequate nutrition and appropriate food choices  Outcome: Progressing  Goal: Maintains or returns to baseline bowel function  Description: INTERVENTIONS:  - Assess bowel function  - Encourage oral fluids to ensure adequate hydration  - Administer IV fluids if ordered to ensure adequate hydration  - Administer ordered medications as needed  - Encourage mobilization and activity  - Consider nutritional services referral to assist patient with adequate nutrition and appropriate food choices  Outcome: Progressing  Goal: Maintains adequate nutritional intake  Description: INTERVENTIONS:  - Monitor percentage of each meal consumed  - Identify factors contributing to decreased intake, treat as appropriate  - Assist with meals as needed  - Monitor I&O, weight, and lab values if indicated  - Obtain nutrition services referral as needed  Outcome: Progressing  Goal: Establish and maintain optimal ostomy function  Description: INTERVENTIONS:  - Assess bowel function  - Encourage oral fluids to ensure adequate hydration  - Administer IV fluids if ordered to ensure adequate hydration   - Administer ordered medications as needed  - Encourage mobilization and activity  - Nutrition services referral to assist patient with appropriate food choices  - Assess stoma site  - Consider wound care consult   Outcome: Progressing  Goal: Oral mucous membranes remain intact  Description: INTERVENTIONS  - Assess oral mucosa and hygiene practices  - Implement preventative oral hygiene regimen  - Implement oral medicated treatments as ordered  - Initiate Nutrition services referral as needed  Outcome: Progressing      Problem: GENITOURINARY - ADULT  Goal: Maintains or returns to baseline urinary function  Description: INTERVENTIONS:  - Assess urinary function  - Encourage oral fluids to ensure adequate hydration if ordered  - Administer IV fluids as ordered to ensure adequate hydration  - Administer ordered medications as needed  - Offer frequent toileting  - Follow urinary retention protocol if ordered  Outcome: Progressing  Goal: Absence of urinary retention  Description: INTERVENTIONS:  - Assess patient’s ability to void and empty bladder  - Monitor I/O  - Bladder scan as needed  - Discuss with physician/AP medications to alleviate retention as needed  - Discuss catheterization for long term situations as appropriate  Outcome: Progressing  Goal: Urinary catheter remains patent  Description: INTERVENTIONS:  - Assess patency of urinary catheter  - If patient has a chronic bowser, consider changing catheter if non-functioning  - Follow guidelines for intermittent irrigation of non-functioning urinary catheter  Outcome: Progressing     Problem: METABOLIC, FLUID AND ELECTROLYTES - ADULT  Goal: Electrolytes maintained within normal limits  Description: INTERVENTIONS:  - Monitor labs and assess patient for signs and symptoms of electrolyte imbalances  - Administer electrolyte replacement as ordered  - Monitor response to electrolyte replacements, including repeat lab results as appropriate  - Instruct patient on fluid and nutrition as appropriate  Outcome: Progressing  Goal: Fluid balance maintained  Description: INTERVENTIONS:  - Monitor labs   - Monitor I/O and WT  - Instruct patient on fluid and nutrition as appropriate  - Assess for signs & symptoms of volume excess or deficit  Outcome: Progressing  Goal: Glucose maintained within target range  Description: INTERVENTIONS:  - Monitor Blood Glucose as ordered  - Assess for signs and symptoms of hyperglycemia and hypoglycemia  - Administer ordered medications to maintain glucose  within target range  - Assess nutritional intake and initiate nutrition service referral as needed  Outcome: Progressing     Problem: SKIN/TISSUE INTEGRITY - ADULT  Goal: Skin Integrity remains intact(Skin Breakdown Prevention)  Description: Assess:  -Perform Leonard assessment every   -Clean and moisturize skin every   -Inspect skin when repositioning, toileting, and assisting with ADLS  -Assess under medical devices such as  every   -Assess extremities for adequate circulation and sensation     Bed Management:  -Have minimal linens on bed & keep smooth, unwrinkled  -Change linens as needed when moist or perspiring  -Avoid sitting or lying in one position for more than  hours while in bed  -Keep HOB at degrees     Toileting:  -Offer bedside commode  -Assess for incontinence every   -Use incontinent care products after each incontinent episode such as     Activity:  -Mobilize patient  times a day  -Encourage activity and walks on unit  -Encourage or provide ROM exercises   -Turn and reposition patient every  Hours  -Use appropriate equipment to lift or move patient in bed  -Instruct/ Assist with weight shifting every  when out of bed in chair  -Consider limitation of chair time  hour intervals    Skin Care:  -Avoid use of baby powder, tape, friction and shearing, hot water or constrictive clothing  -Relieve pressure over bony prominences using   -Do not massage red bony areas    Next Steps:  -Teach patient strategies to minimize risks such as   -Consider consults to  interdisciplinary teams such as   Outcome: Progressing  Goal: Incision(s), wounds(s) or drain site(s) healing without S/S of infection  Description: INTERVENTIONS  - Assess and document dressing, incision, wound bed, drain sites and surrounding tissue  - Provide patient and family education  - Perform skin care/dressing changes every   Outcome: Progressing  Goal: Pressure injury heals and does not worsen  Description: Interventions:  - Implement low air  loss mattress or specialty surface (Criteria met)  - Apply silicone foam dressing  - Instruct/assist with weight shifting every  minutes when in chair   - Limit chair time to  hour intervals  - Use special pressure reducing interventions such as  when in chair   - Apply fecal or urinary incontinence containment device   - Perform passive or active ROM every   - Turn and reposition patient & offload bony prominences every  hours   - Utilize friction reducing device or surface for transfers   - Consider consults to  interdisciplinary teams such as   - Use incontinent care products after each incontinent episode such as  - Consider nutrition services referral as needed  Outcome: Progressing     Problem: HEMATOLOGIC - ADULT  Goal: Maintains hematologic stability  Description: INTERVENTIONS  - Assess for signs and symptoms of bleeding or hemorrhage  - Monitor labs  - Administer supportive blood products/factors as ordered and appropriate  Outcome: Progressing     Problem: MUSCULOSKELETAL - ADULT  Goal: Maintain or return mobility to safest level of function  Description: INTERVENTIONS:  - Assess patient's ability to carry out ADLs; assess patient's baseline for ADL function and identify physical deficits which impact ability to perform ADLs (bathing, care of mouth/teeth, toileting, grooming, dressing, etc.)  - Assess/evaluate cause of self-care deficits   - Assess range of motion  - Assess patient's mobility  - Assess patient's need for assistive devices and provide as appropriate  - Encourage maximum independence but intervene and supervise when necessary  - Involve family in performance of ADLs  - Assess for home care needs following discharge   - Consider OT consult to assist with ADL evaluation and planning for discharge  - Provide patient education as appropriate  Outcome: Progressing  Goal: Maintain proper alignment of affected body part  Description: INTERVENTIONS:  - Support, maintain and protect limb and body  alignment  - Provide patient/ family with appropriate education  Outcome: Progressing

## 2025-03-17 NOTE — ASSESSMENT & PLAN NOTE
Baseline mentation- Aox4, lives alone per ex wife, has been progressively declining   Per EMS patient was having seizure-like activity s/p 1 dose of Ativan, further episodes in ED  Unreliable historian?  But did endorse a mechanical fall  Alert to place and self  Mild leukocytosis likely reactive to fall   Head, CT C-spine, CT facial bones-negative  CT CAP-mild right hydronephrosis likely chronic due to UPJ  Polypharmacy with psych and pain meds?  UDS positive for opiates/THC, per son patient does abuse alcohol  3/17 A&O x 3     Plan-  Urinary retention protocol she was retaining in ED  Stat CT head if mentation worsens

## 2025-03-17 NOTE — ASSESSMENT & PLAN NOTE
Per EMS en route  Received 2mg of Ativan  DDX including but not limited to: seizure disorder, pseudoseizure, metabolic abnormality, cardiac etiology, syncope, tumor, other intracranial process, substance abuse, overdose    Plan-   Depakote 500 mg twice daily  Seizure precautions  Resume initial dose of Wellbutrin 300 mg daily  Recommended to wean off this medication per neuro as it lowers seizure threshold  Rest of plan as above

## 2025-03-17 NOTE — [facility] ADMISSION
[facility] admissions team received referral on patient's case for possible [facility] placement.   Patient appears [facility]  appropriate pending medical readiness / continued functional need/  bed availability.

## 2025-03-17 NOTE — PROGRESS NOTES
Progress Note - Hospitalist   Name: Levon Cruz 70 y.o. male I MRN: [id number]  Unit/Bed#: S -01 I Date of Admission: 3/13/2025   Date of Service: 3/17/2025 I Hospital Day: 4    Assessment & Plan  Fall  Per ex-wife patient has been having progressively worsening ambulatory dysfunction  Falls frequently at home  Apple watch alerted EMS due to fall   Was found down in house, multiple facial lacerations, ecchymosis, bilateral lower extremity skin tears  Patient endorses some ambulatory dysfunction however notes that he fell at a park earlier in the day  Came in as a trauma-workup was negative  MRI brain negative  Fall likely secondary to new onset seizure  Telemetry reviewed and patient bradycardic 51-55 overnight while sleeping.  3/17 echo EF 75% w/ grade 1 DD   PT/OT level 1  Patient medically stable for discharge, and is pending placement.  Plan-    Neurology on board recommendations appreciated  Seizure precautions   S/p IV VPA 1g  Continue Depakote 500 mg BID  Recommend weaning off Wellbutrin gradually and follow-up with psychiatry  [employer] [place] completed and submitted online on 3/14/2025   Recommend evaluating for cardiogenic cause of fall  Seizure-like activity (HCC)  Per EMS en route  Received 2mg of Ativan  DDX including but not limited to: seizure disorder, pseudoseizure, metabolic abnormality, cardiac etiology, syncope, tumor, other intracranial process, substance abuse, overdose    Plan-   Depakote 500 mg twice daily  Seizure precautions  Resume initial dose of Wellbutrin 300 mg daily  Recommended to wean off this medication per neuro as it lowers seizure threshold  Rest of plan as above  Acute encephalopathy (Resolved: 3/17/2025)  Baseline mentation- Aox4, lives alone per ex wife, has been progressively declining   Per EMS patient was having seizure-like activity s/p 1 dose of Ativan, further episodes in ED  Unreliable historian?  But did endorse a mechanical fall  Alert to place and self  Mild  leukocytosis likely reactive to fall   Head, CT C-spine, CT facial bones-negative  CT CAP-mild right hydronephrosis likely chronic due to UPJ  Polypharmacy with psych and pain meds?  UDS positive for opiates/THC, per son patient does abuse alcohol  3/17 A&O x 3     Plan-  Urinary retention protocol she was retaining in ED  Stat CT head if mentation worsens  Multiple contusions    Fever  3/16 Patient noted to have a fever Tmax 101.2 last night p.m. that resolved with Tylenol.  Patient denies any respiratory symptoms including cough, congestion, chest pain.  He is also without any urinary complaints.  Suspect fever not due to an infectious cause likely secondary to extensive ecchymosis.  Pro-Gigi negative  3/17 patient had a low-grade fever of 100.4 overnight.     Plan     F/u COVID/flu/RSV swab   Monitor vitals and trend CBC  Monitor for any other signs or symptoms of infection.    VTE Pharmacologic Prophylaxis: VTE Score: 4 Moderate Risk (Score 3-4) - Pharmacological DVT Prophylaxis Ordered: enoxaparin (Lovenox).    Mobility:   Basic Mobility Inpatient Raw Score: 17  -[facility] Goal: 5: Stand one or more mins  -[facility] Achieved: 3: Sit at edge of bed  Not assessed by me    Patient Centered Rounds: I performed bedside rounds with nursing staff today.   Discussions with Specialists or Other Care Team Provider: None    Education and Discussions with Family / Patient: Updated  (Vaishali) via phone.     Current Length of Stay: 4 day(s)  Current Patient Status: Inpatient   Certification Statement: The patient will continue to require additional inpatient hospital stay due to fall/possible seizure  Discharge Plan: Anticipate discharge in 24-48 hrs to discharge location to be determined pending rehab evaluations.    Code Status: Level 1 - Full Code    Subjective   Patient seen and examined at bedside. Overnight patient had fever Tmax 100.4 that resolved with Tylenol. Patient only endorses upper back pain.  He is  tolerating p.o. without difficulty.  Denies any other signs or symptoms. He slept well without difficulty. Denies n/v. Last BM 2 days ago.    Objective :  Temp:  [98.3 °F (36.8 °C)-100.4 °F (38 °C)] 98.3 °F (36.8 °C)  HR:  [61-92] 61  BP: (105-139)/(74-83) 125/78  SpO2:  [95 %-98 %] 97 %  O2 Device: None (Room air)    Body mass index is 25.34 kg/m².     Input and Output Summary (last 24 hours):     Intake/Output Summary (Last 24 hours) at 3/17/2025 1423  Last data filed at 3/17/2025 0401  Gross per 24 hour   Intake 720 ml   Output 650 ml   Net 70 ml       Physical Exam  Vitals and nursing note reviewed.   Constitutional:       General: He is not in acute distress.     Appearance: He is well-developed.   HENT:      Head: Normocephalic.      Comments: Bilateral periorbital edema, diffuse ecchymosis to the face, nose  Abrasions noted to forehead, nose, chin  Eyes:      Extraocular Movements: Extraocular movements intact.      Conjunctiva/sclera: Conjunctivae normal.      Pupils: Pupils are equal, round, and reactive to light.   Cardiovascular:      Rate and Rhythm: Normal rate and regular rhythm.      Pulses: Normal pulses.      Heart sounds: Normal heart sounds. No murmur heard.     No friction rub. No gallop.   Pulmonary:      Effort: Pulmonary effort is normal. No respiratory distress.      Breath sounds: Normal breath sounds. No wheezing or rhonchi.   Chest:      Chest wall: No tenderness.   Abdominal:      General: Bowel sounds are normal. There is no distension.      Palpations: Abdomen is soft. There is no mass.      Tenderness: There is no abdominal tenderness. There is no guarding or rebound.   Musculoskeletal:         General: No swelling or deformity. Normal range of motion.      Cervical back: Normal range of motion and neck supple.      Right lower leg: No edema.      Left lower leg: No edema.      Comments: Multiple superficial skin tears and ecchymosis to the legs bilaterally  Abrasion noted to left  elbow   Skin:     General: Skin is warm and dry.      Capillary Refill: Capillary refill takes less than 2 seconds.   Neurological:      Mental Status: He is alert.      Sensory: Sensory deficit present.      Motor: No weakness.      Coordination: Romberg sign positive. Finger-Nose-Finger Test abnormal (Mild past-pointing on L).      Comments: Decreased sensation and proprioception noted to bilateral lower extremities   Psychiatric:         Mood and Affect: Mood normal.       Lines/Drains:      Lab Results: I have reviewed the following results:   Results from last 7 days   Lab Units 03/17/25  0426 03/14/25  0602 03/13/25  1923   WBC Thousand/uL 3.40*   < > 10.55*   HEMOGLOBIN g/dL 9.5*   < > 10.0*   HEMATOCRIT % 29.1*   < > 29.2*   PLATELETS Thousands/uL 201   < > 228   BANDS PCT %  --   --  1   SEGS PCT % 73   < >  --    LYMPHO PCT % 9*   < > 1*   MONO PCT % 12   < > 1*   EOS PCT % 5   < > 0    < > = values in this interval not displayed.     Results from last 7 days   Lab Units 03/17/25  0426   SODIUM mmol/L 134*   POTASSIUM mmol/L 3.9   CHLORIDE mmol/L 102   CO2 mmol/L 25   BUN mg/dL 23   CREATININE mg/dL 0.74   ANION GAP mmol/L 7   CALCIUM mg/dL 8.3*   ALBUMIN g/dL 3.4*   TOTAL BILIRUBIN mg/dL 0.31   ALK PHOS U/L 57   ALT U/L 27   AST U/L 30   GLUCOSE RANDOM mg/dL 100     Results from last 7 days   Lab Units 03/13/25  1923   INR  0.94             Results from last 7 days   Lab Units 03/16/25  0430 03/13/25  2253   LACTIC ACID mmol/L  --  0.7   PROCALCITONIN ng/ml 0.10  --        Recent Cultures (last 7 days):         Imaging Results Review: No pertinent imaging studies reviewed.  Other Study Results Review: No additional pertinent studies reviewed.    Last 24 Hours Medication List:     Current Facility-Administered Medications:     abiraterone (ZYTIGA) tablet 500 mg, Daily Before Breakfast    acetaminophen (TYLENOL) tablet 650 mg, Q8H KYLE    amLODIPine (NORVASC) tablet 10 mg, Daily    ARIPiprazole (ABILIFY)  tablet 5 mg, Daily    [Held by provider] atorvastatin (LIPITOR) tablet 80 mg, Daily With Dinner    buPROPion (WELLBUTRIN XL) 24 hr tablet 300 mg, Daily    divalproex sodium (DEPAKOTE) DR tablet 500 mg, Q12H KYLE    enoxaparin (LOVENOX) subcutaneous injection 40 mg, Daily    folic acid (FOLVITE) tablet 1 mg, Daily    gabapentin (NEURONTIN) capsule 400 mg, BID    [Held by provider] hydroCHLOROthiazide tablet 25 mg, Daily    LORazepam (ATIVAN) injection 0.5 mg, Once PRN    losartan (COZAAR) tablet 100 mg, Daily    [Held by provider] modafinil (PROVIGIL) tablet 200 mg, Daily    morphine (MS CONTIN) ER tablet 15 mg, BID    multivitamin-minerals (CENTRUM) tablet 1 tablet, Daily    oxyCODONE (ROXICODONE) immediate release tablet 10 mg, Q4H PRN    oxyCODONE (ROXICODONE) IR tablet 5 mg, Q4H PRN    pantoprazole (PROTONIX) EC tablet 40 mg, Early Morning    predniSONE tablet 5 mg, Daily    tamsulosin (FLOMAX) capsule 0.4 mg, Daily With Dinner    thiamine tablet 100 mg, Daily    vilazodone (VIIBRYD) tablet 40 mg, HS    Administrative Statements   Today, Patient Was Seen By: Val Edward MD      **Please Note: This note may have been constructed using a voice recognition system.**

## 2025-03-17 NOTE — TELEPHONE ENCOUNTER
PT IS MARKED FOR MERGE ORIGINAL CHART MRN#  [id number]         STILL ADMITTED:3/13/2025 - present (4 days)  [facility]        DO SUHAIL [facility] Discharge  Levon Cruz will need follow-up in in 6 weeks with epilepsy team for Other in 60 minute appointment. They will not require outpatient neurological testing.

## 2025-03-17 NOTE — CASE MANAGEMENT
Case Management Assessment & Discharge Planning Note    Patient name Lveon Cruz  Location S /S -01 MRN [id number]  : 1955 Date 3/17/2025       Current Admission Date: 3/13/2025  Current Admission Diagnosis:Fall   Patient Active Problem List    Diagnosis Date Noted Date Diagnosed    Fever 2025     Multiple contusions 2025     Fall 2025     Seizure-like activity (HCC) 2025       LOS (days): 4  Geometric Mean LOS (GMLOS) (days): 2.7  Days to GMLOS:-1     OBJECTIVE:    Risk of Unplanned Readmission Score: 19.35         Current admission status: Inpatient       Preferred Pharmacy: No Pharmacies Listed  Primary Care Provider: Leonard Hanson MD    Primary Insurance: MEDICARE  Secondary Insurance: [employer]    ASSESSMENT:  Active Health Care Proxies    There are no active Health Care Proxies on file.                 Readmission Root Cause  30 Day Readmission: No    Patient Information  Admitted from:: Home  Mental Status: Alert  During Assessment patient was accompanied by: Not accompanied during assessment  Assessment information provided by:: Patient  Primary Caregiver: Self  Support Systems: Self, Son  [place] Residence: [place] [facility]  What city do you live in?: [place]  Home entry access options. Select all that apply.: Stairs  Number of steps to enter home.: One Flight  Type of Current Residence: 2 story home  Upon entering residence, is there a bedroom on the main floor (no further steps)?: Yes  Upon entering residence, is there a bathroom on the main floor (no further steps)?: Yes  Living Arrangements: Lives Alone    Activities of Daily Living Prior to Admission  Functional Status: Independent  Completes ADLs independently?: Yes  Ambulates independently?: Yes  Does patient use assisted devices?: Yes  Assisted Devices (DME) used: Straight Cane  Does patient currently own DME?: Yes  What DME does the patient currently own?: Straight Cane  Does patient have a  history of Outpatient Therapy (PT/OT)?: No  Does the patient have a history of Short-Term Rehab?: No  Does patient have a history of HHC?: Yes  Does patient currently have HHC?: No         Patient Information Continued  Income Source: Pension/group home  Does patient have prescription coverage?: Yes  Can the patient afford their medications and any related supplies (such as glucometers or test strips)?: Yes  Does patient receive dialysis treatments?: No  Does patient have a history of substance abuse?: No  Does patient have a history of Mental Health Diagnosis?: No         Means of Transportation  Means of Transport to Appts:: Public Transportation - [employer]          DISCHARGE DETAILS:    Discharge planning discussed with:: Patient at bedside  Freedom of Choice: Yes     CM contacted family/caregiver?: No- see comments (Pt declined)     Did patient/caregiver verbalize understanding of patient care needs?: Yes  Were patient/caregiver advised of the risks associated with not following Treatment Team discharge recommendations?: Yes    Contacts  Reason/Outcome: Discharge Planning, Referral, Continuity of Care      Other Referral/Resources/Interventions Provided:  Referral Comments: Pt is recommended for Acute rehab. Pt agreeable to blanket referral- submitted in [facility]    Pt resides alone. Pt uses a cane at baseline, uses [employer] for transport and is independent w/ adls.  Pt is recommended for Acute rehab and agreeable to blanket referrals-submitted in [facility].

## 2025-03-17 NOTE — ASSESSMENT & PLAN NOTE
3/16 Patient noted to have a fever Tmax 101.2 last night p.m. that resolved with Tylenol.  Patient denies any respiratory symptoms including cough, congestion, chest pain.  He is also without any urinary complaints.  Suspect fever not due to an infectious cause likely secondary to extensive ecchymosis.  Pro-Gigi negative  3/17 patient had a low-grade fever of 100.4 overnight.     Plan     F/u COVID/flu/RSV swab   Monitor vitals and trend CBC  Monitor for any other signs or symptoms of infection.

## 2025-03-17 NOTE — OR NURSING
RN pulled 2 doses of MS contin from [facility] accidentally - administered one 15 mg tablet and returned one 15mg  tablet with Lola Gardner RN to [employer]

## 2025-03-18 VITALS
OXYGEN SATURATION: 99 % | BODY MASS INDEX: 25.23 KG/M2 | OXYGEN SATURATION: 99 % | HEIGHT: 66 IN | RESPIRATION RATE: 17 BRPM | HEIGHT: 66 IN | HEART RATE: 90 BPM | SYSTOLIC BLOOD PRESSURE: 116 MMHG | DIASTOLIC BLOOD PRESSURE: 78 MMHG | TEMPERATURE: 98.3 F | DIASTOLIC BLOOD PRESSURE: 78 MMHG | WEIGHT: 157 LBS | RESPIRATION RATE: 17 BRPM | WEIGHT: 157 LBS | TEMPERATURE: 98.3 F | BODY MASS INDEX: 25.23 KG/M2 | HEART RATE: 90 BPM | SYSTOLIC BLOOD PRESSURE: 116 MMHG

## 2025-03-18 PROBLEM — R50.9 FEVER: Status: RESOLVED | Noted: 2025-03-16 | Resolved: 2025-03-18

## 2025-03-18 PROBLEM — D72.819 LEUKOPENIA: Status: ACTIVE | Noted: 2025-03-18

## 2025-03-18 LAB
BASOPHILS # BLD MANUAL: 0 THOUSAND/UL (ref 0–0.1)
BASOPHILS # BLD MANUAL: 0 THOUSAND/UL (ref 0–0.1)
BASOPHILS NFR MAR MANUAL: 0 % (ref 0–1)
BASOPHILS NFR MAR MANUAL: 0 % (ref 0–1)
EOSINOPHIL # BLD MANUAL: 0.4 THOUSAND/UL (ref 0–0.4)
EOSINOPHIL # BLD MANUAL: 0.4 THOUSAND/UL (ref 0–0.4)
EOSINOPHIL NFR BLD MANUAL: 14 % (ref 0–6)
EOSINOPHIL NFR BLD MANUAL: 14 % (ref 0–6)
ERYTHROCYTE [DISTWIDTH] IN BLOOD BY AUTOMATED COUNT: 13.9 % (ref 11.6–15.1)
ERYTHROCYTE [DISTWIDTH] IN BLOOD BY AUTOMATED COUNT: 13.9 % (ref 11.6–15.1)
HCT VFR BLD AUTO: 27.9 % (ref 36.5–49.3)
HCT VFR BLD AUTO: 27.9 % (ref 36.5–49.3)
HGB BLD-MCNC: 9.1 G/DL (ref 12–17)
HGB BLD-MCNC: 9.1 G/DL (ref 12–17)
LYMPHOCYTES # BLD AUTO: 0.46 THOUSAND/UL (ref 0.6–4.47)
LYMPHOCYTES # BLD AUTO: 0.46 THOUSAND/UL (ref 0.6–4.47)
LYMPHOCYTES # BLD AUTO: 16 % (ref 14–44)
LYMPHOCYTES # BLD AUTO: 16 % (ref 14–44)
MCH RBC QN AUTO: 30.5 PG (ref 26.8–34.3)
MCH RBC QN AUTO: 30.5 PG (ref 26.8–34.3)
MCHC RBC AUTO-ENTMCNC: 32.6 G/DL (ref 31.4–37.4)
MCHC RBC AUTO-ENTMCNC: 32.6 G/DL (ref 31.4–37.4)
MCV RBC AUTO: 94 FL (ref 82–98)
MCV RBC AUTO: 94 FL (ref 82–98)
METAMYELOCYTE ABSOLUTE CT: 0.06 THOUSAND/UL (ref 0–0.1)
METAMYELOCYTE ABSOLUTE CT: 0.06 THOUSAND/UL (ref 0–0.1)
METAMYELOCYTES NFR BLD MANUAL: 2 % (ref 0–1)
METAMYELOCYTES NFR BLD MANUAL: 2 % (ref 0–1)
MONOCYTES # BLD AUTO: 0.23 THOUSAND/UL (ref 0–1.22)
MONOCYTES # BLD AUTO: 0.23 THOUSAND/UL (ref 0–1.22)
MONOCYTES NFR BLD: 8 % (ref 4–12)
MONOCYTES NFR BLD: 8 % (ref 4–12)
NEUTROPHILS # BLD MANUAL: 1.72 THOUSAND/UL (ref 1.85–7.62)
NEUTROPHILS # BLD MANUAL: 1.72 THOUSAND/UL (ref 1.85–7.62)
NEUTS SEG NFR BLD AUTO: 60 % (ref 43–75)
NEUTS SEG NFR BLD AUTO: 60 % (ref 43–75)
PLATELET # BLD AUTO: 205 THOUSANDS/UL (ref 149–390)
PLATELET # BLD AUTO: 205 THOUSANDS/UL (ref 149–390)
PLATELET BLD QL SMEAR: ADEQUATE
PLATELET BLD QL SMEAR: ADEQUATE
PMV BLD AUTO: 8.2 FL (ref 8.9–12.7)
PMV BLD AUTO: 8.2 FL (ref 8.9–12.7)
RBC # BLD AUTO: 2.98 MILLION/UL (ref 3.88–5.62)
RBC # BLD AUTO: 2.98 MILLION/UL (ref 3.88–5.62)
RBC MORPH BLD: NORMAL
RBC MORPH BLD: NORMAL
WBC # BLD AUTO: 2.86 THOUSAND/UL (ref 4.31–10.16)
WBC # BLD AUTO: 2.86 THOUSAND/UL (ref 4.31–10.16)

## 2025-03-18 PROCEDURE — 99239 HOSP IP/OBS DSCHRG MGMT >30: CPT | Performed by: INTERNAL MEDICINE

## 2025-03-18 PROCEDURE — 97535 SELF CARE MNGMENT TRAINING: CPT

## 2025-03-18 PROCEDURE — 85027 COMPLETE CBC AUTOMATED: CPT

## 2025-03-18 PROCEDURE — 97116 GAIT TRAINING THERAPY: CPT

## 2025-03-18 PROCEDURE — 85007 BL SMEAR W/DIFF WBC COUNT: CPT

## 2025-03-18 RX ORDER — BUPROPION HYDROCHLORIDE 150 MG/1
300 TABLET ORAL DAILY
Start: 2025-03-18 | End: 2025-04-02 | Stop reason: SDUPTHER

## 2025-03-18 RX ORDER — LANOLIN ALCOHOL/MO/W.PET/CERES
100 CREAM (GRAM) TOPICAL DAILY
Qty: 30 TABLET | Refills: 0 | Status: SHIPPED | OUTPATIENT
Start: 2025-03-19

## 2025-03-18 RX ORDER — DIVALPROEX SODIUM 500 MG/1
500 TABLET, DELAYED RELEASE ORAL EVERY 12 HOURS SCHEDULED
Qty: 60 TABLET | Refills: 0 | Status: SHIPPED | OUTPATIENT
Start: 2025-03-18

## 2025-03-18 RX ORDER — FOLIC ACID 1 MG/1
1 TABLET ORAL DAILY
Qty: 30 TABLET | Refills: 0 | Status: SHIPPED | OUTPATIENT
Start: 2025-03-19

## 2025-03-18 RX ADMIN — DIVALPROEX SODIUM 500 MG: 500 TABLET, DELAYED RELEASE ORAL at 09:09

## 2025-03-18 RX ADMIN — PANTOPRAZOLE SODIUM 40 MG: 40 TABLET, DELAYED RELEASE ORAL at 05:00

## 2025-03-18 RX ADMIN — MORPHINE SULFATE 15 MG: 15 TABLET, EXTENDED RELEASE ORAL at 09:10

## 2025-03-18 RX ADMIN — TAMSULOSIN HYDROCHLORIDE 0.4 MG: 0.4 CAPSULE ORAL at 17:28

## 2025-03-18 RX ADMIN — FOLIC ACID 1 MG: 1 TABLET ORAL at 09:11

## 2025-03-18 RX ADMIN — LOSARTAN POTASSIUM 100 MG: 50 TABLET, FILM COATED ORAL at 09:10

## 2025-03-18 RX ADMIN — ENOXAPARIN SODIUM 40 MG: 40 INJECTION SUBCUTANEOUS at 09:11

## 2025-03-18 RX ADMIN — MULTIPLE VITAMINS W/ MINERALS TAB 1 TABLET: TAB ORAL at 09:10

## 2025-03-18 RX ADMIN — MORPHINE SULFATE 15 MG: 15 TABLET, EXTENDED RELEASE ORAL at 17:28

## 2025-03-18 RX ADMIN — Medication 100 MG: at 09:11

## 2025-03-18 RX ADMIN — GABAPENTIN 400 MG: 400 CAPSULE ORAL at 17:28

## 2025-03-18 RX ADMIN — ACETAMINOPHEN 650 MG: 325 TABLET, FILM COATED ORAL at 05:00

## 2025-03-18 RX ADMIN — ABIRATERONE ACETATE 500 MG: 250 TABLET, FILM COATED ORAL at 05:00

## 2025-03-18 RX ADMIN — PREDNISONE 5 MG: 5 TABLET ORAL at 09:11

## 2025-03-18 RX ADMIN — BUPROPION HYDROCHLORIDE 300 MG: 150 TABLET, EXTENDED RELEASE ORAL at 09:11

## 2025-03-18 RX ADMIN — ARIPIPRAZOLE 5 MG: 5 TABLET ORAL at 09:10

## 2025-03-18 RX ADMIN — AMLODIPINE BESYLATE 10 MG: 10 TABLET ORAL at 09:11

## 2025-03-18 RX ADMIN — GABAPENTIN 400 MG: 400 CAPSULE ORAL at 09:09

## 2025-03-18 NOTE — [facility] ADMISSION
CM closed [facility] referral in [facility] because patient progressed with therapy and will now be discharging home with home health services.

## 2025-03-18 NOTE — PLAN OF CARE
Problem: Prexisting or High Potential for Compromised Skin Integrity  Goal: Skin integrity is maintained or improved  Description: INTERVENTIONS:  - Identify patients at risk for skin breakdown  - Assess and monitor skin integrity  - Assess and monitor nutrition and hydration status  - Monitor labs   - Assess for incontinence   - Turn and reposition patient  - Assist with mobility/ambulation  - Relieve pressure over bony prominences  - Avoid friction and shearing  - Provide appropriate hygiene as needed including keeping skin clean and dry  - Evaluate need for skin moisturizer/barrier cream  - Collaborate with interdisciplinary team   - Patient/family teaching  - Consider wound care consult   Outcome: Progressing     Problem: NEUROSENSORY - ADULT  Goal: Achieves stable or improved neurological status  Description: INTERVENTIONS  - Monitor and report changes in neurological status  - Monitor vital signs such as temperature, blood pressure, glucose, and any other labs ordered   - Initiate measures to prevent increased intracranial pressure  - Monitor for seizure activity and implement precautions if appropriate      Outcome: Progressing  Goal: Remains free of injury related to seizures activity  Description: INTERVENTIONS  - Maintain airway, patient safety  and administer oxygen as ordered  - Monitor patient for seizure activity, document and report duration and description of seizure to physician/advanced practitioner  - If seizure occurs,  ensure patient safety during seizure  - Reorient patient post seizure  - Seizure pads on all 4 side rails  - Instruct patient/family to notify RN of any seizure activity including if an aura is experienced  - Instruct patient/family to call for assistance with activity based on nursing assessment  - Administer anti-seizure medications if ordered    Outcome: Progressing  Goal: Achieves maximal functionality and self care  Description: INTERVENTIONS  - Monitor swallowing and  airway patency with patient fatigue and changes in neurological status  - Encourage and assist patient to increase activity and self care.   - Encourage visually impaired, hearing impaired and aphasic patients to use assistive/communication devices  Outcome: Progressing     Problem: CARDIOVASCULAR - ADULT  Goal: Maintains optimal cardiac output and hemodynamic stability  Description: INTERVENTIONS:  - Monitor I/O, vital signs and rhythm  - Monitor for S/S and trends of decreased cardiac output  - Administer and titrate ordered vasoactive medications to optimize hemodynamic stability  - Assess quality of pulses, skin color and temperature  - Assess for signs of decreased coronary artery perfusion  - Instruct patient to report change in severity of symptoms  Outcome: Progressing  Goal: Absence of cardiac dysrhythmias or at baseline rhythm  Description: INTERVENTIONS:  - Continuous cardiac monitoring, vital signs, obtain 12 lead EKG if ordered  - Administer antiarrhythmic and heart rate control medications as ordered  - Monitor electrolytes and administer replacement therapy as ordered  Outcome: Progressing     Problem: RESPIRATORY - ADULT  Goal: Achieves optimal ventilation and oxygenation  Description: INTERVENTIONS:  - Assess for changes in respiratory status  - Assess for changes in mentation and behavior  - Position to facilitate oxygenation and minimize respiratory effort  - Oxygen administered by appropriate delivery if ordered  - Initiate smoking cessation education as indicated  - Encourage broncho-pulmonary hygiene including cough, deep breathe, Incentive Spirometry  - Assess the need for suctioning and aspirate as needed  - Assess and instruct to report SOB or any respiratory difficulty  - Respiratory Therapy support as indicated  Outcome: Progressing     Problem: GASTROINTESTINAL - ADULT  Goal: Minimal or absence of nausea and/or vomiting  Description: INTERVENTIONS:  - Administer IV fluids if ordered to  ensure adequate hydration  - Maintain NPO status until nausea and vomiting are resolved  - Nasogastric tube if ordered  - Administer ordered antiemetic medications as needed  - Provide nonpharmacologic comfort measures as appropriate  - Advance diet as tolerated, if ordered  - Consider nutrition services referral to assist patient with adequate nutrition and appropriate food choices  Outcome: Progressing  Goal: Maintains or returns to baseline bowel function  Description: INTERVENTIONS:  - Assess bowel function  - Encourage oral fluids to ensure adequate hydration  - Administer IV fluids if ordered to ensure adequate hydration  - Administer ordered medications as needed  - Encourage mobilization and activity  - Consider nutritional services referral to assist patient with adequate nutrition and appropriate food choices  Outcome: Progressing  Goal: Maintains adequate nutritional intake  Description: INTERVENTIONS:  - Monitor percentage of each meal consumed  - Identify factors contributing to decreased intake, treat as appropriate  - Assist with meals as needed  - Monitor I&O, weight, and lab values if indicated  - Obtain nutrition services referral as needed  Outcome: Progressing  Goal: Establish and maintain optimal ostomy function  Description: INTERVENTIONS:  - Assess bowel function  - Encourage oral fluids to ensure adequate hydration  - Administer IV fluids if ordered to ensure adequate hydration   - Administer ordered medications as needed  - Encourage mobilization and activity  - Nutrition services referral to assist patient with appropriate food choices  - Assess stoma site  - Consider wound care consult   Outcome: Progressing  Goal: Oral mucous membranes remain intact  Description: INTERVENTIONS  - Assess oral mucosa and hygiene practices  - Implement preventative oral hygiene regimen  - Implement oral medicated treatments as ordered  - Initiate Nutrition services referral as needed  Outcome: Progressing      Problem: GENITOURINARY - ADULT  Goal: Maintains or returns to baseline urinary function  Description: INTERVENTIONS:  - Assess urinary function  - Encourage oral fluids to ensure adequate hydration if ordered  - Administer IV fluids as ordered to ensure adequate hydration  - Administer ordered medications as needed  - Offer frequent toileting  - Follow urinary retention protocol if ordered  Outcome: Progressing  Goal: Absence of urinary retention  Description: INTERVENTIONS:  - Assess patient’s ability to void and empty bladder  - Monitor I/O  - Bladder scan as needed  - Discuss with physician/AP medications to alleviate retention as needed  - Discuss catheterization for long term situations as appropriate  Outcome: Progressing  Goal: Urinary catheter remains patent  Description: INTERVENTIONS:  - Assess patency of urinary catheter  - If patient has a chronic bowser, consider changing catheter if non-functioning  - Follow guidelines for intermittent irrigation of non-functioning urinary catheter  Outcome: Progressing     Problem: METABOLIC, FLUID AND ELECTROLYTES - ADULT  Goal: Electrolytes maintained within normal limits  Description: INTERVENTIONS:  - Monitor labs and assess patient for signs and symptoms of electrolyte imbalances  - Administer electrolyte replacement as ordered  - Monitor response to electrolyte replacements, including repeat lab results as appropriate  - Instruct patient on fluid and nutrition as appropriate  Outcome: Progressing  Goal: Fluid balance maintained  Description: INTERVENTIONS:  - Monitor labs   - Monitor I/O and WT  - Instruct patient on fluid and nutrition as appropriate  - Assess for signs & symptoms of volume excess or deficit  Outcome: Progressing  Goal: Glucose maintained within target range  Description: INTERVENTIONS:  - Monitor Blood Glucose as ordered  - Assess for signs and symptoms of hyperglycemia and hypoglycemia  - Administer ordered medications to maintain glucose  within target range  - Assess nutritional intake and initiate nutrition service referral as needed  Outcome: Progressing     Problem: SKIN/TISSUE INTEGRITY - ADULT  Goal: Skin Integrity remains intact(Skin Breakdown Prevention)  Description: Assess:  -Perform Leonard assessment every   -Clean and moisturize skin every   -Inspect skin when repositioning, toileting, and assisting with ADLS  -Assess under medical devices such as  every   -Assess extremities for adequate circulation and sensation     Bed Management:  -Have minimal linens on bed & keep smooth, unwrinkled  -Change linens as needed when moist or perspiring  -Avoid sitting or lying in one position for more than  hours while in bed  -Keep HOB at 30 degrees     Toileting:  -Offer bedside commode  -Assess for incontinence every   -Use incontinent care products after each incontinent episode such as     Activity:  -Mobilize patient  times a day  -Encourage activity and walks on unit  -Encourage or provide ROM exercises   -Turn and reposition patient every  Hours  -Use appropriate equipment to lift or move patient in bed  -Instruct/ Assist with weight shifting every  when out of bed in chair  -Consider limitation of chair time  hour intervals    Skin Care:  -Avoid use of baby powder, tape, friction and shearing, hot water or constrictive clothing  -Relieve pressure over bony prominences using   -Do not massage red bony areas    Next Steps:  -Teach patient strategies to minimize risks such as    -Consider consults to  interdisciplinary teams such as   Outcome: Progressing  Goal: Incision(s), wounds(s) or drain site(s) healing without S/S of infection  Description: INTERVENTIONS  - Assess and document dressing, incision, wound bed, drain sites and surrounding tissue  - Provide patient and family education  - Perform skin care/dressing changes every   Outcome: Progressing  Goal: Pressure injury heals and does not worsen  Description: Interventions:  - Implement low  air loss mattress or specialty surface (Criteria met)  - Apply silicone foam dressing  - Instruct/assist with weight shifting every  minutes when in chair   - Limit chair time to  hour intervals  - Use special pressure reducing interventions such as  when in chair   - Apply fecal or urinary incontinence containment device   - Perform passive or active ROM every   - Turn and reposition patient & offload bony prominences every  hours   - Utilize friction reducing device or surface for transfers   - Consider consults to  interdisciplinary teams such as   - Use incontinent care products after each incontinent episode such as  - Consider nutrition services referral as needed  Outcome: Progressing     Problem: HEMATOLOGIC - ADULT  Goal: Maintains hematologic stability  Description: INTERVENTIONS  - Assess for signs and symptoms of bleeding or hemorrhage  - Monitor labs  - Administer supportive blood products/factors as ordered and appropriate  Outcome: Progressing     Problem: MUSCULOSKELETAL - ADULT  Goal: Maintain or return mobility to safest level of function  Description: INTERVENTIONS:  - Assess patient's ability to carry out ADLs; assess patient's baseline for ADL function and identify physical deficits which impact ability to perform ADLs (bathing, care of mouth/teeth, toileting, grooming, dressing, etc.)  - Assess/evaluate cause of self-care deficits   - Assess range of motion  - Assess patient's mobility  - Assess patient's need for assistive devices and provide as appropriate  - Encourage maximum independence but intervene and supervise when necessary  - Involve family in performance of ADLs  - Assess for home care needs following discharge   - Consider OT consult to assist with ADL evaluation and planning for discharge  - Provide patient education as appropriate  Outcome: Progressing  Goal: Maintain proper alignment of affected body part  Description: INTERVENTIONS:  - Support, maintain and protect limb and  body alignment  - Provide patient/ family with appropriate education  Outcome: Progressing

## 2025-03-18 NOTE — PHYSICAL THERAPY NOTE
PHYSICAL THERAPY TREATMENT NOTE          Patient Name: Levon Cruz  Today's Date: 3/18/2025        AGE:   70 y.o.  Mrn:   [id number]  ADMIT DX:  Multiple injuries [T07.XXXA]  Seizure-like activity (HCC) [R56.9]  Multiple abrasions [T07.XXXA]  Fall, initial encounter [W19.XXXA]  Contusion of head, unspecified part of head, initial encounter [S00.93XA]    Past Medical History:  History reviewed. No pertinent past medical history.       25 1413   PT Last Visit   PT Visit Date 25   Note Type   Note Type Treatment   Restrictions/Precautions   Weight Bearing Precautions Per Order No   Other Precautions Chair Alarm;Bed Alarm;Pain   General   Chart Reviewed Yes   Additional Pertinent History Pt reports he has a cane and might have a walker available upon DC, spoke to pt regarding having an AD on each floor of his townhouse (to avoid carrying device up/down the steps), spoke to CM post - pt possibly needing a RW upon DC   Family/Caregiver Present No   Cognition   Overall Cognitive Status WFL   Arousal/Participation Alert;Cooperative   Attention Within functional limits   Orientation Level Oriented X4   Memory Decreased recall of recent events   Following Commands Follows one step commands without difficulty   Comments Pt ID via name and  on wristband; pt pleasant and very motivated to participate in PT tx   Bed Mobility   Additional Comments pt OOB in recliner chair upon arrival to room, returned to chair at end of session   Transfers   Sit to Stand 5  Supervision   Additional items Assist x 1;Armrests   Stand to Sit 5  Supervision   Additional items Assist x 1;Armrests;Verbal cues   Additional Comments 2 sit<>stand transfers, VC for chair approach w/ RW and minimizing ambulating backwards as much as possible   Ambulation/Elevation   Gait pattern Decreased foot clearance;Short stride;Decreased hip extension;Decreased heel strike   Gait Assistance 5   Supervision   Additional items Assist x 1;Verbal cues   Assistive Device Rolling walker   Distance 140'+240'   Stair Management Assistance 5  Supervision   Additional items Assist x 1;Verbal cues   Stair Management Technique One rail R;Step to pattern   Number of Stairs 10  (Wooden practice step in room)   Ambulation/Elevation Additional Comments pt able to negotiate multiple turns/obtacles w/o assistance from PT   Balance   Static Sitting Fair +   Dynamic Sitting Fair   Static Standing Fair  (w/ RW)   Dynamic Standing Fair -   Ambulatory Fair -  (w/ RW)   Activity Tolerance   Activity Tolerance Patient tolerated treatment well   Medical Staff Made Aware MONISHA Manzano, CARLEEN Cervantes   Nurse Made Aware ANASTASIA Roland   Assessment   Prognosis Good   Problem List Decreased strength;Impaired balance;Decreased mobility;Decreased skin integrity   Assessment PT treatment provided today to address deficits of: impaired balance, gait deviations. Interventions provided include: transfer, balance, endurance, gait, and stair negotiation in order to challenge pt's activity tolerance, progress pt towards his baseline mobility, and to maximize pt independence w/ functional mobility during current admission w/ fall. Compared to previous session, pt w/ overall improvement in OOB functional mobility. Pt demonstrated ability to progress to supervision level of assistance w/ the RW to ambulate as well as negotiate 10 steps w/ unilateral RUE support. Pt reports he feels as if he will be able to function w/in his home environment using the RW and he is interested in having HHPT upon DC. Pt will continue benefit from PT to promote independence w/ functional mobility, address mobility deficits, and progress towards set goals. Recommend DC w/ level: III (Minimum Rehab Resource Intensity) when medically cleared.   Goals   Patient Goals to go home   STG Expiration Date 03/25/25   Short Term Goal #1 Pt will be able to: (1) perform bed mobility with mod I to  promote OOB activity (2) perform sit to stand with supervision to decrease burden of care (3) ambulate at least 200` with supervision and least restrictive AD to increase activity tolerance (4) increase standing balance by 1 grade to decrease risk of falls (5) negotiate at least 13 stairs with supervision and use of handrail to allow safe access into home   PT Treatment Day 2   Plan   Treatment/Interventions Functional transfer training;LE strengthening/ROM;Elevations;Therapeutic exercise;Endurance training;Patient/family training;Equipment eval/education;Bed mobility;Gait training;Compensatory technique education   Progress Progressing toward goals   PT Frequency 2-3x/wk   Discharge Recommendation   Rehab Resource Intensity Level, PT III (Minimum Resource Intensity)   Equipment Recommended Walker   Walker Package Recommended Wheeled walker   Change/add to Walker Package? No   AM-PAC Basic Mobility Inpatient   Turning in Flat Bed Without Bedrails 4   Lying on Back to Sitting on Edge of Flat Bed Without Bedrails 4   Moving Bed to Chair 3   Standing Up From Chair Using Arms 3   Walk in Room 3   Climb 3-5 Stairs With Railing 3   Basic Mobility Inpatient Raw Score 20   Basic Mobility Standardized Score 43.99   [facility] Highest Level Of Mobility   -HLM Goal 6: Walk 10 steps or more   -HLM Achieved 8: Walk 250 feet ot more   Education   Education Provided Mobility training;Assistive device   Patient Demonstrates acceptance/verbal understanding   End of Consult   Patient Position at End of Consult Bedside chair;Bed/Chair alarm activated;All needs within reach       The patient's AM-PAC Basic Mobility Inpatient Short Form Raw Score is 20. A Raw score of greater than 16 suggests the patient may benefit from discharge to home. Please also refer to the recommendation of the Physical Therapist for safe discharge planning.    Pt will continue to benefit from skilled inpatient PT during this admission in order to  facilitate progress towards goals and to maximize pt's independence w/ functional mobility.    DC rec: level III (Minimum Rehab Resource Intensity)      Sobia Reid PT, DPT  03/18/25

## 2025-03-18 NOTE — CASE MANAGEMENT
Case Management Discharge Planning Note    Patient name Levon Cruz  Location S /S -01 MRN [id number]  : 1955 Date 3/18/2025       Current Admission Date: 3/13/2025  Current Admission Diagnosis:Fall   Patient Active Problem List    Diagnosis Date Noted Date Diagnosed    Fever 2025     Multiple contusions 2025     Fall 2025     Seizure-like activity (HCC) 2025       LOS (days): 5  Geometric Mean LOS (GMLOS) (days): 2.7  Days to GMLOS:-2     OBJECTIVE:  Risk of Unplanned Readmission Score: 19.43         Current admission status: Inpatient   Preferred Pharmacy: No Pharmacies Listed  Primary Care Provider: Leonard Hanson MD    Primary Insurance: MEDICARE  Secondary Insurance: [facility]    DISCHARGE DETAILS:        Requested Home Health Care         Is the patient interested in HHC at discharge?: Yes  Home Health Discipline requested:: Nursing, Occupational Therapy, Physical Therapy  Home Health Agency Name:: [facility] External Referral Reason (only applicable if external HHA name selected): Services not provided in network or near patient location  Home Health Follow-Up Provider:: PCP  Home Health Services Needed:: Strengthening/Theraputic Exercises to Improve Function, Evaluate Functional Status and Safety, Gait/ADL Training, Other (comment) (new medication review)  Homebound Criteria Met:: Uses an Assist Device (i.e. cane, walker, etc)  Supporting Clincal Findings:: Limited Endurance, Fatigues Easliy in Short Distances         Other Referral/Resources/Interventions Provided:  Referral Comments: [facility] in [place]       IMM Given (Date):: 25  IMM Given to:: Patient       Pt was re-evaluated by Physical and Occupational Therapy. Pt is now reccommended for HHC vs Acute rehab. Pt prefers to return home w/ HHC. [facility] is reserved in [place]. CM updated Pts son via speaker phone at bedside. CARLEEN  left  for Pts ex-wife Vaishali.  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     Pt is requesting a Lyft ride home as he does not have anyone able to pick him up. Lyft ride will be requested once Pt is ready to leave.

## 2025-03-18 NOTE — PLAN OF CARE
Problem: Prexisting or High Potential for Compromised Skin Integrity  Goal: Skin integrity is maintained or improved  Description: INTERVENTIONS:  - Identify patients at risk for skin breakdown  - Assess and monitor skin integrity  - Assess and monitor nutrition and hydration status  - Monitor labs   - Assess for incontinence   - Turn and reposition patient  - Assist with mobility/ambulation  - Relieve pressure over bony prominences  - Avoid friction and shearing  - Provide appropriate hygiene as needed including keeping skin clean and dry  - Evaluate need for skin moisturizer/barrier cream  - Collaborate with interdisciplinary team   - Patient/family teaching  - Consider wound care consult   Outcome: Adequate for Discharge     Problem: OCCUPATIONAL THERAPY ADULT  Goal: Performs self-care activities at highest level of function for planned discharge setting.  See evaluation for individualized goals.  Description: Treatment Interventions: ADL retraining, Functional transfer training, Cognitive reorientation, Endurance training, Patient/family training, Equipment evaluation/education, Compensatory technique education, Energy conservation, Activityengagement (cognitive assessments)          See flowsheet documentation for full assessment, interventions and recommendations.   Outcome: Adequate for Discharge     Problem: PHYSICAL THERAPY ADULT  Goal: Performs mobility at highest level of function for planned discharge setting.  See evaluation for individualized goals.  Description: Treatment/Interventions: Functional transfer training, LE strengthening/ROM, Elevations, Therapeutic exercise, Endurance training, Patient/family training, Equipment eval/education, Bed mobility, Gait training          See flowsheet documentation for full assessment, interventions and recommendations.  Outcome: Adequate for Discharge     Problem: NEUROSENSORY - ADULT  Goal: Achieves stable or improved neurological status  Description:  INTERVENTIONS  - Monitor and report changes in neurological status  - Monitor vital signs such as temperature, blood pressure, glucose, and any other labs ordered   - Initiate measures to prevent increased intracranial pressure  - Monitor for seizure activity and implement precautions if appropriate      Outcome: Adequate for Discharge  Goal: Remains free of injury related to seizures activity  Description: INTERVENTIONS  - Maintain airway, patient safety  and administer oxygen as ordered  - Monitor patient for seizure activity, document and report duration and description of seizure to physician/advanced practitioner  - If seizure occurs,  ensure patient safety during seizure  - Reorient patient post seizure  - Seizure pads on all 4 side rails  - Instruct patient/family to notify RN of any seizure activity including if an aura is experienced  - Instruct patient/family to call for assistance with activity based on nursing assessment  - Administer anti-seizure medications if ordered    Outcome: Adequate for Discharge  Goal: Achieves maximal functionality and self care  Description: INTERVENTIONS  - Monitor swallowing and airway patency with patient fatigue and changes in neurological status  - Encourage and assist patient to increase activity and self care.   - Encourage visually impaired, hearing impaired and aphasic patients to use assistive/communication devices  Outcome: Adequate for Discharge     Problem: CARDIOVASCULAR - ADULT  Goal: Maintains optimal cardiac output and hemodynamic stability  Description: INTERVENTIONS:  - Monitor I/O, vital signs and rhythm  - Monitor for S/S and trends of decreased cardiac output  - Administer and titrate ordered vasoactive medications to optimize hemodynamic stability  - Assess quality of pulses, skin color and temperature  - Assess for signs of decreased coronary artery perfusion  - Instruct patient to report change in severity of symptoms  Outcome: Adequate for  Discharge  Goal: Absence of cardiac dysrhythmias or at baseline rhythm  Description: INTERVENTIONS:  - Continuous cardiac monitoring, vital signs, obtain 12 lead EKG if ordered  - Administer antiarrhythmic and heart rate control medications as ordered  - Monitor electrolytes and administer replacement therapy as ordered  Outcome: Adequate for Discharge     Problem: RESPIRATORY - ADULT  Goal: Achieves optimal ventilation and oxygenation  Description: INTERVENTIONS:  - Assess for changes in respiratory status  - Assess for changes in mentation and behavior  - Position to facilitate oxygenation and minimize respiratory effort  - Oxygen administered by appropriate delivery if ordered  - Initiate smoking cessation education as indicated  - Encourage broncho-pulmonary hygiene including cough, deep breathe, Incentive Spirometry  - Assess the need for suctioning and aspirate as needed  - Assess and instruct to report SOB or any respiratory difficulty  - Respiratory Therapy support as indicated  Outcome: Adequate for Discharge     Problem: GASTROINTESTINAL - ADULT  Goal: Minimal or absence of nausea and/or vomiting  Description: INTERVENTIONS:  - Administer IV fluids if ordered to ensure adequate hydration  - Maintain NPO status until nausea and vomiting are resolved  - Nasogastric tube if ordered  - Administer ordered antiemetic medications as needed  - Provide nonpharmacologic comfort measures as appropriate  - Advance diet as tolerated, if ordered  - Consider nutrition services referral to assist patient with adequate nutrition and appropriate food choices  Outcome: Adequate for Discharge  Goal: Maintains or returns to baseline bowel function  Description: INTERVENTIONS:  - Assess bowel function  - Encourage oral fluids to ensure adequate hydration  - Administer IV fluids if ordered to ensure adequate hydration  - Administer ordered medications as needed  - Encourage mobilization and activity  - Consider nutritional  services referral to assist patient with adequate nutrition and appropriate food choices  Outcome: Adequate for Discharge  Goal: Maintains adequate nutritional intake  Description: INTERVENTIONS:  - Monitor percentage of each meal consumed  - Identify factors contributing to decreased intake, treat as appropriate  - Assist with meals as needed  - Monitor I&O, weight, and lab values if indicated  - Obtain nutrition services referral as needed  Outcome: Adequate for Discharge  Goal: Establish and maintain optimal ostomy function  Description: INTERVENTIONS:  - Assess bowel function  - Encourage oral fluids to ensure adequate hydration  - Administer IV fluids if ordered to ensure adequate hydration   - Administer ordered medications as needed  - Encourage mobilization and activity  - Nutrition services referral to assist patient with appropriate food choices  - Assess stoma site  - Consider wound care consult   Outcome: Adequate for Discharge  Goal: Oral mucous membranes remain intact  Description: INTERVENTIONS  - Assess oral mucosa and hygiene practices  - Implement preventative oral hygiene regimen  - Implement oral medicated treatments as ordered  - Initiate Nutrition services referral as needed  Outcome: Adequate for Discharge     Problem: GENITOURINARY - ADULT  Goal: Maintains or returns to baseline urinary function  Description: INTERVENTIONS:  - Assess urinary function  - Encourage oral fluids to ensure adequate hydration if ordered  - Administer IV fluids as ordered to ensure adequate hydration  - Administer ordered medications as needed  - Offer frequent toileting  - Follow urinary retention protocol if ordered  Outcome: Adequate for Discharge  Goal: Absence of urinary retention  Description: INTERVENTIONS:  - Assess patient’s ability to void and empty bladder  - Monitor I/O  - Bladder scan as needed  - Discuss with physician/AP medications to alleviate retention as needed  - Discuss catheterization for long  term situations as appropriate  Outcome: Adequate for Discharge  Goal: Urinary catheter remains patent  Description: INTERVENTIONS:  - Assess patency of urinary catheter  - If patient has a chronic bowser, consider changing catheter if non-functioning  - Follow guidelines for intermittent irrigation of non-functioning urinary catheter  Outcome: Adequate for Discharge     Problem: METABOLIC, FLUID AND ELECTROLYTES - ADULT  Goal: Electrolytes maintained within normal limits  Description: INTERVENTIONS:  - Monitor labs and assess patient for signs and symptoms of electrolyte imbalances  - Administer electrolyte replacement as ordered  - Monitor response to electrolyte replacements, including repeat lab results as appropriate  - Instruct patient on fluid and nutrition as appropriate  Outcome: Adequate for Discharge  Goal: Fluid balance maintained  Description: INTERVENTIONS:  - Monitor labs   - Monitor I/O and WT  - Instruct patient on fluid and nutrition as appropriate  - Assess for signs & symptoms of volume excess or deficit  Outcome: Adequate for Discharge  Goal: Glucose maintained within target range  Description: INTERVENTIONS:  - Monitor Blood Glucose as ordered  - Assess for signs and symptoms of hyperglycemia and hypoglycemia  - Administer ordered medications to maintain glucose within target range  - Assess nutritional intake and initiate nutrition service referral as needed  Outcome: Adequate for Discharge     Problem: SKIN/TISSUE INTEGRITY - ADULT  Goal: Skin Integrity remains intact(Skin Breakdown Prevention)  Description: Assess:  -Perform Leonard assessment  -Clean and moisturize skin   -Inspect skin when repositioning, toileting, and assisting with ADLS  -Assess under medical devices   -Assess extremities for adequate circulation and sensation     Bed Management:  -Have minimal linens on bed & keep smooth, unwrinkled  -Change linens as needed when moist or perspiring  -Avoid sitting or lying in one  position for more than 2 hours while in bed  -Keep HOB at 30 degrees     Toileting:  -Offer bedside commode  -Assess for incontinence   -Use incontinent care products after each incontinent episode     Activity:  -Mobilize patient 2 times a day  -Encourage activity and walks on unit  -Encourage or provide ROM exercises   -Turn and reposition patient every 2 Hours  -Use appropriate equipment to lift or move patient in bed  -Instruct/ Assist with weight shifting when out of bed in chair  -Consider limitation of chair time 2 hour intervals    Skin Care:  -Avoid use of baby powder, tape, friction and shearing, hot water or constrictive clothing  -Relieve pressure over bony prominences  -Do not massage red bony areas    Next Steps:  -Teach patient strategies to minimize risks    -Consider consults to  interdisciplinary teams   Outcome: Adequate for Discharge  Goal: Incision(s), wounds(s) or drain site(s) healing without S/S of infection  Description: INTERVENTIONS  - Assess and document dressing, incision, wound bed, drain sites and surrounding tissue  - Provide patient and family education  - Perform skin care/dressing changes   Outcome: Adequate for Discharge  Goal: Pressure injury heals and does not worsen  Description: Interventions:  - Implement low air loss mattress or specialty surface (Criteria met)  - Apply silicone foam dressing  - Instruct/assist with weight shifting every 30 minutes when in chair   - Limit chair time to 2 hour intervals  - Use special pressure reducing interventions when in chair   - Apply fecal or urinary incontinence containment device   - Perform passive or active ROM   - Turn and reposition patient & offload bony prominences every 2 hours   - Utilize friction reducing device or surface for transfers   - Consider consults to  interdisciplinary teams  - Use incontinent care products after each incontinent episode   - Consider nutrition services referral as needed  Outcome: Adequate for  Discharge     Problem: HEMATOLOGIC - ADULT  Goal: Maintains hematologic stability  Description: INTERVENTIONS  - Assess for signs and symptoms of bleeding or hemorrhage  - Monitor labs  - Administer supportive blood products/factors as ordered and appropriate  Outcome: Adequate for Discharge     Problem: MUSCULOSKELETAL - ADULT  Goal: Maintain or return mobility to safest level of function  Description: INTERVENTIONS:  - Assess patient's ability to carry out ADLs; assess patient's baseline for ADL function and identify physical deficits which impact ability to perform ADLs (bathing, care of mouth/teeth, toileting, grooming, dressing, etc.)  - Assess/evaluate cause of self-care deficits   - Assess range of motion  - Assess patient's mobility  - Assess patient's need for assistive devices and provide as appropriate  - Encourage maximum independence but intervene and supervise when necessary  - Involve family in performance of ADLs  - Assess for home care needs following discharge   - Consider OT consult to assist with ADL evaluation and planning for discharge  - Provide patient education as appropriate  Outcome: Adequate for Discharge  Goal: Maintain proper alignment of affected body part  Description: INTERVENTIONS:  - Support, maintain and protect limb and body alignment  - Provide patient/ family with appropriate education  Outcome: Adequate for Discharge

## 2025-03-18 NOTE — PLAN OF CARE
Problem: PHYSICAL THERAPY ADULT  Goal: Performs mobility at highest level of function for planned discharge setting.  See evaluation for individualized goals.  Description: Treatment/Interventions: Functional transfer training, LE strengthening/ROM, Elevations, Therapeutic exercise, Endurance training, Patient/family training, Equipment eval/education, Bed mobility, Gait training          See flowsheet documentation for full assessment, interventions and recommendations.  Outcome: Progressing  Note: Prognosis: Good  Problem List: Decreased strength, Impaired balance, Decreased mobility, Decreased skin integrity  Assessment: PT treatment provided today to address deficits of: impaired balance, gait deviations. Interventions provided include: transfer, balance, endurance, gait, and stair negotiation in order to challenge pt's activity tolerance, progress pt towards his baseline mobility, and to maximize pt independence w/ functional mobility during current admission w/ fall. Compared to previous session, pt w/ overall improvement in OOB functional mobility. Pt demonstrated ability to progress to supervision level of assistance w/ the RW to ambulate as well as negotiate 10 steps w/ unilateral RUE support. Pt reports he feels as if he will be able to function w/in his home environment using the R and he is interested in having HHPT upon DC. Pt will continue benefit from PT to promote independence w/ functional mobility, address mobility deficits, and progress towards set goals. Recommend DC w/ level: III (Minimum Rehab Resource Intensity) when medically cleared.        Rehab Resource Intensity Level, PT: III (Minimum Resource Intensity)    See flowsheet documentation for full assessment.

## 2025-03-18 NOTE — PLAN OF CARE
Problem: OCCUPATIONAL THERAPY ADULT  Goal: Performs self-care activities at highest level of function for planned discharge setting.  See evaluation for individualized goals.  Description: Treatment Interventions: ADL retraining, Functional transfer training, Cognitive reorientation, Endurance training, Patient/family training, Equipment evaluation/education, Compensatory technique education, Energy conservation, Activityengagement (cognitive assessments)          See flowsheet documentation for full assessment, interventions and recommendations.   Outcome: Progressing  Note: Limitation: Decreased ADL status, Decreased UE strength, Decreased Safe judgement during ADL, Decreased cognition, Decreased endurance, Decreased self-care trans, Decreased high-level ADLs (balance, safety awarenss, standing tolerance)  Prognosis: Good  Assessment: Patient seen for OT treatment on 3/18/2025 s/p admission for Fall Patient agreeable to OT session. Patient participated in fall prevention , self-care transfers, bed mobility , functional mobility, and ADLs with intervention focus on optimizing independence in functional mobility, self-care transfers and ADL tasks. Levon AlvaresNamara is showing improvements in attention, insight, balance, strength, activity tolerance and pain management but is continuing to perform below baseline due to the following deficits: decreased standing tolerance for self care tasks , decreased dynamic balance impacting functional reach, and (+) pain . Personal factors continuing to impact D/C include: (+) Hx of falls , decreased social/family support within the home, Assistance needed for ADL/IADLs, Assistance needed for ADLs and functional mobility, High fall risk , and decreased recall of precautions  From OT standpoint, patient would benefit from skilled intervention to maximize independence with ADLs and functional mobility. Goals remain appropriate, continue POC. At this time, recommending D/C to:  level III (minimum resource intensity).     Rehab Resource Intensity Level, OT: III (Minimum Resource Intensity)     Natacha Kirby MS OTR/L   [place] Licensure# [id number]

## 2025-03-18 NOTE — OCCUPATIONAL THERAPY NOTE
Occupational Therapy Treatment Note     Patient Name: Levon Cruz  Today's Date: 3/18/2025  Problem List  Principal Problem:    Fall  Active Problems:    Seizure-like activity (HCC)    Multiple contusions    Fever          03/18/25 1247   OT Last Visit   OT Visit Date 03/18/25   Note Type   Note Type Treatment   Pain Assessment   Pain Assessment Tool 0-10   Pain Score 3   Pain Location/Orientation Location: Generalized  (chronic in nature)   Effect of Pain on Daily Activities limits comfort   Patient's Stated Pain Goal No pain   Hospital Pain Intervention(s) Repositioned;Emotional support   Multiple Pain Sites No   Restrictions/Precautions   Weight Bearing Precautions Per Order No   Other Precautions Chair Alarm;Bed Alarm;Fall Risk;Pain;Seizure   Lifestyle   [place] with ADLs/IADLs. SPC for fnxl mobility. (+) falls and (-)    Reciprocal Relationships Son who currently lives in [place], plans to return back to [place] at the end of this year. Spouse, reports they are on good terms but seperated two-three months ago   Service to Others Retired teacher   Intrinsic Gratification traveling   ADL   Eating Assistance 7  Independent   Eating Comments w/ lunch tray   Grooming Assistance 6  Modified Independent   Grooming Deficit Setup;Wash/dry face;Teeth care   Grooming Comments completed standing at the sink w/o support or steadying   LB Bathing Comments pt reports sponge bathing earlier this AM Mod I with setup   LB Dressing Assistance 5  Supervision/Setup   LB Dressing Deficit Setup;Thread LLE into underwear;Thread RLE into underwear;Thread RLE into pants;Thread LLE into pants;Pull up over hips   LB Dressing Comments thread clothing over BLEs tailor sitting at the EOB, able to pull over hips in standing w/o support / steadying   Toileting Comments denies the need to void at this time   Functional Standing Tolerance   Time 3 mins   Activity ADL tasks standing at the sink  "  Comments Fair static standing balance, no overt LOB or signs of instability - pt tolerated well   Bed Mobility   Supine to Sit 6  Modified independent   Additional items HOB elevated   Sit to Supine   (NT: OOB to recliner chair)   Additional Comments Denies lightheadedness/dizziness w/ postural changes   Transfers   Sit to Stand 5  Supervision   Additional items Increased time required;Verbal cues   Stand to Sit 5  Supervision   Additional items Increased time required;Verbal cues   Stand pivot 5  Supervision   Additional items Increased time required;Verbal cues  (w/ RW)   Toilet transfer 5  Supervision   Additional items Assist x 1;Increased time required;Verbal cues;Standard toilet  (use of R grab bar)   Additional Comments STS performed x 4 from various self-care surfaces w/ supervision + use of RW. Fair recall of optimal hand placement and body mechanics for safe transfers - continued education   Functional Mobility   Functional Mobility 5  Supervision   Additional Comments for functional household distance x 2 w/ seated rest break inbetween. Pt tolerated well. No overt LOB or signs of instability   Additional items Rolling walker   Toilet Transfers   Toilet Transfer From Rolling walker   Toilet Transfer Type To and from   Toilet Transfer to Standard toilet   Toilet Transfer Technique Ambulating   Toilet Transfers Supervision   Toilet Transfers Comments use of R grab bar   Subjective   Subjective \"I feel good\"   Cognition   Overall Cognitive Status WFL  (WFL during conversation)   Arousal/Participation Alert;Responsive;Cooperative   Attention Within functional limits   Orientation Level Oriented X4   Memory Decreased short term memory   Following Commands Follows one step commands without difficulty   Comments Pt ID via wristband, name and . Pt is pleasent and cooperative. Improved attention, safety and awareness noted on this date. Pt reports having local supportive friends / family who can help out as " needed upon discharge.   Activity Tolerance   Activity Tolerance Patient tolerated treatment well   Medical Staff Made Aware [place] with SLIM, CM, PT, RN [place]   Assessment   Assessment Patient seen for OT treatment on 3/18/2025 s/p admission for Fall Patient agreeable to OT session. Patient participated in fall prevention , self-care transfers, bed mobility , functional mobility, and ADLs with intervention focus on optimizing independence in functional mobility, self-care transfers and ADL tasks. Levon Cruz is showing improvements in attention, insight, balance, strength, activity tolerance and pain management but is continuing to perform below baseline due to the following deficits: decreased standing tolerance for self care tasks , decreased dynamic balance impacting functional reach, and (+) pain . Personal factors continuing to impact D/C include: (+) Hx of falls , decreased social/family support within the home, Assistance needed for ADL/IADLs, Assistance needed for ADLs and functional mobility, High fall risk , and decreased recall of precautions  From OT standpoint, patient would benefit from skilled intervention to maximize independence with ADLs and functional mobility. Goals remain appropriate, continue POC. At this time, recommending D/C to: level III (minimum resource intensity).   Plan   Treatment Interventions ADL retraining;Functional transfer training;Patient/family training;Equipment evaluation/education;Compensatory technique education;Energy conservation;Activityengagement   Goal Expiration Date 03/24/25   OT Treatment Day 1   OT Frequency 2-3x/wk   Discharge Recommendation   Rehab Resource Intensity Level, OT III (Minimum Resource Intensity)   AM-PAC Daily Activity Inpatient   Lower Body Dressing 3   Bathing 3   Toileting 3   Upper Body Dressing 4   Grooming 4   Eating 4   Daily Activity Raw Score 21   Daily Activity Standardized Score (Calc for Raw Score >=11) 44.27   AM-PAC Applied  Cognition Inpatient   Following a Speech/Presentation 4   Understanding Ordinary Conversation 4   Taking Medications 4   Remembering Where Things Are Placed or Put Away 4   Remembering List of 4-5 Errands 3   Taking Care of Complicated Tasks 3   Applied Cognition Raw Score 22   Applied Cognition Standardized Score 47.83   End of Consult   Education Provided Yes   Patient Position at End of Consult Bedside chair;All needs within reach;Bed/Chair alarm activated   Nurse Communication Nurse aware of consult   GOALS:      -Patient will perform grooming tasks standing at sink with overall Mod I in order to increase overall independence (PROGRESSING)      -Patient will be Mod I with UB dressing using AE and AD as needed in order to increase (I) with ADLs     -Patient will be Mod I with UB bathing using AE and AD as needed in order to increase (I) with ADLs     -Patient will be Mod I with LB dressing with use of AE and AD as needed in order to increase (I) with ADLs (PROGRESSING)      -Patient will be Mod I with LB bathing with use of AE and AD as needed in order to increase (I) with ADLs     -Patient will complete toileting w/ Mod I w/ G hygiene/thoroughness in order to reduce caregiver burden     -Patient will demonstrate Mod I with bed mobility for ability to manage own comfort and initiate OOB tasks.  (PROGRESSING)      -Patient will perform functional transfers with Mod I to/from all surfaces using DME as needed in order to increase (I) with functional tasks (PROGRESSING)      -Patient will be Mod I with functional mobility to/from bathroom for increased independence with toileting tasks (PROGRESSING)      -Patient will tolerate therapeutic activities for greater than 30 min, in order to increase tolerance for functional activities. (PROGRESSING)      -Patient will engage in ongoing cognitive assessment in order to assist with safe discharge planning/recommendations.     -Patient will independently integrate one pacing  strategy into morning ADL's.      -Patient will demonstrate standing for 3 min in order to increase active participation in functional activities (GOAL MET: progress to 6-8 mins)     -Patient will independently identify and utilize 2-3 positive coping strategies to enhance overall wellbeing and engagement in valued occupations. (PROGRESSING)     The patient's raw score on the AM-PAC Daily Activity Inpatient Short Form is 21. A raw score of greater than or equal to 19 suggests the patient may benefit from discharge to home. Please refer to the recommendation of the Occupational Therapist for safe discharge planning.    Natacha Kirby MS OTR/L   [place] Licensure# [id number]  \

## 2025-03-18 NOTE — CASE MANAGEMENT
Case Management Progress Note    Patient name Levon Cruz  Location S /S -01 MRN [id number]  : 1955 Date 3/18/2025       LOS (days): 5  Geometric Mean LOS (GMLOS) (days): 2.7  Days to GMLOS:-2        OBJECTIVE:        Current admission status: Inpatient  Preferred Pharmacy: No Pharmacies Listed  Primary Care Provider: Leonard Hanson MD    Primary Insurance: MEDICARE  Secondary Insurance: AARP    PROGRESS NOTE:    Weekly Care Management Length of Stay Review     Current LOS: 5 Days    Most Recent Labs:     Lab Results   Component Value Date/Time    WBC 2.86 (L) 2025 04:53 AM    HGB 9.1 (L) 2025 04:53 AM    HCT 27.9 (L) 2025 04:53 AM     2025 04:53 AM    SODIUM 134 (L) 2025 04:26 AM    K 3.9 2025 04:26 AM     2025 04:26 AM    CO2 25 2025 04:26 AM    BUN 23 2025 04:26 AM    CREATININE 0.74 2025 04:26 AM    GLUC 100 2025 04:26 AM    ALKPHOS 57 2025 04:26 AM    ALT 27 2025 04:26 AM    AST 30 2025 04:26 AM    ALB 3.4 (L) 2025 04:26 AM    TBILI 0.31 2025 04:26 AM       Most Recent Vitals:   Vitals:    25 1506   BP: 116/78   Pulse: 90   Resp:    Temp: 98.3 °F (36.8 °C)   SpO2: 99%        Identified Barriers to Discharge/Discharge Goals/Care Management Interventions: fall, recommendations advanced to dc home. Medically stable    Intended Discharge Disposition: home-awaiting Leasing office to provide key.    Expected Discharge Date:  today vs 24hrs

## 2025-03-18 NOTE — [facility] ADMISSION
[facility]  met with patient at bedside. Reviewed [facility] program, acute rehab criteria and approval process, medicare benefit/days/criteria, as well as [facility] locations and preferences. Patient's preferred [facility] location is [facility] and second choice is  [facility].  [facility] Rehab folder left with patient and all questions answered. Patient was made aware that [facility] Reviewer will keep their  updated regarding referral status.

## 2025-03-18 NOTE — PROGRESS NOTES
Patient:    MRN:  [id number]    Violet Request ID:  [id number]    Level of care reserved:  Home Health Agency    Partner Reserved:  [facility], [facility], [place] [address] [phone]    Clinical needs requested:    Geography searched:  [phone]    Start of Service:    Request sent:  2:06pm EDT on 3/18/2025 by Helen Rose    Partner reserved:  3:12pm EDT on 3/18/2025 by Helen Rose    Choice list shared:
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
## 2025-03-18 NOTE — DISCHARGE INSTR - AVS FIRST PAGE
Dear Levon Cruz,     It was our pleasure to care for you here at [facility].  It is our hope that we were always able to exceed the expected standards for your care during your stay.  You were hospitalized due to unwitnessed fall/possible seizure.  You were cared for on the third floor by Val Edward MD under the service of Artie Watt MD with the [facility] who covers for your primary care physician (PCP), Leonard Hanson MD, while you were hospitalized.  If you have any questions or concerns related to this hospitalization, you may contact us at .  For follow up as well as any medication refills, we recommend that you follow up with your primary care physician.  A registered nurse will reach out to you by phone within a few days after your discharge to answer any additional questions that you may have after going home.  However, at this time we provide for you here, the most important instructions / recommendations at discharge:     Notable Medication Adjustments -   Stop hydrochlorothiazide until otherwise instructed by your prescriber   Start Depakote 500 mg, 1 tablet, every 12 hours starting tonight 3/18  Start folic acid 1 mg, 1 tablet, daily starting tomorrow  Start thiamine 100 mg, 1 tablet, daily starting tomorrow  Continue Wellbutrin 300 mg daily.  You are advised to follow-up with psychiatry to have this medication weaned off.  Testing Required after Discharge -   Please have a complete blood count, comprehensive metabolic panel, Valproic acid level repeated within 1 week.  Neurology have already ordered these test.  Important follow up information -   Please follow-up with neurology in 6 weeks  Please follow-up with your psychiatrist as soon as possible to discuss weaning off of Wellbutrin  Other Instructions -   Please return to the emergency department if you experience loss of consciousness, yellowing of  skin, abdominal pain, or develop any new or worsening symptoms.  No driving, lifting heavy machinery, climbing heights, swimming unattended, hot tub baths or any other activity that will place you in danger in case of a seizure for at least six months.  Please review this entire after visit summary as additional general instructions including medication list, appointments, activity, diet, any pertinent wound care, and other additional recommendations from your care team that may be provided for you.      Sincerely,     Val Edward MD

## 2025-03-19 ENCOUNTER — TELEPHONE (OUTPATIENT)
Dept: PSYCHIATRY | Facility: CLINIC | Age: 70
End: 2025-03-19

## 2025-03-19 ENCOUNTER — TRANSITIONAL CARE MANAGEMENT (OUTPATIENT)
Dept: FAMILY MEDICINE CLINIC | Facility: CLINIC | Age: 70
End: 2025-03-19

## 2025-03-19 ENCOUNTER — TELEMEDICINE (OUTPATIENT)
Dept: PSYCHIATRY | Facility: CLINIC | Age: 70
End: 2025-03-19
Payer: MEDICARE

## 2025-03-19 DIAGNOSIS — F33.0 MILD EPISODE OF RECURRENT MAJOR DEPRESSIVE DISORDER (HCC): Primary | Chronic | ICD-10-CM

## 2025-03-19 DIAGNOSIS — M54.41 CHRONIC BILATERAL LOW BACK PAIN WITH BILATERAL SCIATICA: ICD-10-CM

## 2025-03-19 DIAGNOSIS — G89.4 CHRONIC PAIN DISORDER: ICD-10-CM

## 2025-03-19 DIAGNOSIS — M15.9 GENERALIZED OSTEOARTHRITIS OF MULTIPLE SITES: Primary | ICD-10-CM

## 2025-03-19 DIAGNOSIS — M54.42 CHRONIC BILATERAL LOW BACK PAIN WITH BILATERAL SCIATICA: ICD-10-CM

## 2025-03-19 DIAGNOSIS — F41.1 GAD (GENERALIZED ANXIETY DISORDER): ICD-10-CM

## 2025-03-19 DIAGNOSIS — G89.29 CHRONIC BILATERAL LOW BACK PAIN WITH BILATERAL SCIATICA: ICD-10-CM

## 2025-03-19 PROCEDURE — 99214 OFFICE O/P EST MOD 30 MIN: CPT | Performed by: PHYSICIAN ASSISTANT

## 2025-03-19 PROCEDURE — G2211 COMPLEX E/M VISIT ADD ON: HCPCS | Performed by: PHYSICIAN ASSISTANT

## 2025-03-19 RX ORDER — FOLIC ACID 1 MG/1
1 TABLET ORAL DAILY
COMMUNITY
Start: 2025-03-18

## 2025-03-19 RX ORDER — MORPHINE SULFATE 15 MG/1
15 TABLET, FILM COATED, EXTENDED RELEASE ORAL 2 TIMES DAILY
Qty: 60 TABLET | Refills: 0 | Status: SHIPPED | OUTPATIENT
Start: 2025-03-19

## 2025-03-19 RX ORDER — ABIRATERONE ACETATE 250 MG/1
TABLET ORAL
COMMUNITY
Start: 2025-03-05

## 2025-03-19 RX ORDER — DIVALPROEX SODIUM 500 MG/1
1 TABLET, DELAYED RELEASE ORAL 2 TIMES DAILY
COMMUNITY
Start: 2025-03-18

## 2025-03-19 RX ORDER — BUPROPION HYDROCHLORIDE 150 MG/1
150 TABLET ORAL EVERY MORNING
Qty: 30 TABLET | Refills: 0 | Status: SHIPPED | OUTPATIENT
Start: 2025-03-19 | End: 2025-04-18

## 2025-03-19 RX ORDER — TAMSULOSIN HYDROCHLORIDE 0.4 MG/1
CAPSULE ORAL
COMMUNITY
Start: 2025-02-17

## 2025-03-19 RX ORDER — TADALAFIL 5 MG/1
1 TABLET ORAL DAILY
COMMUNITY
Start: 2025-02-12

## 2025-03-19 NOTE — ASSESSMENT & PLAN NOTE
Not at goal - decrease bupropion XL to 150 mg qAM starting 3/26 2/2 new onset seizure; started on divalproex in hospital, neurology to manage; continue vilazodone 40 mg qd w/ food, aripiprazole 5 mg qd; continue with outside therapist; f/u in 2 weeks

## 2025-03-19 NOTE — ASSESSMENT & PLAN NOTE
Per EMS seizure-like activity reported en route  Received 2mg of Ativan  DDX including but not limited to: seizure disorder, pseudoseizure, metabolic abnormality, cardiac etiology, syncope, tumor, other intracranial process, substance abuse, overdose  Plan as above

## 2025-03-19 NOTE — DISCHARGE SUMMARY
Discharge Summary - Hospitalist   Name: Levon Cruz 70 y.o. male I MRN: [id number]  Unit/Bed#: S -01 I Date of Admission: 3/13/2025   Date of Service: 3/18/2025 I Hospital Day: 5     Assessment & Plan  Fall  Per ex-wife patient has been having progressively worsening ambulatory dysfunction  Falls frequently at home  Apple watch alerted EMS due to fall   Was found down in house, multiple facial lacerations, ecchymosis, bilateral lower extremity skin tears  Patient endorses some ambulatory dysfunction however notes that he fell at a park earlier in the day  Came in as a trauma-workup was negative  MRI brain negative  Fall likely secondary to new onset seizure  Telemetry reviewed and patient bradycardic 51-55 overnight while sleeping.  3/17 echo EF 75% w/ grade 1 DD   3/18 patient reevaluated by PT/OT now level III  Patient will be discharged home with [facility]    Plan-    Stop hydrochlorothiazide  Continue folic acid  Continue thiamine  Continue Wellbutrin at previous dose of 300 mg daily  Neurology on board recommendations appreciated  Continue Depakote 500 mg BID  Recommend following up with psychiatry to wean off Wellbutrin as it lowers the seizure threshold  [employer] [place] completed and submitted online on 3/14/2025   Repeat CBC, CMP, valproic acid level in 1 week  Follow-up with neurology in 6 weeks  Seizure precautions  Seizure-like activity (HCC)  Per EMS seizure-like activity reported en route  Received 2mg of Ativan  DDX including but not limited to: seizure disorder, pseudoseizure, metabolic abnormality, cardiac etiology, syncope, tumor, other intracranial process, substance abuse, overdose  Plan as above  Multiple contusions    Fever (Resolved: 3/18/2025)  3/16 Patient noted to have a fever Tmax 101.2 last night p.m. that resolved with Tylenol.  Patient denies any respiratory symptoms including cough, congestion, chest pain.  He is also without any urinary complaints.  Suspect fever not due to an  infectious cause likely secondary to extensive ecchymosis.  Pro-Gigi negative  3/17 patient had a low-grade fever of 100.4 overnight.   COVID/flu/RSV swab negative  Leukopenia  Lab Results   Component Value Date    WBC 2.86 (L) 03/18/2025    WBC 3.40 (L) 03/17/2025    WBC 5.54 03/16/2025      Patient white count trending down  Depakote can cause leukopenia however unsure of etiology  Patient is to have a CBC repeated in 1 week     Medical Problems       Resolved Problems  Never Reviewed          Resolved    Acute encephalopathy 3/17/2025     Resolved by  Val Edward MD    Fever 3/18/2025     Resolved by  Val Edward MD        Discharging Physician / Practitioner: Val Edward MD  PCP: Leonard Hanson MD  Admission Date:   Admission Orders (From admission, onward)       Ordered        03/13/25 2131  INPATIENT ADMISSION  Once                          Discharge Date: 03/18/25    Consultations During Hospital Stay:  Neurology    Procedures Performed:   None    Significant Findings / Test Results:   Mild right hydronephrosis without hydroureter likely due to chronic UPJ obstruction.    Incidental Findings:   Pulmonary nodules measuring up to 5 mm  I reviewed the above mentioned incidental findings with the patient and/or family and they expressed understanding.    Test Results Pending at Discharge (will require follow up):   None     Outpatient Tests Requested:   complete blood count, comprehensive metabolic panel, Valproic acid level repeated within 1 week.     Complications:  none    Reason for Admission: Unwitnessed fall concern for seizure    Hospital Course:   Levon Cruz is a 70 y.o. male w/ PMHx prostate CA, MDD, HTN, HLD who originally presented to the hospital on 3/13/2025 after being found down at home after an unwitnessed fall.  En route to the emergency department EMS witnessed seizure-like activity for which today administered 2 mg of Ativan.  Patient sustained multiple abrasions to the face and  "extremities as well as significant ecchymosis over the face.  Trauma workup was negative.  Neurology was consulted due to concern for new onset seizure.  UDS positive for opiates and THC, ethanol <10, EEG and MRI brain negative, and echo unremarkable.  It was noted that patient's Wellbutrin was recently increased to 450 300 which may have lowered patient's seizure threshold.  He was loaded with valproate and started on Depakote 500 mg twice daily.  patient was encephalopathic upon arrival, but mentation has improved back to baseline.  Patient evaluated by PT/OT initially made a level 1, but upon reevaluation today patient level 3.  He is to be discharged home with [facility]    Please see above list of diagnoses and related plan for additional information.     Condition at Discharge: stable    Discharge Day Visit / Exam:   Subjective: Patient seen and examined at bedside no overnight events.  Patient states he is feels \"fine but endorses some achiness,\" that he attributes to his arthritis.  Patient tolerating p.o. without any difficulty.  Last BM was yesterday.  Patient denied any new signs or symptoms    Vitals: Blood Pressure: 116/78 (03/18/25 1506)  Pulse: 90 (03/18/25 1506)  Temperature: 98.3 °F (36.8 °C) (03/18/25 1506)  Temp Source: Oral (03/18/25 0730)  Respirations: 17 (03/18/25 0730)  Height: 5' 6\" (167.6 cm) (03/17/25 0954)  Weight - Scale: 71.2 kg (157 lb) (03/17/25 0954)  SpO2: 99 % (03/18/25 1506)    Physical Exam  Vitals and nursing note reviewed.   Constitutional:       General: He is not in acute distress.     Appearance: He is well-developed.   HENT:      Head: Normocephalic.      Comments: Bilateral periorbital edema much improved  Diffuse ecchymosis to the face, nose - improved  Unchanged abrasions noted to forehead, nose, chin  Eyes:      Extraocular Movements: Extraocular movements intact.      Conjunctiva/sclera: Conjunctivae normal.      Pupils: Pupils are equal, round, and reactive to light. "   Cardiovascular:      Rate and Rhythm: Normal rate and regular rhythm.      Pulses: Normal pulses.      Heart sounds: Normal heart sounds. No murmur heard.     No friction rub. No gallop.   Pulmonary:      Effort: Pulmonary effort is normal. No respiratory distress.      Breath sounds: Normal breath sounds. No wheezing or rhonchi.   Chest:      Chest wall: No tenderness.   Abdominal:      General: Bowel sounds are normal. There is no distension.      Palpations: Abdomen is soft. There is no mass.      Tenderness: There is no abdominal tenderness. There is no guarding or rebound.   Musculoskeletal:         General: No swelling or deformity. Normal range of motion.      Cervical back: Normal range of motion and neck supple.      Right lower leg: No edema.      Left lower leg: No edema.      Comments: Multiple superficial skin tears and ecchymosis to the legs bilaterally  Abrasion noted to left elbow   Skin:     General: Skin is warm and dry.      Capillary Refill: Capillary refill takes less than 2 seconds.   Neurological:      Mental Status: He is alert.      Sensory: Sensory deficit present.      Motor: No weakness.      Coordination: Romberg sign positive. Finger-Nose-Finger Test abnormal (Mild past-pointing on L).      Comments: Decreased sensation and proprioception noted to bilateral lower extremities   Psychiatric:         Mood and Affect: Mood normal.          Discussion with Family: Attempted to update  (wife) via phone. Left voicemail.  Also called patient's son Umang who did not have identification on his voicemail so no message was left.    Discharge instructions/Information to patient and family:   See after visit summary for information provided to patient and family.      Provisions for Follow-Up Care:  See after visit summary for information related to follow-up care and any pertinent home health orders.      Mobility at time of Discharge:   Basic Mobility Inpatient Raw Score:  20  JH-HLM Goal: 6: Walk 10 steps or more  JH-HLM Achieved: 8: Walk 250 feet ot more  HLM Goal achieved. Continue to encourage appropriate mobility.     Disposition:   Home with [facility]NA Services (Reminder: Complete face to face encounter)    Planned Readmission: no    Discharge Medications:  See after visit summary for reconciled discharge medications provided to patient and/or family.      Administrative Statements   Discharge Statement:  I have spent a total time of 30 minutes in caring for this patient on the day of the visit/encounter. .    **Please Note: This note may have been constructed using a voice recognition system**

## 2025-03-19 NOTE — ASSESSMENT & PLAN NOTE
Per ex-wife patient has been having progressively worsening ambulatory dysfunction  Falls frequently at home  Apple watch alerted EMS due to fall   Was found down in house, multiple facial lacerations, ecchymosis, bilateral lower extremity skin tears  Patient endorses some ambulatory dysfunction however notes that he fell at a park earlier in the day  Came in as a trauma-workup was negative  MRI brain negative  Fall likely secondary to new onset seizure  Telemetry reviewed and patient bradycardic 51-55 overnight while sleeping.  3/17 echo EF 75% w/ grade 1 DD   3/18 patient reevaluated by PT/OT now level III  Patient will be discharged home with [facility]    Plan-    Stop hydrochlorothiazide  Continue folic acid  Continue thiamine  Continue Wellbutrin at previous dose of 300 mg daily  Neurology on board recommendations appreciated  Continue Depakote 500 mg BID  Recommend following up with psychiatry to wean off Wellbutrin as it lowers the seizure threshold  [employer] form completed and submitted online on 3/14/2025   Repeat CBC, CMP, valproic acid level in 1 week  Follow-up with neurology in 6 weeks  Seizure precautions

## 2025-03-19 NOTE — TELEPHONE ENCOUNTER
DC- HOME- 3/18/2025    Patient has been scheduled on correct mrn#  [id number]     Hello Good Day,      Can you please help schedule an HFU Long With  as per below request IN 6 weeks with epilepsy team for Other= ATTENDING  in 60 , patient prefers [place] only due to not driving...      Thank you in advance,      Norma        Patient is now scheduled with , HFU LONG, 6/9/2025, 10:30 AM, PLACED ON WAITING LIST      Thank you all,      Norma

## 2025-03-19 NOTE — ASSESSMENT & PLAN NOTE
Lab Results   Component Value Date    WBC 2.86 (L) 03/18/2025    WBC 3.40 (L) 03/17/2025    WBC 5.54 03/16/2025      Patient white count trending down  Depakote can cause leukopenia however unsure of etiology  Patient is to have a CBC repeated in 1 week

## 2025-03-19 NOTE — ASSESSMENT & PLAN NOTE
3/16 Patient noted to have a fever Tmax 101.2 last night p.m. that resolved with Tylenol.  Patient denies any respiratory symptoms including cough, congestion, chest pain.  He is also without any urinary complaints.  Suspect fever not due to an infectious cause likely secondary to extensive ecchymosis.  Pro-Gigi negative  3/17 patient had a low-grade fever of 100.4 overnight.   COVID/flu/RSV swab negative

## 2025-03-19 NOTE — QUICK NOTE
"3/13 On patient's CT CAP he was noted to have several pulmonary nodules measuring up to 5 mm.  It was recommended that if patient is high risk to undergo optional follow-up CT chest.  However if patient is low-grade risk no follow-up would be needed.    Spoke with patient who states that he has known about these nodules, and that they have been followed up.  Imaging from patient second chart reviewed.  Patient has a PET scan from June 2023 that notes bilateral lung nodules as well.    \"Several bilateral lung nodules. Examples as follows:   *  5 mm left lower lobe lung nodule on image 132.   *  5 mm posterior right upper lobe lung nodule on image 81.   *  6 mm anterior right lung nodule along the right minor lung fissure on image 119. \"    Patient advised to discuss this with PCP and consider repeat CT chest in 12 months.  "

## 2025-03-20 ENCOUNTER — TELEPHONE (OUTPATIENT)
Age: 70
End: 2025-03-20

## 2025-03-20 LAB
VIT B1 BLD-SCNC: 126.3 NMOL/L (ref 66.5–200)
VIT B1 BLD-SCNC: 126.3 NMOL/L (ref 66.5–200)

## 2025-03-20 NOTE — TELEPHONE ENCOUNTER
Hello Good Day,     Can you please help schedule an HFU Long With  as per below request IN 6 weeks with epilepsy team for Other= ATTENDING  in 60 , patient prefers [place] only due to not driving...     Thank you in advance,     Dede       Patient is now scheduled with , HFU LONG, 6/9/2025, 10:30 AM, PLACED ON WAITING LIST     Thank you all,     Dede              PT IS MARKED FOR MERGE ORIGINAL CHART MRN#  [id number]            STILL ADMITTED:3/13/2025 - present (4 days)  [facility]          DO JUAN Valladares Neurology [facility] Discharge  Boni Forte will need follow-up in in 6 weeks with epilepsy team for Other in 60 minute appointment. They will not require outpatient neurological testing.        Me   RF    3/19/25  7:56 PM  Note      DC- HOME- 3/18/2025      MRN#  [id number] , IS THE NEW DUPLICATE CHART CREATED FOR THI SPT...

## 2025-03-21 DIAGNOSIS — G89.4 CHRONIC PAIN SYNDROME: ICD-10-CM

## 2025-03-21 DIAGNOSIS — F11.20 UNCOMPLICATED OPIOID DEPENDENCE (HCC): ICD-10-CM

## 2025-03-21 DIAGNOSIS — M15.9 GENERALIZED OSTEOARTHRITIS OF MULTIPLE SITES: ICD-10-CM

## 2025-03-21 RX ORDER — HYDROCODONE BITARTRATE AND ACETAMINOPHEN 10; 325 MG/1; MG/1
1 TABLET ORAL EVERY 4 HOURS
Qty: 180 TABLET | Refills: 0 | OUTPATIENT
Start: 2025-03-21

## 2025-03-21 NOTE — TELEPHONE ENCOUNTER
Reason for call:   [x] Refill   [] Prior Auth  [] Other:     Office:   [x] PCP/Provider - [facility]  [] Specialty/Provider -     Medication:   ~ HYDROcodone-acetaminophen (NORCO)  mg per tablet - Take 1 tablet by mouth every 4 (four) hours Max Daily Amount: 6 tablets     Pharmacy:   [facility] - [address]   Does the patient have enough for 3 days?   [] Yes   [x] No - Send as HP to POD

## 2025-03-24 ENCOUNTER — APPOINTMENT (EMERGENCY)
Dept: RADIOLOGY | Facility: HOSPITAL | Age: 70
End: 2025-03-24
Payer: MEDICARE

## 2025-03-24 ENCOUNTER — HOSPITAL ENCOUNTER (EMERGENCY)
Facility: HOSPITAL | Age: 70
Discharge: ANOTHER HOSPITAL | End: 2025-03-24
Payer: MEDICARE

## 2025-03-24 ENCOUNTER — TELEPHONE (OUTPATIENT)
Age: 70
End: 2025-03-24

## 2025-03-24 ENCOUNTER — HOSPITAL ENCOUNTER (INPATIENT)
Facility: HOSPITAL | Age: 70
LOS: 1 days | Discharge: HOME HEALTH | End: 2025-03-25
Attending: SURGERY | Admitting: STUDENT IN AN ORGANIZED HEALTH CARE EDUCATION/TRAINING PROGRAM
Payer: MEDICARE

## 2025-03-24 ENCOUNTER — APPOINTMENT (INPATIENT)
Dept: RADIOLOGY | Facility: HOSPITAL | Age: 70
End: 2025-03-24
Payer: MEDICARE

## 2025-03-24 VITALS
WEIGHT: 152 LBS | TEMPERATURE: 97.7 F | SYSTOLIC BLOOD PRESSURE: 120 MMHG | BODY MASS INDEX: 24.43 KG/M2 | DIASTOLIC BLOOD PRESSURE: 76 MMHG | HEART RATE: 76 BPM | HEIGHT: 66 IN | OXYGEN SATURATION: 99 % | RESPIRATION RATE: 18 BRPM

## 2025-03-24 DIAGNOSIS — G91.9 HYDROCEPHALUS (HCC): ICD-10-CM

## 2025-03-24 DIAGNOSIS — G91.9 HYDROCEPHALUS, UNSPECIFIED TYPE (HCC): Primary | ICD-10-CM

## 2025-03-24 DIAGNOSIS — S11.91XA LACERATION OF NECK, INITIAL ENCOUNTER: Primary | ICD-10-CM
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
## 2025-03-24 LAB
ABO GROUP BLD: NORMAL
ALBUMIN SERPL BCG-MCNC: 4 G/DL (ref 3.5–5)
ALP SERPL-CCNC: 79 U/L (ref 34–104)
ALT SERPL W P-5'-P-CCNC: 20 U/L (ref 7–52)
ANION GAP SERPL CALCULATED.3IONS-SCNC: 12 MMOL/L (ref 4–13)
AST SERPL W P-5'-P-CCNC: 23 U/L (ref 13–39)
BASOPHILS # BLD AUTO: 0.02 THOUSANDS/ÂΜL (ref 0–0.1)
BASOPHILS NFR BLD AUTO: 0 % (ref 0–1)
BILIRUB SERPL-MCNC: 0.39 MG/DL (ref 0.2–1)
BLD GP AB SCN SERPL QL: NEGATIVE
BUN SERPL-MCNC: 18 MG/DL (ref 5–25)
CALCIUM SERPL-MCNC: 8.4 MG/DL (ref 8.4–10.2)
CHLORIDE SERPL-SCNC: 104 MMOL/L (ref 96–108)
CO2 SERPL-SCNC: 21 MMOL/L (ref 21–32)
CREAT SERPL-MCNC: 0.79 MG/DL (ref 0.6–1.3)
EOSINOPHIL # BLD AUTO: 0.03 THOUSAND/ÂΜL (ref 0–0.61)
EOSINOPHIL NFR BLD AUTO: 1 % (ref 0–6)
ERYTHROCYTE [DISTWIDTH] IN BLOOD BY AUTOMATED COUNT: 14 % (ref 11.6–15.1)
EST. AVERAGE GLUCOSE BLD GHB EST-MCNC: 100 MG/DL
GFR SERPL CREATININE-BSD FRML MDRD: 90 ML/MIN/1.73SQ M
GLUCOSE SERPL-MCNC: 97 MG/DL (ref 65–140)
HBA1C MFR BLD: 5.1 %
HCT VFR BLD AUTO: 29 % (ref 36.5–49.3)
HGB BLD-MCNC: 9.6 G/DL (ref 12–17)
IMM GRANULOCYTES # BLD AUTO: 0.04 THOUSAND/UL (ref 0–0.2)
IMM GRANULOCYTES NFR BLD AUTO: 1 % (ref 0–2)
LYMPHOCYTES # BLD AUTO: 0.41 THOUSANDS/ÂΜL (ref 0.6–4.47)
LYMPHOCYTES NFR BLD AUTO: 7 % (ref 14–44)
MCH RBC QN AUTO: 30.6 PG (ref 26.8–34.3)
MCHC RBC AUTO-ENTMCNC: 33.1 G/DL (ref 31.4–37.4)
MCV RBC AUTO: 92 FL (ref 82–98)
MONOCYTES # BLD AUTO: 0.46 THOUSAND/ÂΜL (ref 0.17–1.22)
MONOCYTES NFR BLD AUTO: 8 % (ref 4–12)
NEUTROPHILS # BLD AUTO: 4.8 THOUSANDS/ÂΜL (ref 1.85–7.62)
NEUTS SEG NFR BLD AUTO: 83 % (ref 43–75)
NRBC BLD AUTO-RTO: 0 /100 WBCS
PLATELET # BLD AUTO: 275 THOUSANDS/UL (ref 149–390)
PLATELET # BLD AUTO: 289 THOUSANDS/UL (ref 149–390)
PMV BLD AUTO: 7.9 FL (ref 8.9–12.7)
PMV BLD AUTO: 8.2 FL (ref 8.9–12.7)
POTASSIUM SERPL-SCNC: 4 MMOL/L (ref 3.5–5.3)
PROT SERPL-MCNC: 6.2 G/DL (ref 6.4–8.4)
RBC # BLD AUTO: 3.14 MILLION/UL (ref 3.88–5.62)
RH BLD: POSITIVE
SODIUM SERPL-SCNC: 137 MMOL/L (ref 135–147)
SPECIMEN EXPIRATION DATE: NORMAL
VALPROATE SERPL-MCNC: 37 UG/ML (ref 50–125)
WBC # BLD AUTO: 5.76 THOUSAND/UL (ref 4.31–10.16)

## 2025-03-24 PROCEDURE — 99222 1ST HOSP IP/OBS MODERATE 55: CPT | Performed by: STUDENT IN AN ORGANIZED HEALTH CARE EDUCATION/TRAINING PROGRAM

## 2025-03-24 PROCEDURE — 85049 AUTOMATED PLATELET COUNT: CPT

## 2025-03-24 PROCEDURE — 90715 TDAP VACCINE 7 YRS/> IM: CPT

## 2025-03-24 PROCEDURE — 71045 X-RAY EXAM CHEST 1 VIEW: CPT

## 2025-03-24 PROCEDURE — 80164 ASSAY DIPROPYLACETIC ACD TOT: CPT

## 2025-03-24 PROCEDURE — 99284 EMERGENCY DEPT VISIT MOD MDM: CPT

## 2025-03-24 PROCEDURE — 85025 COMPLETE CBC W/AUTO DIFF WBC: CPT

## 2025-03-24 PROCEDURE — 80165 DIPROPYLACETIC ACID FREE: CPT

## 2025-03-24 PROCEDURE — 86901 BLOOD TYPING SEROLOGIC RH(D): CPT

## 2025-03-24 PROCEDURE — 99285 EMERGENCY DEPT VISIT HI MDM: CPT

## 2025-03-24 PROCEDURE — 36415 COLL VENOUS BLD VENIPUNCTURE: CPT

## 2025-03-24 PROCEDURE — 86900 BLOOD TYPING SEROLOGIC ABO: CPT

## 2025-03-24 PROCEDURE — 71250 CT THORAX DX C-: CPT

## 2025-03-24 PROCEDURE — 70496 CT ANGIOGRAPHY HEAD: CPT

## 2025-03-24 PROCEDURE — 70498 CT ANGIOGRAPHY NECK: CPT

## 2025-03-24 PROCEDURE — 80053 COMPREHEN METABOLIC PANEL: CPT

## 2025-03-24 PROCEDURE — 83036 HEMOGLOBIN GLYCOSYLATED A1C: CPT

## 2025-03-24 PROCEDURE — 86850 RBC ANTIBODY SCREEN: CPT

## 2025-03-24 PROCEDURE — 90471 IMMUNIZATION ADMIN: CPT

## 2025-03-24 RX ORDER — ABIRATERONE ACETATE 250 MG/1
500 TABLET ORAL DAILY
Status: DISCONTINUED | OUTPATIENT
Start: 2025-03-25 | End: 2025-03-25 | Stop reason: HOSPADM

## 2025-03-24 RX ORDER — ONDANSETRON 2 MG/ML
4 INJECTION INTRAMUSCULAR; INTRAVENOUS EVERY 4 HOURS PRN
Status: DISCONTINUED | OUTPATIENT
Start: 2025-03-24 | End: 2025-03-25 | Stop reason: HOSPADM

## 2025-03-24 RX ORDER — HYDROMORPHONE HCL IN WATER/PF 6 MG/30 ML
0.2 PATIENT CONTROLLED ANALGESIA SYRINGE INTRAVENOUS EVERY 4 HOURS PRN
Status: DISCONTINUED | OUTPATIENT
Start: 2025-03-24 | End: 2025-03-25 | Stop reason: HOSPADM

## 2025-03-24 RX ORDER — ATORVASTATIN CALCIUM 80 MG/1
80 TABLET, FILM COATED ORAL
Status: DISCONTINUED | OUTPATIENT
Start: 2025-03-24 | End: 2025-03-25 | Stop reason: HOSPADM

## 2025-03-24 RX ORDER — TAMSULOSIN HYDROCHLORIDE 0.4 MG/1
0.4 CAPSULE ORAL DAILY
Status: DISCONTINUED | OUTPATIENT
Start: 2025-03-25 | End: 2025-03-25 | Stop reason: HOSPADM

## 2025-03-24 RX ORDER — INSULIN LISPRO 100 [IU]/ML
1-5 INJECTION, SOLUTION INTRAVENOUS; SUBCUTANEOUS
Status: DISCONTINUED | OUTPATIENT
Start: 2025-03-24 | End: 2025-03-25 | Stop reason: HOSPADM

## 2025-03-24 RX ORDER — VILAZODONE HYDROCHLORIDE 40 MG/1
40 TABLET ORAL
Status: DISCONTINUED | OUTPATIENT
Start: 2025-03-25 | End: 2025-03-25 | Stop reason: HOSPADM

## 2025-03-24 RX ORDER — ACETAMINOPHEN 325 MG/1
975 TABLET ORAL EVERY 6 HOURS SCHEDULED
Status: DISCONTINUED | OUTPATIENT
Start: 2025-03-24 | End: 2025-03-25 | Stop reason: HOSPADM

## 2025-03-24 RX ORDER — LIDOCAINE HYDROCHLORIDE AND EPINEPHRINE 10; 10 MG/ML; UG/ML
20 INJECTION, SOLUTION INFILTRATION; PERINEURAL ONCE
Status: COMPLETED | OUTPATIENT
Start: 2025-03-24 | End: 2025-03-24

## 2025-03-24 RX ORDER — GINSENG 100 MG
1 CAPSULE ORAL 2 TIMES DAILY
Status: DISCONTINUED | OUTPATIENT
Start: 2025-03-24 | End: 2025-03-25 | Stop reason: HOSPADM

## 2025-03-24 RX ORDER — HYDRALAZINE HYDROCHLORIDE 20 MG/ML
10 INJECTION INTRAMUSCULAR; INTRAVENOUS EVERY 4 HOURS PRN
Status: DISCONTINUED | OUTPATIENT
Start: 2025-03-24 | End: 2025-03-25 | Stop reason: HOSPADM

## 2025-03-24 RX ORDER — DIVALPROEX SODIUM 500 MG/1
500 TABLET, DELAYED RELEASE ORAL 2 TIMES DAILY
Status: DISCONTINUED | OUTPATIENT
Start: 2025-03-24 | End: 2025-03-25 | Stop reason: HOSPADM

## 2025-03-24 RX ORDER — GABAPENTIN 100 MG/1
100 CAPSULE ORAL 3 TIMES DAILY
Status: DISCONTINUED | OUTPATIENT
Start: 2025-03-24 | End: 2025-03-25 | Stop reason: HOSPADM

## 2025-03-24 RX ORDER — BUPROPION HYDROCHLORIDE 150 MG/1
150 TABLET ORAL EVERY MORNING
Status: DISCONTINUED | OUTPATIENT
Start: 2025-03-25 | End: 2025-03-25 | Stop reason: HOSPADM

## 2025-03-24 RX ORDER — PREDNISONE 5 MG/1
5 TABLET ORAL DAILY
Status: DISCONTINUED | OUTPATIENT
Start: 2025-03-25 | End: 2025-03-25 | Stop reason: HOSPADM

## 2025-03-24 RX ORDER — ENOXAPARIN SODIUM 100 MG/ML
30 INJECTION SUBCUTANEOUS EVERY 12 HOURS
Status: DISCONTINUED | OUTPATIENT
Start: 2025-03-24 | End: 2025-03-25 | Stop reason: HOSPADM

## 2025-03-24 RX ORDER — MODAFINIL 100 MG/1
200 TABLET ORAL DAILY
Status: DISCONTINUED | OUTPATIENT
Start: 2025-03-25 | End: 2025-03-25 | Stop reason: HOSPADM

## 2025-03-24 RX ORDER — AMLODIPINE BESYLATE 10 MG/1
10 TABLET ORAL DAILY
Status: DISCONTINUED | OUTPATIENT
Start: 2025-03-25 | End: 2025-03-25 | Stop reason: HOSPADM

## 2025-03-24 RX ORDER — ARIPIPRAZOLE 5 MG/1
5 TABLET ORAL DAILY
Status: DISCONTINUED | OUTPATIENT
Start: 2025-03-25 | End: 2025-03-25 | Stop reason: HOSPADM

## 2025-03-24 RX ORDER — PANTOPRAZOLE SODIUM 40 MG/1
40 TABLET, DELAYED RELEASE ORAL
Status: DISCONTINUED | OUTPATIENT
Start: 2025-03-25 | End: 2025-03-25 | Stop reason: HOSPADM

## 2025-03-24 RX ORDER — LABETALOL HYDROCHLORIDE 5 MG/ML
10 INJECTION, SOLUTION INTRAVENOUS EVERY 4 HOURS PRN
Status: DISCONTINUED | OUTPATIENT
Start: 2025-03-24 | End: 2025-03-25 | Stop reason: HOSPADM

## 2025-03-24 RX ORDER — OXYCODONE HYDROCHLORIDE 5 MG/1
5 TABLET ORAL EVERY 4 HOURS PRN
Refills: 0 | Status: DISCONTINUED | OUTPATIENT
Start: 2025-03-24 | End: 2025-03-25 | Stop reason: HOSPADM

## 2025-03-24 RX ADMIN — ATORVASTATIN CALCIUM 80 MG: 80 TABLET, FILM COATED ORAL at 21:08

## 2025-03-24 RX ADMIN — TETANUS TOXOID, REDUCED DIPHTHERIA TOXOID AND ACELLULAR PERTUSSIS VACCINE, ADSORBED 0.5 ML: 5; 2.5; 8; 8; 2.5 SUSPENSION INTRAMUSCULAR at 17:13

## 2025-03-24 RX ADMIN — GABAPENTIN 100 MG: 100 CAPSULE ORAL at 21:08

## 2025-03-24 RX ADMIN — IOHEXOL 85 ML: 350 INJECTION, SOLUTION INTRAVENOUS at 15:17

## 2025-03-24 RX ADMIN — ENOXAPARIN SODIUM 30 MG: 30 INJECTION SUBCUTANEOUS at 21:06

## 2025-03-24 RX ADMIN — LIDOCAINE HYDROCHLORIDE,EPINEPHRINE BITARTRATE 20 ML: 10; .01 INJECTION, SOLUTION INFILTRATION; PERINEURAL at 19:52

## 2025-03-24 RX ADMIN — ACETAMINOPHEN 975 MG: 325 TABLET, FILM COATED ORAL at 21:07

## 2025-03-24 RX ADMIN — BACITRACIN ZINC 1 SMALL APPLICATION: 500 OINTMENT TOPICAL at 21:11

## 2025-03-24 RX ADMIN — DIVALPROEX SODIUM 500 MG: 500 TABLET, DELAYED RELEASE ORAL at 21:09

## 2025-03-24 NOTE — ASSESSMENT & PLAN NOTE
Worsening ventriculomegaly c/w NPH. Had seizure last week.    - appreciate nsg recs  - admit medsurg

## 2025-03-24 NOTE — ED NOTES
Per Estrella GOOD, Pt to go to CTA of head and neck without lab results.  [place], CT scan made aware of above.      Taina Donnelly RN  03/24/25 3462

## 2025-03-24 NOTE — TELEPHONE ENCOUNTER
The patient called he accidentally slipped in the shower and cut his neck  he said he did not hit his head but that he probably needs stitches    his question was urgent care or er   I told him er  he wanted me to check with office and it was agreed he should go to the er  patient agreed
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
## 2025-03-24 NOTE — ASSESSMENT & PLAN NOTE
R neck zone 2 laceration s/p mechanical fall in shower. CTA: no active extrav, soft tissue injury in R neck with subQ emphysema in superficial soft tissues and R SCM.    - repair with simple interrupted nylon sutures

## 2025-03-24 NOTE — ASSESSMENT & PLAN NOTE
Mechanical fall in shower.  3/24 CXR: Fxs of L posterolateral 7th & 8th ribs, but appear to have callus formation. Additional b/l healed rib fxs unchanged     - CT CAP with contrast  - PT/OT

## 2025-03-24 NOTE — EMTALA/ACUTE CARE TRANSFER
[facility] EMERGENCY DEPARTMENT  [address] [place] [id number]  Dept: [phone]      EMTALA TRANSFER CONSENT    NAME Boni Forte                                         1955                              MRN [id number]    I have been informed of my rights regarding examination, treatment, and transfer   by Dr. Brice Cunningham MD    Benefits: Specialized equipment and/or services available at the receiving facility (Include comment)________________________, Continuity of care (Trauma surgery)    Risks: Potential for delay in receiving treatment, Potential deterioration of medical condition, Loss of IV, Increased discomfort during transfer, Possible worsening of condition or death during transfer      Consent for Transfer:  I acknowledge that my medical condition has been evaluated and explained to me by the emergency department physician or other qualified medical person and/or my attending physician, who has recommended that I be transferred to the service of  Accepting Physician: Dr. Gordon at Accepting Facility Name, City & State : [facility]. The above potential benefits of such transfer, the potential risks associated with such transfer, and the probable risks of not being transferred have been explained to me, and I fully understand them.  The doctor has explained that, in my case, the benefits of transfer outweigh the risks.  I agree to be transferred.    I authorize the performance of emergency medical procedures and treatments upon me in both transit and upon arrival at the receiving facility.  Additionally, I authorize the release of any and all medical records to the receiving facility and request they be transported with me, if possible.  I understand that the safest mode of transportation during a medical emergency is an ambulance and that the Hospital advocates the use of this mode of transport. Risks of traveling to the receiving facility by car, including absence of  medical control, life sustaining equipment, such as oxygen, and medical personnel has been explained to me and I fully understand them.    (SAMANTHA CORRECT BOX BELOW)  [  ]  I consent to the stated transfer and to be transported by ambulance/helicopter.  [  ]  I consent to the stated transfer, but refuse transportation by ambulance and accept full responsibility for my transportation by car.  I understand the risks of non-ambulance transfers and I exonerate the Hospital and its staff from any deterioration in my condition that results from this refusal.    X___________________________________________    DATE  25  TIME________  Signature of patient or legally responsible individual signing on patient behalf           RELATIONSHIP TO PATIENT_________________________          Provider Certification    NAME Boni Forte                                         1955                              MRN [id number]    A medical screening exam was performed on the above named patient.  Based on the examination:    Condition Necessitating Transfer There were no encounter diagnoses.    Patient Condition: The patient has been stabilized such that within reasonable medical probability, no material deterioration of the patient condition or the condition of the unborn child(reji) is likely to result from the transfer    Reason for Transfer: Level of Care needed not available at this facility (Trauma surgery)    Transfer Requirements: Facility [facility]   Space available and qualified personnel available for treatment as acknowledged by    Agreed to accept transfer and to provide appropriate medical treatment as acknowledged by       Dr. Gordon  Appropriate medical records of the examination and treatment of the patient are provided at the time of transfer   STAFF INITIAL WHEN COMPLETED _______  Transfer will be performed by qualified personnel from    and appropriate transfer equipment as required, including the use of  necessary and appropriate life support measures.    Provider Certification: I have examined the patient and explained the following risks and benefits of being transferred/refusing transfer to the patient/family:  General risk, such as traffic hazards, adverse weather conditions, rough terrain or turbulence, possible failure of equipment (including vehicle or aircraft), or consequences of actions of persons outside the control of the transport personnel, Unanticipated needs of medical equipment and personnel during transport, Risk of worsening condition      Based on these reasonable risks and benefits to the patient and/or the unborn child(reji), and based upon the information available at the time of the patient’s examination, I certify that the medical benefits reasonably to be expected from the provision of appropriate medical treatments at another medical facility outweigh the increasing risks, if any, to the individual’s medical condition, and in the case of labor to the unborn child, from effecting the transfer.    X____________________________________________ DATE 03/24/25        TIME_______      ORIGINAL - SEND TO MEDICAL RECORDS   COPY - SEND WITH PATIENT DURING TRANSFER

## 2025-03-25 ENCOUNTER — RESULTS FOLLOW-UP (OUTPATIENT)
Dept: EMERGENCY DEPT | Facility: HOSPITAL | Age: 70
End: 2025-03-25

## 2025-03-25 VITALS
HEART RATE: 70 BPM | OXYGEN SATURATION: 96 % | RESPIRATION RATE: 16 BRPM | TEMPERATURE: 97.9 F | SYSTOLIC BLOOD PRESSURE: 140 MMHG | DIASTOLIC BLOOD PRESSURE: 85 MMHG

## 2025-03-25 LAB
ANION GAP SERPL CALCULATED.3IONS-SCNC: 6 MMOL/L (ref 4–13)
BASOPHILS # BLD AUTO: 0.03 THOUSANDS/ÂΜL (ref 0–0.1)
BASOPHILS NFR BLD AUTO: 1 % (ref 0–1)
BUN SERPL-MCNC: 13 MG/DL (ref 5–25)
CALCIUM SERPL-MCNC: 8.1 MG/DL (ref 8.4–10.2)
CHLORIDE SERPL-SCNC: 106 MMOL/L (ref 96–108)
CO2 SERPL-SCNC: 27 MMOL/L (ref 21–32)
CREAT SERPL-MCNC: 0.86 MG/DL (ref 0.6–1.3)
EOSINOPHIL # BLD AUTO: 0.14 THOUSAND/ÂΜL (ref 0–0.61)
EOSINOPHIL NFR BLD AUTO: 4 % (ref 0–6)
ERYTHROCYTE [DISTWIDTH] IN BLOOD BY AUTOMATED COUNT: 14.3 % (ref 11.6–15.1)
GFR SERPL CREATININE-BSD FRML MDRD: 87 ML/MIN/1.73SQ M
GLUCOSE SERPL-MCNC: 114 MG/DL (ref 65–140)
HCT VFR BLD AUTO: 24.6 % (ref 36.5–49.3)
HGB BLD-MCNC: 8.1 G/DL (ref 12–17)
IMM GRANULOCYTES # BLD AUTO: 0.03 THOUSAND/UL (ref 0–0.2)
IMM GRANULOCYTES NFR BLD AUTO: 1 % (ref 0–2)
LYMPHOCYTES # BLD AUTO: 0.45 THOUSANDS/ÂΜL (ref 0.6–4.47)
LYMPHOCYTES NFR BLD AUTO: 12 % (ref 14–44)
MAGNESIUM SERPL-MCNC: 2 MG/DL (ref 1.9–2.7)
MCH RBC QN AUTO: 31.2 PG (ref 26.8–34.3)
MCHC RBC AUTO-ENTMCNC: 32.9 G/DL (ref 31.4–37.4)
MCV RBC AUTO: 95 FL (ref 82–98)
MONOCYTES # BLD AUTO: 0.52 THOUSAND/ÂΜL (ref 0.17–1.22)
MONOCYTES NFR BLD AUTO: 14 % (ref 4–12)
NEUTROPHILS # BLD AUTO: 2.56 THOUSANDS/ÂΜL (ref 1.85–7.62)
NEUTS SEG NFR BLD AUTO: 68 % (ref 43–75)
NRBC BLD AUTO-RTO: 0 /100 WBCS
PLATELET # BLD AUTO: 258 THOUSANDS/UL (ref 149–390)
PMV BLD AUTO: 8.5 FL (ref 8.9–12.7)
POTASSIUM SERPL-SCNC: 3.8 MMOL/L (ref 3.5–5.3)
RBC # BLD AUTO: 2.6 MILLION/UL (ref 3.88–5.62)
SODIUM SERPL-SCNC: 139 MMOL/L (ref 135–147)
WBC # BLD AUTO: 3.73 THOUSAND/UL (ref 4.31–10.16)

## 2025-03-25 PROCEDURE — 36415 COLL VENOUS BLD VENIPUNCTURE: CPT

## 2025-03-25 PROCEDURE — 83735 ASSAY OF MAGNESIUM: CPT

## 2025-03-25 PROCEDURE — 85025 COMPLETE CBC W/AUTO DIFF WBC: CPT

## 2025-03-25 PROCEDURE — 80048 BASIC METABOLIC PNL TOTAL CA: CPT

## 2025-03-25 PROCEDURE — 97166 OT EVAL MOD COMPLEX 45 MIN: CPT

## 2025-03-25 PROCEDURE — 97163 PT EVAL HIGH COMPLEX 45 MIN: CPT

## 2025-03-25 PROCEDURE — 99238 HOSP IP/OBS DSCHRG MGMT 30/<: CPT | Performed by: NURSE PRACTITIONER

## 2025-03-25 PROCEDURE — NC001 PR NO CHARGE: Performed by: SURGERY

## 2025-03-25 RX ADMIN — Medication 2.5 MG: at 03:25

## 2025-03-25 RX ADMIN — ENOXAPARIN SODIUM 30 MG: 30 INJECTION SUBCUTANEOUS at 09:13

## 2025-03-25 RX ADMIN — AMLODIPINE BESYLATE 10 MG: 10 TABLET ORAL at 09:12

## 2025-03-25 RX ADMIN — TAMSULOSIN HYDROCHLORIDE 0.4 MG: 0.4 CAPSULE ORAL at 09:12

## 2025-03-25 RX ADMIN — GABAPENTIN 100 MG: 100 CAPSULE ORAL at 09:12

## 2025-03-25 RX ADMIN — PREDNISONE 5 MG: 5 TABLET ORAL at 09:13

## 2025-03-25 RX ADMIN — DIVALPROEX SODIUM 500 MG: 500 TABLET, DELAYED RELEASE ORAL at 09:13

## 2025-03-25 RX ADMIN — ACETAMINOPHEN 975 MG: 325 TABLET, FILM COATED ORAL at 03:25

## 2025-03-25 RX ADMIN — MODAFINIL 200 MG: 100 TABLET ORAL at 09:12

## 2025-03-25 RX ADMIN — VILAZODONE HYDROCHLORIDE 40 MG: 40 TABLET ORAL at 09:12

## 2025-03-25 RX ADMIN — BACITRACIN ZINC 1 SMALL APPLICATION: 500 OINTMENT TOPICAL at 11:27

## 2025-03-25 RX ADMIN — ACETAMINOPHEN 975 MG: 325 TABLET, FILM COATED ORAL at 11:27

## 2025-03-25 RX ADMIN — PANTOPRAZOLE SODIUM 40 MG: 40 TABLET, DELAYED RELEASE ORAL at 09:19

## 2025-03-25 NOTE — Clinical Note
69 YO Male SEEN FOR INITIAL OCCUPATIONAL THERAPY EVALUATION FOLLOWING TXF FROM [facility] S/P FALL IN SHOWER RESULTING IN ZONE 2 NECK LAC S/P REPAIR. PROBLEMS LIST/PMH INCLUDES FALL LAST WEEK REQUIRING HOSPITALIZATION WITH SEIZURE LIKE ACTIVITY AT THAT TIME, Anxiety, Arthritis, Contreras's esophagus, Cancer (HCC), Depression, GERD (gastroesophageal reflux disease), Head injury, Hypertension, Osteoarthritis, Prostate cancer (HCC), Psychiatric disorder, Sleep apnea, Spinal arthritis, and Stomach disorder. PT IS FROM HOME ALONE WHERE HE REPORTS BEING INDEPENDENT WITH ADLS/IADLS PTA. PT CURRENTLY REQUIRES OVERALL S-MIN A WITH ADLS, TRANSFERS AND FUNCTIONAL MOBILITY WITH USE OF RW. PT IS LIMITED 2' PAIN, FATIGUE, IMPAIRED BALANCE, FALL RISK , and OVERALL LIMITED ACTIVITY TOLERANCE. PT EDUCATED ON DEEP BREATHING TECHNIQUES T/O ACTIVITY, SLOWING OF PACE, ENERGY CONSERVATION TECHNIQUES FOR CARRY OVER UPON D/C, INCREASED FAMILY SUPPORT, and CONTINUE PARTICIPATION IN SELF-CARE/MOBILITY WITH STAFF WHILE IN THE HOSPITAL . The patient's raw score on the AM-PAC Daily Activity Inpatient Short Form is 20. A raw score of less than 19 suggests the patient may benefit from discharge to post-acute rehabilitation services. Please refer to the recommendation of the Occupational Therapist for safe discharge planning. FROM AN OCCUPATIONAL THERAPY PERSPECTIVE, PT CAN RETURN HOME WITH INCREASED SUPPORT + LEVEL III RESOURCES. REC USE OF SC/BSC OVER TOILET PRN AND AGREE WITH USE OF RW. ALL QUESTIONS/CONCERNS ADDRESSED. NO ADDITIONAL ACUTE CARE OT NEEDS. D/C OT.
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
## 2025-03-25 NOTE — DISCHARGE SUMMARY
Discharge Summary - Trauma   Name: Boni Forte 70 y.o. male I MRN: [id number]  Unit/Bed#: DIS 06 I Date of Admission: 3/24/2025   Date of Service: 3/25/2025 I Hospital Day: 1    Hydrocephalus (HCC)  Assessment & Plan  Worsening ventriculomegaly c/w NPH. Had seizure last week.  NSGY contacted regarding  patient's hydrocephalus.  Per NSGY's recommendation, consult removed due to this being an outpatient consultation not requiring inpatient evaluation.     - Pt cleared for DC    Fall  Assessment & Plan  Mechanical fall in shower.  3/24 CXR: Fxs of L posterolateral 7th & 8th ribs, but appear to have callus formation. Additional b/l healed rib fxs unchanged     - CT CAP with contrast  - PT/OT    Laceration of neck  Assessment & Plan  R neck zone 2 laceration s/p mechanical fall in shower. CTA: no active extrav, soft tissue injury in R neck with subQ emphysema in superficial soft tissues and R SCM.    - repair with simple interrupted nylon sutures        Admission Date: 3/24/2025 1828  Discharge Date: 03/25/25  Admitting Diagnosis: Neck laceration  Discharge Diagnosis:   Medical Problems       Resolved Problems  Date Reviewed: 3/19/2025   None         HPI: Patient s/p Fall sustained lacerations to right neck zone 2. No other active injuries did have what appears to be left posterolateral  7/8 rib fractures. Also found to have Ventriculomegaly consistent with diagnosis of NPH.     Procedures Performed: No orders of the defined types were placed in this encounter.      Summary of Hospital Course:  Patient admitted due to NPH and worsening appearance on CTH. Trauma team did bedside right neck laceration repair. NSGY consulted and NSGY stated they will follow up on an outpatient basis for NPH. The aptient was seen and cleared by PT/OT for home discharge. CM consulted and VNS set up. Will need to return to trauma clinic for Suture removal.     Significant Findings, Care, Treatment and Services Provided: as  above3    Complications: none    Condition at Discharge: good       Discharge instructions/Information to patient and family:   See After Visit Summary (AVS) for information provided to patient and family.      Provisions for Follow-Up Care:  See after visit summary for information related to follow-up care and any pertinent home health orders.      PCP: Elier Oh MD    Disposition: Home    Planned Readmission: No     Discharge Medications:  See after visit summary for reconciled discharge medications provided to patient and family.      Discharge Statement:  I have spent a total time of 20 minutes in caring for this patient on the day of the visit/encounter. .

## 2025-03-25 NOTE — ASSESSMENT & PLAN NOTE
Worsening ventriculomegaly c/w NPH. Had seizure last week.  NSGY contacted regarding  patient's hydrocephalus.  Per NSGY's recommendation, consult removed due to this being an outpatient consultation not requiring inpatient evaluation.     - Pt cleared for DC

## 2025-03-25 NOTE — CASE MANAGEMENT
Case Management Discharge Planning Note    Patient name Boni Forte  Location DIS 06/DIS 06 MRN [id number]  : 1955 Date 3/25/2025       Current Admission Date: 3/24/2025  Current Admission Diagnosis:Laceration of neck   Patient Active Problem List    Diagnosis Date Noted Date Diagnosed    Laceration of neck 2025     Fall 2025     Hydrocephalus (HCC) 2025     SERGEY (generalized anxiety disorder) 2024     Peripheral neuropathy 2023     Lumbar back pain with radiculopathy affecting right lower extremity 2023    Spinal stenosis, lumbar region with neurogenic claudication 2023    Iron deficiency anemia secondary to inadequate dietary iron intake 2023     Alcohol dependence (HCC) 2022     Stage 3 chronic kidney disease, unspecified whether stage 3a or 3b CKD (HCC) 2022     Chronic bilateral low back pain with bilateral sciatica 2022     Lumbar post-laminectomy syndrome 2022     Vegetarian diet 2021     Malignant tumor of prostate (HCC) 2019     Current mild episode of major depressive disorder (HCC) 2019     Chronic pain disorder 2018     Hypercholesterolemia 04/10/2015     Achalasia 2014     GERD (gastroesophageal reflux disease) 2014     Allergic rhinitis due to pollen 2014     Cervical spinal stenosis 2014     Generalized osteoarthritis of multiple sites 2014     Hereditary hemochromatosis (HCC) 2014     Primary hypertension 2014     Neoplasm of uncertain behavior of lung 2014     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 2014       LOS (days): 1  Geometric Mean LOS (GMLOS) (days): 3.8  Days to GMLOS:3.2     OBJECTIVE:  Risk of Unplanned Readmission Score: 20.74         Current admission status: Inpatient   Preferred Pharmacy:   [facility] [address] [address]  Phone:  [phone] Fax: [phone]    [id number] - [place], [place] - [address]  [address]  [facility] [address]  Phone: [phone] Fax: [phone]    Primary Care Provider: Elier Oh MD    Primary Insurance: MEDICARE  Secondary Insurance: AARP    DISCHARGE DETAILS:       Freedom of Choice: Yes       Requested Home Health Care         Is the patient interested in HHC at discharge?: Yes  Home Health Discipline requested:: Occupational Therapy, Physical Therapy, Nursing  [facility]erral Reason (only applicable if external HHA name selected): Patient has established relationship with provider  Home Health Follow-Up Provider:: PCP  Home Health Services Needed:: Evaluate Functional Status and Safety, Strengthening/Theraputic Exercises to Improve Function, Gait/ADL Training  Homebound Criteria Met:: Requires the Assistance of Another Person for Safe Ambulation or to Leave the Home, Requires Medical Transportation  Supporting Clincal Findings:: Limited Endurance, Fatigues Easliy in Short Distances    DME Referral Provided  Referral made for DME?: No    Other Referral/Resources/Interventions Provided:  Interventions: HHC    Treatment Team Recommendation: Home with Home Health Care  Discharge Destination Plan:: Home with Home Health Care       Pt was evaluated by OT/PT and recommended for a home d/c with [facility]  Pt was active with [facility] and CM sent referral to them  Plan to d/c home today with [facility]
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
## 2025-03-25 NOTE — PROGRESS NOTES
Progress Note - Trauma   Name: Boni Forte 70 y.o. male I MRN: [id number]  Unit/Bed#: DIS 06 I Date of Admission: 3/24/2025   Date of Service: 3/25/2025 I Hospital Day: 1    Assessment & Plan  Laceration of neck  R neck zone 2 laceration s/p mechanical fall in shower. CTA: no active extrav, soft tissue injury in R neck with subQ emphysema in superficial soft tissues and R SCM.    - repair with simple interrupted nylon sutures  Fall  Mechanical fall in shower.  3/24 CXR: Fxs of L posterolateral 7th & 8th ribs, but appear to have callus formation. Additional b/l healed rib fxs unchanged     - CT CAP with contrast  - PT/OT  Hydrocephalus (HCC)  Worsening ventriculomegaly c/w NPH. Had seizure last week.  NSGY contacted regarding  patient's hydrocephalus.  Per NSGY's recommendation, consult removed due to this being an outpatient consultation not requiring inpatient evaluation.     - Pt cleared for DC    VTE Prophylaxis: VTE covered by:  enoxaparin, Subcutaneous, 30 mg at 03/25/25 0964        Disposition: Patient cleared for discharge     TRAUMA TERTIARY SURVEY  Summary of Diagnosed Injuries: Right neck laceration    Transfer from: [facility]    Mechanism of Injury:Fall     Chief Complaint: Right neck laceration s/p repair    24 Hour Events : Right neck laceration repaired overnight  Subjective : Patient doing well this morning.  States no pain and interested in going home.  Still feeling slightly unstable due to NPH but will address with NSGY outpatient    Objective :  Temp:  [97.7 °F (36.5 °C)-97.9 °F (36.6 °C)] 97.9 °F (36.6 °C)  HR:  [54-96] 66  BP: ()/(64-88) 119/69  Resp:  [16-20] 16  SpO2:  [94 %-100 %] 97 %  O2 Device: None (Room air)    I/O       None            Physical Exam   Physical Exam  Constitutional:       General: He is not in acute distress.  Eyes:      Extraocular Movements: Extraocular movements intact.      Conjunctiva/sclera: Conjunctivae normal.   Cardiovascular:      Rate and Rhythm:  Normal rate.      Pulses: Normal pulses.   Pulmonary:      Effort: Pulmonary effort is normal. No respiratory distress.   Abdominal:      General: There is no distension.   Musculoskeletal:         General: Normal range of motion.      Cervical back: Normal range of motion.   Skin:     General: Skin is warm and dry.      Findings: Bruising (upper R chest)      Comments: Status post laceration repair to right neck.  Laceration clean dry and intact.  Neurological:      General: No focal deficit present.      Mental Status: He is alert.        1. Before the illness or injury that brought you to the Emergency, did you need someone to help you on a regular basis? 0=No   2. Since the illness or injury that brought you to the Emergency, have you needed more help than usual to take care of yourself? 0=No   3. Have you been hospitalized for one or more nights during the past 6 months (excluding a stay in the Emergency Department)? 1=Yes   4. In general, do you see well? 0=Yes   5. In general, do you have serious problems with your memory? 1=Yes   6. Do you take more than three different medications everyday? 1=Yes   TOTAL   3     Did you order a geriatric consult if the score was 2 or greater?: n/a           Lab Results: I have reviewed the following results:  Recent Labs     03/24/25  1503 03/24/25  1952 03/25/25  0539   WBC 5.76  --  3.73*   HGB 9.6*  --  8.1*   HCT 29.0*  --  24.6*      < > 258   SODIUM 137  --  139   K 4.0  --  3.8     --  106   CO2 21  --  27   BUN 18  --  13   CREATININE 0.79  --  0.86   GLUC 97  --  114   MG  --   --  2.0   AST 23  --   --    ALT 20  --   --    ALB 4.0  --   --    TBILI 0.39  --   --    ALKPHOS 79  --   --     < > = values in this interval not displayed.

## 2025-03-25 NOTE — DISCHARGE INSTR - AVS FIRST PAGE
Trauma Discharge Instructions:    Please follow-up as instructed. If you need a follow-up appointment, please call the office when you leave to schedule an appointment.    Medications: No medication changes were made to your medications. Please continue all pre hospital medications and check with your PCP to adjust.    Activity:  - PT and OT evaluation and treatment as indicated.  - You may resume activity as tolerated.  - Walking and normal light activities are encouraged.  - Normal daily activities including climbing steps are okay.  - No driving until no longer using pain medications.    Return to work:    - You may return to work once cleared by the Trauma Service.    Diet:    - You may resume your normal diet.    Medications:  - You should continue your current medication regimen after discharge unless otherwise instructed. Please refer to your discharge medication list for further details.  - Please take the pain medications as directed.  - You are encouraged to use non-narcotic pain medications first and whenever possible. Reserve the use of narcotic pain medication for moderate to severe pain not controlled by non-narcotic medications.  - No driving while taking narcotic pain medications.  - You may become constipated, especially if taking pain medications. You may take any over the counter stool softeners or laxatives as needed. Examples: Milk of Magnesia, Colace, Senna.    Additional Instructions:  - May shower daily.  - If you have any questions or concerns after discharge please call the office.  - Call office or return to ER if fever greater than 101, chills, persistent nausea/vomiting, worsening/uncontrollable pain, develop productive cough, increasing shortness of breath, difficulty breathing, and/or increasing redness or purulent/foul smelling drainage from incision(s).

## 2025-03-26 ENCOUNTER — TRANSITIONAL CARE MANAGEMENT (OUTPATIENT)
Dept: FAMILY MEDICINE CLINIC | Facility: CLINIC | Age: 70
End: 2025-03-26

## 2025-03-27 LAB — VALPROATE FREE SERPL-MCNC: 3.6 UG/ML (ref 6–22)

## 2025-03-28 ENCOUNTER — TELEPHONE (OUTPATIENT)
Dept: ADMINISTRATIVE | Facility: OTHER | Age: 70
End: 2025-03-28

## 2025-03-28 NOTE — TELEPHONE ENCOUNTER
03/28/25 1:14 PM    Patient contacted to bring Advance Directive, POLST, or Living Will document to next scheduled pcp visit.VBI Department spoke with patient/ caregiver.    Thank you.  Maria Esther Talbot MA  [facility]

## 2025-03-31 ENCOUNTER — TELEMEDICINE (OUTPATIENT)
Dept: FAMILY MEDICINE CLINIC | Facility: CLINIC | Age: 70
End: 2025-03-31
Payer: MEDICARE

## 2025-03-31 DIAGNOSIS — Z78.9 TRANSITION OF CARE: Primary | ICD-10-CM

## 2025-03-31 DIAGNOSIS — I10 PRIMARY HYPERTENSION: ICD-10-CM

## 2025-03-31 DIAGNOSIS — G89.29 CHRONIC BILATERAL LOW BACK PAIN WITH BILATERAL SCIATICA: ICD-10-CM

## 2025-03-31 DIAGNOSIS — R56.9 SEIZURE-LIKE ACTIVITY (HCC): ICD-10-CM

## 2025-03-31 DIAGNOSIS — N18.30 STAGE 3 CHRONIC KIDNEY DISEASE, UNSPECIFIED WHETHER STAGE 3A OR 3B CKD (HCC): ICD-10-CM

## 2025-03-31 DIAGNOSIS — G91.9 HYDROCEPHALUS, UNSPECIFIED TYPE (HCC): ICD-10-CM

## 2025-03-31 DIAGNOSIS — E83.110 HEREDITARY HEMOCHROMATOSIS (HCC): ICD-10-CM

## 2025-03-31 DIAGNOSIS — M54.42 CHRONIC BILATERAL LOW BACK PAIN WITH BILATERAL SCIATICA: ICD-10-CM

## 2025-03-31 DIAGNOSIS — C61 MALIGNANT TUMOR OF PROSTATE (HCC): ICD-10-CM

## 2025-03-31 DIAGNOSIS — G89.4 CHRONIC PAIN DISORDER: ICD-10-CM

## 2025-03-31 DIAGNOSIS — M15.9 GENERALIZED OSTEOARTHRITIS OF MULTIPLE SITES: ICD-10-CM

## 2025-03-31 DIAGNOSIS — F10.21 ALCOHOL DEPENDENCE IN REMISSION (HCC): ICD-10-CM

## 2025-03-31 DIAGNOSIS — M54.41 CHRONIC BILATERAL LOW BACK PAIN WITH BILATERAL SCIATICA: ICD-10-CM

## 2025-03-31 PROCEDURE — 99496 TRANSJ CARE MGMT HIGH F2F 7D: CPT | Performed by: FAMILY MEDICINE

## 2025-03-31 NOTE — ASSESSMENT & PLAN NOTE
PATIENT IS S/P [facility]'S ADMISSION FOR FALL AND NEWLY FOUND HYDROCEPHALUS    DATE OF DISCHARGE 3/24/25    REVIEWED HOSPITAL COURSE, DISCHARGE INSTRUCTIONS AND MEDICATIONS    PATIENT FEELS BETTER   LACERATION HEALING  NO FURTHER SEIZURE ACTIVITY    DENIES ANY CP, SOB, PALPITATIONS  NO FEVER OR CHILLS  NO NVD  DOES COMPLAIN OF SOME DOUBLE VISION AT TIMES  NO CHANGE IN STREGTH OF CHANGE IN NEUROPATHY - LIKE SYMPTOMS

## 2025-03-31 NOTE — PATIENT INSTRUCTIONS
CONTINUE CURRENT TREATMENT PLAN  MONITOR SYMPTOMS  MEDICATION AS DIRECTED  FOLLOW UP NEUROSURGICAL EVAL   FURTHER PLANS PENDING

## 2025-03-31 NOTE — ASSESSMENT & PLAN NOTE
Orders:  •  naloxone (NARCAN) 4 mg/0.1 mL nasal spray; Administer 1 spray into a nostril. If no response after 2-3 minutes, give another dose in the other nostril using a new spray.

## 2025-03-31 NOTE — ASSESSMENT & PLAN NOTE
Lab Results   Component Value Date    EGFR 87 03/25/2025    EGFR 90 03/24/2025    EGFR 93 03/17/2025    CREATININE 0.86 03/25/2025    CREATININE 0.79 03/24/2025    CREATININE 0.74 03/17/2025

## 2025-03-31 NOTE — PROGRESS NOTES
Virtual TCM Visit:Name: Boni Forte      : 1955      MRN: [id number]  Encounter Provider: Elier Oh MD  Encounter Date: 3/31/2025   Encounter department: [facility]  :  Assessment & Plan  Transition of care         PATIENT IS S/P [facility]'S ADMISSION FOR FALL AND NEWLY FOUND HYDROCEPHALUS    DATE OF DISCHARGE 3/24/25    REVIEWED HOSPITAL COURSE, DISCHARGE INSTRUCTIONS AND MEDICATIONS    PATIENT FEELS BETTER   LACERATION HEALING  NO FURTHER SEIZURE ACTIVITY    DENIES ANY CP, SOB, PALPITATIONS  NO FEVER OR CHILLS  NO NVD  DOES COMPLAIN OF SOME DOUBLE VISION AT TIMES  NO CHANGE IN STREGTH OF CHANGE IN NEUROPATHY - LIKE SYMPTOMS  Generalized osteoarthritis of multiple sites    Orders:  •  naloxone (NARCAN) 4 mg/0.1 mL nasal spray; Administer 1 spray into a nostril. If no response after 2-3 minutes, give another dose in the other nostril using a new spray.    Chronic pain disorder    Orders:  •  naloxone (NARCAN) 4 mg/0.1 mL nasal spray; Administer 1 spray into a nostril. If no response after 2-3 minutes, give another dose in the other nostril using a new spray.    Chronic bilateral low back pain with bilateral sciatica    Orders:  •  naloxone (NARCAN) 4 mg/0.1 mL nasal spray; Administer 1 spray into a nostril. If no response after 2-3 minutes, give another dose in the other nostril using a new spray.    Hydrocephalus, unspecified type (HCC)         Seizure-like activity (HCC)         Primary hypertension         Alcohol dependence in remission (HCC)         Stage 3 chronic kidney disease, unspecified whether stage 3a or 3b CKD (HCC)  Lab Results   Component Value Date    EGFR 87 2025    EGFR 90 2025    EGFR 93 2025    CREATININE 0.86 2025    CREATININE 0.79 2025    CREATININE 0.74 2025          Hereditary hemochromatosis (HCC)         Malignant tumor of prostate (HCC)         Transition of care         Generalized osteoarthritis of  multiple sites         Chronic pain disorder         Chronic bilateral low back pain with bilateral sciatica         Hydrocephalus, unspecified type (HCC)         Seizure-like activity (HCC)         Primary hypertension         Alcohol dependence in remission (HCC)         Stage 3 chronic kidney disease, unspecified whether stage 3a or 3b CKD (HCC)  Lab Results   Component Value Date    EGFR 87 03/25/2025    EGFR 90 03/24/2025    EGFR 93 03/17/2025    CREATININE 0.86 03/25/2025    CREATININE 0.79 03/24/2025    CREATININE 0.74 03/17/2025          Hereditary hemochromatosis (HCC)         Malignant tumor of prostate (HCC)                    History of Present Illness     Transitional Care Management Review:   Boni Forte is a 70 y.o. male here for TCM follow up.    During the TCM phone call patient stated:  TCM Call (since 3/17/2025)     Date and time call was made  3/26/2025  2:11 PM    Hospital care reviewed  Records reviewed    Patient was hospitial[facility] at  [facility]    Date of Admission  03/24/25  transfered to Trauma Unit in [place]    Date of discharge  03/25/25    Diagnosis  neck laceration, hydrocephalus    Disposition  Home    Were the patients medications reviewed and updated  Yes    Current Symptoms  None      TCM Call (since 3/17/2025)     Post hospital issues  None    Scheduled for follow up?  Yes    Patients specialists  Neurologist    Neurologist name  [facility]    Neurologist contact #  [phone]    Did you obtain your prescribed medications  Yes    Do you need help managing your prescriptions or medications  No    Is transportation to your appointment needed  No    I have advised the patient to call PCP with any new or worsening symptoms  L.Denson/LPN    Living Arrangements  Alone    Support System  Friends; Family; Neighboors    Do you have social support  Yes, some    Are you recieving home care services  No    Types of home care services  Home PT  PATIENT WILL BE CALLING  TO SET THAT UP        TRANSITION OF CARE      Review of Systems   Constitutional:  Positive for fatigue. Negative for chills and fever.   HENT:  Negative for congestion, ear discharge, ear pain, mouth sores, postnasal drip, sore throat and trouble swallowing.    Eyes:  Positive for visual disturbance. Negative for pain and discharge.   Respiratory:  Negative for cough, shortness of breath and wheezing.    Cardiovascular:  Negative for chest pain, palpitations and leg swelling.   Gastrointestinal:  Negative for abdominal distention, abdominal pain, blood in stool, diarrhea and nausea.   Endocrine: Negative for polydipsia, polyphagia and polyuria.   Genitourinary:  Negative for dysuria, frequency, hematuria and urgency.   Musculoskeletal:  Positive for arthralgias. Negative for gait problem and joint swelling.   Skin:  Negative for pallor and rash.   Neurological:  Positive for weakness. Negative for dizziness, syncope, speech difficulty, light-headedness, numbness and headaches.   Hematological:  Negative for adenopathy.   Psychiatric/Behavioral:  Negative for behavioral problems, confusion and sleep disturbance. The patient is not nervous/anxious.      Objective   There were no vitals taken for this visit.    Physical Exam    PATIENT VISUALLY APPEARS  IN NO OBVIOUS DISTRESS  LACERATION APPEARS TO BE HEALING      TCM Call (since 3/17/2025)     Date and time call was made  3/26/2025  2:11 PM    Hospital care reviewed  Records reviewed    Patient was hospitialized at  [facility]    Date of Admission  03/24/25  transfered to Trauma Unit in [place]    Date of discharge  03/25/25    Diagnosis  neck laceration, hydrocephalus    Disposition  Home    Were the patients medications reviewed and updated  Yes    Current Symptoms  None      TCM Call (since 3/17/2025)     Post hospital issues  None    Scheduled for follow up?  Yes    Patients specialists  Neurologist    Neurologist name  [facility]     Neurologist contact #  [phone]    Did you obtain your prescribed medications  Yes    Do you need help managing your prescriptions or medications  No    Is transportation to your appointment needed  No    I have advised the patient to call PCP with any new or worsening symptoms  L.Denson/LPN    Living Arrangements  Alone    Support System  Friends; Family; Neighboors    Do you have social support  Yes, some    Are you recieving home care services  No    Types of home care services  Home PT  PATIENT WILL BE CALLING TO SET THAT UP          Medications have been reviewed by provider in current encounter    Administrative Statements   Encounter provider Elier Oh MD    The Patient is located at Home and in the following state in which I hold an active license [place].    The patient was identified by name and date of birth. Boni Forte was informed that this is a telemedicine visit and that the visit is being conducted through the Epic Embedded platform. He agrees to proceed..  My office door was closed. No one else was in the room.  He acknowledged consent and understanding of privacy and security of the video platform. The patient has agreed to participate and understands they can discontinue the visit at any time.    I have spent a total time of 10 minutes in caring for this patient on the day of the visit/encounter including Diagnostic results, Instructions for management, and Impressions, not including the time spent for establishing the audio/video connection.    Elier Oh MD

## 2025-03-31 NOTE — ASSESSMENT & PLAN NOTE
PATIENT IS S/P [facility]'S ADMISSION FOR FALL AND NEWLY FOUND HYDROCEPHALUS    DATE OF DISCHARGE 3/24/25    REVIEWED HOSPITAL COURSE, DISCHARGE INSTRUCTIONS AND MEDICATIONS    PATIENT FEELS BETTER   LACERATION HEALING  NO FURTHER SEIZURE ACTIVITY    DENIES ANY CP, SOB, PALPITATIONS  NO FEVER OR CHILLS  NO NVD  DOES COMPLAIN OF SOME DOUBLE VISION AT TIMES  NO CHANGE IN STREGTH OF CHANGE IN NEUROPATHY - LIKE SYMPTOMS
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
## 2025-04-02 ENCOUNTER — TELEMEDICINE (OUTPATIENT)
Dept: PSYCHIATRY | Facility: CLINIC | Age: 70
End: 2025-04-02
Payer: MEDICARE

## 2025-04-02 DIAGNOSIS — F41.1 GAD (GENERALIZED ANXIETY DISORDER): ICD-10-CM

## 2025-04-02 DIAGNOSIS — F33.0 MILD EPISODE OF RECURRENT MAJOR DEPRESSIVE DISORDER (HCC): Primary | ICD-10-CM

## 2025-04-02 PROCEDURE — G2211 COMPLEX E/M VISIT ADD ON: HCPCS | Performed by: PHYSICIAN ASSISTANT

## 2025-04-02 PROCEDURE — 99214 OFFICE O/P EST MOD 30 MIN: CPT | Performed by: PHYSICIAN ASSISTANT

## 2025-04-02 RX ORDER — BUPROPION HYDROCHLORIDE 150 MG/1
150 TABLET ORAL EVERY MORNING
Qty: 14 TABLET | Refills: 0 | Status: SHIPPED | OUTPATIENT
Start: 2025-04-02 | End: 2025-04-16

## 2025-04-02 NOTE — ASSESSMENT & PLAN NOTE
Not at goal - decrease bupropion XL to 150 mg qAM starting 4/3 2/2 new onset seizure; started on divalproex in hospital, neurology to manage; continue vilazodone 40 mg qd w/ food, aripiprazole 5 mg qd; continue with outside therapist; f/u in 2 weeks

## 2025-04-03 ENCOUNTER — OFFICE VISIT (OUTPATIENT)
Dept: SURGERY | Facility: CLINIC | Age: 70
End: 2025-04-03
Payer: MEDICARE

## 2025-04-03 DIAGNOSIS — S11.91XD LACERATION OF NECK, SUBSEQUENT ENCOUNTER: Primary | ICD-10-CM

## 2025-04-03 PROCEDURE — 99212 OFFICE O/P EST SF 10 MIN: CPT | Performed by: NURSE PRACTITIONER

## 2025-04-03 NOTE — PROGRESS NOTES
Name: Boni Forte      : 1955      MRN: [id number]  Encounter Provider: Trauma Surgery provider  Encounter Date: 4/3/2025   Encounter department: [facility]  :  Assessment & Plan  Laceration of neck, subsequent encounter  Patient here for follow up fall with Neck Laceration and S/P suture repair.  Doing well.  Laceration to neck healing, skin edges well approximated  - Sutures removed  - Steri strip applied to small bottom area x 2  - F/U PRN           Does the patient have a positive PTSD Screen? No  Was a psychiatric referral provided? no    History of Present Illness   Boni Forte is a 70 y.o. male who presents following a fall in the bathroom at which time he sustained a laceration to his Right neck, zone II area. CTA showed no active extravasation. His wound was irrigated and sutured. Here is here today for suture removal. The wound edges are C/D/I with sutures and well approximated. Sutures removed. 2 steri strips applied to lower wound edges. Follow up PRN    Review of Systems   Constitutional: Negative.    Respiratory: Negative.     Cardiovascular: Negative.   as per HPI  Current Outpatient Medications on File Prior to Visit   Medication Sig Dispense Refill   • abiraterone (ZYTIGA) 250 mg tablet      • amLODIPine (NORVASC) 10 mg tablet Take 1 tablet (10 mg total) by mouth daily 90 tablet 1   • ARIPiprazole (ABILIFY) 5 mg tablet Take 1 tablet (5 mg total) by mouth daily 30 tablet 2   • Armodafinil 250 MG tablet Take 1 tablet (250 mg total) by mouth daily 30 tablet 0   • buPROPion (WELLBUTRIN XL) 150 mg 24 hr tablet Take 1 tablet (150 mg total) by mouth every morning for 14 days 14 tablet 0   • Calcium Citrate-Vitamin D 250 mg-2.5 mcg tablet Take 1 tablet by mouth 2 (two) times a day 180 tablet 1   • diphenoxylate-atropine (LOMOTIL) 2.5-0.025 mg per tablet Take 1 tablet by mouth 4 (four) times a day as needed for diarrhea     • divalproex sodium (DEPAKOTE) 500 mg   "tablet Take 1 tablet (500 mg total) by mouth every 12 (twelve) hours 60 tablet 0   • folic acid (FOLVITE) 1 mg tablet Take 1 tablet (1 mg total) by mouth daily 30 tablet 0   • gabapentin (NEURONTIN) 400 mg capsule Take 400 mg by mouth 2 (two) times a day     • HYDROcodone-acetaminophen (NORCO)  mg per tablet Take 1 tablet by mouth every 4 (four) hours Max Daily Amount: 6 tablets 180 tablet 0   • HYDROcodone-acetaminophen (NORCO) 5-325 mg per tablet Take 1 tablet by mouth every 6 (six) hours as needed for pain     • INSULIN SYRINGE .5CC/29G ([employer]ocate Insulin Syringe) 29G X 1/2\" 0.5 ML MISC Syringe 0.5 mL 29g 1/2 inch ; 1 each Quantity: 30 Refills: 0 Ordered: 7-May-2024 Juancarlos Wilson: 7-May-2024 Generic Substitution Allowed     • Isopropyl Alcohol (ALCOHOL PREPS EX) Alcohol Preps Sterile (1 each once a day for 100 days); 1 each Quantity: 100 Refills: 0 Ordered: 7-May-2024 Juancarlos Wilson: 7-May-2024 Generic Substitution Allowed     • leuprolide (LUPRON DEPOT 3 MONTH KIT) 22.5 mg injection Inject into a muscle every 3 (three) months Every three months     • morphine (MS CONTIN) 15 mg 12 hr tablet Take 1 tablet (15 mg total) by mouth 2 (two) times a day Max Daily Amount: 30 mg 60 tablet 0   • multivitamin (THERAGRAN) TABS Take 1 tablet by mouth daily     • naloxone (NARCAN) 4 mg/0.1 mL nasal spray Administer 1 spray into a nostril. If no response after 2-3 minutes, give another dose in the other nostril using a new spray.     • olmesartan (BENICAR) 40 mg tablet Take 40 mg by mouth daily     • omeprazole (PriLOSEC) 40 MG capsule Take 40 mg by mouth daily     • Papav-Phentolamine-Alprostadil (PGE1 / PHENTOLAMINE / PAPAVERINE, TRIMIX, 40 MCG / 1 MG / 30 MG INJECTION) alprostadil 10 mcg/mL + papaverine 30 mg/mL + phentolamine 1 mg/mL (trimix); 10 unit(s) intracavernosal Administer 15 minutes prior to sexual activity. Do not take more than 3 times a week. Quantity: 5 Refills: 0 Ordered: 7-May-2024 Steve, " [place]: 7-May-2024 Generic Substitution Allowed     • predniSONE 5 mg tablet Take 5 mg by mouth daily w/ Zytiga     • rosuvastatin (CRESTOR) 40 MG tablet Take 40 mg by mouth daily     • tadalafil (CIALIS) 5 MG tablet Take 1 tablet by mouth in the morning     • tamsulosin (FLOMAX) 0.4 mg TAKE 1 CAPSULE (0.4 MG TOTAL) BY MOUTH DAILY WITH DINNER TAKE AFTER DINNER     • thiamine 100 MG tablet Take 1 tablet (100 mg total) by mouth daily 30 tablet 0   • vilazodone (VIIBRYD) 40 mg tablet Take 40 mg by mouth daily with breakfast     • zolpidem (AMBIEN CR) 12.5 MG CR tablet Take 1 tablet (12.5 mg total) by mouth daily at bedtime as needed for sleep 30 tablet 0     No current facility-administered medications on file prior to visit.         Objective   There were no vitals taken for this visit.    Physical Exam  Constitutional:       Appearance: Normal appearance.   HENT:      Head: Atraumatic.   Musculoskeletal:         General: Normal range of motion.   Skin:     General: Skin is warm.      Comments: Wound edges on Right neck well approximated with sutures   Neurological:      Mental Status: He is alert and oriented to person, place, and time.   Psychiatric:         Behavior: Behavior normal.

## 2025-04-03 NOTE — ASSESSMENT & PLAN NOTE
Patient here for follow up fall with Neck Laceration and S/P suture repair.  Doing well.  Laceration to neck healing, skin edges well approximated  - Sutures removed  - Steri strip applied to small bottom area x 2  - F/U PRN

## 2025-04-08 DIAGNOSIS — M15.9 GENERALIZED OSTEOARTHRITIS OF MULTIPLE SITES: ICD-10-CM

## 2025-04-08 DIAGNOSIS — R56.9 SEIZURE-LIKE ACTIVITY (HCC): ICD-10-CM

## 2025-04-09 RX ORDER — FOLIC ACID 1 MG/1
1 TABLET ORAL DAILY
Qty: 30 TABLET | Refills: 0 | Status: SHIPPED | OUTPATIENT
Start: 2025-04-09

## 2025-04-09 RX ORDER — LANOLIN ALCOHOL/MO/W.PET/CERES
100 CREAM (GRAM) TOPICAL DAILY
Qty: 30 TABLET | Refills: 0 | Status: SHIPPED | OUTPATIENT
Start: 2025-04-09

## 2025-04-10 ENCOUNTER — OFFICE VISIT (OUTPATIENT)
Dept: NEUROSURGERY | Facility: CLINIC | Age: 70
End: 2025-04-10
Payer: MEDICARE

## 2025-04-10 VITALS
OXYGEN SATURATION: 96 % | TEMPERATURE: 97.5 F | SYSTOLIC BLOOD PRESSURE: 130 MMHG | DIASTOLIC BLOOD PRESSURE: 70 MMHG | HEART RATE: 90 BPM

## 2025-04-10 DIAGNOSIS — G91.9 HYDROCEPHALUS, UNSPECIFIED TYPE (HCC): ICD-10-CM

## 2025-04-10 DIAGNOSIS — G31.9 VENTRICULAR ENLARGEMENT DUE TO BRAIN ATROPHY (HCC): Primary | ICD-10-CM

## 2025-04-10 PROCEDURE — 99205 OFFICE O/P NEW HI 60 MIN: CPT | Performed by: NURSE PRACTITIONER
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
## 2025-04-10 NOTE — ASSESSMENT & PLAN NOTE
Presents as referral from ED for evaluation of ventricular enlargement/concern for NPH  S/p new-onset seizure 3/18/25 likely a result of increase in dose of Wellbutrin.  S/p fall 3/25 with additional hospitalization and concern on imaging for above.  C/o memory/cognitive impairment, urinary incontinence since starting ADT therapy for prostate CA 2 years ago.  Current exam is non-focal. No ataxia. Abnormal gait related to leg-length discrepancy, s/p multiple orthopedic surgeries.  Metrics: MOCA 28/30, NPH score 11/15.    Imaging:  CTA head/neck w/wo, 3/24/25: 1. Worsening ventriculomegaly with a configuration that can be seen in the setting of NPH. 2. No acute intracranial abnormality.  MRI brain w/wo, 3/14/25: No mass effect, acute intracranial hemorrhage or evidence of recent infarction. No abnormal parenchymal or leptomeningeal enhancement identified.     Plan:  Reviewed imaging extensively with patient and his wife Sandi over the phone.  At this time, I would classify patient as possible NPH.  He does suffer from classic triad of symptoms although cognitive and urinary issues could be a result of his prostate CA treatment as symptoms began when started hormone therapy.  Gait dysfunction likely multifactorial in setting of leg-length discrepancy, multiple bilateral hip arthroplasties.  Discussed natural history of NPH and associated symptoms.   Reviewed that NPH is a clinical diagnosis made based on insidious onset of trifecta of symptoms that cannot be explained by other causes as well as imaging consistent with ventricular enlargement that is out of proportion to any age related cerebral atrophy.   Advised that should patient wish to pursue further workup, this would involve cogntitive testing, PT evaluation and possibly LP. Discussed that  shunt is only recommended when above represent likely or very likely diagnosis of NPH.   Patient would like to move forward with further testing so I will contact NPH  nurse navigator to organize.  Follow up as discussed.

## 2025-04-14 DIAGNOSIS — F11.20 UNCOMPLICATED OPIOID DEPENDENCE (HCC): ICD-10-CM

## 2025-04-14 DIAGNOSIS — M15.9 GENERALIZED OSTEOARTHRITIS OF MULTIPLE SITES: ICD-10-CM

## 2025-04-14 DIAGNOSIS — G89.4 CHRONIC PAIN SYNDROME: ICD-10-CM

## 2025-04-15 RX ORDER — HYDROCODONE BITARTRATE AND ACETAMINOPHEN 10; 325 MG/1; MG/1
1 TABLET ORAL EVERY 4 HOURS
Qty: 180 TABLET | Refills: 0 | Status: SHIPPED | OUTPATIENT
Start: 2025-04-15

## 2025-04-16 ENCOUNTER — RA CDI HCC (OUTPATIENT)
Dept: OTHER | Facility: HOSPITAL | Age: 70
End: 2025-04-16

## 2025-04-16 ENCOUNTER — TELEPHONE (OUTPATIENT)
Age: 70
End: 2025-04-16

## 2025-04-16 ENCOUNTER — TELEPHONE (OUTPATIENT)
Dept: NEUROSURGERY | Facility: CLINIC | Age: 70
End: 2025-04-16

## 2025-04-16 DIAGNOSIS — G91.2 NORMAL PRESSURE HYDROCEPHALUS (HCC): Primary | ICD-10-CM

## 2025-04-18 ENCOUNTER — TELEMEDICINE (OUTPATIENT)
Dept: PSYCHIATRY | Facility: CLINIC | Age: 70
End: 2025-04-18
Payer: MEDICARE

## 2025-04-18 DIAGNOSIS — F33.0 MILD EPISODE OF RECURRENT MAJOR DEPRESSIVE DISORDER (HCC): Primary | Chronic | ICD-10-CM

## 2025-04-18 DIAGNOSIS — F41.1 GAD (GENERALIZED ANXIETY DISORDER): ICD-10-CM

## 2025-04-18 PROCEDURE — 99214 OFFICE O/P EST MOD 30 MIN: CPT | Performed by: PHYSICIAN ASSISTANT

## 2025-04-18 PROCEDURE — G2211 COMPLEX E/M VISIT ADD ON: HCPCS | Performed by: PHYSICIAN ASSISTANT
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
## 2025-04-18 NOTE — ASSESSMENT & PLAN NOTE
Not at goal - second opinion with staff psychiatrist advised 2/2 continuing bupropion post-seizure; continue vilazodone 40 mg qd w/ food, bupropion  mg qAM, aripiprazole 5 mg qd; continue with outside therapist; f/u in 2 weeks

## 2025-04-18 NOTE — TELEPHONE ENCOUNTER
Patient phoned back and he was okay with the 6/6/25 apt for PT NPH initial Gait Assessment.      While pt was initially okay with the 7/18/25 for the LP turns out he has an appointment with Hem Onc that day.  This RN to reschedule for the following week and then reach back out to patient.      Patient appreciative for assistance.

## 2025-04-18 NOTE — TELEPHONE ENCOUNTER
This RN spoke with patient regarding scheduling for NPH continued follow up.      This RN informed pt that we were currently scheduling out into July around the July 18th.  This RN inquired with patient that he would be free for the LP on July 18th , 25th or 8/2 should there be a need to go out that far.  Patient stated that yes, he was available those Friday's.    This RN to schedule patient and then reach out to verify and complete appointment scheduling.    This RN was able to schedule the LP for Friday, July 18th, 2025.    Call out to [facility] and CAROL left requesting Post NPH Gait Assessment apt for 7/18/2025 at 1130 or 1200.  This RN noting that would also like to schedule initial NPH COT assessment roughly 4-5 weeks prior. Pending either call back or communication through secure chat.

## 2025-04-22 ENCOUNTER — TELEPHONE (OUTPATIENT)
Dept: ADMINISTRATIVE | Facility: OTHER | Age: 70
End: 2025-04-22

## 2025-04-22 NOTE — TELEPHONE ENCOUNTER
04/22/25 10:07 AM    Patient contacted to bring Advance Directive, POLST, or Living Will document to next scheduled pcp visit.[facility] Department spoke with patient/ caregiver.    Thank you.  Maria Esther Talbot MA  [facility]

## 2025-04-23 ENCOUNTER — OFFICE VISIT (OUTPATIENT)
Dept: FAMILY MEDICINE CLINIC | Facility: CLINIC | Age: 70
End: 2025-04-23
Payer: MEDICARE

## 2025-04-23 VITALS
OXYGEN SATURATION: 97 % | DIASTOLIC BLOOD PRESSURE: 80 MMHG | HEIGHT: 66 IN | HEART RATE: 64 BPM | RESPIRATION RATE: 18 BRPM | WEIGHT: 163.4 LBS | BODY MASS INDEX: 26.26 KG/M2 | SYSTOLIC BLOOD PRESSURE: 140 MMHG | TEMPERATURE: 98.7 F

## 2025-04-23 DIAGNOSIS — M54.42 CHRONIC BILATERAL LOW BACK PAIN WITH BILATERAL SCIATICA: Primary | ICD-10-CM

## 2025-04-23 DIAGNOSIS — M15.9 GENERALIZED OSTEOARTHRITIS OF MULTIPLE SITES: ICD-10-CM

## 2025-04-23 DIAGNOSIS — F11.20 CONTINUOUS OPIOID DEPENDENCE (HCC): ICD-10-CM

## 2025-04-23 DIAGNOSIS — M54.41 CHRONIC BILATERAL LOW BACK PAIN WITH BILATERAL SCIATICA: Primary | ICD-10-CM

## 2025-04-23 DIAGNOSIS — G89.29 CHRONIC BILATERAL LOW BACK PAIN WITH BILATERAL SCIATICA: Primary | ICD-10-CM

## 2025-04-23 DIAGNOSIS — M48.062 SPINAL STENOSIS, LUMBAR REGION WITH NEUROGENIC CLAUDICATION: ICD-10-CM

## 2025-04-23 DIAGNOSIS — G89.4 CHRONIC PAIN SYNDROME: ICD-10-CM

## 2025-04-23 PROBLEM — S11.91XA LACERATION OF NECK: Status: RESOLVED | Noted: 2025-03-24 | Resolved: 2025-04-23

## 2025-04-23 PROBLEM — D72.819 LEUKOPENIA: Status: RESOLVED | Noted: 2025-03-18 | Resolved: 2025-04-23

## 2025-04-23 PROBLEM — M54.16 LUMBAR BACK PAIN WITH RADICULOPATHY AFFECTING RIGHT LOWER EXTREMITY: Status: RESOLVED | Noted: 2023-06-29 | Resolved: 2025-04-23

## 2025-04-23 PROBLEM — T07.XXXA MULTIPLE CONTUSIONS: Status: RESOLVED | Noted: 2025-03-14 | Resolved: 2025-04-23
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
## 2025-04-23 PROBLEM — Z78.9 TRANSITION OF CARE: Status: RESOLVED | Noted: 2025-03-31 | Resolved: 2025-04-23
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
## 2025-04-23 PROCEDURE — 99214 OFFICE O/P EST MOD 30 MIN: CPT | Performed by: FAMILY MEDICINE

## 2025-04-23 PROCEDURE — G2211 COMPLEX E/M VISIT ADD ON: HCPCS | Performed by: FAMILY MEDICINE

## 2025-04-23 RX ORDER — MIRABEGRON 50 MG/1
50 TABLET, FILM COATED, EXTENDED RELEASE ORAL DAILY
COMMUNITY

## 2025-04-23 NOTE — PATIENT INSTRUCTIONS
CONTINUE CURRENT TREATMENT PLAN  MONITOR SYMPTOMS  CONTINUE PT      RV 3 M  Goals of care:  Maximize your health and quality of life by:   Increasing your level of function and activity  Decreasing the negative effects of pain on your life  Minimizing the risks and side effects of medications and ensuring safe use of opioid medication     Ways for you to help meet your goals:  Maintain a healthy lifestyle. This includes proper nutrition, regular physical activity as able, try for 8 hours of sleep per night, use stress reduction strategies, avoid triggers.      Risks and side effects of opioid use:  Prescription opioids carry serious risks of addiction and  overdose, especially with prolonged use. An opioid overdose,  often marked by slowed breathing, can cause sudden death. The  use of prescription opioids can have a number of side effects as  well, even when taken as directed:  Tolerance--meaning you might need to take more of a medication for the same pain relief  Physical dependence--meaning you have symptoms of withdrawal when a medication is stopped  Increased sensitivity to pain  Constipation  Nausea, vomiting, dry mouth  Sleepiness and dizziness   Confusion  Depression  Low levels of testosterone that can result in lower sex drive, energy, and strength  Itching and sweating    If you are prescribed opioids for pain:  Never take opioids in greater amounts or more often than prescribed.  Help prevent misuse and abuse.        - Never sell or share prescription opioids.        - Never use another person’s prescription opioids.  ‡Store prescription opioids in a secure place and out of reach of others (this may include visitors, children, friends, and family).  Safely dispose of unused prescription opioids: Find your community drug take-back program or your pharmacy mail-back program, or flush them down the toilet, following guidance from the Food and Drug Administration (www.fda.gov/Drugs/ResourcesForYou).  ‡Visit  www.cdc.gov/drugoverdose to learn about the risks of opioid abuse and overdose.  If you believe you may be struggling with addiction, tell your health care provider and ask for guidance or call [facility]’s National Helpline at 7-806-269-HLVB.

## 2025-04-23 NOTE — PROGRESS NOTES
Name: Boni Forte      : 1955      MRN: [id number]  Encounter Provider: Elier Oh MD  Encounter Date: 2025   Encounter department: [facility]    :  Assessment & Plan  Chronic bilateral low back pain with bilateral sciatica         Spinal stenosis, lumbar region with neurogenic claudication         Generalized osteoarthritis of multiple sites         Continuous opioid dependence (HCC)         Chronic pain syndrome         Chronic bilateral low back pain with bilateral sciatica         Spinal stenosis, lumbar region with neurogenic claudication         Generalized osteoarthritis of multiple sites         Continuous opioid dependence (HCC)         Chronic pain syndrome               Treatment Plan: STRETCHING  PAIN MEDICATION    Treatment Goals: IMPROVE MOBILITY  PAIN RELIEF    Opiate risks  There are risks associated with opioid medications, including dependence, addiction and tolerance. The patient understands and agrees to use these medications only as prescribed. Potential side effects of the medications include, but are not limited to, constipation, drowsiness, addiction, impaired judgment and risk of fatal overdose if not taken as prescribed. The patient was warned against driving while taking sedation medications.  Sharing medications is a felony. At this point in time, the patient is showing no signs of addiction, abuse, diversion or suicidal ideation.      Pateint is taking concurrent benzodiazepines. Has been counseled on the risks of taking opioids and benzodiazepines including sedation, increased fall risk, dizziness, addictive potential and death.      Opioid agreement  Pain management agreement was reviewed with patient and signed/updated during visit      PD[place] review  [place] or [place]  reviewed. No red flags were identified; safe to proceed with prescription      instructions for management and impressions.           History of Present Illness      Opioid Management:   Type of visit: Follow-up    Pain related diagnoses: CERVICAL SPINAL STENOSIS, POST LAMINECTOMY SYNDROME, SEVERE OSTEOARTHRITIS, CHRONIC PAIN MANAGEMENT    Interval history: STABLE  PAIN MANAGEMENT SUFFICIENT    Aberrant behavior?: No      Adverse effects from medication?: No      Screening Tools/Assessments:    PHQ-2/9:  Last PHQ-9 score: 7 (Last PHQ-9 date: 4/2/2025)    Brief Pain Inventory (BPI):  1) Throughout our lives, most of us have had pain from time to time (such as minor headaches, sprains, and toothaches). Have you had pain other than these everyday kinds of pain today? Yes  2) Where is your pain located? Roght wrist, lumbar spine  3) Rate your pain at its worst in the last 24 hours: 6  4) Rate your pain at its least in the last 24 hours: 3  5) Rate your average level of pain: 4  6) Rate your pain right now: 3  7) What treatments or medications are you receiving for your pain? Aleve, [place], Morphine ER  8) In the past 24 hours, how much relief have pain treatments or medication provided? 40%  9) During the past 24 hours, pain has interfered with your:     A) General activity: 4     B) Mood: 5     C) Walking ability: 4     D) Normal work (work outside the home & housework): 2     E) Relations with other people: 0     F) Sleep: 4     G) Enjoyment of life: 4    Drug Screen:  Date of last drug screen: 12/30/2024  Drug screen result based off current prescriptions: consistent    Opioid agreement:  Active Opioid agreement on file?: No    Opioid agreement signed date: 1/14/2020  Opioid agreement expiration date: 1/13/2021    Naloxone:  Currently prescribed Naloxone (Narcan): Yes      Outpatient Morphine Milligram Equivalents Per Day     4/23/25 and after 90 MME/Day    Order Name Dose Route Frequency Maximum MME/Day     morphine (MS CONTIN) 15 mg 12 hr tablet 15 mg Oral 2 times daily 30 MME/Day     HYDROcodone-acetaminophen (NORCO)  mg per tablet 1 tablet Oral Every 4 hours 60  "MME/Day    Total Potential Morphine Milligram Equivalents Per Day 90 MME/Day    Calculation Information        morphine (MS CONTIN) 15 mg 12 hr tablet    morphine 15 mg Tbcr: maximum daily dose of 30 mg * morphine equivalence factor of 1 = 30 MME/Day       HYDROcodone-acetaminophen (NORCO)  mg per tablet    HYDROcodone-acetaminophen  mg Tabs: maximum daily dose of 60 mg of opioid in combo * morphine equivalence factor of 1 = 60 MME/Day                         PDMP Review       Value Time User    PDMP Reviewed  Yes 4/23/2025  1:15 PM Elier Oh MD         Review of Systems   Constitutional:  Negative for chills, fatigue and fever.   HENT:  Negative for congestion, ear discharge, ear pain, mouth sores, postnasal drip, sore throat and trouble swallowing.    Eyes:  Negative for pain, discharge and visual disturbance.   Respiratory:  Negative for cough, shortness of breath and wheezing.    Cardiovascular:  Negative for chest pain, palpitations and leg swelling.   Gastrointestinal:  Negative for abdominal distention, abdominal pain, blood in stool, diarrhea and nausea.   Endocrine: Negative for polydipsia, polyphagia and polyuria.   Genitourinary:  Negative for dysuria, frequency, hematuria and urgency.   Musculoskeletal:  Positive for arthralgias, gait problem, neck pain and neck stiffness. Negative for joint swelling.   Skin:  Negative for pallor and rash.   Neurological:  Negative for dizziness, syncope, speech difficulty, weakness, light-headedness, numbness and headaches.   Hematological:  Negative for adenopathy.   Psychiatric/Behavioral:  Negative for behavioral problems, confusion and sleep disturbance. The patient is nervous/anxious.      Objective   /80   Pulse 64   Temp 98.7 °F (37.1 °C)   Resp 18   Ht 5' 6\" (1.676 m)   Wt 74.1 kg (163 lb 6.4 oz)   SpO2 97%   BMI 26.37 kg/m²     Physical Exam  Vitals reviewed.   Constitutional:       General: He is not in acute distress.     " Appearance: Normal appearance. He is well-developed and normal weight. He is not ill-appearing.   HENT:      Head: Normocephalic and atraumatic.      Nose: Nose normal.      Mouth/Throat:      Mouth: Mucous membranes are moist.   Eyes:      General:         Right eye: No discharge.         Left eye: No discharge.      Conjunctiva/sclera: Conjunctivae normal.      Pupils: Pupils are equal, round, and reactive to light.   Neck:      Thyroid: No thyromegaly.      Vascular: No JVD.   Cardiovascular:      Rate and Rhythm: Normal rate and regular rhythm.      Heart sounds: Normal heart sounds. No murmur heard.  Pulmonary:      Effort: Pulmonary effort is normal.      Breath sounds: Normal breath sounds. No wheezing or rales.   Abdominal:      General: Bowel sounds are normal.      Palpations: Abdomen is soft. There is no mass.      Tenderness: There is no abdominal tenderness. There is no guarding or rebound.   Musculoskeletal:         General: No tenderness or deformity.      Cervical back: Neck supple.      Comments: MODERATELY SEVERE DJD CHANGES   Lymphadenopathy:      Cervical: No cervical adenopathy.   Skin:     General: Skin is warm and dry.      Findings: No erythema or rash.   Neurological:      General: No focal deficit present.      Mental Status: He is alert and oriented to person, place, and time.      Motor: Weakness present.      Gait: Gait abnormal.   Psychiatric:         Mood and Affect: Mood normal.         Behavior: Behavior normal.         Thought Content: Thought content normal.         Judgment: Judgment normal.

## 2025-04-24 ENCOUNTER — TELEPHONE (OUTPATIENT)
Age: 70
End: 2025-04-24

## 2025-04-24 DIAGNOSIS — G89.29 CHRONIC BILATERAL LOW BACK PAIN WITH BILATERAL SCIATICA: ICD-10-CM

## 2025-04-24 DIAGNOSIS — G89.4 CHRONIC PAIN DISORDER: ICD-10-CM

## 2025-04-24 DIAGNOSIS — M54.41 CHRONIC BILATERAL LOW BACK PAIN WITH BILATERAL SCIATICA: ICD-10-CM

## 2025-04-24 DIAGNOSIS — M15.9 GENERALIZED OSTEOARTHRITIS OF MULTIPLE SITES: ICD-10-CM

## 2025-04-24 DIAGNOSIS — R56.9 SEIZURE-LIKE ACTIVITY (HCC): ICD-10-CM

## 2025-04-24 DIAGNOSIS — M54.42 CHRONIC BILATERAL LOW BACK PAIN WITH BILATERAL SCIATICA: ICD-10-CM

## 2025-04-24 NOTE — TELEPHONE ENCOUNTER
This RN phoned patient to review his apts for NPH Clinic Follow up post changing of his LO from 7/18/25 to the 7/25/25 date as previously discussed.  This RN reached  and left detailed message noting the apt times and requested that pt call back to discuss.  If no call back received this RN will phone again tomorrow.  Pt apt dates, times,  and locations noted below to review with patient.        Friday, 6/6/2025 [facility], [address] [address]  [address]     [facility]   Friday, 7/25/2025 [facility], [facility] [place], [place], [id number] [phone].  Patient aware that this may show up on My Chart as 0830 however the arrival time is 0700.       [facility] NPH Gait Assessment and MoCA, Friday 7/25/2025,  1200 noon, [address] [facility] [address]  [address]     Follow up appointment with surgeon, Dr. Goldberg Friday, 7/25/2025 at 1300, [address] [facility] [address]  [address]

## 2025-04-25 NOTE — TELEPHONE ENCOUNTER
This RN phoned pt and reviewed and confirmed apt dates, times, locations for upcoming NPH work up. Pt will reach back out with any additional questions or concerns as they arise.

## 2025-04-25 NOTE — TELEPHONE ENCOUNTER
Requested medication(s) are due for refill today: No  Patient has already received a courtesy refill: No  Other reason request has been forwarded to provider: Depakote was given by the ER. Morphine cannot be delegated.

## 2025-04-26 RX ORDER — MORPHINE SULFATE 15 MG/1
15 TABLET, FILM COATED, EXTENDED RELEASE ORAL 2 TIMES DAILY
Qty: 60 TABLET | Refills: 0 | Status: SHIPPED | OUTPATIENT
Start: 2025-04-26

## 2025-04-26 RX ORDER — DIVALPROEX SODIUM 500 MG/1
500 TABLET, DELAYED RELEASE ORAL EVERY 12 HOURS SCHEDULED
Qty: 60 TABLET | Refills: 0 | Status: SHIPPED | OUTPATIENT
Start: 2025-04-26

## 2025-05-01 NOTE — ASSESSMENT & PLAN NOTE
Patient's spouse calling back, please call.    Stable but not at goal - continue vilazodone 40 mg qd w/ food, bupropion  mg qAM, aripiprazole 5 mg qd; continue with outside therapist; f/u in 2 months

## 2025-05-05 DIAGNOSIS — K21.9 GASTROESOPHAGEAL REFLUX DISEASE WITHOUT ESOPHAGITIS: Primary | ICD-10-CM

## 2025-05-05 DIAGNOSIS — F33.0 MILD EPISODE OF RECURRENT MAJOR DEPRESSIVE DISORDER (HCC): ICD-10-CM

## 2025-05-05 DIAGNOSIS — F51.01 PRIMARY INSOMNIA: ICD-10-CM

## 2025-05-05 DIAGNOSIS — R53.83 FATIGUE, UNSPECIFIED TYPE: ICD-10-CM

## 2025-05-05 DIAGNOSIS — G47.30 SLEEP APNEA, UNSPECIFIED TYPE: ICD-10-CM

## 2025-05-05 NOTE — TELEPHONE ENCOUNTER
Reason for call:   [x] Refill   [] Prior Auth  [] Other:     Office:   [] PCP/Provider -   [x] Specialty/Provider - psych/Gustavo    Medication: Bupropion XL 150mg    Dose/Frequency: 1 tab am     Quantity: 90    Pharmacy: [address] [phone]     Local Pharmacy   Does the patient have enough for 3 days?   [x] Yes   [] No - Send as HP to POD

## 2025-05-06 ENCOUNTER — TELEPHONE (OUTPATIENT)
Age: 70
End: 2025-05-06

## 2025-05-06 RX ORDER — OMEPRAZOLE 40 MG/1
40 CAPSULE, DELAYED RELEASE ORAL 2 TIMES DAILY
Qty: 180 CAPSULE | Refills: 3 | Status: SHIPPED | OUTPATIENT
Start: 2025-05-06

## 2025-05-06 RX ORDER — ZOLPIDEM TARTRATE 12.5 MG/1
12.5 TABLET, FILM COATED, EXTENDED RELEASE ORAL
Qty: 30 TABLET | Refills: 0 | Status: SHIPPED | OUTPATIENT
Start: 2025-05-06

## 2025-05-06 RX ORDER — BUPROPION HYDROCHLORIDE 150 MG/1
150 TABLET ORAL EVERY MORNING
Qty: 90 TABLET | Refills: 1 | Status: SHIPPED | OUTPATIENT
Start: 2025-05-06 | End: 2025-11-02

## 2025-05-06 RX ORDER — ARMODAFINIL 250 MG/1
250 TABLET ORAL DAILY
Qty: 30 TABLET | Refills: 0 | Status: SHIPPED | OUTPATIENT
Start: 2025-05-06 | End: 2025-05-12 | Stop reason: SDUPTHER

## 2025-05-06 NOTE — TELEPHONE ENCOUNTER
[place] Armodafinil 250 MG SUBMITTED    to [employer]     via    []CMM-KEY:    [x]Surescripts-Case ID # [id number]  []Availity-Auth ID #  NDC #    []Faxed to plan   []Other website    []Phone call Case ID #       []PA sent as URGENT    All office notes, labs and other pertaining documents and studies sent. Clinical questions answered. Awaiting determination from insurance company.     Turnaround time for your insurance to make a decision on your Prior Authorization can take 7-21 business days.

## 2025-05-06 NOTE — TELEPHONE ENCOUNTER
Requested medication(s) are due for refill today: No  Patient has already received a courtesy refill: No  Other reason request has been forwarded to provider: NOT DUE FOR REFILL 1 month ago (3/14/2025) by Historical Provider, MD

## 2025-05-07 NOTE — TELEPHONE ENCOUNTER
[place] Armodafinil 250 MG DENIED    Reason:(Screenshot if applicable)        Message sent to office clinical pool Yes    Denial letter scanned into Media Yes    We can gladly do an appeal but the process can take about 30-60 days to provide determination. Please Schedule a Peer to Peer at phone [phone]. If an appeal is truly warranted please have Provider send clinical documentation to the [place] department to support the appeal.     **Please follow up with your patient regarding denial and next steps**

## 2025-05-12 DIAGNOSIS — R53.83 FATIGUE, UNSPECIFIED TYPE: ICD-10-CM

## 2025-05-12 DIAGNOSIS — G47.30 SLEEP APNEA, UNSPECIFIED TYPE: ICD-10-CM

## 2025-05-12 RX ORDER — ARMODAFINIL 250 MG/1
250 TABLET ORAL DAILY
Qty: 30 TABLET | Refills: 0 | Status: SHIPPED | OUTPATIENT
Start: 2025-05-12

## 2025-05-20 DIAGNOSIS — F11.20 UNCOMPLICATED OPIOID DEPENDENCE (HCC): ICD-10-CM

## 2025-05-20 DIAGNOSIS — R56.9 SEIZURE-LIKE ACTIVITY (HCC): ICD-10-CM

## 2025-05-20 DIAGNOSIS — G89.4 CHRONIC PAIN SYNDROME: ICD-10-CM

## 2025-05-20 DIAGNOSIS — M15.9 GENERALIZED OSTEOARTHRITIS OF MULTIPLE SITES: ICD-10-CM

## 2025-05-21 NOTE — TELEPHONE ENCOUNTER
Requested medication(s) are due for refill today: Yes  Patient has already received a courtesy refill: No  Other reason request has been forwarded to provider:   HGB in normal range and within 180 days    WBC in normal range and within 180 days    HCT in normal range and within 180 days    Valproic Acid (serum) in normal range and within 360 days

## 2025-05-22 RX ORDER — DIVALPROEX SODIUM 500 MG/1
500 TABLET, DELAYED RELEASE ORAL EVERY 12 HOURS SCHEDULED
Qty: 60 TABLET | Refills: 0 | Status: SHIPPED | OUTPATIENT
Start: 2025-05-22

## 2025-05-22 RX ORDER — HYDROCODONE BITARTRATE AND ACETAMINOPHEN 10; 325 MG/1; MG/1
1 TABLET ORAL EVERY 4 HOURS
Qty: 180 TABLET | Refills: 0 | Status: SHIPPED | OUTPATIENT
Start: 2025-05-22

## 2025-05-26 DIAGNOSIS — M15.9 GENERALIZED OSTEOARTHRITIS OF MULTIPLE SITES: ICD-10-CM

## 2025-05-26 DIAGNOSIS — M54.42 CHRONIC BILATERAL LOW BACK PAIN WITH BILATERAL SCIATICA: ICD-10-CM

## 2025-05-26 DIAGNOSIS — G89.4 CHRONIC PAIN DISORDER: ICD-10-CM

## 2025-05-26 DIAGNOSIS — M54.41 CHRONIC BILATERAL LOW BACK PAIN WITH BILATERAL SCIATICA: ICD-10-CM

## 2025-05-26 DIAGNOSIS — G89.29 CHRONIC BILATERAL LOW BACK PAIN WITH BILATERAL SCIATICA: ICD-10-CM

## 2025-05-27 DIAGNOSIS — F33.0 MILD EPISODE OF RECURRENT MAJOR DEPRESSIVE DISORDER (HCC): Primary | ICD-10-CM

## 2025-05-27 DIAGNOSIS — F41.1 GAD (GENERALIZED ANXIETY DISORDER): ICD-10-CM

## 2025-05-27 RX ORDER — MORPHINE SULFATE 15 MG/1
15 TABLET, FILM COATED, EXTENDED RELEASE ORAL 2 TIMES DAILY
Qty: 60 TABLET | Refills: 0 | Status: SHIPPED | OUTPATIENT
Start: 2025-05-27

## 2025-05-28 RX ORDER — VILAZODONE HYDROCHLORIDE 40 MG/1
40 TABLET ORAL
Qty: 90 TABLET | Refills: 1 | Status: SHIPPED | OUTPATIENT
Start: 2025-05-28

## 2025-06-06 ENCOUNTER — OFFICE VISIT (OUTPATIENT)
Dept: PHYSICAL THERAPY | Facility: CLINIC | Age: 70
End: 2025-06-06
Payer: MEDICARE

## 2025-06-06 DIAGNOSIS — G91.2 NORMAL PRESSURE HYDROCEPHALUS (HCC): Primary | ICD-10-CM

## 2025-06-06 PROCEDURE — 97162 PT EVAL MOD COMPLEX 30 MIN: CPT
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
## 2025-06-09 ENCOUNTER — OFFICE VISIT (OUTPATIENT)
Age: 70
End: 2025-06-09
Payer: MEDICARE

## 2025-06-09 VITALS
SYSTOLIC BLOOD PRESSURE: 126 MMHG | HEART RATE: 76 BPM | HEIGHT: 66 IN | WEIGHT: 165 LBS | DIASTOLIC BLOOD PRESSURE: 84 MMHG | BODY MASS INDEX: 26.52 KG/M2

## 2025-06-09 DIAGNOSIS — R56.9 SEIZURES (HCC): Primary | ICD-10-CM

## 2025-06-09 DIAGNOSIS — G91.2 NPH (NORMAL PRESSURE HYDROCEPHALUS) (HCC): Primary | ICD-10-CM

## 2025-06-09 PROCEDURE — G2211 COMPLEX E/M VISIT ADD ON: HCPCS

## 2025-06-09 PROCEDURE — 99215 OFFICE O/P EST HI 40 MIN: CPT

## 2025-06-09 RX ORDER — LACOSAMIDE 100 MG/1
100 TABLET ORAL EVERY 12 HOURS SCHEDULED
Qty: 60 TABLET | Refills: 5 | Status: SHIPPED | OUTPATIENT
Start: 2025-06-09
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
## 2025-06-20 DIAGNOSIS — F33.0 MILD EPISODE OF RECURRENT MAJOR DEPRESSIVE DISORDER (HCC): ICD-10-CM

## 2025-06-23 RX ORDER — BUPROPION HYDROCHLORIDE 300 MG/1
300 TABLET ORAL EVERY MORNING
Qty: 90 TABLET | Refills: 1 | OUTPATIENT
Start: 2025-06-23

## 2025-06-24 ENCOUNTER — TELEPHONE (OUTPATIENT)
Age: 70
End: 2025-06-24

## 2025-06-24 DIAGNOSIS — R56.9 SEIZURE-LIKE ACTIVITY (HCC): ICD-10-CM

## 2025-06-24 DIAGNOSIS — G89.4 CHRONIC PAIN SYNDROME: ICD-10-CM

## 2025-06-24 DIAGNOSIS — M15.9 GENERALIZED OSTEOARTHRITIS OF MULTIPLE SITES: ICD-10-CM

## 2025-06-24 DIAGNOSIS — M54.41 CHRONIC BILATERAL LOW BACK PAIN WITH BILATERAL SCIATICA: ICD-10-CM

## 2025-06-24 DIAGNOSIS — M54.42 CHRONIC BILATERAL LOW BACK PAIN WITH BILATERAL SCIATICA: ICD-10-CM

## 2025-06-24 DIAGNOSIS — G47.30 SLEEP APNEA, UNSPECIFIED TYPE: ICD-10-CM

## 2025-06-24 DIAGNOSIS — K21.9 GASTROESOPHAGEAL REFLUX DISEASE WITHOUT ESOPHAGITIS: ICD-10-CM

## 2025-06-24 DIAGNOSIS — G89.4 CHRONIC PAIN DISORDER: ICD-10-CM

## 2025-06-24 DIAGNOSIS — G89.29 CHRONIC BILATERAL LOW BACK PAIN WITH BILATERAL SCIATICA: ICD-10-CM

## 2025-06-24 DIAGNOSIS — R53.83 FATIGUE, UNSPECIFIED TYPE: ICD-10-CM

## 2025-06-24 DIAGNOSIS — F11.20 UNCOMPLICATED OPIOID DEPENDENCE (HCC): ICD-10-CM

## 2025-06-24 NOTE — TELEPHONE ENCOUNTER
Pt phoned and VM reached, msg left informing pt that his NPH follow up after his High Volume Lumbar on Friday July 25 2025.  This RN noted that Dr. Goldberg does have availability then on Tuesday, July 29, 2025 and there are multiple times available.  This RN requesting call back to [phone] to discuss if Tuesday, July 29th will work for pt.  If pt unable to call back this day, this RN will call again then tomorrow.

## 2025-06-25 RX ORDER — OMEPRAZOLE 40 MG/1
40 CAPSULE, DELAYED RELEASE ORAL 2 TIMES DAILY
Qty: 180 CAPSULE | Refills: 1 | Status: SHIPPED | OUTPATIENT
Start: 2025-06-25

## 2025-06-25 RX ORDER — MORPHINE SULFATE 15 MG/1
15 TABLET, FILM COATED, EXTENDED RELEASE ORAL 2 TIMES DAILY
Qty: 60 TABLET | Refills: 0 | Status: SHIPPED | OUTPATIENT
Start: 2025-06-25

## 2025-06-25 RX ORDER — ARMODAFINIL 250 MG/1
250 TABLET ORAL DAILY
Qty: 30 TABLET | Refills: 0 | Status: SHIPPED | OUTPATIENT
Start: 2025-06-25 | End: 2025-06-27 | Stop reason: SDUPTHER

## 2025-06-25 RX ORDER — FOLIC ACID 1 MG/1
1 TABLET ORAL DAILY
Qty: 90 TABLET | Refills: 1 | Status: SHIPPED | OUTPATIENT
Start: 2025-06-25

## 2025-06-25 RX ORDER — LANOLIN ALCOHOL/MO/W.PET/CERES
100 CREAM (GRAM) TOPICAL DAILY
Qty: 30 TABLET | Refills: 0 | Status: SHIPPED | OUTPATIENT
Start: 2025-06-25

## 2025-06-25 RX ORDER — HYDROCODONE BITARTRATE AND ACETAMINOPHEN 10; 325 MG/1; MG/1
1 TABLET ORAL EVERY 4 HOURS
Qty: 180 TABLET | Refills: 0 | Status: SHIPPED | OUTPATIENT
Start: 2025-06-25

## 2025-06-25 NOTE — TELEPHONE ENCOUNTER
Requested medication(s) are due for refill today: Yes  Patient has already received a courtesy refill: No  Other reason request has been forwarded to provider: Ca in normal range and within 360 days; Refills cannot be delegated.

## 2025-06-27 DIAGNOSIS — R53.83 FATIGUE, UNSPECIFIED TYPE: ICD-10-CM

## 2025-06-27 DIAGNOSIS — G47.30 SLEEP APNEA, UNSPECIFIED TYPE: ICD-10-CM

## 2025-06-27 RX ORDER — ARMODAFINIL 250 MG/1
250 TABLET ORAL DAILY
Qty: 30 TABLET | Refills: 0 | Status: SHIPPED | OUTPATIENT
Start: 2025-06-27

## 2025-06-27 NOTE — TELEPHONE ENCOUNTER
Requested medication(s) are due for refill today: Yes  Patient has already received a courtesy refill: No  Other reason request has been forwarded to provider: Pt requesting to a different pharmacy

## 2025-07-02 RX ORDER — ACETAMINOPHEN 325 MG/1
650 TABLET ORAL EVERY 6 HOURS PRN
OUTPATIENT
Start: 2025-07-02

## 2025-07-03 NOTE — TELEPHONE ENCOUNTER
This RN phoned and reached pt VM.  This RN left message that this was RN from [facility] reaching out in regards to his appointment with Dr. Goldberg on 7/25/2025, noting that the high volume LP and f/u PT apt remain however the surgeon is no longer available in the office that day and the f/u after the LP and PT with provider needs to be rescheduled.     This RN is hopeful that pt would have availability to come in on Tuesday 7/29/2025 as Dr. Goldberg had openings on that day and this RN would like  to discuss with patient.      This RN requesting call back to [phone] noting that this RN will call back again if no call is received.

## 2025-07-07 ENCOUNTER — TELEPHONE (OUTPATIENT)
Dept: NON INVASIVE DIAGNOSTICS | Facility: HOSPITAL | Age: 70
End: 2025-07-07

## 2025-07-07 NOTE — TELEPHONE ENCOUNTER
Call placed to patient to discuss upcoming high volume lumbar puncture at [facility]. Allergies reviewed and verified patient does not currently take any anticoagulant medications. Pre procedure instructions including drinking plenty of fluids, diet, taking own medications and need for  discussed with patient. Explained patient may eat a light breakfast on the day of the procedure and take medications as usual. Procedure and post procedure expectations and instructions reviewed. Patient verbalizes understanding. No further questions at this time. Reminded of the location, date and time of the expected procedure and time to arrive at the Ambulatory Procedure Unit 1.5 hours prior to procedure (arrive at 07:00 on 7/25/25). Contact number provided in case patient has any further questions.

## 2025-07-08 ENCOUNTER — TELEMEDICINE (OUTPATIENT)
Dept: PSYCHIATRY | Facility: CLINIC | Age: 70
End: 2025-07-08
Payer: MEDICARE

## 2025-07-08 DIAGNOSIS — F41.1 GAD (GENERALIZED ANXIETY DISORDER): ICD-10-CM

## 2025-07-08 DIAGNOSIS — F33.0 MILD EPISODE OF RECURRENT MAJOR DEPRESSIVE DISORDER (HCC): Primary | ICD-10-CM

## 2025-07-08 PROCEDURE — 90833 PSYTX W PT W E/M 30 MIN: CPT | Performed by: STUDENT IN AN ORGANIZED HEALTH CARE EDUCATION/TRAINING PROGRAM

## 2025-07-08 PROCEDURE — 99214 OFFICE O/P EST MOD 30 MIN: CPT | Performed by: STUDENT IN AN ORGANIZED HEALTH CARE EDUCATION/TRAINING PROGRAM

## 2025-07-08 RX ORDER — MIRTAZAPINE 7.5 MG/1
7.5 TABLET, FILM COATED ORAL
Qty: 30 TABLET | Refills: 1 | Status: SHIPPED | OUTPATIENT
Start: 2025-07-08

## 2025-07-08 NOTE — ASSESSMENT & PLAN NOTE
The presents with a history of major depressive disorder.  Since have been recurrent and most recently appear to be in the mild range of severity.  He has had chronic problems with depression with ongoing stressors.  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  He is also  from his wife, to whom he has been  for approximately 50 years.  Patient has historically been managed with Wellbutrin, though had a seizure several months ago while on 450 mg dose.  Patient is currently maintained on 150 mg dose.  He has had difficulty tolerating other medications in the past and has had limited benefit with his most recent regimen including Viibryd 40 mg daily, Wellbutrin  mg daily, and Abilify 5 mg daily.  Patient reports that he had stopped taking Abilify over a month ago as he did not find this to be effective at 5 mg.  Patient may benefit from new trial of Abilify in the future with higher dose, though at this time will utilize Remeron to augment for depression and to provide additional benefit for sleep.  Due to seizure episode, he would avoid increasing Wellbutrin at this time, though with continued stability with management by neurology (patient currently on Vimpat), can consider further titration again with discussion with neurology regarding risks with concurrent antiepileptic regimen.  At this time, patient denies any SI, HI, or AVH.  He does not demonstrate an imminent danger to his safety or to the safety of others at this time.    Continue Viibryd 40 mg daily.  Start Remeron 7.5 mg nightly.  Recommend titration to efficacy as indicated/tolerated.  Can continue Wellbutrin  mg daily.    Can continue Ambien CR 12.5 mg nightly as needed for insomnia.  Patient reports that he rarely uses this.    Continue individual psychotherapy.

## 2025-07-08 NOTE — ASSESSMENT & PLAN NOTE
The patient presents with a history of generalized anxiety disorder.  Anxiety appears to be affected by ongoing stressors as above.  Currently, primary concern is related to his going depressed mood.  We will be starting Remeron as above.    See plan for major depressive disorder.

## 2025-07-08 NOTE — PSYCH
MEDICATION MANAGEMENT NOTE    Name: Boni Forte      : 1955      MRN: [id number]  Encounter Provider: Santana Villafuerte MD  Encounter Date: 2025   Encounter department: [facility]    Insurance: Payor: MEDICARE / Plan: MEDICARE A AND B / Product Type: Medicare A & B Fee for Service /      Reason for Visit:   Chief Complaint   Patient presents with    Medication Management    Depression    Anxiety   :  Assessment & Plan  Mild episode of recurrent major depressive disorder (HCC)  The presents with a history of major depressive disorder.  Since have been recurrent and most recently appear to be in the mild range of severity.  He has had chronic problems with depression with ongoing stressors.  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  He is also  from his wife, to whom he has been  for approximately 50 years.  Patient has historically been managed with Wellbutrin, though had a seizure several months ago while on 450 mg dose.  Patient is currently maintained on 150 mg dose.  He has had difficulty tolerating other medications in the past and has had limited benefit with his most recent regimen including Viibryd 40 mg daily, Wellbutrin  mg daily, and Abilify 5 mg daily.  Patient reports that he had stopped taking Abilify over a month ago as he did not find this to be effective at 5 mg.  Patient may benefit from new trial of Abilify in the future with higher dose, though at this time will utilize Remeron to augment for depression and to provide additional benefit for sleep.  Due to seizure episode, he would avoid increasing Wellbutrin at this time, though with continued stability with management by neurology (patient currently on Vimpat), can consider further titration again with discussion with  neurology regarding risks with concurrent antiepileptic regimen.  At this time, patient denies any SI, HI, or AVH.  He does not demonstrate an imminent danger to his safety or to the safety of others at this time.    Continue Viibryd 40 mg daily.  Start Remeron 7.5 mg nightly.  Recommend titration to efficacy as indicated/tolerated.  Can continue Wellbutrin  mg daily.    Can continue Ambien CR 12.5 mg nightly as needed for insomnia.  Patient reports that he rarely uses this.    Continue individual psychotherapy.    Orders:    mirtazapine (REMERON) 7.5 MG tablet; Take 1 tablet (7.5 mg total) by mouth daily at bedtime    SERGEY (generalized anxiety disorder)  The patient presents with a history of generalized anxiety disorder.  Anxiety appears to be affected by ongoing stressors as above.  Currently, primary concern is related to his going depressed mood.  We will be starting Remeron as above.    See plan for major depressive disorder.             Treatment Recommendations:    Educated about diagnosis and treatment modalities. Verbalizes understanding and agreement with the treatment plan.  Discussed self monitoring of symptoms, and symptom monitoring tools.  Discussed medications and if treatment adjustment was needed or desired.  Medication management every 4 weeks  Aware of 24 hour and weekend coverage for urgent situations accessed by calling [facility] main practice number  I am scheduling this patient out for greater than 3 months: No    Medications Risks/Benefits:      Risks, Benefits And Possible Side Effects Of Medications:    Risks, benefits, and possible side effects of medications explained to John and he (or legal representative) verbalizes understanding and agreement for treatment.    Controlled Medication Discussion:     John has been filling controlled prescriptions on time as prescribed according to [place] Prescription Drug Monitoring Program.      History of Present  "Illness     This patient presents as a consultation for medication management for major depressive disorder and generalized anxiety disorder.  He is currently managed by Damaris Chen PA-C, at the [facility] office.  Patient has been managed for depression and anxiety for several years at the practice and has been managed primarily with Wellbutrin.  He has tried other medications as well including Cymbalta and most recently Viibryd and Abilify.  Consultation requested for medication management has been has had difficulty with reduction in Wellbutrin, which was done after the patient had a seizure episode several months ago while on 450 mg dose.  Patient reports that he had never before experienced a seizure episode and is currently managed by neurology, having been switched from Depakote to Vimpat.  He found that Depakote initially had made him feel very \"flat\", though has found this to be improved with transition to Vimpat.  Patient notes that he has chronically struggled with depression over the past 25 years.  He believes that he may have experienced milder episodes of depression when he was younger after experiencing a bad accident at age 9 that had left him with ongoing chronic pain issues.  However, depressive symptoms had been most predominant for approximately 5 years.  He reports that this was initially noticed by his wife with the patient experiencing severely low mood, loss of interest, low energy.  She had encouraged him to see a psychiatrist and he had established at the time with psychiatrist for about 5 years, having been maintained with Wellbutrin.  He transitioned care to his primary care provider, who continue medication management until recommended that he establish care with a new psychiatrist.  Patient has been with the [facility] for management for the past several years.  He has found historically that Wellbutrin has been the most " helpful medication for him and has had difficulty tolerating other medications.  He noted he struggled with Cymbalta due to erectile dysfunction.  He also reports having struggled in the past with Zoloft, Lexapro, Prozac.  He is currently managed with Viibryd and Abilify, though reports that he had stopped taking Abilify in May after he ran out of his prescription and did not feel it was helpful so did not request refill.  The patient reports that he continues to struggle with low mood and significance with loss of interest and energy.  Regarding stressors, he notes that he has been experiencing difficulty with normal pressure hydrocephalus, has affected his ambulation, in addition to his chronic spinal pain issues.  He also notes that he is  from his wife, who is experiencing medical issues herself.    Regarding depressive symptoms, the patient reports that he has been struggling with low mood, loss of interest, poor sleep, low energy.  He reports that he has had periods of hopelessness in the past.  However, he denies having ever had any suicidal thoughts or thoughts to harm himself or others.  He denies any history of suicide attempts or nonsuicidal self-injury.  The patient denies any history consistent with shekhar including decreased need for sleep with excessively elevated mood or energy for several days in a row.    He has also chronically struggled with anxiety including excessive feeling of nervousness, difficulty controlling worrying, worrying about numerous domains of life, intermittent irritability.  He denies any history of auditory or visual hallucinations.  He denies any history of paranoia and does not demonstrate any paranoia or delusional thought content during interview.    The patient reports that he has also been struggling with cancer, having been diagnosed with prostate cancer and having undergone radiation and recently completing androgen deprivation therapy.  The patient has  struggled emotionally and physically with these treatments.  However, he is pleased that he is done with his course of treatment.  He notes that he has very limited support right now.  As above, he is  from his wife, to whom he has been  for approximately 50 years.  He has 1 son, who lives in [place].  The patient reports that his best friend  several years ago and he does have a few friends, though has felt isolated and disconnected from friends.  He is hoping to eventually relocate to an area where he will have improved ability to interact with others.  While he and his wife are , he notes that they do maintain a good relationship and are supportive of each other.  However, due to her current medical issues, the support has been more limited recently.    As above, he denies any SI, HI, or AVH.  He denies access to firearms at home.  He is able to appropriately plan for safety during session.  Upon reviewing medication options, discussed with patient that at this time, Wellbutrin remains not a preferred medication due to his seizure that he had experienced.  However, this may be considered in the future if no benefit on other medications and after discussing with neurology to review risks with concurrent antiepileptic regimen.  Discussed with patient that Abilify dose had been fairly low and he may benefit from new trial with higher dose in the future.  However, due to his concurrent sleep issues, discussed possibility of starting Remeron and patient was in agreement with this plan.  Discussed risks and benefits including risk of appetite stimulation.  Patient expressed understanding and is in agreement with plan to trial Remeron and continue to follow up with his established provider.  He denies any other questions or concerns at this time.    Review Of Systems: A review of systems is obtained and is negative except for the pertinent positives listed in HPI/Subjective above.      Current  Rating Scores:     Current PHQ-9   PHQ-2/9 Depression Screening    Little interest or pleasure in doing things: 2 - more than half the days  Feeling down, depressed, or hopeless: 2 - more than half the days  Trouble falling or staying asleep, or sleeping too much: 2 - more than half the days  Feeling tired or having little energy: 1 - several days  Poor appetite or overeatin - several days  Feeling bad about yourself - or that you are a failure or have let yourself or your family down: 1 - several days  Trouble concentrating on things, such as reading the newspaper or watching television: 0 - not at all  Moving or speaking so slowly that other people could have noticed. Or the opposite - being so fidgety or restless that you have been moving around a lot more than usual: 0 - not at all  Thoughts that you would be better off dead, or of hurting yourself in some way: 0 - not at all  PHQ-9 Score: 9  PHQ-9 Interpretation: Mild depression       Current SERGEY-7   SERGEY-7 Flowsheet Screening      Flowsheet Row Most Recent Value   Over the last two weeks, how often have you been bothered by the following problems?     Feeling nervous, anxious, or on edge 1    Not being able to stop or control worrying 1    Worrying too much about different things 1    Trouble relaxing  1    Being so restless that it's hard to sit still 0    Becoming easily annoyed or irritable  1    Feeling afraid as if something awful might happen 0    How difficult have these problems made it for you to do your work, take care of things at home, or get along with other people?  Somewhat difficult    SERGEY Score  5             Areas of Improvement: reviewed in HPI/Subjective Section and reviewed in Assessment and Plan Section    Past Psychiatric History:  Past Inpatient Psychiatric Treatment:   No history of past inpatient psychiatric admissions  Past Outpatient Psychiatric Treatment:    Had seen a psychiatrist 25 years ago for 5 years, then was managed by  "PCP until about 3 years ago, then established with [facility].  Past Suicide Attempts: no  Past Violent Behavior: no  Past Psychiatric Medication Trials: Prozac, Zoloft, Paxil, Lexapro, Cymbalta, Wellbutrin XL, Viibryd, and Abilify    Past Medical History[1]  Past Surgical History[2]  Allergies: Allergies[3]    Current Outpatient Medications   Medication Instructions    abiraterone (ZYTIGA) 250 mg tablet     amLODIPine (NORVASC) 10 mg, Oral, Daily    Armodafinil 250 mg, Oral, Daily    buPROPion (WELLBUTRIN XL) 150 mg, Oral, Every morning    Calcium Citrate-Vitamin D 250 mg-2.5 mcg tablet 1 tablet, Oral, 2 times daily    diphenoxylate-atropine (LOMOTIL) 2.5-0.025 mg per tablet 1 tablet, 4 times daily PRN    folic acid (FOLVITE) 1 mg, Oral, Daily    gabapentin (NEURONTIN) 400 mg, 2 times daily    HYDROcodone-acetaminophen (NORCO)  mg per tablet 1 tablet, Oral, Every 4 hours    INSULIN SYRINGE .5CC/29G (Advocate Insulin Syringe) 29G X 1/2\" 0.5 ML MISC     Isopropyl Alcohol (ALCOHOL PREPS EX)     lacosamide (VIMPAT) 100 mg, Oral, Every 12 hours scheduled    leuprolide (LUPRON DEPOT 3 MONTH KIT) 22.5 mg injection Every 3 months    Mirabegron ER 50 mg, Daily    mirtazapine (REMERON) 7.5 mg, Oral, Daily at bedtime    morphine (MS CONTIN) 15 mg, Oral, 2 times daily    multivitamin (THERAGRAN) TABS 1 tablet, Daily    naloxone (NARCAN) 4 mg/0.1 mL nasal spray Administer 1 spray into a nostril. If no response after 2-3 minutes, give another dose in the other nostril using a new spray.    olmesartan (BENICAR) 40 mg, Daily    omeprazole (PRILOSEC) 40 mg, Oral, 2 times daily    Papav-Phentolamine-Alprostadil (PGE1 / PHENTOLAMINE / PAPAVERINE, TRIMIX, 40 MCG / 1 MG / 30 MG INJECTION)     predniSONE 5 mg, Daily    rosuvastatin (CRESTOR) 40 mg, Daily    tadalafil (CIALIS) 5 MG tablet 1 tablet, Daily    tamsulosin (FLOMAX) 0.4 mg     thiamine 100 mg, Oral, Daily    vilazodone (VIIBRYD) 40 mg, Oral, Daily with breakfast    " zolpidem (AMBIEN CR) 12.5 mg, Oral, Daily at bedtime PRN        Substance Abuse History:    Tobacco, Alcohol and Drug Use History     Tobacco Use    Smoking status: Former     Current packs/day: 0.00     Average packs/day: 1 pack/day for 17.0 years (16.5 ttl pk-yrs)     Types: Cigarettes     Start date: 1969     Quit date: 1984     Years since quittin.5     Passive exposure: Never    Smokeless tobacco: Never   Vaping Use    Vaping status: Never Used   Substance Use Topics    Alcohol use: Yes     Alcohol/week: 2.0 standard drinks of alcohol     Types: 1 Glasses of wine, 1 Shots of liquor per week     Comment: one drink a week    Drug use: Yes     Frequency: 1.0 times per week     Types: Marijuana     Comment: occassionaly     Alcohol Use: Unknown (2021)    AUDIT-C     Frequency of Alcohol Consumption: Monthly or less     Average Number of Drinks: 1 or 2     Patient reports that he previously drank more heavily but now only has alcohol on occasion. He had previously experimented with cocaine and LSD but none in past 40 years. He uses cannabis on occasion. He denies having needed to go to rehab. He is a former smoker and quit about 40 years ago.    Social History:    Social History     Socioeconomic History    Marital status: /Civil Union     Spouse name: Not on file    Number of children: 1    Years of education: Not on file    Highest education level: Master's degree (e.g., MA, MS, Benito, MEd, MSW, FERMIN)   Occupational History    Occupation:     Occupation: Teacher     Employer: [employer]   Other Topics Concern    Not on file   Social History Narrative    ** Merged History Encounter **         Dental care, regularly  Drinks coffee:  2-3 cups per day  Exercises moderately 3 or more times a week  Vegetarian diet      Currently the patient lives alone in [place], [place]. He is  from his wife, to whom he has been  for 50 years. He has one son, who  currently lives in [place]. He graduated with an FERMIN from [employer]. He is retired and worked as an  for most of his career. He denies any legal issues. He denies any  service. He denies access to firearms.    Family Psychiatric History:     Family History[4]    Patient reports that his mother had depression and also has a younger brother who also struggled with depression. He denies any known family history of substance abuse. He denies any known family history of suicide attempts.    Medical History Reviewed by provider this encounter:          Objective   There were no vitals taken for this visit.     Mental Status Evaluation:    Appearance age appropriate, casually dressed   Behavior pleasant, cooperative, calm   Speech normal rate, normal volume, normal pitch, spontaneous   Mood depressed   Affect normal range and intensity, appropriate   Thought Processes organized, goal directed, linear   Thought Content no overt delusions   Perceptual Disturbances: no auditory hallucinations, no visual hallucinations   Abnormal Thoughts  Risk Potential Suicidal ideation - None  Homicidal ideation - None  Potential for aggression - No   Orientation oriented to person, place, time/date, and situation   Memory recent and remote memory grossly intact   Consciousness alert and awake   Attention Span Concentration Span attention span and concentration are age appropriate   Intellect appears to be of average intelligence   Insight intact   Judgement intact   Muscle Strength and  Gait unable to assess today due to virtual visit   Motor activity no abnormal movements   Language no difficulty naming common objects, no difficulty repeating a phrase, no difficulty writing a sentence   Fund of Knowledge adequate knowledge of current events  adequate fund of knowledge regarding past history  adequate fund of knowledge regarding vocabulary        Laboratory Results: I have personally reviewed all pertinent laboratory/tests  results    Recent Labs (last 6 months):   Admission on 03/24/2025, Discharged on 03/25/2025   Component Date Value    Valproic Acid, Free 03/24/2025 3.6 (L)     Valproic Acid, Total 03/24/2025 37 (L)     ABO Grouping 03/24/2025 O     Rh Factor 03/24/2025 Positive     Antibody Screen 03/24/2025 Negative     Specimen Expiration Date 03/24/2025 [id number]     Platelets 03/24/2025 275     MPV 03/24/2025 8.2 (L)     Hemoglobin A1C 03/24/2025 5.1     EAG 03/24/2025 100     Magnesium 03/25/2025 2.0     Sodium 03/25/2025 139     Potassium 03/25/2025 3.8     Chloride 03/25/2025 106     CO2 03/25/2025 27     ANION GAP 03/25/2025 6     BUN 03/25/2025 13     Creatinine 03/25/2025 0.86     Glucose 03/25/2025 114     Calcium 03/25/2025 8.1 (L)     eGFR 03/25/2025 87     WBC 03/25/2025 3.73 (L)     RBC 03/25/2025 2.60 (L)     Hemoglobin 03/25/2025 8.1 (L)     Hematocrit 03/25/2025 24.6 (L)     MCV 03/25/2025 95     MCH 03/25/2025 31.2     MCHC 03/25/2025 32.9     RDW 03/25/2025 14.3     MPV 03/25/2025 8.5 (L)     Platelets 03/25/2025 258     nRBC 03/25/2025 0     Segmented % 03/25/2025 68     Immature Grans % 03/25/2025 1     Lymphocytes % 03/25/2025 12 (L)     Monocytes % 03/25/2025 14 (H)     Eosinophils Relative 03/25/2025 4     Basophils Relative 03/25/2025 1     Absolute Neutrophils 03/25/2025 2.56     Absolute Immature Grans 03/25/2025 0.03     Absolute Lymphocytes 03/25/2025 0.45 (L)     Absolute Monocytes 03/25/2025 0.52     Eosinophils Absolute 03/25/2025 0.14     Basophils Absolute 03/25/2025 0.03    Admission on 03/24/2025, Discharged on 03/24/2025   Component Date Value    WBC 03/24/2025 5.76     RBC 03/24/2025 3.14 (L)     Hemoglobin 03/24/2025 9.6 (L)     Hematocrit 03/24/2025 29.0 (L)     MCV 03/24/2025 92     MCH 03/24/2025 30.6     MCHC 03/24/2025 33.1     RDW 03/24/2025 14.0     MPV 03/24/2025 7.9 (L)     Platelets 03/24/2025 289     nRBC 03/24/2025 0     Segmented % 03/24/2025 83 (H)     Immature Grans %  03/24/2025 1     Lymphocytes % 03/24/2025 7 (L)     Monocytes % 03/24/2025 8     Eosinophils Relative 03/24/2025 1     Basophils Relative 03/24/2025 0     Absolute Neutrophils 03/24/2025 4.80     Absolute Immature Grans 03/24/2025 0.04     Absolute Lymphocytes 03/24/2025 0.41 (L)     Absolute Monocytes 03/24/2025 0.46     Eosinophils Absolute 03/24/2025 0.03     Basophils Absolute 03/24/2025 0.02     Sodium 03/24/2025 137     Potassium 03/24/2025 4.0     Chloride 03/24/2025 104     CO2 03/24/2025 21     ANION GAP 03/24/2025 12     BUN 03/24/2025 18     Creatinine 03/24/2025 0.79     Glucose 03/24/2025 97     Calcium 03/24/2025 8.4     AST 03/24/2025 23     ALT 03/24/2025 20     Alkaline Phosphatase 03/24/2025 79     Total Protein 03/24/2025 6.2 (L)     Albumin 03/24/2025 4.0     Total Bilirubin 03/24/2025 0.39     eGFR 03/24/2025 90    No results displayed because visit has over 200 results.          Suicide/Homicide Risk Assessment:    Risk of Harm to Self:  The following ratings are based on assessment at the time of the interview and review of records  Demographic Risk Factors include: , , male, age: over 50 or older  Historical Risk Factors include: chronic depressive symptoms, chronic anxiety symptoms  Recent Specific Risk Factors include: current depressive symptoms, current anxiety symptoms, chronic pain, chronic health problems, social isolation  Protective Factors: no current suicidal ideation, ability to adapt to change, able to make plans for the future, able to manage anger well, access to mental health treatment, being a parent, being , compliant with medications, compliant with mental health treatment, effective coping skills, effective decision-making skills, future oriented, having a desire to be alive, resiliency, responsibilities and duties to others, stable living environment, sense of personal control, supportive family  Weapons/Firearms: none. The following steps have  been taken to ensure weapons are properly secured: not applicable  Based on today's assessment, John presents the following risk of harm to self: low    Risk of Harm to Others:  The following ratings are based on assessment at the time of the interview and review of records  Demographic Risk Factors include: male  Historical Risk Factors include: none  Recent Specific Risk Factors include: multiple stressors, social difficulties  Protective Factors: no current homicidal ideation, ability to adapt to change, able to manage anger well, access to mental health treatment, being a parent, being , effective coping skills, effective decision-making skills, resilience, responsibilities and duties to others, safe and stable living environment, sense of personal control, supportive family  Weapons/Firearms: none. The following steps have been taken to ensure weapons are properly secured: not applicable  Based on today's assessment, John presents the following risk of harm to others: low    The following interventions are recommended: Continue medication management. Continue psychotherapy. No other intervention changes indicated at this time.    Psychotherapy Provided:     Individual psychotherapy provided: Yes    Counseling was provided during the session today for 30 minutes.  Medication changes discussed with John.  Medication education provided to John.  Discussed with John history of mental illness, history of medication management, ongoing chronic stressors regarding his health and chronic pain, more recent health related to stressors and family stressors.  Importance of medication and treatment compliance reviewed with John.  Reassurance and supportive therapy provided.   Crisis/safety plan discussed with John.    Treatment Plan:    Completed and signed during the session: To be completed by primary treating provider.    Goals: Progress towards Treatment Plan goals - Yes, progressing, as evidenced by subjective  "findings in HPI/Subjective Section and in Assessment and Plan Section    Depression Follow-up Plan Completed: Yes    Note Share:    This note was not shared with the patient due to reasonable likelihood of causing patient harm    Administrative Statements   Administrative Statements   Encounter provider Santana Villafuerte MD    The Patient is located at Home and in the following state in which I hold an active license [place].    The patient was identified by name and date of birth. Boni Forte was informed that this is a telemedicine visit and that the visit is being conducted through the Epic Embedded platform. He agrees to proceed..  My office door was closed. No one else was in the room.  He acknowledged consent and understanding of privacy and security of the video platform. The patient has agreed to participate and understands they can discontinue the visit at any time.    I have spent a total time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

## 2025-07-11 ENCOUNTER — APPOINTMENT (OUTPATIENT)
Dept: PHYSICAL THERAPY | Facility: CLINIC | Age: 70
End: 2025-07-11
Payer: MEDICARE

## 2025-07-15 ENCOUNTER — APPOINTMENT (OUTPATIENT)
Dept: LAB | Facility: CLINIC | Age: 70
End: 2025-07-15
Attending: PHYSICIAN ASSISTANT
Payer: MEDICARE

## 2025-07-15 DIAGNOSIS — E83.110 HEREDITARY HEMOCHROMATOSIS (HCC): ICD-10-CM

## 2025-07-15 DIAGNOSIS — E46 PROTEIN-CALORIE MALNUTRITION, UNSPECIFIED SEVERITY (HCC): ICD-10-CM

## 2025-07-15 DIAGNOSIS — D51.8 OTHER VITAMIN B12 DEFICIENCY ANEMIAS: ICD-10-CM

## 2025-07-15 LAB
ALBUMIN SERPL BCG-MCNC: 3.9 G/DL (ref 3.5–5)
ALP SERPL-CCNC: 59 U/L (ref 34–104)
ALT SERPL W P-5'-P-CCNC: 21 U/L (ref 7–52)
ANION GAP SERPL CALCULATED.3IONS-SCNC: 5 MMOL/L (ref 4–13)
AST SERPL W P-5'-P-CCNC: 24 U/L (ref 13–39)
BASOPHILS # BLD AUTO: 0.02 THOUSANDS/ÂΜL (ref 0–0.1)
BASOPHILS NFR BLD AUTO: 0 % (ref 0–1)
BILIRUB SERPL-MCNC: 0.43 MG/DL (ref 0.2–1)
BUN SERPL-MCNC: 14 MG/DL (ref 5–25)
CALCIUM SERPL-MCNC: 8.7 MG/DL (ref 8.4–10.2)
CHLORIDE SERPL-SCNC: 103 MMOL/L (ref 96–108)
CO2 SERPL-SCNC: 28 MMOL/L (ref 21–32)
CREAT SERPL-MCNC: 0.81 MG/DL (ref 0.6–1.3)
EOSINOPHIL # BLD AUTO: 0.12 THOUSAND/ÂΜL (ref 0–0.61)
EOSINOPHIL NFR BLD AUTO: 2 % (ref 0–6)
ERYTHROCYTE [DISTWIDTH] IN BLOOD BY AUTOMATED COUNT: 16 % (ref 11.6–15.1)
FERRITIN SERPL-MCNC: 7 NG/ML (ref 30–336)
FOLATE SERPL-MCNC: >22.3 NG/ML
GFR SERPL CREATININE-BSD FRML MDRD: 90 ML/MIN/1.73SQ M
GLUCOSE P FAST SERPL-MCNC: 96 MG/DL (ref 65–99)
HCT VFR BLD AUTO: 28.3 % (ref 36.5–49.3)
HGB BLD-MCNC: 9 G/DL (ref 12–17)
IMM GRANULOCYTES # BLD AUTO: 0.03 THOUSAND/UL (ref 0–0.2)
IMM GRANULOCYTES NFR BLD AUTO: 1 % (ref 0–2)
IRON SATN MFR SERPL: 8 % (ref 15–50)
IRON SERPL-MCNC: 28 UG/DL (ref 50–212)
LYMPHOCYTES # BLD AUTO: 0.48 THOUSANDS/ÂΜL (ref 0.6–4.47)
LYMPHOCYTES NFR BLD AUTO: 9 % (ref 14–44)
MCH RBC QN AUTO: 26.6 PG (ref 26.8–34.3)
MCHC RBC AUTO-ENTMCNC: 31.8 G/DL (ref 31.4–37.4)
MCV RBC AUTO: 84 FL (ref 82–98)
MONOCYTES # BLD AUTO: 0.48 THOUSAND/ÂΜL (ref 0.17–1.22)
MONOCYTES NFR BLD AUTO: 9 % (ref 4–12)
NEUTROPHILS # BLD AUTO: 4.21 THOUSANDS/ÂΜL (ref 1.85–7.62)
NEUTS SEG NFR BLD AUTO: 79 % (ref 43–75)
NRBC BLD AUTO-RTO: 0 /100 WBCS
PLATELET # BLD AUTO: 265 THOUSANDS/UL (ref 149–390)
PMV BLD AUTO: 8.9 FL (ref 8.9–12.7)
POTASSIUM SERPL-SCNC: 4.6 MMOL/L (ref 3.5–5.3)
PROT SERPL-MCNC: 6.3 G/DL (ref 6.4–8.4)
RBC # BLD AUTO: 3.38 MILLION/UL (ref 3.88–5.62)
SODIUM SERPL-SCNC: 136 MMOL/L (ref 135–147)
TIBC SERPL-MCNC: 344.4 UG/DL (ref 250–450)
TRANSFERRIN SERPL-MCNC: 246 MG/DL (ref 203–362)
UIBC SERPL-MCNC: 316 UG/DL (ref 155–355)
VIT B12 SERPL-MCNC: 643 PG/ML (ref 180–914)
WBC # BLD AUTO: 5.34 THOUSAND/UL (ref 4.31–10.16)

## 2025-07-15 PROCEDURE — 83540 ASSAY OF IRON: CPT

## 2025-07-15 PROCEDURE — 36415 COLL VENOUS BLD VENIPUNCTURE: CPT

## 2025-07-15 PROCEDURE — 82607 VITAMIN B-12: CPT

## 2025-07-15 PROCEDURE — 82728 ASSAY OF FERRITIN: CPT

## 2025-07-15 PROCEDURE — 80053 COMPREHEN METABOLIC PANEL: CPT

## 2025-07-15 PROCEDURE — 85025 COMPLETE CBC W/AUTO DIFF WBC: CPT

## 2025-07-15 PROCEDURE — 83550 IRON BINDING TEST: CPT

## 2025-07-15 PROCEDURE — 82746 ASSAY OF FOLIC ACID SERUM: CPT

## 2025-07-18 ENCOUNTER — APPOINTMENT (OUTPATIENT)
Dept: PHYSICAL THERAPY | Facility: CLINIC | Age: 70
End: 2025-07-18
Payer: MEDICARE

## 2025-07-18 ENCOUNTER — TELEPHONE (OUTPATIENT)
Dept: HEMATOLOGY ONCOLOGY | Facility: MEDICAL CENTER | Age: 70
End: 2025-07-18

## 2025-07-18 ENCOUNTER — OFFICE VISIT (OUTPATIENT)
Dept: HEMATOLOGY ONCOLOGY | Facility: MEDICAL CENTER | Age: 70
End: 2025-07-18
Payer: MEDICARE

## 2025-07-18 VITALS
BODY MASS INDEX: 26.2 KG/M2 | RESPIRATION RATE: 16 BRPM | TEMPERATURE: 98.6 F | HEART RATE: 73 BPM | DIASTOLIC BLOOD PRESSURE: 74 MMHG | WEIGHT: 163 LBS | HEIGHT: 66 IN | OXYGEN SATURATION: 96 % | SYSTOLIC BLOOD PRESSURE: 132 MMHG

## 2025-07-18 DIAGNOSIS — D51.8 OTHER VITAMIN B12 DEFICIENCY ANEMIAS: ICD-10-CM

## 2025-07-18 DIAGNOSIS — T14.8XXA BRUISING: ICD-10-CM

## 2025-07-18 DIAGNOSIS — C61 PROSTATE CANCER (HCC): ICD-10-CM

## 2025-07-18 DIAGNOSIS — D50.8 IRON DEFICIENCY ANEMIA SECONDARY TO INADEQUATE DIETARY IRON INTAKE: ICD-10-CM

## 2025-07-18 DIAGNOSIS — E83.110 HEREDITARY HEMOCHROMATOSIS (HCC): Primary | ICD-10-CM

## 2025-07-18 PROCEDURE — 99214 OFFICE O/P EST MOD 30 MIN: CPT | Performed by: PHYSICIAN ASSISTANT

## 2025-07-18 RX ORDER — SODIUM CHLORIDE 9 MG/ML
20 INJECTION, SOLUTION INTRAVENOUS ONCE
OUTPATIENT
Start: 2025-08-05

## 2025-07-18 NOTE — ASSESSMENT & PLAN NOTE
(C282Y, C282Y)      7/15/2024 WBC 5.34, hemoglobin 9.0, RDW 16.0, platelets 265.  Ferritin 7, saturation 8%, vitamin B12 643.  Orders:    CBC and differential; Future    Iron Panel (Includes Ferritin, Iron Sat%, Iron, and TIBC); Future    Folate; Future    Comprehensive metabolic panel; Future  Patient currently on observation for HH, has not required a phlebotomy in many years. He follows a vegetarian diet.     Patient's ferritin remains < 100.  Phlebotomies are still not indicated. We will continue to follow-up in 4 months with the blood work below.

## 2025-07-18 NOTE — PROGRESS NOTES
Name: Boni Forte      : 1955      MRN: [id number]  Encounter Provider: Blanca Knox PA-C  Encounter Date: 2025   Encounter department: [facility]  :  Assessment & Plan  Hereditary hemochromatosis (HCC)  (C282Y, C282Y)      7/15/2024 WBC 5.34, hemoglobin 9.0, RDW 16.0, platelets 265.  Ferritin 7, saturation 8%, vitamin B12 643.  Orders:    CBC and differential; Future    Iron Panel (Includes Ferritin, Iron Sat%, Iron, and TIBC); Future    Folate; Future    Comprehensive metabolic panel; Future  Patient currently on observation for HH, has not required a phlebotomy in many years. He follows a vegetarian diet.     Patient's ferritin remains < 100.  Phlebotomies are still not indicated. We will continue to follow-up in 4 months with the blood work below.       Other vitamin B12 deficiency anemias  B12 level is around 600. Patient can continue B complex vitamin daily.       Patient will have blood work retested prior to his next visit.  Patient's B12 deficiency is secondary to dietary restrictions.  Orders:    Vitamin B12; Future    Prostate cancer (HCC)  Under the care of [facility].  Status post radiation + ADT for management of local prostate disease.     Regimen (now completed):  Zytiga 500 mg p.o. daily (dose reduced due to toxicities including diarrhea)  Prednisone 5 mg twice daily     Patient is currently on observation.  Iron deficiency anemia secondary to inadequate dietary iron intake  Patient now with iron deficiency.  Unclear etiology.  May be diet related.  Patient is up-to-date with his colonoscopy.  He has no evidence of GI blood loss.  Patient will be treated with venofer 300mg weekly x 2.  Orders:    CBC and differential; Future    Comprehensive metabolic panel; Future    Iron Panel (Includes Ferritin, Iron Sat%, Iron, and TIBC); Future          History of Present Illness   Chief Complaint   Patient presents with    Follow-up      This is a 70-year-old male with past medical history of Contreras's esophagus with achalasia, prostate cancer on Zytiga, GERD, depression, anxiety, hypertension, sleep apnea and osteoarthritis-with 4 hip surgeries including 3 revisions of joint replacement who presents now to the hematology clinic for evaluation of anemia.     In March 2022 patient was found to be anemic with microcytosis.  Patient was not hospitalized at this time.  Patient does not remember the situation around blood work in March 2022 beyond just going to a doctor for regular screening.  Patient denies history of blood loss anemia but does state that in his past he has been anemic.       Patient was not treated with oral iron until fall 2022.  Patient was also admitted secondary to back issues.  At that time, patient was found to be anemic and was given 500 mg of IV Venofer-see outlined below.     Patient is a vegetarian.  He eats a well-rounded diet which also includes leafy green vegetables as well as beans.  Patient notes that years ago he was diagnosed with hemochromatosis but there is no diagnostic test at that time.  Patient was advised to eat a diet low in iron.  This did not influence the patient's desire to be vegetarian.     Patient was asked to follow-up with hematology.  9/21/2020 hemoglobin = 11.7, MCV 87, RDW normal, platelet count 305 (BASELINE)     3/21/2022 hemoglobin = 8.7, MCV 81, RDW 14.9, platelet count 305  12/5/2022 hemoglobin = 10.6, MCV 83, RDW high at 16.5, platelet count 515  Given 2 doses of Venofer total 500 mg during hospitalization.  12/20/2022 WBC = 5.4, hemoglobin 9.8, HCT 29.4, MCV 82.4, RDW 17.4  Patient started on oral iron but was only able to take it 3 weeks before he became constipated.  1/9/2023 hemoglobin = 12.0, HCT = 36.0, RDW 18.6, platelet count 320, white blood cell count 5.8     Patient notes that he is feeling fatigued but is unsure if it is related to his anxiety or depression.     5/5/2023 iron  saturation = 18% ferritin = 15, hemoglobin 11.7, white blood cell count 6.06, platelet count 264     5/12 and 5/19/2023:  Venofer 300mg IV x 2      07/18/2023 iron saturation 38%, ferritin 67,hemoglobin 13.1,white blood cell count 5.67,platelet count 314k     Late summer early fall 2023, diagnosed with locally advanced prostate cancer, status post radiation and presently on ADT therapy.     10/30/2023: Ferritin = 73, WBC 5.7, hemoglobin 13.3, platelet 319, BUN 21, creatinine 0.92, EGFR 85, LFTs WNL     2/28/2024 ferritin = 60, iron saturation 39%              WBC 5.0, hemoglobin 10.9, , platelet count 273     7/10/2024 WBC 4.40, hemoglobin 10.4, hematocrit 32.1, MCV 99, platelets 345, ferritin 39, iron saturation 33, vitamin B12 1,010, potassium 4.2, BUN 15, creatinine 0.71, AST 25, ALT 21     1/8/2025 WBC 6.3, hemoglobin 11.1, hematocrit 31.9, platelets 325, creatinine 0.79, BUN 23, ALT 27, AST 30, ferritin 41.9, percent saturation 41.  Vitamin B12 1011.    7/15/2024 WBC 5.34, hemoglobin 9.0, RDW 16.0, platelets 265.  Ferritin 7, saturation 8%, vitamin B12 643.     Interval history: Patient was seen today in follow-up.  Currently he is feeling well.  He admits to increased fatigue.  He says he recently discontinued Zytiga and prednisone and is now on observation.  He denies any obvious bleeding.  He admits to fatigue.  He denies nausea, vomiting, bowel changes, chest pain, shortness of breath.  He ambulates with a cane.  He says his colonoscopy was around 3 years ago. Patient is a lifelong vegetarian.     Review of Systems   Constitutional:  Positive for fatigue. Negative for activity change, appetite change and fever.   HENT:  Negative for nosebleeds.    Respiratory:  Negative for cough, choking and shortness of breath.    Cardiovascular:  Negative for chest pain, palpitations and leg swelling.   Gastrointestinal:  Negative for abdominal distention, abdominal pain, anal bleeding, blood in stool,  constipation, diarrhea, nausea and vomiting.   Endocrine: Negative for cold intolerance.   Genitourinary:  Negative for hematuria.   Musculoskeletal:  Negative for myalgias.   Skin:  Negative for color change, pallor and rash.   Allergic/Immunologic: Negative for immunocompromised state.   Neurological:  Negative for headaches.   Hematological:  Negative for adenopathy. Bruises/bleeds easily.   All other systems reviewed and are negative.        Objective   Vitals:    07/18/25 1403   BP: 132/74   Pulse: 73   Resp: 16   Temp: 98.6 °F (37 °C)   SpO2: 96%     Physical Exam  Constitutional:       General: He is not in acute distress.     Appearance: Normal appearance. He is well-developed.   HENT:      Head: Normocephalic and atraumatic.      Right Ear: External ear normal.      Left Ear: External ear normal.      Nose: Nose normal.     Eyes:      General: No scleral icterus.     Conjunctiva/sclera: Conjunctivae normal.       Cardiovascular:      Rate and Rhythm: Normal rate and regular rhythm.   Pulmonary:      Effort: Pulmonary effort is normal. No respiratory distress.      Breath sounds: Normal breath sounds.   Abdominal:      General: There is no distension.      Palpations: Abdomen is soft.      Tenderness: There is no abdominal tenderness.     Skin:     Findings: No rash (on exposed skin.).     Neurological:      Mental Status: He is alert and oriented to person, place, and time.     Psychiatric:         Mood and Affect: Mood normal.         Thought Content: Thought content normal.         Judgment: Judgment normal.       Labs: I have reviewed the following labs:  Results for orders placed or performed in visit on 07/15/25   Vitamin B12   Result Value Ref Range    Vitamin B-12 643 180 - 914 pg/mL   Result Value Ref Range    Folate >22.3 >5.9 ng/mL   CBC and differential   Result Value Ref Range    WBC 5.34 4.31 - 10.16 Thousand/uL    RBC 3.38 (L) 3.88 - 5.62 Million/uL    Hemoglobin 9.0 (L) 12.0 - 17.0 g/dL     Hematocrit 28.3 (L) 36.5 - 49.3 %    MCV 84 82 - 98 fL    MCH 26.6 (L) 26.8 - 34.3 pg    MCHC 31.8 31.4 - 37.4 g/dL    RDW 16.0 (H) 11.6 - 15.1 %    MPV 8.9 8.9 - 12.7 fL    Platelets 265 149 - 390 Thousands/uL    nRBC 0 /100 WBCs    Segmented % 79 (H) 43 - 75 %    Immature Grans % 1 0 - 2 %    Lymphocytes % 9 (L) 14 - 44 %    Monocytes % 9 4 - 12 %    Eosinophils Relative 2 0 - 6 %    Basophils Relative 0 0 - 1 %    Absolute Neutrophils 4.21 1.85 - 7.62 Thousands/µL    Absolute Immature Grans 0.03 0.00 - 0.20 Thousand/uL    Absolute Lymphocytes 0.48 (L) 0.60 - 4.47 Thousands/µL    Absolute Monocytes 0.48 0.17 - 1.22 Thousand/µL    Eosinophils Absolute 0.12 0.00 - 0.61 Thousand/µL    Basophils Absolute 0.02 0.00 - 0.10 Thousands/µL   Comprehensive metabolic panel   Result Value Ref Range    Sodium 136 135 - 147 mmol/L    Potassium 4.6 3.5 - 5.3 mmol/L    Chloride 103 96 - 108 mmol/L    CO2 28 21 - 32 mmol/L    ANION GAP 5 4 - 13 mmol/L    BUN 14 5 - 25 mg/dL    Creatinine 0.81 0.60 - 1.30 mg/dL    Glucose, Fasting 96 65 - 99 mg/dL    Calcium 8.7 8.4 - 10.2 mg/dL    AST 24 13 - 39 U/L    ALT 21 7 - 52 U/L    Alkaline Phosphatase 59 34 - 104 U/L    Total Protein 6.3 (L) 6.4 - 8.4 g/dL    Albumin 3.9 3.5 - 5.0 g/dL    Total Bilirubin 0.43 0.20 - 1.00 mg/dL    eGFR 90 ml/min/1.73sq m   TIBC Panel (incl. Iron, TIBC, % Iron Saturation)   Result Value Ref Range    Iron Saturation 8 (L) 15 - 50 %    TIBC 344.4 250 - 450 ug/dL    Iron 28 (L) 50 - 212 ug/dL    Transferrin 246 203 - 362 mg/dL    UIBC 316 155 - 355 ug/dL   Result Value Ref Range    Ferritin 7 (L) 30 - 336 ng/mL

## 2025-07-18 NOTE — ASSESSMENT & PLAN NOTE
Patient now with iron deficiency.  Unclear etiology.  May be diet related.  Patient is up-to-date with his colonoscopy.  He has no evidence of GI blood loss.  Patient will be treated with venofer 300mg weekly x 2.  Orders:    CBC and differential; Future    Comprehensive metabolic panel; Future    Iron Panel (Includes Ferritin, Iron Sat%, Iron, and TIBC); Future

## 2025-07-18 NOTE — TELEPHONE ENCOUNTER
Called pt / left vm to inform of follow up appt     Notified that infusion will call pt to schedule     Left hope line if needed

## 2025-07-24 DIAGNOSIS — M54.41 CHRONIC BILATERAL LOW BACK PAIN WITH BILATERAL SCIATICA: ICD-10-CM

## 2025-07-24 DIAGNOSIS — G89.4 CHRONIC PAIN DISORDER: ICD-10-CM

## 2025-07-24 DIAGNOSIS — M15.9 GENERALIZED OSTEOARTHRITIS OF MULTIPLE SITES: ICD-10-CM

## 2025-07-24 DIAGNOSIS — M54.42 CHRONIC BILATERAL LOW BACK PAIN WITH BILATERAL SCIATICA: ICD-10-CM

## 2025-07-24 DIAGNOSIS — F51.01 PRIMARY INSOMNIA: ICD-10-CM

## 2025-07-24 DIAGNOSIS — G89.29 CHRONIC BILATERAL LOW BACK PAIN WITH BILATERAL SCIATICA: ICD-10-CM

## 2025-07-24 DIAGNOSIS — F11.20 UNCOMPLICATED OPIOID DEPENDENCE (HCC): ICD-10-CM

## 2025-07-24 DIAGNOSIS — G89.4 CHRONIC PAIN SYNDROME: ICD-10-CM

## 2025-07-25 ENCOUNTER — TELEPHONE (OUTPATIENT)
Dept: ADMINISTRATIVE | Facility: OTHER | Age: 70
End: 2025-07-25

## 2025-07-25 ENCOUNTER — HOSPITAL ENCOUNTER (OUTPATIENT)
Dept: RADIOLOGY | Facility: HOSPITAL | Age: 70
Discharge: HOME | End: 2025-07-25
Attending: NURSE PRACTITIONER
Payer: MEDICARE

## 2025-07-25 ENCOUNTER — OFFICE VISIT (OUTPATIENT)
Dept: PHYSICAL THERAPY | Facility: CLINIC | Age: 70
End: 2025-07-25
Payer: MEDICARE

## 2025-07-25 VITALS
BODY MASS INDEX: 25.71 KG/M2 | SYSTOLIC BLOOD PRESSURE: 110 MMHG | TEMPERATURE: 98 F | WEIGHT: 160 LBS | HEIGHT: 66 IN | RESPIRATION RATE: 17 BRPM | DIASTOLIC BLOOD PRESSURE: 71 MMHG | HEART RATE: 89 BPM | OXYGEN SATURATION: 96 %

## 2025-07-25 DIAGNOSIS — G91.2 NPH (NORMAL PRESSURE HYDROCEPHALUS) (HCC): ICD-10-CM

## 2025-07-25 DIAGNOSIS — G91.2 NORMAL PRESSURE HYDROCEPHALUS (HCC): ICD-10-CM

## 2025-07-25 LAB
APTT PPP: 26 SECONDS (ref 23–34)
INR PPP: 0.89 (ref 0.85–1.19)
PLATELET # BLD AUTO: 258 THOUSANDS/UL (ref 149–390)
PMV BLD AUTO: 7.8 FL (ref 8.9–12.7)
PROTHROMBIN TIME: 12.8 SECONDS (ref 12.3–15)

## 2025-07-25 PROCEDURE — 85610 PROTHROMBIN TIME: CPT | Performed by: PHYSICIAN ASSISTANT

## 2025-07-25 PROCEDURE — 62329 THER SPI PNXR CSF FLUOR/CT: CPT

## 2025-07-25 PROCEDURE — 97162 PT EVAL MOD COMPLEX 30 MIN: CPT

## 2025-07-25 PROCEDURE — 85730 THROMBOPLASTIN TIME PARTIAL: CPT | Performed by: PHYSICIAN ASSISTANT

## 2025-07-25 PROCEDURE — 85049 AUTOMATED PLATELET COUNT: CPT | Performed by: PHYSICIAN ASSISTANT

## 2025-07-25 RX ORDER — HYDROCODONE BITARTRATE AND ACETAMINOPHEN 10; 325 MG/1; MG/1
1 TABLET ORAL EVERY 4 HOURS
Qty: 180 TABLET | Refills: 0 | Status: SHIPPED | OUTPATIENT
Start: 2025-07-25

## 2025-07-25 RX ORDER — ZOLPIDEM TARTRATE 12.5 MG/1
12.5 TABLET, FILM COATED, EXTENDED RELEASE ORAL
Qty: 30 TABLET | Refills: 0 | Status: SHIPPED | OUTPATIENT
Start: 2025-07-25

## 2025-07-25 RX ORDER — MORPHINE SULFATE 15 MG/1
15 TABLET, FILM COATED, EXTENDED RELEASE ORAL 2 TIMES DAILY
Qty: 60 TABLET | Refills: 0 | Status: SHIPPED | OUTPATIENT
Start: 2025-07-25

## 2025-07-25 NOTE — TELEPHONE ENCOUNTER
07/25/25 11:12 AM    Patient contacted to bring Advance Directive, POLST, or Living Will document to next scheduled pcp visit.[facility] Department left message.    Thank you.  Maria Esther Talbot MA  [facility]
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
## 2025-07-28 ENCOUNTER — OFFICE VISIT (OUTPATIENT)
Dept: FAMILY MEDICINE CLINIC | Facility: CLINIC | Age: 70
End: 2025-07-28
Payer: MEDICARE

## 2025-07-28 VITALS
WEIGHT: 155.4 LBS | TEMPERATURE: 98.2 F | BODY MASS INDEX: 24.98 KG/M2 | OXYGEN SATURATION: 97 % | HEART RATE: 72 BPM | RESPIRATION RATE: 16 BRPM | DIASTOLIC BLOOD PRESSURE: 60 MMHG | HEIGHT: 66 IN | SYSTOLIC BLOOD PRESSURE: 130 MMHG

## 2025-07-28 DIAGNOSIS — M48.062 SPINAL STENOSIS, LUMBAR REGION WITH NEUROGENIC CLAUDICATION: ICD-10-CM

## 2025-07-28 DIAGNOSIS — G89.4 CHRONIC PAIN DISORDER: ICD-10-CM

## 2025-07-28 DIAGNOSIS — M54.42 CHRONIC BILATERAL LOW BACK PAIN WITH BILATERAL SCIATICA: ICD-10-CM

## 2025-07-28 DIAGNOSIS — M54.41 CHRONIC BILATERAL LOW BACK PAIN WITH BILATERAL SCIATICA: ICD-10-CM

## 2025-07-28 DIAGNOSIS — M51.06 INTERVERTEBRAL DISC DISORDER OF LUMBAR REGION WITH MYELOPATHY: ICD-10-CM

## 2025-07-28 DIAGNOSIS — M96.1 LUMBAR POST-LAMINECTOMY SYNDROME: ICD-10-CM

## 2025-07-28 DIAGNOSIS — M15.9 GENERALIZED OSTEOARTHRITIS OF MULTIPLE SITES: Primary | ICD-10-CM

## 2025-07-28 DIAGNOSIS — M48.02 CERVICAL SPINAL STENOSIS: ICD-10-CM

## 2025-07-28 DIAGNOSIS — F11.20 CONTINUOUS OPIOID DEPENDENCE (HCC): ICD-10-CM

## 2025-07-28 DIAGNOSIS — G89.29 CHRONIC BILATERAL LOW BACK PAIN WITH BILATERAL SCIATICA: ICD-10-CM

## 2025-07-28 PROCEDURE — G2211 COMPLEX E/M VISIT ADD ON: HCPCS | Performed by: FAMILY MEDICINE

## 2025-07-28 PROCEDURE — 99214 OFFICE O/P EST MOD 30 MIN: CPT | Performed by: FAMILY MEDICINE

## 2025-07-29 ENCOUNTER — OFFICE VISIT (OUTPATIENT)
Dept: NEUROSURGERY | Facility: CLINIC | Age: 70
End: 2025-07-29
Payer: MEDICARE

## 2025-07-29 VITALS
HEART RATE: 70 BPM | WEIGHT: 155 LBS | BODY MASS INDEX: 24.91 KG/M2 | SYSTOLIC BLOOD PRESSURE: 106 MMHG | OXYGEN SATURATION: 97 % | TEMPERATURE: 98.8 F | RESPIRATION RATE: 17 BRPM | HEIGHT: 66 IN | DIASTOLIC BLOOD PRESSURE: 74 MMHG

## 2025-07-29 DIAGNOSIS — G91.2 NORMAL PRESSURE HYDROCEPHALUS (HCC): Primary | ICD-10-CM

## 2025-07-29 PROCEDURE — 99213 OFFICE O/P EST LOW 20 MIN: CPT | Performed by: NEUROLOGICAL SURGERY

## 2025-08-05 ENCOUNTER — HOSPITAL ENCOUNTER (OUTPATIENT)
Dept: INFUSION CENTER | Facility: HOSPITAL | Age: 70
Discharge: HOME | End: 2025-08-05
Attending: INTERNAL MEDICINE
Payer: MEDICARE

## 2025-08-07 ENCOUNTER — HOSPITAL ENCOUNTER (OUTPATIENT)
Dept: NEUROLOGY | Facility: HOSPITAL | Age: 70
End: 2025-08-07
Payer: MEDICARE

## 2025-08-07 DIAGNOSIS — I10 PRIMARY HYPERTENSION: Primary | ICD-10-CM

## 2025-08-07 PROBLEM — R56.9 SEIZURES (HCC): Status: ACTIVE | Noted: 2025-08-07

## 2025-08-08 ENCOUNTER — RESULTS FOLLOW-UP (OUTPATIENT)
Age: 70
End: 2025-08-08

## 2025-08-09 RX ORDER — OLMESARTAN MEDOXOMIL 40 MG/1
40 TABLET ORAL DAILY
Qty: 90 TABLET | Refills: 1 | Status: SHIPPED | OUTPATIENT
Start: 2025-08-09

## 2025-08-12 ENCOUNTER — HOSPITAL ENCOUNTER (OUTPATIENT)
Dept: INFUSION CENTER | Facility: HOSPITAL | Age: 70
Discharge: HOME | End: 2025-08-12
Attending: INTERNAL MEDICINE
Payer: MEDICARE

## 2025-08-18 DIAGNOSIS — R53.83 FATIGUE, UNSPECIFIED TYPE: ICD-10-CM

## 2025-08-18 DIAGNOSIS — G47.30 SLEEP APNEA, UNSPECIFIED TYPE: ICD-10-CM

## 2025-08-18 RX ORDER — ARMODAFINIL 250 MG/1
250 TABLET ORAL DAILY
Qty: 30 TABLET | Refills: 0 | Status: SHIPPED | OUTPATIENT
Start: 2025-08-18

## 2025-08-19 ENCOUNTER — TELEPHONE (OUTPATIENT)
Age: 70
End: 2025-08-19

## (undated) DEVICE — ACE WRAP 4 IN STERILE

## (undated) DEVICE — PADDING CAST 4 IN  COTTON STRL

## (undated) DEVICE — SPONGE SCRUB 4 PCT CHLORHEXIDINE

## (undated) DEVICE — 3M™ IOBAN™ 2 ANTIMICROBIAL INCISE DRAPE 6650EZ: Brand: IOBAN™ 2

## (undated) DEVICE — NEEDLE BLUNT 18 G X 1 1/2 W FILTER

## (undated) DEVICE — INTENT TO BE USED WITH SUTURE MATERIAL FOR TISSUE CLOSURE: Brand: RICHARD-ALLAN®  NEEDLE 1/2 CIRCLE REVERSE CUTTING

## (undated) DEVICE — TRAY EPID CONT PERIFIX 18G X 3.5IN 5ML CLSD TIP DRAPE

## (undated) DEVICE — CHLORAPREP APPLICATOR TINTED 10.5ML ONE-STEP

## (undated) DEVICE — NEEDLE 18 G X 1 1/2 SAFETY

## (undated) DEVICE — DISPOSABLE EQUIPMENT COVER: Brand: SMALL TOWEL DRAPE

## (undated) DEVICE — DRAPE SHEET THREE QUARTER

## (undated) DEVICE — BANDAGE, ESMARK LF STR 4"X9'(20/CS): Brand: CYPRESS

## (undated) DEVICE — RADIOLOGY STERILE LABELS: Brand: CENTURION

## (undated) DEVICE — DUAL CUT SAGITTAL BLADE

## (undated) DEVICE — NEEDLE SPINAL 22G X 5IN QUINCKE

## (undated) DEVICE — SUT VICRYL 2-0 CT-1 27 IN J259H

## (undated) DEVICE — PAD CAST 4 IN COTTON NON STERILE

## (undated) DEVICE — SUT ETHILON 4-0 P-3 18 IN 691G

## (undated) DEVICE — DRAPE EQUIPMENT RF WAND

## (undated) DEVICE — BETHLEHEM TOTAL HIP, KIT: Brand: CARDINAL HEALTH

## (undated) DEVICE — WIPES BABY PAMPERS SENSITIVE 36/PK

## (undated) DEVICE — SUT VICRYL 1 CT-1 27 IN J261H

## (undated) DEVICE — ASTOUND STANDARD SURGICAL GOWN, XL: Brand: CONVERTORS

## (undated) DEVICE — GLOVE SRG BIOGEL ORTHOPEDIC 8

## (undated) DEVICE — SMALL NEEDLE COUNTER NEST

## (undated) DEVICE — SUT VICRYL 0 CP-1 27 IN J267H

## (undated) DEVICE — U-DRAPE: Brand: CONVERTORS

## (undated) DEVICE — OCCLUSIVE GAUZE STRIP,3% BISMUTH TRIBROMOPHENATE IN PETROLATUM BLEND: Brand: XEROFORM

## (undated) DEVICE — TOWEL SET X-RAY

## (undated) DEVICE — CAST PADDING 4 IN SYNTHETIC NON-STRL

## (undated) DEVICE — 450 ML BOTTLE OF 0.05% CHLORHEXIDINE GLUCONATE IN 99.95% STERILE WATER FOR IRRIGATION, USP AND APPLICATOR.: Brand: IRRISEPT ANTIMICROBIAL WOUND LAVAGE

## (undated) DEVICE — GLOVE INDICATOR PI UNDERGLOVE SZ 8 BLUE

## (undated) DEVICE — SUT MONOCRYL 4-0 PS-2 27 IN Y426H

## (undated) DEVICE — GLOVE INDICATOR PI UNDERGLOVE SZ 7 BLUE

## (undated) DEVICE — PLUMEPEN PRO 10FT

## (undated) DEVICE — GLOVE SRG BIOGEL 7

## (undated) DEVICE — HOOD: Brand: FLYTE, SURGICOOL

## (undated) DEVICE — PLASTIC ADHESIVE BANDAGE: Brand: CURITY

## (undated) DEVICE — KERLIX BANDAGE ROLL: Brand: KERLIX

## (undated) DEVICE — TIBURON HAND DRAPE: Brand: CONVERTORS

## (undated) DEVICE — CUFF TOURNIQUET 18 X 4 IN QUICK CONNECT DISP 1 BLADDER

## (undated) DEVICE — SYRINGE 5ML LL

## (undated) DEVICE — CHLORAPREP HI-LITE 26ML ORANGE

## (undated) DEVICE — DRESSING MEPILEX AG BORDER POST-OP 4 X 12 IN

## (undated) DEVICE — INTENDED FOR TISSUE SEPARATION, AND OTHER PROCEDURES THAT REQUIRE A SHARP SURGICAL BLADE TO PUNCTURE OR CUT.: Brand: BARD-PARKER ® CARBON RIB-BACK BLADES

## (undated) DEVICE — DRESSING MEPILEX AG BORDER 4 X 10 IN

## (undated) DEVICE — GAUZE SPONGES,16 PLY: Brand: CURITY

## (undated) DEVICE — GLOVE SRG BIOGEL 7.5

## (undated) DEVICE — SUT ETHIBOND 5 V-37 30 IN MB66G

## (undated) DEVICE — TIBURON SPLIT SHEET: Brand: CONVERTORS

## (undated) DEVICE — STOCKINETTE,IMPERVIOUS,12X48,STERILE: Brand: MEDLINE

## (undated) DEVICE — ELECTRODE BLADE MOD  E-Z CLEAN 6.5IN -0014M

## (undated) DEVICE — ACE WRAP 3 IN UNSTERILE

## (undated) DEVICE — COBAN 4 IN STERILE

## (undated) DEVICE — SKIN MARKER DUAL TIP WITH RULER CAP, FLEXIBLE RULER AND LABELS: Brand: DEVON

## (undated) DEVICE — PACK GENERAL LF